# Patient Record
Sex: MALE | Race: WHITE | Employment: UNEMPLOYED | URBAN - METROPOLITAN AREA
[De-identification: names, ages, dates, MRNs, and addresses within clinical notes are randomized per-mention and may not be internally consistent; named-entity substitution may affect disease eponyms.]

---

## 2017-09-05 ENCOUNTER — APPOINTMENT (OUTPATIENT)
Dept: CT IMAGING | Age: 62
DRG: 044 | End: 2017-09-05
Attending: EMERGENCY MEDICINE
Payer: MEDICAID

## 2017-09-05 ENCOUNTER — APPOINTMENT (OUTPATIENT)
Dept: GENERAL RADIOLOGY | Age: 62
DRG: 044 | End: 2017-09-05
Attending: EMERGENCY MEDICINE
Payer: MEDICAID

## 2017-09-05 ENCOUNTER — HOSPITAL ENCOUNTER (INPATIENT)
Age: 62
LOS: 7 days | Discharge: HOME | DRG: 044 | End: 2017-09-12
Attending: EMERGENCY MEDICINE | Admitting: INTERNAL MEDICINE
Payer: MEDICAID

## 2017-09-05 DIAGNOSIS — R51.9 HEADACHE, UNSPECIFIED HEADACHE TYPE: ICD-10-CM

## 2017-09-05 DIAGNOSIS — S06.5XAA SUBDURAL HEMATOMA: Primary | ICD-10-CM

## 2017-09-05 DIAGNOSIS — I15.9 SECONDARY HYPERTENSION: ICD-10-CM

## 2017-09-05 PROBLEM — F31.9 BIPOLAR 1 DISORDER (HCC): Status: ACTIVE | Noted: 2017-09-05

## 2017-09-05 PROBLEM — I62.01 NONTRAUMATIC ACUTE SUBDURAL HEMORRHAGE (HCC): Status: ACTIVE | Noted: 2017-09-05

## 2017-09-05 LAB
ALBUMIN SERPL-MCNC: 3.6 G/DL (ref 3.2–4.6)
ALBUMIN/GLOB SERPL: 0.9 {RATIO} (ref 1.2–3.5)
ALP SERPL-CCNC: 38 U/L (ref 50–136)
ALT SERPL-CCNC: 16 U/L (ref 12–65)
ANION GAP SERPL CALC-SCNC: 14 MMOL/L (ref 7–16)
AST SERPL-CCNC: 10 U/L (ref 15–37)
ATRIAL RATE: 82 BPM
BASOPHILS # BLD: 0 K/UL (ref 0–0.2)
BASOPHILS NFR BLD: 0 % (ref 0–2)
BILIRUB SERPL-MCNC: 1.8 MG/DL (ref 0.2–1.1)
BUN SERPL-MCNC: 5 MG/DL (ref 8–23)
CALCIUM SERPL-MCNC: 9.3 MG/DL (ref 8.3–10.4)
CALCULATED P AXIS, ECG09: 61 DEGREES
CALCULATED R AXIS, ECG10: 54 DEGREES
CALCULATED T AXIS, ECG11: 53 DEGREES
CHLORIDE SERPL-SCNC: 101 MMOL/L (ref 98–107)
CO2 SERPL-SCNC: 20 MMOL/L (ref 21–32)
CREAT SERPL-MCNC: 0.66 MG/DL (ref 0.8–1.5)
DIAGNOSIS, 93000: NORMAL
DIFFERENTIAL METHOD BLD: ABNORMAL
EOSINOPHIL # BLD: 0 K/UL (ref 0–0.8)
EOSINOPHIL NFR BLD: 0 % (ref 0.5–7.8)
ERYTHROCYTE [DISTWIDTH] IN BLOOD BY AUTOMATED COUNT: 12.7 % (ref 11.9–14.6)
EST. AVERAGE GLUCOSE BLD GHB EST-MCNC: 148 MG/DL
GLOBULIN SER CALC-MCNC: 4.2 G/DL (ref 2.3–3.5)
GLUCOSE BLD STRIP.AUTO-MCNC: 137 MG/DL (ref 65–100)
GLUCOSE SERPL-MCNC: 138 MG/DL (ref 65–100)
HBA1C MFR BLD: 6.8 % (ref 4.8–6)
HCT VFR BLD AUTO: 41.4 % (ref 41.1–50.3)
HGB BLD-MCNC: 15.1 G/DL (ref 13.6–17.2)
IMM GRANULOCYTES # BLD: 0 K/UL (ref 0–0.5)
IMM GRANULOCYTES NFR BLD: 0.2 % (ref 0–5)
LYMPHOCYTES # BLD: 2 K/UL (ref 0.5–4.6)
LYMPHOCYTES NFR BLD: 17 % (ref 13–44)
MAGNESIUM SERPL-MCNC: 1.7 MG/DL (ref 1.8–2.4)
MCH RBC QN AUTO: 30.8 PG (ref 26.1–32.9)
MCHC RBC AUTO-ENTMCNC: 36.5 G/DL (ref 31.4–35)
MCV RBC AUTO: 84.3 FL (ref 79.6–97.8)
MONOCYTES # BLD: 1.8 K/UL (ref 0.1–1.3)
MONOCYTES NFR BLD: 15 % (ref 4–12)
NEUTS SEG # BLD: 8.2 K/UL (ref 1.7–8.2)
NEUTS SEG NFR BLD: 68 % (ref 43–78)
P-R INTERVAL, ECG05: 140 MS
PLATELET # BLD AUTO: 147 K/UL (ref 150–450)
PMV BLD AUTO: 10.3 FL (ref 10.8–14.1)
POTASSIUM SERPL-SCNC: 3.3 MMOL/L (ref 3.5–5.1)
PROT SERPL-MCNC: 7.8 G/DL (ref 6.3–8.2)
Q-T INTERVAL, ECG07: 368 MS
QRS DURATION, ECG06: 76 MS
QTC CALCULATION (BEZET), ECG08: 429 MS
RBC # BLD AUTO: 4.91 M/UL (ref 4.23–5.67)
SODIUM SERPL-SCNC: 135 MMOL/L (ref 136–145)
TROPONIN I SERPL-MCNC: <0.02 NG/ML (ref 0.02–0.05)
VENTRICULAR RATE, ECG03: 82 BPM
WBC # BLD AUTO: 12.1 K/UL (ref 4.3–11.1)

## 2017-09-05 PROCEDURE — 70450 CT HEAD/BRAIN W/O DYE: CPT

## 2017-09-05 PROCEDURE — 65610000001 HC ROOM ICU GENERAL

## 2017-09-05 PROCEDURE — 80053 COMPREHEN METABOLIC PANEL: CPT | Performed by: EMERGENCY MEDICINE

## 2017-09-05 PROCEDURE — 71020 XR CHEST PA LAT: CPT

## 2017-09-05 PROCEDURE — 83735 ASSAY OF MAGNESIUM: CPT | Performed by: INTERNAL MEDICINE

## 2017-09-05 PROCEDURE — 74011000250 HC RX REV CODE- 250: Performed by: EMERGENCY MEDICINE

## 2017-09-05 PROCEDURE — 84484 ASSAY OF TROPONIN QUANT: CPT | Performed by: EMERGENCY MEDICINE

## 2017-09-05 PROCEDURE — 82962 GLUCOSE BLOOD TEST: CPT

## 2017-09-05 PROCEDURE — 85025 COMPLETE CBC W/AUTO DIFF WBC: CPT | Performed by: EMERGENCY MEDICINE

## 2017-09-05 PROCEDURE — 74011250637 HC RX REV CODE- 250/637: Performed by: INTERNAL MEDICINE

## 2017-09-05 PROCEDURE — 93005 ELECTROCARDIOGRAM TRACING: CPT | Performed by: EMERGENCY MEDICINE

## 2017-09-05 PROCEDURE — 74011000250 HC RX REV CODE- 250: Performed by: INTERNAL MEDICINE

## 2017-09-05 PROCEDURE — 83036 HEMOGLOBIN GLYCOSYLATED A1C: CPT | Performed by: INTERNAL MEDICINE

## 2017-09-05 PROCEDURE — 74011250636 HC RX REV CODE- 250/636: Performed by: INTERNAL MEDICINE

## 2017-09-05 PROCEDURE — 99284 EMERGENCY DEPT VISIT MOD MDM: CPT | Performed by: EMERGENCY MEDICINE

## 2017-09-05 PROCEDURE — 74011000258 HC RX REV CODE- 258: Performed by: INTERNAL MEDICINE

## 2017-09-05 RX ORDER — IBUPROFEN 400 MG/1
400 TABLET ORAL
Status: DISCONTINUED | OUTPATIENT
Start: 2017-09-05 | End: 2017-09-08

## 2017-09-05 RX ORDER — PANTOPRAZOLE SODIUM 40 MG/1
40 TABLET, DELAYED RELEASE ORAL
Status: DISCONTINUED | OUTPATIENT
Start: 2017-09-06 | End: 2017-09-12 | Stop reason: HOSPADM

## 2017-09-05 RX ORDER — CARVEDILOL 25 MG/1
25 TABLET ORAL 2 TIMES DAILY WITH MEALS
Status: DISCONTINUED | OUTPATIENT
Start: 2017-09-06 | End: 2017-09-08

## 2017-09-05 RX ORDER — NALOXONE HYDROCHLORIDE 0.4 MG/ML
0.4 INJECTION, SOLUTION INTRAMUSCULAR; INTRAVENOUS; SUBCUTANEOUS AS NEEDED
Status: DISCONTINUED | OUTPATIENT
Start: 2017-09-05 | End: 2017-09-10

## 2017-09-05 RX ORDER — LABETALOL HYDROCHLORIDE 5 MG/ML
10 INJECTION, SOLUTION INTRAVENOUS
Status: DISCONTINUED | OUTPATIENT
Start: 2017-09-05 | End: 2017-09-05

## 2017-09-05 RX ORDER — NICARDIPINE HYDROCHLORIDE 0.1 MG/ML
5-15 INJECTION INTRAVENOUS
Status: DISCONTINUED | OUTPATIENT
Start: 2017-09-05 | End: 2017-09-05 | Stop reason: SDUPTHER

## 2017-09-05 RX ORDER — AMITRIPTYLINE HYDROCHLORIDE 50 MG/1
25 TABLET, FILM COATED ORAL
Status: CANCELLED | OUTPATIENT
Start: 2017-09-05

## 2017-09-05 RX ORDER — SODIUM CHLORIDE 0.9 % (FLUSH) 0.9 %
5-10 SYRINGE (ML) INJECTION AS NEEDED
Status: DISCONTINUED | OUTPATIENT
Start: 2017-09-05 | End: 2017-09-12 | Stop reason: HOSPADM

## 2017-09-05 RX ORDER — POTASSIUM CHLORIDE 20 MEQ/1
40 TABLET, EXTENDED RELEASE ORAL
Status: COMPLETED | OUTPATIENT
Start: 2017-09-05 | End: 2017-09-05

## 2017-09-05 RX ORDER — SODIUM CHLORIDE 0.9 % (FLUSH) 0.9 %
5-10 SYRINGE (ML) INJECTION EVERY 8 HOURS
Status: DISCONTINUED | OUTPATIENT
Start: 2017-09-05 | End: 2017-09-05 | Stop reason: SDUPTHER

## 2017-09-05 RX ORDER — MAGNESIUM SULFATE 1 G/100ML
1 INJECTION INTRAVENOUS ONCE
Status: COMPLETED | OUTPATIENT
Start: 2017-09-05 | End: 2017-09-05

## 2017-09-05 RX ORDER — TOPIRAMATE 50 MG/1
50 TABLET, FILM COATED ORAL
Status: DISCONTINUED | OUTPATIENT
Start: 2017-09-05 | End: 2017-09-08

## 2017-09-05 RX ORDER — INSULIN LISPRO 100 [IU]/ML
INJECTION, SOLUTION INTRAVENOUS; SUBCUTANEOUS
Status: DISCONTINUED | OUTPATIENT
Start: 2017-09-05 | End: 2017-09-12 | Stop reason: HOSPADM

## 2017-09-05 RX ORDER — ACETAMINOPHEN 325 MG/1
650 TABLET ORAL
Status: DISCONTINUED | OUTPATIENT
Start: 2017-09-05 | End: 2017-09-12 | Stop reason: HOSPADM

## 2017-09-05 RX ORDER — SPIRONOLACTONE 25 MG/1
25 TABLET ORAL DAILY
Status: DISCONTINUED | OUTPATIENT
Start: 2017-09-06 | End: 2017-09-12 | Stop reason: HOSPADM

## 2017-09-05 RX ORDER — DIVALPROEX SODIUM 500 MG/1
500 TABLET, EXTENDED RELEASE ORAL
Status: DISCONTINUED | OUTPATIENT
Start: 2017-09-05 | End: 2017-09-07

## 2017-09-05 RX ORDER — LISINOPRIL 5 MG/1
10 TABLET ORAL DAILY
Status: DISCONTINUED | OUTPATIENT
Start: 2017-09-06 | End: 2017-09-06

## 2017-09-05 RX ORDER — GABAPENTIN 300 MG/1
600 CAPSULE ORAL 3 TIMES DAILY
Status: CANCELLED | OUTPATIENT
Start: 2017-09-05

## 2017-09-05 RX ORDER — BISACODYL 5 MG
5 TABLET, DELAYED RELEASE (ENTERIC COATED) ORAL DAILY PRN
Status: DISCONTINUED | OUTPATIENT
Start: 2017-09-05 | End: 2017-09-12 | Stop reason: HOSPADM

## 2017-09-05 RX ORDER — SODIUM CHLORIDE 0.9 % (FLUSH) 0.9 %
5-10 SYRINGE (ML) INJECTION EVERY 8 HOURS
Status: DISCONTINUED | OUTPATIENT
Start: 2017-09-05 | End: 2017-09-12 | Stop reason: HOSPADM

## 2017-09-05 RX ORDER — HYDROCODONE BITARTRATE AND ACETAMINOPHEN 7.5; 325 MG/1; MG/1
1 TABLET ORAL
Status: DISCONTINUED | OUTPATIENT
Start: 2017-09-05 | End: 2017-09-07

## 2017-09-05 RX ORDER — ATORVASTATIN CALCIUM 40 MG/1
40 TABLET, FILM COATED ORAL DAILY
Status: CANCELLED | OUTPATIENT
Start: 2017-09-06

## 2017-09-05 RX ORDER — SODIUM CHLORIDE 0.9 % (FLUSH) 0.9 %
5-10 SYRINGE (ML) INJECTION AS NEEDED
Status: DISCONTINUED | OUTPATIENT
Start: 2017-09-05 | End: 2017-09-05 | Stop reason: SDUPTHER

## 2017-09-05 RX ADMIN — NICARDIPINE HYDROCHLORIDE 5 MG/HR: 0.1 INJECTION, SOLUTION INTRAVENOUS at 21:56

## 2017-09-05 RX ADMIN — SODIUM CHLORIDE 15 MG/HR: 900 INJECTION, SOLUTION INTRAVENOUS at 23:45

## 2017-09-05 RX ADMIN — MAGNESIUM SULFATE HEPTAHYDRATE 1 G: 1 INJECTION, SOLUTION INTRAVENOUS at 22:06

## 2017-09-05 RX ADMIN — HYDROCODONE BITARTRATE AND ACETAMINOPHEN 1 TABLET: 7.5; 325 TABLET ORAL at 23:10

## 2017-09-05 RX ADMIN — Medication 10 ML: at 21:55

## 2017-09-05 RX ADMIN — DIVALPROEX SODIUM 500 MG: 500 TABLET, EXTENDED RELEASE ORAL at 23:31

## 2017-09-05 RX ADMIN — TOPIRAMATE 50 MG: 50 TABLET ORAL at 23:31

## 2017-09-05 RX ADMIN — NICARDIPINE HYDROCHLORIDE 5 MG/HR: 0.1 INJECTION, SOLUTION INTRAVENOUS at 20:54

## 2017-09-05 RX ADMIN — POTASSIUM CHLORIDE 40 MEQ: 20 TABLET, EXTENDED RELEASE ORAL at 22:09

## 2017-09-05 NOTE — ED TRIAGE NOTES
Reports headache and shortness of breath x 1 months, went to [facility] [place] last month for possible stroke, pt denies stroke like symptoms currently
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
## 2017-09-06 ENCOUNTER — APPOINTMENT (OUTPATIENT)
Dept: MRI IMAGING | Age: 62
DRG: 044 | End: 2017-09-06
Attending: INTERNAL MEDICINE
Payer: MEDICAID

## 2017-09-06 ENCOUNTER — APPOINTMENT (OUTPATIENT)
Dept: CT IMAGING | Age: 62
DRG: 044 | End: 2017-09-06
Attending: INTERNAL MEDICINE
Payer: MEDICAID

## 2017-09-06 LAB
ANION GAP SERPL CALC-SCNC: 8 MMOL/L (ref 7–16)
BACTERIA SPEC CULT: NORMAL
BUN SERPL-MCNC: 5 MG/DL (ref 8–23)
CALCIUM SERPL-MCNC: 9.1 MG/DL (ref 8.3–10.4)
CHLORIDE SERPL-SCNC: 105 MMOL/L (ref 98–107)
CO2 SERPL-SCNC: 27 MMOL/L (ref 21–32)
CREAT SERPL-MCNC: 0.63 MG/DL (ref 0.8–1.5)
ERYTHROCYTE [DISTWIDTH] IN BLOOD BY AUTOMATED COUNT: 12.7 % (ref 11.9–14.6)
GLUCOSE BLD STRIP.AUTO-MCNC: 114 MG/DL (ref 65–100)
GLUCOSE BLD STRIP.AUTO-MCNC: 119 MG/DL (ref 65–100)
GLUCOSE BLD STRIP.AUTO-MCNC: 127 MG/DL (ref 65–100)
GLUCOSE BLD STRIP.AUTO-MCNC: 175 MG/DL (ref 65–100)
GLUCOSE SERPL-MCNC: 146 MG/DL (ref 65–100)
HCT VFR BLD AUTO: 43.7 % (ref 41.1–50.3)
HGB BLD-MCNC: 15.8 G/DL (ref 13.6–17.2)
INR PPP: 1.1 (ref 0.9–1.2)
MAGNESIUM SERPL-MCNC: 2.1 MG/DL (ref 1.8–2.4)
MCH RBC QN AUTO: 31 PG (ref 26.1–32.9)
MCHC RBC AUTO-ENTMCNC: 36.2 G/DL (ref 31.4–35)
MCV RBC AUTO: 85.7 FL (ref 79.6–97.8)
PLATELET # BLD AUTO: 139 K/UL (ref 150–450)
PMV BLD AUTO: 10.4 FL (ref 10.8–14.1)
POTASSIUM SERPL-SCNC: 3.3 MMOL/L (ref 3.5–5.1)
PROTHROMBIN TIME: 12.3 SEC (ref 9.6–12)
RBC # BLD AUTO: 5.1 M/UL (ref 4.23–5.67)
SERVICE CMNT-IMP: NORMAL
SODIUM SERPL-SCNC: 140 MMOL/L (ref 136–145)
WBC # BLD AUTO: 10.3 K/UL (ref 4.3–11.1)

## 2017-09-06 PROCEDURE — 36415 COLL VENOUS BLD VENIPUNCTURE: CPT | Performed by: INTERNAL MEDICINE

## 2017-09-06 PROCEDURE — 70450 CT HEAD/BRAIN W/O DYE: CPT

## 2017-09-06 PROCEDURE — 74011250637 HC RX REV CODE- 250/637: Performed by: INTERNAL MEDICINE

## 2017-09-06 PROCEDURE — 80048 BASIC METABOLIC PNL TOTAL CA: CPT | Performed by: INTERNAL MEDICINE

## 2017-09-06 PROCEDURE — 85027 COMPLETE CBC AUTOMATED: CPT | Performed by: INTERNAL MEDICINE

## 2017-09-06 PROCEDURE — 74011000258 HC RX REV CODE- 258: Performed by: INTERNAL MEDICINE

## 2017-09-06 PROCEDURE — 92610 EVALUATE SWALLOWING FUNCTION: CPT

## 2017-09-06 PROCEDURE — 97161 PT EVAL LOW COMPLEX 20 MIN: CPT

## 2017-09-06 PROCEDURE — 74011000250 HC RX REV CODE- 250: Performed by: INTERNAL MEDICINE

## 2017-09-06 PROCEDURE — 74011636637 HC RX REV CODE- 636/637: Performed by: INTERNAL MEDICINE

## 2017-09-06 PROCEDURE — 74011250636 HC RX REV CODE- 250/636

## 2017-09-06 PROCEDURE — 77030032490 HC SLV COMPR SCD KNE COVD -B

## 2017-09-06 PROCEDURE — 74011250636 HC RX REV CODE- 250/636: Performed by: INTERNAL MEDICINE

## 2017-09-06 PROCEDURE — 82962 GLUCOSE BLOOD TEST: CPT

## 2017-09-06 PROCEDURE — 83735 ASSAY OF MAGNESIUM: CPT | Performed by: INTERNAL MEDICINE

## 2017-09-06 PROCEDURE — 65610000001 HC ROOM ICU GENERAL

## 2017-09-06 PROCEDURE — 87641 MR-STAPH DNA AMP PROBE: CPT | Performed by: INTERNAL MEDICINE

## 2017-09-06 PROCEDURE — 85610 PROTHROMBIN TIME: CPT | Performed by: EMERGENCY MEDICINE

## 2017-09-06 RX ORDER — LORAZEPAM 2 MG/ML
1 INJECTION INTRAMUSCULAR ONCE
Status: COMPLETED | OUTPATIENT
Start: 2017-09-06 | End: 2017-09-06

## 2017-09-06 RX ORDER — ONDANSETRON 2 MG/ML
4 INJECTION INTRAMUSCULAR; INTRAVENOUS
Status: DISCONTINUED | OUTPATIENT
Start: 2017-09-06 | End: 2017-09-12 | Stop reason: HOSPADM

## 2017-09-06 RX ORDER — CHLORTHALIDONE 25 MG/1
50 TABLET ORAL DAILY
Status: DISCONTINUED | OUTPATIENT
Start: 2017-09-06 | End: 2017-09-08

## 2017-09-06 RX ORDER — POTASSIUM CHLORIDE 20 MEQ/1
40 TABLET, EXTENDED RELEASE ORAL 2 TIMES DAILY
Status: DISCONTINUED | OUTPATIENT
Start: 2017-09-06 | End: 2017-09-08

## 2017-09-06 RX ORDER — ONDANSETRON 2 MG/ML
INJECTION INTRAMUSCULAR; INTRAVENOUS
Status: COMPLETED
Start: 2017-09-06 | End: 2017-09-06

## 2017-09-06 RX ORDER — HYDRALAZINE HYDROCHLORIDE 20 MG/ML
10 INJECTION INTRAMUSCULAR; INTRAVENOUS
Status: DISCONTINUED | OUTPATIENT
Start: 2017-09-06 | End: 2017-09-12 | Stop reason: HOSPADM

## 2017-09-06 RX ORDER — POTASSIUM CHLORIDE 20MEQ/15ML
40 LIQUID (ML) ORAL 2 TIMES DAILY
Status: DISCONTINUED | OUTPATIENT
Start: 2017-09-06 | End: 2017-09-06

## 2017-09-06 RX ORDER — LORAZEPAM 2 MG/ML
INJECTION INTRAMUSCULAR
Status: COMPLETED
Start: 2017-09-06 | End: 2017-09-06

## 2017-09-06 RX ORDER — LISINOPRIL 20 MG/1
40 TABLET ORAL DAILY
Status: DISCONTINUED | OUTPATIENT
Start: 2017-09-07 | End: 2017-09-08

## 2017-09-06 RX ADMIN — INSULIN LISPRO 2 UNITS: 100 INJECTION, SOLUTION INTRAVENOUS; SUBCUTANEOUS at 08:57

## 2017-09-06 RX ADMIN — CHLORTHALIDONE 50 MG: 25 TABLET ORAL at 16:57

## 2017-09-06 RX ADMIN — Medication 10 ML: at 21:02

## 2017-09-06 RX ADMIN — ONDANSETRON: 2 INJECTION INTRAMUSCULAR; INTRAVENOUS at 20:00

## 2017-09-06 RX ADMIN — SODIUM CHLORIDE 15 MG/HR: 900 INJECTION, SOLUTION INTRAVENOUS at 05:16

## 2017-09-06 RX ADMIN — SODIUM CHLORIDE 15 MG/HR: 900 INJECTION, SOLUTION INTRAVENOUS at 11:42

## 2017-09-06 RX ADMIN — Medication 10 ML: at 05:13

## 2017-09-06 RX ADMIN — SODIUM CHLORIDE 15 MG/HR: 900 INJECTION, SOLUTION INTRAVENOUS at 07:30

## 2017-09-06 RX ADMIN — SODIUM CHLORIDE 15 MG/HR: 900 INJECTION, SOLUTION INTRAVENOUS at 17:09

## 2017-09-06 RX ADMIN — LORAZEPAM 1 MG: 2 INJECTION INTRAMUSCULAR; INTRAVENOUS at 19:22

## 2017-09-06 RX ADMIN — Medication 10 ML: at 13:21

## 2017-09-06 RX ADMIN — POTASSIUM CHLORIDE 40 MEQ: 20 TABLET, EXTENDED RELEASE ORAL at 17:00

## 2017-09-06 RX ADMIN — SPIRONOLACTONE 25 MG: 25 TABLET, FILM COATED ORAL at 08:56

## 2017-09-06 RX ADMIN — ONDANSETRON 4 MG: 2 INJECTION INTRAMUSCULAR; INTRAVENOUS at 19:22

## 2017-09-06 RX ADMIN — CARVEDILOL 25 MG: 25 TABLET, FILM COATED ORAL at 16:24

## 2017-09-06 RX ADMIN — HYDRALAZINE HYDROCHLORIDE 10 MG: 20 INJECTION INTRAMUSCULAR; INTRAVENOUS at 12:42

## 2017-09-06 RX ADMIN — SODIUM CHLORIDE 15 MG/HR: 900 INJECTION, SOLUTION INTRAVENOUS at 09:57

## 2017-09-06 RX ADMIN — LISINOPRIL 10 MG: 5 TABLET ORAL at 08:56

## 2017-09-06 RX ADMIN — SODIUM CHLORIDE 15 MG/HR: 900 INJECTION, SOLUTION INTRAVENOUS at 19:28

## 2017-09-06 RX ADMIN — DIVALPROEX SODIUM 500 MG: 500 TABLET, EXTENDED RELEASE ORAL at 21:01

## 2017-09-06 RX ADMIN — HYDROCODONE BITARTRATE AND ACETAMINOPHEN 1 TABLET: 7.5; 325 TABLET ORAL at 20:57

## 2017-09-06 RX ADMIN — SODIUM CHLORIDE 15 MG/HR: 900 INJECTION, SOLUTION INTRAVENOUS at 01:31

## 2017-09-06 RX ADMIN — LORAZEPAM 1 MG: 2 INJECTION, SOLUTION INTRAMUSCULAR; INTRAVENOUS at 20:00

## 2017-09-06 RX ADMIN — TOPIRAMATE 50 MG: 50 TABLET ORAL at 21:01

## 2017-09-06 RX ADMIN — PANTOPRAZOLE SODIUM 40 MG: 40 TABLET, DELAYED RELEASE ORAL at 08:57

## 2017-09-06 RX ADMIN — CARVEDILOL 25 MG: 25 TABLET, FILM COATED ORAL at 08:56

## 2017-09-06 RX ADMIN — SODIUM CHLORIDE 15 MG/HR: 900 INJECTION, SOLUTION INTRAVENOUS at 15:31

## 2017-09-06 RX ADMIN — SODIUM CHLORIDE 15 MG/HR: 900 INJECTION, SOLUTION INTRAVENOUS at 13:20

## 2017-09-06 RX ADMIN — POTASSIUM CHLORIDE 40 MEQ: 20 TABLET, EXTENDED RELEASE ORAL at 11:42

## 2017-09-06 NOTE — PROGRESS NOTES
Bedside and Verbal shift change report given to Jeri [address] (oncoming nurse) by Liz Avila (offgoing nurse). Report included the following information SBAR, Kardex, ED Summary, Procedure Summary, Intake/Output, MAR, Accordion, Recent Results, Med Rec Status, Cardiac Rhythm SR and Alarm Parameters . Patient alert and oriented. Denies numbness or tingling. Denies weakness. Neuro intact. Patient demanding and agitated at times.
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
## 2017-09-06 NOTE — PROGRESS NOTES
Neurological changes noted. All extremities weakened, but he is able to move all extremities. Bilateral facial weakness noted. Patient denies numbness or tingling. Able to tell me his name and date of birth with delayed responses. Pupils PERRLA at 3mm. Dr. Gini Davies paged and awaiting callback.

## 2017-09-06 NOTE — PROGRESS NOTES
Patient's systolic >697 at times. Reported to Dr. Erick Gardner. Patient maxed on cardene drip. No new orders at this time.

## 2017-09-06 NOTE — PROGRESS NOTES
Bedside report from Levine, Susan. \Hospital Has a New Name and Outlook.\"". Bed alarm on.  BP WNL. Will lrepeat BP to see if Cardene can be weaned.

## 2017-09-06 NOTE — CONSULTS
LEAPFROG PROTOCOL NOTE    Di Vaibhav  9/6/2017    The patient is currently in the critical care setting managed by Dr. Jose Maria Newby with Nontraumatic acute subdural hemorrhage. The patient's chart is reviewed and the patient is discussed with the staff. Patient is currently hemodynamically stable. Patient has no needs identified for Intensivist management in the critical care setting at this time. Please notify us if can be of assistance. No charge billed to the patient. Thank you.     Patricia Aguilar, NP

## 2017-09-06 NOTE — INTERDISCIPLINARY ROUNDS
Interdisciplinary team rounds were held 9/6/2017 with the following team members:Nursing, Nurse Practitioner, Nutrition, Palliative Care, Pastoral Care, Pharmacy, Physical Therapy, Physician, Respiratory Therapy and Clinical Coordinator and the patient. Plan of care discussed. See clinical pathway and/or care plan for interventions and desired outcomes.

## 2017-09-06 NOTE — PROGRESS NOTES
NS  CT SHOWS NO DEFINITE ACUTE HEMORRHAGE  SMALL LINEAR DENSITY ALONG THE FALX OF UNCERTAIN SIGNIFICANCE  NO MASS EFFECT  A/P NOT A SURGICAL SITUATION AT ALL  AGREE WITH DR Sejal Rivera MD
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
## 2017-09-06 NOTE — PROGRESS NOTES
Problem: Mobility Impaired (Adult and Pediatric)  Goal: *Acute Goals and Plan of Care (Insert Text)      PHYSICAL THERAPY: INITIAL ASSESSMENT, DISCHARGE, AM 9/6/2017  INPATIENT: Hospital Day: 2  Payor: [facility] MEDICAID / Plan: [facility] MEDICAID / Product Type: Medicaid /      NAME/AGE/GENDER: Luis Mathew is a 59 y.o. male    PRIMARY DIAGNOSIS: Nontraumatic acute subdural hemorrhage (HCC) Nontraumatic acute subdural hemorrhage (HCC) Nontraumatic acute subdural hemorrhage (HCC)        ICD-10: Treatment Diagnosis:       · Generalized Muscle Weakness (M62.81)   Precaution/Allergies:  Oyster extract       ASSESSMENT:      Mr. Barbie Jaffe is a 59 y.o. male in the hospital for the above who was supine in bed upon arrival.  He reports that he lives with his wife and 5 grandchildren in one story house. Pt also stated that he was independent with ADLS/ambulation PTA with no recent falls reported. Mr. Barbie Jaffe presents to PT with [facility] AROM and some weakness in B hip flexors (4/5). Pt reports intact sensation to light touch in B L3-S2 dermatomes and has WFL B LE coordination. Pt was able to come to sitting on edge of bed with modified independence and good sitting balance. He was also able to transfers and ambulate with supervision-modified independence. Pt at first used IV pole but was able to ambulate without while demonstrating good to fair + standing balance. His gait speed is slightly decreased but pt had no loss of balance throughout out of bed activity. Pt's BP recorded at 160/80 in sitting position after ambulation. Mr. Barbie Jaffe appears to be functioning close to baseline and will be discharged from PT. Please re-consult if status changes. 1.           RECOMMENDED REHABILITATION/EQUIPMENT: (at time of discharge pending progress): Due to the probability of continued deficits (see above) this patient will not likely need continued skilled physical therapy after discharge.   Equipment: · None at this time                   HISTORY:   History of Present Injury/Illness (Reason for Referral):  Per H&P:   HPI:   59M with PMH of complicated migraines with TIA symptoms presented to the ER with worsening headache since morning. Pt states he has been having headaches for about 3-4 months that have been off and on, Right posterior area, feels different than his migraines. Has been taking tylenol and sometimes takes percocet but headache didn't resolve completely. Today it was so worse that he came to the ER. CT head showed Increased linear density along the posterior falx subsequently asymmetrically extending along the right tentorial leaflet. A hairline acute subdural hematoma cannot be excluded. Images were reviewed by on call Neurosurgery and it looked like a small SHD. Pt denies any head trauma. His BP was 183/93 in ER. Hospitalist asked to admit. Pt states his headache has eased off for now. Denies any visual or speech related changes, any new numbness or weakness, dizziness or falls. Just feels weak overall. Wife at bedside concerned about depression as Pt has been feeling like he's gonna die and has self DC'd some of his home meds. He denies any SI/HI. No recent illness or fever reported. 10 point ROS done and is negative except as noted in HPI. Past Medical History/Comorbidities:   Mr. Leticia Reyes  has a past medical history of Anxiety (6/21/2016); Arrhythmia; Asthma (6/21/2016); Bipolar affective disorder, manic ([address] (5/16/2011); CAD (coronary artery disease) (6/21/2016); CHF (congestive heart failure)  (6/21/2016); Chronic back pain (6/21/2016); COPD (chronic obstructive pulmonary disease) ([address] (1/21/2016); Coronary atherosclerosis of native coronary artery (7/20/2014); CVA (cerebral vascular accident) ([address] (3/29/2011); Depression (6/21/2016); Diabetes mellitus type 2, controlled ([address] (11/5/2010);  Diabetic neuropathy ([facility] (6/21/2016); DM2 (diabetes mellitus, type 2) ([address] (7/20/2014); Dyslipidemia (11/5/2010); GERD (gastroesophageal reflux disease); Heart failure ([facility] HTN (hypertension) (7/20/2014); Hypertensive cardiovascular disease (11/5/2010); Insomnia (6/21/2016); Left leg weakness (5/12/2011); Left-sided weakness (8/28/2015); Migraine (7/20/2014); Neurological disorder; Thrombocytopenia ([facility] (6/21/2016); Unintentional weight loss (6/21/2016); and Weakness of left arm (5/12/2011). He also has no past medical history of COPD. Mr. Marci Felder  has a past surgical history that includes hernia repair; ptca; other surgical; and tonsil and adenoidectomy. Social History/Living Environment:   Home Environment: Private residence  # Steps to Enter: 1  One/Two Story Residence: One story  Living Alone: No  Support Systems: Spouse/Significant Other/Partner  Patient Expects to be Discharged to[de-identified] Unknown  Current DME Used/Available at Home: Cane, straight  Tub or Shower Type: Tub/Shower combination  Prior Level of Function/Work/Activity:  Lives with his wife and grandchildren in one story house with one step to enter. Independent with ADLs/ambulation PTA with no recent falls. Number of Personal Factors/Comorbidities that affect the Plan of Care:   Anxiety  Bipolar  CHF  COPD  Chronic back pain  DM II 3+: HIGH COMPLEXITY   EXAMINATION:   Most Recent Physical Functioning:   Gross Assessment:  AROM: Within functional limits  Strength: Generally decreased, functional (B hip flexors 4/5)  Coordination: Within functional limits  Sensation: Intact               Posture:     Balance:  Sitting: Intact  Standing: Impaired  Standing - Static: Good  Standing - Dynamic : Fair (+) Bed Mobility:  Supine to Sit: Modified independent  Sit to Supine: Modified independent  Scooting: Modified independent  Wheelchair Mobility:     Transfers:  Sit to Stand: Supervision  Stand to Sit: Modified independent  Gait:     Gait Abnormalities: Decreased step clearance  Distance (ft): 500 Feet (ft)  Ambulation - Level of Assistance: Modified independent       Body Structures Involved:  1. Nerves Body Functions Affected:  1. None Activities and Participation Affected:  1. None   Number of elements that affect the Plan of Care: 3: MODERATE COMPLEXITY   CLINICAL PRESENTATION:   Presentation: Stable and uncomplicated: LOW COMPLEXITY   CLINICAL DECISION MAKIN [facility] [address] AM-PAC 6 Clicks   Basic Mobility Inpatient Short Form  How much difficulty does the patient currently have. .. Unable A Lot A Little None   1. Turning over in bed (including adjusting bedclothes, sheets and blankets)? [ ] 1   [ ] 2   [ ] 3   [X] 4   2. Sitting down on and standing up from a chair with arms ( e.g., wheelchair, bedside commode, etc.)   [ ] 1   [ ] 2   [ ] 3   [X] 4   3. Moving from lying on back to sitting on the side of the bed? [ ] 1   [ ] 2   [ ] 3   [X] 4   How much help from another person does the patient currently need. .. Total A Lot A Little None   4. Moving to and from a bed to a chair (including a wheelchair)? [ ] 1   [ ] 2   [ ] 3   [X] 4   5. Need to walk in hospital room? [ ] 1   [ ] 2   [ ] 3   [X] 4   6. Climbing 3-5 steps with a railing? [ ] 1   [ ] 2   [ ] 3   [X] 4   © , Trustees of [address] [place] [address] [address] [address], under license to [employer]. All rights reserved    Score:  Initial: 24 Most Recent: X (Date: -- )     Interpretation of Tool:  Represents activities that are increasingly more difficult (i.e. Bed mobility, Transfers, Gait).        Score 24 23 22-20 19-15 14-10 9-7 6       Modifier CH CI CJ CK CL CM CN         · Mobility - Walking and Moving Around:               - CURRENT STATUS:    CH - 0% impaired, limited or restricted               - GOAL STATUS:           CH - 0% impaired, limited or restricted               - D/C STATUS:                       CH - 0% impaired, limited or restricted  Payor: [employer] [place] / [place] [employer] [facility] [place] / Product Type: Medicaid /        Use of outcome tool(s) and clinical judgement create a POC that gives a: Clear prediction of patient's progress: LOW COMPLEXITY                 TREATMENT:   (In addition to Assessment/Re-Assessment sessions the following treatments were rendered)   Pre-treatment Symptoms/Complaints:  None  Pain: Initial:   Pain Intensity 1: 0  Post Session:  0      Assessment/Reassessment only, no treatment provided today     Braces/Orthotics/Lines/Etc:   · IV  · O2 Device: Room air  Treatment/Session Assessment:    · Response to Treatment:  Pt tolerated well with no complaints. · Interdisciplinary Collaboration:  · Physical Therapist  · Registered Nurse  · Rehabilitation Attendant  · After treatment position/precautions:  · Supine in bed  · Bed/Chair-wheels locked  · Bed in low position  · Call light within reach  · RN notified  · Side rails x 3  · Compliance with Program/Exercises: Will assess as treatment progresses. · Recommendations/Intent for next treatment session: \"Next visit will focus on advancements to more challenging activities and reduction in assistance provided\".   Total Treatment Duration:  PT Patient Time In/Time Out  Time In: 6859  Time Out: 1669 Sadia Harris PT, DPT

## 2017-09-06 NOTE — PROGRESS NOTES
Summoned to room by bed alarm. Pt refuses to stay in bed, stating, \"I just need to get some air\". 02 sat 98% on room air. Pt alert and oriented x 4, but states, \"Just leave me alone\". Fauzia Taylor assisting pt up to commode and back to bed. Bed alarm in use.

## 2017-09-06 NOTE — PROGRESS NOTES
Patient's blood pressure sustaining systolics > 452. Dr. Kaushik Amaya notified. No new orders at this time. Cardene drip maxed and hydralazine given.

## 2017-09-06 NOTE — PROGRESS NOTES
During pt education about addition of PRN Hydralazine to attain SBP goal of 140's, pt stated that he \"has already had too many medicines tonight\". The pt is concerned that his BP will continue to decrease and that it will cause him to [facility] something bad happen\". Pt's right to refuse treatment observed and  notified. Currently, pt is not having any overt neuro changes except that his headache is \"almost gone\". BP monitoring will continue.

## 2017-09-06 NOTE — PROGRESS NOTES
* No dysphagia goals identified as swallow function is within normal limits. STG: Patient will participate in speech-language/cognitive assessment. Speech language pathology: bedside swallow note: Initial Assessment and Discharge    NAME/AGE/GENDER: Levan Peabody is a 59 y.o. male  DATE: 9/6/2017  PRIMARY DIAGNOSIS: Nontraumatic acute subdural hemorrhage ([facility]       ICD-10: Treatment Diagnosis: R13.12 Oropharyngeal Dysphagia. INTERDISCIPLINARY COLLABORATION: Registered Nurse  PRECAUTIONS/ALLERGIES: Oyster extract ASSESSMENT:Based on the objective data described below, Mr. Josue Son presents with swallow function that is within normal limits. He reports consuming regular diet prior to admission. He reports some \"drooling\" this AM, but relates it to medication provided by RN. No gross asymmetries noted on oral motor assessment. He also endorses changes in cognition over last 2 weeks, but did not elaborate on specific difficulties despite questioning from clinician. He was presented with thin via cup and straw, mixed, and solid consistencies. He demonstrated timely mastication with appropriate swallow initiation of mixed and solids. Single swallow palpated and adequate oral clearing. No drooling noted with liquids or solids. Consecutive sips of thin via straw consumed without overt signs or symptoms of airway compromise. Swallow function is within normal limits. Recommend continued with prescribed diet. No additional dysphagia treatment is warranted. ST to follow up with cognitive-linguistic assessment secondary to patient's reports of difficulties at home. Patient will benefit from skilled intervention to address the below impairments. ?????? ? ? This section established at most recent assessment??????????  PROBLEM LIST (Impairments causing functional limitations):  1.  Oropharyngeal dysphagia  REHABILITATION POTENTIAL FOR STATED GOALS: Excellent  PLAN OF CARE:   Patient will benefit from skilled intervention to address the following impairments. RECOMMENDATIONS AND PLANNED INTERVENTIONS (Benefits and precautions of therapy have been discussed with the patient.):  · continue prescribed diet  MEDICATIONS:  · With liquid  COMPENSATORY STRATEGIES/MODIFICATIONS INCLUDING:  · None  OTHER RECOMMENDATIONS (including follow up treatment recommendations):   · speech-language/cognitive assessment  RECOMMENDED DIET MODIFICATIONS DISCUSSED WITH:  · Nursing  · Patient  FREQUENCY/DURATION: Continue to follow patient 3 times a week for duration of hospital stay to address above goals. RECOMMENDED REHABILITATION/EQUIPMENT: (at time of discharge pending progress): Due to the probability of continued deficits (see above) this patient will not likely need continued skilled speech therapy after discharge. SUBJECTIVE:   \"I am going home today. I want to see my grandbabies. \"  History of Present Injury/Illness: Mr. Winnie Berumen  has a past medical history of Anxiety (6/21/2016); Arrhythmia; Asthma (6/21/2016); Bipolar affective disorder, manic ([address] (5/16/2011); CAD (coronary artery disease) (6/21/2016); CHF (congestive heart failure)  (6/21/2016); Chronic back pain (6/21/2016); COPD (chronic obstructive pulmonary disease) ([address] (1/21/2016); Coronary atherosclerosis of native coronary artery (7/20/2014); CVA (cerebral vascular accident) ([address] (3/29/2011); Depression (6/21/2016); Diabetes mellitus type 2, controlled ([address] (11/5/2010); Diabetic neuropathy ([address] (6/21/2016); DM2 (diabetes mellitus, type 2) ([address] (7/20/2014); Dyslipidemia (11/5/2010); GERD (gastroesophageal reflux disease); Heart failure ([address] HTN (hypertension) (7/20/2014); Hypertensive cardiovascular disease (11/5/2010); Insomnia (6/21/2016); Left leg weakness (5/12/2011); Left-sided weakness (8/28/2015); Migraine (7/20/2014); Neurological disorder; Thrombocytopenia ([address] (6/21/2016); Unintentional weight loss (6/21/2016); and Weakness of left arm (5/12/2011).  He also has no past medical history of COPD. Josh Nicole He also  has a past surgical history that includes hernia repair; ptca; other surgical; and tonsil and adenoidectomy. Present Symptoms: No dysphagia symptoms identified   Pain Intensity 1: 0  Pain Location 1: Head  Pain Orientation 1: Left  Pain Intervention(s) 1: Medication (see MAR)  Current Medications:   No current facility-administered medications on file prior to encounter. Current Outpatient Prescriptions on File Prior to Encounter   Medication Sig Dispense Refill    oxyCODONE-acetaminophen (PERCOCET)  mg per tablet Take 1 Tab by mouth every six (6) hours as needed for Pain. Max Daily Amount: 4 Tabs. 15 Tab 0    gabapentin (NEURONTIN) 600 mg tablet Take  by mouth three (3) times daily.  divalproex ER (DEPAKOTE ER) 500 mg ER tablet Take 1 Tab by mouth nightly. 30 Tab 0    topiramate (TOPAMAX) 25 mg tablet Take 2 Tabs by mouth nightly. 30 Tab 0    metFORMIN (GLUCOPHAGE) 1,000 mg tablet Take 1,000 mg by mouth two (2) times daily (with meals).  lisinopril (PRINIVIL, ZESTRIL) 10 mg tablet Take 10 mg by mouth daily.  spironolactone (ALDACTONE) 25 mg tablet Take 25 mg by mouth daily.  omeprazole (PRILOSEC) 20 mg capsule Take 20 mg by mouth daily.  carvedilol (COREG) 25 mg tablet Take 25 mg by mouth two (2) times daily (with meals).  nitroglycerin (NITROSTAT) 0.4 mg SL tablet 1 Tab by SubLINGual route as needed for Chest Pain. 1 Bottle 0     Current Dietary Status:  Regular/thin      Social History/Home Situation:    Home Environment: Private residence  # Steps to Enter: 1  One/Two Story Residence: One story  Living Alone: No  Support Systems: Mu-ism / hodan community, Family member(s)  Patient Expects to be Discharged to[de-identified] Private residence  Current DME Used/Available at Home: Glucometer  OBJECTIVE:   Respiratory Status:  Room air     CXR Results:No acute cardiopulmonary process evident by plain film imaging. MRI/CT Results:  Increased linear density along the posterior falx subsequently  asymmetrically extending along the right tentorial leaflet. A hairline acute  subdural hematoma cannot be excluded especially given its asymmetric appearance  along the tentorial leaflets. MRI pending  Oral Motor Structure/Speech:  Oral-Motor Structure/Motor Speech  Labial: No impairment  Dentition: Upper & lower dentures  Oral Hygiene: Adequate  Lingual: No impairment    Cognitive and Communication Status:  Neurologic State: Alert; Appropriate for age  Orientation Level: Oriented X4  Cognition: Follows commands  Perception: Appears intact  Perseveration: No perseveration noted  Safety/Judgement: Fall prevention    BEDSIDE SWALLOW EVALUATION  Oral Assessment:  Oral Assessment  Labial: No impairment  Dentition: Upper & lower dentures  Oral Hygiene: Adequate  Lingual: No impairment  P.O. Trials:  Patient Position: upright in bed    The patient was given tsp-small bite amounts of the following:   Consistency Presented: Solid;Mixed consistency; Thin liquid  How Presented: SLP-fed/presented;Spoon;Straw;Cup/sip; Successive swallows    ORAL PHASE:  Bolus Acceptance: No impairment  Bolus Formation/Control: No impairment  Propulsion: No impairment          PHARYNGEAL PHASE:  Initiation of Swallow: No impairment  Laryngeal Elevation: Functional  Aspiration Signs/Symptoms: None  Vocal Quality: No impairment           Pharyngeal Phase Characteristics: No impairment, issues, or problems     OTHER OBSERVATIONS:  Rate/bite size: WNL   Endurance: WNL   Comments:       Tool Used: Dysphagia Outcome and Severity Scale (DAI)    Score Comments   Normal Diet  [] 7 With no strategies or extra time needed   Functional Swallow  [] 6 May have mild oral or pharyngeal delay       Mild Dysphagia    [] 5 Which may require one diet consistency restricted (those who demonstrate penetration which is entirely cleared on MBS would be included)   Mild-Moderate Dysphagia  [] 4 With 1-2 diet consistencies restricted       Moderate Dysphagia  [] 3 With 2 or more diet consistencies restricted       Moderately Severe Dysphagia  [] 2 With partial PO strategies (trials with ST only)       Severe Dysphagia  [] 1 With inability to tolerate any PO safely          Score:  Initial: 7 Most Recent: X (Date: -- )   Interpretation of Tool: The Dysphagia Outcome and Severity Scale (DAI) is a simple, easy-to-use, 7-point scale developed to systematically rate the functional severity of dysphagia based on objective assessment and make recommendations for diet level, independence level, and type of nutrition. Score 7 6 5 4 3 2 1   Modifier CH CI CJ CK CL CM CN   ? Swallowing:     - CURRENT STATUS: CH - 0% impaired, limited or restricted    - GOAL STATUS:  CH - 0% impaired, limited or restricted    - D/C STATUS:  CH - 0% impaired, limited or restricted  [place] [employer] [place] / [place] [employer] [place] / Product Type: Medicaid /     TREATMENT:    (In addition to Assessment/Re-Assessment sessions the following treatments were rendered)  Assessment/Reassessment only, no treatment provided today  MODALITIES:                                                                    ORAL MOTOR  EXERCISES:                                                                                                                                                                      LARYNGEAL / PHARYNGEAL EXERCISES:                                                                                                                                     __________________________________________________________________________________________________  Safety:   After treatment position/precautions:  · Upright in Bed  Recommendations/Intent for next treatment session: \"Treatment next visit will focus on speech-language/cognitive assessment\".     Total Treatment Duration:  Time In: 9933  Time Out: [address] [place]. FELIX Mathew, CCC-SLP, CBIS

## 2017-09-06 NOTE — PROGRESS NOTES
Spoke with Dr. Delon Ham, no concern for any intracranial bleed. Will DC Cardene gtt and transfer out to remote tele. No need for aggressive SBP control to < 140. Will order CT in am and if -ve, then he can be discharged. Consider telepsych eval if he is agitated and develops acute psychosis. Pt is very mistrustful and delusional about meds given by RN, believes these meds will make him worse and kill him.
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
## 2017-09-06 NOTE — PROGRESS NOTES
Spiritual Care visit. Initial visit.  visited and prayed with patient at bedside. 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 Incidentally, he said the owner of the building had asked him to move out because he (the owner) was going to   [facility] the building. He also spoke of some challenging health issues he is facing, and about his anxiety about them.  conversed with him about God's call to faithfulness, and his role in obedience.

## 2017-09-06 NOTE — PROGRESS NOTES
PT Note:  Per RN pt is not appropriate at this time for PT due to cognitive status and orders should be cancelled at this time. Please re-consult when pt is appropriate.   Thanks,  Terell Enrique, PT, DPT
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
## 2017-09-06 NOTE — PROGRESS NOTES
Spoke to  as he rounded on unit about Cardene gtt 15mg/hr ( 150cc/hr) not being effective to get SBP down to 140's. Order for PRN Hydralzine received.

## 2017-09-06 NOTE — PROGRESS NOTES
Bedside report to Ramiro Martin RN. Pt has been standing up during report. His Cardene gtt is awaiting a new bag. Pt is currently argumentative as oncoming RN is attempting to do effective pt teaching about his need for further cooperation with his treatment plan which includes not getting out of bed and use of further antihypertensives for BP control. Neurosurgery is to be contacted on day shift.

## 2017-09-07 ENCOUNTER — APPOINTMENT (OUTPATIENT)
Dept: CT IMAGING | Age: 62
DRG: 044 | End: 2017-09-07
Attending: INTERNAL MEDICINE
Payer: MEDICAID

## 2017-09-07 LAB
ANION GAP SERPL CALC-SCNC: 11 MMOL/L (ref 7–16)
BUN SERPL-MCNC: 10 MG/DL (ref 8–23)
CALCIUM SERPL-MCNC: 8.9 MG/DL (ref 8.3–10.4)
CHLORIDE SERPL-SCNC: 108 MMOL/L (ref 98–107)
CO2 SERPL-SCNC: 24 MMOL/L (ref 21–32)
CREAT SERPL-MCNC: 0.71 MG/DL (ref 0.8–1.5)
GLUCOSE BLD STRIP.AUTO-MCNC: 132 MG/DL (ref 65–100)
GLUCOSE BLD STRIP.AUTO-MCNC: 141 MG/DL (ref 65–100)
GLUCOSE BLD STRIP.AUTO-MCNC: 150 MG/DL (ref 65–100)
GLUCOSE BLD STRIP.AUTO-MCNC: 174 MG/DL (ref 65–100)
GLUCOSE SERPL-MCNC: 126 MG/DL (ref 65–100)
POTASSIUM SERPL-SCNC: 3.7 MMOL/L (ref 3.5–5.1)
SODIUM SERPL-SCNC: 143 MMOL/L (ref 136–145)

## 2017-09-07 PROCEDURE — 74011000250 HC RX REV CODE- 250: Performed by: INTERNAL MEDICINE

## 2017-09-07 PROCEDURE — 74011250637 HC RX REV CODE- 250/637: Performed by: INTERNAL MEDICINE

## 2017-09-07 PROCEDURE — 96125 COGNITIVE TEST BY HC PRO: CPT

## 2017-09-07 PROCEDURE — 65660000000 HC RM CCU STEPDOWN

## 2017-09-07 PROCEDURE — 70450 CT HEAD/BRAIN W/O DYE: CPT

## 2017-09-07 PROCEDURE — 97530 THERAPEUTIC ACTIVITIES: CPT

## 2017-09-07 PROCEDURE — 97166 OT EVAL MOD COMPLEX 45 MIN: CPT

## 2017-09-07 PROCEDURE — 82962 GLUCOSE BLOOD TEST: CPT

## 2017-09-07 PROCEDURE — 97535 SELF CARE MNGMENT TRAINING: CPT

## 2017-09-07 PROCEDURE — 80048 BASIC METABOLIC PNL TOTAL CA: CPT | Performed by: INTERNAL MEDICINE

## 2017-09-07 PROCEDURE — 74011250636 HC RX REV CODE- 250/636: Performed by: INTERNAL MEDICINE

## 2017-09-07 PROCEDURE — 36415 COLL VENOUS BLD VENIPUNCTURE: CPT | Performed by: INTERNAL MEDICINE

## 2017-09-07 PROCEDURE — 74011636637 HC RX REV CODE- 636/637: Performed by: INTERNAL MEDICINE

## 2017-09-07 RX ORDER — DIVALPROEX SODIUM 125 MG/1
250 TABLET, DELAYED RELEASE ORAL EVERY 12 HOURS
Status: DISCONTINUED | OUTPATIENT
Start: 2017-09-07 | End: 2017-09-12 | Stop reason: HOSPADM

## 2017-09-07 RX ORDER — METOPROLOL TARTRATE 5 MG/5ML
5 INJECTION INTRAVENOUS ONCE
Status: COMPLETED | OUTPATIENT
Start: 2017-09-07 | End: 2017-09-07

## 2017-09-07 RX ADMIN — HYDRALAZINE HYDROCHLORIDE 10 MG: 20 INJECTION INTRAMUSCULAR; INTRAVENOUS at 22:33

## 2017-09-07 RX ADMIN — METOPROLOL TARTRATE 5 MG: 5 INJECTION INTRAVENOUS at 04:35

## 2017-09-07 RX ADMIN — CARVEDILOL 25 MG: 25 TABLET, FILM COATED ORAL at 10:07

## 2017-09-07 RX ADMIN — Medication 10 ML: at 15:13

## 2017-09-07 RX ADMIN — HYDRALAZINE HYDROCHLORIDE 10 MG: 20 INJECTION INTRAMUSCULAR; INTRAVENOUS at 02:13

## 2017-09-07 RX ADMIN — Medication 10 ML: at 05:04

## 2017-09-07 RX ADMIN — PANTOPRAZOLE SODIUM 40 MG: 40 TABLET, DELAYED RELEASE ORAL at 10:07

## 2017-09-07 RX ADMIN — INSULIN LISPRO 2 UNITS: 100 INJECTION, SOLUTION INTRAVENOUS; SUBCUTANEOUS at 12:03

## 2017-09-07 RX ADMIN — Medication 10 ML: at 21:25

## 2017-09-07 RX ADMIN — SPIRONOLACTONE 25 MG: 25 TABLET, FILM COATED ORAL at 10:07

## 2017-09-07 RX ADMIN — HYDRALAZINE HYDROCHLORIDE 10 MG: 20 INJECTION INTRAMUSCULAR; INTRAVENOUS at 06:14

## 2017-09-07 RX ADMIN — Medication 10 ML: at 06:15

## 2017-09-07 RX ADMIN — CHLORTHALIDONE 50 MG: 25 TABLET ORAL at 10:07

## 2017-09-07 RX ADMIN — POTASSIUM CHLORIDE 40 MEQ: 20 TABLET, EXTENDED RELEASE ORAL at 10:07

## 2017-09-07 RX ADMIN — WATER 10 MG: 1 INJECTION INTRAMUSCULAR; INTRAVENOUS; SUBCUTANEOUS at 01:59

## 2017-09-07 RX ADMIN — LISINOPRIL 40 MG: 20 TABLET ORAL at 10:07

## 2017-09-07 RX ADMIN — HYDROCODONE BITARTRATE AND ACETAMINOPHEN 1 TABLET: 7.5; 325 TABLET ORAL at 06:10

## 2017-09-07 RX ADMIN — ACETAMINOPHEN 650 MG: 325 TABLET ORAL at 15:13

## 2017-09-07 NOTE — PROGRESS NOTES
Lopressor given in the last hour for elevated BP and HR >100. BP down to 173/85 and HR down to 100. Pt continues oriented and easily awakened from a light sleep. He continues to offer staff $500 to anyone who will let him out of the soft restraints. He says that the bed alarm scared him earlier and this was why he was combative when he was walking in the room near the computer station without assistance.

## 2017-09-07 NOTE — PROGRESS NOTES
Pt on phone dialing multiple numbers to tell them, \"I'm dying. I just want to go to that window and they won't let me\". Anthony Solitario RN notifying hospitalist as I am at the bedside.

## 2017-09-07 NOTE — PROGRESS NOTES
STG: Patient will maintain attention to task at hand for 10 minutes without need for re-direction. STG: Patient will recall relevant verbal information with 80% accuracy with minimal assistance. STG: Patient will participate in moderate level word finding task with 80% accuracy  STG: Patient will complete verbal problem solving tasks with 80% accuracy with minimal assistance. LTG: Patient will increase neuro-linguistic abilities to increase safety and awareness of deficits. Speech language pathology: Speech-language and cognitive note: Initial Assessment    NAME/AGE/GENDER: Kiko Lee is a 59 y.o. male  DATE: 9/7/2017  PRIMARY DIAGNOSIS: Nontraumatic acute subdural hemorrhage ([facility]       ICD-10: Treatment Diagnosis: R41.841    INTERDISCIPLINARY COLLABORATION: Social WorkerASSESSMENT:Based on the objective data described below, Mr. Alejandro Moritz presents with moderate cognitive communicative deficits. Patient's family at bedside and report cognitive changes over last several months since \"bleeding on his brain\". They report that patient has voiced perseverative thoughts regarding death/dying. He also presents with paranoia. He did not eat hospital provided meal this PM due to concerns of being poisoned. He reports a recent decline in memory. Patient completed the [facility] Cognitive Assessment [facility]) with a raw score of 16/30. Decreased planning, attention, and problem solving during trials task and cube drawing. Semantic errors noted with confrontational naming. Five concrete category members identified in 1 minute with min-mod verbal prompts and repetition of concept. He recalled 5/5 unrelated words immediately, declining to 1/5 following a 5 minute delay. Patient presents with cognitive impairments in the areas of attention, memory, problem solving, thought organization, and word finding.  He is an excellent rehabilitation candidate and will benefit from continued skilled speech therapy services after discharge from this facility. ?????? ? ? This section established at most recent assessment??????????  PROBLEM LIST (Impairments causing functional limitations):  1. Memory  2. Attention  3. Problem solving  4. Word finding  5. Thought organization  REHABILITATION POTENTIAL FOR STATED GOALS: Fair  PLAN OF CARE:   Patient will benefit from skilled intervention to address the following impairments. INTERVENTIONS PLANNED: (Benefits and precautions of therapy have been discussed with the patient.)  1. Cognitive-linguisitc  FREQUENCY/DURATION: Continue to follow patient 3 times a week for duration of hospital stay to address above goals. RECOMMENDED REHABILITATION/EQUIPMENT: (at time of discharge pending progress): Due to the probability of continued deficits (see above) this patient will likely need continued skilled speech therapy after discharge. SUBJECTIVE:   Dalton Trevino are trying to kill me\"  History of Present Injury/Illness: Mr. Winnie Berumen  has a past medical history of Anxiety (6/21/2016); Arrhythmia; Asthma (6/21/2016); Bipolar affective disorder, manic ([facility] (5/16/2011); CAD (coronary artery disease) (6/21/2016); CHF (congestive heart failure)  (6/21/2016); Chronic back pain (6/21/2016); COPD (chronic obstructive pulmonary disease) ([address] (1/21/2016); Coronary atherosclerosis of native coronary artery (7/20/2014); CVA (cerebral vascular accident) ([facility] (3/29/2011); Depression (6/21/2016); Diabetes mellitus type 2, controlled ([facility] (11/5/2010); Diabetic neuropathy ([facility] (6/21/2016); DM2 (diabetes mellitus, type 2) ([facility] (7/20/2014); Dyslipidemia (11/5/2010); GERD (gastroesophageal reflux disease); Heart failure ([address] HTN (hypertension) (7/20/2014); Hypertensive cardiovascular disease (11/5/2010); Insomnia (6/21/2016); Left leg weakness (5/12/2011); Left-sided weakness (8/28/2015); Migraine (7/20/2014); Neurological disorder; Thrombocytopenia ([facility] (6/21/2016);  Unintentional weight loss (6/21/2016); and Weakness of left arm (5/12/2011). He also has no past medical history of COPD. Josh Kensington He also  has a past surgical history that includes hernia repair; ptca; other surgical; and tonsil and adenoidectomy. Present Symptoms: Cognitive-linguistic deficits   Pain Intensity 1: 0  Pain Location 1: Head  Pain Orientation 1: Posterior  Pain Intervention(s) 1: Medication (see MAR)  Current Medications:   No current facility-administered medications on file prior to encounter. Current Outpatient Prescriptions on File Prior to Encounter   Medication Sig Dispense Refill    oxyCODONE-acetaminophen (PERCOCET)  mg per tablet Take 1 Tab by mouth every six (6) hours as needed for Pain. Max Daily Amount: 4 Tabs. 15 Tab 0    gabapentin (NEURONTIN) 600 mg tablet Take  by mouth three (3) times daily.  divalproex ER (DEPAKOTE ER) 500 mg ER tablet Take 1 Tab by mouth nightly. 30 Tab 0    topiramate (TOPAMAX) 25 mg tablet Take 2 Tabs by mouth nightly. 30 Tab 0    metFORMIN (GLUCOPHAGE) 1,000 mg tablet Take 1,000 mg by mouth two (2) times daily (with meals).  lisinopril (PRINIVIL, ZESTRIL) 10 mg tablet Take 10 mg by mouth daily.  spironolactone (ALDACTONE) 25 mg tablet Take 25 mg by mouth daily.  omeprazole (PRILOSEC) 20 mg capsule Take 20 mg by mouth daily.  carvedilol (COREG) 25 mg tablet Take 25 mg by mouth two (2) times daily (with meals).  nitroglycerin (NITROSTAT) 0.4 mg SL tablet 1 Tab by SubLINGual route as needed for Chest Pain.  1 Bottle 0     Current Dietary Status:  Regular/thin      Social History/Home Situation:    Home Environment: Private residence  # Steps to Enter: 1  Wheelchair Ramp: No  One/Two Story Residence: One story  Living Alone: No  Support Systems: Spouse/Significant Other/Partner, Child(brian), Other (comments) (5 grandchildren live with pt, son/wife live behind pt/wife)  Patient Expects to be Discharged to[de-identified] Rehabilitation facility  Current DME Used/Available at Home: [address] [place] [address], straight  Tub or Shower Type: Tub/Shower combination  Work/Activity History:   OBJECTIVE:   Oral Motor Structure/Speech:  Oral-Motor Structure/Motor Speech  Labial: No impairment  Dentition: Upper & lower dentures  Oral Hygiene: Adequate  Lingual: No impairment    SPEECH-LANGUAGE COGNITIVE EVALUATION  Tests Given:MOCA    Mental Status:  Neurologic State: Eyes open to voice  Orientation Level: Oriented to person;Oriented to place;Oriented to situation;Disoriented to time  Cognition: Decreased attention/concentration;Decreased command following; Follows commands  Perception: Appears intact  Perseveration: No perseveration noted  Safety/Judgement: Decreased awareness of need for assistance;Decreased awareness of need for safety;Decreased awareness of environment;Decreased insight into deficits; Fall prevention    Motor Speech: Auditory Comprehension:   Auditory Comprehension  Auditory Impairment: No     Reading Comprehension:        Verbal Expression:   Verbal Expression  Initiation: No impairment  Naming: Impaired  Confrontation (%): 66 %  Divergent (%): 50 %  Conversation: No impairment  Cueing amount: Minimal    Neuro-Linguistics:  Verbal Reasoning Tasks: Impaired            Assessment/Reassessment only, no treatment provided today    Tool Used: Functional George Measure (FIMTM)   Score Comments   Eating       Comprehension       Expression       Social Interaction       Problem Solving  3     Memory          Score:  Initial:3 Most Recent: X (Date: -- )   Interpretation of Tool: Provides a uniform system of measurement for disability based on the International Classification of Impairment, Disabilities and Handicaps; measures the level of a patient's disability and indicates how much assistance is required for the individual to carry out activities of daily living. Score 7 6 5 4 3 2 1   Modifier CH CI CJ CK CL CM CN   ?  Attention:     - CURRENT STATUS: CL - 60%-79% impaired, limited or restricted    - GOAL STATUS:  CJ - 20%-39% impaired, limited or restricted    - D/C STATUS:  ---------------To be determined---------------  [place] [employer] [place] / [place] [employer] / Product Type: Medicaid /   __________________________________________________________________________________________________  Safety:   After treatment position/precautions:  · Upright in Bed  . Progression/Medical Necessity:   · Patient is expected to demonstrate progress in cognitive ability to increase independence with activities of daily living. Compliance with Program/Exercises: Will assess as treatment progresses. Reason for Continuation of Services/Other Comments:  · Patient continues to require skilled intervention due to cognitive-linguistic deficits. Recommendations/Intent for next treatment session: \"Treatment next visit will focus on cognitive-linguistic tasks\".     Total Treatment Duration:  Time In: 1416  Time Out: 1445    FELIX Gonzalez, CCC-SLP, CBIS

## 2017-09-07 NOTE — PROGRESS NOTES
Reported hypertension to Dr. Blu Gauthier. No new orders at this time. Patient drowsy and unable to take pills safely. Will continue to monitor.

## 2017-09-07 NOTE — PROGRESS NOTES
Depakote and Coreg given to patient. Patient spit pills out in wash cloth. States, \"you're trying to kill me. \"

## 2017-09-07 NOTE — PROGRESS NOTES
Problem: Self Care Deficits Care Plan (Adult)  Goal: *Acute Goals and Plan of Care (Insert Text)  1. Patient will complete upper body bathing and dressing with setup assist and adaptive equipment as needed. 2. Patient will complete lower body bathing and dressing with minimal assist and adaptive equipment as needed. 3. Patient will complete toileting with minimal assist.   4. Patient will tolerate 25 minutes of OT treatment with less than 2 rest breaks to increase activity tolerance for ADLs. 5. Patient will complete functional transfers with modified independence and adaptive equipment as needed. 6. Patient will self feed 80% of all meals with verbal cues only. 7. Patient will complete grooming tasks after setup with verbal cues only. Timeframe: 7 visits       OCCUPATIONAL THERAPY: Initial Assessment, Daily Note, Treatment Day: Day of Assessment and AM 9/7/2017  INPATIENT: Hospital Day: 3  Payor: [facility] MEDICAID / Plan: [facility] MEDICAID / Product Type: Medicaid /      NAME/AGE/GENDER: Angela Todd is a 59 y.o. male    PRIMARY DIAGNOSIS:  Nontraumatic acute subdural hemorrhage (HCC) Nontraumatic acute subdural hemorrhage (HCC) Nontraumatic acute subdural hemorrhage (HCC)        ICD-10: Treatment Diagnosis:        · Other lack of cordination (R27.8)   Precautions/Allergies:        falls, Oyster extract       ASSESSMENT:      Mr. Orestes Ruiz presents supine in ICU bed, awaken by voice, agreeable to OT evaluation. MORENITA Martinez cleared pt for OT treatment and removing B wrist and B leg restraints. Pt lethargic but answering questions. Not fully oriented, but that also fluctuated during the hour OT was with pt. Pt agreeable to eat breakfast, but did not open eyes fully and did not attempt to self feed until he was sitting up on the toilet and notably more alert. Head of bed elevated for better positioning, but pt reported his head began hurting more. Reported pain 8/10, RN notified.  Pt then reported he needed to use the bathroom for BM. RN Marci Leiva alerted and urinal was removed partially full. Pt was mod I with bed mobility, but slow. Reported no dizziness, but continued to keep eyes closed. Verbal cues to open them to look where he was going. Lines managed by RN, DIANNE x2 sit to stand and over to the toilet, CGA to sit. Pt asked for more coffee and then continued to finish his breakfast with pt actually holding cup for cranberry juice and mug for coffee. Completed all of breakfast with OT feeding him. B UE MMT completed while pt sitting on toilet. B UE are WFLs for basic self care tasks and no strength differences noted between R and L UEs. Pt reports he has always had trouble with fine motor tasks especially fasteners because his hands are so large, wife sometimes helps him with small buttons. B hands appear somewhat swollen this am, but pt able to make full fists. Decreased light touch to B hands, espcially fingertips. Reports B shoulders are sore \"because they jerked me around so much yesterday. \" Pt able to brush hair with R UE without trouble. Continued rubbing the back of his head while sitting up. Pt took medications while sitting on toilet as well with assist. No signs of swallowing problems noted. MD entered and asked for follow up CT scan. Pt sit to stand from lower surface needing min Assist x2, turned and washed hands at sink with verbal cues and setup. Verbal cues again for keeping eyes open to watch where he was walking. Ambulated back to bed with min Assist x2. Modified independence getting back to supine. Head of bed elevated and pt prepared for CT scan. Restraints not replaced. Pt is functioning well below his baseline and would benefit from skilled OT to maximize his independence with all ADLs, IADLs, and functional mobility. Depending on progress, will make recommendations on rehab needs.        This section established at most recent assessment   PROBLEM LIST (Impairments causing functional limitations):  1. Decreased Strength  2. Decreased ADL/Functional Activities  3. Decreased Transfer Abilities  4. Decreased Ambulation Ability/Technique  5. Decreased Balance  6. Increased Pain  7. Decreased Activity Tolerance  8. Decreased Pacing Skills  9. Decreased Cognition    INTERVENTIONS PLANNED: (Benefits and precautions of occupational therapy have been discussed with the patient.)  1. Activities of daily living training  2. Adaptive equipment training  3. Balance training  4. Clothing management  5. Cognitive training  6. Community reintergration  7. Donning&doffing training  8. Group therapy  9. Neuromuscular re-eduation  10. Therapeutic activity  11. Therapeutic exercise  12. Safety training      TREATMENT PLAN: Frequency/Duration: Follow patient 3x per week to address above goals. Rehabilitation Potential For Stated Goals: GOOD      RECOMMENDED REHABILITATION/EQUIPMENT: (at time of discharge pending progress): Due to the probability of continued deficits (see above) this patient will likely need continued skilled occupational therapy after discharge. Equipment:   · to be determined, possibly RW if balance does not improve. OCCUPATIONAL PROFILE AND HISTORY:   History of Present Injury/Illness (Reason for Referral):  See H&P   Past Medical History/Comorbidities:   Mr. Michael Lewis  has a past medical history of Anxiety (6/21/2016); Arrhythmia; Asthma (6/21/2016); Bipolar affective disorder, manic ([facility] (5/16/2011); CAD (coronary artery disease) (6/21/2016); CHF (congestive heart failure)  (6/21/2016); Chronic back pain (6/21/2016); COPD (chronic obstructive pulmonary disease) ([facility] [facility] (1/21/2016); Coronary atherosclerosis of native coronary artery (7/20/2014); CVA (cerebral vascular accident) ([facility] (3/29/2011); Depression (6/21/2016); Diabetes mellitus type 2, controlled ([address] (11/5/2010);  Diabetic neuropathy ([facility] [address] [facility] (6/21/2016); DM2 (diabetes mellitus, type 2) ([facility] (7/20/2014); Dyslipidemia (11/5/2010); GERD (gastroesophageal reflux disease); Heart failure ([facility] HTN (hypertension) (7/20/2014); Hypertensive cardiovascular disease (11/5/2010); Insomnia (6/21/2016); Left leg weakness (5/12/2011); Left-sided weakness (8/28/2015); Migraine (7/20/2014); Neurological disorder; Thrombocytopenia ([facility] (6/21/2016); Unintentional weight loss (6/21/2016); and Weakness of left arm (5/12/2011). He also has no past medical history of COPD. Mr. Patricia Subramanian  has a past surgical history that includes hernia repair; ptca; other surgical; and tonsil and adenoidectomy. Social History/Living Environment:   Home Environment: Private residence  # Steps to Enter: 1  Wheelchair Ramp: No  One/Two Story Residence: One story  Living Alone: No  Support Systems: Spouse/Significant Other/Partner, Child(brian), Other (comments) (5 grandchildren live with pt, son/wife live behind pt/wife)  Patient Expects to be Discharged to[de-identified] Rehabilitation facility  Current DME Used/Available at Home: [address] [place] [address] Street, straight  Tub or Shower Type: Tub/Shower combination  Prior Level of Function/Work/Activity:  Pt reports he was independent with all self care tasks and ambulation without device, was driving, retired from his own  business, and was helping take care of the 5 grandchildren that live with he and his wife. Reports his son and wife Garmateo Malini behind us in a house, but we have raised the grandkids. \"   Dominant Side:         RIGHT and Left hand since R hand has been broken and does not bend well he reports   Number of Personal Factors/Comorbidities that affect the Plan of Care: Extensive review of physical, cognitive, and psychosocial performance (3+):  HIGH COMPLEXITY   ASSESSMENT OF OCCUPATIONAL PERFORMANCE[de-identified]   Activities of Daily Living:         Assisted pt with breakfast, grooming tasks, bed mobility  Basic ADLs (From Assessment) Complex ADLs (From Assessment)   Basic ADL  Feeding: Maximum assistance, Minimum assistance (fluctuated)  Oral Facial Hygiene/Grooming: Maximum assistance  Bathing: Maximum assistance  Upper Body Dressing: Maximum assistance  Lower Body Dressing: Total assistance  Toileting: Maximum assistance, Additional time (used urinal after setup supine,asked for help wiping with BM) Instrumental ADL  Meal Preparation: Moderate assistance (wife prepares meals at home)  Homemaking: Moderate assistance (wife completes all housework)  Medication Management: Setup (took his own bloodsugars prior)  Financial Management: Setup (shared with wife)   Grooming/Bathing/Dressing Activities of Daily Living   Grooming  Grooming Assistance: Maximum assistance  Washing Face: Maximum assistance  Washing Hands: Minimum assistance (in standing at sink, verbal cues)  Shaving:  (declined)  Brushing/Combing Hair: Supervision/set-up (verbal cues for thoroughness)  Cues: Tactile cues provided;Verbal cues provided;Visual cues provided Cognitive Retraining  Orientation Retraining: Reorienting  Problem Solving: General alternative solution; Identifying the problem; Identifying the task  Executive Functions: Managing time;Regulating behavior  Organizing/Sequencing: Breaking task down;Prioritizing  Attention to Task: Single task;Distractibility  Maintains Attention For (Time): 1 minute  Following Commands: Follows one step commands/directions (not consistently)  Safety/Judgement: Decreased awareness of need for assistance;Decreased awareness of need for safety;Decreased awareness of environment;Decreased insight into deficits; Fall prevention  Cues: Tactile cues provided;Verbal cues provided;Visual cues provided     Feeding  Feeding Assistance: Minimum assistance;Maximum assistance (started at max, by end of breakfast was min)  Container Management: Minimum assistance  Cutting Food: Maximum assistance  Utensil Management: Maximum assistance  Food to Mouth: Maximum assistance  Drink to Mouth: Minimum assistance  Cues: Physical assistance; Tactile cues provided;Verbal cues provided;Visual cues provided     Toileting  Toileting Assistance: Moderate assistance  Bladder Hygiene: Minimum assistance  Bowel Hygiene: Moderate assistance  Clothing Management: Moderate assistance  Cues: Physical assistance for pants down;Physical assistance for pants up; Tactile cues provided;Verbal cues provided;Visual cues provided  Adaptive Equipment: Grab bars     Functional Transfers  Bathroom Mobility: Minimum assistance; Moderate assistance  Toilet Transfer : Minimum assistance;Assist x2 (from lower toilet surface)  Tub Transfer: Maximum assistance  Shower Transfer: Maximum assistance  Cues: Physical assistance; Tactile cues provided;Verbal cues provided;Visual cues provided  Adaptive Equipment: Grab bars     Bed/Mat Mobility  Rolling: Modified independent (but was in restraints upon contact, left for CT scan with RN)  Supine to Sit: Modified independent; Additional time (head of bed elevated)  Sit to Supine: Modified independent; Additional time  Sit to Stand: Contact guard assistance;Assist x2  Bed to Chair: Minimum assistance;Assist x2  Scooting: Modified independent; Additional time  Cues: Tactile cues provided;Verbal cues provided;Visual cues provided;Physical assistance  Adaptive Equipment: Bed rails (heaed of bed elevated)          Most Recent Physical Functioning:   Gross Assessment:  AROM: Generally decreased, functional (reports B shoulders sore)  Strength: Generally decreased, functional  Coordination: Generally decreased, functional (reports prior problems with fasteners due to large hands)  Sensation: Impaired (light touch diminished, takes blood sugars)               Posture:     Balance:  Sitting: Intact  Standing: Impaired  Standing - Static: Good  Standing - Dynamic : Fair (walked to toilet in ICU with HHA x2) Bed Mobility:  Rolling: Modified independent (but was in restraints upon contact, left for CT scan with RN)  Supine to Sit: Modified independent; Additional time (head of bed elevated)  Sit to Supine: Modified independent; Additional time  Scooting: Modified independent; Additional time  Wheelchair Mobility:     Transfers:  Sit to Stand: Contact guard assistance;Assist x2  Stand to Sit: Contact guard assistance;Assist x2  Bed to Chair: Minimum assistance;Assist x2              Patient Vitals for the past 6 hrs:       BP BP Patient Position SpO2 O2 Flow Rate (L/min) Pulse   17 0454 173/85 - 94 % - (!) 102   17 0554 (!) 195/94 - 94 % - (!) 108   17 0614 (!) 195/94 - - - (!) 101   17 0616 (!) 177/92 - 92 % - (!) 102   17 0654 165/79 - 94 % - (!) 102   17 0721 165/79 At rest 95 % 2 l/min (!) 105   17 0754 163/73 - 90 % - 95   17 1007 162/86 - - - (!) 104        Mental Status  Neurologic State: Drowsy, Eyes open spontaneously, Eyes open to voice  Orientation Level: Oriented to person, Oriented to place, Disoriented to situation, Disoriented to time (answered differently 3x during session)  Cognition: Decreased attention/concentration, Decreased command following  Perception: Appears intact  Perseveration: No perseveration noted  Safety/Judgement: Decreased awareness of need for assistance, Decreased awareness of need for safety, Decreased awareness of environment, Decreased insight into deficits, Fall prevention                               Physical Skills Involved:  1. Range of Motion  2. Balance  3. Strength  4. Activity Tolerance  5. Sensation  6. Fine Motor Control  7. Vision  8. Pain (acute)  9. Edema  10. Skin Integrity Cognitive Skills Affected (resulting in the inability to perform in a timely and safe manner):   1. all of the above Psychosocial Skills Affected:  1. all of the above   Number of elements that affect the Plan of Care: 5+:  HIGH COMPLEXITY   CLINICAL DECISION MAKIN [facility] [address] [address] AM-PAC 6 Clicks   Daily Activity Inpatient Short Form  How much help from another person does the patient currently need. .. Total A Lot A Little None   1. Putting on and taking off regular lower body clothing? [X] 1   [ ] 2   [ ] 3   [ ] 4   2. Bathing (including washing, rinsing, drying)? [ ] 1   [X] 2   [ ] 3   [ ] 4   3. Toileting, which includes using toilet, bedpan or urinal?   [ ] 1   [X] 2   [ ] 3   [ ] 4   4. Putting on and taking off regular upper body clothing?   [ ] 1   [X] 2   [ ] 3   [ ] 4   5. Taking care of personal grooming such as brushing teeth? [ ] 1   [X] 2   [ ] 3   [ ] 4   6. Eating meals? [ ] 1   [X] 2   [ ] 3   [ ] 4   © 2007, Trustees of [address] [place] [address] [address] [id number], under license to [employer]. All rights reserved    Score:  Initial: 11, completed 9/7/2017 Most Recent: X (Date: -- )     Interpretation of Tool:  Represents activities that are increasingly more difficult (i.e. Bed mobility, Transfers, Gait). Score 24 23 22-20 19-15 14-10 9-7 6       Modifier CH CI CJ CK CL CM CN         · Self Care:               - CURRENT STATUS:    CL - 60%-79% impaired, limited or restricted               - GOAL STATUS:           CI - 1%-19% impaired, limited or restricted               - D/C STATUS:                       ---------------To be determined---------------  Payor: [facility] [place] / Plan[place] [facility] [place] / Product Type: Medicaid /       Medical Necessity:     · Patient demonstrates good rehab potential due to higher previous functional level. Reason for Services/Other Comments:  · Patient continues to require skilled intervention due to s/p above and decreased ADLs, IADLs, and functional mobility. Use of outcome tool(s) and clinical judgement create a POC that gives a: MODERATE COMPLEXITY             TREATMENT:   (In addition to Assessment/Re-Assessment sessions the following treatments were rendered)      Pre-treatment Symptoms/Complaints: \"My head is killing me because they jerked me around yesterday. \"  Pt not fully oriented, fluctuated during session as well. Pain: Initial:   Pain Intensity 1: 8  Pain Location 1: Head  Pain Orientation 1: Posterior  Pain Intervention(s) 1: Ambulation/Increased Activity, Nurse notified, Repositioned, Rest (RN  gave medications during OT session)  Post Session:  same      Self Care: (32 mins): Procedure(s) (per grid) utilized to improve and/or restore self-care/home management as related to dressing, bathing, toileting, grooming and self feeding. Required maximal visual, verbal, manual, tactile, physical cues and   cueing to facilitate activities of daily living skills and compensatory activities. Therapeutic Activity: (    15 mins): Therapeutic activities including Bed transfers, Toilet transfers, Ambulation on level ground, Carry objects and safety training, bed mobility to improve mobility, strength, balance, coordination and activity tolerance for ADLs. Required moderate   to promote dynamic balance in standing. Evaluation: 15 mins  Braces/Orthotics/Lines/Etc:   · IV  · O2 Device: Nasal cannula  Treatment/Session Assessment:    · Response to Treatment:  Tolerated fairly, more alert once sitting up on toilet, not fully oriented. · Interdisciplinary Collaboration:  · Occupational Therapist  · Registered Nurse  · Physician  · After treatment position/precautions:  · Supine in bed  · Bed alarm/tab alert on  · Bed/Chair-wheels locked  · Bed in low position  · Call light within reach  · RN notified  · Nurse at bedside  · Side rails x 2  · restraints not in place after treatment, were at start  · Compliance with Program/Exercises: Will assess as treatment progresses. Compliant today. · Recommendations/Intent for next treatment session: \"Next visit will focus on advancements to more challenging activities and reduction in assistance provided\".   Total Treatment Duration:  62 mins  OT Patient Time In/Time Out  Time In: 0920  Time Out: [facility], OT  Benjamin Macias MS, OTR/L

## 2017-09-07 NOTE — PROGRESS NOTES
Bedside report to Maria Ines Leigh RN for care. Agreement that Tele-Psyche consult to be done on day shift.

## 2017-09-07 NOTE — PROGRESS NOTES
Honey No RN and Talita Harris RN in room to assist.  Pt is now on phone and is well-oriented as to his situated. He is telling his grandchild at home that he \"will probably come home in a box\". Pt telling family members that he is in \"room 3108 , ICU\".

## 2017-09-07 NOTE — PROGRESS NOTES
Pt is telling his family member on the phone  \"I got a bleed on my brain. I ain't gonna make it home:.
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
## 2017-09-07 NOTE — PROGRESS NOTES
Bedside and Verbal shift change report given to Jeri [address] (oncoming nurse) by Willie Arteaga (offgoing nurse). Report included the following information SBAR, Kardex, ED Summary, Procedure Summary, Intake/Output, MAR, Accordion, Recent Results, Med Rec Status, Cardiac Rhythm SR with PAC and Alarm Parameters .

## 2017-09-07 NOTE — PROGRESS NOTES
Close monitoring by staff continues. Pt is talking on phone to family members. He is oriented x 4. Maricarmen Arrieta RN assisting pt with unit telephone to make phone calls to other members of his family.
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
## 2017-09-07 NOTE — PROGRESS NOTES
Telepsych was consulted who recommended Depakote 250mg BID and Zyprexa 10mg IM Q8H PRN with repeat Depakote levels in 5 days.

## 2017-09-07 NOTE — PROGRESS NOTES
Pt seen awake and alert sitting up in bed in ICU. S/P admission Nontramuatic acute subdural hemorrhage. Wife and daughter are at bedside. Pt lives with wife, and 5 children, 16years old to 1year old, that he and wife have custody of. They have 2 grown children. He was independent prior to admission and drove self. Does not use DME to ambulate with and works. He was able to answer most questions himself. No DME's currently. Able to get Rx with drug plan and confirms [facility]. Goes to [facility] for PCP. Discussed d/c POC. PT/OT/Speech seeing pt. Pt does have [facility] MERY and would need LOC and precert prior to any STR at SNF. Awaiting eval. Pt and wife thinks he is doing well and could do [facility] or outpt. Discussed that we will follow to assist with d/c POC. They would like list of providers as PCP. [facility] PCP list given. Also, list of inpt rehab facilities given. Explained they would need to make sure accepts insurance if called to change. Verbalized understanding. CM to follow for d/c needs. PPD placed for any potential rehab needs. Care Management Interventions  PCP Verified by CM: Yes  Mode of Transport at Discharge: Other (see comment)  Physical Therapy Consult: Yes  Occupational Therapy Consult: Yes  Speech Therapy Consult: Yes  Current Support Network: Lives with Spouse, Own Home  Confirm Follow Up Transport: Family  Plan discussed with Pt/Family/Caregiver: Yes  Freedom of Choice Offered:  Yes

## 2017-09-07 NOTE — PROGRESS NOTES
Pt's wife has been notified of his behavior and he is in restraints and that he wants her to take him home.  notified of current BP and HR.
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
## 2017-09-08 LAB
ANION GAP SERPL CALC-SCNC: 12 MMOL/L (ref 7–16)
BUN SERPL-MCNC: 11 MG/DL (ref 8–23)
CALCIUM SERPL-MCNC: 9.7 MG/DL (ref 8.3–10.4)
CHLORIDE SERPL-SCNC: 103 MMOL/L (ref 98–107)
CO2 SERPL-SCNC: 23 MMOL/L (ref 21–32)
CREAT SERPL-MCNC: 0.69 MG/DL (ref 0.8–1.5)
GLUCOSE BLD STRIP.AUTO-MCNC: 131 MG/DL (ref 65–100)
GLUCOSE BLD STRIP.AUTO-MCNC: 131 MG/DL (ref 65–100)
GLUCOSE BLD STRIP.AUTO-MCNC: 151 MG/DL (ref 65–100)
GLUCOSE SERPL-MCNC: 158 MG/DL (ref 65–100)
MAGNESIUM SERPL-MCNC: 1.9 MG/DL (ref 1.8–2.4)
POTASSIUM SERPL-SCNC: 3.4 MMOL/L (ref 3.5–5.1)
SODIUM SERPL-SCNC: 138 MMOL/L (ref 136–145)

## 2017-09-08 PROCEDURE — 74011250637 HC RX REV CODE- 250/637: Performed by: INTERNAL MEDICINE

## 2017-09-08 PROCEDURE — 74011000250 HC RX REV CODE- 250

## 2017-09-08 PROCEDURE — 80048 BASIC METABOLIC PNL TOTAL CA: CPT | Performed by: INTERNAL MEDICINE

## 2017-09-08 PROCEDURE — 82962 GLUCOSE BLOOD TEST: CPT

## 2017-09-08 PROCEDURE — 83735 ASSAY OF MAGNESIUM: CPT | Performed by: INTERNAL MEDICINE

## 2017-09-08 PROCEDURE — 74011250636 HC RX REV CODE- 250/636: Performed by: INTERNAL MEDICINE

## 2017-09-08 PROCEDURE — 74011000250 HC RX REV CODE- 250: Performed by: INTERNAL MEDICINE

## 2017-09-08 PROCEDURE — 74011000258 HC RX REV CODE- 258: Performed by: INTERNAL MEDICINE

## 2017-09-08 PROCEDURE — 74011636637 HC RX REV CODE- 636/637: Performed by: INTERNAL MEDICINE

## 2017-09-08 PROCEDURE — 65660000000 HC RM CCU STEPDOWN

## 2017-09-08 PROCEDURE — 74011250636 HC RX REV CODE- 250/636

## 2017-09-08 PROCEDURE — 93005 ELECTROCARDIOGRAM TRACING: CPT | Performed by: INTERNAL MEDICINE

## 2017-09-08 PROCEDURE — 36415 COLL VENOUS BLD VENIPUNCTURE: CPT | Performed by: INTERNAL MEDICINE

## 2017-09-08 RX ORDER — LORAZEPAM 2 MG/ML
1 INJECTION INTRAMUSCULAR
Status: DISCONTINUED | OUTPATIENT
Start: 2017-09-08 | End: 2017-09-12 | Stop reason: HOSPADM

## 2017-09-08 RX ORDER — HALOPERIDOL 5 MG/ML
4 INJECTION INTRAMUSCULAR
Status: DISCONTINUED | OUTPATIENT
Start: 2017-09-08 | End: 2017-09-10

## 2017-09-08 RX ORDER — POTASSIUM CHLORIDE 14.9 MG/ML
20 INJECTION INTRAVENOUS ONCE
Status: COMPLETED | OUTPATIENT
Start: 2017-09-08 | End: 2017-09-08

## 2017-09-08 RX ORDER — TOPIRAMATE 50 MG/1
100 TABLET, FILM COATED ORAL DAILY
COMMUNITY
End: 2017-09-12

## 2017-09-08 RX ORDER — DICYCLOMINE HYDROCHLORIDE 10 MG/1
10 CAPSULE ORAL 2 TIMES DAILY
COMMUNITY
End: 2018-06-01 | Stop reason: ALTCHOICE

## 2017-09-08 RX ORDER — AMITRIPTYLINE HYDROCHLORIDE 25 MG/1
25 TABLET, FILM COATED ORAL
COMMUNITY
End: 2017-09-12

## 2017-09-08 RX ORDER — HALOPERIDOL 5 MG/ML
2 INJECTION INTRAMUSCULAR
Status: DISCONTINUED | OUTPATIENT
Start: 2017-09-08 | End: 2017-09-08

## 2017-09-08 RX ORDER — LORAZEPAM 0.5 MG/1
0.5 TABLET ORAL
Qty: 10 TAB | Refills: 0 | Status: SHIPPED | OUTPATIENT
Start: 2017-09-08 | End: 2017-09-08

## 2017-09-08 RX ORDER — DILTIAZEM HYDROCHLORIDE 5 MG/ML
10 INJECTION INTRAVENOUS ONCE
Status: COMPLETED | OUTPATIENT
Start: 2017-09-08 | End: 2017-09-08

## 2017-09-08 RX ORDER — TOPIRAMATE 100 MG/1
100 TABLET, FILM COATED ORAL
Status: DISCONTINUED | OUTPATIENT
Start: 2017-09-09 | End: 2017-09-12 | Stop reason: HOSPADM

## 2017-09-08 RX ORDER — DILTIAZEM HYDROCHLORIDE 5 MG/ML
INJECTION INTRAVENOUS
Status: COMPLETED
Start: 2017-09-08 | End: 2017-09-08

## 2017-09-08 RX ORDER — METOPROLOL TARTRATE 5 MG/5ML
5 INJECTION INTRAVENOUS EVERY 6 HOURS
Status: DISCONTINUED | OUTPATIENT
Start: 2017-09-08 | End: 2017-09-08

## 2017-09-08 RX ORDER — DILTIAZEM HYDROCHLORIDE 5 MG/ML
10 INJECTION INTRAVENOUS
Status: COMPLETED | OUTPATIENT
Start: 2017-09-08 | End: 2017-09-08

## 2017-09-08 RX ORDER — METOPROLOL TARTRATE 5 MG/5ML
INJECTION INTRAVENOUS
Status: COMPLETED
Start: 2017-09-08 | End: 2017-09-08

## 2017-09-08 RX ORDER — METOPROLOL TARTRATE 5 MG/5ML
2.5 INJECTION INTRAVENOUS EVERY 4 HOURS
Status: DISCONTINUED | OUTPATIENT
Start: 2017-09-08 | End: 2017-09-08

## 2017-09-08 RX ORDER — HALOPERIDOL 5 MG/ML
INJECTION INTRAMUSCULAR
Status: COMPLETED
Start: 2017-09-08 | End: 2017-09-08

## 2017-09-08 RX ORDER — CARVEDILOL 25 MG/1
25 TABLET ORAL 2 TIMES DAILY WITH MEALS
Status: DISCONTINUED | OUTPATIENT
Start: 2017-09-08 | End: 2017-09-12 | Stop reason: HOSPADM

## 2017-09-08 RX ORDER — AMITRIPTYLINE HYDROCHLORIDE 25 MG/1
25 TABLET, FILM COATED ORAL
Status: DISCONTINUED | OUTPATIENT
Start: 2017-09-08 | End: 2017-09-12 | Stop reason: HOSPADM

## 2017-09-08 RX ORDER — RANITIDINE 150 MG/1
150 CAPSULE ORAL 2 TIMES DAILY
COMMUNITY
End: 2017-09-12

## 2017-09-08 RX ORDER — METFORMIN HYDROCHLORIDE 500 MG/1
500 TABLET ORAL 2 TIMES DAILY WITH MEALS
COMMUNITY

## 2017-09-08 RX ADMIN — LORAZEPAM 1 MG: 2 INJECTION INTRAMUSCULAR at 13:50

## 2017-09-08 RX ADMIN — DILTIAZEM HYDROCHLORIDE 10 MG: 5 INJECTION INTRAVENOUS at 12:55

## 2017-09-08 RX ADMIN — HYDRALAZINE HYDROCHLORIDE 10 MG: 20 INJECTION INTRAMUSCULAR; INTRAVENOUS at 04:27

## 2017-09-08 RX ADMIN — HYDRALAZINE HYDROCHLORIDE 10 MG: 20 INJECTION INTRAMUSCULAR; INTRAVENOUS at 11:00

## 2017-09-08 RX ADMIN — HALOPERIDOL LACTATE 4 MG: 5 INJECTION, SOLUTION INTRAMUSCULAR at 10:25

## 2017-09-08 RX ADMIN — HALOPERIDOL 4 MG: 5 INJECTION INTRAMUSCULAR at 10:25

## 2017-09-08 RX ADMIN — METOPROLOL TARTRATE 5 MG: 5 INJECTION INTRAVENOUS at 09:30

## 2017-09-08 RX ADMIN — INSULIN LISPRO 2 UNITS: 100 INJECTION, SOLUTION INTRAVENOUS; SUBCUTANEOUS at 06:33

## 2017-09-08 RX ADMIN — Medication 10 ML: at 13:01

## 2017-09-08 RX ADMIN — POTASSIUM CHLORIDE 20 MEQ: 14.9 INJECTION, SOLUTION INTRAVENOUS at 17:35

## 2017-09-08 RX ADMIN — Medication 10 ML: at 22:03

## 2017-09-08 RX ADMIN — SODIUM CHLORIDE 10 MG/HR: 900 INJECTION, SOLUTION INTRAVENOUS at 22:00

## 2017-09-08 RX ADMIN — AMITRIPTYLINE HYDROCHLORIDE 25 MG: 25 TABLET, FILM COATED ORAL at 22:00

## 2017-09-08 RX ADMIN — SODIUM CHLORIDE 10 MG/HR: 900 INJECTION, SOLUTION INTRAVENOUS at 13:32

## 2017-09-08 RX ADMIN — METOPROLOL TARTRATE 5 MG: 5 INJECTION INTRAVENOUS at 03:08

## 2017-09-08 RX ADMIN — DIVALPROEX SODIUM 250 MG: 250 TABLET, DELAYED RELEASE ORAL at 22:00

## 2017-09-08 RX ADMIN — Medication 10 ML: at 06:33

## 2017-09-08 RX ADMIN — DILTIAZEM HYDROCHLORIDE 10 MG: 5 INJECTION INTRAVENOUS at 13:05

## 2017-09-08 NOTE — PROGRESS NOTES
Bedside and Verbal shift change report given to MORENITA Leary by Renetta Herbert RN. Report included the following information SBAR, Kardex, ED Summary, Procedure Summary, Intake/Output, MAR, Accordion, Recent Results, Med Rec Status, Cardiac Rhythm SR/ST and Alarm Parameters . Pt oriented to person, place, and date. Paranoid of situation and nursing staff. Refusing to take medications at this time. Pt states \"The medications will kill me\". Currently holding damp cloth over mouth and nose for unknown reason. Will continue to closely monitor. Awaiting open bed for transfer.

## 2017-09-08 NOTE — PROGRESS NOTES
Spiritual Care visit. Follow up visit.  visited with patient and a friend,  Prayed for patient's health.     Visit by Fahad Georges M.Ed., Th.B. ,Staff

## 2017-09-08 NOTE — PROGRESS NOTES
Pt refusing medication at this time including TB test. Educated on importance of TB test in relation to placement after hospitalization. Pt verbalizes understanding and realizes refusal of TB test may exclude him from any rehab facilities if deemed necessary. [employer] notified.  Documented in [facility]

## 2017-09-08 NOTE — PROGRESS NOTES
Called by Trevon Lemus RN, due to patient in suspected SVT with heart rate 170-190 on monitor. Patient is refusing his medications, including carvedilol. He is however willing to take IV meds. Lopressor 5 mg q 6 hours orderd. Upon arrival to bedside, his pulse is 110-140 and appears to be sinus tach. EKG pending.     Zari Ren MD

## 2017-09-08 NOTE — PROGRESS NOTES
Bedside and Verbal shift change report given to Faiza Gómez RN by  Victor Hugo Grimm RN.  Report included the following information SBAR, Kardex, ED Summary, Intake/Output, MAR, Accordion, Recent Results, Med Rec Status and Cardiac Rhythm ST.

## 2017-09-08 NOTE — PROGRESS NOTES
Problem: Nutrition Deficit  Goal: *Optimize nutritional status  Nutrition:  Malnutrition Screening Tool Referral for 14-23 pound weight loss without trying and eating poorly due to decreased appetite. Assessment:  Anthropometrics:  Ht - 6'1\", wgt - 98.6 kg (ICU bed 9/8/17), BMI - 28.7 c/w \"overweight\", edema - none reported. Macronutrient Needs:  Estimated calorie needs - 1930-4548 nick/day (20-25 nick/kg/day)   Estimated protein needs -  gm pro/day (1-1.2 gm pro/kgIBW/day) (GFR >60 ml/min)  Intake/Comparative Standards:              No oral intake is recorded. Pertinent Labs:              AM glucose 158, potassium 3.4. Pertinent Medications:              Aldactone. Diet:              CCHO, cardiac, low sodium. Food/Nutrition History:              59year old gentleman with a h/o diabetes, bipolar disorder and depression admitted with headache and possible subdural hemmorrhage. He reports that he \"thinks that he may have lost weight\", but can't give me any actual information - previous weight, how much or what he was eating PTA or any unusual GI symptoms. All weights recorded in the EMR are weights that were stated weights during ER visits. He is currently covering his nose and mouth with a washcloth and refusing his meals, claiming that the air is poisoning him. He declined the addition of a supplement, stating, \"I'm fine. \"     Diagnosis (Nutrition): Inadequate oral intake related to paranoid thought process as evidenced by intentional refusal of meals. Intervention:  Meals and Snacks: Continue current diet. Encouraged the patient to resume oral intake - our food or food brought to him from home. Medical Food Supplement Therapy: Commercial Beverage: Declined. Coordination of Nutrition Care by a Nutrition Professional: AM ICU rounds, collaboration with Rossana Cerna. Nutrition Discharge Plan: Too soon to determine. Joshua Escobedo.  Rosalio Gaucher  [phone]

## 2017-09-08 NOTE — PROGRESS NOTES
Hospitalist Progress Note     Admit Date:  2017  6:45 PM   Name:  Comfort Edwards   Age:  59 y.o.  :  1955   MRN:  [id number]   PCP:  Xenia Brandon MD  Treatment Team: Attending Provider: Surekha Mejia MD; Care Manager: David Ewrin RN; Consulting Provider: Gonzales Liu MD    Subjective:     \"Mr. Elena Prater is a 60 yo male who presented with headache on a background COPD, CHF, CAD, CVA, DM2 and anxiety. CT head was done and a hairline acute subdural hematoma cannot be excluded especially given On the second day of admission he became  agitated. Loren Berry was called and there was no concern for any intracranial bleed\"     17. Patient has become paranoic and is refusing his oral and IV  meds. He is covering his nose because he thinks the air is poisoned. He is hypertensive and tachycardic now because he is refusing his oral meds. After talking to him he is refusing his medications. May need parenteral anty-psychotics. Wife has been called before we proceed.          Objective:     Patient Vitals for the past 24 hrs:   Temp Pulse Resp BP SpO2   17 0627 - (!) 113 17 163/90 98 %   17 0559 - (!) 118 28 163/83 97 %   17 0459 - (!) 115 13 168/77 97 %   17 0414 97.5 °F (36.4 °C) (!) 120 16 (!) 185/99 98 %   17 0256 - (!) 183 17 - -   17 0254 - (!) 168 13 165/81 -   17 0249 - (!) 175 11 - -   17 0245 - (!) 182 26 - -   17 0238 - (!) 112 17 178/75 -   17 0101 - (!) 110 17 166/65 -   17 0035 - (!) 114 20 159/62 -   17 2330 99.9 °F (37.7 °C) (!) 107 16 143/68 98 %   17 2300 - (!) 109 22 167/66 -   17 2224 - (!) 103 19 174/78 -   17 2112 - (!) 104 18 - 98 %   17 1911 98.5 °F (36.9 °C) 99 12 156/83 96 %   17 1714 - 95 14 (!) 169/96 98 %   17 1614 - 100 - 161/77 -   17 1600 - 99 21 - 98 %   17 1516 98 °F (36.7 °C) 100 26 161/77 96 %   17 1500 - 88 25 - 97 %   17 1400 - 91 13 - 97 %   09/07/17 1300 - 91 21 - 95 %   09/07/17 1249 97.8 °F (36.6 °C) 91 19 120/85 97 %   09/07/17 1200 - 92 16 - 96 %   09/07/17 1051 - (!) 110 16 - 97 %   09/07/17 1026 - 98 17 - 96 %   09/07/17 1007 - (!) 104 - 162/86 -   09/07/17 0953 - 100 19 162/86 98 %     Oxygen Therapy  O2 Sat (%): 98 % (09/08/17 0627)  Pulse via Oximetry: 114 beats per minute (09/08/17 0627)  O2 Device: Room air (09/08/17 0414)  O2 Flow Rate (L/min): 2 l/min (09/07/17 0721)    Intake/Output Summary (Last 24 hours) at 09/08/17 0925  Last data filed at 09/08/17 0659   Gross per 24 hour   Intake                0 ml   Output             2050 ml   Net            -2050 ml           General appearance: alert   Eyes: anicteric sclerae, moist conjunctivae; no lid-lag  ENT: Atraumatic; oropharynx clear with moist mucous membranes and no mucosal ulcerations; normal hard and soft palate  Neck: Trachea midline   FROM, supple, no thyromegaly or lymphadenopathy  Lungs: CTA, with normal respiratory effort and no intercostal retractions  CV: S1,S2 present, no added murmurs  Abdomen: Soft, non-tender; no masses   Extremities: No peripheral edema or extremity lymphadenopathy  Skin: Normal temperature, turgor and texture; no subcutaneous nodules  Psych: paranoic     Data Review:  I have reviewed all labs, meds, telemetry events, and studies from the last 24 hours.     Recent Results (from the past 24 hour(s))   GLUCOSE, POC    Collection Time: 09/07/17 11:51 AM   Result Value Ref Range    Glucose (POC) 174 (H) 65 - 100 mg/dL   GLUCOSE, POC    Collection Time: 09/07/17  4:06 PM   Result Value Ref Range    Glucose (POC) 150 (H) 65 - 100 mg/dL   GLUCOSE, POC    Collection Time: 09/07/17  9:23 PM   Result Value Ref Range    Glucose (POC) 132 (H) 65 - 485 mg/dL   METABOLIC PANEL, BASIC    Collection Time: 09/08/17  4:07 AM   Result Value Ref Range    Sodium 138 136 - 145 mmol/L    Potassium 3.4 (L) 3.5 - 5.1 mmol/L    Chloride 103 98 - 107 mmol/L    CO2 23 21 - 32 mmol/L    Anion gap 12 7 - 16 mmol/L    Glucose 158 (H) 65 - 100 mg/dL    BUN 11 8 - 23 MG/DL    Creatinine 0.69 (L) 0.8 - 1.5 MG/DL    GFR est AA >60 >60 ml/min/1.73m2    GFR est non-AA >60 >60 ml/min/1.73m2    Calcium 9.7 8.3 - 10.4 MG/DL   GLUCOSE, POC    Collection Time: 09/08/17  6:21 AM   Result Value Ref Range    Glucose (POC) 151 (H) 65 - 100 mg/dL        All Micro Results     Procedure Component Value Units Date/Time    MRSA SCREEN - PCR (NASAL) [[id number]] Collected:  09/06/17 0730    Order Status:  Completed Specimen:  Nasal from Nares Updated:  09/06/17 0959     Special Requests: NO SPECIAL REQUESTS        Culture result:       MRSA target DNA is not detected (presumptive not colonized with MRSA)  CORRECTED ON 09/06 AT 0959: PREVIOUSLY REPORTED AS MRSA target DNA not detected, SA target DNA detected. A MRSA negative, SA positive test result does not preclude MRSA nasal colonization.             Current Meds:  Current Facility-Administered Medications   Medication Dose Route Frequency    metoprolol (LOPRESSOR) injection 5 mg  5 mg IntraVENous Q6H    haloperidol lactate (HALDOL) injection 2 mg  2 mg IntraVENous Q4H PRN    divalproex DR (DEPAKOTE) tablet 250 mg  250 mg Oral Q12H    hydrALAZINE (APRESOLINE) 20 mg/mL injection 10 mg  10 mg IntraVENous Q4H PRN    potassium chloride (K-DUR, KLOR-CON) SR tablet 40 mEq  40 mEq Oral BID    lisinopril (PRINIVIL, ZESTRIL) tablet 40 mg  40 mg Oral DAILY    ondansetron (ZOFRAN) injection 4 mg  4 mg IntraVENous Q6H PRN    sodium chloride (NS) flush 5-10 mL  5-10 mL IntraVENous Q8H    sodium chloride (NS) flush 5-10 mL  5-10 mL IntraVENous PRN    acetaminophen (TYLENOL) tablet 650 mg  650 mg Oral Q4H PRN    naloxone (NARCAN) injection 0.4 mg  0.4 mg IntraVENous PRN    bisacodyl (DULCOLAX) tablet 5 mg  5 mg Oral DAILY PRN    carvedilol (COREG) tablet 25 mg  25 mg Oral BID WITH MEALS    pantoprazole (PROTONIX) tablet 40 mg  40 mg Oral ACB    spironolactone (ALDACTONE) tablet 25 mg  25 mg Oral DAILY    topiramate (TOPAMAX) tablet 50 mg  50 mg Oral QHS    ibuprofen (MOTRIN) tablet 400 mg  400 mg Oral Q6H PRN    insulin lispro (HUMALOG) injection   SubCUTAneous AC&HS       Other Studies (last 24 hours):  Ct Head Wo Cont    Result Date: 9/7/2017  Head CT INDICATION:  Headaches, possible hemorrhage seen on recent CT Multiple axial images obtained through the brain without intravenous contrast. Radiation dose reduction techniques were used for this study: All CT scans performed at this facility use one or all of the following: Automated exposure control, adjustment of the mA and/or kVp according to patient's size, iterative reconstruction. FINDINGS: There are stable linear density in the posterior left posterior fossa, likely just asymmetric prominence of a normal left transverse sinus. This was also seen on a prior MRI from 2014. There is no CT evidence of acute hemorrhage or infarction. The ventricles are normal in size. There are no extra-axial fluid collections. No masses are seen. The sinuses are clear. There are no bony lesions. IMPRESSION: Asymmetric density in the left posterior fossa is likely a normal transverse vein. No evidence of acute hemorrhage.        Assessment and Plan:     Hospital Problems as of 9/8/2017  Date Reviewed: 5/12/2011          Codes Class Noted - Resolved POA    * (Principal)Nontraumatic acute subdural hemorrhage ([facility] ICD-10-CM: I62.01  ICD-9-CM: 432.1  9/5/2017 - Present Yes        Bipolar 1 disorder ([facility] ICD-10-CM: F31.9  ICD-9-CM: 296.7  9/5/2017 - Present Yes        GERD (gastroesophageal reflux disease) ICD-10-CM: K21.9  ICD-9-CM: 530.81  Unknown - Present Yes    Overview Signed 6/21/2016  4:42 PM by Sonal srivastavatculijuan david             CAD (coronary artery disease) ICD-10-CM: I25.10  ICD-9-CM: 414.00  6/21/2016 - Present Yes        Insomnia ICD-10-CM: G47.00  ICD-9-CM: 780.52  6/21/2016 - Present Yes Depression ICD-10-CM: F32.9  ICD-9-CM: 413  6/21/2016 - Present Yes        HTN (hypertension) ICD-10-CM: I10  ICD-9-CM: 401.9  7/20/2014 - Present Yes        Migraine ICD-10-CM: U18.650  ICD-9-CM: 346.90  7/20/2014 - Present Yes        DM2 (diabetes mellitus, type 2) (HCC) (Chronic) ICD-10-CM: E11.9  ICD-9-CM: 250.00  7/20/2014 - Present Yes              PLAN:  Metabolic encephalopathy/ suspect mainly psychiatric disorder/bipolar   -patient is paranoid   -refusing oral and IV meds   -May need parenteral anti-psychotics. Will call his wife to see if he can convince him. Otherwise, will proceed to give meds against his will.    - Neurosurgery no concern for intracranial bleed        DM2  Continue insulin sliding scale    #tachycardia /hypertension   -refusing to take his BB   -refusion IV meds        Dispo: may need to go a mental health facility   DVT ppx:  SCD    Signed:  Matt Mendoza MD

## 2017-09-08 NOTE — PROGRESS NOTES
-190 on monitor, Dr. Jen Lubin notified. Order for 5mg lopressor IV. Pt states he is slightly SOB, refusing oxygen at this time.  Awaiting EKG

## 2017-09-08 NOTE — PROGRESS NOTES
SPEECH PATHOLOGY NOTE:    Attempted to see patient this PM. Per RN, patient in Afib with heart rate at 190. Hold treatment at this time. ST to continue following.      FELIX West, CCC-SLP, CBIS

## 2017-09-08 NOTE — PROGRESS NOTES
Patient sitting in bed with washcloth over mouth. When asked about it, patient states, \"you are trying to poison me with gas. \" Attempted to reorient patient without success. Attempted again to give patient his medicines without success. Educated patient on importance of medications. Patient refuses at this time.

## 2017-09-08 NOTE — PROGRESS NOTES
Bedside and Verbal shift change report given to Gibran RN by Cortney Dacosta RN. Report included the following information SBAR, Kardex, ED Summary, Procedure Summary, Intake/Output, MAR, Accordion, Recent Results, Med Rec Status, Cardiac Rhythm AFIB/SR, PAC, PVC and Alarm Parameters . Pt paranoid throughout day, received PRN Ativan/Haldol during day shift. Calm/healing environment provided. Pt resting comfortably at this time, VSS, cardizem gtt infusing @ 10mg/hr. Will continue to monitor.

## 2017-09-08 NOTE — INTERDISCIPLINARY ROUNDS
Interdisciplinary team rounds were held 9/8/2017 with the following team members:Care Management, Nursing, Nutrition, Pastoral Care, Pharmacy, Physician, Respiratory Therapy, speech therapy and Clinical Coordinator. Plan of care discussed. See clinical pathway and/or care plan for interventions and desired outcomes.

## 2017-09-08 NOTE — PROGRESS NOTES
Patient encouraged to relax in bed. Patient states, \" I don't want to go to sleep because the enemy will get me and I don't want to be condemned to damnation. \"  Reassurance provided. Music turned on and warm blanket placed on patient. Patient relaxed and fell asleep. Wife updated over phone. Hydralazine given IV push while patient resting for .

## 2017-09-08 NOTE — PROGRESS NOTES
TRANSFER - OUT REPORT:    Verbal report given to MORENITA Arrieta on Angela Todd  being transferred to [address] [place] [address] for routine progression of care       Report consisted of patients Situation, Background, Assessment and   Recommendations(SBAR). Information from the following report(s) SBAR, Kardex, Procedure Summary, Intake/Output, MAR, Recent Results and Cardiac Rhythm SR/ST was reviewed with the receiving nurse. Lines:   Peripheral IV 09/06/17 Left Arm (Active)   Site Assessment Clean, dry, & intact 9/7/2017  7:11 PM   Phlebitis Assessment 0 9/7/2017  7:11 PM   Infiltration Assessment 0 9/7/2017  7:11 PM   Dressing Status Clean, dry, & intact 9/7/2017  7:11 PM   Dressing Type Transparent;Tape 9/7/2017  7:11 PM   Hub Color/Line Status Pink;Patent; Flushed 9/7/2017  7:11 PM        Opportunity for questions and clarification was provided.       Patient transported with:   Monitor (remote)

## 2017-09-08 NOTE — PROGRESS NOTES
Patient went into Afib with RVR this morning. Received a bolus of 20 mg IV of cardizem and was started on a cardizem drip. Anticoagulation NOT attempted due to possible intra-cranial bleeding reported in the head CT scans. He converted to NSR around 4 pm. Will taper down cardizem drip and will resume his regular dose of oral coreg. Family has provided his home meds. Patient still paranoid. IV potassium ordered due to mild hypokalemia.

## 2017-09-08 NOTE — PROGRESS NOTES
Patient assessed. Refusing to take medication at this time. He wants to take his medicine when he gets home. Patient states, [facility] hospital medicine is not the same as what I have at home. \"  Patient continues to hold cloth over his nose and mouth because he believes there is poison in the air. Reassurance and calm atmosphere provided.

## 2017-09-08 NOTE — PROGRESS NOTES
Problem: Falls - Risk of  Goal: *Absence of Falls  Document Malia Fall Risk and appropriate interventions in the flowsheet.    Outcome: Progressing Towards Goal  Fall Risk Interventions:  Mobility Interventions: Bed/chair exit alarm           Medication Interventions: Bed/chair exit alarm, Evaluate medications/consider consulting pharmacy     Elimination Interventions: Bed/chair exit alarm, Call light in reach

## 2017-09-08 NOTE — PROGRESS NOTES
Patient feeling drowsy and is very anxious about feeling drowsy. He is not happy about feeling drowsy. Reassurance and calm environment provided.

## 2017-09-09 LAB
ALBUMIN SERPL-MCNC: 2.8 G/DL (ref 3.2–4.6)
ALBUMIN/GLOB SERPL: 0.6 {RATIO} (ref 1.2–3.5)
ALP SERPL-CCNC: 36 U/L (ref 50–136)
ALT SERPL-CCNC: 14 U/L (ref 12–65)
ANION GAP SERPL CALC-SCNC: 13 MMOL/L (ref 7–16)
AST SERPL-CCNC: 18 U/L (ref 15–37)
ATRIAL RATE: 170 BPM
ATRIAL RATE: 89 BPM
BILIRUB SERPL-MCNC: 0.9 MG/DL (ref 0.2–1.1)
BUN SERPL-MCNC: 15 MG/DL (ref 8–23)
CALCIUM SERPL-MCNC: 9.4 MG/DL (ref 8.3–10.4)
CALCULATED P AXIS, ECG09: 116 DEGREES
CALCULATED R AXIS, ECG10: 36 DEGREES
CALCULATED R AXIS, ECG10: 64 DEGREES
CALCULATED T AXIS, ECG11: -78 DEGREES
CALCULATED T AXIS, ECG11: 78 DEGREES
CHLORIDE SERPL-SCNC: 105 MMOL/L (ref 98–107)
CO2 SERPL-SCNC: 22 MMOL/L (ref 21–32)
CREAT SERPL-MCNC: 0.65 MG/DL (ref 0.8–1.5)
DIAGNOSIS, 93000: NORMAL
DIAGNOSIS, 93000: NORMAL
ERYTHROCYTE [DISTWIDTH] IN BLOOD BY AUTOMATED COUNT: 12.8 % (ref 11.9–14.6)
GLOBULIN SER CALC-MCNC: 4.7 G/DL (ref 2.3–3.5)
GLUCOSE BLD STRIP.AUTO-MCNC: 134 MG/DL (ref 65–100)
GLUCOSE BLD STRIP.AUTO-MCNC: 136 MG/DL (ref 65–100)
GLUCOSE BLD STRIP.AUTO-MCNC: 150 MG/DL (ref 65–100)
GLUCOSE BLD STRIP.AUTO-MCNC: 163 MG/DL (ref 65–100)
GLUCOSE SERPL-MCNC: 140 MG/DL (ref 65–100)
HCT VFR BLD AUTO: 43.7 % (ref 41.1–50.3)
HGB BLD-MCNC: 15.7 G/DL (ref 13.6–17.2)
MAGNESIUM SERPL-MCNC: 2 MG/DL (ref 1.8–2.4)
MCH RBC QN AUTO: 30.8 PG (ref 26.1–32.9)
MCHC RBC AUTO-ENTMCNC: 35.9 G/DL (ref 31.4–35)
MCV RBC AUTO: 85.7 FL (ref 79.6–97.8)
P-R INTERVAL, ECG05: 138 MS
PLATELET # BLD AUTO: 177 K/UL (ref 150–450)
PMV BLD AUTO: 10.6 FL (ref 10.8–14.1)
POTASSIUM SERPL-SCNC: 3.8 MMOL/L (ref 3.5–5.1)
PROT SERPL-MCNC: 7.5 G/DL (ref 6.3–8.2)
Q-T INTERVAL, ECG07: 282 MS
Q-T INTERVAL, ECG07: 340 MS
QRS DURATION, ECG06: 74 MS
QRS DURATION, ECG06: 82 MS
QTC CALCULATION (BEZET), ECG08: 413 MS
QTC CALCULATION (BEZET), ECG08: 479 MS
RBC # BLD AUTO: 5.1 M/UL (ref 4.23–5.67)
SODIUM SERPL-SCNC: 140 MMOL/L (ref 136–145)
VENTRICULAR RATE, ECG03: 174 BPM
VENTRICULAR RATE, ECG03: 89 BPM
WBC # BLD AUTO: 8.8 K/UL (ref 4.3–11.1)

## 2017-09-09 PROCEDURE — 80053 COMPREHEN METABOLIC PANEL: CPT | Performed by: INTERNAL MEDICINE

## 2017-09-09 PROCEDURE — 74011250636 HC RX REV CODE- 250/636: Performed by: INTERNAL MEDICINE

## 2017-09-09 PROCEDURE — 85027 COMPLETE CBC AUTOMATED: CPT | Performed by: INTERNAL MEDICINE

## 2017-09-09 PROCEDURE — 36415 COLL VENOUS BLD VENIPUNCTURE: CPT | Performed by: INTERNAL MEDICINE

## 2017-09-09 PROCEDURE — 82962 GLUCOSE BLOOD TEST: CPT

## 2017-09-09 PROCEDURE — 74011250637 HC RX REV CODE- 250/637: Performed by: INTERNAL MEDICINE

## 2017-09-09 PROCEDURE — 65660000000 HC RM CCU STEPDOWN

## 2017-09-09 PROCEDURE — 74011636637 HC RX REV CODE- 636/637: Performed by: INTERNAL MEDICINE

## 2017-09-09 PROCEDURE — 83735 ASSAY OF MAGNESIUM: CPT | Performed by: INTERNAL MEDICINE

## 2017-09-09 RX ORDER — LISINOPRIL 5 MG/1
10 TABLET ORAL DAILY
Status: DISCONTINUED | OUTPATIENT
Start: 2017-09-09 | End: 2017-09-12 | Stop reason: HOSPADM

## 2017-09-09 RX ADMIN — Medication 10 ML: at 17:35

## 2017-09-09 RX ADMIN — CARVEDILOL 25 MG: 25 TABLET ORAL at 18:09

## 2017-09-09 RX ADMIN — HYDRALAZINE HYDROCHLORIDE 10 MG: 20 INJECTION INTRAMUSCULAR; INTRAVENOUS at 17:43

## 2017-09-09 RX ADMIN — INSULIN LISPRO 2 UNITS: 100 INJECTION, SOLUTION INTRAVENOUS; SUBCUTANEOUS at 11:49

## 2017-09-09 RX ADMIN — Medication 10 ML: at 06:26

## 2017-09-09 RX ADMIN — SPIRONOLACTONE 25 MG: 25 TABLET, FILM COATED ORAL at 08:09

## 2017-09-09 RX ADMIN — CARVEDILOL 25 MG: 25 TABLET ORAL at 08:09

## 2017-09-09 RX ADMIN — LISINOPRIL 10 MG: 5 TABLET ORAL at 11:46

## 2017-09-09 RX ADMIN — TOPIRAMATE 100 MG: 100 TABLET, FILM COATED ORAL at 08:09

## 2017-09-09 RX ADMIN — INSULIN LISPRO 2 UNITS: 100 INJECTION, SOLUTION INTRAVENOUS; SUBCUTANEOUS at 23:58

## 2017-09-09 RX ADMIN — PANTOPRAZOLE SODIUM 40 MG: 40 TABLET, DELAYED RELEASE ORAL at 06:32

## 2017-09-09 RX ADMIN — DIVALPROEX SODIUM 250 MG: 250 TABLET, DELAYED RELEASE ORAL at 08:09

## 2017-09-09 NOTE — PROGRESS NOTES
Admitted to the unit, no acute distress, alert, oriented X 3, lung diminished BS A/P, no confusion or paranoid behavior, responds to questions appropriately.

## 2017-09-09 NOTE — PROGRESS NOTES
Pt resting comfortably throughout night. No paranoid behavior noted, taking PO medications at this time. VSS on cardizem gtt.

## 2017-09-09 NOTE — PROGRESS NOTES
Hospitalist Progress Note     Admit Date:  2017  6:45 PM   Name:  Kiko Lee   Age:  59 y.o.  :  1955   MRN:  [id number]   PCP:  Kitty Wright MD  Treatment Team: Attending Provider: Mark Espinal MD; Care Manager: Peter Aldana RN; Consulting Provider: Cris Amado MD    Subjective:     \"Mr. Wileen Gottron is a 60 yo male who presented with headache on a background COPD, CHF, CAD, CVA, DM2 and anxiety. CT head was done and a hairline acute subdural hematoma cannot be excluded especially given On the second day of admission he became  agitated. [facility] was called and there was no concern for any intracranial bleed\"     17. Yesterday patient developed Afib with RVR. He was placed on a cardizem drip and converted to NSR in the evening. No anticoagulation was started due to suspicion of small intracranial bleed. He was paranoid yesterday and refused to take his regular meds. Better today. Cooperative. Taking his meds.  Stable to go to the floor         Objective:     Patient Vitals for the past 24 hrs:   Temp Pulse Resp BP SpO2   17 1017 - 90 - (!) 160/95 -   17 0729 98.2 °F (36.8 °C) 91 21 (!) 160/95 100 %   17 0551 - 89 28 114/62 97 %   17 0349 98.5 °F (36.9 °C) 91 22 118/68 93 %   17 0200 - 83 29 103/44 -   17 0017 99 °F (37.2 °C) 94 28 116/64 94 %   17 1919 99.3 °F (37.4 °C) 86 23 115/70 95 %   17 1833 - 91 - 121/56 -   17 1305 - (!) 178 - - -   17 1255 - (!) 198 - (!) 163/115 -   17 1251 - (!) 175 20 (!) 163/115 -   17 1045 - (!) 105 - (!) 197/102 -     Oxygen Therapy  O2 Sat (%): 100 % (17)  Pulse via Oximetry: 87 beats per minute (17 0551)  O2 Device: Room air (17)  O2 Flow Rate (L/min): 2 l/min (17)    Intake/Output Summary (Last 24 hours) at 17 1040  Last data filed at 17 0955   Gross per 24 hour   Intake           673.75 ml   Output              850 ml   Net -176.25 ml           General appearance: alert   Eyes: anicteric sclerae, moist conjunctivae; no lid-lag  ENT: Atraumatic; oropharynx clear with moist mucous membranes and no mucosal ulcerations; normal hard and soft palate  Neck: Trachea midline   FROM, supple, no thyromegaly or lymphadenopathy  Lungs: CTA, with normal respiratory effort and no intercostal retractions  CV: S1,S2 present, no added murmurs  Abdomen: Soft, non-tender; no masses   Extremities: No peripheral edema or extremity lymphadenopathy  Skin: Normal temperature, turgor and texture; no subcutaneous nodules  Psych: normal , not paranoic anymore     Data Review:  I have reviewed all labs, meds, telemetry events, and studies from the last 24 hours. Recent Results (from the past 24 hour(s))   EKG, 12 LEAD, INITIAL    Collection Time: 09/08/17 12:47 PM   Result Value Ref Range    Ventricular Rate 174 BPM    Atrial Rate 170 BPM    QRS Duration 82 ms    Q-T Interval 282 ms    QTC Calculation (Bezet) 479 ms    Calculated R Axis 36 degrees    Calculated T Axis -78 degrees    Diagnosis       Atrial fibrillation with rapid ventricular response  Marked ST abnormality, possible inferior subendocardial injury  Marked ST abnormality, possible anterior subendocardial injury  Abnormal ECG  When compared with ECG of 05-SEP-2017 16:47,  Atrial fibrillation has replaced Sinus rhythm  Vent.  rate has increased BY  92 BPM  ST now depressed in Inferior leads  ST now depressed in Anterolateral leads  T wave inversion now evident in Inferior leads  Confirmed by Bryce Woods [id number]) on 9/9/2017 7:09:01 AM     GLUCOSE, POC    Collection Time: 09/08/17 12:48 PM   Result Value Ref Range    Glucose (POC) 131 (H) 65 - 100 mg/dL   EKG, 12 LEAD, SUBSEQUENT    Collection Time: 09/08/17  4:22 PM   Result Value Ref Range    Ventricular Rate 89 BPM    Atrial Rate 89 BPM    P-R Interval 138 ms    QRS Duration 74 ms    Q-T Interval 340 ms    QTC Calculation (Bezet) 413 ms Calculated P Axis 116 degrees    Calculated R Axis 64 degrees    Calculated T Axis 78 degrees    Diagnosis       Normal sinus rhythm  Normal ECG  When compared with ECG of 08-SEP-2017 12:47,  Sinus rhythm has replaced Atrial fibrillation  Vent. rate has decreased BY  85 BPM  ST no longer depressed in Inferior leads  ST no longer depressed in Anterolateral leads  T wave inversion no longer evident in Inferior leads  Nonspecific T wave abnormality, worse in Lateral leads  Confirmed by Leni Trammell ([id number]) on 9/9/2017 7:13:01 AM     GLUCOSE, POC    Collection Time: 09/08/17  9:55 PM   Result Value Ref Range    Glucose (POC) 131 (H) 65 - 464 mg/dL   METABOLIC PANEL, COMPREHENSIVE    Collection Time: 09/09/17  5:22 AM   Result Value Ref Range    Sodium 140 136 - 145 mmol/L    Potassium 3.8 3.5 - 5.1 mmol/L    Chloride 105 98 - 107 mmol/L    CO2 22 21 - 32 mmol/L    Anion gap 13 7 - 16 mmol/L    Glucose 140 (H) 65 - 100 mg/dL    BUN 15 8 - 23 MG/DL    Creatinine 0.65 (L) 0.8 - 1.5 MG/DL    GFR est AA >60 >60 ml/min/1.73m2    GFR est non-AA >60 >60 ml/min/1.73m2    Calcium 9.4 8.3 - 10.4 MG/DL    Bilirubin, total 0.9 0.2 - 1.1 MG/DL    ALT (SGPT) 14 12 - 65 U/L    AST (SGOT) 18 15 - 37 U/L    Alk.  phosphatase 36 (L) 50 - 136 U/L    Protein, total 7.5 6.3 - 8.2 g/dL    Albumin 2.8 (L) 3.2 - 4.6 g/dL    Globulin 4.7 (H) 2.3 - 3.5 g/dL    A-G Ratio 0.6 (L) 1.2 - 3.5     MAGNESIUM    Collection Time: 09/09/17  5:22 AM   Result Value Ref Range    Magnesium 2.0 1.8 - 2.4 mg/dL   GLUCOSE, POC    Collection Time: 09/09/17  6:21 AM   Result Value Ref Range    Glucose (POC) 134 (H) 65 - 100 mg/dL   CBC W/O DIFF    Collection Time: 09/09/17  6:26 AM   Result Value Ref Range    WBC 8.8 4.3 - 11.1 K/uL    RBC 5.10 4.23 - 5.67 M/uL    HGB 15.7 13.6 - 17.2 g/dL    HCT 43.7 41.1 - 50.3 %    MCV 85.7 79.6 - 97.8 FL    MCH 30.8 26.1 - 32.9 PG    MCHC 35.9 (H) 31.4 - 35.0 g/dL    RDW 12.8 11.9 - 14.6 %    PLATELET 663 000 - 124 K/uL MPV 10.6 (L) 10.8 - 14.1 FL        All Micro Results     Procedure Component Value Units Date/Time    MRSA SCREEN - PCR (NASAL) [[id number]] Collected:  09/06/17 0730    Order Status:  Completed Specimen:  Nasal from Nares Updated:  09/06/17 0959     Special Requests: NO SPECIAL REQUESTS        Culture result:       MRSA target DNA is not detected (presumptive not colonized with MRSA)  CORRECTED ON 09/06 AT 0959: PREVIOUSLY REPORTED AS MRSA target DNA not detected, SA target DNA detected. A MRSA negative, SA positive test result does not preclude MRSA nasal colonization.             Current Meds:  Current Facility-Administered Medications   Medication Dose Route Frequency    lisinopril (PRINIVIL, ZESTRIL) tablet 10 mg  10 mg Oral DAILY    haloperidol lactate (HALDOL) injection 4 mg  4 mg IntraVENous Q6H PRN    dilTIAZem (CARDIZEM) 100 mg in 0.9% sodium chloride (MBP/ADV) 100 mL infusion  0-15 mg/hr IntraVENous TITRATE    LORazepam (ATIVAN) injection 1 mg  1 mg IntraVENous Q4H PRN    carvedilol (COREG) tablet 25 mg  25 mg Oral BID WITH MEALS    topiramate (TOPAMAX) tablet 100 mg  100 mg Oral DAILY WITH BREAKFAST    amitriptyline (ELAVIL) tablet 25 mg  25 mg Oral QHS    divalproex DR (DEPAKOTE) tablet 250 mg  250 mg Oral Q12H    hydrALAZINE (APRESOLINE) 20 mg/mL injection 10 mg  10 mg IntraVENous Q4H PRN    ondansetron (ZOFRAN) injection 4 mg  4 mg IntraVENous Q6H PRN    sodium chloride (NS) flush 5-10 mL  5-10 mL IntraVENous Q8H    sodium chloride (NS) flush 5-10 mL  5-10 mL IntraVENous PRN    acetaminophen (TYLENOL) tablet 650 mg  650 mg Oral Q4H PRN    naloxone (NARCAN) injection 0.4 mg  0.4 mg IntraVENous PRN    bisacodyl (DULCOLAX) tablet 5 mg  5 mg Oral DAILY PRN    pantoprazole (PROTONIX) tablet 40 mg  40 mg Oral ACB    spironolactone (ALDACTONE) tablet 25 mg  25 mg Oral DAILY    insulin lispro (HUMALOG) injection   SubCUTAneous AC&HS       Other Studies (last 24 hours):  No results found.    Assessment and Plan:     Hospital Problems as of 9/9/2017  Date Reviewed: 5/12/2011          Codes Class Noted - Resolved POA    * (Principal)Nontraumatic acute subdural hemorrhage ([facility] ICD-10-CM: I62.01  ICD-9-CM: 432.1  9/5/2017 - Present Yes        Bipolar 1 disorder ([facility] ICD-10-CM: F31.9  ICD-9-CM: 296.7  9/5/2017 - Present Yes        GERD (gastroesophageal reflux disease) ICD-10-CM: K21.9  ICD-9-CM: 530.81  Unknown - Present Yes    Overview Signed 6/21/2016  4:42 PM by China sneed             CAD (coronary artery disease) ICD-10-CM: I25.10  ICD-9-CM: 414.00  6/21/2016 - Present Yes        Insomnia ICD-10-CM: G47.00  ICD-9-CM: 780.52  6/21/2016 - Present Yes        Depression ICD-10-CM: F32.9  ICD-9-CM: 681  6/21/2016 - Present Yes        HTN (hypertension) ICD-10-CM: I10  ICD-9-CM: 401.9  7/20/2014 - Present Yes        Migraine ICD-10-CM: W12.725  ICD-9-CM: 346.90  7/20/2014 - Present Yes        DM2 (diabetes mellitus, type 2) (HCC) (Chronic) ICD-10-CM: E11.9  ICD-9-CM: 250.00  7/20/2014 - Present Yes              PLAN:  Metabolic encephalopathy/ suspect mainly psychiatric disorder/bipolar   -patient was paranoid yesterday. Received IV haldol. Started on depakote as per [employer]. Better today. Should check Depakote level on Monday   - Neurosurgery no concern for intracranial bleed. DM2  Continue insulin sliding scale  Using metformin at home   Hgb A1c is 6.8     Afib with RVR   -converted to NSR   -on Coreg   -on ASA   -no anticoagulation due to suspicion for small intracranial bleed     HTN  -on coreg   -lisinopril 10 mg po daily. Using 20 mg at home.  Titrate as necessary        DVT ppx:  SCD    Signed:  Haim Domingo MD

## 2017-09-09 NOTE — PROGRESS NOTES
Bedside report received from [address] [place] [address], [address] [place] [address].

## 2017-09-09 NOTE — PROGRESS NOTES
TRANSFER - OUT REPORT:    Verbal report given to Madison duarte RN on [address]  being transferred to [phone] for routine progression of care       Report consisted of patients Situation, Background, Assessment and   Recommendations(SBAR). Information from the following report(s) SBAR, Kardex, ED Summary, Intake/Output, MAR and Cardiac Rhythm NSR/ST was reviewed with the receiving nurse. Lines:   Peripheral IV 09/06/17 Left Arm (Active)   Site Assessment Clean, dry, & intact 9/9/2017  7:29 AM   Phlebitis Assessment 0 9/9/2017  7:29 AM   Infiltration Assessment 0 9/9/2017  7:29 AM   Dressing Status Clean, dry, & intact 9/9/2017  7:29 AM   Dressing Type Tape;Transparent 9/9/2017  7:29 AM   Hub Color/Line Status Pink; Infusing 9/9/2017  7:29 AM   Alcohol Cap Used No 9/9/2017  7:29 AM       Peripheral IV 09/08/17 Right Forearm (Active)   Site Assessment Clean, dry, & intact 9/9/2017  7:29 AM   Phlebitis Assessment 0 9/9/2017  7:29 AM   Infiltration Assessment 0 9/9/2017  7:29 AM   Dressing Status Clean, dry, & intact 9/9/2017  7:29 AM   Dressing Type Tape;Transparent 9/9/2017  7:29 AM   Hub Color/Line Status Pink;Capped 9/9/2017  7:29 AM   Alcohol Cap Used No 9/9/2017  7:29 AM        Opportunity for questions and clarification was provided.       Patient transported with:   Monitor  Tech

## 2017-09-09 NOTE — PROGRESS NOTES
TRANSFER - IN REPORT:    Verbal report received from Elizabeth Quiñones RN on [address]  being received from ICU for routine progression of care      Report consisted of patients Situation, Background, Assessment and   Recommendations(SBAR). Information from the following report(s) SBAR was reviewed with the receiving nurse. Opportunity for questions and clarification was provided. Assessment completed upon patients arrival to unit and care assumed.

## 2017-09-09 NOTE — PROGRESS NOTES
Bedside and Verbal shift change report given to Nahid Edge RN by [facility] RN. Report included the following information SBAR, Kardex, ED Summary, Intake/Output, MAR, Accordion, Recent Results, Med Rec Status, Cardiac Rhythm SR, PAC/PVC and Alarm Parameters .

## 2017-09-10 ENCOUNTER — APPOINTMENT (OUTPATIENT)
Dept: CT IMAGING | Age: 62
DRG: 044 | End: 2017-09-10
Attending: HOSPITALIST
Payer: MEDICAID

## 2017-09-10 LAB
ALBUMIN SERPL-MCNC: 3 G/DL (ref 3.2–4.6)
ALBUMIN/GLOB SERPL: 0.6 {RATIO} (ref 1.2–3.5)
ALP SERPL-CCNC: 42 U/L (ref 50–136)
ALT SERPL-CCNC: 15 U/L (ref 12–65)
ANION GAP SERPL CALC-SCNC: 12 MMOL/L (ref 7–16)
APPEARANCE UR: CLEAR
AST SERPL-CCNC: 12 U/L (ref 15–37)
BILIRUB DIRECT SERPL-MCNC: 0.3 MG/DL
BILIRUB SERPL-MCNC: 1.5 MG/DL (ref 0.2–1.1)
BILIRUB UR QL: ABNORMAL
BUN SERPL-MCNC: 16 MG/DL (ref 8–23)
CALCIUM SERPL-MCNC: 9.8 MG/DL (ref 8.3–10.4)
CHLORIDE SERPL-SCNC: 102 MMOL/L (ref 98–107)
CO2 SERPL-SCNC: 23 MMOL/L (ref 21–32)
COLOR UR: ABNORMAL
CREAT SERPL-MCNC: 0.64 MG/DL (ref 0.8–1.5)
CRP SERPL-MCNC: 20.1 MG/DL (ref 0–0.9)
ERYTHROCYTE [DISTWIDTH] IN BLOOD BY AUTOMATED COUNT: 12.7 % (ref 11.9–14.6)
ERYTHROCYTE [SEDIMENTATION RATE] IN BLOOD: 86 MM/HR (ref 0–20)
GLOBULIN SER CALC-MCNC: 5.2 G/DL (ref 2.3–3.5)
GLUCOSE BLD STRIP.AUTO-MCNC: 143 MG/DL (ref 65–100)
GLUCOSE BLD STRIP.AUTO-MCNC: 151 MG/DL (ref 65–100)
GLUCOSE BLD STRIP.AUTO-MCNC: 159 MG/DL (ref 65–100)
GLUCOSE BLD STRIP.AUTO-MCNC: 200 MG/DL (ref 65–100)
GLUCOSE SERPL-MCNC: 160 MG/DL (ref 65–100)
GLUCOSE UR STRIP.AUTO-MCNC: NEGATIVE MG/DL
HCT VFR BLD AUTO: 45.5 % (ref 41.1–50.3)
HGB BLD-MCNC: 16.3 G/DL (ref 13.6–17.2)
HGB UR QL STRIP: NEGATIVE
KETONES UR QL STRIP.AUTO: 15 MG/DL
LEUKOCYTE ESTERASE UR QL STRIP.AUTO: NEGATIVE
MAGNESIUM SERPL-MCNC: 2.1 MG/DL (ref 1.8–2.4)
MCH RBC QN AUTO: 31.1 PG (ref 26.1–32.9)
MCHC RBC AUTO-ENTMCNC: 35.8 G/DL (ref 31.4–35)
MCV RBC AUTO: 86.8 FL (ref 79.6–97.8)
NITRITE UR QL STRIP.AUTO: NEGATIVE
PH UR STRIP: 6 [PH] (ref 5–9)
PLATELET # BLD AUTO: 196 K/UL (ref 150–450)
PMV BLD AUTO: 10.7 FL (ref 10.8–14.1)
POTASSIUM SERPL-SCNC: 3.4 MMOL/L (ref 3.5–5.1)
PROCALCITONIN SERPL-MCNC: 0.1 NG/ML
PROT SERPL-MCNC: 8.2 G/DL (ref 6.3–8.2)
PROT UR STRIP-MCNC: ABNORMAL MG/DL
RBC # BLD AUTO: 5.24 M/UL (ref 4.23–5.67)
SODIUM SERPL-SCNC: 137 MMOL/L (ref 136–145)
SP GR UR REFRACTOMETRY: 1.04 (ref 1–1.02)
TSH SERPL DL<=0.005 MIU/L-ACNC: 0.53 UIU/ML (ref 0.36–3.74)
UROBILINOGEN UR QL STRIP.AUTO: 1 EU/DL (ref 0.2–1)
VIT B12 SERPL-MCNC: 736 PG/ML (ref 193–986)
WBC # BLD AUTO: 12.7 K/UL (ref 4.3–11.1)

## 2017-09-10 PROCEDURE — 82962 GLUCOSE BLOOD TEST: CPT

## 2017-09-10 PROCEDURE — 87088 URINE BACTERIA CULTURE: CPT | Performed by: HOSPITALIST

## 2017-09-10 PROCEDURE — 74011636320 HC RX REV CODE- 636/320: Performed by: INTERNAL MEDICINE

## 2017-09-10 PROCEDURE — 74011636637 HC RX REV CODE- 636/637: Performed by: INTERNAL MEDICINE

## 2017-09-10 PROCEDURE — 74011250637 HC RX REV CODE- 250/637: Performed by: INTERNAL MEDICINE

## 2017-09-10 PROCEDURE — 65660000000 HC RM CCU STEPDOWN

## 2017-09-10 PROCEDURE — 81003 URINALYSIS AUTO W/O SCOPE: CPT | Performed by: HOSPITALIST

## 2017-09-10 PROCEDURE — 82607 VITAMIN B-12: CPT | Performed by: INTERNAL MEDICINE

## 2017-09-10 PROCEDURE — 85027 COMPLETE CBC AUTOMATED: CPT | Performed by: INTERNAL MEDICINE

## 2017-09-10 PROCEDURE — 83735 ASSAY OF MAGNESIUM: CPT | Performed by: INTERNAL MEDICINE

## 2017-09-10 PROCEDURE — 84145 PROCALCITONIN (PCT): CPT | Performed by: INTERNAL MEDICINE

## 2017-09-10 PROCEDURE — 74011250636 HC RX REV CODE- 250/636: Performed by: HOSPITALIST

## 2017-09-10 PROCEDURE — 85652 RBC SED RATE AUTOMATED: CPT | Performed by: INTERNAL MEDICINE

## 2017-09-10 PROCEDURE — 80053 COMPREHEN METABOLIC PANEL: CPT | Performed by: INTERNAL MEDICINE

## 2017-09-10 PROCEDURE — 82248 BILIRUBIN DIRECT: CPT | Performed by: INTERNAL MEDICINE

## 2017-09-10 PROCEDURE — 84443 ASSAY THYROID STIM HORMONE: CPT | Performed by: INTERNAL MEDICINE

## 2017-09-10 PROCEDURE — 36415 COLL VENOUS BLD VENIPUNCTURE: CPT | Performed by: INTERNAL MEDICINE

## 2017-09-10 PROCEDURE — 87086 URINE CULTURE/COLONY COUNT: CPT | Performed by: HOSPITALIST

## 2017-09-10 PROCEDURE — 74011250637 HC RX REV CODE- 250/637: Performed by: HOSPITALIST

## 2017-09-10 PROCEDURE — 86140 C-REACTIVE PROTEIN: CPT | Performed by: INTERNAL MEDICINE

## 2017-09-10 RX ORDER — FUROSEMIDE 20 MG/1
20 TABLET ORAL DAILY
Status: DISCONTINUED | OUTPATIENT
Start: 2017-09-10 | End: 2017-09-12 | Stop reason: HOSPADM

## 2017-09-10 RX ORDER — ENOXAPARIN SODIUM 100 MG/ML
40 INJECTION SUBCUTANEOUS EVERY 24 HOURS
Status: DISCONTINUED | OUTPATIENT
Start: 2017-09-10 | End: 2017-09-12 | Stop reason: HOSPADM

## 2017-09-10 RX ORDER — SODIUM CHLORIDE 0.9 % (FLUSH) 0.9 %
10 SYRINGE (ML) INJECTION
Status: COMPLETED | OUTPATIENT
Start: 2017-09-10 | End: 2017-09-10

## 2017-09-10 RX ORDER — ASPIRIN 325 MG
325 TABLET ORAL DAILY
Status: DISCONTINUED | OUTPATIENT
Start: 2017-09-10 | End: 2017-09-12 | Stop reason: HOSPADM

## 2017-09-10 RX ORDER — HYDROCODONE BITARTRATE AND ACETAMINOPHEN 5; 325 MG/1; MG/1
1 TABLET ORAL
Status: DISCONTINUED | OUTPATIENT
Start: 2017-09-10 | End: 2017-09-12 | Stop reason: HOSPADM

## 2017-09-10 RX ORDER — HALOPERIDOL 5 MG/ML
2 INJECTION INTRAMUSCULAR
Status: DISCONTINUED | OUTPATIENT
Start: 2017-09-10 | End: 2017-09-12 | Stop reason: HOSPADM

## 2017-09-10 RX ADMIN — TOPIRAMATE 100 MG: 100 TABLET, FILM COATED ORAL at 09:19

## 2017-09-10 RX ADMIN — CARVEDILOL 25 MG: 25 TABLET ORAL at 17:06

## 2017-09-10 RX ADMIN — AMITRIPTYLINE HYDROCHLORIDE 25 MG: 25 TABLET, FILM COATED ORAL at 01:05

## 2017-09-10 RX ADMIN — INSULIN LISPRO 2 UNITS: 100 INJECTION, SOLUTION INTRAVENOUS; SUBCUTANEOUS at 09:17

## 2017-09-10 RX ADMIN — FUROSEMIDE 20 MG: 20 TABLET ORAL at 18:11

## 2017-09-10 RX ADMIN — DIVALPROEX SODIUM 250 MG: 250 TABLET, DELAYED RELEASE ORAL at 22:18

## 2017-09-10 RX ADMIN — Medication 10 ML: at 22:19

## 2017-09-10 RX ADMIN — ENOXAPARIN SODIUM 40 MG: 40 INJECTION SUBCUTANEOUS at 17:06

## 2017-09-10 RX ADMIN — PANTOPRAZOLE SODIUM 40 MG: 40 TABLET, DELAYED RELEASE ORAL at 09:18

## 2017-09-10 RX ADMIN — DIVALPROEX SODIUM 250 MG: 250 TABLET, DELAYED RELEASE ORAL at 01:06

## 2017-09-10 RX ADMIN — ASPIRIN 325 MG ORAL TABLET 325 MG: 325 PILL ORAL at 18:11

## 2017-09-10 RX ADMIN — CARVEDILOL 25 MG: 25 TABLET ORAL at 09:18

## 2017-09-10 RX ADMIN — Medication 10 ML: at 13:21

## 2017-09-10 RX ADMIN — Medication 10 ML: at 00:00

## 2017-09-10 RX ADMIN — LISINOPRIL 10 MG: 5 TABLET ORAL at 09:18

## 2017-09-10 RX ADMIN — DIVALPROEX SODIUM 250 MG: 250 TABLET, DELAYED RELEASE ORAL at 09:18

## 2017-09-10 RX ADMIN — Medication 10 ML: at 05:41

## 2017-09-10 RX ADMIN — Medication 10 ML: at 14:52

## 2017-09-10 RX ADMIN — INSULIN LISPRO 2 UNITS: 100 INJECTION, SOLUTION INTRAVENOUS; SUBCUTANEOUS at 22:48

## 2017-09-10 RX ADMIN — INSULIN LISPRO 4 UNITS: 100 INJECTION, SOLUTION INTRAVENOUS; SUBCUTANEOUS at 17:06

## 2017-09-10 RX ADMIN — SPIRONOLACTONE 25 MG: 25 TABLET, FILM COATED ORAL at 09:19

## 2017-09-10 RX ADMIN — IOPAMIDOL 80 ML: 755 INJECTION, SOLUTION INTRAVENOUS at 13:21

## 2017-09-10 RX ADMIN — AMITRIPTYLINE HYDROCHLORIDE 25 MG: 25 TABLET, FILM COATED ORAL at 22:18

## 2017-09-10 NOTE — PROGRESS NOTES
Bedside and Verbal shift change report given to Aiyana De La Vega  (oncoming nurse) by Jake Coleman  (offgoing nurse). Report included the following information SBAR, Kardex, Intake/Output, MAR and Recent Results.

## 2017-09-10 NOTE — ROUTINE PROCESS
Patient refused night-time medication until the football game is off. Patient states the medication make him sleepy. Nurse explained the importance of timely medication. Medication is placed on hold. Patient refused Protonix related to \"they have been waking me up all night. I not gonna to take any medication\" Nurse explained the medication must be administrated on a empty stomach. Patient refused. Patient sits on the edge of the bed to use the urinal.   Patient's urine is orange and cloudy.

## 2017-09-10 NOTE — PROGRESS NOTES
Problem: Falls - Risk of  Goal: *Absence of Falls  Document Malia Fall Risk and appropriate interventions in the flowsheet. Fall Risk Interventions:  Mobility Interventions: Bed/chair exit alarm, Patient to call before getting OOB     Mentation Interventions: Bed/chair exit alarm     Medication Interventions: Bed/chair exit alarm, Patient to call before getting OOB, Teach patient to arise slowly     Elimination Interventions: Bed/chair exit alarm, Call light in reach, Patient to call for help with toileting needs                 Problem: Nutrition Deficit  Goal: *Optimize nutritional status  Outcome: Not Progressing Towards Goal  Patient still has a poor appetite & is not eating well.

## 2017-09-10 NOTE — PROGRESS NOTES
Notified Dr. Danii Ortega that pt could not complete CT of head & neck because he became nauseous & moved around too much. Plan is to try the CT again tomorrow after 1330.

## 2017-09-10 NOTE — PROGRESS NOTES
Shift assessment complete; pt resting in bed. Drowsy but arouse able. Oriented x4. Resp even & unlabored on room air; lungs clear. HR regular; telemetry applied. All extremities weak with palpable pulses. Skin intact. Call light in reach; bed low & locked; no needs voiced; alarm on; will continue to monitor.
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
## 2017-09-11 ENCOUNTER — APPOINTMENT (OUTPATIENT)
Dept: CT IMAGING | Age: 62
DRG: 044 | End: 2017-09-11
Attending: HOSPITALIST
Payer: MEDICAID

## 2017-09-11 ENCOUNTER — APPOINTMENT (OUTPATIENT)
Dept: ULTRASOUND IMAGING | Age: 62
DRG: 044 | End: 2017-09-11
Attending: HOSPITALIST
Payer: MEDICAID

## 2017-09-11 LAB
ALBUMIN SERPL-MCNC: 2.8 G/DL (ref 3.2–4.6)
ALBUMIN/GLOB SERPL: 0.5 {RATIO} (ref 1.2–3.5)
ALP SERPL-CCNC: 38 U/L (ref 50–136)
ALT SERPL-CCNC: 17 U/L (ref 12–65)
ANION GAP SERPL CALC-SCNC: 11 MMOL/L (ref 7–16)
AST SERPL-CCNC: 12 U/L (ref 15–37)
BASOPHILS # BLD: 0 K/UL (ref 0–0.2)
BASOPHILS NFR BLD: 0 % (ref 0–2)
BILIRUB SERPL-MCNC: 0.8 MG/DL (ref 0.2–1.1)
BUN SERPL-MCNC: 27 MG/DL (ref 8–23)
CALCIUM SERPL-MCNC: 9.7 MG/DL (ref 8.3–10.4)
CHLORIDE SERPL-SCNC: 103 MMOL/L (ref 98–107)
CO2 SERPL-SCNC: 23 MMOL/L (ref 21–32)
CREAT SERPL-MCNC: 0.73 MG/DL (ref 0.8–1.5)
CRP SERPL-MCNC: 16 MG/DL (ref 0–0.9)
DIFFERENTIAL METHOD BLD: ABNORMAL
EOSINOPHIL # BLD: 0.1 K/UL (ref 0–0.8)
EOSINOPHIL NFR BLD: 1 % (ref 0.5–7.8)
ERYTHROCYTE [DISTWIDTH] IN BLOOD BY AUTOMATED COUNT: 12.7 % (ref 11.9–14.6)
GLOBULIN SER CALC-MCNC: 5.3 G/DL (ref 2.3–3.5)
GLUCOSE BLD STRIP.AUTO-MCNC: 146 MG/DL (ref 65–100)
GLUCOSE BLD STRIP.AUTO-MCNC: 151 MG/DL (ref 65–100)
GLUCOSE BLD STRIP.AUTO-MCNC: 186 MG/DL (ref 65–100)
GLUCOSE BLD STRIP.AUTO-MCNC: 196 MG/DL (ref 65–100)
GLUCOSE SERPL-MCNC: 142 MG/DL (ref 65–100)
HCT VFR BLD AUTO: 45.4 % (ref 41.1–50.3)
HGB BLD-MCNC: 16.1 G/DL (ref 13.6–17.2)
IMM GRANULOCYTES # BLD: 0 K/UL (ref 0–0.5)
IMM GRANULOCYTES NFR BLD: 0.5 % (ref 0–5)
LACTATE SERPL-SCNC: 1 MMOL/L (ref 0.4–2)
LYMPHOCYTES # BLD: 2.6 K/UL (ref 0.5–4.6)
LYMPHOCYTES NFR BLD: 31 % (ref 13–44)
MCH RBC QN AUTO: 30.7 PG (ref 26.1–32.9)
MCHC RBC AUTO-ENTMCNC: 35.5 G/DL (ref 31.4–35)
MCV RBC AUTO: 86.6 FL (ref 79.6–97.8)
MONOCYTES # BLD: 1 K/UL (ref 0.1–1.3)
MONOCYTES NFR BLD: 11 % (ref 4–12)
NEUTS SEG # BLD: 4.8 K/UL (ref 1.7–8.2)
NEUTS SEG NFR BLD: 57 % (ref 43–78)
PLATELET # BLD AUTO: 200 K/UL (ref 150–450)
PMV BLD AUTO: 10.4 FL (ref 10.8–14.1)
POTASSIUM SERPL-SCNC: 3.6 MMOL/L (ref 3.5–5.1)
PROT SERPL-MCNC: 8.1 G/DL (ref 6.3–8.2)
RBC # BLD AUTO: 5.24 M/UL (ref 4.23–5.67)
SODIUM SERPL-SCNC: 137 MMOL/L (ref 136–145)
T3 SERPL-MCNC: 0.49 NG/ML (ref 0.6–1.81)
T4 FREE SERPL-MCNC: 1.6 NG/DL (ref 0.78–1.46)
VALPROATE SERPL-MCNC: 28 UG/ML (ref 50–100)
WBC # BLD AUTO: 8.6 K/UL (ref 4.3–11.1)

## 2017-09-11 PROCEDURE — 74011250637 HC RX REV CODE- 250/637: Performed by: INTERNAL MEDICINE

## 2017-09-11 PROCEDURE — 74011000258 HC RX REV CODE- 258: Performed by: INTERNAL MEDICINE

## 2017-09-11 PROCEDURE — 97164 PT RE-EVAL EST PLAN CARE: CPT

## 2017-09-11 PROCEDURE — 86140 C-REACTIVE PROTEIN: CPT | Performed by: HOSPITALIST

## 2017-09-11 PROCEDURE — 74011636320 HC RX REV CODE- 636/320: Performed by: INTERNAL MEDICINE

## 2017-09-11 PROCEDURE — 65660000000 HC RM CCU STEPDOWN

## 2017-09-11 PROCEDURE — 80053 COMPREHEN METABOLIC PANEL: CPT | Performed by: HOSPITALIST

## 2017-09-11 PROCEDURE — 74011250636 HC RX REV CODE- 250/636: Performed by: HOSPITALIST

## 2017-09-11 PROCEDURE — 97530 THERAPEUTIC ACTIVITIES: CPT

## 2017-09-11 PROCEDURE — 76705 ECHO EXAM OF ABDOMEN: CPT

## 2017-09-11 PROCEDURE — 85025 COMPLETE CBC W/AUTO DIFF WBC: CPT | Performed by: HOSPITALIST

## 2017-09-11 PROCEDURE — 84480 ASSAY TRIIODOTHYRONINE (T3): CPT | Performed by: INTERNAL MEDICINE

## 2017-09-11 PROCEDURE — 70496 CT ANGIOGRAPHY HEAD: CPT

## 2017-09-11 PROCEDURE — 74011250637 HC RX REV CODE- 250/637: Performed by: HOSPITALIST

## 2017-09-11 PROCEDURE — 83605 ASSAY OF LACTIC ACID: CPT | Performed by: HOSPITALIST

## 2017-09-11 PROCEDURE — 80201 ASSAY OF TOPIRAMATE: CPT | Performed by: INTERNAL MEDICINE

## 2017-09-11 PROCEDURE — 36415 COLL VENOUS BLD VENIPUNCTURE: CPT | Performed by: HOSPITALIST

## 2017-09-11 PROCEDURE — 74011636637 HC RX REV CODE- 636/637: Performed by: INTERNAL MEDICINE

## 2017-09-11 PROCEDURE — 80164 ASSAY DIPROPYLACETIC ACD TOT: CPT | Performed by: HOSPITALIST

## 2017-09-11 PROCEDURE — 74011250636 HC RX REV CODE- 250/636: Performed by: INTERNAL MEDICINE

## 2017-09-11 PROCEDURE — 84439 ASSAY OF FREE THYROXINE: CPT | Performed by: HOSPITALIST

## 2017-09-11 PROCEDURE — 82962 GLUCOSE BLOOD TEST: CPT

## 2017-09-11 RX ORDER — SODIUM CHLORIDE 0.9 % (FLUSH) 0.9 %
10 SYRINGE (ML) INJECTION
Status: ACTIVE | OUTPATIENT
Start: 2017-09-11 | End: 2017-09-11

## 2017-09-11 RX ADMIN — Medication 10 ML: at 06:58

## 2017-09-11 RX ADMIN — INSULIN LISPRO 2 UNITS: 100 INJECTION, SOLUTION INTRAVENOUS; SUBCUTANEOUS at 17:10

## 2017-09-11 RX ADMIN — LISINOPRIL 10 MG: 5 TABLET ORAL at 09:53

## 2017-09-11 RX ADMIN — TOPIRAMATE 100 MG: 100 TABLET, FILM COATED ORAL at 09:53

## 2017-09-11 RX ADMIN — DIVALPROEX SODIUM 250 MG: 250 TABLET, DELAYED RELEASE ORAL at 23:23

## 2017-09-11 RX ADMIN — ASPIRIN 325 MG ORAL TABLET 325 MG: 325 PILL ORAL at 09:52

## 2017-09-11 RX ADMIN — CARVEDILOL 25 MG: 25 TABLET ORAL at 08:00

## 2017-09-11 RX ADMIN — FUROSEMIDE 20 MG: 20 TABLET ORAL at 09:53

## 2017-09-11 RX ADMIN — DIVALPROEX SODIUM 250 MG: 250 TABLET, DELAYED RELEASE ORAL at 09:53

## 2017-09-11 RX ADMIN — CARVEDILOL 25 MG: 25 TABLET ORAL at 17:10

## 2017-09-11 RX ADMIN — SPIRONOLACTONE 25 MG: 25 TABLET, FILM COATED ORAL at 09:53

## 2017-09-11 RX ADMIN — INSULIN LISPRO 2 UNITS: 100 INJECTION, SOLUTION INTRAVENOUS; SUBCUTANEOUS at 23:23

## 2017-09-11 RX ADMIN — IOPAMIDOL 80 ML: 755 INJECTION, SOLUTION INTRAVENOUS at 13:59

## 2017-09-11 RX ADMIN — ENOXAPARIN SODIUM 40 MG: 40 INJECTION SUBCUTANEOUS at 17:10

## 2017-09-11 RX ADMIN — Medication 5 ML: at 14:32

## 2017-09-11 RX ADMIN — PANTOPRAZOLE SODIUM 40 MG: 40 TABLET, DELAYED RELEASE ORAL at 06:57

## 2017-09-11 RX ADMIN — LORAZEPAM 1 MG: 2 INJECTION INTRAMUSCULAR at 13:04

## 2017-09-11 RX ADMIN — Medication 10 ML: at 23:25

## 2017-09-11 RX ADMIN — SODIUM CHLORIDE 100 ML: 900 INJECTION, SOLUTION INTRAVENOUS at 13:59

## 2017-09-11 RX ADMIN — INSULIN LISPRO 2 UNITS: 100 INJECTION, SOLUTION INTRAVENOUS; SUBCUTANEOUS at 12:28

## 2017-09-11 RX ADMIN — AMITRIPTYLINE HYDROCHLORIDE 25 MG: 25 TABLET, FILM COATED ORAL at 23:23

## 2017-09-11 NOTE — PROGRESS NOTES
OT NOTE:    Charts reviewed, treatment session attempted. Pt was being transported off the floor for testing upon attempt. Will re-attempt at later time/date as schedule allows.      Thanks,    Norbert Wick, OTR/L

## 2017-09-11 NOTE — PROGRESS NOTES
Problem: Mobility Impaired (Adult and Pediatric)  Goal: Discharge Goals:    (1.)Mr. Orestes Ruiz will tolerate 25+ minutes of therapeutic activity/exercise while maintaining stable vitals to improve functional strength and activity tolerance within 7 day(s). (2.)Mr. Orestes Ruiz will demonstrate good dynamic standing balance within 7 day(s). (3.)Mr. Orestes Ruiz will perform transfers with INDEPENDENCE within 7 day(s). (4.)Mr. Orestes Ruiz will ambulate with INDEPENDENCE for 250+ feet with the least restrictive device within 7 day(s). ________________________________________________________________________________________________             PHYSICAL THERAPY: Re-evaluation, Treatment Day: Day of Assessment and PM    9/11/2017  INPATIENT: Hospital Day: 7  Payor: [facility] MEDICAID / Plan: [facility] MEDICAID / Product Type: Medicaid /      NAME/AGE/GENDER: Angela Todd is a 59 y.o. male    PRIMARY DIAGNOSIS: Nontraumatic acute subdural hemorrhage (HCC) Nontraumatic acute subdural hemorrhage (HCC) Nontraumatic acute subdural hemorrhage (HCC)        ICD-10: Treatment Diagnosis:       · Generalized Muscle Weakness (M62.81)  · Difficulty in walking, Not elsewhere classified (R26.2)   Precaution/Allergies:  Oyster extract       ASSESSMENT:      Mr. Orestes Ruiz is a 59year old male admitted with SDH and a-fib with RVR. Patient seen this PM for physical therapy re-evaluation: presents supine in bed, oriented x4, and endorses 0/10 pain. Patient lives with his spouse in a single story residence with 1 step to enter. At baseline, patient is an independent community level ambulator and independent with ADLs. Today, generalized weakness appreciated in B UE/LE (4/5), B UE/LE ROM WFL, visual acuity and tracking WFL, and sensation is intact to light touch C4-T1 and L3-S1 B. Patient performed bed mobility with supervision, transfers with CGA, and ambulation x30ft with room/bathroom with CGA.  Demonstrated a slow, step-through gait pattern with mildly narrowed base of support and fair dynamic balance throughout. Provided handhold CGA assistance with ambulation and patient reaching out for objects in room; endorses, \"feeling better\" holding onto something. Post evaluation patient participated in therapeutic activity including: pre-gait activity, dynamic standing balance throughout ADLs, and transfers bed to chair. Mr. Winnie Berumen presents with generalized weakness, decreased functional activity tolerance, and decreased balance from independent baseline. Recommend continued skilled PT services to address stated deficits. Anticipate patient will progress well toward stated mobility goals. ·               This section established at most recent assessment   PROBLEM LIST (Impairments causing functional limitations):  1. Decreased Strength  2. Decreased ADL/Functional Activities  3. Decreased Transfer Abilities  4. Decreased Ambulation Ability/Technique  5. Decreased Balance  6. Increased Pain  7. Decreased Activity Tolerance  8. Decreased Knowledge of Precautions  9. Decreased [place] with Home Exercise Program    INTERVENTIONS PLANNED: (Benefits and precautions of physical therapy have been discussed with the patient.)  1. Balance Exercise  2. Bed Mobility  3. Family Education  4. Gait Training  5. Home Exercise Program (HEP)  6. Neuromuscular Re-education/Strengthening  7. Range of Motion (ROM)  8. Therapeutic Activites  9. Therapeutic Exercise/Strengthening  10. Transfer Training  11. Group Therapy      TREATMENT PLAN: Frequency/Duration: 3 times a week for duration of hospital stay  Rehabilitation Potential For Stated Goals: GOOD      RECOMMENDED REHABILITATION/EQUIPMENT: (at time of discharge pending progress): Due to the probability of continued deficits (see above) this patient will likely need continued skilled physical therapy after discharge.   Equipment:   · None at this time                   HISTORY:   History of Present Injury/Illness (Reason for Referral):  SDH; Weakness  Past Medical History/Comorbidities:   Mr. Kristy Ga  has a past medical history of Anxiety (6/21/2016); Arrhythmia; Asthma (6/21/2016); Bipolar affective disorder, manic ([facility] (5/16/2011); CAD (coronary artery disease) (6/21/2016); CHF (congestive heart failure)  (6/21/2016); Chronic back pain (6/21/2016); COPD (chronic obstructive pulmonary disease) ([facility] (1/21/2016); Coronary atherosclerosis of native coronary artery (7/20/2014); CVA (cerebral vascular accident) ([facility] (3/29/2011); Depression (6/21/2016); Diabetes mellitus type 2, controlled ([facility] (11/5/2010); Diabetic neuropathy ([facility] (6/21/2016); DM2 (diabetes mellitus, type 2) ([facility] (7/20/2014); Dyslipidemia (11/5/2010); GERD (gastroesophageal reflux disease); Heart failure ([facility] HTN (hypertension) (7/20/2014); Hypertensive cardiovascular disease (11/5/2010); Insomnia (6/21/2016); Left leg weakness (5/12/2011); Left-sided weakness (8/28/2015); Migraine (7/20/2014); Neurological disorder; Thrombocytopenia ([facility] (6/21/2016); Unintentional weight loss (6/21/2016); and Weakness of left arm (5/12/2011). He also has no past medical history of COPD. Mr. Kristy Ga  has a past surgical history that includes hernia repair; ptca; other surgical; and tonsil and adenoidectomy.   Social History/Living Environment:   Home Environment: Private residence  # Steps to Enter: 1  Wheelchair Ramp: No  One/Two Story Residence: One story  Living Alone: No  Support Systems: Family member(s), Spouse/Significant Other/Partner  Patient Expects to be Discharged to[de-identified] Unknown  Current DME Used/Available at Home: Glucometer, Cane, straight  Tub or Shower Type: Tub/Shower combination  Prior Level of Function/Work/Activity:  Independent      Number of Personal Factors/Comorbidities that affect the Plan of Care: 1-2: MODERATE COMPLEXITY   EXAMINATION:   Most Recent Physical Functioning:   Gross Assessment:  AROM: Within functional limits (B UE/LE)  Strength: Generally decreased, functional (generalized weakness B UE LE 4/5)  Coordination: Within functional limits (B UE/LE)  Sensation: Intact (L3-S1 B)               Posture:  Posture (WDL): Exceptions to WDL  Posture Assessment: Forward head, Rounded shoulders  Balance:  Sitting: Intact  Standing: Impaired  Standing - Static: Fair (+)  Standing - Dynamic : Fair Bed Mobility:  Supine to Sit: Supervision  Scooting: Supervision  Wheelchair Mobility:     Transfers:  Sit to Stand: Contact guard assistance  Stand to Sit: Contact guard assistance  Bed to Chair: Contact guard assistance  Gait:     Base of Support: Narrowed; Center of gravity altered  Speed/Vikki: Slow  Step Length: Left shortened;Right shortened  Gait Abnormalities: Decreased step clearance; Step to gait;Trunk sway increased       Body Structures Involved:  1. Heart  2. Muscles Body Functions Affected:  1. Neuromusculoskeletal  2. Movement Related Activities and Participation Affected:  1. Mobility  2. Self Care  3. Domestic Life   Number of elements that affect the Plan of Care: 4+: HIGH COMPLEXITY   CLINICAL PRESENTATION:   Presentation: Stable and uncomplicated: LOW COMPLEXITY   CLINICAL DECISION MAKING:   [facility] 6 Clicks   Basic Mobility Inpatient Short Form  How much difficulty does the patient currently have. .. Unable A Lot A Little None   1. Turning over in bed (including adjusting bedclothes, sheets and blankets)? [ ] 1   [ ] 2   [ ] 3   [X] 4   2. Sitting down on and standing up from a chair with arms ( e.g., wheelchair, bedside commode, etc.)   [ ] 1   [ ] 2   [ ] 3   [X] 4   3. Moving from lying on back to sitting on the side of the bed? [ ] 1   [ ] 2   [ ] 3   [X] 4   How much help from another person does the patient currently need. .. Total A Lot A Little None   4. Moving to and from a bed to a chair (including a wheelchair)? [ ] 1   [ ] 2   [X] 3   [ ] 4   5. Need to walk in hospital room?    [ ] 1 [ ] 2   [X] 3   [ ] 4   6. Climbing 3-5 steps with a railing? [ ] 1   [ ] 2   [X] 3   [ ] 4   © 2007, Trustees of [address] [place] [address] [address] [address], under license to [employer]. All rights reserved    Score:  Initial: 21 Most Recent: 21 (Date: 9/11/17 )     Interpretation of Tool:  Represents activities that are increasingly more difficult (i.e. Bed mobility, Transfers, Gait). Score 24 23 22-20 19-15 14-10 9-7 6       Modifier CH CI CJ CK CL CM CN         · Mobility - Walking and Moving Around:               - CURRENT STATUS:    CJ - 20%-39% impaired, limited or restricted               - GOAL STATUS:           CI - 1%-19% impaired, limited or restricted               - D/C STATUS:                       ---------------To be determined---------------  Payor: [facility] [place] / Plan[place] [employer] [place] / Product Type: Medicaid /       Medical Necessity:     · Patient demonstrates good rehab potential due to higher previous functional level. Reason for Services/Other Comments:  · Patient continues to require skilled intervention due to decreased functional activity tolerance and balance/gait status from baseline; Viktoriya Pritchett Use of outcome tool(s) and clinical judgement create a POC that gives a: Clear prediction of patient's progress: LOW COMPLEXITY                 TREATMENT:   (In addition to Assessment/Re-Assessment sessions the following treatments were rendered)   Pre-treatment Symptoms/Complaints:    Pain: Initial:   Pain Intensity 1: 0  Post Session:  0/10; unchanged      Initial Evaluation   Therapeutic Activity: (    8 Minutes): Therapeutic activities including bed mobility, transfer training, dynamic balance training throughout ADLs, safety awareness training, ambulation on level ground x30ft, and patient education to improve mobility, strength, balance and coordination. Required minimal   to promote dynamic balance in standing.       Braces/Orthotics/Lines/Etc:   · IV  · O2 Device: Room air  Treatment/Session Assessment:    · Response to Treatment:  See above  · Interdisciplinary Collaboration:  · Physical Therapist  · Occupational Therapist  · Registered Nurse  · After treatment position/precautions:  · Up in chair  · Bed alarm/tab alert on  · Bed/Chair-wheels locked  · Bed in low position  · Call light within reach  · RN notified  · Compliance with Program/Exercises: Will assess as treatment progresses. · Recommendations/Intent for next treatment session: \"Next visit will focus on advancements to more challenging activities and reduction in assistance provided\".      Total Treatment Duration:  PT Patient Time In/Time Out  Time In: 1438  Time Out: [address], DPT

## 2017-09-11 NOTE — PROGRESS NOTES
Physical Therapy Note:    Physical therapy evaluation orders received and chart reviewed. Attempted to see patient this AM x2 to initiate assessment. Discussed patient with primary RN; patient currently off of the floor for testing. Will follow and re-attempt at a later time/date as schedule permits/patient available.  Thank you,    Linda Torres, PT, DPT  9:25AM and 11:05AM   9/11/17

## 2017-09-11 NOTE — PROGRESS NOTES
Problem: Self Care Deficits Care Plan (Adult)  Goal: *Acute Goals and Plan of Care (Insert Text)  1. Patient will complete upper body bathing and dressing with setup assist and adaptive equipment as needed. 2. Patient will complete lower body bathing and dressing with minimal assist and adaptive equipment as needed. 3. Patient will complete toileting with minimal assist. MET 9/11/17  4. Patient will tolerate 25 minutes of OT treatment with less than 2 rest breaks to increase activity tolerance for ADLs. 5. Patient will complete functional transfers with modified independence and adaptive equipment as needed. 6. Patient will self feed 80% of all meals with verbal cues only. MET 9/11/17  7. Patient will complete grooming tasks after setup with verbal cues only. Timeframe: 7 visits       OCCUPATIONAL THERAPY: Daily Note, Treatment Day: 2nd and PM 9/11/2017  INPATIENT: Hospital Day: 7  Payor: [employer] [place] / [place] [employer] / Product Type: Medicaid /      NAME/AGE/GENDER: Faye Jacobson is a 59 y.o. male    PRIMARY DIAGNOSIS:  Nontraumatic acute subdural hemorrhage (HCC) Nontraumatic acute subdural hemorrhage (HCC) Nontraumatic acute subdural hemorrhage (HCC)        ICD-10: Treatment Diagnosis:        · Other lack of cordination (R27.8)   Precautions/Allergies:        falls, Oyster extract       ASSESSMENT:      Mr. Sully Reynoso presents to hospital for above. Pt is seated in recliner upon arrival. He is alert and oriented x4 and agreeable to OT evaluation with encouragement. Today, pt completes BUE exercises (shown in grid below) to improve activity tolerance for ADLs. In addition, he completed functional transfers around room with SBA/CGA for balance and steadying and no AD used. Pt appears to be functioning better than at time of initial evaluation, as he fed himself today with set up assistance.  Of note: Per PT, pt completed toileting/bowel hygiene with supervision during their session today. Pt continues to be limited by decreased balance and activity tolerance. However, progress is noted by improved ADL performance today and improved cognition (he was able to call his family using the room phone without cueing). His potential for rehab remains good secondary to age and family support. Pt will continue to benefit from skilled OT services to maximize safety and independence with ADLs. Continue with POC and stated goals. This section established at most recent assessment   PROBLEM LIST (Impairments causing functional limitations):  1. Decreased Strength  2. Decreased ADL/Functional Activities  3. Decreased Transfer Abilities  4. Decreased Ambulation Ability/Technique  5. Decreased Balance  6. Increased Pain  7. Decreased Activity Tolerance  8. Decreased Pacing Skills  9. Decreased Cognition    INTERVENTIONS PLANNED: (Benefits and precautions of occupational therapy have been discussed with the patient.)  1. Activities of daily living training  2. Adaptive equipment training  3. Balance training  4. Clothing management  5. Cognitive training  6. Community reintergration  7. Donning&doffing training  8. Group therapy  9. Neuromuscular re-eduation  10. Therapeutic activity  11. Therapeutic exercise  12. Safety training      TREATMENT PLAN: Frequency/Duration: Follow patient 3x per week to address above goals. Rehabilitation Potential For Stated Goals: GOOD      RECOMMENDED REHABILITATION/EQUIPMENT: (at time of discharge pending progress): Due to the probability of continued deficits (see above) this patient will likely need continued skilled occupational therapy after discharge. Equipment:   · to be determined, possibly RW if balance does not improve. OCCUPATIONAL PROFILE AND HISTORY:   History of Present Injury/Illness (Reason for Referral):  See H&P   Past Medical History/Comorbidities:   Mr. Winnie Berumen  has a past medical history of Anxiety (6/21/2016);  Arrhythmia; Asthma (6/21/2016); Bipolar affective disorder, manic ([facility] (5/16/2011); CAD (coronary artery disease) (6/21/2016); CHF (congestive heart failure)  (6/21/2016); Chronic back pain (6/21/2016); COPD (chronic obstructive pulmonary disease) ([facility] (1/21/2016); Coronary atherosclerosis of native coronary artery (7/20/2014); CVA (cerebral vascular accident) ([facility] (3/29/2011); Depression (6/21/2016); Diabetes mellitus type 2, controlled ([facility] (11/5/2010); Diabetic neuropathy ([facility] (6/21/2016); DM2 (diabetes mellitus, type 2) ([facility] (7/20/2014); Dyslipidemia (11/5/2010); GERD (gastroesophageal reflux disease); Heart failure ([facility] HTN (hypertension) (7/20/2014); Hypertensive cardiovascular disease (11/5/2010); Insomnia (6/21/2016); Left leg weakness (5/12/2011); Left-sided weakness (8/28/2015); Migraine (7/20/2014); Neurological disorder; Thrombocytopenia ([facility] (6/21/2016); Unintentional weight loss (6/21/2016); and Weakness of left arm (5/12/2011). He also has no past medical history of COPD. Mr. Josue Son  has a past surgical history that includes hernia repair; ptca; other surgical; and tonsil and adenoidectomy. 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 Reports his son and wife Marco Antonio Cortez behind us in a house, but we have raised the grandkids. \"   Dominant Side:         RIGHT and Left hand since R hand has been broken and does not bend well he reports   Number of Personal Factors/Comorbidities that affect the Plan of Care: Extensive review of physical, cognitive, and psychosocial performance (3+):  HIGH COMPLEXITY   ASSESSMENT OF OCCUPATIONAL PERFORMANCE[de-identified]   Activities of Daily Living:         Assisted pt with breakfast, grooming tasks, bed mobility  Basic ADLs (From Assessment) Complex ADLs (From Assessment)   Basic ADL  Feeding: Maximum assistance, Minimum assistance (fluctuated)  Oral Facial Hygiene/Grooming: Maximum assistance  Bathing: Maximum assistance  Upper Body Dressing: Maximum assistance  Lower Body Dressing: Total assistance  Toileting: Maximum assistance, Additional time (used urinal after setup supine,asked for help wiping with BM) Instrumental ADL  Meal Preparation: Moderate assistance (wife prepares meals at home)  Homemaking: Moderate assistance (wife completes all housework)  Medication Management: Setup (took his own bloodsugars prior)  Financial Management: Setup (shared with wife)   Grooming/Bathing/Dressing Activities of Daily Living                             Bed/Mat Mobility  Supine to Sit: Supervision  Sit to Stand: Contact guard assistance  Bed to Chair: Contact guard assistance  Scooting: Supervision          Most Recent Physical Functioning:   Gross Assessment:                  Posture:  Posture (WDL): Exceptions to WDL  Posture Assessment:  Forward head, Rounded shoulders  Balance:  Sitting: Intact  Standing: Impaired  Standing - Static: Fair (+)  Standing - Dynamic : Fair Bed Mobility:  Supine to Sit: Supervision  Scooting: Supervision  Wheelchair Mobility:     Transfers:  Sit to Stand: Contact guard assistance  Stand to Sit: Contact guard assistance  Bed to Chair: Contact guard assistance              Patient Vitals for the past 6 hrs:   BP BP Patient Position SpO2 Pulse   09/11/17 1232 138/86 At rest 96 % 84        Mental Status  Neurologic State: Alert  Orientation Level: Oriented X4  Cognition: Follows commands  Perception: Appears intact  Perseveration: No perseveration noted  Safety/Judgement: Decreased awareness of need for assistance, Decreased awareness of need for safety, Decreased awareness of environment, Decreased insight into deficits, Fall prevention                               Physical Skills Involved:  1. Range of Motion  2. Balance  3. Strength  4. Activity Tolerance  5. Sensation  6. Fine Motor Control  7. Vision  8. Pain (acute)  9. Edema  10. Skin Integrity Cognitive Skills Affected (resulting in the inability to perform in a timely and safe manner): 1. all of the above Psychosocial Skills Affected:  1. all of the above   Number of elements that affect the Plan of Care: 5+:  HIGH COMPLEXITY   CLINICAL DECISION MAKIN[address] [place] [address] AM-PAC 6 Clicks   Daily Activity Inpatient Short Form  How much help from another person does the patient currently need. .. Total A Lot A Little None   1. Putting on and taking off regular lower body clothing? [X] 1   [ ] 2   [ ] 3   [ ] 4   2. Bathing (including washing, rinsing, drying)? [ ] 1   [X] 2   [ ] 3   [ ] 4   3. Toileting, which includes using toilet, bedpan or urinal?   [ ] 1   [X] 2   [ ] 3   [ ] 4   4. Putting on and taking off regular upper body clothing?   [ ] 1   [X] 2   [ ] 3   [ ] 4   5. Taking care of personal grooming such as brushing teeth? [ ] 1   [X] 2   [ ] 3   [ ] 4   6. Eating meals? [ ] 1   [X] 2   [ ] 3   [ ] 4   © , Trustees of [address] [place] [address], under license to [employer]. All rights reserved    Score:  Initial: 11, completed 2017 Most Recent: X (Date: -- )     Interpretation of Tool:  Represents activities that are increasingly more difficult (i.e. Bed mobility, Transfers, Gait).        Score 24 23 22-20 19-15 14-10 9-7 6       Modifier CH CI CJ CK CL CM CN         · Self Care:               - CURRENT STATUS:    CL - 60%-79% impaired, limited or restricted               - GOAL STATUS: CI - 1%-19% impaired, limited or restricted               - D/C STATUS:                       ---------------To be determined---------------  [place] / Product Type: Medicaid /       Medical Necessity:     · Patient demonstrates good rehab potential due to higher previous functional level. Reason for Services/Other Comments:  · Patient continues to require skilled intervention due to s/p above and decreased ADLs, IADLs, and functional mobility. Use of outcome tool(s) and clinical judgement create a POC that gives a: MODERATE COMPLEXITY             TREATMENT:   (In addition to Assessment/Re-Assessment sessions the following treatments were rendered)      Pre-treatment Symptoms/Complaints:    Pain: Initial:   Pain Intensity 1: 0  Post Session:  0/10      Therapeutic Activity: (    15 minutes): Therapeutic activities including Bed transfers, Chair transfers and BUE exercises to improve mobility, strength, balance and activity tolerance. Required no physical cueing   to promote static balance in standing. Date:  9/11/17 Date:   Date:     Activity/Exercise Parameters Parameters Parameters   Chest press 20 reps     Bicep curls 20 reps     Shoulder flexion 20 reps                                     Braces/Orthotics/Lines/Etc:   · O2 Device: Room air  Treatment/Session Assessment:    · Response to Treatment:  Pt tolerated well without complications or complaints   · Interdisciplinary Collaboration:  · Physical Therapist and Occupational Therapist  · After treatment position/precautions:  · Supine in bed, Bed alarm/tab alert on, Bed/Chair-wheels locked, Bed in low position, Call light within reach and Side rails x 3  · Compliance with Program/Exercises: Compliant all of the time today. · Recommendations/Intent for next treatment session:   \"Next visit will focus on advancements to more challenging activities and reduction in assistance provided\".   Total Treatment Duration:  OT Patient Time In/Time Out  Time In: 1520  Time Out: [address]

## 2017-09-11 NOTE — PROGRESS NOTES
Call received from [facility] with Microbiology. States they reported MRSA neg but is actually SA pos.

## 2017-09-11 NOTE — PROGRESS NOTES
Problem: Falls - Risk of  Goal: *Absence of Falls  Document Malia Fall Risk and appropriate interventions in the flowsheet.    Outcome: Progressing Towards Goal  Fall Risk Interventions:  Mobility Interventions: Bed/chair exit alarm, Patient to call before getting OOB     Mentation Interventions: Bed/chair exit alarm     Medication Interventions: Bed/chair exit alarm     Elimination Interventions: Bed/chair exit alarm
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
## 2017-09-11 NOTE — PROGRESS NOTES
SPEECH PATHOLOGY NOTE:    Attempted to see pt for cognitive tx but she was out of the room. Will re-attempt at a later time/date.     Jessica Cunha MS, CCC-SLP

## 2017-09-12 VITALS
RESPIRATION RATE: 16 BRPM | OXYGEN SATURATION: 95 % | DIASTOLIC BLOOD PRESSURE: 87 MMHG | HEART RATE: 86 BPM | WEIGHT: 222.8 LBS | SYSTOLIC BLOOD PRESSURE: 112 MMHG | BODY MASS INDEX: 29.53 KG/M2 | HEIGHT: 73 IN | TEMPERATURE: 98.2 F

## 2017-09-12 PROBLEM — I62.01 NONTRAUMATIC ACUTE SUBDURAL HEMORRHAGE (HCC): Status: RESOLVED | Noted: 2017-09-05 | Resolved: 2017-09-12

## 2017-09-12 PROBLEM — I48.0 PAROXYSMAL ATRIAL FIBRILLATION WITH RVR (HCC): Status: RESOLVED | Noted: 2017-09-08 | Resolved: 2017-09-08

## 2017-09-12 PROBLEM — Z53.09 MRI CONTRAINDICATED DUE TO METAL IMPLANT: Chronic | Status: ACTIVE | Noted: 2017-09-12

## 2017-09-12 LAB
ALBUMIN SERPL-MCNC: 2.9 G/DL (ref 3.2–4.6)
ALBUMIN/GLOB SERPL: 0.6 {RATIO} (ref 1.2–3.5)
ALP SERPL-CCNC: 40 U/L (ref 50–136)
ALT SERPL-CCNC: 23 U/L (ref 12–65)
ANION GAP SERPL CALC-SCNC: 11 MMOL/L (ref 7–16)
AST SERPL-CCNC: 15 U/L (ref 15–37)
BASOPHILS # BLD: 0 K/UL (ref 0–0.2)
BASOPHILS NFR BLD: 0 % (ref 0–2)
BILIRUB SERPL-MCNC: 0.7 MG/DL (ref 0.2–1.1)
BUN SERPL-MCNC: 28 MG/DL (ref 8–23)
CALCIUM SERPL-MCNC: 9.8 MG/DL (ref 8.3–10.4)
CHLORIDE SERPL-SCNC: 104 MMOL/L (ref 98–107)
CO2 SERPL-SCNC: 24 MMOL/L (ref 21–32)
CREAT SERPL-MCNC: 0.84 MG/DL (ref 0.8–1.5)
DIFFERENTIAL METHOD BLD: ABNORMAL
EOSINOPHIL # BLD: 0.1 K/UL (ref 0–0.8)
EOSINOPHIL NFR BLD: 1 % (ref 0.5–7.8)
ERYTHROCYTE [DISTWIDTH] IN BLOOD BY AUTOMATED COUNT: 12.6 % (ref 11.9–14.6)
ERYTHROCYTE [SEDIMENTATION RATE] IN BLOOD: 85 MM/HR (ref 0–20)
GLOBULIN SER CALC-MCNC: 5.2 G/DL (ref 2.3–3.5)
GLUCOSE BLD STRIP.AUTO-MCNC: 140 MG/DL (ref 65–100)
GLUCOSE BLD STRIP.AUTO-MCNC: 206 MG/DL (ref 65–100)
GLUCOSE SERPL-MCNC: 160 MG/DL (ref 65–100)
HCT VFR BLD AUTO: 46 % (ref 41.1–50.3)
HGB BLD-MCNC: 15.7 G/DL (ref 13.6–17.2)
IMM GRANULOCYTES # BLD: 0 K/UL (ref 0–0.5)
IMM GRANULOCYTES NFR BLD: 0 % (ref 0–5)
LYMPHOCYTES # BLD: 2.6 K/UL (ref 0.5–4.6)
LYMPHOCYTES NFR BLD: 28 % (ref 13–44)
MCH RBC QN AUTO: 30.2 PG (ref 26.1–32.9)
MCHC RBC AUTO-ENTMCNC: 34.1 G/DL (ref 31.4–35)
MCV RBC AUTO: 88.5 FL (ref 79.6–97.8)
MONOCYTES # BLD: 0.8 K/UL (ref 0.1–1.3)
MONOCYTES NFR BLD: 9 % (ref 4–12)
NEUTS SEG # BLD: 5.8 K/UL (ref 1.7–8.2)
NEUTS SEG NFR BLD: 62 % (ref 43–78)
PLATELET # BLD AUTO: 254 K/UL (ref 150–450)
PLATELET COMMENTS,PCOM: ADEQUATE
PMV BLD AUTO: 10.7 FL (ref 10.8–14.1)
POTASSIUM SERPL-SCNC: 3.7 MMOL/L (ref 3.5–5.1)
PROT SERPL-MCNC: 8.1 G/DL (ref 6.3–8.2)
RBC # BLD AUTO: 5.2 M/UL (ref 4.23–5.67)
RBC MORPH BLD: ABNORMAL
SODIUM SERPL-SCNC: 139 MMOL/L (ref 136–145)
TOPIRAMATE SERPL-MCNC: 3.2 UG/ML (ref 2–25)
WBC # BLD AUTO: 9.3 K/UL (ref 4.3–11.1)
WBC MORPH BLD: ABNORMAL

## 2017-09-12 PROCEDURE — 85025 COMPLETE CBC W/AUTO DIFF WBC: CPT | Performed by: HOSPITALIST

## 2017-09-12 PROCEDURE — 96125 COGNITIVE TEST BY HC PRO: CPT

## 2017-09-12 PROCEDURE — 80074 ACUTE HEPATITIS PANEL: CPT | Performed by: HOSPITALIST

## 2017-09-12 PROCEDURE — 85652 RBC SED RATE AUTOMATED: CPT | Performed by: HOSPITALIST

## 2017-09-12 PROCEDURE — 74011250637 HC RX REV CODE- 250/637: Performed by: INTERNAL MEDICINE

## 2017-09-12 PROCEDURE — 36415 COLL VENOUS BLD VENIPUNCTURE: CPT | Performed by: HOSPITALIST

## 2017-09-12 PROCEDURE — 92507 TX SP LANG VOICE COMM INDIV: CPT

## 2017-09-12 PROCEDURE — 82962 GLUCOSE BLOOD TEST: CPT

## 2017-09-12 PROCEDURE — 74011250637 HC RX REV CODE- 250/637: Performed by: HOSPITALIST

## 2017-09-12 PROCEDURE — 80053 COMPREHEN METABOLIC PANEL: CPT | Performed by: HOSPITALIST

## 2017-09-12 RX ORDER — FUROSEMIDE 20 MG/1
20 TABLET ORAL DAILY
Qty: 30 TAB | Refills: 3 | Status: SHIPPED | OUTPATIENT
Start: 2017-09-12 | End: 2017-09-12

## 2017-09-12 RX ORDER — SPIRONOLACTONE 25 MG/1
25 TABLET ORAL DAILY
Qty: 30 TAB | Refills: 3 | Status: SHIPPED | OUTPATIENT
Start: 2017-09-12 | End: 2018-06-01 | Stop reason: ALTCHOICE

## 2017-09-12 RX ORDER — DIVALPROEX SODIUM 250 MG/1
250 TABLET, DELAYED RELEASE ORAL EVERY 12 HOURS
Qty: 60 TAB | Refills: 3 | Status: SHIPPED | OUTPATIENT
Start: 2017-09-12 | End: 2018-06-01 | Stop reason: ALTCHOICE

## 2017-09-12 RX ORDER — CARVEDILOL 25 MG/1
25 TABLET ORAL 2 TIMES DAILY WITH MEALS
Qty: 60 TAB | Refills: 3 | Status: SHIPPED | OUTPATIENT
Start: 2017-09-12 | End: 2018-06-01 | Stop reason: ALTCHOICE

## 2017-09-12 RX ORDER — AMITRIPTYLINE HYDROCHLORIDE 25 MG/1
25 TABLET, FILM COATED ORAL
Qty: 30 TAB | Refills: 3 | Status: SHIPPED | OUTPATIENT
Start: 2017-09-12 | End: 2018-06-01 | Stop reason: ALTCHOICE

## 2017-09-12 RX ORDER — TOPIRAMATE 100 MG/1
100 TABLET, FILM COATED ORAL
Qty: 30 TAB | Refills: 3 | Status: SHIPPED | OUTPATIENT
Start: 2017-09-12 | End: 2018-06-01 | Stop reason: ALTCHOICE

## 2017-09-12 RX ORDER — LISINOPRIL 10 MG/1
10 TABLET ORAL DAILY
Qty: 30 TAB | Refills: 3 | Status: SHIPPED | OUTPATIENT
Start: 2017-09-12 | End: 2018-06-01 | Stop reason: DRUGHIGH

## 2017-09-12 RX ORDER — ASPIRIN 325 MG
325 TABLET ORAL DAILY
Qty: 30 TAB | Refills: 3 | Status: SHIPPED | OUTPATIENT
Start: 2017-09-12

## 2017-09-12 RX ADMIN — SPIRONOLACTONE 25 MG: 25 TABLET, FILM COATED ORAL at 08:27

## 2017-09-12 RX ADMIN — LISINOPRIL 10 MG: 5 TABLET ORAL at 08:27

## 2017-09-12 RX ADMIN — PANTOPRAZOLE SODIUM 40 MG: 40 TABLET, DELAYED RELEASE ORAL at 06:11

## 2017-09-12 RX ADMIN — FUROSEMIDE 20 MG: 20 TABLET ORAL at 08:27

## 2017-09-12 RX ADMIN — ASPIRIN 325 MG ORAL TABLET 325 MG: 325 PILL ORAL at 08:27

## 2017-09-12 RX ADMIN — CARVEDILOL 25 MG: 25 TABLET ORAL at 08:28

## 2017-09-12 RX ADMIN — DIVALPROEX SODIUM 250 MG: 250 TABLET, DELAYED RELEASE ORAL at 08:27

## 2017-09-12 RX ADMIN — Medication 10 ML: at 06:11

## 2017-09-12 RX ADMIN — TOPIRAMATE 100 MG: 100 TABLET, FILM COATED ORAL at 08:27

## 2017-09-12 NOTE — DISCHARGE INSTRUCTIONS
Subdural Hematoma: Care Instructions  Your Care Instructions  A subdural hematoma is a buildup of blood between the layers of tissue that cover the brain. The blood collects under the layer closest to the skull. (This layer is called the dura.) The bleeding is most often caused by a head injury, but there can be other causes. In an older adult, even a minor injury can lead to a subdural hematoma. The buildup of blood inside the skull can put pressure on the brain. This may cause symptoms, such as a severe headache, confusion, or seizures. There are two kinds of hematomas: acute and chronic. · With an acute hematoma, symptoms start soon after the injury. · With a chronic hematoma, it may be days or weeks before symptoms appear. Doctors use imaging tests to find the buildup of blood. You may have a test such as a CT scan or MRI. The doctor may also do a test to check the pressure inside your skull. Bleeding inside the skull may get worse over time. So it is very important to pay attention to your symptoms. And be sure to see your doctor for follow-up testing. In some cases, treatment is needed to remove the blood. This helps relieve the pressure on the brain. Your doctor may make one or two small holes in your skull. For a large hematoma, the doctor may need to remove a piece of the skull. You may not need treatment if you have a small hematoma that is not causing symptoms. If you take aspirin or some other blood thinner, you may need to stop taking it. The doctor may give you treatment to undo the effects of the blood thinner. This can help prevent more bleeding in the skull. The doctor has checked you carefully, but problems can develop later. If you notice any problems or new symptoms, get medical treatment right away. Follow-up care is a key part of your treatment and safety. Be sure to make and go to all appointments, and call your doctor if you are having problems.  It's also a good idea to know your test results and keep a list of the medicines you take. How can you care for yourself at home? For an acute hematoma  · Follow your doctor's instructions. He or she will tell you if you need someone to watch you closely for the next 24 hours or longer. For a chronic hematoma  · Get plenty of sleep at night, and take it easy during the day. Rest is the best way to recover. · Avoid activities that are physically or mentally demanding. These include housework, exercise, paperwork, video games, text messaging, and using the computer. You may need to change your work or school schedule for a while. · Return to your normal activities slowly. Do not try to do too much at once. For either type of hematoma  · Do not drink alcohol until your doctor says it is okay. · Don't drive a car, ride a bike, or operate machinery until your doctor says it's okay. · If you take aspirin or some other blood thinner, be sure to talk to your doctor. He or she will tell you if and when to start taking this medicine again. Make sure that you understand exactly what your doctor wants you to do. · If you normally take medicine, your doctor will tell you if and when you can restart it. He or she will also give you instructions about taking any new medicines. When should you call for help? Call 911 anytime you think you may need emergency care. For example, call if:  · You have a seizure. · You passed out (lost consciousness). · You are confused or can't stay awake. Call your doctor now or seek immediate medical care if:  · You have new or worse vomiting. · You feel less alert. · You have new weakness or numbness in any part of your body. · You have a headache that is getting worse. Watch closely for changes in your health, and be sure to contact your doctor if:  · You do not get better as expected. · You have new symptoms, such as headaches, trouble concentrating, or changes in mood. Where can you learn more?   Go to http://eugene-eva.info/. Enter L496 in the search box to learn more about \"Subdural Hematoma: Care Instructions. \"  Current as of: December 2, 2016  Content Version: 11.3  © 7685-1465 [employer]. Care instructions adapted under license by [employer] (which disclaims liability or warranty for this information). If you have questions about a medical condition or this instruction, always ask your healthcare professional. [place] [address] any warranty or liability for your use of this information. High Blood Pressure: Care Instructions  Your Care Instructions  If your blood pressure is usually above 140/90, you have high blood pressure, or hypertension. That means the top number is 140 or higher or the bottom number is 90 or higher, or both. Despite what a lot of people think, high blood pressure usually doesn't cause headaches or make you feel dizzy or lightheaded. It usually has no symptoms. But it does increase your risk for heart attack, stroke, and kidney or eye damage. The higher your blood pressure, the more your risk increases. Your doctor will give you a goal for your blood pressure. Your goal will be based on your health and your age. An example of a goal is to keep your blood pressure below 140/90. Lifestyle changes, such as eating healthy and being active, are always important to help lower blood pressure. You might also take medicine to reach your blood pressure goal.  Follow-up care is a key part of your treatment and safety. Be sure to make and go to all appointments, and call your doctor if you are having problems. It's also a good idea to know your test results and keep a list of the medicines you take. How can you care for yourself at home? Medical treatment  · If you stop taking your medicine, your blood pressure will go back up. You may take one or more types of medicine to lower your blood pressure. Be safe with medicines. Take your medicine exactly as prescribed. Call your doctor if you think you are having a problem with your medicine. · Talk to your doctor before you start taking aspirin every day. Aspirin can help certain people lower their risk of a heart attack or stroke. But taking aspirin isn't right for everyone, because it can cause serious bleeding. · See your doctor regularly. You may need to see the doctor more often at first or until your blood pressure comes down. · If you are taking blood pressure medicine, talk to your doctor before you take decongestants or anti-inflammatory medicine, such as ibuprofen. Some of these medicines can raise blood pressure. · Learn how to check your blood pressure at home. Lifestyle changes  · Stay at a healthy weight. This is especially important if you put on weight around the waist. Losing even 10 pounds can help you lower your blood pressure. · If your doctor recommends it, get more exercise. Walking is a good choice. Bit by bit, increase the amount you walk every day. Try for at least 30 minutes on most days of the week. You also may want to swim, bike, or do other activities. · Avoid or limit alcohol. Talk to your doctor about whether you can drink any alcohol. · Try to limit how much sodium you eat to less than 2,300 milligrams (mg) a day. Your doctor may ask you to try to eat less than 1,500 mg a day. · Eat plenty of fruits (such as bananas and oranges), vegetables, legumes, whole grains, and low-fat dairy products. · Lower the amount of saturated fat in your diet. Saturated fat is found in animal products such as milk, cheese, and meat. Limiting these foods may help you lose weight and also lower your risk for heart disease. · Do not smoke. Smoking increases your risk for heart attack and stroke. If you need help quitting, talk to your doctor about stop-smoking programs and medicines. These can increase your chances of quitting for good.   When should you call for help?  Call 911 anytime you think you may need emergency care. This may mean having symptoms that suggest that your blood pressure is causing a serious heart or blood vessel problem. Your blood pressure may be over 180/110. For example, call 911 if:  · You have symptoms of a heart attack. These may include:  ¨ Chest pain or pressure, or a strange feeling in the chest.  ¨ Sweating. ¨ Shortness of breath. ¨ Nausea or vomiting. ¨ Pain, pressure, or a strange feeling in the back, neck, jaw, or upper belly or in one or both shoulders or arms. ¨ Lightheadedness or sudden weakness. ¨ A fast or irregular heartbeat. · You have symptoms of a stroke. These may include:  ¨ Sudden numbness, tingling, weakness, or loss of movement in your face, arm, or leg, especially on only one side of your body. ¨ Sudden vision changes. ¨ Sudden trouble speaking. ¨ Sudden confusion or trouble understanding simple statements. ¨ Sudden problems with walking or balance. ¨ A sudden, severe headache that is different from past headaches. · You have severe back or belly pain. Do not wait until your blood pressure comes down on its own. Get help right away. Call your doctor now or seek immediate care if:  · Your blood pressure is much higher than normal (such as 180/110 or higher), but you don't have symptoms. · You think high blood pressure is causing symptoms, such as:  ¨ Severe headache. ¨ Blurry vision. Watch closely for changes in your health, and be sure to contact your doctor if:  · Your blood pressure measures 140/90 or higher at least 2 times. That means the top number is 140 or higher or the bottom number is 90 or higher, or both. · You think you may be having side effects from your blood pressure medicine. · Your blood pressure is usually normal, but it goes above normal at least 2 times. Where can you learn more? Go to http://eugene-eva.info/.   Enter D496 in the search box to learn more about \"High Blood Pressure: Care Instructions. \"  Current as of: August 8, 2016  Content Version: 11.3  © 1197-7245 [employer]. Care instructions adapted under license by [employer] (which disclaims liability or warranty for this information). If you have questions about a medical condition or this instruction, always ask your healthcare professional. [address] any warranty or liability for your use of this information. DISCHARGE SUMMARY from Nurse    The following personal items are in your possession at time of discharge:    Dental Appliances: Lowers, Uppers  Visual Aid: At bedside, Glasses     Home Medications: None  Jewelry: None  Clothing: Pants, Shirt  Other Valuables: None             PATIENT INSTRUCTIONS:    After general anesthesia or intravenous sedation, for 24 hours or while taking prescription Narcotics:  · Limit your activities  · Do not drive and operate hazardous machinery  · Do not make important personal or business decisions  · Do  not drink alcoholic beverages  · If you have not urinated within 8 hours after discharge, please contact your surgeon on call. Report the following to your surgeon:  · Excessive pain, swelling, redness or odor of or around the surgical area  · Temperature over 100.5  · Nausea and vomiting lasting longer than 4 hours or if unable to take medications  · Any signs of decreased circulation or nerve impairment to extremity: change in color, persistent  numbness, tingling, coldness or increase pain  · Any questions        What to do at Home:  Recommended activity: Activity as tolerated, diabetic diet as tolerated. *  Please give a list of your current medications to your Primary Care Provider. *  Please update this list whenever your medications are discontinued, doses are      changed, or new medications (including over-the-counter products) are added.     *  Please carry medication information at all times in case of emergency situations. These are general instructions for a healthy lifestyle:    No smoking/ No tobacco products/ Avoid exposure to second hand smoke    Surgeon General's Warning:  Quitting smoking now greatly reduces serious risk to your health. Obesity, smoking, and sedentary lifestyle greatly increases your risk for illness    A healthy diet, regular physical exercise & weight monitoring are important for maintaining a healthy lifestyle    You may be retaining fluid if you have a history of heart failure or if you experience any of the following symptoms:  Weight gain of 3 pounds or more overnight or 5 pounds in a week, increased swelling in our hands or feet or shortness of breath while lying flat in bed. Please call your doctor as soon as you notice any of these symptoms; do not wait until your next office visit. Recognize signs and symptoms of STROKE:    F-face looks uneven    A-arms unable to move or move unevenly    S-speech slurred or non-existent    T-time-call 911 as soon as signs and symptoms begin-DO NOT go       Back to bed or wait to see if you get better-TIME IS BRAIN. Warning Signs of HEART ATTACK     Call 911 if you have these symptoms:   Chest discomfort. Most heart attacks involve discomfort in the center of the chest that lasts more than a few minutes, or that goes away and comes back. It can feel like uncomfortable pressure, squeezing, fullness, or pain.  Discomfort in other areas of the upper body. Symptoms can include pain or discomfort in one or both arms, the back, neck, jaw, or stomach.  Shortness of breath with or without chest discomfort.  Other signs may include breaking out in a cold sweat, nausea, or lightheadedness. Don't wait more than five minutes to call 911 - MINUTES MATTER! Fast action can save your life. Calling 911 is almost always the fastest way to get lifesaving treatment.  Emergency Medical Services staff can begin treatment when they arrive -- up to an hour sooner than if someone gets to the hospital by car. The discharge information has been reviewed with the patient and spouse. The patient and spouse verbalized understanding. Discharge medications reviewed with the patient and spouse and appropriate educational materials and side effects teaching were provided.
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
## 2017-09-12 NOTE — PROGRESS NOTES
Discharge instructions pt and wife. Prescriptions sent to preferred pharmacy. Pt and wife verbalized understanding. Medication side effects sheet reviewed with pt. Pt to be discharged home.

## 2017-09-12 NOTE — PROGRESS NOTES
STG: Patient will maintain attention to task at hand for 10 minutes without need for re-direction. STG: Patient will recall relevant verbal information with 80% accuracy with minimal assistance. STG: Patient will participate in moderate level word finding task with 80% accuracy  STG: Patient will complete verbal problem solving tasks with 80% accuracy with minimal assistance. LTG: Patient will increase neuro-linguistic abilities to increase safety and awareness of deficits. Speech language pathology: Speech-language and cognitive note: Daily Note 1    NAME/AGE/GENDER: Faye Jacobson is a 59 y.o. male  DATE: 9/12/2017  PRIMARY DIAGNOSIS: Nontraumatic acute subdural hemorrhage (HCC)       ICD-10: Treatment Diagnosis: R41.841    INTERDISCIPLINARY COLLABORATION: NoneASSESSMENT:Patient had just finished breakfast when SLP arrived and was agreeable to cognitive/linguistic therapy  Based on the objective data described below, Mr. Sully Reynoso presents with continued cognitive communicative deficits. Patient was 75% accurate naming 3 items given broad category. He was 70% accuracy with critical thinking tasks, requiring min-mod verbal cues. Patient having the most difficulty remembering given names from task. Patient able to make inferences given simple scenarios with 80% accuracy with min cues. He was able to name 3 items given a category with 60% accuracy. Word-deduction and if/then tasks were completed with 100% accuracy. ?????? ? ? This section established at most recent assessment??????????  PROBLEM LIST (Impairments causing functional limitations):  1. Memory  2. Attention  3. Problem solving  4. Word finding  5. Thought organization  REHABILITATION POTENTIAL FOR STATED GOALS: Fair  PLAN OF CARE:   Patient will benefit from skilled intervention to address the following impairments.   INTERVENTIONS PLANNED: (Benefits and precautions of therapy have been discussed with the patient.)  1. Cognitive-linguisitc  FREQUENCY/DURATION: Continue to follow patient 3 times a week for duration of hospital stay to address above goals. RECOMMENDED REHABILITATION/EQUIPMENT: (at time of discharge pending progress): Due to the probability of continued deficits (see above) this patient will likely need continued skilled speech therapy after discharge. SUBJECTIVE:   Patient pleasant and cooperative. No family present. History of Present Injury/Illness: Mr. Denise Grove  has a past medical history of Anxiety (6/21/2016); Arrhythmia; Asthma (6/21/2016); Bipolar affective disorder, manic ([address] (5/16/2011); CAD (coronary artery disease) (6/21/2016); CHF (congestive heart failure)  (6/21/2016); Chronic back pain (6/21/2016); COPD (chronic obstructive pulmonary disease) ([address] (1/21/2016); Coronary atherosclerosis of native coronary artery (7/20/2014); CVA (cerebral vascular accident) ([address] (3/29/2011); Depression (6/21/2016); Diabetes mellitus type 2, controlled ([address] (11/5/2010); Diabetic neuropathy ([address] (6/21/2016); DM2 (diabetes mellitus, type 2) ([address] (7/20/2014); Dyslipidemia (11/5/2010); GERD (gastroesophageal reflux disease); Heart failure ([address] HTN (hypertension) (7/20/2014); Hypertensive cardiovascular disease (11/5/2010); Insomnia (6/21/2016); Left leg weakness (5/12/2011); Left-sided weakness (8/28/2015); Migraine (7/20/2014); Neurological disorder; Thrombocytopenia ([address] (6/21/2016); Unintentional weight loss (6/21/2016); and Weakness of left arm (5/12/2011). He also has no past medical history of COPD. Stone Mustafa He also  has a past surgical history that includes hernia repair; ptca; other surgical; and tonsil and adenoidectomy. Present Symptoms: Cognitive-linguistic deficits   Pain Intensity 1: 0  Pain Location 1: Head  Pain Orientation 1: Posterior  Pain Intervention(s) 1: Medication (see MAR)  Current Medications:   No current facility-administered medications on file prior to encounter.       Current Outpatient Prescriptions on File Prior to Encounter   Medication Sig Dispense Refill    oxyCODONE-acetaminophen (PERCOCET)  mg per tablet Take 1 Tab by mouth every six (6) hours as needed for Pain. Max Daily Amount: 4 Tabs. 15 Tab 0    gabapentin (NEURONTIN) 600 mg tablet Take  by mouth three (3) times daily.  lisinopril (PRINIVIL, ZESTRIL) 10 mg tablet Take 20 mg by mouth daily.  carvedilol (COREG) 25 mg tablet Take 25 mg by mouth two (2) times daily (with meals). Current Dietary Status:  Regular/thin      Social History/Home Situation:    Home Environment: Private residence  # Steps to Enter: 1  Wheelchair Ramp: No  One/Two Story Residence: One story  Living Alone: No  Support Systems: Family member(s), Spouse/Significant Other/Partner  Patient Expects to be Discharged to[de-identified] Unknown  Current DME Used/Available at Home: Glucometer, Cane, straight  Tub or Shower Type: Tub/Shower combination  Work/Activity History:   OBJECTIVE:   Oral Motor Structure/Speech:  Oral-Motor Structure/Motor Speech  Labial: No impairment  Dentition: Upper & lower dentures  Oral Hygiene: Adequate  Lingual: No impairment    SPEECH-LANGUAGE COGNITIVE EVALUATION  Tests Given:MOCA    Mental Status:  Neurologic State: Alert  Orientation Level: Oriented X4  Cognition: Follows commands; Appropriate for age attention/concentration             Motor Speech: Auditory Comprehension:        Reading Comprehension:        Verbal Expression:   Verbal Expression  Primary Mode of Expression: Verbal  Naming: Impaired  Convergent (%): 60 %  Divergent (%): 75 %    Neuro-Linguistics:  Verbal Reasoning Tasks: Impaired            Cognitive Skills Activities: Activities/Procedures listed utilized to improve and/or restore cognitive function as related to attention to tasks, problem solving skills, memory, thought organization and compensatory strategies for recall.  Required moderate verbal and   cueing to improve improve recall of information. Tool Used: Functional [place] Measure (FIMTM)   Score Comments   Eating       Comprehension       Expression   Primary Mode of Expression: Verbal   Social Interaction       Problem Solving  3     Memory          Score:  Initial:3 Most Recent: X (Date: -- )   Interpretation of Tool: Provides a uniform system of measurement for disability based on the International Classification of Impairment, Disabilities and Handicaps; measures the level of a patient's disability and indicates how much assistance is required for the individual to carry out activities of daily living. Score 7 6 5 4 3 2 1   Modifier CH CI CJ CK CL CM CN   ? Attention:     - CURRENT STATUS: CL - 60%-79% impaired, limited or restricted    - GOAL STATUS:  CJ - 20%-39% impaired, limited or restricted    - D/C STATUS:  ---------------To be determined---------------  Payor: [employer] [place] [employer] / [place] [employer] [place] / Product Type: Medicaid /   __________________________________________________________________________________________________  Safety:   After treatment position/precautions:  · Upright in Bed  . Progression/Medical Necessity:   · Patient is expected to demonstrate progress in cognitive ability to increase independence with activities of daily living. Compliance with Program/Exercises: Will assess as treatment progresses. Reason for Continuation of Services/Other Comments:  · Patient continues to require skilled intervention due to cognitive-linguistic deficits. Recommendations/Intent for next treatment session: \"Treatment next visit will focus on cognitive-linguistic tasks\".     Total Treatment Duration:  Time In: 0900  Time Out: [address], [place], CCC-SLP

## 2017-09-12 NOTE — PROGRESS NOTES
Problem: Falls - Risk of  Goal: *Absence of Falls  Document Malia Fall Risk and appropriate interventions in the flowsheet.    Outcome: Progressing Towards Goal  Fall Risk Interventions:  Mobility Interventions: Bed/chair exit alarm, Patient to call before getting OOB     Mentation Interventions: Bed/chair exit alarm, Door open when patient unattended     Medication Interventions: Bed/chair exit alarm, Patient to call before getting OOB     Elimination Interventions: Call light in reach, Bed/chair exit alarm, Patient to call for help with toileting needs

## 2017-09-12 NOTE — PROGRESS NOTES
Problem: Falls - Risk of  Goal: *Absence of Falls  Document Malia Fall Risk and appropriate interventions in the flowsheet.    Outcome: Progressing Towards Goal  Fall Risk Interventions:  Mobility Interventions: Bed/chair exit alarm, Communicate number of staff needed for ambulation/transfer, Patient to call before getting OOB     Mentation Interventions: Bed/chair exit alarm, Adequate sleep, hydration, pain control     Medication Interventions: Bed/chair exit alarm, Evaluate medications/consider consulting pharmacy, Patient to call before getting OOB     Elimination Interventions: Call light in reach, Patient to call for help with toileting needs

## 2017-09-13 LAB
BACTERIA SPEC CULT: ABNORMAL
HAV IGM SERPL QL IA: NEGATIVE
HBV CORE IGM SERPL QL IA: NEGATIVE
HBV SURFACE AG SERPL QL IA: NEGATIVE
HCV AB S/CO SERPL IA: <0.1 S/CO RATIO (ref 0–0.9)
SERVICE CMNT-IMP: ABNORMAL

## 2017-09-14 PROBLEM — G93.41 ACUTE METABOLIC ENCEPHALOPATHY: Status: RESOLVED | Noted: 2017-09-14 | Resolved: 2017-09-14

## 2017-09-14 PROBLEM — G93.41 ACUTE METABOLIC ENCEPHALOPATHY: Status: ACTIVE | Noted: 2017-09-14

## 2019-03-29 NOTE — IP AVS SNAPSHOT
[facility] 
 
 
 [address] [facility] [address] [facility] 
[phone] Patient: Levan Peabody MRN: [id number] MVC:[id number]/[id number] You are allergic to the following Allergen Reactions Oyster Extract Nausea and Vomiting Immunizations Administered for This Admission Name Date  
 TB Skin Test (PPD) Intradermal  Deferred () Recent Documentation Height Weight BMI Smoking Status 1.854 m 101.1 kg 29.39 kg/m2 Former Smoker Unresulted Labs Order Current Status HEPATITIS PANEL, ACUTE In process SED RATE, AUTOMATED In process CBC WITH AUTOMATED DIFF Preliminary result CULTURE, URINE Preliminary result Emergency Contacts Name Discharge Info Relation Home Work Mobile Jessica Love  Spouse [3] [phone] Isabelle Mota  Child [2] [phone] About your hospitalization You were admitted on:  September 5, 2017 You last received care in the:  [facility] 7 MED SURG You were discharged on:  September 12, 2017 Unit phone number:  [phone] Why you were hospitalized Your primary diagnosis was:  Nontraumatic Acute Subdural Hemorrhage (Hcc) Your diagnoses also included:  Migraine, Htn (Hypertension), Gerd (Gastroesophageal Reflux Disease), Cad (Coronary Artery Disease), Controlled Type 2 Diabetes Mellitus With Diabetic Neuropathy, Without Long-Term Current Use Of Insulin (Hcc), Chronic Diastolic Congestive Heart Failure (Hcc), Bipolar Affective Disorder, Manic (Hcc), Paroxysmal Atrial Fibrillation With Rvr (Hcc), Mri Contraindicated Due To Metal Implant Providers Seen During Your Hospitalizations Provider Role Specialty Primary office phone Sheila Parsons MD Attending Provider Emergency Medicine [phone] Ángel Mcneal MD Attending Provider Internal Medicine [phone] Your Primary Care Physician (PCP) Primary Care Physician Office Phone Office Fax [address], [address] [facility] [phone] Follow-up Information Follow up With Details Comments Contact Info Trey Davila MD On 9/21/2017 at 8:20 a.m. [address] [place] [address] [phone] Psychiatry Go to follow up bipolar Current Discharge Medication List  
  
START taking these medications Dose & Instructions Dispensing Information Comments Morning Noon Evening Bedtime  
 aspirin 325 mg tablet Commonly known as:  ASPIRIN Your next dose is:  Tomorrow Morning Dose:  325 mg Take 1 Tab by mouth daily. Quantity:  30 Tab Refills:  3  
     
  
   
   
   
  
 divalproex  mg tablet Commonly known as:  DEPAKOTE Replaces:  divalproex  mg ER tablet Your next dose is: This evening Dose:  250 mg Take 1 Tab by mouth every twelve (12) hours. Quantity:  60 Tab Refills:  3 CONTINUE these medications which have CHANGED Dose & Instructions Dispensing Information Comments Morning Noon Evening Bedtime  
 lisinopril 10 mg tablet Commonly known as:  Helon Estrin What changed:  how much to take Your next dose is:  Tomorrow Morning Dose:  10 mg Take 1 Tab by mouth daily. Quantity:  30 Tab Refills:  3  
     
  
   
   
   
  
 metFORMIN 500 mg tablet Commonly known as:  GLUCOPHAGE What changed:  Another medication with the same name was removed. Continue taking this medication, and follow the directions you see here. Your next dose is: This evening with meal  
   
 Dose:  500 mg Take 500 mg by mouth two (2) times daily (with meals). Refills:  0  
     
  
   
   
  
   
  
 topiramate 100 mg tablet Commonly known as:  TOPAMAX What changed:   
- medication strength 
- how much to take - when to take this - Another medication with the same name was removed. Continue taking this medication, and follow the directions you see here. Your next dose is:  Tomorrow Morning Dose:  100 mg Take 1 Tab by mouth daily (with breakfast). Quantity:  30 Tab Refills:  3 CONTINUE these medications which have NOT CHANGED Dose & Instructions Dispensing Information Comments Morning Noon Evening Bedtime  
 amitriptyline 25 mg tablet Commonly known as:  ELAVIL Your next dose is: Take tonight Dose:  25 mg Take 1 Tab by mouth nightly. Quantity:  30 Tab Refills:  3 BENTYL 10 mg capsule Generic drug:  dicyclomine Your next dose is: This evening Dose:  10 mg Take 10 mg by mouth two (2) times a day. Refills:  0  
     
  
   
   
  
   
  
 carvedilol 25 mg tablet Commonly known as:  Raynette Hy Your next dose is: This evening with meal  
   
 Dose:  25 mg Take 1 Tab by mouth two (2) times daily (with meals). Quantity:  60 Tab Refills:  3 NEURONTIN 600 mg tablet Generic drug:  gabapentin Your next dose is: This evening and bedtime Take  by mouth three (3) times daily. Refills:  0  
     
  
   
   
  
   
  
  
 spironolactone 25 mg tablet Commonly known as:  ALDACTONE Your next dose is:  Tomorrow Morning Dose:  25 mg Take 1 Tab by mouth daily. Quantity:  30 Tab Refills:  3 STOP taking these medications   
 divalproex  mg ER tablet Commonly known as:  DEPAKOTE ER  
Replaced by:  divalproex  mg tablet  
   
  
 nitroglycerin 0.4 mg SL tablet Commonly known as:  NITROSTAT  
   
  
 omeprazole 20 mg capsule Commonly known as:  PRILOSEC  
   
  
 oxyCODONE-acetaminophen  mg per tablet Commonly known as:  PERCOCET  
   
  
 raNITIdine hcl 150 mg capsule Where to Get Your Medications These medications were sent to [address] [place] [address] [address] [facility] [address] Phone:  [phone]  
  amitriptyline 25 mg tablet  
 aspirin 325 mg tablet  
 carvedilol 25 mg tablet  
 divalproex  mg tablet  
 lisinopril 10 mg tablet  
 spironolactone 25 mg tablet  
 topiramate 100 mg tablet Discharge Instructions Subdural Hematoma: Care Instructions Your Care Instructions A subdural hematoma is a buildup of blood between the layers of tissue that cover the brain. The blood collects under the layer closest to the skull. (This layer is called the dura.) The bleeding is most often caused by a head injury, but there can be other causes. In an older adult, even a minor injury can lead to a subdural hematoma. The buildup of blood inside the skull can put pressure on the brain. This may cause symptoms, such as a severe headache, confusion, or seizures. There are two kinds of hematomas: acute and chronic. · With an acute hematoma, symptoms start soon after the injury. · With a chronic hematoma, it may be days or weeks before symptoms appear. Doctors use imaging tests to find the buildup of blood. You may have a test such as a CT scan or MRI. The doctor may also do a test to check the pressure inside your skull. Bleeding inside the skull may get worse over time. So it is very important to pay attention to your symptoms. And be sure to see your doctor for follow-up testing. In some cases, treatment is needed to remove the blood. This helps relieve the pressure on the brain. Your doctor may make one or two small holes in your skull. For a large hematoma, the doctor may need to remove a piece of the skull. You may not need treatment if you have a small hematoma that is not causing symptoms. If you take aspirin or some other blood thinner, you may need to stop taking it.  The doctor may give you treatment to undo the effects of the blood thinner. This can help prevent more bleeding in the skull. The doctor has checked you carefully, but problems can develop later. If you notice any problems or new symptoms, get medical treatment right away. Follow-up care is a key part of your treatment and safety. Be sure to make and go to all appointments, and call your doctor if you are having problems. It's also a good idea to know your test results and keep a list of the medicines you take. How can you care for yourself at home? For an acute hematoma · Follow your doctor's instructions. He or she will tell you if you need someone to watch you closely for the next 24 hours or longer. For a chronic hematoma · Get plenty of sleep at night, and take it easy during the day. Rest is the best way to recover. · Avoid activities that are physically or mentally demanding. These include housework, exercise, paperwork, video games, text messaging, and using the computer. You may need to change your work or school schedule for a while. · Return to your normal activities slowly. Do not try to do too much at once. For either type of hematoma · Do not drink alcohol until your doctor says it is okay. · Don't drive a car, ride a bike, or operate machinery until your doctor says it's okay. · If you take aspirin or some other blood thinner, be sure to talk to your doctor. He or she will tell you if and when to start taking this medicine again. Make sure that you understand exactly what your doctor wants you to do. · If you normally take medicine, your doctor will tell you if and when you can restart it. He or she will also give you instructions about taking any new medicines. When should you call for help? Call 911 anytime you think you may need emergency care. For example, call if: 
· You have a seizure. · You passed out (lost consciousness). · You are confused or can't stay awake. Call your doctor now or seek immediate medical care if: · You have new or worse vomiting. · You feel less alert. · You have new weakness or numbness in any part of your body. · You have a headache that is getting worse. Watch closely for changes in your health, and be sure to contact your doctor if: 
· You do not get better as expected. · You have new symptoms, such as headaches, trouble concentrating, or changes in mood. Where can you learn more? Go to http://eugene-eva.info/. Enter U770 in the search box to learn more about \"Subdural Hematoma: Care Instructions. \" Current as of: December 2, 2016 Content Version: 11.3 © 1770-8145 [employer]. Care instructions adapted under license by [employer] (which disclaims liability or warranty for this information). If you have questions about a medical condition or this instruction, always ask your healthcare professional. [place] [address] any warranty or liability for your use of this information. High Blood Pressure: Care Instructions Your Care Instructions If your blood pressure is usually above 140/90, you have high blood pressure, or hypertension. That means the top number is 140 or higher or the bottom number is 90 or higher, or both. Despite what a lot of people think, high blood pressure usually doesn't cause headaches or make you feel dizzy or lightheaded. It usually has no symptoms. But it does increase your risk for heart attack, stroke, and kidney or eye damage. The higher your blood pressure, the more your risk increases. Your doctor will give you a goal for your blood pressure. Your goal will be based on your health and your age. An example of a goal is to keep your blood pressure below 140/90. Lifestyle changes, such as eating healthy and being active, are always important to help lower blood pressure.  You might also take medicine to reach your blood pressure goal. 
 Follow-up care is a key part of your treatment and safety. Be sure to make and go to all appointments, and call your doctor if you are having problems. It's also a good idea to know your test results and keep a list of the medicines you take. How can you care for yourself at home? Medical treatment · If you stop taking your medicine, your blood pressure will go back up. You may take one or more types of medicine to lower your blood pressure. Be safe with medicines. Take your medicine exactly as prescribed. Call your doctor if you think you are having a problem with your medicine. · Talk to your doctor before you start taking aspirin every day. Aspirin can help certain people lower their risk of a heart attack or stroke. But taking aspirin isn't right for everyone, because it can cause serious bleeding. · See your doctor regularly. You may need to see the doctor more often at first or until your blood pressure comes down. · If you are taking blood pressure medicine, talk to your doctor before you take decongestants or anti-inflammatory medicine, such as ibuprofen. Some of these medicines can raise blood pressure. · Learn how to check your blood pressure at home. Lifestyle changes · Stay at a healthy weight. This is especially important if you put on weight around the waist. Losing even 10 pounds can help you lower your blood pressure. · If your doctor recommends it, get more exercise. Walking is a good choice. Bit by bit, increase the amount you walk every day. Try for at least 30 minutes on most days of the week. You also may want to swim, bike, or do other activities. · Avoid or limit alcohol. Talk to your doctor about whether you can drink any alcohol. · Try to limit how much sodium you eat to less than 2,300 milligrams (mg) a day. Your doctor may ask you to try to eat less than 1,500 mg a day.  
· Eat plenty of fruits (such as bananas and oranges), vegetables, legumes, whole grains, and low-fat dairy products. · Lower the amount of saturated fat in your diet. Saturated fat is found in animal products such as milk, cheese, and meat. Limiting these foods may help you lose weight and also lower your risk for heart disease. · Do not smoke. Smoking increases your risk for heart attack and stroke. If you need help quitting, talk to your doctor about stop-smoking programs and medicines. These can increase your chances of quitting for good. When should you call for help? Call 911 anytime you think you may need emergency care. This may mean having symptoms that suggest that your blood pressure is causing a serious heart or blood vessel problem. Your blood pressure may be over 180/110. For example, call 911 if: 
· You have symptoms of a heart attack. These may include: ¨ Chest pain or pressure, or a strange feeling in the chest. 
¨ Sweating. ¨ Shortness of breath. ¨ Nausea or vomiting. ¨ Pain, pressure, or a strange feeling in the back, neck, jaw, or upper belly or in one or both shoulders or arms. ¨ Lightheadedness or sudden weakness. ¨ A fast or irregular heartbeat. · You have symptoms of a stroke. These may include: 
¨ Sudden numbness, tingling, weakness, or loss of movement in your face, arm, or leg, especially on only one side of your body. ¨ Sudden vision changes. ¨ Sudden trouble speaking. ¨ Sudden confusion or trouble understanding simple statements. ¨ Sudden problems with walking or balance. ¨ A sudden, severe headache that is different from past headaches. · You have severe back or belly pain. Do not wait until your blood pressure comes down on its own. Get help right away. Call your doctor now or seek immediate care if: 
· Your blood pressure is much higher than normal (such as 180/110 or higher), but you don't have symptoms. · You think high blood pressure is causing symptoms, such as: ¨ Severe headache. ¨ Blurry vision. Watch closely for changes in your health, and be sure to contact your doctor if: 
· Your blood pressure measures 140/90 or higher at least 2 times. That means the top number is 140 or higher or the bottom number is 90 or higher, or both. · You think you may be having side effects from your blood pressure medicine. · Your blood pressure is usually normal, but it goes above normal at least 2 times. Where can you learn more? Go to http://eugene-eva.info/. Enter S094 in the search box to learn more about \"High Blood Pressure: Care Instructions. \" Current as of: August 8, 2016 Content Version: 11.3 © 0918-2077 [employer]age[employer]. Care instructions adapted under license by [employer] (which disclaims liability or warranty for this information). If you have questions about a medical condition or this instruction, always ask your healthcare professional. [place] [address] any warranty or liability for your use of this information. DISCHARGE SUMMARY from Nurse The following personal items are in your possession at time of discharge: 
 
Dental Appliances: Lowers, Uppers Visual Aid: At bedside, Glasses Home Medications: None Jewelry: None Clothing: Sharon Arreola Other Valuables: None PATIENT INSTRUCTIONS: 
 
 
F-face looks uneven A-arms unable to move or move unevenly S-speech slurred or non-existent T-time-call 911 as soon as signs and symptoms begin-DO NOT go Back to bed or wait to see if you get better-TIME IS BRAIN. Warning Signs of HEART ATTACK Call 911 if you have these symptoms: 
? Chest discomfort.  Most heart attacks involve discomfort in the center of the chest that lasts more than a few minutes, or that goes away and comes back. It can feel like uncomfortable pressure, squeezing, fullness, or pain. ? Discomfort in other areas of the upper body. Symptoms can include pain or discomfort in one or both arms, the back, neck, jaw, or stomach. ? Shortness of breath with or without chest discomfort. ? Other signs may include breaking out in a cold sweat, nausea, or lightheadedness. Don't wait more than five minutes to call [address]! Fast action can save your life. Calling 911 is almost always the fastest way to get lifesaving treatment. Emergency Medical Services staff can begin treatment when they arrive  up to an hour sooner than if someone gets to the hospital by car. The discharge information has been reviewed with the patient and spouse. The patient and spouse verbalized understanding. Discharge medications reviewed with the patient and spouse and appropriate educational materials and side effects teaching were provided. Discharge Orders None WebinarHero Announcement We are excited to announce that we are making your provider's discharge notes available to you in WebinarHero. You will see these notes when they are completed and signed by the physician that discharged you from your recent hospital stay. If you have any questions or concerns about any information you see in WebinarHero, please call the Health Information Department where you were seen or reach out to your Primary Care Provider for more information about your plan of care. Introducing [facility]! [facility] introduces WebinarHero patient portal. Now you can access parts of your medical record, email your doctor's office, and request medication refills online. 1. In your internet browser, go to [email or web address] 2. Click on the First Time User? Click Here link in the Sign In box. You will see the New Member Sign Up page. 3. Enter your WebinarHero Access Code exactly as it appears below.  You will not need to use this code after youve completed the sign-up process. If you do not sign up before the expiration date, you must request a new code. · PharmAbcine Access Code: [email or web address] Expires: 12/11/2017 11:20 AM 
 
4. Enter the last four digits of your Social Security Number (xxxx) and Date of Birth (mm/dd/yyyy) as indicated and click Submit. You will be taken to the next sign-up page. 5. Create a PharmAbcine ID. This will be your PharmAbcine login ID and cannot be changed, so think of one that is secure and easy to remember. 6. Create a PharmAbcine password. You can change your password at any time. 7. Enter your Password Reset Question and Answer. This can be used at a later time if you forget your password. 8. Enter your e-mail address. You will receive e-mail notification when new information is available in [address] 9. Click Sign Up. You can now view and download portions of your medical record. 10. Click the Download Summary menu link to download a portable copy of your medical information. If you have questions, please visit the Frequently Asked Questions section of the PharmAbcine website. Remember, PharmAbcine is NOT to be used for urgent needs. For medical emergencies, dial 911. Now available from your iPhone and Android! General Information Please provide this summary of care documentation to your next provider. Patient Signature:  ____________________________________________________________ Date:  ____________________________________________________________  
  
Vanessa Hung Provider Signature:  ____________________________________________________________ Date:  ____________________________________________________________ DISPLAY PLAN FREE TEXT

## 2022-03-19 PROBLEM — Z53.09 MRI CONTRAINDICATED DUE TO METAL IMPLANT: Status: ACTIVE | Noted: 2017-09-12

## 2022-12-29 ENCOUNTER — APPOINTMENT (OUTPATIENT)
Dept: GENERAL RADIOLOGY | Age: 67
End: 2022-12-29
Payer: MEDICARE

## 2022-12-29 LAB
BASOPHILS # BLD: 0 K/UL (ref 0–0.2)
BASOPHILS NFR BLD: 0 % (ref 0–2)
DIFFERENTIAL METHOD BLD: NORMAL
EOSINOPHIL # BLD: 0.2 K/UL (ref 0–0.8)
EOSINOPHIL NFR BLD: 2 % (ref 0.5–7.8)
ERYTHROCYTE [DISTWIDTH] IN BLOOD BY AUTOMATED COUNT: 12.7 % (ref 11.9–14.6)
ERYTHROCYTE [SEDIMENTATION RATE] IN BLOOD: 39 MM/HR
HCT VFR BLD AUTO: 48.1 % (ref 41.1–50.3)
HGB BLD-MCNC: 16.4 G/DL (ref 13.6–17.2)
IMM GRANULOCYTES # BLD AUTO: 0 K/UL (ref 0–0.5)
IMM GRANULOCYTES NFR BLD AUTO: 0 % (ref 0–5)
LACTATE SERPL-SCNC: 1.6 MMOL/L (ref 0.4–2)
LYMPHOCYTES # BLD: 2.4 K/UL (ref 0.5–4.6)
LYMPHOCYTES NFR BLD: 25 % (ref 13–44)
MCH RBC QN AUTO: 30 PG (ref 26.1–32.9)
MCHC RBC AUTO-ENTMCNC: 34.1 G/DL (ref 31.4–35)
MCV RBC AUTO: 88.1 FL (ref 82–102)
MONOCYTES # BLD: 0.9 K/UL (ref 0.1–1.3)
MONOCYTES NFR BLD: 9 % (ref 4–12)
NEUTS SEG # BLD: 6 K/UL (ref 1.7–8.2)
NEUTS SEG NFR BLD: 64 % (ref 43–78)
NRBC # BLD: 0 K/UL (ref 0–0.2)
PLATELET # BLD AUTO: 159 K/UL (ref 150–450)
PMV BLD AUTO: 10.3 FL (ref 9.4–12.3)
RBC # BLD AUTO: 5.46 M/UL (ref 4.23–5.6)
WBC # BLD AUTO: 9.5 K/UL (ref 4.3–11.1)

## 2022-12-29 PROCEDURE — 83605 ASSAY OF LACTIC ACID: CPT

## 2022-12-29 PROCEDURE — 85652 RBC SED RATE AUTOMATED: CPT

## 2022-12-29 PROCEDURE — 80053 COMPREHEN METABOLIC PANEL: CPT

## 2022-12-29 PROCEDURE — 85025 COMPLETE CBC W/AUTO DIFF WBC: CPT

## 2022-12-29 PROCEDURE — 83036 HEMOGLOBIN GLYCOSYLATED A1C: CPT

## 2022-12-29 PROCEDURE — 93005 ELECTROCARDIOGRAM TRACING: CPT | Performed by: EMERGENCY MEDICINE

## 2022-12-29 PROCEDURE — 87040 BLOOD CULTURE FOR BACTERIA: CPT

## 2022-12-29 PROCEDURE — 99285 EMERGENCY DEPT VISIT HI MDM: CPT

## 2022-12-29 PROCEDURE — 71046 X-RAY EXAM CHEST 2 VIEWS: CPT

## 2022-12-29 PROCEDURE — 84145 PROCALCITONIN (PCT): CPT

## 2022-12-29 PROCEDURE — 83735 ASSAY OF MAGNESIUM: CPT

## 2022-12-29 PROCEDURE — 86140 C-REACTIVE PROTEIN: CPT

## 2022-12-30 ENCOUNTER — APPOINTMENT (OUTPATIENT)
Dept: ULTRASOUND IMAGING | Age: 67
End: 2022-12-30
Payer: MEDICARE

## 2022-12-30 ENCOUNTER — APPOINTMENT (OUTPATIENT)
Dept: GENERAL RADIOLOGY | Age: 67
End: 2022-12-30
Payer: MEDICARE

## 2022-12-30 ENCOUNTER — HOSPITAL ENCOUNTER (EMERGENCY)
Dept: CT IMAGING | Age: 67
End: 2022-12-30
Payer: MEDICARE

## 2022-12-30 ENCOUNTER — HOSPITAL ENCOUNTER (INPATIENT)
Age: 67
LOS: 1 days | Discharge: HOME | End: 2022-12-31
Attending: EMERGENCY MEDICINE | Admitting: FAMILY MEDICINE
Payer: MEDICARE

## 2022-12-30 ENCOUNTER — APPOINTMENT (OUTPATIENT)
Dept: CT IMAGING | Age: 67
End: 2022-12-30
Payer: MEDICARE

## 2022-12-30 DIAGNOSIS — L97.929 ULCERS OF BOTH LOWER LEGS (HCC): ICD-10-CM

## 2022-12-30 DIAGNOSIS — R52 PAIN: ICD-10-CM

## 2022-12-30 DIAGNOSIS — L97.919 ULCERS OF BOTH LOWER LEGS (HCC): ICD-10-CM

## 2022-12-30 DIAGNOSIS — I77.6 VASCULITIS (HCC): Primary | ICD-10-CM

## 2022-12-30 PROBLEM — S06.5XAA SUBDURAL HEMATOMA: Status: ACTIVE | Noted: 2022-12-30

## 2022-12-30 LAB
ALBUMIN SERPL-MCNC: 4 G/DL (ref 3.2–4.6)
ALBUMIN/GLOB SERPL: 1 {RATIO} (ref 0.4–1.6)
ALP SERPL-CCNC: 62 U/L (ref 50–136)
ALT SERPL-CCNC: 28 U/L (ref 12–65)
ANION GAP SERPL CALC-SCNC: 4 MMOL/L (ref 2–11)
AST SERPL-CCNC: 24 U/L (ref 15–37)
BILIRUB SERPL-MCNC: 0.4 MG/DL (ref 0.2–1.1)
BUN SERPL-MCNC: 18 MG/DL (ref 8–23)
CALCIUM SERPL-MCNC: 9.7 MG/DL (ref 8.3–10.4)
CHLORIDE SERPL-SCNC: 106 MMOL/L (ref 101–110)
CO2 SERPL-SCNC: 26 MMOL/L (ref 21–32)
CREAT SERPL-MCNC: 1 MG/DL (ref 0.8–1.5)
CRP SERPL-MCNC: 1.7 MG/DL (ref 0–0.9)
EKG ATRIAL RATE: 85 BPM
EKG DIAGNOSIS: NORMAL
EKG P AXIS: 32 DEGREES
EKG P-R INTERVAL: 144 MS
EKG Q-T INTERVAL: 358 MS
EKG QRS DURATION: 80 MS
EKG QTC CALCULATION (BAZETT): 426 MS
EKG R AXIS: 33 DEGREES
EKG T AXIS: 21 DEGREES
EKG VENTRICULAR RATE: 85 BPM
EST. AVERAGE GLUCOSE BLD GHB EST-MCNC: 212 MG/DL
GLOBULIN SER CALC-MCNC: 4 G/DL (ref 2.8–4.5)
GLUCOSE BLD STRIP.AUTO-MCNC: 150 MG/DL (ref 65–100)
GLUCOSE SERPL-MCNC: 189 MG/DL (ref 65–100)
HBA1C MFR BLD: 9 % (ref 4.8–5.6)
LACTATE SERPL-SCNC: 1.2 MMOL/L (ref 0.4–2)
MAGNESIUM SERPL-MCNC: 2.4 MG/DL (ref 1.8–2.4)
POTASSIUM SERPL-SCNC: 4.1 MMOL/L (ref 3.5–5.1)
PROCALCITONIN SERPL-MCNC: <0.05 NG/ML (ref 0–0.49)
PROT SERPL-MCNC: 8 G/DL (ref 6.3–8.2)
SERVICE CMNT-IMP: ABNORMAL
SODIUM SERPL-SCNC: 136 MMOL/L (ref 133–143)

## 2022-12-30 PROCEDURE — 2580000003 HC RX 258: Performed by: FAMILY MEDICINE

## 2022-12-30 PROCEDURE — 86235 NUCLEAR ANTIGEN ANTIBODY: CPT

## 2022-12-30 PROCEDURE — 86160 COMPLEMENT ANTIGEN: CPT

## 2022-12-30 PROCEDURE — 93970 EXTREMITY STUDY: CPT

## 2022-12-30 PROCEDURE — 83605 ASSAY OF LACTIC ACID: CPT

## 2022-12-30 PROCEDURE — 70450 CT HEAD/BRAIN W/O DYE: CPT

## 2022-12-30 PROCEDURE — 6370000000 HC RX 637 (ALT 250 FOR IP)

## 2022-12-30 PROCEDURE — 1100000000 HC RM PRIVATE

## 2022-12-30 PROCEDURE — 6370000000 HC RX 637 (ALT 250 FOR IP): Performed by: STUDENT IN AN ORGANIZED HEALTH CARE EDUCATION/TRAINING PROGRAM

## 2022-12-30 PROCEDURE — 6370000000 HC RX 637 (ALT 250 FOR IP): Performed by: FAMILY MEDICINE

## 2022-12-30 PROCEDURE — 87040 BLOOD CULTURE FOR BACTERIA: CPT

## 2022-12-30 PROCEDURE — 6360000002 HC RX W HCPCS: Performed by: STUDENT IN AN ORGANIZED HEALTH CARE EDUCATION/TRAINING PROGRAM

## 2022-12-30 PROCEDURE — 97530 THERAPEUTIC ACTIVITIES: CPT

## 2022-12-30 PROCEDURE — 83516 IMMUNOASSAY NONANTIBODY: CPT

## 2022-12-30 PROCEDURE — 2500000003 HC RX 250 WO HCPCS: Performed by: FAMILY MEDICINE

## 2022-12-30 PROCEDURE — 6360000002 HC RX W HCPCS: Performed by: FAMILY MEDICINE

## 2022-12-30 PROCEDURE — 73590 X-RAY EXAM OF LOWER LEG: CPT

## 2022-12-30 PROCEDURE — 97162 PT EVAL MOD COMPLEX 30 MIN: CPT

## 2022-12-30 PROCEDURE — 97535 SELF CARE MNGMENT TRAINING: CPT

## 2022-12-30 PROCEDURE — 97165 OT EVAL LOW COMPLEX 30 MIN: CPT

## 2022-12-30 PROCEDURE — 82962 GLUCOSE BLOOD TEST: CPT

## 2022-12-30 PROCEDURE — 36415 COLL VENOUS BLD VENIPUNCTURE: CPT

## 2022-12-30 RX ORDER — SODIUM CHLORIDE 0.9 % (FLUSH) 0.9 %
5-40 SYRINGE (ML) INJECTION EVERY 12 HOURS SCHEDULED
Status: DISCONTINUED | OUTPATIENT
Start: 2022-12-30 | End: 2022-12-31 | Stop reason: HOSPADM

## 2022-12-30 RX ORDER — HYDRALAZINE HYDROCHLORIDE 25 MG/1
25 TABLET, FILM COATED ORAL EVERY 8 HOURS SCHEDULED
Status: DISCONTINUED | OUTPATIENT
Start: 2022-12-30 | End: 2022-12-31 | Stop reason: HOSPADM

## 2022-12-30 RX ORDER — ONDANSETRON 2 MG/ML
4 INJECTION INTRAMUSCULAR; INTRAVENOUS EVERY 6 HOURS PRN
Status: DISCONTINUED | OUTPATIENT
Start: 2022-12-30 | End: 2022-12-31 | Stop reason: HOSPADM

## 2022-12-30 RX ORDER — ACETAMINOPHEN 325 MG/1
650 TABLET ORAL EVERY 6 HOURS PRN
Status: DISCONTINUED | OUTPATIENT
Start: 2022-12-30 | End: 2022-12-31 | Stop reason: HOSPADM

## 2022-12-30 RX ORDER — SODIUM CHLORIDE 9 MG/ML
INJECTION, SOLUTION INTRAVENOUS PRN
Status: DISCONTINUED | OUTPATIENT
Start: 2022-12-30 | End: 2022-12-31 | Stop reason: HOSPADM

## 2022-12-30 RX ORDER — ENOXAPARIN SODIUM 100 MG/ML
30 INJECTION SUBCUTANEOUS 2 TIMES DAILY
Status: DISCONTINUED | OUTPATIENT
Start: 2022-12-30 | End: 2022-12-31 | Stop reason: HOSPADM

## 2022-12-30 RX ORDER — AMLODIPINE BESYLATE 10 MG/1
10 TABLET ORAL DAILY
Status: DISCONTINUED | OUTPATIENT
Start: 2022-12-30 | End: 2022-12-31 | Stop reason: HOSPADM

## 2022-12-30 RX ORDER — POLYETHYLENE GLYCOL 3350 17 G/17G
17 POWDER, FOR SOLUTION ORAL DAILY PRN
Status: DISCONTINUED | OUTPATIENT
Start: 2022-12-30 | End: 2022-12-31 | Stop reason: HOSPADM

## 2022-12-30 RX ORDER — LISINOPRIL 5 MG/1
10 TABLET ORAL DAILY
Status: DISCONTINUED | OUTPATIENT
Start: 2022-12-30 | End: 2022-12-31 | Stop reason: HOSPADM

## 2022-12-30 RX ORDER — ACETAMINOPHEN 650 MG/1
650 SUPPOSITORY RECTAL EVERY 6 HOURS PRN
Status: DISCONTINUED | OUTPATIENT
Start: 2022-12-30 | End: 2022-12-31 | Stop reason: HOSPADM

## 2022-12-30 RX ORDER — HYDRALAZINE HYDROCHLORIDE 20 MG/ML
5 INJECTION INTRAMUSCULAR; INTRAVENOUS EVERY 8 HOURS PRN
Status: DISCONTINUED | OUTPATIENT
Start: 2022-12-30 | End: 2022-12-30

## 2022-12-30 RX ORDER — DOXYCYCLINE HYCLATE 100 MG/1
100 CAPSULE ORAL EVERY 12 HOURS SCHEDULED
Status: DISCONTINUED | OUTPATIENT
Start: 2022-12-30 | End: 2022-12-31 | Stop reason: HOSPADM

## 2022-12-30 RX ORDER — CLINDAMYCIN PHOSPHATE 600 MG/50ML
600 INJECTION INTRAVENOUS EVERY 8 HOURS
Status: DISCONTINUED | OUTPATIENT
Start: 2022-12-30 | End: 2022-12-30

## 2022-12-30 RX ORDER — HYDRALAZINE HYDROCHLORIDE 20 MG/ML
10 INJECTION INTRAMUSCULAR; INTRAVENOUS EVERY 4 HOURS PRN
Status: DISCONTINUED | OUTPATIENT
Start: 2022-12-30 | End: 2022-12-30

## 2022-12-30 RX ORDER — HYDRALAZINE HYDROCHLORIDE 20 MG/ML
10 INJECTION INTRAMUSCULAR; INTRAVENOUS EVERY 4 HOURS PRN
Status: DISCONTINUED | OUTPATIENT
Start: 2022-12-30 | End: 2022-12-31 | Stop reason: HOSPADM

## 2022-12-30 RX ORDER — HYDROCODONE BITARTRATE AND ACETAMINOPHEN 5; 325 MG/1; MG/1
1 TABLET ORAL EVERY 6 HOURS PRN
Status: DISCONTINUED | OUTPATIENT
Start: 2022-12-30 | End: 2022-12-31 | Stop reason: HOSPADM

## 2022-12-30 RX ORDER — ONDANSETRON 4 MG/1
4 TABLET, ORALLY DISINTEGRATING ORAL EVERY 8 HOURS PRN
Status: DISCONTINUED | OUTPATIENT
Start: 2022-12-30 | End: 2022-12-31 | Stop reason: HOSPADM

## 2022-12-30 RX ORDER — CEPHALEXIN 250 MG/1
500 CAPSULE ORAL EVERY 6 HOURS SCHEDULED
Status: DISCONTINUED | OUTPATIENT
Start: 2022-12-30 | End: 2022-12-31 | Stop reason: HOSPADM

## 2022-12-30 RX ORDER — SODIUM CHLORIDE 0.9 % (FLUSH) 0.9 %
5-40 SYRINGE (ML) INJECTION PRN
Status: DISCONTINUED | OUTPATIENT
Start: 2022-12-30 | End: 2022-12-31 | Stop reason: HOSPADM

## 2022-12-30 RX ADMIN — ENOXAPARIN SODIUM 30 MG: 100 INJECTION SUBCUTANEOUS at 20:52

## 2022-12-30 RX ADMIN — SODIUM CHLORIDE, PRESERVATIVE FREE 5 ML: 5 INJECTION INTRAVENOUS at 07:46

## 2022-12-30 RX ADMIN — SODIUM CHLORIDE, PRESERVATIVE FREE 10 ML: 5 INJECTION INTRAVENOUS at 20:51

## 2022-12-30 RX ADMIN — LISINOPRIL 10 MG: 5 TABLET ORAL at 09:46

## 2022-12-30 RX ADMIN — HYDROCODONE BITARTRATE AND ACETAMINOPHEN 1 TABLET: 5; 325 TABLET ORAL at 12:04

## 2022-12-30 RX ADMIN — CEPHALEXIN 500 MG: 250 CAPSULE ORAL at 23:49

## 2022-12-30 RX ADMIN — HYDROCODONE BITARTRATE AND ACETAMINOPHEN 1 TABLET: 5; 325 TABLET ORAL at 19:36

## 2022-12-30 RX ADMIN — HYDRALAZINE HYDROCHLORIDE 10 MG: 20 INJECTION INTRAMUSCULAR; INTRAVENOUS at 07:45

## 2022-12-30 RX ADMIN — HYDRALAZINE HYDROCHLORIDE 25 MG: 25 TABLET, FILM COATED ORAL at 20:51

## 2022-12-30 RX ADMIN — ACETAMINOPHEN 650 MG: 325 TABLET, FILM COATED ORAL at 09:46

## 2022-12-30 RX ADMIN — DOXYCYCLINE HYCLATE 100 MG: 100 CAPSULE ORAL at 20:51

## 2022-12-30 RX ADMIN — CLINDAMYCIN PHOSPHATE 600 MG: 600 INJECTION, SOLUTION INTRAVENOUS at 07:37

## 2022-12-30 RX ADMIN — AMLODIPINE BESYLATE 10 MG: 10 TABLET ORAL at 12:05

## 2022-12-30 ASSESSMENT — PAIN DESCRIPTION - LOCATION
LOCATION: LEG
LOCATION: BACK
LOCATION: LEG
LOCATION: BACK;LEG

## 2022-12-30 ASSESSMENT — PAIN DESCRIPTION - PAIN TYPE: TYPE: CHRONIC PAIN

## 2022-12-30 ASSESSMENT — PAIN SCALES - GENERAL
PAINLEVEL_OUTOF10: 7
PAINLEVEL_OUTOF10: 0
PAINLEVEL_OUTOF10: 9
PAINLEVEL_OUTOF10: 8
PAINLEVEL_OUTOF10: 3
PAINLEVEL_OUTOF10: 5
PAINLEVEL_OUTOF10: 2

## 2022-12-30 ASSESSMENT — ENCOUNTER SYMPTOMS
WHEEZING: 0
ABDOMINAL PAIN: 0
NAUSEA: 0
SHORTNESS OF BREATH: 0
VOMITING: 0

## 2022-12-30 ASSESSMENT — LIFESTYLE VARIABLES
HOW MANY STANDARD DRINKS CONTAINING ALCOHOL DO YOU HAVE ON A TYPICAL DAY: PATIENT DOES NOT DRINK
HOW OFTEN DO YOU HAVE A DRINK CONTAINING ALCOHOL: NEVER

## 2022-12-30 ASSESSMENT — PAIN DESCRIPTION - ONSET: ONSET: GRADUAL

## 2022-12-30 ASSESSMENT — PAIN DESCRIPTION - ORIENTATION
ORIENTATION: LEFT;RIGHT
ORIENTATION: LEFT;RIGHT;LOWER
ORIENTATION: RIGHT;LEFT

## 2022-12-30 ASSESSMENT — PAIN DESCRIPTION - DESCRIPTORS: DESCRIPTORS: BURNING

## 2022-12-30 ASSESSMENT — PAIN - FUNCTIONAL ASSESSMENT: PAIN_FUNCTIONAL_ASSESSMENT: PREVENTS OR INTERFERES SOME ACTIVE ACTIVITIES AND ADLS

## 2022-12-30 ASSESSMENT — PAIN DESCRIPTION - FREQUENCY: FREQUENCY: INTERMITTENT

## 2022-12-30 NOTE — ASSESSMENT & PLAN NOTE
- Multiple shallow ulcers on BLE  - Ongoing for ~1 month  - Non-healing  - Skin \"sloughs off\" per report  - Concern for vasculitis?   - Have ordered KIANA, ANCA, and C4 panels  - May need punch biopsy  - While does not appear to be actively infected, have ordered clindamycin for coverage of potential skin infection given appearance and open wounds

## 2022-12-30 NOTE — WOUND CARE
Spoke with primary RN pt to be off floor for multiple tests, primary RN described wounds nothing draining significantly, awaiting results of multiple tests and noted vascular consult as well, recommend xeroform over open areas on BLE and secure with kerlix and ace from base of toes to just below the knee, change daily. Wound team will follow up next week for full assessment.

## 2022-12-30 NOTE — ED NOTES
TRANSFER - OUT REPORT:    Verbal report given to Ismael Pozo RN on [address]  being transferred to  [phone] for routine progression of patient care       Report consisted of patient's Situation, Background, Assessment and   Recommendations(SBAR). Information from the following report(s) Nurse Handoff Report was reviewed with the receiving nurse. Kennard Assessment: No data recorded  Lines:   Peripheral IV 12/29/22 Right Hand (Active)   Site Assessment Clean, dry & intact 12/29/22 2310   Line Status Blood return noted 12/29/22 2310   Phlebitis Assessment No symptoms 12/29/22 2310   Infiltration Assessment 0 12/29/22 2310   Alcohol Cap Used No 12/29/22 2310   Dressing Status New dressing applied 12/29/22 2310   Dressing Type Transparent 12/29/22 2310   Dressing Intervention New 12/29/22 2310        Opportunity for questions and clarification was provided.       Patient transported with:  Registered Nurse          Mini Diaz RN  12/30/22 2792

## 2022-12-30 NOTE — PROGRESS NOTES
ACUTE PHYSICAL THERAPY GOALS:   (Developed with and agreed upon by patient and/or caregiver.)   Mr. Chris Harris will perform supine to sit and sit to supine independently in 7 days   Mr. Chris Harris will perform sit to stand and bed to chair independently in 7 days. Mr. Chris Harris will perform gait with least restrictive device 200 ft independently in 7 days. PHYSICAL THERAPY Initial Assessment, Daily Note, and AM  (Link to Caseload Tracking: PT Visit Days : 1  Acknowledge Orders  Time In/Out  PT Charge Capture  Rehab Caseload Tracker    Kianna Angel is a 69 y.o. male   PRIMARY DIAGNOSIS: Ulcers of both lower legs ([address]  Ulcers of both lower legs ([address] [L97.919, L97.929]  Subdural hematoma [S06. 5XAA]       Reason for Referral: Generalized Muscle Weakness (M62.81)  Difficulty in walking, Not elsewhere classified (R26.2)  History of falling (Z91.81)  Inpatient: Payor: Jc Mcdonnell / Plan: Jennifer Dial / Product Type: *No Product type* /     ASSESSMENT:     REHAB RECOMMENDATIONS:   Recommendation to date pending progress:  Setting:  Home Health Therapy    Equipment:    To Be Determined     ASSESSMENT:  Mr. Chris Harris presents after a fall where he landed on a cinder block. He has a gash on his forehead and is complaining of a headache, he is also complaining of \"kidney pain. \"  He has chronic wounds on both legs that are red and his ankles are swollen so that his crocs dont fit right now. He also was noted to have 2 boil like wounds on his back. He got to the edge of the bed slowly with min assist.  Sit to sit with cg and gait with rolling walker with cg. At baseline he doesn't drive but is not entirely active but manages to take care of his 4 grand children all under the age of 16. His wife passed away and when brought up he starts to cry. Mr Vanessa Harris was able to ambulate about 80 ft at a slow pace with generalize soreness while he was moving but steady.   Mr. Chris Harris is functioning below baseline and is therefore appropriate for skilled PT to maximize his rehab potential.     [address] Mobility Inpatient   How much difficulty turning over in bed?: A Little  How much difficulty sitting down on / standing up from a chair with arms?: A Little  How much difficulty moving from lying on back to sitting on side of bed?: A Little  How much help from another person moving to and from a bed to a chair?: A Little  How much help from another person needed to walk in hospital room?: A Little  How much help from another person for climbing 3-5 steps with a railing?: A Little  AM-PAC Inpatient Mobility Raw Score : 18  AM-PAC Inpatient T-Scale Score : 43.63  Mobility Inpatient CMS 0-100% Score: 46.58  Mobility Inpatient CMS G-Code Modifier : CK    SUBJECTIVE:   Mr. Emilee Gonzalez states, \"I dont know what happened I just went down.  \"     Social/Functional Lives With:  (grandkids--4 all under 16 yrs old)  Type of Home: House  Home Layout: One level  Bathroom Shower/Tub: Tub/Shower unit  ADL Assistance: Independent  Ambulation Assistance: Independent    OBJECTIVE:     PAIN: Katie Nose / O2: PRECAUTION / Mayme Jump / Tavo Oniel:   Pre Treatment:          Post Treatment: pain in head and back (around kindeys.) Vitals        Oxygen      None    RESTRICTIONS/PRECAUTIONS:  Restrictions/Precautions: Fall Risk                 GROSS EVALUATION: Intact Impaired (Comments):   AROM [x]     PROM []    Strength []  Weak but functional sore   Balance [] Posture: Fair  Sitting - Static: Good  Sitting - Dynamic: Good  Standing - Static: Fair, +  Standing - Dynamic: Fair   Posture [] Forward Head  Rounded Shoulders   Sensation []  Neuropathy both feet   Coordination []      Tone []     Edema []  BLE swelling   Activity Tolerance []      []      COGNITION/  PERCEPTION: Intact Impaired (Comments):   Orientation [x]     Vision [x]     Hearing [x]     Cognition  [x]       MOBILITY: I Mod I S SBA CGA Min Mod Max Total  NT x2 Comments:   Bed Mobility    Rolling [] [] [] [x] [] [] [] [] [] [] []    Supine to Sit [] [] [] [] [x] [] [] [] [] [] []    Scooting [] [] [] [] [x] [] [] [] [] [] []    Sit to Supine [] [] [] [] [] [] [] [] [] [x] []    Transfers    Sit to Stand [] [] [] [] [x] [] [] [] [] [] []    Bed to Chair [] [] [] [] [x] [] [] [] [] [] []    Stand to Sit [] [] [] [] [x] [] [] [] [] [] []     [] [] [] [] [] [] [] [] [] [] []    I=Independent, Mod I=Modified Independent, S=Supervision, SBA=Standby Assistance, CGA=Contact Guard Assistance,   Min=Minimal Assistance, Mod=Moderate Assistance, Max=Maximal Assistance, Total=Total Assistance, NT=Not Tested    GAIT: I Mod I S SBA CGA Min Mod Max Total  NT x2 Comments:   Level of Assistance [] [] [] [] [x] [] [] [] [] [] []    Distance 80 feet    DME Rolling Walker    Gait Quality Decreased felicita , Decreased step clearance, and Decreased step length    Weightbearing Status      Stairs      I=Independent, Mod I=Modified Independent, S=Supervision, SBA=Standby Assistance, CGA=Contact Guard Assistance,   Min=Minimal Assistance, Mod=Moderate Assistance, Max=Maximal Assistance, Total=Total Assistance, NT=Not Tested    PLAN:   FREQUENCY AND DURATION: 3 times/week for duration of hospital stay or until stated goals are met, whichever comes first.    THERAPY PROGNOSIS: Good    PROBLEM LIST:   (Skilled intervention is medically necessary to address:)  Decreased Activity Tolerance  Decreased AROM/PROM  Decreased Balance  Decreased Gait Ability  Decreased Safety Awareness  Decreased Strength  Decreased Transfer Abilities INTERVENTIONS PLANNED:   (Benefits and precautions of physical therapy have been discussed with the patient.)  Therapeutic Activity  Therapeutic Exercise/HEP  Neuromuscular Re-education  Gait Training  Education       TREATMENT:   EVALUATION: MODERATE COMPLEXITY: (Untimed Charge)    TREATMENT:   Co-Treatment PT/OT necessary due to patient's decreased overall endurance/tolerance levels, as well as need for high level skilled assistance to complete functional transfers/mobility and functional tasks  Therapeutic Activity (12 Minutes): Therapeutic activity included Supine to Sit, Scooting, Transfer Training, Ambulation on level ground, Sitting balance , and Standing balance to improve functional Activity tolerance, Balance, Mobility, and Strength.     TREATMENT GRID:  N/A    AFTER TREATMENT PRECAUTIONS: Call light within reach, Chair, Needs within reach, and RN notified    INTERDISCIPLINARY COLLABORATION:  RN/ PCT, PT/ PTA, and OT/ MASSEY    EDUCATION: Education Given To: Patient  Education Provided: Role of Therapy  Education Method: Verbal  Barriers to Learning: None  Education Outcome: Verbalized understanding    TIME IN/OUT:  Time In: 1640  Time Out: 8503  Minutes: Johnny Don, PT

## 2022-12-30 NOTE — WOUND CARE
Attempted to see patient in ER, pt out of room getting Xray and Ultrasounds. Attempted to call primary RN no answer, will attempt later as time permits.

## 2022-12-30 NOTE — CONSULTS
Infectious Disease Consult    Today's Date: 12/30/2022   Admit Date: 12/30/2022    Impression:   Bilateral lower extremity wounds with erythema/small papules:  ? Contact dermatitis vs possible component of cellulitis. Erythema, small papules, and wounds noted to the BLEs. Reports utilizing multiple products to his BLEs that can irritate/cause excessive dryness to the skin, along with rubbing the areas frequently (using his foot to rub alternate leg). Patient has been afebrile and WBC is WNLs. CRP 1.7 and ESR 39. DM    Plan:   Discontinue Clindamycin   Start Cephalexin 500 mg QID and Doxycyline 100 mg BID. Plan to treat for 10 days. Okay to transition Cephalexin to Cefadroxil upon discharge for ease of administration. Cefadroxil is not on formulary while the patient remains inpatient. Continue to follow blood culture result. Agree with wound consult. Recommend patient follow with wound care on an outpatient basis too. Recommend that patient re-establish care with a PCP. ID will sign off but please reach out with any concerns/questions. Patient does not need ID follow-up outpatient. Anti-infectives:   Cephalexin 12/30-  Doxycycline 12/30-  Clindamycin 12/30-     Subjective:   Date of Consultation:  December 30, 2022  Referring Physician: Errol Marinelli    Patient is a 69 y.o. male who presented to the ED on 12/29 with complaints of BLE redness/wounds. Patient reports that his legs have been bothering him for approximately 3 months. Patient reports that his legs have been itchy and painful. Reports that he rubs his legs, using the alternate foot to rub the alternate leg. Patient reports he has been rubbing hydrogen peroxide, alcohol, and \"red oil\" on his legs. Also reports utilizing neosporin and other creams. States he took some old/left over penicillin he had about a month ago and it did not help. Patient denies fever, chills, and/or sweats.   Patient has not seen a medical provider in approximately 2 years. No longer takes any medications. States he use to take medication for DM but quit after he changed his diet/had weight loss as he \"no longer had diabetes. \"  Patient reports he checks his blood glucose levels in the morning with levels ~ 120. Patient takes his blood glucose levels later in the day after oral intake and noted blood glucose levels 200. Patient noted to have a Tmax 99.3 since admission and WBC is WNLs. Additional labs include CRP 1.7, ESR 39, and lactic 1.6 down to 1.2. Blood culture collected 12/29/22 with NGTD. Patient recently fell PTA with CT head 12/29 with no acute intracranial process but does note question of a small central SDH. Repeat head CT, BLE x-rays, and BLE vascular duplex are pending. Neurosurgery and Vascular have been consulted. Patient Active Problem List   Diagnosis    Bipolar affective disorder, manic ([address]    Controlled type 2 diabetes mellitus with diabetic neuropathy, without long-term current use of insulin (HCC)    Anxiety    Dyslipidemia    Hypertensive cardiovascular disease    Migraine    Left leg weakness    MRI contraindicated due to metal implant    Weakness of left arm    HTN (hypertension)    CVA (cerebral vascular accident) ([address]    Left-sided weakness    Chronic diastolic congestive heart failure ([address]    S/P PTCA (percutaneous transluminal coronary angioplasty)    Unintentional weight loss    GERD (gastroesophageal reflux disease)    Diabetic neuropathy (HCC)    COPD (chronic obstructive pulmonary disease) (HCC)    CAD (coronary artery disease)    Ulcers of both lower legs ([address]    Subdural hematoma     Past Medical History:   Diagnosis Date    CHF (congestive heart failure) ([address]       History reviewed. No pertinent family history. Social History     Tobacco Use    Smoking status: Never    Smokeless tobacco: Never   Substance Use Topics    Alcohol use: Not Currently     History reviewed.  No pertinent surgical history. Prior to Admission medications    Not on File       No Known Allergies     Review of Systems:  A comprehensive review of systems was negative except for that written in the History of Present Illness. Objective:     Visit Vitals  BP (!) 140/79   Pulse 85   Temp 98 °F (36.7 °C) (Oral)   Resp 23   SpO2 97%     Temp (24hrs), Av.7 °F (37.1 °C), Min:98 °F (36.7 °C), Max:99.3 °F (37.4 °C)       Lines:  Peripheral IV:       Physical Exam:    General:  Alert, cooperative, well noursished, well developed, appears stated age   Eyes:  Sclera anicteric. Pupils equally round and reactive to light. Mouth/Throat: Mucous membranes normal, oral pharynx clear, dentures    Neck: Supple   Lungs:   Clear to auscultation bilaterally, good effort   CV:  Regular rate and rhythm,no murmur, click, rub or gallop   Abdomen:   Soft, non-tender.  bowel sounds normal. non-distended   Extremities: No cyanosis or edema   Skin: BLE with erythema and wounds,papules, see images below, no pus or drainage noted     Lymph nodes: Cervical and supraclavicular normal   Musculoskeletal: No swelling or deformity   Lines/Devices:  Intact, no erythema, drainage or tenderness   Psych: Alert and oriented, normal mood affect given the setting                     Data Review:     CBC:  Recent Labs     22   WBC 9.5   HGB 16.4   HCT 48.1          BMP:  Recent Labs     22   BUN 18      K 4.1      CO2 26       LFTS:  Recent Labs     22  2304   ALT 28       Microbiology:   Reviewed   Imaging:   Reviewed     Signed By: SONY Trevino - INDIRA     2022

## 2022-12-30 NOTE — ED TRIAGE NOTES
Pt to ED with c/o bilateral lower leg edema, redness and wounds. Pt states x1 month and not getting better. Pt states he takes no medications for 2-3 years. Pt states today he tripped and fell hitting his head on cinder block. Pt denies loss of consciousness. Pt alert and in NAD at this time.

## 2022-12-30 NOTE — PROGRESS NOTES
ACUTE OCCUPATIONAL THERAPY GOALS:   (Developed with and agreed upon by patient and/or caregiver.)  1. Patient will complete lower body bathing and dressing with SUPERVISION and adaptive equipment as needed. 2. Patient will complete toileting with SUPERVISION. 3. Patient will complete grooming ADL standing at sink with SUPERVISION. 4. Patient will tolerate 25 minutes of OT treatment with 1-2 rest breaks to increase activity tolerance for ADLs. 5. Patient will complete functional transfers with SUPERVISION and adaptive equipment as needed. 6. Patient will tolerate 10 minutes BUE exercises to increase strength for safe, functional transfers. Timeframe: 7 visits     OCCUPATIONAL THERAPY Initial Assessment and Daily Note       OT Visit Days: 1  Acknowledge Orders  Time  OT Charge Capture  Rehab Caseload Tracker      Param Tavarez is a 69 y.o. male   PRIMARY DIAGNOSIS: Ulcers of both lower legs ([address]  Ulcers of both lower legs ([address] [L97.919, L97.929]  Subdural hematoma [S06. 5XAA]       Reason for Referral: Generalized Muscle Weakness (M62.81)  Difficulty in walking, Not elsewhere classified (R26.2)  Other abnormalities of gait and mobility (R26.89)  Inpatient: Payor: Lico Petty / Plan: R[place] / Product Type: *No Product type* /     ASSESSMENT:     REHAB RECOMMENDATIONS:   Recommendation to date pending progress:  Setting:  Home Health Therapy    Equipment:    Rolling Walker     ASSESSMENT:  Mr. Cricket Magallanes is a 68 y/o male presents with BLE cellulitis and fall at home hitting his head on a cinder block. Pt now has subdural hematoma and pain in back. At baseline pt lives with his grandkids, is independent all ADLs and does not use DME for ambulation. Pt reports he does not drive or move much throughout the day. Today pt presents with decreased activity tolerance, balance and mobility impacting ADLs. Pt overall CGA RW for functional transfers and mobility of household distances in ER.  Pt then took seated rest break due to pain in BLE from cellulitis before completing grooming ADLs standing at sink with CGA. Pt noted to be safer with RW. Pt is currently functioning below baseline and would benefit from skilled OT services to address OT goals and plan of care. Rec HHOT at d/c. [address] [facility] [address] [address] [facility] 6 Clicks Daily Activity Inpatient Short Form:    AM-PAC Daily Activity Inpatient   How much help for putting on and taking off regular lower body clothing?: A Lot  How much help for Bathing?: A Little  How much help for Toileting?: A Little  How much help for putting on and taking off regular upper body clothing?: A Little  How much help for taking care of personal grooming?: None  How much help for eating meals?: None  AM-[facility] Inpatient Daily Activity Raw Score: 19  AM-PAC Inpatient ADL T-Scale Score : 40.22  ADL Inpatient CMS 0-100% Score: 42.8  ADL Inpatient CMS G-Code Modifier : CK           SUBJECTIVE:     Mr. Simba Montilla states, \"I never wear socks, but it is hard to get my pants on at home\"     Social/Functional Lives With:  (grandkids--4 all under 16 yrs old)  Type of Home: House  Home Layout: One level  Bathroom Shower/Tub: Tub/Shower unit  ADL Assistance: Independent  Ambulation Assistance: Independent    OBJECTIVE:     Rebecca Bernardo / Luly Shukla / Promise Sims: None    RESTRICTIONS/PRECAUTIONS:  Restrictions/Precautions: Fall Risk    PAIN: VITALS / O2:   Pre Treatment:   Pain Assessment: 0-10  Pain Level: 5  Pain Location: Back;Leg  Pain Orientation: Left;Right; Lower  Non-Pharmaceutical Pain Intervention(s): Ambulation/Increased Activity; Emotional support      Post Treatment: no c/o pain, resting comfortably in bedside chair       Vitals          Oxygen            GROSS EVALUATION: INTACT IMPAIRED   (See Comments)   UE AROM [x] []   UE PROM [x] []   Strength [x]       Posture / Balance [] Posture: Fair  Sitting - Static: Good  Sitting - Dynamic: Good  Standing - Static: Fair, +  Standing - Dynamic: Fair Sensation [x]     Coordination [x]       Tone [x]       Edema [x]    Activity Tolerance [] Patient limited by fatigue, Patient limited by pain, Patient Tolerated treatment well     Hand Dominance R [] L []      COGNITION/  PERCEPTION: INTACT IMPAIRED   (See Comments)   Orientation [x]     Vision [x]     Hearing [x]     Cognition  [x]     Perception [x]       MOBILITY: I Mod I S SBA CGA Min Mod Max Total  NT x2 Comments:   Bed Mobility    Rolling [] [] [] [] [] [] [] [] [] [x] []    Supine to Sit [] [] [] [] [x] [] [] [] [] [] []    Scooting [] [] [] [] [x] [] [] [] [] [] []    Sit to Supine [] [] [] [] [] [] [] [] [] [x] []    Transfers    Sit to Stand [] [] [] [] [x] [] [] [] [] [] [] RW   Bed to Chair [] [] [] [] [x] [] [] [] [] [] [] RW   Stand to Sit [] [] [] [] [x] [] [] [] [] [] [] RW   Tub/Shower [] [] [] [] [] [] [] [] [] [x] []     Toilet [] [] [] [] [] [] [] [] [] [x] []      [] [] [] [] [] [] [] [] [] [x] []    I=Independent, Mod I=Modified Independent, S=Supervision/Setup, SBA=Standby Assistance, CGA=Contact Guard Assistance, Min=Minimal Assistance, Mod=Moderate Assistance, Max=Maximal Assistance, Total=Total Assistance, NT=Not Tested    ACTIVITIES OF DAILY LIVING: I Mod I S SBA CGA Min Mod Max Total NT Comments   BASIC ADLs:              Upper Body Bathing  [] [] [] [] [] [] [] [] [] [x]    Lower Body Bathing [] [] [] [] [] [] [] [] [] [x]    Toileting [] [] [] [] [] [] [] [] [] [x]    Upper Body Dressing [] [] [] [] [] [] [] [] [] [x]    Lower Body Dressing [] [] [] [] [] [] [] [x] [] [] Donning socks EOB--reports does not wear socks at home   Feeding [] [] [] [] [] [] [] [] [] [x]    Grooming [] [] [] [] [x] [] [] [] [] [] Brushing teeth and washing face standing at sink RW   Personal Device Care [] [] [] [] [] [] [] [] [] [x]    Functional Mobility [] [] [] [] [x] [] [] [] [] [] RW   I=Independent, Mod I=Modified Independent, S=Supervision/Setup, SBA=Standby Assistance, CGA=Contact Guard Assistance, Min=Minimal Assistance, Mod=Moderate Assistance, Max=Maximal Assistance, Total=Total Assistance, NT=Not Tested    PLAN:   FREQUENCY/DURATION   OT Plan of Care: 3 times/week for duration of hospital stay or until stated goals are met, whichever comes first.    PROBLEM LIST:   (Skilled intervention is medically necessary to address:)  Decreased ADL/Functional Activities  Decreased Activity Tolerance  Decreased Balance  Decreased Gait Ability  Decreased Safety Awareness  Decreased Strength  Decreased Transfer Abilities  Increased Pain   INTERVENTIONS PLANNED:  (Benefits and precautions of occupational therapy have been discussed with the patient.)  Self Care Training  Therapeutic Activity  Therapeutic Exercise/HEP  Neuromuscular Re-education  Manual Therapy  Education         TREATMENT:     EVALUATION: LOW COMPLEXITY: (Untimed Charge)    TREATMENT:   Self Care (10 minutes): Patient participated in lower body dressing and grooming ADLs in unsupported sitting and standing with minimal verbal, manual, and tactile cueing to increase independence, decrease assistance required, and increase activity tolerance. Patient also participated in functional mobility, functional transfer, and adaptive equipment training to increase independence, decrease assistance required, increase activity tolerance, and increase safety awareness.      TREATMENT GRID:  N/A    AFTER TREATMENT PRECAUTIONS: Call light within reach, Chair, Needs within reach, and RN notified    INTERDISCIPLINARY COLLABORATION:  RN/ PCT, PT/ PTA, and OT/ MASSEY    EDUCATION:  Education Given To: Patient  Education Provided: Role of Therapy;Plan of Care  Education Method: Verbal  Barriers to Learning: None  Education Outcome: Verbalized understanding    TOTAL TREATMENT DURATION AND TIME:  Time In: 6507  Time Out: 429 [facility]  Minutes: 7043 [facility], OT

## 2022-12-30 NOTE — CONSULTS
Department of Neurosurgery  Attending Consult Note        Reason for Consult:  Subdural Hematoma  Requesting Physician:  Howard Malik MD    CHIEF COMPLAINT:  Leg ulcers    History Obtained From:  patient    HISTORY OF PRESENT ILLNESS:                The patient is a 69 y.o. male who presents with leg ulcers that have developed over the last several months. He reports that he was raking leaves yesterday and fell, hitting his forehead. There was no loss of consciousness. He endorses difficulty walking because of the ulcers on his legs. No complaints of other neurologic deficits. Past Medical History:        Diagnosis Date    CHF (congestive heart failure) (HCC)    HTN  DM2  CAD    Past Surgical History:    History reviewed. No pertinent surgical history.   Current Medications:   Current Facility-Administered Medications: sodium chloride flush 0.9 % injection 5-40 mL, 5-40 mL, IntraVENous, 2 times per day  sodium chloride flush 0.9 % injection 5-40 mL, 5-40 mL, IntraVENous, PRN  0.9 % sodium chloride infusion, , IntraVENous, PRN  ondansetron (ZOFRAN-ODT) disintegrating tablet 4 mg, 4 mg, Oral, Q8H PRN **OR** ondansetron (ZOFRAN) injection 4 mg, 4 mg, IntraVENous, Q6H PRN  polyethylene glycol (GLYCOLAX) packet 17 g, 17 g, Oral, Daily PRN  acetaminophen (TYLENOL) tablet 650 mg, 650 mg, Oral, Q6H PRN **OR** acetaminophen (TYLENOL) suppository 650 mg, 650 mg, Rectal, Q6H PRN  hydrALAZINE (APRESOLINE) injection 10 mg, 10 mg, IntraVENous, Q4H PRN  lisinopril (PRINIVIL;ZESTRIL) tablet 10 mg, 10 mg, Oral, Daily  HYDROcodone-acetaminophen (NORCO) 5-325 MG per tablet 1 tablet, 1 tablet, Oral, Q6H PRN  amLODIPine (NORVASC) tablet 10 mg, 10 mg, Oral, Daily  hydrALAZINE (APRESOLINE) tablet 25 mg, 25 mg, Oral, 3 times per day  hydrALAZINE (APRESOLINE) injection 5 mg, 5 mg, IntraVENous, Q8H PRN  cephALEXin (KEFLEX) capsule 500 mg, 500 mg, Oral, 4 times per day  doxycycline hyclate (VIBRAMYCIN) capsule 100 mg, 100 mg, Oral, 2 times per day  enoxaparin Sodium (LOVENOX) injection 30 mg, 30 mg, SubCUTAneous, BID  Allergies:  Patient has no known allergies. Social History:   TOBACCO:   reports that he has never smoked. He has never used smokeless tobacco.  Family History:   History reviewed. No pertinent family history. REVIEW OF SYSTEMS:  See HPI above for relevant ROS    PHYSICAL EXAM:  VITALS:  /71   Pulse (!) 102   Temp 98 °F (36.7 °C) (Oral)   Resp 29   Ht 6' (1.829 m)   Wt 252 lb 13.9 oz (114.7 kg)   SpO2 95%   BMI 34.29 kg/m²   CONSTITUTIONAL:  awake, alert, cooperative, no apparent distress, and appears stated age  NEUROLOGIC:  Awake, alert, oriented to name, place and time. Cranial nerves II-XII are grossly intact. Motor is 5 out of 5 bilaterally. Cerebellar and gait not tested secondary to bilateral LE ulcers and confinment to ICU bed. Sensory is grossly intact with obvious diabetic neuropathy    DATA:  Radiology Review:    CT Head today at 00:05 and 13:55 personally reviewed and DO NOT demonstrate any acute intracranial pathology including subdural hematomas. IMPRESSION/RECOMMENDATIONS:      1) No neurosurgical issues. 2) Does not require ICU monitoring -- no neurological deficits or pathology. 3) Follow-up with PCP PRN.

## 2022-12-30 NOTE — PROGRESS NOTES
Patient was seen and examined at the bedside. He stated he had wounds on his leg since 3 months and was using antibiotic cream, Neosporin and Betadine. He was not resting in the doctors. His PCP stopped his BP and diabetic medications 2 years back. He is unable to walk. He was hypertensive. Ordered lisinopril and hydralazine p.o and IV hydralazine as needed. Ordered AHMET and x-rays of the bilateral foot. Consulted ID, vascular and wound care. Noted small sdh in the ct scan and follow-up with repeat CAT scan. Please refer to H&P in a.m.

## 2022-12-30 NOTE — H&P
Hospitalist History and Physical   Admit Date:  2022  1:27 AM   Name:  Claudio Villatoro   Age:  69 y.o. Sex:  male  :  1955   MRN:  [id number]     Presenting Complaint: Leg wounds  Reason(s) for Admission: Ulcers of both lower legs ([facility] [address] [L97.919, L97.929]  Subdural hematoma [S06. 5XAA]     History of Present Illness:   Claudio Villatoro is a 69 y.o. male with medical history of HTN, DM2, CAD (not taking any meds) who presented to ED with BLE wounds and swelling. Symptoms started about 1 month ago. Reports wounds do not seem to be healing. Denies specific injury to legs. Denies fevers/chills, nausea/vomiting. Upon ER evaluation, labs overall are pretty unremarkable. He does have elevated CRP and sed rate. ER has concern that these wounds are reflective of an undiagnosed vasculitis. Hospitalist asked to admit. Additionally, patient reports falling and hitting his head on a cinder block yesterday. He denies any LOC. CT was obtained in the ER and shows:  1. No CT evidence of acute intracranial process. 2. Question small central frontal subdural hematoma with no underlying fracture. Due to possible SDH, will admit to ICU for close monitoring per protocol. Review of Systems:  10 systems reviewed and negative except as noted in HPI. Assessment & Plan:   Subdural hematoma  Assessment & Plan  - Small possible SDH on CT  - Did fall on cinder block  - Has small skin lac on forehead as well  - Monitor in ICU per protocol  - Repeat head CT  - No anticoagulation  - Control BP  - Monitor for any neurologic change  - Have placed consult to Neurosurgery, I would not expect for this to be surgical at all    * Ulcers of both lower legs (HCC)  Assessment & Plan  - Multiple shallow ulcers on BLE  - Ongoing for ~1 month  - Non-healing  - Skin \"sloughs off\" per report  - Concern for vasculitis?   - Have ordered KIANA, ANCA, and C4 panels  - May need punch biopsy  - While does not appear to be actively infected, have ordered clindamycin for coverage of potential skin infection given appearance and open wounds    CAD (coronary artery disease)  Assessment & Plan  - Has not taken meds in 2 years  - No chest pain  - Would advise f/u with PCP/Cardiology as outpatient    HTN (hypertension)  Assessment & Plan  - Has not taken meds in 2 years  - PRN hydralazine in attempt to keep SBP<140 due to SDH    Controlled type 2 diabetes mellitus with diabetic neuropathy, without long-term current use of insulin ([facility] [address]  Assessment & Plan  - Has not taken meds in 2 years  - Monitor and treat as needed      Disposition: inpatient    Past medical history reviewed. Past Medical History:   Diagnosis Date    CHF (congestive heart failure) (HCC)      Past surgical history reviewed. History reviewed. No pertinent surgical history. No Known Allergies   Social History     Tobacco Use    Smoking status: Never    Smokeless tobacco: Never   Substance Use Topics    Alcohol use: Not Currently      History reviewed. No pertinent family history. Family history reviewed and noncontributory to patient's acute condition; no relevant family history unless otherwise noted above. Immunization History   Administered Date(s) Administered    Td vaccine (adult) 05/07/1991     PTA Medications:  No current outpatient medications    Objective:   Patient Vitals for the past 24 hrs:   Temp Pulse Resp BP SpO2   12/29/22 2050 99.3 °F (37.4 °C) (!) 112 19 (!) 185/106 95 %          There is no height or weight on file to calculate BMI. No intake or output data in the 24 hours ending 12/30/22 0654      Physical Exam:  General:    Well nourished. No overt distress  Head:  Normocephalic, small lac on mid forehead from fall  Eyes:  Sclerae appear normal.  Pupils equally round. HENT:  Nares appear normal, no drainage. Moist mucous membranes  Neck:  No restricted ROM. Trachea midline  CV:   RRR. S1/S2 auscultated  Lungs:   CTAB.   No wheezing, rhonchi, or rales. Appears even, unlabored  Abdomen: Bowel sounds present. Soft, nontender, nondistended. Extremities: Warm and dry. No cyanosis or clubbing. 1+ BLE edema. Skin:     Multiple shallow ulcers on BLE with surrounding erythema. Skin not excessively warm. No drainage noted. Neuro:  Cranial nerves II-XII grossly intact. Sensation intact  Psych:  Normal mood and affect.   Alert and oriented x3    Data Ordered and Personally Reviewed:    Last 24hr Labs:  Recent Results (from the past 24 hour(s))   EKG 12 Lead    Collection Time: 12/29/22 10:48 PM   Result Value Ref Range    Ventricular Rate 85 BPM    Atrial Rate 85 BPM    P-R Interval 144 ms    QRS Duration 80 ms    Q-T Interval 358 ms    QTc Calculation (Bazett) 426 ms    P Axis 32 degrees    R Axis 33 degrees    T Axis 21 degrees    Diagnosis Normal sinus rhythm    CBC with Auto Differential    Collection Time: 12/29/22 11:04 PM   Result Value Ref Range    WBC 9.5 4.3 - 11.1 K/uL    RBC 5.46 4.23 - 5.6 M/uL    Hemoglobin 16.4 13.6 - 17.2 g/dL    Hematocrit 48.1 41.1 - 50.3 %    MCV 88.1 82 - 102 FL    MCH 30.0 26.1 - 32.9 PG    MCHC 34.1 31.4 - 35.0 g/dL    RDW 12.7 11.9 - 14.6 %    Platelets 657 974 - 862 K/uL    MPV 10.3 9.4 - 12.3 FL    nRBC 0.00 0.0 - 0.2 K/uL    Differential Type AUTOMATED      Seg Neutrophils 64 43 - 78 %    Lymphocytes 25 13 - 44 %    Monocytes 9 4.0 - 12.0 %    Eosinophils % 2 0.5 - 7.8 %    Basophils 0 0.0 - 2.0 %    Immature Granulocytes 0 0.0 - 5.0 %    Segs Absolute 6.0 1.7 - 8.2 K/UL    Absolute Lymph # 2.4 0.5 - 4.6 K/UL    Absolute Mono # 0.9 0.1 - 1.3 K/UL    Absolute Eos # 0.2 0.0 - 0.8 K/UL    Basophils Absolute 0.0 0.0 - 0.2 K/UL    Absolute Immature Granulocyte 0.0 0.0 - 0.5 K/UL   CMP    Collection Time: 12/29/22 11:04 PM   Result Value Ref Range    Sodium 136 133 - 143 mmol/L    Potassium 4.1 3.5 - 5.1 mmol/L    Chloride 106 101 - 110 mmol/L    CO2 26 21 - 32 mmol/L    Anion Gap 4 2 - 11 mmol/L Glucose 189 (H) 65 - 100 mg/dL    BUN 18 8 - 23 MG/DL    Creatinine 1.00 0.8 - 1.5 MG/DL    Est, Glom Filt Rate >60 >60 ml/min/1.73m2    Calcium 9.7 8.3 - 10.4 MG/DL    Total Bilirubin 0.4 0.2 - 1.1 MG/DL    ALT 28 12 - 65 U/L    AST 24 15 - 37 U/L    Alk Phosphatase 62 50 - 136 U/L    Total Protein 8.0 6.3 - 8.2 g/dL    Albumin 4.0 3.2 - 4.6 g/dL    Globulin 4.0 2.8 - 4.5 g/dL    Albumin/Globulin Ratio 1.0 0.4 - 1.6     Magnesium    Collection Time: 12/29/22 11:04 PM   Result Value Ref Range    Magnesium 2.4 1.8 - 2.4 mg/dL   C-Reactive Protein    Collection Time: 12/29/22 11:04 PM   Result Value Ref Range    CRP 1.7 (H) 0.0 - 0.9 mg/dL   Sedimentation Rate    Collection Time: 12/29/22 11:04 PM   Result Value Ref Range    Sed Rate, Automated 39 (H) 15 mm/hr   Culture, Blood 1    Collection Time: 12/29/22 11:04 PM    Specimen: Blood   Result Value Ref Range    Special Requests RIGHT  HAND        Culture PENDING    Lactic Acid    Collection Time: 12/29/22 11:04 PM   Result Value Ref Range    Lactic Acid, Plasma 1.6 0.4 - 2.0 MMOL/L   Procalcitonin    Collection Time: 12/29/22 11:04 PM   Result Value Ref Range    Procalcitonin <0.05 0.00 - 0.49 ng/mL   Lactic Acid    Collection Time: 12/30/22  4:10 AM   Result Value Ref Range    Lactic Acid, Plasma 1.2 0.4 - 2.0 MMOL/L       Signed:  Saddie Cowden, MD

## 2022-12-30 NOTE — ED NOTES
BLE wounds cleansed and dressed per wound care orders. Patient tolerated well.       Hassan Boeck, RN  12/30/22 0598

## 2022-12-30 NOTE — ASSESSMENT & PLAN NOTE
- Small possible SDH on CT  - Did fall on cinder block  - Has small skin lac on forehead as well  - Monitor in ICU per protocol  - Repeat head CT  - No anticoagulation  - Control BP  - Monitor for any neurologic change  - Have placed consult to Neurosurgery, I would not expect for this to be surgical at all

## 2022-12-30 NOTE — ED PROVIDER NOTES
Emergency Department Provider Note                   PCP:                Dwain Jain MD               Age: 69 y.o. Sex: male       ICD-10-CM    1. Vasculitis ([facility]  I77.6           DISPOSITION Admitted 12/30/2022 03:14:23 AM       MDM  Number of Diagnoses or Management Options  Vasculitis ([facility]  Diagnosis management comments: Pt with skin changes concerning for vasculitis. Discussed his case with Dr. Maryann Antonio. She recommended I hold steroids for now. She would like to see the patient first.  There are no signs of infection in his lab work and I do not think his exam is consistent with cellulitis. I reviewed the head CT prior to admit but did not wait for official read.        Amount and/or Complexity of Data Reviewed  Clinical lab tests: ordered and reviewed  Tests in the radiology section of CPT®: ordered and reviewed  Decide to obtain previous medical records or to obtain history from someone other than the patient: yes (son)  Review and summarize past medical records: yes  Discuss the patient with other providers: yes  Independent visualization of images, tracings, or specimens: yes    Risk of Complications, Morbidity, and/or Mortality  Presenting problems: high  Diagnostic procedures: minimal  Management options: high    Patient Progress  Patient progress: improved    Orders Placed This Encounter   Procedures    Culture, Blood 1    Culture, Blood 1    XR CHEST (2 VW)    CT HEAD WO CONTRAST    CT HEAD WO CONTRAST    CBC with Auto Differential    CMP    Magnesium    C-Reactive Protein    Sedimentation Rate    Lactic Acid    Lactic Acid    Procalcitonin    Comprehensive Metabolic Panel w/ Reflex to MG    CBC with Auto Differential    ANCA Panel    KIANA Comprehensive Panel    C4 Complement    Diet NPO    Vital signs per unit routine    Admission/Observation order previously placed    Up with assistance    Place INT pneumatic compression device    Full code    Inpatient consult to Neurosurgery    Inpatient consult to Physical Therapy    Inpatient consult to Occupational Therapy    Initiate Oxygen Therapy Protocol    EKG 12 Lead    ADMIT TO INPATIENT        Medications   sodium chloride flush 0.9 % injection 5-40 mL (has no administration in time range)   sodium chloride flush 0.9 % injection 5-40 mL (has no administration in time range)   0.9 % sodium chloride infusion (has no administration in time range)   ondansetron (ZOFRAN-ODT) disintegrating tablet 4 mg (has no administration in time range)     Or   ondansetron (ZOFRAN) injection 4 mg (has no administration in time range)   polyethylene glycol (GLYCOLAX) packet 17 g (has no administration in time range)   acetaminophen (TYLENOL) tablet 650 mg (has no administration in time range)     Or   acetaminophen (TYLENOL) suppository 650 mg (has no administration in time range)   hydrALAZINE (APRESOLINE) injection 10 mg (has no administration in time range)   clindamycin (CLEOCIN) 600 mg in dextrose 5 % 50 mL IVPB (600 mg IntraVENous New Bag 12/30/22 0737)   lisinopril (PRINIVIL;ZESTRIL) tablet 10 mg (has no administration in time range)       New Prescriptions    No medications on file        Cheyenne Portillo is a 69 y.o. male who presents to the Emergency Department with chief complaint of    Chief Complaint   Patient presents with    Leg Pain    Fall      Present for lower extremity swelling, weeping, redness, drainage for about 1 month. He states it is painful and itchy. Not getting better with lasix. Pt got lasix from source that he does not want to tell me about. Takes no medications regularly. Stopped taking all meds 2-3 years ago. Has not seen a provider in 2-3 years. Conchita Severino and hit head while raking leaves, no LOC at 3 pm, pos HA, pos nausea, no numbness or tingling. No new SHOB. All other systems reviewed and are negative unless otherwise stated in the history of present illness section.     Review of Systems   Constitutional:  Negative for chills and fever.   Respiratory:  Negative for shortness of breath and wheezing. Cardiovascular:  Positive for leg swelling. Negative for chest pain. Gastrointestinal:  Negative for abdominal pain, nausea and vomiting. Skin:  Positive for rash and wound. Neurological:  Negative for syncope. Psychiatric/Behavioral:  Negative for agitation and behavioral problems. All other systems reviewed and are negative. Past Medical History:   Diagnosis Date    CHF (congestive heart failure) ([facility] [address]         History reviewed. No pertinent surgical history. History reviewed. No pertinent family history. Social History     Socioeconomic History    Marital status:      Spouse name: None    Number of children: None    Years of education: None    Highest education level: None   Tobacco Use    Smoking status: Never    Smokeless tobacco: Never   Vaping Use    Vaping Use: Never used   Substance and Sexual Activity    Alcohol use: Not Currently    Drug use: Never    Sexual activity: Not Currently        Allergies: Patient has no known allergies. Previous Medications    No medications on file        Vitals signs and nursing note reviewed. Patient Vitals for the past 4 hrs:   Temp Pulse Resp BP SpO2   12/30/22 0737 98 °F (36.7 °C) 75 19 (!) 152/90 97 %   12/30/22 0700 -- 87 22 (!) 149/78 97 %            Physical Exam  Vitals and nursing note reviewed. Constitutional:       General: He is not in acute distress. Appearance: Normal appearance. He is well-developed. He is not ill-appearing. HENT:      Head: Normocephalic. Comments: Abrasion to forehead     Mouth/Throat:      Mouth: Mucous membranes are moist.      Pharynx: Oropharynx is clear. Eyes:      General: No scleral icterus. Extraocular Movements: Extraocular movements intact. Cardiovascular:      Rate and Rhythm: Normal rate and regular rhythm. Pulmonary:      Effort: Pulmonary effort is normal.      Breath sounds: Normal breath sounds. Abdominal:      Palpations: Abdomen is soft. Tenderness: There is no abdominal tenderness. Musculoskeletal:         General: Normal range of motion. Cervical back: Normal range of motion and neck supple. Right lower leg: Edema (1+) present. Left lower leg: Edema present. Comments: Multiple ulcerations on shins, no bullae, very erythematous/blanching/bluish, palpable pulses. Skin:     General: Skin is warm and dry. Coloration: Skin is not jaundiced. Neurological:      General: No focal deficit present. Mental Status: He is alert. Cranial Nerves: No cranial nerve deficit. Psychiatric:         Mood and Affect: Mood normal.         Behavior: Behavior normal.        Procedures    ED EKG Interpretation  EKG was interpreted in the absence of a cardiologist.    Rate: 85  EKG Interpretation: EKG Interpretation: sinus rhythm  ST Segments: Nonspecific ST segments - NO STEMI    Results for orders placed or performed during the hospital encounter of 12/30/22   Culture, Blood 1    Specimen: Blood   Result Value Ref Range    Special Requests RIGHT  HAND        Culture NO GROWTH AFTER 7 HOURS     XR CHEST (2 VW)    Narrative    EXAM: XR CHEST (2 VW)    HISTORY: CHF. TECHNIQUE: Frontal and lateral chest.    COMPARISON: 9/5/2017     FINDINGS:   The cardiac silhouette, mediastinum, and pulmonary vasculature are within normal  limits. There is no consolidation, pleural effusion, or pneumothorax. There is a 1.1 cm nodule projecting at the right mid to lower chest. This could  represent a nipple shadow. No significant osseous abnormalities are observed. Impression    No evidence of an acute intrathoracic process. There is a 1.1 cm nodule projecting at the right mid to lower chest. This could  represent a nipple shadow. CT HEAD WO CONTRAST    Narrative    EXAM: CT HEAD WO CONTRAST    HISTORY:  Reason for exam:->head injury.       TECHNIQUE: Axial images of the brain were performed without the administration  of intravenous contrast. Images were obtained axial plane and coronal  reformatted images were submitted. Dose reduction technique used: Automated exposure control/Adjustment of the mA  and/or kV according to patient size/Use of iterative reconstruction technique. COMPARISON: 9/7/2017    FINDINGS:   There is no evidence of acute intracranial hemorrhage, midline shift, or mass  effect. No intra or extra-axial fluid collections are observed. There are mild chronic appearing microangiopathic changes within the  periventricular white matter. No evidence of acute confluent territorial  infarction. Ventricles and basal cisterns are patent and symmetric. There is mild  generalized volume loss. Visualized paranasal sinuses and mastoid air cells are without significant  fluid. Question small central frontal subdural hematoma with no underlying fracture. Impression    1. No CT evidence of acute intracranial process. 2. Question small central frontal subdural hematoma with no underlying fracture.      CBC with Auto Differential   Result Value Ref Range    WBC 9.5 4.3 - 11.1 K/uL    RBC 5.46 4.23 - 5.6 M/uL    Hemoglobin 16.4 13.6 - 17.2 g/dL    Hematocrit 48.1 41.1 - 50.3 %    MCV 88.1 82 - 102 FL    MCH 30.0 26.1 - 32.9 PG    MCHC 34.1 31.4 - 35.0 g/dL    RDW 12.7 11.9 - 14.6 %    Platelets 674 512 - 056 K/uL    MPV 10.3 9.4 - 12.3 FL    nRBC 0.00 0.0 - 0.2 K/uL    Differential Type AUTOMATED      Seg Neutrophils 64 43 - 78 %    Lymphocytes 25 13 - 44 %    Monocytes 9 4.0 - 12.0 %    Eosinophils % 2 0.5 - 7.8 %    Basophils 0 0.0 - 2.0 %    Immature Granulocytes 0 0.0 - 5.0 %    Segs Absolute 6.0 1.7 - 8.2 K/UL    Absolute Lymph # 2.4 0.5 - 4.6 K/UL    Absolute Mono # 0.9 0.1 - 1.3 K/UL    Absolute Eos # 0.2 0.0 - 0.8 K/UL    Basophils Absolute 0.0 0.0 - 0.2 K/UL    Absolute Immature Granulocyte 0.0 0.0 - 0.5 K/UL   CMP   Result Value Ref Range Sodium 136 133 - 143 mmol/L    Potassium 4.1 3.5 - 5.1 mmol/L    Chloride 106 101 - 110 mmol/L    CO2 26 21 - 32 mmol/L    Anion Gap 4 2 - 11 mmol/L    Glucose 189 (H) 65 - 100 mg/dL    BUN 18 8 - 23 MG/DL    Creatinine 1.00 0.8 - 1.5 MG/DL    Est, Glom Filt Rate >60 >60 ml/min/1.73m2    Calcium 9.7 8.3 - 10.4 MG/DL    Total Bilirubin 0.4 0.2 - 1.1 MG/DL    ALT 28 12 - 65 U/L    AST 24 15 - 37 U/L    Alk Phosphatase 62 50 - 136 U/L    Total Protein 8.0 6.3 - 8.2 g/dL    Albumin 4.0 3.2 - 4.6 g/dL    Globulin 4.0 2.8 - 4.5 g/dL    Albumin/Globulin Ratio 1.0 0.4 - 1.6     Magnesium   Result Value Ref Range    Magnesium 2.4 1.8 - 2.4 mg/dL   C-Reactive Protein   Result Value Ref Range    CRP 1.7 (H) 0.0 - 0.9 mg/dL   Sedimentation Rate   Result Value Ref Range    Sed Rate, Automated 39 (H) 15 mm/hr   Lactic Acid   Result Value Ref Range    Lactic Acid, Plasma 1.6 0.4 - 2.0 MMOL/L   Lactic Acid   Result Value Ref Range    Lactic Acid, Plasma 1.2 0.4 - 2.0 MMOL/L   Procalcitonin   Result Value Ref Range    Procalcitonin <0.05 0.00 - 0.49 ng/mL   EKG 12 Lead   Result Value Ref Range    Ventricular Rate 85 BPM    Atrial Rate 85 BPM    P-R Interval 144 ms    QRS Duration 80 ms    Q-T Interval 358 ms    QTc Calculation (Bazett) 426 ms    P Axis 32 degrees    R Axis 33 degrees    T Axis 21 degrees    Diagnosis Normal sinus rhythm         CT HEAD WO CONTRAST   Final Result   1. No CT evidence of acute intracranial process. 2. Question small central frontal subdural hematoma with no underlying fracture. XR CHEST (2 VW)   Final Result   No evidence of an acute intrathoracic process. There is a 1.1 cm nodule projecting at the right mid to lower chest. This could   represent a nipple shadow. CT HEAD WO CONTRAST    (Results Pending)                         Voice dictation software was used during the making of this note.   This software is not perfect and grammatical and other typographical errors may be present. This note has not been completely proofread for errors.      Shae Beltrán MD  12/30/22 9622

## 2022-12-31 VITALS
RESPIRATION RATE: 19 BRPM | DIASTOLIC BLOOD PRESSURE: 73 MMHG | WEIGHT: 252.87 LBS | HEIGHT: 72 IN | HEART RATE: 72 BPM | TEMPERATURE: 98.2 F | SYSTOLIC BLOOD PRESSURE: 148 MMHG | OXYGEN SATURATION: 97 % | BODY MASS INDEX: 34.25 KG/M2

## 2022-12-31 LAB
ALBUMIN SERPL-MCNC: 3.3 G/DL (ref 3.2–4.6)
ALBUMIN/GLOB SERPL: 0.9 {RATIO} (ref 0.4–1.6)
ALP SERPL-CCNC: 55 U/L (ref 50–136)
ALT SERPL-CCNC: 20 U/L (ref 12–65)
ANION GAP SERPL CALC-SCNC: 7 MMOL/L (ref 2–11)
AST SERPL-CCNC: 13 U/L (ref 15–37)
BASOPHILS # BLD: 0 K/UL (ref 0–0.2)
BASOPHILS NFR BLD: 0 % (ref 0–2)
BILIRUB SERPL-MCNC: 0.9 MG/DL (ref 0.2–1.1)
BUN SERPL-MCNC: 13 MG/DL (ref 8–23)
CALCIUM SERPL-MCNC: 9.1 MG/DL (ref 8.3–10.4)
CHLORIDE SERPL-SCNC: 108 MMOL/L (ref 101–110)
CO2 SERPL-SCNC: 24 MMOL/L (ref 21–32)
CREAT SERPL-MCNC: 0.8 MG/DL (ref 0.8–1.5)
DIFFERENTIAL METHOD BLD: NORMAL
EOSINOPHIL # BLD: 0.2 K/UL (ref 0–0.8)
EOSINOPHIL NFR BLD: 3 % (ref 0.5–7.8)
ERYTHROCYTE [DISTWIDTH] IN BLOOD BY AUTOMATED COUNT: 13.1 % (ref 11.9–14.6)
GLOBULIN SER CALC-MCNC: 3.6 G/DL (ref 2.8–4.5)
GLUCOSE SERPL-MCNC: 177 MG/DL (ref 65–100)
HCT VFR BLD AUTO: 44.3 % (ref 41.1–50.3)
HGB BLD-MCNC: 15 G/DL (ref 13.6–17.2)
IMM GRANULOCYTES # BLD AUTO: 0 K/UL (ref 0–0.5)
IMM GRANULOCYTES NFR BLD AUTO: 0 % (ref 0–5)
LYMPHOCYTES # BLD: 2.5 K/UL (ref 0.5–4.6)
LYMPHOCYTES NFR BLD: 34 % (ref 13–44)
MCH RBC QN AUTO: 29.8 PG (ref 26.1–32.9)
MCHC RBC AUTO-ENTMCNC: 33.9 G/DL (ref 31.4–35)
MCV RBC AUTO: 88.1 FL (ref 82–102)
MONOCYTES # BLD: 0.6 K/UL (ref 0.1–1.3)
MONOCYTES NFR BLD: 9 % (ref 4–12)
NEUTS SEG # BLD: 3.9 K/UL (ref 1.7–8.2)
NEUTS SEG NFR BLD: 54 % (ref 43–78)
NRBC # BLD: 0 K/UL (ref 0–0.2)
PLATELET # BLD AUTO: 188 K/UL (ref 150–450)
PMV BLD AUTO: 11.2 FL (ref 9.4–12.3)
POTASSIUM SERPL-SCNC: 4.2 MMOL/L (ref 3.5–5.1)
PROT SERPL-MCNC: 6.9 G/DL (ref 6.3–8.2)
RBC # BLD AUTO: 5.03 M/UL (ref 4.23–5.6)
SODIUM SERPL-SCNC: 139 MMOL/L (ref 133–143)
WBC # BLD AUTO: 7.4 K/UL (ref 4.3–11.1)

## 2022-12-31 PROCEDURE — 36415 COLL VENOUS BLD VENIPUNCTURE: CPT

## 2022-12-31 PROCEDURE — 80053 COMPREHEN METABOLIC PANEL: CPT

## 2022-12-31 PROCEDURE — 6370000000 HC RX 637 (ALT 250 FOR IP)

## 2022-12-31 PROCEDURE — 2580000003 HC RX 258: Performed by: FAMILY MEDICINE

## 2022-12-31 PROCEDURE — 85025 COMPLETE CBC W/AUTO DIFF WBC: CPT

## 2022-12-31 PROCEDURE — 6360000002 HC RX W HCPCS: Performed by: STUDENT IN AN ORGANIZED HEALTH CARE EDUCATION/TRAINING PROGRAM

## 2022-12-31 PROCEDURE — 6370000000 HC RX 637 (ALT 250 FOR IP): Performed by: STUDENT IN AN ORGANIZED HEALTH CARE EDUCATION/TRAINING PROGRAM

## 2022-12-31 RX ORDER — DOXYCYCLINE HYCLATE 100 MG/1
100 CAPSULE ORAL EVERY 12 HOURS SCHEDULED
Qty: 18 CAPSULE | Refills: 0 | Status: SHIPPED | OUTPATIENT
Start: 2022-12-31 | End: 2023-01-09

## 2022-12-31 RX ORDER — LISINOPRIL 10 MG/1
20 TABLET ORAL DAILY
Qty: 30 TABLET | Refills: 0 | Status: SHIPPED | OUTPATIENT
Start: 2023-01-01

## 2022-12-31 RX ORDER — HYDROCODONE BITARTRATE AND ACETAMINOPHEN 5; 325 MG/1; MG/1
1 TABLET ORAL EVERY 6 HOURS PRN
Qty: 12 TABLET | Refills: 0 | Status: SHIPPED | OUTPATIENT
Start: 2022-12-31 | End: 2023-01-03

## 2022-12-31 RX ORDER — HYDRALAZINE HYDROCHLORIDE 25 MG/1
25 TABLET, FILM COATED ORAL EVERY 8 HOURS SCHEDULED
Qty: 90 TABLET | Refills: 0 | Status: SHIPPED | OUTPATIENT
Start: 2022-12-31

## 2022-12-31 RX ORDER — CEPHALEXIN 500 MG/1
500 CAPSULE ORAL 4 TIMES DAILY
Qty: 36 CAPSULE | Refills: 0 | Status: SHIPPED | OUTPATIENT
Start: 2022-12-31 | End: 2023-01-09

## 2022-12-31 RX ORDER — AMLODIPINE BESYLATE 10 MG/1
10 TABLET ORAL DAILY
Qty: 30 TABLET | Refills: 0 | Status: SHIPPED | OUTPATIENT
Start: 2023-01-01

## 2022-12-31 RX ADMIN — HYDRALAZINE HYDROCHLORIDE 25 MG: 25 TABLET, FILM COATED ORAL at 05:37

## 2022-12-31 RX ADMIN — ENOXAPARIN SODIUM 30 MG: 100 INJECTION SUBCUTANEOUS at 09:20

## 2022-12-31 RX ADMIN — HYDROCODONE BITARTRATE AND ACETAMINOPHEN 1 TABLET: 5; 325 TABLET ORAL at 05:37

## 2022-12-31 RX ADMIN — SODIUM CHLORIDE, PRESERVATIVE FREE 5 ML: 5 INJECTION INTRAVENOUS at 10:21

## 2022-12-31 RX ADMIN — AMLODIPINE BESYLATE 10 MG: 10 TABLET ORAL at 10:20

## 2022-12-31 RX ADMIN — DOXYCYCLINE HYCLATE 100 MG: 100 CAPSULE ORAL at 09:20

## 2022-12-31 RX ADMIN — CEPHALEXIN 500 MG: 250 CAPSULE ORAL at 12:30

## 2022-12-31 RX ADMIN — HYDROCODONE BITARTRATE AND ACETAMINOPHEN 1 TABLET: 5; 325 TABLET ORAL at 10:28

## 2022-12-31 RX ADMIN — LISINOPRIL 10 MG: 5 TABLET ORAL at 10:20

## 2022-12-31 RX ADMIN — CEPHALEXIN 500 MG: 250 CAPSULE ORAL at 05:37

## 2022-12-31 ASSESSMENT — PAIN DESCRIPTION - LOCATION
LOCATION: LEG
LOCATION: LEG

## 2022-12-31 ASSESSMENT — PAIN - FUNCTIONAL ASSESSMENT
PAIN_FUNCTIONAL_ASSESSMENT: ACTIVITIES ARE NOT PREVENTED
PAIN_FUNCTIONAL_ASSESSMENT: PREVENTS OR INTERFERES SOME ACTIVE ACTIVITIES AND ADLS

## 2022-12-31 ASSESSMENT — PAIN DESCRIPTION - ONSET: ONSET: GRADUAL

## 2022-12-31 ASSESSMENT — PAIN DESCRIPTION - PAIN TYPE
TYPE: ACUTE PAIN
TYPE: CHRONIC PAIN

## 2022-12-31 ASSESSMENT — PAIN DESCRIPTION - FREQUENCY
FREQUENCY: INTERMITTENT
FREQUENCY: INTERMITTENT

## 2022-12-31 ASSESSMENT — PAIN SCALES - GENERAL
PAINLEVEL_OUTOF10: 7
PAINLEVEL_OUTOF10: 6
PAINLEVEL_OUTOF10: 0
PAINLEVEL_OUTOF10: 0

## 2022-12-31 ASSESSMENT — PAIN DESCRIPTION - ORIENTATION
ORIENTATION: RIGHT;LEFT
ORIENTATION: LEFT;RIGHT

## 2022-12-31 ASSESSMENT — PAIN DESCRIPTION - DESCRIPTORS
DESCRIPTORS: ACHING
DESCRIPTORS: ACHING

## 2022-12-31 NOTE — PLAN OF CARE
Problem: Discharge Planning  Goal: Discharge to home or other facility with appropriate resources  Outcome: Progressing  Flowsheets (Taken 12/30/2022 0549 by Lilibeth Mccoy RN)  Discharge to home or other facility with appropriate resources:   Identify barriers to discharge with patient and caregiver   Identify discharge learning needs (meds, wound care, etc)   Refer to discharge planning if patient needs post-hospital services based on physician order or complex needs related to functional status, cognitive ability or social support system     Problem: Pain  Goal: Verbalizes/displays adequate comfort level or baseline comfort level  Outcome: Progressing     Problem: Safety - Adult  Goal: Free from fall injury  Outcome: Progressing     Problem: ABCDS Injury Assessment  Goal: Absence of physical injury  Outcome: Progressing

## 2022-12-31 NOTE — CONSULTS
History and Physical/Surgical Consult   Dean Baez    Admit date: 2022    MRN: [id number]     : 1955     Age: 69 y.o.          2022 9:41 AM    Subjective/HPI:   This patient is a 69 y.o. seen and evaluated at the request of Dr. Alba Siu. Dean Baez is a 69 y.o. male who presents with a roughly 3 month history of bilateral LE swelling with associated redness and now shallow ulcerations over the shins that weep clear fluid. He denies fevers, chills, sweats, purulence, odor from wounds. He denies claudication, rest pain, tissue loss. He does not endorse leg swelling before this. Review of Systems  Pertinent items are noted in HPI. Past Medical History:   Diagnosis Date    CHF (congestive heart failure) ([facility] [address]       History reviewed. No pertinent surgical history. No Known Allergies   Social History     Tobacco Use    Smoking status: Never    Smokeless tobacco: Never   Substance Use Topics    Alcohol use: Not Currently      Social History     Social History Narrative    Not on file     History reviewed. No pertinent family history.    [unfilled]  Current Facility-Administered Medications   Medication Dose Route Frequency    sodium chloride flush 0.9 % injection 5-40 mL  5-40 mL IntraVENous 2 times per day    sodium chloride flush 0.9 % injection 5-40 mL  5-40 mL IntraVENous PRN    0.9 % sodium chloride infusion   IntraVENous PRN    ondansetron (ZOFRAN-ODT) disintegrating tablet 4 mg  4 mg Oral Q8H PRN    Or    ondansetron (ZOFRAN) injection 4 mg  4 mg IntraVENous Q6H PRN    polyethylene glycol (GLYCOLAX) packet 17 g  17 g Oral Daily PRN    acetaminophen (TYLENOL) tablet 650 mg  650 mg Oral Q6H PRN    Or    acetaminophen (TYLENOL) suppository 650 mg  650 mg Rectal Q6H PRN    lisinopril (PRINIVIL;ZESTRIL) tablet 10 mg  10 mg Oral Daily    HYDROcodone-acetaminophen (NORCO) 5-325 MG per tablet 1 tablet  1 tablet Oral Q6H PRN    amLODIPine (NORVASC) tablet 10 mg  10 mg Oral Daily hydrALAZINE (APRESOLINE) tablet 25 mg  25 mg Oral 3 times per day    cephALEXin (KEFLEX) capsule 500 mg  500 mg Oral 4 times per day    doxycycline hyclate (VIBRAMYCIN) capsule 100 mg  100 mg Oral 2 times per day    enoxaparin Sodium (LOVENOX) injection 30 mg  30 mg SubCUTAneous BID    hydrALAZINE (APRESOLINE) injection 10 mg  10 mg IntraVENous Q4H PRN     Objective:     Vitals:    12/30/22 2344 12/31/22 0318 12/31/22 0537 12/31/22 0753   BP: (!) 164/79 139/78 139/78 137/78   Pulse: 81 82  90   Resp: 18 17  19   Temp: 97.2 °F (36.2 °C) 97.7 °F (36.5 °C)  97.3 °F (36.3 °C)   TempSrc: Oral Oral  Oral   SpO2: 97% 98%  96%   Weight:       Height:         12/31 0701 - 12/31 1900  In: 009 [P.O.:595]  Out: -   12/29 1901 - 12/31 0700  In: 500 [P.O.:500]  Out: 250 [Urine:250]  Physical Exam:   Gen- the patient is well developed and in no acute distress  HEENT- PERRL, EOMI, no scleral icterus       nose without alar flaring or epistaxis                  oral muscosa moist without cyanosis  Neck- no JVD or retractions  Lungs- resp even/unlab   Heart- RRR   Abd- SNT  Ext- warm without cyanosis. There is positive lower leg edema. Palpable pulses, changes of chronic venous stasis dermatitis with shallow ulcerations without surrounding erythema or purulence. Skin- no jaundice or rashes  Neuro- alert and oriented x 3. No gross sensorimotor deficits are present. Data Review   Recent Labs     12/29/22 2304 12/31/22  0543   WBC 9.5 7.4   HGB 16.4 15.0   HCT 48.1 44.3    188     Recent Labs     12/29/22 2304 12/31/22  0543    139   K 4.1 4.2    108   CO2 26 24   BUN 18 13   MG 2.4  --        Assessment:   69 y.o. male with bilateral chronic venous stasis dermatitis now with active ulcerations. Has not used compression yet or been evaluated for reflux. Plan:     Wound care evaluation for modified profore to bilateral LE (prior HF patient). ID recommends 10 day course of Keflex.   Follow-up in office in 1 month for follow-up and for venous reflux US to assess if any superficial venous reflux that can be treated.  Vascular surgery will sign off, please call with any questions or change in exam.      50 minutes of time was spent on this encounter with greater than 50% of time in direct patient contact including patient education, counseling, review of medical history and imaging, and medical decision making.       Marlon Ruiz MD  12/31/22  9:44 AM      Marlon Ruiz MD

## 2022-12-31 NOTE — DISCHARGE SUMMARY
Hospitalist Discharge Summary   Admit Date:  2022  1:27 AM   DC Note date: 2022  Name:  Antolin Jolly   Age:  69 y.o. Sex:  male  :  1955   MRN:  [id number]   Room:  [facility]  PCP:  Kody Fernandez MD    Presenting Complaint: Leg Pain and Fall     Initial Admission Diagnosis: Vasculitis ([address] [I77.6]  Subdural hematoma [S06. 5XAA]  Ulcers of both lower legs ([address] [L97.919, L97.929]     Problem List for this Hospitalization (present on admission):    Principal Problem:    Ulcers of both lower legs ([address]  Active Problems:    Subdural hematoma    Controlled type 2 diabetes mellitus with diabetic neuropathy, without long-term current use of insulin (HCC)    HTN (hypertension)    CAD (coronary artery disease)  Resolved Problems:    * No resolved hospital problems. *      Hospital Course:  Antolin Jolly is a 69 y.o. male with medical history of HTN, DM2, CAD (not taking any meds) who presented to ED with BLE wounds and swelling. Symptoms started about 1 month ago. Reports wounds do not seem to be healing. Denies specific injury to legs. Denies fevers/chills, nausea/vomiting. Upon ER evaluation, labs overall are pretty unremarkable. He does have elevated CRP and sed rate. ER has concern that these wounds are reflective of an undiagnosed vasculitis. Hospitalist asked to admit. Additionally, patient reports falling and hitting his head on a cinder block yesterday. He denies any LOC. CT was obtained in the ER and shows:  1. No CT evidence of acute intracranial process. 2. Question small central frontal subdural hematoma with no underlying fracture. Due to possible SDH, will admit to ICU for close monitoring per protocol. X-ray of bilateral legs  was negative for fracture. Venous duplex of left lower extremity negative for DVT. He was treated empirically with clindamycin. Blood cultures ID was consulted.  Clindamycin is a POOR choice for cellulitis as local MRSA/MSSA and Group B strep isolates are up to 20% resistant making this a poor empiric choice. Antibiotic changed to oral ABX Keflex for strep coverage and doxy for staph coverage and treat x 10 days. Needs outpatient wound care Follow up. ID signed off. Vascular was consulted and outpatient follow-up in 1 month. Neurosurgery was consulted and no neurosurgical intervention. Repeat CAT scan showed no subdural hemorrhage. PT/OT recommended HHT. Patient is hemodynamically stable for discharge. Disposition: Home  Diet: ADULT DIET; Regular; Low Fat/Low Chol/High Fiber/DOLORES  Code Status: Full Code    Follow Ups:      Time spent in patient discharge and coordination 32 minutes. Follow up labs/diagnostics (ultimately defer to outpatient provider): Follow-up with PCP in 3 to 5 days  Follow-up with vascular in 1 month  Follow-up with wound care 1 week    Plan was discussed with patient and grandson. All questions answered. Patient was stable at time of discharge. Instructions given to call a physician or return if any concerns. There are no discharge medications for this patient. Procedures done this admission:  * No surgery found *    Consults this admission:  IP CONSULT TO NEUROSURGERY  IP CONSULT TO PHYSICAL THERAPY  IP CONSULT TO OCCUPATIONAL THERAPY  IP CONSULT TO INFECTIOUS DISEASES  IP CONSULT TO VASCULAR SURGERY  IP WOUND CARE NURSE CONSULT TO EVAL    Echocardiogram results:  No results found for this or any previous visit. Diagnostic Imaging/Tests:   XR CHEST (2 VW)    Result Date: 12/30/2022  No evidence of an acute intrathoracic process. There is a 1.1 cm nodule projecting at the right mid to lower chest. This could represent a nipple shadow. XR TIBIA FIBULA LEFT (2 VIEWS)    Result Date: 12/30/2022  FINDINGS / IMPRESSION:   No fracture, subluxation or dislocation. No periostitis. No erosions. Possible osteopenia. RIGHT TIB-FIB 2 view(s).  INDICATION: Pain and soreness with difficulty walking TECHNIQUE: AP and lateral views. COMPARISON: None. FINDINGS / IMPRESSION:   No fracture, subluxation or dislocation. No periostitis. No erosions. Possible osteopenia. Arterial calcifications. XR TIBIA FIBULA RIGHT (2 VIEWS)    Result Date: 12/30/2022  FINDINGS / IMPRESSION:   No fracture, subluxation or dislocation. No periostitis. No erosions. Possible osteopenia. RIGHT TIB-FIB 2 view(s). INDICATION: Pain and soreness with difficulty walking TECHNIQUE: AP and lateral views. COMPARISON: None. FINDINGS / IMPRESSION:   No fracture, subluxation or dislocation. No periostitis. No erosions. Possible osteopenia. Arterial calcifications. CT HEAD WO CONTRAST    Result Date: 12/30/2022  Negative for acute intracranial abnormality. No subdural hemorrhage. CT HEAD WO CONTRAST    Result Date: 12/30/2022  1. No CT evidence of acute intracranial process. 2. Question small central frontal subdural hematoma with no underlying fracture. Vascular duplex lower extremity venous bilateral    Result Date: 12/30/2022  Negative for sonographic evidence of deep venous thrombosis right lower extremity. LEFT LOWER EXTREMITY VENOUS DUPLEX ULTRASOUND. INDICATION: Left lower extremity pain and swelling for greater than a month. TECHNIQUE: Direct skin-contact high resolution grayscale images, color-flow Doppler and duplex waveform analysis. FINDINGS: There is compressibility, color-flow assignment and augmentation of the venous waveform with calf compression in the common femoral, superficial femoral and popliteal veins. Upper calf veins are unremarkable. IMPRESSION: Negative for sonographic evidence of deep venous thrombosis left lower extremity.         Labs: Results:       BMP, Mg, Phos Recent Labs     12/29/22  2304 12/31/22  0543    139   K 4.1 4.2    108   CO2 26 24   ANIONGAP 4 7   BUN 18 13   CREATININE 1.00 0.80   LABGLOM >60 >60   CALCIUM 9.7 9.1   GLUCOSE 189* 177*   MG 2.4  --       CBC Recent Labs 12/29/22 2304 12/31/22  0543   WBC 9.5 7.4   RBC 5.46 5.03   HGB 16.4 15.0   HCT 48.1 44.3   MCV 88.1 88.1   MCH 30.0 29.8   MCHC 34.1 33.9   RDW 12.7 13.1    188   MPV 10.3 11.2   NRBC 0.00 0.00   SEGS 64 54   LYMPHOPCT 25 34   EOSRELPCT 2 3   MONOPCT 9 9   BASOPCT 0 0   IMMGRAN 0 0   SEGSABS 6.0 3.9   LYMPHSABS 2.4 2.5   EOSABS 0.2 0.2   MONOSABS 0.9 0.6   BASOSABS 0.0 0.0   ABSIMMGRAN 0.0 0.0      LFT Recent Labs     12/29/22 2304 12/31/22  0543   BILITOT 0.4 0.9   ALKPHOS 62 55   AST 24 13*   ALT 28 20   PROT 8.0 6.9   LABALBU 4.0 3.3   GLOB 4.0 3.6      Cardiac  No results found for: NTPROBNP, TROPHS   Coags No results found for: PROTIME, INR, APTT   A1c Lab Results   Component Value Date/Time    LABA1C 9.0 12/29/2022 11:07 PM     12/29/2022 11:07 PM      Lipids No results found for: CHOL, LDLCALC, LABVLDL, HDL, CHOLHDLRATIO, TRIG   Thyroid  No results found for: Galo Second     Most Recent UA No results found for: COLORU, APPEARANCE, SPECGRAV, LABPH, PROTEINU, GLUCOSEU, KETUA, [address] [place] [address], BLOODU, UROBILINOGEN, NITRU, LEUKOCYTESUR, WBCUA, RBCUA, EPITHUA, BACTERIA, LABCAST, MUCUS     Recent Labs     12/30/22  1822 12/29/22 2304   CULTURE NO GROWTH AFTER 11 HOURS NO GROWTH 2 DAYS       All Labs from Last 24 Hrs:  Recent Results (from the past 24 hour(s))   POCT Glucose    Collection Time: 12/30/22  4:29 PM   Result Value Ref Range    POC Glucose 150 (H) 65 - 100 mg/dL    Performed by: Heather    Culture, Blood 1    Collection Time: 12/30/22  6:22 PM    Specimen: Blood   Result Value Ref Range    Special Requests RIGHT  Antecubital        Culture NO GROWTH AFTER 11 HOURS     Comprehensive Metabolic Panel w/ Reflex to MG    Collection Time: 12/31/22  5:43 AM   Result Value Ref Range    Sodium 139 133 - 143 mmol/L    Potassium 4.2 3.5 - 5.1 mmol/L    Chloride 108 101 - 110 mmol/L    CO2 24 21 - 32 mmol/L    Anion Gap 7 2 - 11 mmol/L    Glucose 177 (H) 65 - 100 mg/dL    BUN 13 8 - 23 MG/DL    Creatinine 0.80 0.8 - 1.5 MG/DL    Est, Glom Filt Rate >60 >60 ml/min/1.73m2    Calcium 9.1 8.3 - 10.4 MG/DL    Total Bilirubin 0.9 0.2 - 1.1 MG/DL    ALT 20 12 - 65 U/L    AST 13 (L) 15 - 37 U/L    Alk Phosphatase 55 50 - 136 U/L    Total Protein 6.9 6.3 - 8.2 g/dL    Albumin 3.3 3.2 - 4.6 g/dL    Globulin 3.6 2.8 - 4.5 g/dL    Albumin/Globulin Ratio 0.9 0.4 - 1.6     CBC with Auto Differential    Collection Time: 12/31/22  5:43 AM   Result Value Ref Range    WBC 7.4 4.3 - 11.1 K/uL    RBC 5.03 4.23 - 5.6 M/uL    Hemoglobin 15.0 13.6 - 17.2 g/dL    Hematocrit 44.3 41.1 - 50.3 %    MCV 88.1 82 - 102 FL    MCH 29.8 26.1 - 32.9 PG    MCHC 33.9 31.4 - 35.0 g/dL    RDW 13.1 11.9 - 14.6 %    Platelets 821 315 - 540 K/uL    MPV 11.2 9.4 - 12.3 FL    nRBC 0.00 0.0 - 0.2 K/uL    Differential Type AUTOMATED      Seg Neutrophils 54 43 - 78 %    Lymphocytes 34 13 - 44 %    Monocytes 9 4.0 - 12.0 %    Eosinophils % 3 0.5 - 7.8 %    Basophils 0 0.0 - 2.0 %    Immature Granulocytes 0 0.0 - 5.0 %    Segs Absolute 3.9 1.7 - 8.2 K/UL    Absolute Lymph # 2.5 0.5 - 4.6 K/UL    Absolute Mono # 0.6 0.1 - 1.3 K/UL    Absolute Eos # 0.2 0.0 - 0.8 K/UL    Basophils Absolute 0.0 0.0 - 0.2 K/UL    Absolute Immature Granulocyte 0.0 0.0 - 0.5 K/UL       No Known Allergies  Immunization History   Administered Date(s) Administered    Td vaccine (adult) 05/07/1991       Recent Vital Data:  Patient Vitals for the past 24 hrs:   Temp Pulse Resp BP SpO2   12/31/22 1210 98.2 °F (36.8 °C) 72 19 (!) 148/73 97 %   12/31/22 1028 -- -- 20 -- --   12/31/22 0753 97.3 °F (36.3 °C) 90 19 137/78 96 %   12/31/22 0537 -- -- -- 139/78 --   12/31/22 0318 97.7 °F (36.5 °C) 82 17 139/78 98 %   12/30/22 2344 97.2 °F (36.2 °C) 81 18 (!) 164/79 97 %   12/30/22 2051 -- -- -- 137/71 --   12/30/22 1941 97.9 °F (36.6 °C) 90 19 137/71 98 %   12/30/22 1855 97.6 °F (36.4 °C) 84 22 (!) 163/85 98 %   12/30/22 1800 -- 86 (!) 35 139/73 96 %   12/30/22 1745 -- 76 17 (!) 150/79 94 %   12/30/22 1730 -- 77 26 (!) 147/74 96 %   12/30/22 1715 -- 72 16 -- 97 %   12/30/22 1700 -- 74 16 125/65 94 %   12/30/22 1645 98.6 °F (37 °C) 85 30 139/73 94 %   12/30/22 1630 -- 80 20 (!) 161/75 97 %   12/30/22 1500 -- -- -- 132/71 95 %   12/30/22 1445 -- -- -- 125/72 98 %   12/30/22 1430 -- -- -- (!) 157/79 --   12/30/22 1240 -- (!) 102 29 -- 91 %   12/30/22 1230 -- 85 23 (!) 140/79 97 %       Oxygen Therapy  SpO2: 97 %  Pulse via Oximetry: 87 beats per minute  O2 Device: None (Room air)    Estimated body mass index is 34.29 kg/m² as calculated from the following:    Height as of this encounter: 6' (1.829 m).    Weight as of this encounter: 252 lb 13.9 oz (114.7 kg).    Intake/Output Summary (Last 24 hours) at 12/31/2022 1226  Last data filed at 12/31/2022 0927  Gross per 24 hour   Intake 1095 ml   Output 250 ml   Net 845 ml         Physical Exam:    General:    Well nourished.  No overt distress  Head:  Normocephalic, atraumatic  Eyes:  Sclerae appear normal.  Pupils equally round.    HENT:  Nares appear normal, no drainage.  Moist mucous membranes  Neck:  No restricted ROM.  Trachea midline  CV:   RRR.  No m/r/g.  No JVD  Lungs:   CTAB.  No wheezing, rhonchi, or rales.  Respirations even, unlabored  Abdomen:   Soft, nontender, nondistended.    Extremities: Warm and dry.  No cyanosis or clubbing.  Dressing is present bilaterally on lower legs.  skin:     No rashes.  Normal coloration  Neuro:  CN II-XII grossly intact.  Psych:  Normal mood and affect.    Signed:  Padmaja Guerrero MD    Part of this note may have been written by using a voice dictation software.  The note has been proof read but may still contain some grammatical/other typographical errors.

## 2023-01-01 LAB
BACTERIA SPEC CULT: NORMAL
BACTERIA SPEC CULT: NORMAL
C4 SERPL-MCNC: 27 MG/DL (ref 12–38)
CENTROMERE B AB SER-ACNC: NORMAL AI
CHROMATIN AB SERPL-ACNC: NORMAL AI
DSDNA AB SER-ACNC: NORMAL IU/ML
ENA JO1 AB SER-ACNC: NORMAL AI
ENA RNP AB SER-ACNC: NORMAL AI
ENA SCL70 AB SER-ACNC: NORMAL AI
ENA SM AB SER-ACNC: NORMAL AI
ENA SS-A AB SER-ACNC: NORMAL AI
ENA SS-B AB SER-ACNC: NORMAL AI
Lab: NORMAL
SERVICE CMNT-IMP: NORMAL
SERVICE CMNT-IMP: NORMAL

## 2023-01-03 LAB
BACTERIA SPEC CULT: NORMAL
CENTROMERE B AB SER-ACNC: <0.2 AI (ref 0–0.9)
CHROMATIN AB SERPL-ACNC: <0.2 AI (ref 0–0.9)
DSDNA AB SER-ACNC: 1 IU/ML (ref 0–9)
ENA JO1 AB SER-ACNC: <0.2 AI (ref 0–0.9)
ENA RNP AB SER-ACNC: <0.2 AI (ref 0–0.9)
ENA SCL70 AB SER-ACNC: <0.2 AI (ref 0–0.9)
ENA SM AB SER-ACNC: <0.2 AI (ref 0–0.9)
ENA SS-A AB SER-ACNC: <0.2 AI (ref 0–0.9)
ENA SS-B AB SER-ACNC: <0.2 AI (ref 0–0.9)
Lab: NORMAL
SERVICE CMNT-IMP: NORMAL

## 2023-01-04 LAB
BACTERIA SPEC CULT: NORMAL
C-ANCA TITR SER IF: NORMAL TITER
MYELOPEROXIDASE AB SER IA-ACNC: <0.2 UNITS (ref 0–0.9)
P-ANCA ATYPICAL TITR SER IF: NORMAL TITER
P-ANCA TITR SER IF: NORMAL TITER
PROTEINASE3 AB SER IA-ACNC: <0.2 UNITS (ref 0–0.9)
SERVICE CMNT-IMP: NORMAL

## 2023-01-10 ENCOUNTER — OFFICE VISIT (OUTPATIENT)
Dept: PRIMARY CARE CLINIC | Facility: CLINIC | Age: 68
End: 2023-01-10
Payer: MEDICARE

## 2023-01-10 VITALS
DIASTOLIC BLOOD PRESSURE: 72 MMHG | HEIGHT: 72 IN | BODY MASS INDEX: 33.94 KG/M2 | SYSTOLIC BLOOD PRESSURE: 138 MMHG | WEIGHT: 250.6 LBS | RESPIRATION RATE: 16 BRPM | HEART RATE: 87 BPM | OXYGEN SATURATION: 99 %

## 2023-01-10 DIAGNOSIS — E78.5 DYSLIPIDEMIA: ICD-10-CM

## 2023-01-10 DIAGNOSIS — M54.41 CHRONIC BILATERAL LOW BACK PAIN WITH BILATERAL SCIATICA: ICD-10-CM

## 2023-01-10 DIAGNOSIS — G89.29 CHRONIC BILATERAL LOW BACK PAIN WITH BILATERAL SCIATICA: ICD-10-CM

## 2023-01-10 DIAGNOSIS — Z09 HOSPITAL DISCHARGE FOLLOW-UP: ICD-10-CM

## 2023-01-10 DIAGNOSIS — I10 HYPERTENSION, UNSPECIFIED TYPE: ICD-10-CM

## 2023-01-10 DIAGNOSIS — E11.65 UNCONTROLLED TYPE 2 DIABETES MELLITUS WITH HYPERGLYCEMIA (HCC): ICD-10-CM

## 2023-01-10 DIAGNOSIS — S06.5XAA SUBDURAL HEMATOMA: ICD-10-CM

## 2023-01-10 DIAGNOSIS — L97.929 ULCERS OF BOTH LOWER LEGS (HCC): ICD-10-CM

## 2023-01-10 DIAGNOSIS — E11.40 PAINFUL DIABETIC NEUROPATHY (HCC): ICD-10-CM

## 2023-01-10 DIAGNOSIS — L97.919 ULCERS OF BOTH LOWER LEGS (HCC): ICD-10-CM

## 2023-01-10 DIAGNOSIS — I25.10 CORONARY ARTERY DISEASE, UNSPECIFIED VESSEL OR LESION TYPE, UNSPECIFIED WHETHER ANGINA PRESENT, UNSPECIFIED WHETHER NATIVE OR TRANSPLANTED HEART: ICD-10-CM

## 2023-01-10 DIAGNOSIS — I50.9 CHRONIC CONGESTIVE HEART FAILURE, UNSPECIFIED HEART FAILURE TYPE (HCC): ICD-10-CM

## 2023-01-10 DIAGNOSIS — M54.42 CHRONIC BILATERAL LOW BACK PAIN WITH BILATERAL SCIATICA: ICD-10-CM

## 2023-01-10 DIAGNOSIS — Z76.89 ENCOUNTER TO ESTABLISH CARE WITH NEW DOCTOR: Primary | ICD-10-CM

## 2023-01-10 PROBLEM — E78.00 HYPERCHOLESTEROLEMIA: Status: ACTIVE | Noted: 2019-07-23

## 2023-01-10 PROBLEM — F51.01 PRIMARY INSOMNIA: Status: ACTIVE | Noted: 2023-01-10

## 2023-01-10 PROBLEM — D12.6 ADENOMATOUS POLYP OF COLON: Status: ACTIVE | Noted: 2023-01-10

## 2023-01-10 PROBLEM — K22.89 PRESBYESOPHAGUS: Status: ACTIVE | Noted: 2019-01-08

## 2023-01-10 PROBLEM — G25.9: Status: ACTIVE | Noted: 2017-07-19

## 2023-01-10 PROBLEM — E53.8 FOLATE DEFICIENCY: Status: ACTIVE | Noted: 2018-07-23

## 2023-01-10 PROBLEM — G89.4 CHRONIC PAIN DISORDER: Status: ACTIVE | Noted: 2017-06-30

## 2023-01-10 PROBLEM — G31.84 MILD COGNITIVE IMPAIRMENT: Status: ACTIVE | Noted: 2018-07-23

## 2023-01-10 PROBLEM — F31.9 BIPOLAR DISORDER (HCC): Status: ACTIVE | Noted: 2017-09-21

## 2023-01-10 PROBLEM — M47.816 SPONDYLOSIS OF LUMBAR REGION WITHOUT MYELOPATHY OR RADICULOPATHY: Status: ACTIVE | Noted: 2017-06-30

## 2023-01-10 PROCEDURE — 99204 OFFICE O/P NEW MOD 45 MIN: CPT | Performed by: FAMILY MEDICINE

## 2023-01-10 PROCEDURE — 3075F SYST BP GE 130 - 139MM HG: CPT | Performed by: FAMILY MEDICINE

## 2023-01-10 PROCEDURE — 1123F ACP DISCUSS/DSCN MKR DOCD: CPT | Performed by: FAMILY MEDICINE

## 2023-01-10 PROCEDURE — 3078F DIAST BP <80 MM HG: CPT | Performed by: FAMILY MEDICINE

## 2023-01-10 RX ORDER — AMLODIPINE BESYLATE 10 MG/1
10 TABLET ORAL DAILY
Qty: 30 TABLET | Refills: 5 | Status: SHIPPED | OUTPATIENT
Start: 2023-01-10

## 2023-01-10 RX ORDER — GABAPENTIN 300 MG/1
300 CAPSULE ORAL 3 TIMES DAILY
Qty: 90 CAPSULE | Refills: 5 | Status: SHIPPED | OUTPATIENT
Start: 2023-01-10 | End: 2023-07-09

## 2023-01-10 RX ORDER — GABAPENTIN 300 MG/1
300 CAPSULE ORAL DAILY
COMMUNITY
Start: 2018-08-08 | End: 2023-01-10 | Stop reason: ALTCHOICE

## 2023-01-10 RX ORDER — TRAMADOL HYDROCHLORIDE 50 MG/1
50 TABLET ORAL EVERY 8 HOURS PRN
Qty: 30 TABLET | Refills: 0 | Status: SHIPPED | OUTPATIENT
Start: 2023-01-10 | End: 2023-01-15

## 2023-01-10 RX ORDER — CARVEDILOL 25 MG/1
25 TABLET ORAL 2 TIMES DAILY
Qty: 60 TABLET | Refills: 5 | Status: SHIPPED | OUTPATIENT
Start: 2023-01-10

## 2023-01-10 RX ORDER — LISINOPRIL 20 MG/1
20 TABLET ORAL DAILY
Qty: 30 TABLET | Refills: 5 | Status: SHIPPED | OUTPATIENT
Start: 2023-01-10

## 2023-01-10 RX ORDER — DOXYCYCLINE HYCLATE 100 MG
100 TABLET ORAL EVERY 12 HOURS
Refills: 3 | Status: CANCELLED | OUTPATIENT
Start: 2023-01-10

## 2023-01-10 RX ORDER — HYDRALAZINE HYDROCHLORIDE 25 MG/1
25 TABLET, FILM COATED ORAL EVERY 8 HOURS SCHEDULED
Qty: 90 TABLET | Refills: 0 | Status: CANCELLED | OUTPATIENT
Start: 2023-01-10

## 2023-01-10 RX ORDER — FUROSEMIDE 20 MG/1
20 TABLET ORAL DAILY
Qty: 30 TABLET | Refills: 5 | Status: SHIPPED | OUTPATIENT
Start: 2023-01-10

## 2023-01-10 RX ORDER — DOXYCYCLINE HYCLATE 100 MG
100 TABLET ORAL EVERY 12 HOURS
COMMUNITY
Start: 2022-12-31 | End: 2023-01-10 | Stop reason: ALTCHOICE

## 2023-01-10 RX ORDER — BLOOD-GLUCOSE METER
1 KIT MISCELLANEOUS DAILY
Qty: 1 KIT | Refills: 0 | Status: SHIPPED | OUTPATIENT
Start: 2023-01-10

## 2023-01-10 RX ORDER — LISINOPRIL 10 MG/1
20 TABLET ORAL DAILY
Qty: 30 TABLET | Refills: 0 | Status: CANCELLED | OUTPATIENT
Start: 2023-01-10

## 2023-01-10 RX ORDER — CEPHALEXIN 500 MG/1
500 CAPSULE ORAL 4 TIMES DAILY
COMMUNITY
End: 2023-01-10 | Stop reason: ALTCHOICE

## 2023-01-10 RX ORDER — AMLODIPINE BESYLATE 10 MG/1
10 TABLET ORAL DAILY
COMMUNITY
Start: 2018-08-09 | End: 2023-01-10

## 2023-01-10 RX ORDER — HYDROCODONE BITARTRATE AND ACETAMINOPHEN 5; 325 MG/1; MG/1
1 TABLET ORAL EVERY 6 HOURS PRN
COMMUNITY
End: 2023-01-10 | Stop reason: ALTCHOICE

## 2023-01-10 RX ORDER — CEPHALEXIN 500 MG/1
500 CAPSULE ORAL 4 TIMES DAILY
Refills: 3 | Status: CANCELLED | OUTPATIENT
Start: 2023-01-10

## 2023-01-10 RX ORDER — GLUCOSAMINE HCL/CHONDROITIN SU 500-400 MG
CAPSULE ORAL
Qty: 100 STRIP | Refills: 5 | Status: SHIPPED | OUTPATIENT
Start: 2023-01-10

## 2023-01-10 SDOH — ECONOMIC STABILITY: FOOD INSECURITY: WITHIN THE PAST 12 MONTHS, YOU WORRIED THAT YOUR FOOD WOULD RUN OUT BEFORE YOU GOT MONEY TO BUY MORE.: NEVER TRUE

## 2023-01-10 SDOH — ECONOMIC STABILITY: FOOD INSECURITY: WITHIN THE PAST 12 MONTHS, THE FOOD YOU BOUGHT JUST DIDN'T LAST AND YOU DIDN'T HAVE MONEY TO GET MORE.: NEVER TRUE

## 2023-01-10 ASSESSMENT — ANXIETY QUESTIONNAIRES
6. BECOMING EASILY ANNOYED OR IRRITABLE: 0
3. WORRYING TOO MUCH ABOUT DIFFERENT THINGS: 0
5. BEING SO RESTLESS THAT IT IS HARD TO SIT STILL: 0
IF YOU CHECKED OFF ANY PROBLEMS ON THIS QUESTIONNAIRE, HOW DIFFICULT HAVE THESE PROBLEMS MADE IT FOR YOU TO DO YOUR WORK, TAKE CARE OF THINGS AT HOME, OR GET ALONG WITH OTHER PEOPLE: NOT DIFFICULT AT ALL
7. FEELING AFRAID AS IF SOMETHING AWFUL MIGHT HAPPEN: 0
5. BEING SO RESTLESS THAT IT IS HARD TO SIT STILL: 0
7. FEELING AFRAID AS IF SOMETHING AWFUL MIGHT HAPPEN: 0
2. NOT BEING ABLE TO STOP OR CONTROL WORRYING: 0
3. WORRYING TOO MUCH ABOUT DIFFERENT THINGS: 0
4. TROUBLE RELAXING: 0
6. BECOMING EASILY ANNOYED OR IRRITABLE: 0
1. FEELING NERVOUS, ANXIOUS, OR ON EDGE: 0
4. TROUBLE RELAXING: 0
GAD7 TOTAL SCORE: 0
1. FEELING NERVOUS, ANXIOUS, OR ON EDGE: 0
2. NOT BEING ABLE TO STOP OR CONTROL WORRYING: 0
IF YOU CHECKED OFF ANY PROBLEMS ON THIS QUESTIONNAIRE, HOW DIFFICULT HAVE THESE PROBLEMS MADE IT FOR YOU TO DO YOUR WORK, TAKE CARE OF THINGS AT HOME, OR GET ALONG WITH OTHER PEOPLE: NOT DIFFICULT AT ALL
GAD7 TOTAL SCORE: 0

## 2023-01-10 ASSESSMENT — ENCOUNTER SYMPTOMS
NAUSEA: 0
VOMITING: 0
SHORTNESS OF BREATH: 0
COLOR CHANGE: 1
ABDOMINAL PAIN: 0
COUGH: 0
DIARRHEA: 0
CONSTIPATION: 1
BACK PAIN: 1
BLOOD IN STOOL: 0

## 2023-01-10 ASSESSMENT — PATIENT HEALTH QUESTIONNAIRE - PHQ9
3. TROUBLE FALLING OR STAYING ASLEEP: 0
9. THOUGHTS THAT YOU WOULD BE BETTER OFF DEAD, OR OF HURTING YOURSELF: 0
7. TROUBLE CONCENTRATING ON THINGS, SUCH AS READING THE NEWSPAPER OR WATCHING TELEVISION: 0
2. FEELING DOWN, DEPRESSED OR HOPELESS: 0
SUM OF ALL RESPONSES TO PHQ QUESTIONS 1-9: 0
1. LITTLE INTEREST OR PLEASURE IN DOING THINGS: 0
4. FEELING TIRED OR HAVING LITTLE ENERGY: 0
SUM OF ALL RESPONSES TO PHQ QUESTIONS 1-9: 0
5. POOR APPETITE OR OVEREATING: 0
SUM OF ALL RESPONSES TO PHQ9 QUESTIONS 1 & 2: 0
6. FEELING BAD ABOUT YOURSELF - OR THAT YOU ARE A FAILURE OR HAVE LET YOURSELF OR YOUR FAMILY DOWN: 0
SUM OF ALL RESPONSES TO PHQ QUESTIONS 1-9: 0
8. MOVING OR SPEAKING SO SLOWLY THAT OTHER PEOPLE COULD HAVE NOTICED. OR THE OPPOSITE, BEING SO FIGETY OR RESTLESS THAT YOU HAVE BEEN MOVING AROUND A LOT MORE THAN USUAL: 0
SUM OF ALL RESPONSES TO PHQ QUESTIONS 1-9: 0
10. IF YOU CHECKED OFF ANY PROBLEMS, HOW DIFFICULT HAVE THESE PROBLEMS MADE IT FOR YOU TO DO YOUR WORK, TAKE CARE OF THINGS AT HOME, OR GET ALONG WITH OTHER PEOPLE: 0

## 2023-01-10 ASSESSMENT — SOCIAL DETERMINANTS OF HEALTH (SDOH): HOW HARD IS IT FOR YOU TO PAY FOR THE VERY BASICS LIKE FOOD, HOUSING, MEDICAL CARE, AND HEATING?: NOT HARD AT ALL

## 2023-01-10 NOTE — PROGRESS NOTES
[address], DO  [address] [facility], [address], [address]  [phone]        ASSESSMENT AND PLAN    Problem List Items Addressed This Visit          Circulatory    CHF (congestive heart failure) ([facility] [address]      History of CAD and CHF, patient was off medicine for some time, has not been back to Cardiology. Will refer back to [facility]. Relevant Medications    amLODIPine (NORVASC) 10 MG tablet    lisinopril (PRINIVIL;ZESTRIL) 20 MG tablet    carvedilol (COREG) 25 MG tablet    furosemide (LASIX) 20 MG tablet    Other Relevant Orders    [address]    HTN (hypertension)     BP controlled today, patient just put back on medication after his hospitalization. Discharged on Hydralazine, Lisinopril and Amlodipine. Will continue Amlodipine and Lisinopril, refills sent, and will switch from Hydralazine to Carvedilol due to Hx of CHF. Will check labs when patient returns in 3-4 weeks. Coronary atherosclerosis      Discussed restarting daily aspirin. Patient states that he did not tolerate statins in the past due to muscle aches. Will check labs when he returns in 3-4 weeks. Relevant Medications    amLODIPine (NORVASC) 10 MG tablet    lisinopril (PRINIVIL;ZESTRIL) 20 MG tablet    carvedilol (COREG) 25 MG tablet    furosemide (LASIX) 20 MG tablet       Endocrine    Uncontrolled type 2 diabetes mellitus with hyperglycemia ([facility] [address]     Patient with elevated blood sugars in the hospital with A1C of 9. Will restart his Metformin and will check labs when he returns in 3-4 weeks. Rx for glucometer and test strips sent to the pharmacy. Relevant Medications    metFORMIN (GLUCOPHAGE) 500 MG tablet    Painful diabetic neuropathy ([facility] [address]     Patient with chronic neuropathy, previously reported to be secondary to diabetes, though also possibly due to chronic low back issues.   Will restart on Gabapentin, agree to a one-time fill of Tramadol while I refer him to Pain Management. Relevant Medications    traMADol (ULTRAM) 50 MG tablet    metFORMIN (GLUCOPHAGE) 500 MG tablet       Nervous and Auditory    Subdural hematoma     Resolved on repeat CT prior to discharge. Other    Ulcers of both lower legs ([address]     Patient with poorly healing chronic leg wounds. Improved erythema today, patient completed Cephalexin and Doxycycline. Some edema and mild tenderness but no purulence noted. Called Wound Care and was able to move patient's appointment to tomorrow at 10:00 AM.  Informed patient and daughter. Relevant Orders    [address] Vascular Surgery, [place]    Encounter to establish care with [facility] discharge follow-up    Chronic lumbar pain    Relevant Medications    traMADol (ULTRAM) 50 MG tablet    gabapentin (NEURONTIN) 300 MG capsule    Other Relevant Orders    [facility] - Edmar Mcclelland, , Pain Management, [place]    Dyslipidemia     Patient reports did not tolerate statin in the past.  Will recheck lipids when he returns in 3-4 weeks. The diagnoses and plan were discussed with the patient, who verbalizes understanding and agrees with plan. All questions answered. Chief Complaint    Chief Complaint   Patient presents with    Establish Care    Wound Check     On both legs     Medication Refill         HISTORY OF PRESENT ILLNESS    69 y.o. male presents today to establish care with a new primary care provider. Presents for a hospital follow up appointment - was admitted to [facility] from 12/30-12/31/2022 for leg wounds and swelling. Patient had not been on medications for CAD, HTN, CHF or DM and had developed wounds on both lower legs a month prior to admission. Patient had also fallen the day before admission and hit his head on a cinder block. Questionable frontal subdural hematoma noted on CT.   X-rays and Dopplers of lower extremities negative for fractures and DVTs. Treated with Keflex and Doxycycline. Vascular consulted and recommended outpatient follow up in one month. Neurosurgery consulted and did not recommend any intervention with a negative repeat CT. States that he was discharged home with some medicine but has not been able to find a new PCP until today. Notes that he did not get follow up appointments scheduled with Wound Care or with Vascular as discussed. Notes that he used to take Metformin for his blood sugars and Gabapentin for neuropathy in his legs. Has chronic low back pain, used to take pain medicine a long time ago. Stopped it awhile ago and has been taking OTC meds without relief. Notes that when he was in the hospital he was given Hydrocodone, which helped with the pain. States that he had previously taken Furosemide to help with leg swelling. Notes that his legs are swollen today but states that the wounds and redness are better than before. Daughter notes that she was able to schedule him a wound care appointment but it is not for another couple of weeks. Patient notes history of CHF and used to see [facility] Cardiology, has not seen them for awhile. Does not have a glucometer at home that works so does not know how his blood sugars have been running. States that he stopped the Metformin awhile ago because his sugars were controlled. PAST MEDICAL HISTORY    Past Medical History:   Diagnosis Date    Bipolar disorder ([address]     CAD (coronary artery disease)     CHF (congestive heart failure) (H[facility])     Chronic back pain     Hyperlipidemia     Hypertension     Neuropathy     Type 2 diabetes mellitus without complication ([address]        PAST SURGICAL HISTORY    Past Surgical History:   Procedure Laterality Date    HERNIA REPAIR         FAMILY HISTORY    History reviewed. No pertinent family history.     SOCIAL HISTORY    Social History     Socioeconomic History    Marital status:      Spouse name: None Number of children: None    Years of education: None    Highest education level: None   Tobacco Use    Smoking status: Never    Smokeless tobacco: Never   Vaping Use    Vaping Use: Never used   Substance and Sexual Activity    Alcohol use: Not Currently    Drug use: Never    Sexual activity: Not Currently     Social Determinants of Health     Financial Resource Strain: Low Risk     Difficulty of Paying Living Expenses: Not hard at all   Food Insecurity: No Food Insecurity    Worried About Running Out of Food in the Last Year: Never true    Ran Out of Food in the Last Year: Never true       MEDICATIONS    Current Outpatient Medications:     amLODIPine (NORVASC) 10 MG tablet, Take 1 tablet by mouth daily, Disp: 30 tablet, Rfl: 5    lisinopril (PRINIVIL;ZESTRIL) 20 MG tablet, Take 1 tablet by mouth daily, Disp: 30 tablet, Rfl: 5    carvedilol (COREG) 25 MG tablet, Take 1 tablet by mouth 2 times daily, Disp: 60 tablet, Rfl: 5    traMADol (ULTRAM) 50 MG tablet, Take 1 tablet by mouth every 8 hours as needed for Pain for up to 5 days. Intended supply: 5 days. Take lowest dose possible to manage pain Max Daily Amount: 150 mg, Disp: 30 tablet, Rfl: 0    furosemide (LASIX) 20 MG tablet, Take 1 tablet by mouth daily, Disp: 30 tablet, Rfl: 5    metFORMIN (GLUCOPHAGE) 500 MG tablet, Take 1 tablet by mouth 2 times daily (with meals), Disp: 60 tablet, Rfl: 5    gabapentin (NEURONTIN) 300 MG capsule, Take 1 capsule by mouth 3 times daily for 180 days. Intended supply: 30 days, Disp: 90 capsule, Rfl: 5    glucose monitoring (FREESTYLE FREEDOM) kit, 1 kit by Does not apply route daily, Disp: 1 kit, Rfl: 0    blood glucose monitor strips, Test one time a day & as needed for symptoms of irregular blood glucose. Dispense sufficient amount for indicated testing frequency plus additional to accommodate PRN testing needs. , Disp: 100 strip, Rfl: 5    ALLERGIES / INTOLERANCES    Allergies   Allergen Reactions    Other Shortness Of Breath Oyster    Rosuvastatin Shortness Of Breath    Oyster Extract Other (See Comments) and Nausea And Vomiting     N & V, diarrhea, abdominal cramping         REVIEW OF SYSTEMS    Review of Systems   Constitutional:  Negative for fever. HENT:  Negative for congestion. Respiratory:  Negative for cough and shortness of breath. Cardiovascular:  Negative for chest pain. Gastrointestinal:  Positive for constipation. Negative for abdominal pain, blood in stool, diarrhea, nausea and vomiting. Musculoskeletal:  Positive for back pain, gait problem and myalgias. Skin:  Positive for color change and wound. Neurological:  Positive for numbness. Psychiatric/Behavioral:  Negative for dysphoric mood. PHYSICAL EXAMINATION    Vitals:    01/10/23 1338   BP: 138/72   Pulse: 87   Resp: 16   SpO2: 99%       Physical Exam  Vitals and nursing note reviewed. Constitutional:       Appearance: Normal appearance. He is obese. HENT:      Head: Normocephalic and atraumatic. Right Ear: External ear normal.      Left Ear: External ear normal.      Nose: Nose normal.      Mouth/Throat:      Mouth: Mucous membranes are moist.   Eyes:      Extraocular Movements: Extraocular movements intact. Cardiovascular:      Rate and Rhythm: Normal rate and regular rhythm. Heart sounds: Normal heart sounds. No murmur heard. Pulmonary:      Effort: Pulmonary effort is normal. No respiratory distress. Breath sounds: Normal breath sounds. Abdominal:      General: Bowel sounds are normal.      Palpations: Abdomen is soft. Tenderness: There is no abdominal tenderness. There is no right CVA tenderness or left CVA tenderness. Musculoskeletal:         General: Normal range of motion. Cervical back: Normal range of motion. Skin:     General: Skin is warm. Findings: Abrasion, erythema and wound present.              Comments: Right lower leg with mild anterior erythema with superficial scabs that looks like a healing abrasion, no drainage, mild tenderness, 1+ pitting edema; Left lower leg with anterior wound without purulence, mild erythema, 1+ pitting edema, tenderness   Neurological:      General: No focal deficit present. Mental Status: He is alert. Psychiatric:         Mood and Affect: Mood normal.         Behavior: Behavior normal.         PERTINENT LABS AND IMAGING    Lab Results   Component Value Date    WBC 7.4 12/31/2022    HGB 15.0 12/31/2022    HCT 44.3 12/31/2022    MCV 88.1 12/31/2022     12/31/2022     Lab Results   Component Value Date     12/31/2022    K 4.2 12/31/2022     12/31/2022    CO2 24 12/31/2022    BUN 13 12/31/2022    CREATININE 0.80 12/31/2022    GLUCOSE 177 (H) 12/31/2022    CALCIUM 9.1 12/31/2022    PROT 6.9 12/31/2022    LABALBU 3.3 12/31/2022    BILITOT 0.9 12/31/2022    ALKPHOS 55 12/31/2022    AST 13 (L) 12/31/2022    ALT 20 12/31/2022    LABGLOM >60 12/31/2022    GLOB 3.6 12/31/2022     Hemoglobin A1C   Date Value Ref Range Status   12/29/2022 9.0 (H) 4.8 - 5.6 % Final     Sed Rate, Automated 15 mm/hr 39 High       CRP 0.0 - 0.9 mg/dL 1.7 High       Magnesium 1.8 - 2.4 mg/dL 2.4     CT Result (most recent):  CT HEAD WO CONTRAST 12/30/2022    Narrative  CT HEAD WITHOUT CONTRAST. INDICATION: Subdural hematoma, follow-up. COMPARISON: Exam 14 hours earlier. TECHNIQUE:   5 mm axial scans from the skull base to the vertex. Our CT  scanners use one or more of the following:  Automated exposure control,  adjustment of the mA and or kV according to patient size, iterative  reconstruction. FINDINGS:  No acute intraparenchymal hemorrhage or abnormal extra-axial fluid collection. The ventricles are normal size. No midline shift or mass effect. Posterior fossa: Unremarkable. Included portion of the:  Paranasal sinuses and mastoid air cells are clear  Globes and orbits grossly unremarkable. Impression  Negative for acute intracranial abnormality.  No subdural  hemorrhage. Xray Result (most recent):  XR TIBIA FIBULA RIGHT (2 VIEWS) 12/30/2022    Narrative  RIGHT TIB-FIB 2 view(s). INDICATION: Pain and soreness with difficulty walking    TECHNIQUE: AP and lateral views. COMPARISON: None. Impression  FINDINGS / IMPRESSION:   No fracture, subluxation or dislocation. No  periostitis. No erosions. Possible osteopenia. RIGHT TIB-FIB 2 view(s). INDICATION: Pain and soreness with difficulty walking    TECHNIQUE: AP and lateral views. COMPARISON: None. FINDINGS / IMPRESSION:   No fracture, subluxation or dislocation. No  periostitis. No erosions. Possible osteopenia. Arterial calcifications.             Alexander Dozier DO  5:27 PM  01/10/23

## 2023-01-10 NOTE — CARE COORDINATION
1/10/2023 @ 0830    TC from pt's dtr, Magui Richmond. She stated when the pt was dc on 12/31/22, he was supposed to have a follow up appt at the wound healing center. She stated she called their office and was told they had not received a referral and one was needed. Chart reviewed. The only outpatient provider seen while hospitalized was vascular surgery. Pt was supposed to have a follow up scheduled with them for 1 month after dc but there is not record this was scheduled. Dtr confirmed the pt does not have a PCP. CM provided her with the number to [facility] and instructed her to contact them to schedule the follow up appointment and see if they can send the referral over to the wound care center. She was also agreeable for CM to make referral to As[facility] to establish care with a PCP. Referral submitted as an urgent request for a follow up appointment.

## 2023-01-10 NOTE — ASSESSMENT & PLAN NOTE
Patient reports did not tolerate statin in the past.  Will recheck lipids when he returns in 3-4 weeks.

## 2023-01-10 NOTE — ASSESSMENT & PLAN NOTE
BP controlled today, patient just put back on medication after his hospitalization. Discharged on Hydralazine, Lisinopril and Amlodipine. Will continue Amlodipine and Lisinopril, refills sent, and will switch from Hydralazine to Carvedilol due to Hx of CHF. Will check labs when patient returns in 3-4 weeks.

## 2023-01-10 NOTE — ASSESSMENT & PLAN NOTE
Patient with chronic neuropathy, previously reported to be secondary to diabetes, though also possibly due to chronic low back issues. Will restart on Gabapentin, agree to a one-time fill of Tramadol while I refer him to Pain Management.

## 2023-01-10 NOTE — PATIENT INSTRUCTIONS
IT WAS GOOD TO MEET YOU TODAY. STOP TAKING THE HYDRALAZINE AND THE 10 MG DOSE OF LISINOPRIL. I AM PUTTING YOU ON LISINOPRIL 20 MG ONCE A DAY, CARVEDILOL 25 MG TWICE A DAY AND AMLODIPINE 10 MG ONCE A DAY FOR BLOOD PRESSURE. I AM ALSO PUTTING YOU ON FUROSEMIDE 20 MG ONCE A DAY FOR SWELLING. I AM RESTARTING YOUR METFORMIN  MG. TAKE IT JUST ONCE IN THE MORNING FOR THE NEXT WEEK, THEN YOU CAN TAKE IT TWICE A DAY. I AM RESTARTING YOUR GABAPENTIN 300 MG. TAKE IT ONCE A DAY FOR 3 DAYS, THEN TWICE A DAY FOR 3 DAYS BEFORE RESTARTING IT THREE TIMES A DAY FOR NERVE PAIN. I WILL GIVE YOU ONE PRESCRIPTION FOR TRAMADOL TO USE AS NEEDED FOR PAIN. I WILL NOT REFILL THIS MEDICINE FOR YOU. INSTEAD I AM REFERRING YOU TO PAIN MANAGEMENT AND TO THE VASCULAR DOCTOR. THEY SHOULD BE CALLING YOU WITH AN APPOINTMENT. KEEP THE WOUND CARE APPOINTMENT UNLESS THEY CALL YOU WITH A NEW APPOINTMENT DATE AND TIME.     I WILL SEE YOU IN 4 WEEKS BUT IF YOU HAVE ANY PROBLEMS CALL THE OFFICE - [phone]

## 2023-01-10 NOTE — ASSESSMENT & PLAN NOTE
Discussed restarting daily aspirin. Patient states that he did not tolerate statins in the past due to muscle aches. Will check labs when he returns in 3-4 weeks.

## 2023-01-10 NOTE — ASSESSMENT & PLAN NOTE
History of CAD and CHF, patient was off medicine for some time, has not been back to Cardiology. Will refer back to [address] Cardiology.

## 2023-01-10 NOTE — ASSESSMENT & PLAN NOTE
Patient with elevated blood sugars in the hospital with A1C of 9. Will restart his Metformin and will check labs when he returns in 3-4 weeks. Rx for glucometer and test strips sent to the pharmacy.

## 2023-01-11 ENCOUNTER — HOSPITAL ENCOUNTER (OUTPATIENT)
Dept: WOUND CARE | Age: 68
Discharge: HOME | End: 2023-01-11
Payer: MEDICARE

## 2023-01-11 VITALS
HEART RATE: 82 BPM | SYSTOLIC BLOOD PRESSURE: 125 MMHG | DIASTOLIC BLOOD PRESSURE: 80 MMHG | WEIGHT: 254 LBS | HEIGHT: 72 IN | BODY MASS INDEX: 34.4 KG/M2

## 2023-01-11 DIAGNOSIS — I87.393 CHRONIC VENOUS HYPERTENSION (IDIOPATHIC) WITH OTHER COMPLICATIONS OF BILATERAL LOWER EXTREMITY: ICD-10-CM

## 2023-01-11 DIAGNOSIS — L72.3 SEBACEOUS CYST: ICD-10-CM

## 2023-01-11 PROBLEM — L97.929 ULCERS OF BOTH LOWER LEGS (HCC): Chronic | Status: ACTIVE | Noted: 2022-12-30

## 2023-01-11 PROBLEM — L97.919 ULCERS OF BOTH LOWER LEGS (HCC): Chronic | Status: ACTIVE | Noted: 2022-12-30

## 2023-01-11 PROCEDURE — 99203 OFFICE O/P NEW LOW 30 MIN: CPT | Performed by: FAMILY MEDICINE

## 2023-01-11 PROCEDURE — 29581 APPL MULTLAYER CMPRN SYS LEG: CPT

## 2023-01-11 NOTE — PROGRESS NOTES
[address] [facility]   History and Physical Note   Referring Provider: Camille Baxter DO  Reason for Referral: Lower extremity wounds bilaterally and edema. Matt Velasco  MEDICAL RECORD NUMBER:  [id number]  AGE: 69 y.o. GENDER: male  : 1955  EPISODE DATE:  2023    Chief complaint and reason for visit:     Chief Complaint   Patient presents with    Wound Check     BLE 23         HISTORY of PRESENT ILLNESS HPI     Matt Velasco is a 69 y.o. male who presents today for an initial evaluation of a wound/ulcer. Patient is new to the wound center. Wound duration:  3 month(s). History of Wound Context: Patient comes in today with daughter. Has lower extremity chronic wounds and drainage. Was recently hospitalized with cellulitis lower extremities. Also has congestive heart failure. Lasix has been initiated. He also has 2 cystic areas on his back that he would like looked at to see if what how these can be removed. Pertinent associated symptoms: drainage  and redness    PAST MEDICAL HISTORY        Diagnosis Date    Bipolar disorder ([address]     CAD (coronary artery disease)     CHF (congestive heart failure) (HCC)     Chronic back pain     Hyperlipidemia     Hypertension     Neuropathy     Type 2 diabetes mellitus without complication ([facility]        PAST SURGICAL HISTORY  Past Surgical History:   Procedure Laterality Date    HERNIA REPAIR         FAMILY HISTORY  History reviewed. No pertinent family history.     SOCIAL HISTORY  Social History     Tobacco Use    Smoking status: Never    Smokeless tobacco: Never   Vaping Use    Vaping Use: Never used   Substance Use Topics    Alcohol use: Not Currently    Drug use: Never       ALLERGIES  Allergies   Allergen Reactions    Other Shortness Of Breath     Oyster    Rosuvastatin Shortness Of Breath    Oyster Extract Other (See Comments) and Nausea And Vomiting     N & V, diarrhea, abdominal cramping MEDICATIONS  Current Outpatient Medications on File Prior to Encounter   Medication Sig Dispense Refill    amLODIPine (NORVASC) 10 MG tablet Take 1 tablet by mouth daily 30 tablet 5    lisinopril (PRINIVIL;ZESTRIL) 20 MG tablet Take 1 tablet by mouth daily 30 tablet 5    carvedilol (COREG) 25 MG tablet Take 1 tablet by mouth 2 times daily 60 tablet 5    traMADol (ULTRAM) 50 MG tablet Take 1 tablet by mouth every 8 hours as needed for Pain for up to 5 days. Intended supply: 5 days. Take lowest dose possible to manage pain Max Daily Amount: 150 mg 30 tablet 0    furosemide (LASIX) 20 MG tablet Take 1 tablet by mouth daily 30 tablet 5    metFORMIN (GLUCOPHAGE) 500 MG tablet Take 1 tablet by mouth 2 times daily (with meals) 60 tablet 5    gabapentin (NEURONTIN) 300 MG capsule Take 1 capsule by mouth 3 times daily for 180 days. Intended supply: 30 days 90 capsule 5    glucose monitoring (FREESTYLE FREEDOM) kit 1 kit by Does not apply route daily 1 kit 0    blood glucose monitor strips Test one time a day & as needed for symptoms of irregular blood glucose. Dispense sufficient amount for indicated testing frequency plus additional to accommodate PRN testing needs. 100 strip 5     No current facility-administered medications on file prior to encounter. REVIEW OF SYSTEMS  A comprehensive review of systems was negative except for: Pertinent items are noted in HPI. Objective:      /80   Pulse 82   Ht 6' (1.829 m)   Wt 254 lb (115.2 kg)   BMI 34.45 kg/m²     Wt Readings from Last 3 Encounters:   01/11/23 254 lb (115.2 kg)   01/10/23 250 lb 9.6 oz (113.7 kg)   12/30/22 252 lb 13.9 oz (114.7 kg)       PHYSICAL EXAM  General Appearance/Constitutional: alert and oriented to person, place and time,  and in no acute distress. Nontoxic. Skin: warm and dry, no rash, positive wound per LDA documentation if applicable. Head: normocephalic and atraumatic.   Eyes: extraocular eye movements intact, conjunctivae normal, and sclera anicteric. ENT: hearing grossly normal bilaterally. Normal appearance. Pulmonary/Chest: no chest wall tenderness and clear anteriorly. No respiratory distress. Cardiovascular: normal rate and regular rhythm. GI: abdomen soft, non-tender and non-distended. Musculoskeletal: normal range of motion of joints. Nontender calves. No cyanosis. Nontender calves. Edema 2+  Neurologic: no gross cranial nerve deficit and speech normal. No focal deficits. Mental status normal.    Medical Decision Making:     Patient's lower extremity wounds are mainly from the extra edema. Lasix is being treated but he needs compression therapy due to venous hypertension. We discussed this in great detail. We will utilize 2 layer wraps and Xeroform gauze over wound areas. Get the edema under control and these wound should heal up nicely. Long-term compression will be needed to prevent this in the future. This was discussed in detail. Patient to get 60 g of protein intake with protein supplementation. See orders below. Patient also has 2 sebaceous cysts that are somewhat protruding mid back that are close to each other. These need to be excised so we will set him up with general surgery for possibly treatment of that. Assessment required other independent historian(s): Yes. Additional Historian: patient  and daughter . Comorbid conditions affecting wound healing: As noted in [facility] and [facility] which was reviewed. Problem List Items Addressed This Visit          Circulatory    Chronic venous hypertension (idiopathic) with other complications of bilateral lower extremity (Chronic)       Other    Sebaceous cyst (Chronic)       Wounds and Treatment Plan:    Surgical referral for possible sebaceous cyst excision on back. Long discussion about compression therapy on lower extremities needed from  To prevent these areas on his lower extremities from coming back and to get them to heal now.   All discussed in detail with patient and patient's daughter. 60 g of protein daily. Return Appointment:   2 weeks with Lacho Barrow DO        Instructions: Do not cut compression wraps off. If wraps become too loose or slide down, call wound center to make an appointment for dressing change. If wraps become too tight, try elevating your legs to assist fluid drainage. Elevate legs when sitting. Avoid prolonged standing or sitting with legs in dependent position. Eliminate salt intake as this promotes fluid retention and swelling. Take diuretic (fluid pill) as instructed by your doctor. Increase protein intake to promote wound healing. Deandre and Ensure are examples of protein supplements. Wound healing is greatly slowed when glucose levels are greater than 200. Monitor glucose levels to ensure tight glucose control. Other diagnoses or problems addressed:      Pertinent labs reviewed. Review of medical records and external note (s) from other providers was done for this visit. New lab or imaging orders placed:  No new labs today. Prescription drug management: N/A     Risk of complications and/or mortality of patient management: This patient has a low risk of morbidity and mortality from additional diagnostic testing or treatment. This is due to the above conditions affecting wound healing as well as patient and procedure risk factors. Education and discussion held with patient regarding these disease processes pertinent to wound(s). Other pertinent decisions include: minor surgery or procedures as below. The patient's diagnosis or treatment is  significantly limited by social determinants of health as noted by: wound care supply limitations  and nutritional resource limitations . Discussion of management or test interpretation with N/A.     Time spent with patient and patient care issues above the usual time needed for wound assessment and treatment was: [] 15-20 min  [] 21-30 min  [] 31-44 min  [] 45 min or more  This included time retrieving and reviewing records with patient and education provided to patient regarding disease process(es), offloading or pressure relief, nutrition needed for wound healing, smoking cessation when applicable, and infection risk. This time also included physician non-face-to-face service time visit on the date of service such as  Preparing to see the patient (eg, review of tests)  Obtaining and/or reviewing separately obtained history  Performing a medically necessary appropriate examination and/or evaluation  Counseling and educating the patient/family/caregiver  Ordering medications, tests, or procedures  Referring and communicating with other health care professionals as needed  Documenting clinical information in the electronic or other health record  Independently interpreting results (not reported separately) and communicating results to the patient/family/caregiver  Care coordination (not reported separately)    Wound 01/11/23 Pretibial Left cluster #1 (Active)   Wound Image   01/11/23 1040   Wound Etiology Venous 01/11/23 1040   Dressing Status Intact 01/11/23 1040   Wound Cleansed Cleansed with saline 01/11/23 1040   Dressing/Treatment Open to air 01/11/23 1040   Wound Length (cm) 31 cm 01/11/23 1040   Wound Width (cm) 4 cm 01/11/23 1040   Wound Depth (cm) 0.1 cm 01/11/23 1040   Wound Surface Area (cm^2) 124 cm^2 01/11/23 1040   Wound Volume (cm^3) 12.4 cm^3 01/11/23 1040   Wound Assessment Pink/red 01/11/23 1040   Drainage Amount Small 01/11/23 1040   Drainage Description Serosanguinous 01/11/23 1040   Odor None 01/11/23 1040   Mariana-wound Assessment Edematous; Blanchable erythema; Hemosiderin staining (brown yellow) 01/11/23 1040   Wound Thickness Description not for Pressure Injury Full thickness 01/11/23 1040   Number of days: 0       Wound 01/11/23 Pretibial Right cluster #2 (Active)   Wound Image   01/11/23 1040   Wound Etiology Venous 01/11/23 1040   Dressing Status Intact 01/11/23 1040   Wound Cleansed Cleansed with saline 01/11/23 1040   Dressing/Treatment Open to air 01/11/23 1040   Wound Length (cm) 31 cm 01/11/23 1040   Wound Width (cm) 0.5 cm 01/11/23 1040   Wound Depth (cm) 0.1 cm 01/11/23 1040   Wound Surface Area (cm^2) 15.5 cm^2 01/11/23 1040   Wound Volume (cm^3) 1.55 cm^3 01/11/23 1040   Wound Assessment Granulation tissue 01/11/23 1040   Drainage Amount Small 01/11/23 1040   Drainage Description Serosanguinous 01/11/23 1040   Odor None 01/11/23 1040   Mariana-wound Assessment Edematous; Blanchable erythema; Hemosiderin staining (brown yellow) 01/11/23 1040   Wound Thickness Description not for Pressure Injury Full thickness 01/11/23 1040   Number of days: 0       Wound 01/11/23 Back Medial;Upper #3 (Active)   Wound Image   01/11/23 1040   Wound Etiology Other 01/11/23 1040   Dressing Status Other (Comment) 01/11/23 1040   Wound Cleansed Cleansed with saline 01/11/23 1040   Dressing/Treatment Open to air 01/11/23 1040   Wound Assessment Other (Comment) 01/11/23 1040   Drainage Amount None 01/11/23 1040   Odor None 01/11/23 1040   Mariana-wound Assessment Induration 01/11/23 1040   Number of days: 0          Written patient dismissal instructions given to patient and signed by patient or POA. Patient voiced understanding that the importance of adherence to instructions is paramount to wound healing improvement or success.      Electronically signed by Zane Palacios DO on 1/11/2023 at 11:29 AM

## 2023-01-11 NOTE — DISCHARGE INSTRUCTIONS
Discharge Instructions for  [facility]  [address] [place] [address]  [address], [address]  Phone [phone]   Fax [phone]      NAME:  Zenia Felty  YOB: 1955  MEDICAL RECORD NUMBER:  [id number]  DATE:  @ED@    Return Appointment:   2 weeks with John Meza DO      Instructions: Do not cut compression wraps off. If wraps become too loose or slide down, call wound center to make an appointment for dressing change. If wraps become too tight, try elevating your legs to assist fluid drainage. Elevate legs when sitting. Avoid prolonged standing or sitting with legs in dependent position. Eliminate salt intake as this promotes fluid retention and swelling. Take diuretic (fluid pill) as instructed by your doctor. Increase protein intake to promote wound healing. Deandre and Ensure are examples of protein supplements. Wound healing is greatly slowed when glucose levels are greater than 200. Monitor glucose levels to ensure tight glucose control. Should you experience increased redness, swelling, pain, foul odor, size of wound(s), or have a temperature over 101 degrees please contact the [address] [place] [address] Road at [phone] or if after hours contact your primary care physician or go to the hospital emergency department. PLEASE NOTE: IF YOU ARE UNABLE TO OBTAIN WOUND SUPPLIES, CONTINUE TO USE THE SUPPLIES YOU HAVE AVAILABLE UNTIL YOU ARE ABLE TO REACH US. IT IS MOST IMPORTANT TO KEEP THE WOUND COVERED AT ALL TIMES.     Electronically signed Sravanthi Faulkner RN on 1/11/2023 at 11:09 AM

## 2023-01-11 NOTE — WOUND CARE
Multilayer Compression Wrap   (Not Unna) Below the Knee    NAME:  Zenia Felty  YOB: 1955  MEDICAL RECORD NUMBER:  [id number]  DATE:  1/11/2023    Multilayer compression wrap: Removed old Multilayer wrap if indicated and wash leg with mild soap/water. Applied moisturizing agent to dry skin as needed. Applied primary and secondary dressing as ordered. Applied multilayered dressing below the knee to right lower leg. Applied multilayered dressing below the knee to left lower leg. Instructed patient/caregiver not to remove dressing and to keep it clean and dry. Instructed patient/caregiver on complications to report to provider, such as pain, numbness in toes, heavy drainage, and slippage of dressing. Instructed patient on purpose of compression dressing and on activity and exercise recommendations.       Electronically signed by Sravanthi Faulkner RN on 1/11/2023 at 11:10 AM

## 2023-01-11 NOTE — FLOWSHEET NOTE
01/11/23 1040   Wound 01/11/23 Pretibial Left cluster #1   Date First Assessed/Time First Assessed: 01/11/23 1039   Present on Hospital Admission: Yes  Wound Approximate Age at First Assessment (Weeks): 27 weeks  Primary Wound Type: Venous Ulcer  Location: Pretibial  Wound Location Orientation: Left  Wound De. .. Wound Image    Wound Etiology Venous   Dressing Status Intact   Wound Cleansed Cleansed with saline   Dressing/Treatment Open to air   Wound Length (cm) 31 cm   Wound Width (cm) 4 cm   Wound Depth (cm) 0.1 cm   Wound Surface Area (cm^2) 124 cm^2   Wound Volume (cm^3) 12.4 cm^3   Wound Assessment Pink/red   Drainage Amount Small   Drainage Description Serosanguinous   Odor None   Mariana-wound Assessment Edematous; Blanchable erythema; Hemosiderin staining (brown yellow)   Wound Thickness Description not for Pressure Injury Full thickness   Wound 01/11/23 Pretibial Right cluster #2   Date First Assessed/Time First Assessed: 01/11/23 1039   Present on Hospital Admission: Yes  Wound Approximate Age at First Assessment (Weeks): 27 weeks  Primary Wound Type: Venous Ulcer  Location: Pretibial  Wound Location Orientation: Right  Wound D... Wound Image    Wound Etiology Venous   Dressing Status Intact   Wound Cleansed Cleansed with saline   Dressing/Treatment Open to air   Wound Length (cm) 31 cm   Wound Width (cm) 0.5 cm   Wound Depth (cm) 0.1 cm   Wound Surface Area (cm^2) 15.5 cm^2   Wound Volume (cm^3) 1.55 cm^3   Wound Assessment Granulation tissue   Drainage Amount Small   Drainage Description Serosanguinous   Odor None   Mariana-wound Assessment Edematous; Blanchable erythema; Hemosiderin staining (brown yellow)   Wound Thickness Description not for Pressure Injury Full thickness   Wound 01/11/23 Back Medial;Upper #3   Date First Assessed/Time First Assessed: 01/11/23 1040   Primary Wound Type: (c)   Location: Back  Wound Location Orientation: Medial;Upper  Wound Description (Comments): #3   Wound Image    Wound Etiology Other   Dressing Status Other (Comment)   Wound Cleansed Cleansed with saline   Dressing/Treatment Open to air   Wound Assessment Other (Comment)  (cyst)   Drainage Amount None   Odor None   Mariana-wound Assessment Induration

## 2023-01-11 NOTE — WOUND CARE
Discharge Instructions for  [address]  [address]  [address] [facility], [facility] [address]  Phone [phone]   Fax [phone]      NAME:  Henny Caban  YOB: 1955  MEDICAL RECORD NUMBER:  [id number]  DATE:  1/11/2023    Return Appointment:   2 weeks with Ken Cohn DO      Instructions: BLE:  Cleanse with soap and water  Apply vaseline to bilateral lower legs  Xeroform- apply to wound bed. And cover with ABD pads  2 layer compression with wound and lower extremity assessment. If there is any problem with the dressing (too tight, slides down, etc.) Patient to return to wound clinic to have re-wrapped by clinician. Change 2 times per week      Do not get wet in shower may purchase cast covers from [employer] or [employer]    Referral to General surgery for management of cysts on upper mid back    Do not cut compression wraps off. If wraps become too loose or slide down, call wound center to make an appointment for dressing change. If wraps become too tight, try elevating your legs to assist fluid drainage. Elevate legs when sitting. Avoid prolonged standing or sitting with legs in dependent position. Eliminate salt intake as this promotes fluid retention and swelling. Take diuretic (fluid pill) as instructed by your doctor. Increase protein intake to promote wound healing. Deandre and Ensure are examples of protein supplements. Wound healing is greatly slowed when glucose levels are greater than 200. Monitor glucose levels to ensure tight glucose control.        Admit to [facility] for dressing changes  Applied wraps today need [facility] for dressing change this Friday 1/13/23 and then next week on Monday 1/16/23 and Friday 1/20/23        Should you experience increased redness, swelling, pain, foul odor, size of wound(s), or have a temperature over 101 degrees please contact the [address] at [phone] or if after hours contact your primary care physician or go to the hospital emergency department. PLEASE NOTE: IF YOU ARE UNABLE TO OBTAIN WOUND SUPPLIES, CONTINUE TO USE THE SUPPLIES YOU HAVE AVAILABLE UNTIL YOU ARE ABLE TO REACH US. IT IS MOST IMPORTANT TO KEEP THE WOUND COVERED AT ALL TIMES.     Electronically signed Ananda Stoll RN on 1/11/2023 at 11:06 AM

## 2023-01-17 ENCOUNTER — HOME HEALTH ADMISSION (OUTPATIENT)
Dept: HOME HEALTH SERVICES | Facility: HOME HEALTH | Age: 68
End: 2023-01-17
Payer: MEDICARE

## 2023-01-20 ENCOUNTER — HOME CARE VISIT (OUTPATIENT)
Dept: SCHEDULING | Facility: HOME HEALTH | Age: 68
End: 2023-01-20
Payer: MEDICARE

## 2023-01-20 VITALS
TEMPERATURE: 98.4 F | OXYGEN SATURATION: 96 % | SYSTOLIC BLOOD PRESSURE: 136 MMHG | DIASTOLIC BLOOD PRESSURE: 80 MMHG | RESPIRATION RATE: 16 BRPM | HEART RATE: 65 BPM

## 2023-01-20 PROCEDURE — G0299 HHS/HOSPICE OF RN EA 15 MIN: HCPCS

## 2023-01-20 ASSESSMENT — ENCOUNTER SYMPTOMS
STOOL DESCRIPTION: SOFT FORMED
DYSPNEA ACTIVITY LEVEL: AFTER AMBULATING MORE THAN 20 FT
PAIN LOCATION - PAIN QUALITY: ACHING, SHARP

## 2023-01-23 ENCOUNTER — HOME CARE VISIT (OUTPATIENT)
Dept: SCHEDULING | Facility: HOME HEALTH | Age: 68
End: 2023-01-23
Payer: MEDICARE

## 2023-01-23 PROCEDURE — G0299 HHS/HOSPICE OF RN EA 15 MIN: HCPCS

## 2023-01-24 VITALS
OXYGEN SATURATION: 98 % | TEMPERATURE: 97.9 F | SYSTOLIC BLOOD PRESSURE: 138 MMHG | RESPIRATION RATE: 18 BRPM | DIASTOLIC BLOOD PRESSURE: 82 MMHG | HEART RATE: 78 BPM

## 2023-01-24 ASSESSMENT — ENCOUNTER SYMPTOMS
DYSPNEA ACTIVITY LEVEL: AFTER AMBULATING MORE THAN 20 FT
STOOL DESCRIPTION: FORMED
PAIN LOCATION - PAIN QUALITY: BURNING, TINGLING

## 2023-01-25 ENCOUNTER — HOSPITAL ENCOUNTER (OUTPATIENT)
Dept: WOUND CARE | Age: 68
Discharge: HOME | End: 2023-01-25
Payer: MEDICARE

## 2023-01-25 VITALS
TEMPERATURE: 98.4 F | DIASTOLIC BLOOD PRESSURE: 73 MMHG | BODY MASS INDEX: 35.76 KG/M2 | WEIGHT: 264 LBS | SYSTOLIC BLOOD PRESSURE: 133 MMHG | HEIGHT: 72 IN | HEART RATE: 68 BPM

## 2023-01-25 DIAGNOSIS — L97.929 ULCERS OF BOTH LOWER LEGS (HCC): ICD-10-CM

## 2023-01-25 DIAGNOSIS — I87.393 CHRONIC VENOUS HYPERTENSION (IDIOPATHIC) WITH OTHER COMPLICATIONS OF BILATERAL LOWER EXTREMITY: Primary | ICD-10-CM

## 2023-01-25 DIAGNOSIS — L72.3 SEBACEOUS CYST: ICD-10-CM

## 2023-01-25 DIAGNOSIS — L97.919 ULCERS OF BOTH LOWER LEGS (HCC): ICD-10-CM

## 2023-01-25 PROBLEM — S06.5XAA SUBDURAL HEMATOMA (HCC): Chronic | Status: ACTIVE | Noted: 2022-12-30

## 2023-01-25 PROCEDURE — 97597 DBRDMT OPN WND 1ST 20 CM/<: CPT

## 2023-01-25 RX ORDER — LIDOCAINE HYDROCHLORIDE 40 MG/ML
SOLUTION TOPICAL ONCE
OUTPATIENT
Start: 2023-01-25 | End: 2023-01-25

## 2023-01-25 RX ORDER — GENTAMICIN SULFATE 1 MG/G
OINTMENT TOPICAL ONCE
OUTPATIENT
Start: 2023-01-25 | End: 2023-01-25

## 2023-01-25 RX ORDER — BACITRACIN, NEOMYCIN, POLYMYXIN B 400; 3.5; 5 [USP'U]/G; MG/G; [USP'U]/G
OINTMENT TOPICAL ONCE
OUTPATIENT
Start: 2023-01-25 | End: 2023-01-25

## 2023-01-25 RX ORDER — LIDOCAINE HYDROCHLORIDE 20 MG/ML
JELLY TOPICAL ONCE
OUTPATIENT
Start: 2023-01-25 | End: 2023-01-25

## 2023-01-25 RX ORDER — LIDOCAINE 40 MG/G
CREAM TOPICAL ONCE
OUTPATIENT
Start: 2023-01-25 | End: 2023-01-25

## 2023-01-25 RX ORDER — GINSENG 100 MG
CAPSULE ORAL ONCE
OUTPATIENT
Start: 2023-01-25 | End: 2023-01-25

## 2023-01-25 RX ORDER — BACITRACIN ZINC AND POLYMYXIN B SULFATE 500; 1000 [USP'U]/G; [USP'U]/G
OINTMENT TOPICAL ONCE
OUTPATIENT
Start: 2023-01-25 | End: 2023-01-25

## 2023-01-25 RX ORDER — BETAMETHASONE DIPROPIONATE 0.05 %
OINTMENT (GRAM) TOPICAL ONCE
OUTPATIENT
Start: 2023-01-25 | End: 2023-01-25

## 2023-01-25 RX ORDER — LIDOCAINE HYDROCHLORIDE 20 MG/ML
JELLY TOPICAL ONCE
Status: COMPLETED | OUTPATIENT
Start: 2023-01-25 | End: 2023-01-25

## 2023-01-25 RX ORDER — LIDOCAINE 50 MG/G
OINTMENT TOPICAL ONCE
OUTPATIENT
Start: 2023-01-25 | End: 2023-01-25

## 2023-01-25 RX ORDER — CLOBETASOL PROPIONATE 0.5 MG/G
OINTMENT TOPICAL ONCE
OUTPATIENT
Start: 2023-01-25 | End: 2023-01-25

## 2023-01-25 RX ADMIN — LIDOCAINE HYDROCHLORIDE: 20 JELLY TOPICAL at 15:29

## 2023-01-25 NOTE — FLOWSHEET NOTE
Pre & post debridement     01/25/23 1530   Wound 01/11/23 Pretibial Left cluster #1   Date First Assessed/Time First Assessed: 01/11/23 1039   Present on Hospital Admission: Yes  Wound Approximate Age at First Assessment (Weeks): 27 weeks  Primary Wound Type: Venous Ulcer  Location: Pretibial  Wound Location Orientation: Left  Wound De. .. Wound Image     Wound Etiology Venous   Dressing Status Clean;Dry; Intact   Wound Cleansed Cleansed with saline; Soap and water;Vashe   Dressing/Treatment Xeroform   Wound Length (cm) 0.7 cm   Wound Width (cm) 0.7 cm   Wound Depth (cm) 0.1 cm   Wound Surface Area (cm^2) 0.49 cm^2   Change in Wound Size % (l*w) 99.6   Wound Volume (cm^3) 0.049 cm^3   Wound Healing % 100   Post-Procedure Length (cm) 0.7 cm   Post-Procedure Width (cm) 0.7 cm   Post-Procedure Depth (cm) 0.1 cm   Post-Procedure Surface Area (cm^2) 0.49 cm^2   Post-Procedure Volume (cm^3) 0.049 cm^3   Wound Assessment Granulation tissue   Drainage Amount Small   Drainage Description Serosanguinous   Odor None   Mariana-wound Assessment Intact   Margins Attached edges   Wound Thickness Description not for Pressure Injury Full thickness   Wound 01/11/23 Pretibial Right cluster #2   Date First Assessed/Time First Assessed: 01/11/23 1039   Present on Hospital Admission: Yes  Wound Approximate Age at First Assessment (Weeks): 27 weeks  Primary Wound Type: Venous Ulcer  Location: Pretibial  Wound Location Orientation: Right  Wound D... Wound Image    Wound Etiology Venous   Dressing Status Intact   Wound Cleansed Cleansed with saline; Soap and water;Vashe   Dressing/Treatment Xeroform   Wound Length (cm) 0 cm   Wound Width (cm) 0 cm   Wound Depth (cm) 0 cm   Wound Surface Area (cm^2) 0 cm^2   Change in Wound Size % (l*w) 100   Wound Volume (cm^3) 0 cm^3   Wound Healing % 100   Wound Assessment Epithelialization   Drainage Amount None   Odor None   Wound 01/11/23 Back Medial;Upper #3   Date First Assessed/Time First Assessed: 01/11/23 1040   Primary Wound Type: (c)   Location: Back  Wound Location Orientation: Medial;Upper  Wound Description (Comments): #3   Wound Image    Wound Etiology Other   Wound Cleansed Cleansed with saline   Wound Length (cm) 3 cm   Wound Width (cm) 1.5 cm   Wound Depth (cm) 0 cm   Wound Surface Area (cm^2) 4.5 cm^2   Wound Volume (cm^3) 0 cm^3   Wound Assessment   (firm, raised, with black center X2)   Drainage Amount None   Odor None   Mariana-wound Assessment Intact

## 2023-01-25 NOTE — WOUND CARE
Discharge Instructions for  [facility]  [address] [place] [address]  [address], [address]  Phone [phone]   Fax [phone]      NAME:  Jc Valdez  YOB: 1955  MEDICAL RECORD NUMBER:  [id number]  DATE:  1/25/2023    Return Appointment:   1 week with Leonardo Estrada DO    Instructions: BLE:  Cleanse with soap and water. Vashe Wound Solution (hypochlorous acid) soak placed on wound bed for a minimum of 15 seconds prior to dressing application. This solution removes pathogens, bioburden, and biofilms from wounds, but is not cytotoxic. Apply vaseline to bilateral lower legs  Xeroform- apply to wound bed. And cover with ABD pads  2 layer compression with wound and lower extremity assessment. If there is any problem with the dressing (too tight, slides down, etc.) Patient to return to wound clinic to have re-wrapped by clinician. Change 2 times per week     Do not get wet in shower may purchase cast covers from [employer] or [employer]     Referral to General surgery for management of cysts on upper mid back     Do not cut compression wraps off. If wraps become too loose or slide down, call wound center to make an appointment for dressing change. If wraps become too tight, try elevating your legs to assist fluid drainage. Elevate legs when sitting. Avoid prolonged standing or sitting with legs in dependent position. Eliminate salt intake as this promotes fluid retention and swelling. Take diuretic (fluid pill) as instructed by your doctor. Increase protein intake to promote wound healing. Deandre and Ensure are examples of protein supplements. Wound healing is greatly slowed when glucose levels are greater than 200. Monitor glucose levels to ensure tight glucose control. Applied wraps today need HH for dressing change this Friday and then next week on Monday.     Should you experience increased redness, swelling, pain, foul odor, size of wound(s), or have a temperature over 101 degrees please contact the [address] [place] [address] Road at [phone] or if after hours contact your primary care physician or go to the hospital emergency department. PLEASE NOTE: IF YOU ARE UNABLE TO OBTAIN WOUND SUPPLIES, CONTINUE TO USE THE SUPPLIES YOU HAVE AVAILABLE UNTIL YOU ARE ABLE TO REACH US. IT IS MOST IMPORTANT TO KEEP THE WOUND COVERED AT ALL TIMES.     Electronically signed Suman Jo RN on 1/25/2023 at 3:40 PM

## 2023-01-25 NOTE — DISCHARGE INSTRUCTIONS
Discharge Instructions for  Gaby Plaza  [address] [facility] [address]  [address], [address]  Phone [phone]   Fax [phone]        NAME:  Jennifer Colmenares  YOB: 1955  MEDICAL RECORD NUMBER:  [id number]  DATE:  1/25/2023     Return Appointment:   1 week with Gregory Becerra DO     Instructions: BLE:  Cleanse with soap and water. Vashe Wound Solution (hypochlorous acid) soak placed on wound bed for a minimum of 15 seconds prior to dressing application. This solution removes pathogens, bioburden, and biofilms from wounds, but is not cytotoxic. Apply vaseline to bilateral lower legs  Xeroform- apply to wound bed. And cover with ABD pads  2 layer compression with wound and lower extremity assessment. If there is any problem with the dressing (too tight, slides down, etc.) Patient to return to wound clinic to have re-wrapped by clinician. Change 2 times per week     Do not get wet in shower may purchase cast covers from [employer] or [employer]     Referral to General surgery for management of cysts on upper mid back     Do not cut compression wraps off. If wraps become too loose or slide down, call wound center to make an appointment for dressing change. If wraps become too tight, try elevating your legs to assist fluid drainage. Elevate legs when sitting. Avoid prolonged standing or sitting with legs in dependent position. Eliminate salt intake as this promotes fluid retention and swelling. Take diuretic (fluid pill) as instructed by your doctor. Increase protein intake to promote wound healing. Deandre and Ensure are examples of protein supplements. Wound healing is greatly slowed when glucose levels are greater than 200. Monitor glucose levels to ensure tight glucose control. Applied wraps today need HH for dressing change this Friday and then next week on Monday.     Should you experience increased redness, swelling, pain, foul odor, size of wound(s), or have a temperature over 101 degrees please contact the [address] [place] [address] Road at [phone] or if after hours contact your primary care physician or go to the hospital emergency department. PLEASE NOTE: IF YOU ARE UNABLE TO OBTAIN WOUND SUPPLIES, CONTINUE TO USE THE SUPPLIES YOU HAVE AVAILABLE UNTIL YOU ARE ABLE TO REACH US. IT IS MOST IMPORTANT TO KEEP THE WOUND COVERED AT ALL TIMES.      Electronically signed Dorsie Goldberg, RN on 1/25/2023 at 3:40 PM          Revision History                          Routing History

## 2023-01-25 NOTE — WOUND CARE
Multilayer Compression Wrap   (Not Unna) Below the Knee    NAME:  Jc Valdez  YOB: 1955  MEDICAL RECORD NUMBER:  [id number]  DATE:  1/25/2023    Multilayer compression wrap: Removed old Multilayer wrap if indicated and wash leg with mild soap/water. Applied moisturizing agent to dry skin as needed. Applied primary and secondary dressing as ordered. Applied multilayered dressing below the knee to right lower leg. Applied multilayered dressing below the knee to left lower leg. Instructed patient/caregiver not to remove dressing and to keep it clean and dry. Instructed patient/caregiver on complications to report to provider, such as pain, numbness in toes, heavy drainage, and slippage of dressing. Instructed patient on purpose of compression dressing and on activity and exercise recommendations.       Electronically signed by Milvia Bridges RN on 1/25/2023 at 3:39 PM

## 2023-01-27 ENCOUNTER — HOME CARE VISIT (OUTPATIENT)
Dept: SCHEDULING | Facility: HOME HEALTH | Age: 68
End: 2023-01-27
Payer: MEDICARE

## 2023-01-27 VITALS
TEMPERATURE: 97.9 F | HEART RATE: 72 BPM | RESPIRATION RATE: 19 BRPM | OXYGEN SATURATION: 99 % | DIASTOLIC BLOOD PRESSURE: 70 MMHG | SYSTOLIC BLOOD PRESSURE: 122 MMHG

## 2023-01-27 PROCEDURE — G0299 HHS/HOSPICE OF RN EA 15 MIN: HCPCS

## 2023-01-27 ASSESSMENT — ENCOUNTER SYMPTOMS
SPUTUM PRODUCTION: 1
SPUTUM AMOUNT: SCANT
STOOL DESCRIPTION: FORMED
SPUTUM CONSISTENCY: THIN
COUGH: 1
SPUTUM COLOR: GREEN
DYSPNEA ACTIVITY LEVEL: AFTER AMBULATING MORE THAN 20 FT
COUGH CHARACTERISTICS: PRODUCTIVE

## 2023-01-31 ENCOUNTER — HOME CARE VISIT (OUTPATIENT)
Dept: SCHEDULING | Facility: HOME HEALTH | Age: 68
End: 2023-01-31
Payer: MEDICARE

## 2023-01-31 VITALS
HEART RATE: 62 BPM | RESPIRATION RATE: 18 BRPM | SYSTOLIC BLOOD PRESSURE: 126 MMHG | TEMPERATURE: 97.9 F | OXYGEN SATURATION: 98 % | BODY MASS INDEX: 35.4 KG/M2 | WEIGHT: 261 LBS | DIASTOLIC BLOOD PRESSURE: 72 MMHG

## 2023-01-31 PROCEDURE — G0299 HHS/HOSPICE OF RN EA 15 MIN: HCPCS

## 2023-01-31 ASSESSMENT — ENCOUNTER SYMPTOMS: HEMOPTYSIS: 0

## 2023-02-01 ENCOUNTER — HOSPITAL ENCOUNTER (OUTPATIENT)
Dept: WOUND CARE | Age: 68
Discharge: HOME | End: 2023-02-01
Payer: MEDICARE

## 2023-02-01 VITALS
WEIGHT: 263 LBS | HEIGHT: 72 IN | TEMPERATURE: 99.3 F | RESPIRATION RATE: 18 BRPM | BODY MASS INDEX: 35.62 KG/M2 | HEART RATE: 72 BPM | DIASTOLIC BLOOD PRESSURE: 77 MMHG | SYSTOLIC BLOOD PRESSURE: 129 MMHG

## 2023-02-01 DIAGNOSIS — L97.929 ULCERS OF BOTH LOWER LEGS (HCC): ICD-10-CM

## 2023-02-01 DIAGNOSIS — L97.919 ULCERS OF BOTH LOWER LEGS (HCC): ICD-10-CM

## 2023-02-01 DIAGNOSIS — I87.393 CHRONIC VENOUS HYPERTENSION (IDIOPATHIC) WITH OTHER COMPLICATIONS OF BILATERAL LOWER EXTREMITY: Primary | ICD-10-CM

## 2023-02-01 PROCEDURE — 29581 APPL MULTLAYER CMPRN SYS LEG: CPT

## 2023-02-01 PROCEDURE — 99213 OFFICE O/P EST LOW 20 MIN: CPT | Performed by: FAMILY MEDICINE

## 2023-02-01 RX ORDER — CLOBETASOL PROPIONATE 0.5 MG/G
OINTMENT TOPICAL ONCE
OUTPATIENT
Start: 2023-02-01 | End: 2023-02-01

## 2023-02-01 RX ORDER — LIDOCAINE HYDROCHLORIDE 20 MG/ML
JELLY TOPICAL ONCE
OUTPATIENT
Start: 2023-02-01 | End: 2023-02-01

## 2023-02-01 RX ORDER — BETAMETHASONE DIPROPIONATE 0.05 %
OINTMENT (GRAM) TOPICAL ONCE
OUTPATIENT
Start: 2023-02-01 | End: 2023-02-01

## 2023-02-01 RX ORDER — BACITRACIN, NEOMYCIN, POLYMYXIN B 400; 3.5; 5 [USP'U]/G; MG/G; [USP'U]/G
OINTMENT TOPICAL ONCE
OUTPATIENT
Start: 2023-02-01 | End: 2023-02-01

## 2023-02-01 RX ORDER — LIDOCAINE HYDROCHLORIDE 40 MG/ML
SOLUTION TOPICAL ONCE
OUTPATIENT
Start: 2023-02-01 | End: 2023-02-01

## 2023-02-01 RX ORDER — LIDOCAINE 50 MG/G
OINTMENT TOPICAL ONCE
OUTPATIENT
Start: 2023-02-01 | End: 2023-02-01

## 2023-02-01 RX ORDER — GENTAMICIN SULFATE 1 MG/G
OINTMENT TOPICAL ONCE
OUTPATIENT
Start: 2023-02-01 | End: 2023-02-01

## 2023-02-01 RX ORDER — GINSENG 100 MG
CAPSULE ORAL ONCE
OUTPATIENT
Start: 2023-02-01 | End: 2023-02-01

## 2023-02-01 RX ORDER — LIDOCAINE 40 MG/G
CREAM TOPICAL ONCE
OUTPATIENT
Start: 2023-02-01 | End: 2023-02-01

## 2023-02-01 RX ORDER — BACITRACIN ZINC AND POLYMYXIN B SULFATE 500; 1000 [USP'U]/G; [USP'U]/G
OINTMENT TOPICAL ONCE
OUTPATIENT
Start: 2023-02-01 | End: 2023-02-01

## 2023-02-01 NOTE — WOUND CARE
Multilayer Compression Wrap   (Not Unna) Below the Knee    NAME:  Martha Middleton  YOB: 1955  MEDICAL RECORD NUMBER:  [id number]  DATE:  2/1/2023    Multilayer compression wrap: Removed old Multilayer wrap if indicated and wash leg with mild soap/water. Applied moisturizing agent to dry skin as needed. Applied primary and secondary dressing as ordered. Applied multilayered dressing below the knee to right lower leg. Applied multilayered dressing below the knee to left lower leg. Instructed patient/caregiver not to remove dressing and to keep it clean and dry. Instructed patient/caregiver on complications to report to provider, such as pain, numbness in toes, heavy drainage, and slippage of dressing. Instructed patient on purpose of compression dressing and on activity and exercise recommendations.       Electronically signed by Ricardo Barriga RN on 2/1/2023 at 2:20 PM

## 2023-02-01 NOTE — FLOWSHEET NOTE
02/01/23 1342   Wound 01/11/23 Back Medial;Upper #3   Date First Assessed/Time First Assessed: 01/11/23 1040   Primary Wound Type: (c)   Location: Back  Wound Location Orientation: Medial;Upper  Wound Description (Comments): #3   Wound Image    Wound Etiology Other   Dressing/Treatment Open to air   Wound Length (cm) 3 cm   Wound Width (cm) 2 cm   Wound Depth (cm) 0.1 cm   Wound Surface Area (cm^2) 6 cm^2   Change in Wound Size % (l*w) -33.33   Wound Volume (cm^3) 0.6 cm^3   Wound Assessment   (sebaceous cyst)   Drainage Amount None   Odor None   Mariana-wound Assessment Intact   Wound 01/11/23 Pretibial Left cluster #1   Date First Assessed/Time First Assessed: 01/11/23 1039   Present on Hospital Admission: Yes  Wound Approximate Age at First Assessment (Weeks): 27 weeks  Primary Wound Type: Venous Ulcer  Location: Pretibial  Wound Location Orientation: Left  Wound De. ..    Wound Image    Wound Etiology Venous   Dressing Status Intact   Wound Cleansed Cleansed with saline   Dressing/Treatment Xeroform   Wound Length (cm) 0.2 cm   Wound Width (cm) 0.2 cm   Wound Depth (cm) 0.1 cm   Wound Surface Area (cm^2) 0.04 cm^2   Change in Wound Size % (l*w) 99.97   Wound Volume (cm^3) 0.004 cm^3   Wound Healing % 100   Wound Assessment Pink/red   Drainage Amount Scant   Drainage Description Serous   Odor None   Mariana-wound Assessment Intact   Wound Thickness Description not for Pressure Injury Full thickness

## 2023-02-01 NOTE — WOUND CARE
Discharge Instructions for  Gaby Plaza  [address] [place] [address]  [address], [address]  Phone [phone]   Fax [phone]      NAME:  Zenia Felty  YOB: 1955  MEDICAL RECORD NUMBER:  [id number]  DATE:  2/1/2023    Return Appointment:   2 weeks with John Meza DO      Instructions: BLE:  Cleanse with soap and water. Vashe Wound Solution (hypochlorous acid) soak placed on wound bed for a minimum of 15 seconds prior to dressing application. This solution removes pathogens, bioburden, and biofilms from wounds, but is not cytotoxic. Apply vaseline to bilateral lower legs  Xeroform- apply to wound bed. And cover with ABD pads  2 layer compression with wound and lower extremity assessment. If there is any problem with the dressing (too tight, slides down, etc.) Patient to return to wound clinic to have re-wrapped by clinician. Change once weekly. Do not get wet in shower may purchase cast covers from [employer] or [employer]     Referral to General surgery for management of cysts on upper mid back     Do not cut compression wraps off. If wraps become too loose or slide down, call wound center to make an appointment for dressing change. If wraps become too tight, try elevating your legs to assist fluid drainage. Elevate legs when sitting. Avoid prolonged standing or sitting with legs in dependent position. Eliminate salt intake as this promotes fluid retention and swelling. Take diuretic (fluid pill) as instructed by your doctor. Increase protein intake to promote wound healing. Deandre and Ensure are examples of protein supplements. Wound healing is greatly slowed when glucose levels are greater than 200. Monitor glucose levels to ensure tight glucose control. Applied wraps today need HH for dressing change next Wednesday and will send orders after next visit on Wednesday,   2/15/23.         Should you experience increased redness, swelling, pain, foul odor, size of wound(s), or have a temperature over 101 degrees please contact the [address] [place] [address] Road at [phone] or if after hours contact your primary care physician or go to the hospital emergency department. PLEASE NOTE: IF YOU ARE UNABLE TO OBTAIN WOUND SUPPLIES, CONTINUE TO USE THE SUPPLIES YOU HAVE AVAILABLE UNTIL YOU ARE ABLE TO REACH US. IT IS MOST IMPORTANT TO KEEP THE WOUND COVERED AT ALL TIMES.     Electronically signed Arnaud Diamond RN on 2/1/2023 at 2:18 PM

## 2023-02-03 ENCOUNTER — HOME CARE VISIT (OUTPATIENT)
Dept: SCHEDULING | Facility: HOME HEALTH | Age: 68
End: 2023-02-03
Payer: MEDICARE

## 2023-02-03 NOTE — PROGRESS NOTES
[address] [facility]   Medical Staff Progress Note     [facility]  MEDICAL RECORD NUMBER:  [id number]  AGE: 69 y.o. GENDER: male  : 1955  EPISODE DATE:  2023    Chief complaint and reason for visit:     Chief Complaint   Patient presents with    Wound Check     Left lower leg wound      Patient presenting for follow up evaluation of wound(s) per chief complaint. Subjective and ROS: Symptoms, wound related issues, or other pertinent wound history since last visit: no new wound care issues. Tolerating current treatment. No systemic complaints above baseline. History of Wound Context: Per original history and physical on this patient. Changes in history since last evaluation: none    Medical Decision Making:     Problem List Items Addressed This Visit          Circulatory    Chronic venous hypertension (idiopathic) with other complications of bilateral lower extremity - Primary (Chronic)       Other    * (Principal) Ulcers of both lower legs (HCC) (Chronic)       Wounds and Treatment Plan:     Return Appointment:   2 weeks with Lacho Barrow DO        Instructions: BLE:  Cleanse with soap and water. Vashe Wound Solution (hypochlorous acid) soak placed on wound bed for a minimum of 15 seconds prior to dressing application. This solution removes pathogens, bioburden, and biofilms from wounds, but is not cytotoxic. Apply vaseline to bilateral lower legs  Xeroform- apply to wound bed. And cover with ABD pads  2 layer compression with wound and lower extremity assessment. If there is any problem with the dressing (too tight, slides down, etc.) Patient to return to wound clinic to have re-wrapped by clinician. Change once weekly. Do not get wet in shower may purchase cast covers from [employer] or [employer]     Referral to General surgery for management of cysts on upper mid back     Do not cut compression wraps off.  If wraps become too loose or slide down, call wound center to make an appointment for dressing change. If wraps become too tight, try elevating your legs to assist fluid drainage. Elevate legs when sitting. Avoid prolonged standing or sitting with legs in dependent position. Eliminate salt intake as this promotes fluid retention and swelling. Take diuretic (fluid pill) as instructed by your doctor. Increase protein intake to promote wound healing. Deandre and Ensure are examples of protein supplements. Wound healing is greatly slowed when glucose levels are greater than 200. Monitor glucose levels to ensure tight glucose control. Applied wraps today need HH for dressing change next Wednesday and will send orders after next visit on Wednesday,   2/15/23. Other associated diagnoses or problems addressed:  Chronic venous hypertension    Pertinent imaging reviewed including independent interpretation include:  None    Pertinent labs reviewed. Medical records and review of external note (s) from other providers done as well. New lab or imaging orders placed:  None     Prescription drug management: N/A     Discussion of management or test interpretation with N/A. Comorbid conditions affecting wound healing: As per PMH which was reviewed. Risk of complications and/or mortality of patient management: This patient has a minimal risk of morbidity and mortality from additional diagnostic testing or treatment. This is due to the above conditions affecting wound healing as well as patient and procedure risk factors. Education and discussion held with patient regarding these disease processes pertinent to wound(s). Other pertinent decisions include: minor surgery or procedures as below. The patient's diagnosis or treatment is  significantly limited by social determinants of health as noted by: nutritional resource limitations .     Wound 01/11/23 Pretibial Left cluster #1 (Active)   Wound Image   02/01/23 1342 Wound Etiology Venous 02/01/23 1342   Dressing Status Intact 02/01/23 1342   Wound Cleansed Cleansed with saline 02/01/23 1342   Dressing/Treatment Xeroform 02/01/23 1342   Wound Length (cm) 0.2 cm 02/01/23 1342   Wound Width (cm) 0.2 cm 02/01/23 1342   Wound Depth (cm) 0.1 cm 02/01/23 1342   Wound Surface Area (cm^2) 0.04 cm^2 02/01/23 1342   Change in Wound Size % (l*w) 99.97 02/01/23 1342   Wound Volume (cm^3) 0.004 cm^3 02/01/23 1342   Wound Healing % 100 02/01/23 1342   Post-Procedure Length (cm) 0.7 cm 01/25/23 1530   Post-Procedure Width (cm) 0.7 cm 01/25/23 1530   Post-Procedure Depth (cm) 0.1 cm 01/25/23 1530   Post-Procedure Surface Area (cm^2) 0.49 cm^2 01/25/23 1530   Post-Procedure Volume (cm^3) 0.049 cm^3 01/25/23 1530   Wound Assessment Pink/red 02/01/23 1342   Drainage Amount Scant 02/01/23 1342   Drainage Description Serous 02/01/23 1342   Odor None 02/01/23 1342   Mariana-wound Assessment Intact 02/01/23 1342   Margins Defined edges 01/31/23 0957   Wound Thickness Description not for Pressure Injury Full thickness 02/01/23 1342   Number of days: 22       Wound 01/11/23 Back Medial;Upper #3 (Active)   Wound Image   02/01/23 1342   Wound Etiology Other 02/01/23 1342   Dressing Status Other (Comment) 01/11/23 1040   Wound Cleansed Not Cleansed 01/31/23 0957   Dressing/Treatment Open to air 02/01/23 1342   Wound Length (cm) 3 cm 02/01/23 1342   Wound Width (cm) 2 cm 02/01/23 1342   Wound Depth (cm) 0.1 cm 02/01/23 1342   Wound Surface Area (cm^2) 6 cm^2 02/01/23 1342   Change in Wound Size % (l*w) -33.33 02/01/23 1342   Wound Volume (cm^3) 0.6 cm^3 02/01/23 1342   Wound Assessment Other (Comment) 01/31/23 0957   Drainage Amount None 02/01/23 1342   Odor None 02/01/23 1342   Mariana-wound Assessment Intact 02/01/23 1342   Number of days: 22          TIME: E/M Time spent with patient and/or patient care issues: [] 15-20 min  [x] 21-30 min  [] 31-44 min  [] 45 min or more.    This is above the usual time needed to address patient's chief complaint today: [] Yes  [] No  This time includes physician non-face-to-face service time visit on the date of service such as  Preparing to see the patient (eg, review of tests)  Obtaining and/or reviewing separately obtained history  Performing a medically necessary appropriate examination and/or evaluation  Counseling and educating the patient/family/caregiver  Ordering medications, tests, or procedures  Referring and communicating with other health care professionals as needed  Documenting clinical information in the electronic or other health record  Independently interpreting results (not reported separately) and communicating results to the patient/family/caregiver  Care coordination (not reported separately)    Objective:    /77   Pulse 72   Temp 99.3 °F (37.4 °C) (Oral)   Resp 18   Ht 6' (1.829 m)   Wt 263 lb (119.3 kg)   BMI 35.67 kg/m²   Wt Readings from Last 3 Encounters:   02/01/23 263 lb (119.3 kg)   01/31/23 261 lb (118.4 kg)   01/25/23 264 lb (119.7 kg)       PHYSICAL EXAM  General: Alert and in no acute distress. Normal appearing  Skin: Warm and dry, no rash, see wound measurements listed above. Areas are much better. Still some edema. Recent wound areas are extremely thin. Head: Normocephalic and atraumatic  Eyes: Extraocular eye movements intact, conjunctivae normal, and sclera anicteric  ENT: Hearing grossly normal bilaterally. Normal appearance  Respiratory: no chest wall tenderness. no respiratory distress  GI: Abdomen non-tender and benign  Musculoskeletal: Baseline range of motion in joints. Nontender calves. No cyanosis. Edema 1+. Neurologic: Speech normal. At baseline without new focal deficits.  Mental status normal or at baseline    PAST MEDICAL HISTORY        Diagnosis Date    Bipolar disorder ([facility] [address]     CAD (coronary artery disease)     CHF (congestive heart failure) (HCC)     Chronic back pain     Hyperlipidemia     Hypertension Neuropathy     Type 2 diabetes mellitus without complication ([facility] [address]        PAST SURGICAL HISTORY    Past Surgical History:   Procedure Laterality Date    HERNIA REPAIR         FAMILY HISTORY    History reviewed. No pertinent family history. SOCIAL HISTORY    Social History     Tobacco Use    Smoking status: Never    Smokeless tobacco: Never   Vaping Use    Vaping Use: Never used   Substance Use Topics    Alcohol use: Not Currently    Drug use: Never       ALLERGIES    Allergies   Allergen Reactions    Other Shortness Of Breath     Oyster    Rosuvastatin Shortness Of Breath    Oyster Extract Other (See Comments) and Nausea And Vomiting     N & V, diarrhea, abdominal cramping         MEDICATIONS    Current Outpatient Medications on File Prior to Encounter   Medication Sig Dispense Refill    traMADol (ULTRAM) 50 MG tablet Take 50 mg by mouth every 8 hours as needed for Pain. amLODIPine (NORVASC) 10 MG tablet Take 1 tablet by mouth daily 30 tablet 5    lisinopril (PRINIVIL;ZESTRIL) 20 MG tablet Take 1 tablet by mouth daily 30 tablet 5    carvedilol (COREG) 25 MG tablet Take 1 tablet by mouth 2 times daily 60 tablet 5    furosemide (LASIX) 20 MG tablet Take 1 tablet by mouth daily 30 tablet 5    metFORMIN (GLUCOPHAGE) 500 MG tablet Take 1 tablet by mouth 2 times daily (with meals) 60 tablet 5    gabapentin (NEURONTIN) 300 MG capsule Take 1 capsule by mouth 3 times daily for 180 days. Intended supply: 30 days 90 capsule 5    glucose monitoring (FREESTYLE FREEDOM) kit 1 kit by Does not apply route daily 1 kit 0    blood glucose monitor strips Test one time a day & as needed for symptoms of irregular blood glucose. Dispense sufficient amount for indicated testing frequency plus additional to accommodate PRN testing needs. 100 strip 5     No current facility-administered medications on file prior to encounter. Written patient dismissal instructions given to patient and signed by patient or POA. Electronically signed by Daya Gray DO on 2/2/2023 at 8:16 PM

## 2023-02-08 ENCOUNTER — TELEPHONE (OUTPATIENT)
Dept: WOUND CARE | Age: 68
End: 2023-02-08

## 2023-02-08 NOTE — TELEPHONE ENCOUNTER
Hao Vivas 15 RN called wound center to notify Dr. Zeina Jimenez that the patient was no longer home bound. Patient is driving daily per home health RN. Patient is proficient in dressing changes and will have another appointment at wound center on 2/17/23.

## 2023-02-09 PROBLEM — Z09 HOSPITAL DISCHARGE FOLLOW-UP: Status: RESOLVED | Noted: 2023-01-10 | Resolved: 2023-02-09

## 2023-02-13 ASSESSMENT — ENCOUNTER SYMPTOMS
NAUSEA: 0
DIARRHEA: 0
VOMITING: 0
ABDOMINAL PAIN: 0

## 2023-02-13 NOTE — PROGRESS NOTES
[address], DO  [address] [facility], [address], [address]  [phone]         ASSESSMENT AND PLAN    Problem List Items Addressed This Visit          Circulatory    HTN (hypertension)     Controlled today. Will check labs. Relevant Orders    Comprehensive Metabolic Panel    CBC with Auto Differential    Coronary atherosclerosis      Waiting to see Dr. Jonna Moraes. Denies chest pain. Relevant Medications    furosemide (LASIX) 20 MG tablet    Other Relevant Orders    Comprehensive Metabolic Panel    CBC with Auto Differential    Lipid Panel       Endocrine    Uncontrolled type 2 diabetes mellitus with hyperglycemia (HCC) (Chronic)     Reports better blood sugar, does not check it at home but had it checked yesterday and it was good. Will check labs today. Relevant Orders    Comprehensive Metabolic Panel    Hemoglobin A1C    Microalbumin / Creatinine Urine Ratio    Painful diabetic neuropathy ([facility]     Seeing Pain Management, being worked up with more imaging, considering epidural, continuing Gabapentin. Hematopoietic and Hemostatic    Thrombocytopenia ([facility] [address]     Will check labs today. Relevant Orders    CBC with Auto Differential       Other    Folate deficiency     Will check labs today. Relevant Orders    Folate    Pain and swelling of right lower extremity - Primary     Patient with 2 days of more swelling, warmth and redness of right leg with pain. Has neuropathy but notes that it is worse in the right than the left. Will order a STAT Doppler to evaluate for DVT. Patient denies chest pain, has some SOB but this is not new for him. Will wait for result. Relevant Orders    Vascular duplex lower extremity venous right    Dyslipidemia     Will check labs today.          Relevant Orders    Lipid Panel     Other Visit Diagnoses       Screening for thyroid disorder        Relevant Orders    TSH Screening for malignant neoplasm of prostate        Relevant Orders    PSA Screening             The diagnoses and plan were discussed with the patient, who verbalizes understanding and agrees with plan. All questions answered. Chief Complaint    Chief Complaint   Patient presents with    Follow-up     Order in for MRI from Pain Management. Discuss Medications     furosemide 20 mg- does not work     Leg Swelling     Right leg. HISTORY OF PRESENT ILLNESS    69 y.o. male with CAD, CHF, DM and HTN presents today for follow up. Last seen to [facility] care one month ago following hospitalization for cellulitis of chronic leg wounds and CHF exacerbation. Referred to Pain Management and moved up his appointment with Wound Care. Patient has seen Dr. Lowell Rees, has had multiple wound debridements and was discharged from [address] [place] [address] as he is able to drive himself. Using Lasix and compression to help with leg swelling. Was referred back to Cardiology, switched from Hydralazine to Carvedilol, continued on Amlodipine and Lisinopril and asked to return today for recheck and labs. States that he is doing well. States that he saw Pain Management who said that he would need an MRI and an epidural.  States that they refilled his Neurontin, which helps. Has some shortness of breath and cough, which he has had for awhile. Denies chest pain. Notes that his right leg is more swollen than his left leg. States that it feels hot and is painful. States that he has been keeping on the compression wrap but continues to have some swelling. Is waiting to see Dr. Xena Monte.     PAST MEDICAL HISTORY    Past Medical History:   Diagnosis Date    Bipolar disorder ([facility] [address]     CAD (coronary artery disease)     CHF (congestive heart failure) (HCC)     Chronic back pain     Hyperlipidemia     Hypertension     Neuropathy     Type 2 diabetes mellitus without complication ([facility] [address]        PAST SURGICAL HISTORY    Past Surgical History:   Procedure Laterality Date    HERNIA REPAIR         FAMILY HISTORY    History reviewed. No pertinent family history. SOCIAL HISTORY    Social History     Socioeconomic History    Marital status:      Spouse name: None    Number of children: None    Years of education: None    Highest education level: None   Tobacco Use    Smoking status: Never    Smokeless tobacco: Never   Vaping Use    Vaping Use: Never used   Substance and Sexual Activity    Alcohol use: Not Currently    Drug use: Never    Sexual activity: Not Currently     Social Determinants of Health     Financial Resource Strain: Low Risk     Difficulty of Paying Living Expenses: Not hard at all   Food Insecurity: No Food Insecurity    Worried About Running Out of Food in the Last Year: Never true    Ran Out of Food in the Last Year: Never true   Transportation Needs: Unknown    Lack of Transportation (Non-Medical): No   Housing Stability: Unknown    Unstable Housing in the Last Year: No       MEDICATIONS      Current Outpatient Medications:     furosemide (LASIX) 20 MG tablet, Take 1 tablet by mouth daily, Disp: 30 tablet, Rfl: 3    traMADol (ULTRAM) 50 MG tablet, Take 50 mg by mouth every 8 hours as needed for Pain., Disp: , Rfl:     amLODIPine (NORVASC) 10 MG tablet, Take 1 tablet by mouth daily, Disp: 30 tablet, Rfl: 5    lisinopril (PRINIVIL;ZESTRIL) 20 MG tablet, Take 1 tablet by mouth daily, Disp: 30 tablet, Rfl: 5    carvedilol (COREG) 25 MG tablet, Take 1 tablet by mouth 2 times daily, Disp: 60 tablet, Rfl: 5    metFORMIN (GLUCOPHAGE) 500 MG tablet, Take 1 tablet by mouth 2 times daily (with meals), Disp: 60 tablet, Rfl: 5    gabapentin (NEURONTIN) 300 MG capsule, Take 1 capsule by mouth 3 times daily for 180 days.  Intended supply: 30 days, Disp: 90 capsule, Rfl: 5    glucose monitoring (FREESTYLE FREEDOM) kit, 1 kit by Does not apply route daily, Disp: 1 kit, Rfl: 0    blood glucose monitor strips, Test one time a day & as needed for symptoms of irregular blood glucose. Dispense sufficient amount for indicated testing frequency plus additional to accommodate PRN testing needs. , Disp: 100 strip, Rfl: 5    ALLERGIES / INTOLERANCES    Allergies   Allergen Reactions    Other Shortness Of Breath     Oyster    Rosuvastatin Shortness Of Breath    Oyster Extract Other (See Comments) and Nausea And Vomiting     N & V, diarrhea, abdominal cramping         REVIEW OF SYSTEMS    Review of Systems   Constitutional:  Negative for fever. HENT:  Negative for congestion. Respiratory:  Positive for cough and shortness of breath. Cardiovascular:  Positive for leg swelling. Negative for chest pain. Gastrointestinal:  Negative for abdominal pain, diarrhea, nausea and vomiting. Musculoskeletal:  Positive for back pain. Skin:  Positive for color change. Neurological:  Positive for numbness. Psychiatric/Behavioral:  Negative for dysphoric mood. PHYSICAL EXAMINATION    Vitals:    02/14/23 0851   BP: 136/72   Pulse: 71   Resp: 16   SpO2: 98%         Physical Exam  Vitals and nursing note reviewed. Constitutional:       Appearance: Normal appearance. He is obese. HENT:      Head: Normocephalic and atraumatic. Right Ear: External ear normal.      Left Ear: External ear normal.      Nose: Nose normal.      Mouth/Throat:      Mouth: Mucous membranes are moist.   Eyes:      Extraocular Movements: Extraocular movements intact. Cardiovascular:      Rate and Rhythm: Normal rate and regular rhythm. Heart sounds: Normal heart sounds. No murmur heard. Comments: Right lower leg 17 1/2 inches in diameter, Left lower leg 16 1/2 inches in diameter; right thigh 20 inches in diameter, left thigh 17 1/2 inches in diameter  Pulmonary:      Effort: Pulmonary effort is normal. No respiratory distress. Breath sounds: Normal breath sounds. Abdominal:      General: Bowel sounds are normal.      Palpations: Abdomen is soft.       Tenderness: There is no abdominal tenderness. There is no right CVA tenderness or left CVA tenderness. Musculoskeletal:         General: Normal range of motion. Cervical back: Normal range of motion. Right lower leg: Edema present. Skin:     General: Skin is warm. Findings: Erythema (right lower leg and thigh with warmth and erythema, tender to palpation in calf and distal thigh) present. Neurological:      General: No focal deficit present. Mental Status: He is alert.    Psychiatric:         Mood and Affect: Mood normal.         Behavior: Behavior normal.           RESULTS  Hemoglobin A1C   Date Value Ref Range Status   12/29/2022 9.0 (H) 4.8 - 5.6 % Final     Lab Results   Component Value Date     12/31/2022    K 4.2 12/31/2022     12/31/2022    CO2 24 12/31/2022    BUN 13 12/31/2022    CREATININE 0.80 12/31/2022    GLUCOSE 177 (H) 12/31/2022    CALCIUM 9.1 12/31/2022    PROT 6.9 12/31/2022    LABALBU 3.3 12/31/2022    BILITOT 0.9 12/31/2022    ALKPHOS 55 12/31/2022    AST 13 (L) 12/31/2022    ALT 20 12/31/2022    LABGLOM >60 12/31/2022    GLOB 3.6 12/31/2022       Lab Results   Component Value Date    WBC 7.4 12/31/2022    HGB 15.0 12/31/2022    HCT 44.3 12/31/2022    MCV 88.1 12/31/2022     12/31/2022           Feliciano Layne cameron, DO  9:34 AM  02/14/23

## 2023-02-14 ENCOUNTER — OFFICE VISIT (OUTPATIENT)
Dept: PRIMARY CARE CLINIC | Facility: CLINIC | Age: 68
End: 2023-02-14
Payer: MEDICARE

## 2023-02-14 ENCOUNTER — HOSPITAL ENCOUNTER (OUTPATIENT)
Dept: ULTRASOUND IMAGING | Age: 68
Discharge: HOME | End: 2023-02-17
Payer: MEDICARE

## 2023-02-14 VITALS
DIASTOLIC BLOOD PRESSURE: 72 MMHG | OXYGEN SATURATION: 98 % | RESPIRATION RATE: 16 BRPM | WEIGHT: 264.6 LBS | BODY MASS INDEX: 35.84 KG/M2 | HEIGHT: 72 IN | SYSTOLIC BLOOD PRESSURE: 136 MMHG | HEART RATE: 71 BPM

## 2023-02-14 DIAGNOSIS — E11.40 PAINFUL DIABETIC NEUROPATHY (HCC): ICD-10-CM

## 2023-02-14 DIAGNOSIS — M79.604 PAIN AND SWELLING OF RIGHT LOWER EXTREMITY: Primary | ICD-10-CM

## 2023-02-14 DIAGNOSIS — M79.89 PAIN AND SWELLING OF RIGHT LOWER EXTREMITY: ICD-10-CM

## 2023-02-14 DIAGNOSIS — I10 PRIMARY HYPERTENSION: ICD-10-CM

## 2023-02-14 DIAGNOSIS — M79.89 PAIN AND SWELLING OF RIGHT LOWER EXTREMITY: Primary | ICD-10-CM

## 2023-02-14 DIAGNOSIS — D69.6 THROMBOCYTOPENIA (HCC): ICD-10-CM

## 2023-02-14 DIAGNOSIS — I25.10 ATHEROSCLEROSIS OF CORONARY ARTERY, UNSPECIFIED VESSEL OR LESION TYPE, UNSPECIFIED WHETHER ANGINA PRESENT, UNSPECIFIED WHETHER NATIVE OR TRANSPLANTED HEART: ICD-10-CM

## 2023-02-14 DIAGNOSIS — Z13.29 SCREENING FOR THYROID DISORDER: ICD-10-CM

## 2023-02-14 DIAGNOSIS — Z12.5 SCREENING FOR MALIGNANT NEOPLASM OF PROSTATE: ICD-10-CM

## 2023-02-14 DIAGNOSIS — E78.5 DYSLIPIDEMIA: ICD-10-CM

## 2023-02-14 DIAGNOSIS — E53.8 FOLATE DEFICIENCY: ICD-10-CM

## 2023-02-14 DIAGNOSIS — E11.65 UNCONTROLLED TYPE 2 DIABETES MELLITUS WITH HYPERGLYCEMIA (HCC): ICD-10-CM

## 2023-02-14 DIAGNOSIS — M79.604 PAIN AND SWELLING OF RIGHT LOWER EXTREMITY: ICD-10-CM

## 2023-02-14 PROCEDURE — G8417 CALC BMI ABV UP PARAM F/U: HCPCS | Performed by: FAMILY MEDICINE

## 2023-02-14 PROCEDURE — G8427 DOCREV CUR MEDS BY ELIG CLIN: HCPCS | Performed by: FAMILY MEDICINE

## 2023-02-14 PROCEDURE — 3078F DIAST BP <80 MM HG: CPT | Performed by: FAMILY MEDICINE

## 2023-02-14 PROCEDURE — 3075F SYST BP GE 130 - 139MM HG: CPT | Performed by: FAMILY MEDICINE

## 2023-02-14 PROCEDURE — 1036F TOBACCO NON-USER: CPT | Performed by: FAMILY MEDICINE

## 2023-02-14 PROCEDURE — G8484 FLU IMMUNIZE NO ADMIN: HCPCS | Performed by: FAMILY MEDICINE

## 2023-02-14 PROCEDURE — 3017F COLORECTAL CA SCREEN DOC REV: CPT | Performed by: FAMILY MEDICINE

## 2023-02-14 PROCEDURE — 3046F HEMOGLOBIN A1C LEVEL >9.0%: CPT | Performed by: FAMILY MEDICINE

## 2023-02-14 PROCEDURE — 99214 OFFICE O/P EST MOD 30 MIN: CPT | Performed by: FAMILY MEDICINE

## 2023-02-14 PROCEDURE — 2022F DILAT RTA XM EVC RTNOPTHY: CPT | Performed by: FAMILY MEDICINE

## 2023-02-14 PROCEDURE — 1123F ACP DISCUSS/DSCN MKR DOCD: CPT | Performed by: FAMILY MEDICINE

## 2023-02-14 PROCEDURE — 93971 EXTREMITY STUDY: CPT

## 2023-02-14 RX ORDER — FUROSEMIDE 20 MG/1
20 TABLET ORAL DAILY
Qty: 30 TABLET | Refills: 3 | Status: SHIPPED | OUTPATIENT
Start: 2023-02-14

## 2023-02-14 SDOH — ECONOMIC STABILITY: FOOD INSECURITY: WITHIN THE PAST 12 MONTHS, THE FOOD YOU BOUGHT JUST DIDN'T LAST AND YOU DIDN'T HAVE MONEY TO GET MORE.: NEVER TRUE

## 2023-02-14 SDOH — ECONOMIC STABILITY: FOOD INSECURITY: WITHIN THE PAST 12 MONTHS, YOU WORRIED THAT YOUR FOOD WOULD RUN OUT BEFORE YOU GOT MONEY TO BUY MORE.: NEVER TRUE

## 2023-02-14 SDOH — ECONOMIC STABILITY: INCOME INSECURITY: HOW HARD IS IT FOR YOU TO PAY FOR THE VERY BASICS LIKE FOOD, HOUSING, MEDICAL CARE, AND HEATING?: NOT HARD AT ALL

## 2023-02-14 SDOH — ECONOMIC STABILITY: HOUSING INSECURITY
IN THE LAST 12 MONTHS, WAS THERE A TIME WHEN YOU DID NOT HAVE A STEADY PLACE TO SLEEP OR SLEPT IN A SHELTER (INCLUDING NOW)?: NO

## 2023-02-14 ASSESSMENT — PATIENT HEALTH QUESTIONNAIRE - PHQ9
1. LITTLE INTEREST OR PLEASURE IN DOING THINGS: 0
5. POOR APPETITE OR OVEREATING: 0
3. TROUBLE FALLING OR STAYING ASLEEP: 0
9. THOUGHTS THAT YOU WOULD BE BETTER OFF DEAD, OR OF HURTING YOURSELF: 0
8. MOVING OR SPEAKING SO SLOWLY THAT OTHER PEOPLE COULD HAVE NOTICED. OR THE OPPOSITE, BEING SO FIGETY OR RESTLESS THAT YOU HAVE BEEN MOVING AROUND A LOT MORE THAN USUAL: 0
SUM OF ALL RESPONSES TO PHQ QUESTIONS 1-9: 0
SUM OF ALL RESPONSES TO PHQ9 QUESTIONS 1 & 2: 0
SUM OF ALL RESPONSES TO PHQ QUESTIONS 1-9: 0
4. FEELING TIRED OR HAVING LITTLE ENERGY: 0
SUM OF ALL RESPONSES TO PHQ QUESTIONS 1-9: 0
2. FEELING DOWN, DEPRESSED OR HOPELESS: 0
7. TROUBLE CONCENTRATING ON THINGS, SUCH AS READING THE NEWSPAPER OR WATCHING TELEVISION: 0
6. FEELING BAD ABOUT YOURSELF - OR THAT YOU ARE A FAILURE OR HAVE LET YOURSELF OR YOUR FAMILY DOWN: 0
SUM OF ALL RESPONSES TO PHQ QUESTIONS 1-9: 0
10. IF YOU CHECKED OFF ANY PROBLEMS, HOW DIFFICULT HAVE THESE PROBLEMS MADE IT FOR YOU TO DO YOUR WORK, TAKE CARE OF THINGS AT HOME, OR GET ALONG WITH OTHER PEOPLE: 0

## 2023-02-14 ASSESSMENT — ANXIETY QUESTIONNAIRES
GAD7 TOTAL SCORE: 0
2. NOT BEING ABLE TO STOP OR CONTROL WORRYING: 0
IF YOU CHECKED OFF ANY PROBLEMS ON THIS QUESTIONNAIRE, HOW DIFFICULT HAVE THESE PROBLEMS MADE IT FOR YOU TO DO YOUR WORK, TAKE CARE OF THINGS AT HOME, OR GET ALONG WITH OTHER PEOPLE: NOT DIFFICULT AT ALL
7. FEELING AFRAID AS IF SOMETHING AWFUL MIGHT HAPPEN: 0
1. FEELING NERVOUS, ANXIOUS, OR ON EDGE: 0
5. BEING SO RESTLESS THAT IT IS HARD TO SIT STILL: 0
6. BECOMING EASILY ANNOYED OR IRRITABLE: 0
3. WORRYING TOO MUCH ABOUT DIFFERENT THINGS: 0
4. TROUBLE RELAXING: 0

## 2023-02-14 ASSESSMENT — ENCOUNTER SYMPTOMS
COLOR CHANGE: 1
COUGH: 1
BACK PAIN: 1
SHORTNESS OF BREATH: 1

## 2023-02-14 NOTE — PATIENT INSTRUCTIONS
IT WAS GREAT TO SEE YOU TODAY!    I WILL CALL YOU WITH THE RESULTS OF YOUR LABS.    CONTINUE TO WORK ON YOUR BLOOD SUGARS AND FOLLOW THE WOUND CARE DOCTORS' INSTRUCTIONS TO MAKE SURE TO KEEP YOUR SWELLING UNDER CONTROL.    PLEASE TAKE ALL MEDICATION AS DISCUSSED.    I WILL CALL YOU WITH THE RESULT OF YOUR ULTRASOUND.    I WILL SEE YOU AGAIN IN 3 MONTHS BUT PLEASE CALL WITH CONCERNS [phone]

## 2023-02-14 NOTE — ASSESSMENT & PLAN NOTE
Reports better blood sugar, does not check it at home but had it checked yesterday and it was good. Will check labs today.

## 2023-02-14 NOTE — ASSESSMENT & PLAN NOTE
Seeing Pain Management, being worked up with more imaging, considering epidural, continuing Gabapentin.

## 2023-02-14 NOTE — ASSESSMENT & PLAN NOTE
Patient with 2 days of more swelling, warmth and redness of right leg with pain. Has neuropathy but notes that it is worse in the right than the left. Will order a STAT Doppler to evaluate for DVT. Patient denies chest pain, has some SOB but this is not new for him. Will wait for result.

## 2023-02-17 ENCOUNTER — HOSPITAL ENCOUNTER (OUTPATIENT)
Dept: WOUND CARE | Age: 68
Discharge: HOME | End: 2023-02-17
Payer: MEDICARE

## 2023-02-17 VITALS
HEART RATE: 95 BPM | WEIGHT: 260 LBS | DIASTOLIC BLOOD PRESSURE: 68 MMHG | TEMPERATURE: 98.3 F | SYSTOLIC BLOOD PRESSURE: 109 MMHG | RESPIRATION RATE: 18 BRPM | BODY MASS INDEX: 35.21 KG/M2 | HEIGHT: 72 IN

## 2023-02-17 PROCEDURE — 99212 OFFICE O/P EST SF 10 MIN: CPT

## 2023-02-17 NOTE — DISCHARGE INSTRUCTIONS
Discharge Instructions for  [facility]  [address] [facility] [address]  [address], [address]  Phone [phone]   Fax [phone]        NAME:  Marylene Held  YOB: 1955  MEDICAL RECORD NUMBER:  [id number]  DATE:  2/17/2023     Return Appointment:   Discharge with Rajan Sandra DO        Instructions: Bilateral lower legs:  Keep skin clean and dry. Apply vaseline daily. Wear compression socks daily while up out of bed. Should you experience increased redness, swelling, pain, foul odor, size of wound(s), or have a temperature over 101 degrees please contact the [address] [place] [address] Road at [phone] or if after hours contact your primary care physician or go to the hospital emergency department. PLEASE NOTE: IF YOU ARE UNABLE TO OBTAIN WOUND SUPPLIES, CONTINUE TO USE THE SUPPLIES YOU HAVE AVAILABLE UNTIL YOU ARE ABLE TO REACH US. IT IS MOST IMPORTANT TO KEEP THE WOUND COVERED AT ALL TIMES.      Electronically signed Chago Valladares RN on 2/17/2023 at 11:13 AM

## 2023-02-17 NOTE — FLOWSHEET NOTE
02/17/23 1032   Right Leg Edema Point of Measurement   Leg circumference 41.5 cm   Ankle circumference 23.5 cm   Foot circumference 25 cm   Compression Therapy 2 layer compression wrap   Left Leg Edema Point of Measurement   Leg circumference 39 cm   Ankle circumference 23.5 cm   Foot circumference 26 cm   Compression Therapy 2 layer compression wrap   Wound 01/11/23 Pretibial Left cluster #1   Date First Assessed/Time First Assessed: 01/11/23 1039   Present on Hospital Admission: Yes  Wound Approximate Age at First Assessment (Weeks): 27 weeks  Primary Wound Type: Venous Ulcer  Location: Pretibial  Wound Location Orientation: Left  Wound De...   Wound Image    Wound Etiology Venous   Dressing Status Intact   Wound Cleansed Cleansed with saline   Dressing/Treatment Xeroform   Wound Length (cm) 0.1 cm   Wound Width (cm) 0.1 cm   Wound Depth (cm) 0.1 cm   Wound Surface Area (cm^2) 0.01 cm^2   Change in Wound Size % (l*w) 99.99   Wound Volume (cm^3) 0.001 cm^3   Wound Healing % 100   Wound Assessment Epithelialization   Drainage Amount None   Odor None   Mariana-wound Assessment Intact   Pain Assessment   Pain Assessment None - Denies Pain

## 2023-02-17 NOTE — WOUND CARE
Discharge Instructions for  [facility]  [address] [place] [address]  [address], [address] [facility]  Phone [phone]   Fax [phone]      NAME:  Stephen Jiménez  YOB: 1955  MEDICAL RECORD NUMBER:  [id number]  DATE:  2/17/2023    Return Appointment:   Discharge with Bolivar Garsia DO      Instructions: Bilateral lower legs:  Keep skin clean and dry. Apply vaseline daily. Wear compression socks daily while up out of bed. Should you experience increased redness, swelling, pain, foul odor, size of wound(s), or have a temperature over 101 degrees please contact the [address] [place] [address] Road at [phone] or if after hours contact your primary care physician or go to the hospital emergency department. PLEASE NOTE: IF YOU ARE UNABLE TO OBTAIN WOUND SUPPLIES, CONTINUE TO USE THE SUPPLIES YOU HAVE AVAILABLE UNTIL YOU ARE ABLE TO REACH US. IT IS MOST IMPORTANT TO KEEP THE WOUND COVERED AT ALL TIMES.     Electronically signed Emma Cisse RN on 2/17/2023 at 11:13 AM

## 2023-02-24 ENCOUNTER — HOSPITAL ENCOUNTER (OUTPATIENT)
Dept: WOUND CARE | Age: 68
Discharge: HOME | End: 2023-02-24
Payer: MEDICARE

## 2023-02-24 VITALS
HEIGHT: 72 IN | DIASTOLIC BLOOD PRESSURE: 80 MMHG | BODY MASS INDEX: 35.21 KG/M2 | SYSTOLIC BLOOD PRESSURE: 161 MMHG | WEIGHT: 260 LBS | HEART RATE: 79 BPM | RESPIRATION RATE: 18 BRPM

## 2023-02-24 PROCEDURE — 99213 OFFICE O/P EST LOW 20 MIN: CPT

## 2023-02-24 NOTE — FLOWSHEET NOTE
02/24/23 0939   Right Leg Edema Point of Measurement   Leg circumference 44 cm   Ankle circumference 25.5 cm   Foot circumference 26 cm   Compression Therapy Compression ordered   Left Leg Edema Point of Measurement   Leg circumference 41 cm   Ankle circumference 25.5 cm   Foot circumference 25.5 cm   Compression Therapy Compression ordered   RLE Neurovascular Assessment   Capillary Refill Less than/Equal to 3 seconds   Color Pink   Temperature Warm   RLE Sensation  Decreased;Numbness   R Pedal Pulse +2   LLE Neurovascular Assessment   Capillary Refill Less than/Equal to 3 seconds   Color Pink   Temperature Warm   LLE Sensation  Numbness;Decreased   L Pedal Pulse +2   Wound 02/24/23 Leg Right;Medial #1   Date First Assessed/Time First Assessed: 02/24/23 0941   Present on Hospital Admission: Yes  Wound Approximate Age at First Assessment (Weeks): 1 weeks  Primary Wound Type: Venous Ulcer  Location: Leg  Wound Location Orientation: Right;Medial  Wound D... Wound Image    Wound Etiology Venous   Wound Cleansed Cleansed with saline   Dressing/Treatment Open to air   Wound Length (cm) 2.2 cm   Wound Width (cm) 3.2 cm   Wound Depth (cm) 0.1 cm   Wound Surface Area (cm^2) 7.04 cm^2   Wound Volume (cm^3) 0.704 cm^3   Wound Assessment Pink/red   Drainage Amount Scant   Drainage Description Serous   Odor None   Mariana-wound Assessment Edematous; Blisters; Warm   Margins Attached edges   Wound Thickness Description not for Pressure Injury Partial thickness   Wound 02/24/23 Leg Left; Anterior #2   Date First Assessed/Time First Assessed: 02/24/23 0942   Present on Hospital Admission: Yes  Wound Approximate Age at First Assessment (Weeks): 1 weeks  Primary Wound Type: Venous Ulcer  Location: Leg  Wound Location Orientation: Left; Anterior  Wound . ..    Wound Image    Wound Etiology Venous   Wound Cleansed Cleansed with saline   Dressing/Treatment Open to air   Wound Length (cm) 0.5 cm   Wound Width (cm) 0.5 cm   Wound Depth (cm) 0.1 cm   Wound Surface Area (cm^2) 0.25 cm^2   Wound Volume (cm^3) 0.025 cm^3   Wound Assessment Pink/red   Drainage Amount Scant   Drainage Description Serous   Odor None   Mariana-wound Assessment Edematous; Warm   Margins Attached edges   Wound Thickness Description not for Pressure Injury Partial thickness   Pain Assessment   Pain Assessment None - Denies Pain   Patient is not currently on Anticoagulant Therapy. Wounds mechanically debrided with Normal Saline.

## 2023-02-24 NOTE — DISCHARGE INSTRUCTIONS
Bilateral Lower Legs:  Cleanse wound and periwound with wound cleanser or normal saline. Xeroform- apply to wound bed. Cover with ABD, wrap with Rolled Gauze. Tubigrip to BLE. Elevate lower legs when at rest, at least 30 minutes every 2 hours. Continue protein 60 gm minimum intake daily. Take Diuretic (Lasix) as prescribed by Cardiologist.    Daina Welch for [address] (Velcro) Compression Wraps. Apply Velcro Compression Wraps first thing every morning. Remove at bedtime. Sleep in bed with legs elevated.

## 2023-02-24 NOTE — WOUND CARE
Discharge Instructions for  [facility]  [address] [place] [address]  [address], [address]  Phone [phone]   Fax [phone]      NAME:  Henny Caban  YOB: 1955  MEDICAL RECORD NUMBER:  [id number]  DATE:  2/24/2023    Return Appointment:   1 month with Ken Cohn DO      Instructions:   Bilateral Lower Legs:  Cleanse wound and periwound with wound cleanser or normal saline. Xeroform- apply to wound bed. Cover with ABD, wrap with Rolled Gauze. Tubigrip to BLE. Elevate lower legs when at rest, at least 30 minutes every 2 hours. Continue protein 60 gm minimum intake daily. Take Diuretic (Lasix) as prescribed by Cardiologist.    Thais Estevez for [address] (Velcro) Compression Wraps. Apply Velcro Compression Wraps first thing every morning. Remove at bedtime. Sleep in bed with legs elevated. Should you experience increased redness, swelling, pain, foul odor, size of wound(s), or have a temperature over 101 degrees please contact the [address] [place] [address] Road at [phone] or if after hours contact your primary care physician or go to the hospital emergency department. PLEASE NOTE: IF YOU ARE UNABLE TO OBTAIN WOUND SUPPLIES, CONTINUE TO USE THE SUPPLIES YOU HAVE AVAILABLE UNTIL YOU ARE ABLE TO REACH US. IT IS MOST IMPORTANT TO KEEP THE WOUND COVERED AT ALL TIMES. Electronically signed Sobeida Marie.  Akhil Torres RN on 2/24/2023 at 10:52 AM
22-Sep-2021 01:48

## 2023-03-03 ENCOUNTER — TELEPHONE (OUTPATIENT)
Dept: PRIMARY CARE CLINIC | Facility: CLINIC | Age: 68
End: 2023-03-03

## 2023-03-03 DIAGNOSIS — F31.10 BIPOLAR I DISORDER WITH MANIA (HCC): Primary | ICD-10-CM

## 2023-03-03 RX ORDER — RISPERIDONE 2 MG/1
2 TABLET ORAL 2 TIMES DAILY
Qty: 60 TABLET | Refills: 2 | Status: SHIPPED | OUTPATIENT
Start: 2023-03-03
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
## 2023-03-03 NOTE — TELEPHONE ENCOUNTER
Patient daughter called stating patient is manic again, and had a manic episode last night and has been \"going crazy, and overspending money\" she is wanting to speak with you about it, and stated that he needs medication or to be in a facility.

## 2023-03-07 ENCOUNTER — APPOINTMENT (OUTPATIENT)
Dept: GENERAL RADIOLOGY | Age: 68
End: 2023-03-07
Payer: MEDICARE

## 2023-03-07 ENCOUNTER — HOSPITAL ENCOUNTER (EMERGENCY)
Age: 68
Discharge: HOME | End: 2023-03-08
Attending: EMERGENCY MEDICINE
Payer: MEDICARE

## 2023-03-07 VITALS
HEIGHT: 72 IN | HEART RATE: 85 BPM | RESPIRATION RATE: 18 BRPM | BODY MASS INDEX: 34.81 KG/M2 | OXYGEN SATURATION: 98 % | DIASTOLIC BLOOD PRESSURE: 82 MMHG | WEIGHT: 257 LBS | TEMPERATURE: 98.7 F | SYSTOLIC BLOOD PRESSURE: 159 MMHG

## 2023-03-07 DIAGNOSIS — L03.116 CELLULITIS OF LEFT LOWER EXTREMITY: Primary | ICD-10-CM

## 2023-03-07 LAB
ALBUMIN SERPL-MCNC: 3.5 G/DL (ref 3.2–4.6)
ALBUMIN/GLOB SERPL: 0.9 (ref 0.4–1.6)
ALP SERPL-CCNC: 46 U/L (ref 50–136)
ALT SERPL-CCNC: 18 U/L (ref 12–65)
ANION GAP SERPL CALC-SCNC: 8 MMOL/L (ref 2–11)
AST SERPL-CCNC: 23 U/L (ref 15–37)
BILIRUB SERPL-MCNC: 0.6 MG/DL (ref 0.2–1.1)
BUN SERPL-MCNC: 22 MG/DL (ref 8–23)
CALCIUM SERPL-MCNC: 9 MG/DL (ref 8.3–10.4)
CHLORIDE SERPL-SCNC: 106 MMOL/L (ref 101–110)
CO2 SERPL-SCNC: 21 MMOL/L (ref 21–32)
CREAT SERPL-MCNC: 1.2 MG/DL (ref 0.8–1.5)
ERYTHROCYTE [DISTWIDTH] IN BLOOD BY AUTOMATED COUNT: 12.8 % (ref 11.9–14.6)
ERYTHROCYTE [SEDIMENTATION RATE] IN BLOOD: 14 MM/HR
GLOBULIN SER CALC-MCNC: 3.9 G/DL (ref 2.8–4.5)
GLUCOSE SERPL-MCNC: 256 MG/DL (ref 65–100)
HCT VFR BLD AUTO: 39.7 % (ref 41.1–50.3)
HGB BLD-MCNC: 12.9 G/DL (ref 13.6–17.2)
MCH RBC QN AUTO: 29.5 PG (ref 26.1–32.9)
MCHC RBC AUTO-ENTMCNC: 32.5 G/DL (ref 31.4–35)
MCV RBC AUTO: 90.6 FL (ref 82–102)
NRBC # BLD: 0 K/UL (ref 0–0.2)
PLATELET # BLD AUTO: 181 K/UL (ref 150–450)
PMV BLD AUTO: 10.7 FL (ref 9.4–12.3)
POTASSIUM SERPL-SCNC: 4.2 MMOL/L (ref 3.5–5.1)
PROT SERPL-MCNC: 7.4 G/DL (ref 6.3–8.2)
RBC # BLD AUTO: 4.38 M/UL (ref 4.23–5.6)
SODIUM SERPL-SCNC: 135 MMOL/L (ref 133–143)
WBC # BLD AUTO: 8.1 K/UL (ref 4.3–11.1)

## 2023-03-07 PROCEDURE — 80053 COMPREHEN METABOLIC PANEL: CPT

## 2023-03-07 PROCEDURE — 73630 X-RAY EXAM OF FOOT: CPT

## 2023-03-07 PROCEDURE — 85027 COMPLETE CBC AUTOMATED: CPT

## 2023-03-07 PROCEDURE — 99284 EMERGENCY DEPT VISIT MOD MDM: CPT | Performed by: EMERGENCY MEDICINE

## 2023-03-07 PROCEDURE — 85652 RBC SED RATE AUTOMATED: CPT

## 2023-03-07 PROCEDURE — 6370000000 HC RX 637 (ALT 250 FOR IP): Performed by: PHYSICIAN ASSISTANT

## 2023-03-07 RX ORDER — TRAMADOL HYDROCHLORIDE 50 MG/1
50 TABLET ORAL ONCE
Status: COMPLETED | OUTPATIENT
Start: 2023-03-07 | End: 2023-03-07

## 2023-03-07 RX ADMIN — TRAMADOL HYDROCHLORIDE 50 MG: 50 TABLET ORAL at 22:44

## 2023-03-07 ASSESSMENT — LIFESTYLE VARIABLES
HOW OFTEN DO YOU HAVE A DRINK CONTAINING ALCOHOL: NEVER
HOW MANY STANDARD DRINKS CONTAINING ALCOHOL DO YOU HAVE ON A TYPICAL DAY: PATIENT DOES NOT DRINK

## 2023-03-07 ASSESSMENT — PAIN DESCRIPTION - LOCATION: LOCATION: TOE (COMMENT WHICH ONE)

## 2023-03-07 ASSESSMENT — ENCOUNTER SYMPTOMS
SHORTNESS OF BREATH: 0
COLOR CHANGE: 1
ABDOMINAL PAIN: 0
RHINORRHEA: 0
NAUSEA: 0
SORE THROAT: 0
VOMITING: 0

## 2023-03-07 ASSESSMENT — PAIN DESCRIPTION - ORIENTATION: ORIENTATION: LEFT

## 2023-03-07 ASSESSMENT — PAIN SCALES - GENERAL: PAINLEVEL_OUTOF10: 9

## 2023-03-08 RX ORDER — TRAMADOL HYDROCHLORIDE 50 MG/1
50 TABLET ORAL EVERY 8 HOURS PRN
Qty: 9 TABLET | Refills: 0 | Status: SHIPPED | OUTPATIENT
Start: 2023-03-08 | End: 2023-03-11

## 2023-03-08 RX ORDER — CEPHALEXIN 500 MG/1
500 CAPSULE ORAL 2 TIMES DAILY
Qty: 14 CAPSULE | Refills: 0 | Status: ON HOLD | OUTPATIENT
Start: 2023-03-08 | End: 2023-03-15

## 2023-03-08 NOTE — ED PROVIDER NOTES
Emergency Department Provider Note                   PCP:                Nicole Leon DO               Age: 69 y.o. Sex: male     DISPOSITION Decision To Discharge 03/08/2023 12:00:59 AM       ICD-10-CM    1. Cellulitis of left lower extremity  L03.116 traMADol (ULTRAM) 50 MG tablet          MEDICAL DECISION MAKING  Complexity of Problems Addressed:  1 acute on chronic illness    Data Reviewed and Analyzed:  Category 1:   I reviewed records from an external source: provider visit notes from PCP. I reviewed records from an external source: provider visit notes from outside specialist.  I ordered each unique test.  I reviewed the results of each unique test.        Category 2:   I independently ordered and reviewed the X-rays. No acute process  I independently ordered and reviewed the labs. Category 3: Discussion of management or test interpretation. Patient is a 38-year-old male who presented facility with acute on chronic leg wounds. On arrival patient is well-appearing in no acute distress. Vital signs are stable. Patient is afebrile with normal heart rate. Exam shows some bilateral anterior lower extremity erythema. The left leg appears to have some slow, but apparently healing wounds present. Appears consistent with a chronic venous stasis sort of issue. Labs here today look good. X-ray of the left foot was obtained with no acute process noted. Patient received a single dose of tramadol here in department for discomfort. Patient does have a history of diabetes and as such I am going to cover the patient with a low-dose of some Keflex for the next week until he can follow-up with his family doctor for reevaluation. I discussed all these results with the patient here today in the department. He reports understanding.   Patient go with a prescription for a few tramadol to try and help with his discomfort however I informed him if he needed more long-term pain control that would need to come from a family doctor or other specialist.  Patient understanding. Patient is stable for discharge at this time. Risk of Complications and/or Morbidity of Patient Management:  Prescription drug management performed     Cheyenne Portillo is a 69 y.o. male who presents to the Emergency Department with chief complaint of    Chief Complaint   Patient presents with    Other    Toe Pain      Patient is a 75-year-old male who presents to facility today with concern about infection and pain in his left lower leg. In his left lower leg. He states he has been signed off by wound care about 2 weeks ago but he is concerned there is still infection because he has diabetes he wanted to be reevaluated. He states been doing wound dressing and keeping them covered at home. He has not had any fevers or chills. He has not had any drainage coming from them. He also states his second digit of his left foot is a little discolored and he was concerned something might be wrong with it as well. He denies any injury or trauma to the toe. Patient has a family doctor who he follows with. No other history reported at this time. The history is provided by the patient. Review of Systems   Constitutional:  Negative for chills and fever. HENT:  Negative for congestion, rhinorrhea and sore throat. Respiratory:  Negative for shortness of breath. Cardiovascular:  Negative for chest pain. Gastrointestinal:  Negative for abdominal pain, nausea and vomiting. Genitourinary:  Negative for dysuria, frequency and urgency. Musculoskeletal:  Negative for gait problem. Skin:  Positive for color change and wound. Neurological:  Negative for dizziness, syncope and headaches. Psychiatric/Behavioral:  Negative for agitation and behavioral problems. All other systems reviewed and are negative. Vitals signs and nursing note reviewed.    Patient Vitals for the past 4 hrs:   Temp Pulse Resp BP SpO2   03/07/23 2132 98.7 °F (37.1 °C) 85 18 (!) 159/82 98 %          Physical Exam  Vitals and nursing note reviewed. Constitutional:       General: He is not in acute distress. Appearance: Normal appearance. He is not ill-appearing or toxic-appearing. HENT:      Head: Normocephalic and atraumatic. Right Ear: External ear normal.      Left Ear: External ear normal.   Eyes:      Extraocular Movements: Extraocular movements intact. Conjunctiva/sclera: Conjunctivae normal.   Cardiovascular:      Rate and Rhythm: Normal rate and regular rhythm. Pulses: Normal pulses. Heart sounds: Normal heart sounds. Pulmonary:      Effort: Pulmonary effort is normal.      Breath sounds: Normal breath sounds. Abdominal:      General: Abdomen is flat. Musculoskeletal:         General: Normal range of motion. Cervical back: Normal range of motion. Right lower leg: No edema. Left lower leg: No edema. Skin:     General: Skin is warm and dry. Capillary Refill: Capillary refill takes less than 2 seconds. Comments: See lower extremity pictures   Neurological:      General: No focal deficit present. Mental Status: He is alert and oriented to person, place, and time. Procedures     Orders Placed This Encounter   Procedures    XR FOOT LEFT (MIN 3 VIEWS)    CBC    Comprehensive Metabolic Panel    Sedimentation Rate        Medications   traMADol (ULTRAM) tablet 50 mg (50 mg Oral Given 3/7/23 2244)       New Prescriptions    CEPHALEXIN (KEFLEX) 500 MG CAPSULE    Take 1 capsule by mouth 2 times daily for 7 days    TRAMADOL (ULTRAM) 50 MG TABLET    Take 1 tablet by mouth every 8 hours as needed for Pain for up to 3 days. Intended supply: 3 days.  Take lowest dose possible to manage pain Max Daily Amount: 150 mg        Past Medical History:   Diagnosis Date    Bipolar disorder ([facility] [address]     CAD (coronary artery disease)     CHF (congestive heart failure) (HCC)     Chronic back pain     Hyperlipidemia Hypertension     Neuropathy     Type 2 diabetes mellitus without complication [facility])         Past Surgical History:   Procedure Laterality Date    HERNIA REPAIR          History reviewed. No pertinent family history. Social History     Socioeconomic History    Marital status:      Spouse name: None    Number of children: None    Years of education: None    Highest education level: None   Tobacco Use    Smoking status: Former     Types: Cigarettes     Quit date: 1998     Years since quittin.1    Smokeless tobacco: Never   Vaping Use    Vaping Use: Never used   Substance and Sexual Activity    Alcohol use: Not Currently    Drug use: Never    Sexual activity: Not Currently     Social Determinants of Health     Financial Resource Strain: Low Risk     Difficulty of Paying Living Expenses: Not hard at all   Food Insecurity: No Food Insecurity    Worried About [address] in the Last Year: Never true    [address] [employer] in the Last Year: Never true   Transportation Needs: Unknown    Lack of Transportation (Non-Medical): No   Housing Stability: Unknown    Unstable Housing in the Last Year: No        Allergies: Other, Rosuvastatin, and Oyster extract    Previous Medications    AMLODIPINE (NORVASC) 10 MG TABLET    Take 1 tablet by mouth daily    BLOOD GLUCOSE MONITOR STRIPS    Test one time a day & as needed for symptoms of irregular blood glucose. Dispense sufficient amount for indicated testing frequency plus additional to accommodate PRN testing needs. CARVEDILOL (COREG) 25 MG TABLET    Take 1 tablet by mouth 2 times daily    FUROSEMIDE (LASIX) 20 MG TABLET    Take 1 tablet by mouth daily    GABAPENTIN (NEURONTIN) 300 MG CAPSULE    Take 1 capsule by mouth 3 times daily for 180 days.  Intended supply: 30 days    GLUCOSE MONITORING (FREESTYLE FREEDOM) KIT    1 kit by Does not apply route daily    LISINOPRIL (PRINIVIL;ZESTRIL) 20 MG TABLET    Take 1 tablet by mouth daily    METFORMIN (GLUCOPHAGE) 500 MG TABLET    Take 1 tablet by mouth 2 times daily (with meals)    RISPERIDONE (RISPERDAL) 2 MG TABLET    Take 1 tablet by mouth 2 times daily        Results for orders placed or performed during the hospital encounter of 03/07/23   XR FOOT LEFT (MIN 3 VIEWS)    Narrative    EXAMINATION: XR FOOT LEFT (MIN 3 VIEWS)      DATE OF EXAM: 3/7/2023 9:54 PM    HISTORY: 2nd digit pain, discoloration    COMPARISON: None. FINDINGS:     The bones are demineralized. No fracture or dislocation. No osteomyelitis or   other acute abnormality. Soft tissues are relatively normal without discrete   wound or other abnormality. Impression    1. No acute abnormalities of the left foot. No fracture or dislocation. No bony   destructive changes of osteomyelitis. No discrete wound or soft tissue gas. 2.  Demineralization.       Soto Hayden M.D.   3/7/2023 10:20:00 PM   CBC   Result Value Ref Range    WBC 8.1 4.3 - 11.1 K/uL    RBC 4.38 4.23 - 5.6 M/uL    Hemoglobin 12.9 (L) 13.6 - 17.2 g/dL    Hematocrit 39.7 (L) 41.1 - 50.3 %    MCV 90.6 82 - 102 FL    MCH 29.5 26.1 - 32.9 PG    MCHC 32.5 31.4 - 35.0 g/dL    RDW 12.8 11.9 - 14.6 %    Platelets 974 000 - 502 K/uL    MPV 10.7 9.4 - 12.3 FL    nRBC 0.00 0.0 - 0.2 K/uL   Comprehensive Metabolic Panel   Result Value Ref Range    Sodium 135 133 - 143 mmol/L    Potassium 4.2 3.5 - 5.1 mmol/L    Chloride 106 101 - 110 mmol/L    CO2 21 21 - 32 mmol/L    Anion Gap 8 2 - 11 mmol/L    Glucose 256 (H) 65 - 100 mg/dL    BUN 22 8 - 23 MG/DL    Creatinine 1.20 0.8 - 1.5 MG/DL    Est, Glom Filt Rate >60 >60 ml/min/1.73m2    Calcium 9.0 8.3 - 10.4 MG/DL    Total Bilirubin 0.6 0.2 - 1.1 MG/DL    ALT 18 12 - 65 U/L    AST 23 15 - 37 U/L    Alk Phosphatase 46 (L) 50 - 136 U/L    Total Protein 7.4 6.3 - 8.2 g/dL    Albumin 3.5 3.2 - 4.6 g/dL    Globulin 3.9 2.8 - 4.5 g/dL    Albumin/Globulin Ratio 0.9 0.4 - 1.6     Sedimentation Rate   Result Value Ref Range    Sed Rate, Automated 14 (L) 15 mm/hr        XR FOOT LEFT (MIN 3 VIEWS)   Final Result      1. No acute abnormalities of the left foot. No fracture or dislocation. No bony    destructive changes of osteomyelitis. No discrete wound or soft tissue gas. 2.  Demineralization. Neela Deleon M.D.    3/7/2023 10:20:00 PM                        Voice dictation software was used during the making of this note. This software is not perfect and grammatical and other typographical errors may be present. This note has not been completely proofread for errors.      [employer], PA-C  03/08/23 0025

## 2023-03-08 NOTE — DISCHARGE INSTRUCTIONS
Labs look good. Out of caution we will cover with some keflex for the next 7 days. Follow-up with your family doctor.  Return to ED as needed

## 2023-03-08 NOTE — ED TRIAGE NOTES
Pt states that he is out of his tramadol and hes \"having withdrawals. \" Pt left leg is red and swollen. Toe beside big toe is red.  Pt states he \"thinks I need antibiotics\"

## 2023-03-10 ENCOUNTER — HOSPITAL ENCOUNTER (EMERGENCY)
Age: 68
Discharge: HOME | DRG: 217 | End: 2023-03-10
Attending: EMERGENCY MEDICINE
Payer: MEDICARE

## 2023-03-10 ENCOUNTER — HOSPITAL ENCOUNTER (EMERGENCY)
Age: 68
Discharge: HOME | DRG: 217 | End: 2023-03-11
Attending: EMERGENCY MEDICINE
Payer: MEDICARE

## 2023-03-10 VITALS
DIASTOLIC BLOOD PRESSURE: 80 MMHG | WEIGHT: 257 LBS | BODY MASS INDEX: 34.81 KG/M2 | RESPIRATION RATE: 18 BRPM | OXYGEN SATURATION: 97 % | HEIGHT: 72 IN | TEMPERATURE: 98.1 F | SYSTOLIC BLOOD PRESSURE: 147 MMHG | HEART RATE: 99 BPM

## 2023-03-10 VITALS
RESPIRATION RATE: 18 BRPM | HEART RATE: 97 BPM | DIASTOLIC BLOOD PRESSURE: 84 MMHG | HEIGHT: 72 IN | WEIGHT: 257 LBS | SYSTOLIC BLOOD PRESSURE: 174 MMHG | TEMPERATURE: 98.2 F | OXYGEN SATURATION: 98 % | BODY MASS INDEX: 34.81 KG/M2

## 2023-03-10 DIAGNOSIS — F31.70 BIPOLAR DISORDER, CURRENTLY IN REMISSION (HCC): Primary | ICD-10-CM

## 2023-03-10 DIAGNOSIS — L03.116 CELLULITIS OF LEFT LOWER EXTREMITY: Primary | ICD-10-CM

## 2023-03-10 LAB
ALBUMIN SERPL-MCNC: 3.8 G/DL (ref 3.2–4.6)
ALBUMIN/GLOB SERPL: 1.1 (ref 0.4–1.6)
ALP SERPL-CCNC: 60 U/L (ref 50–136)
ALT SERPL-CCNC: 18 U/L (ref 12–65)
ANION GAP SERPL CALC-SCNC: 7 MMOL/L (ref 2–11)
AST SERPL-CCNC: 13 U/L (ref 15–37)
BASOPHILS # BLD: 0 K/UL (ref 0–0.2)
BASOPHILS NFR BLD: 0 % (ref 0–2)
BILIRUB SERPL-MCNC: 0.4 MG/DL (ref 0.2–1.1)
BUN SERPL-MCNC: 17 MG/DL (ref 8–23)
CALCIUM SERPL-MCNC: 8.6 MG/DL (ref 8.3–10.4)
CHLORIDE SERPL-SCNC: 107 MMOL/L (ref 101–110)
CO2 SERPL-SCNC: 22 MMOL/L (ref 21–32)
CREAT SERPL-MCNC: 1 MG/DL (ref 0.8–1.5)
DIFFERENTIAL METHOD BLD: NORMAL
EOSINOPHIL # BLD: 0.2 K/UL (ref 0–0.8)
EOSINOPHIL NFR BLD: 2 % (ref 0.5–7.8)
ERYTHROCYTE [DISTWIDTH] IN BLOOD BY AUTOMATED COUNT: 12.8 % (ref 11.9–14.6)
ETHANOL SERPL-MCNC: <3 MG/DL (ref 0–0.08)
GLOBULIN SER CALC-MCNC: 3.5 G/DL (ref 2.8–4.5)
GLUCOSE SERPL-MCNC: 265 MG/DL (ref 65–100)
HCT VFR BLD AUTO: 41.1 % (ref 41.1–50.3)
HGB BLD-MCNC: 13.9 G/DL (ref 13.6–17.2)
IMM GRANULOCYTES # BLD AUTO: 0 K/UL (ref 0–0.5)
IMM GRANULOCYTES NFR BLD AUTO: 0 % (ref 0–5)
LYMPHOCYTES # BLD: 2.7 K/UL (ref 0.5–4.6)
LYMPHOCYTES NFR BLD: 34 % (ref 13–44)
MAGNESIUM SERPL-MCNC: 2.2 MG/DL (ref 1.8–2.4)
MCH RBC QN AUTO: 29.8 PG (ref 26.1–32.9)
MCHC RBC AUTO-ENTMCNC: 33.8 G/DL (ref 31.4–35)
MCV RBC AUTO: 88 FL (ref 82–102)
MONOCYTES # BLD: 0.8 K/UL (ref 0.1–1.3)
MONOCYTES NFR BLD: 10 % (ref 4–12)
NEUTS SEG # BLD: 4.3 K/UL (ref 1.7–8.2)
NEUTS SEG NFR BLD: 54 % (ref 43–78)
NRBC # BLD: 0 K/UL (ref 0–0.2)
PLATELET # BLD AUTO: 182 K/UL (ref 150–450)
PMV BLD AUTO: 9.9 FL (ref 9.4–12.3)
POTASSIUM SERPL-SCNC: 4.3 MMOL/L (ref 3.5–5.1)
PROT SERPL-MCNC: 7.3 G/DL (ref 6.3–8.2)
RBC # BLD AUTO: 4.67 M/UL (ref 4.23–5.6)
SALICYLATES SERPL-MCNC: <1.7 MG/DL (ref 2.8–20)
SODIUM SERPL-SCNC: 136 MMOL/L (ref 133–143)
WBC # BLD AUTO: 8 K/UL (ref 4.3–11.1)

## 2023-03-10 PROCEDURE — 99283 EMERGENCY DEPT VISIT LOW MDM: CPT

## 2023-03-10 PROCEDURE — 93005 ELECTROCARDIOGRAM TRACING: CPT

## 2023-03-10 PROCEDURE — 80053 COMPREHEN METABOLIC PANEL: CPT

## 2023-03-10 PROCEDURE — 83735 ASSAY OF MAGNESIUM: CPT

## 2023-03-10 PROCEDURE — 85025 COMPLETE CBC W/AUTO DIFF WBC: CPT

## 2023-03-10 PROCEDURE — 80307 DRUG TEST PRSMV CHEM ANLYZR: CPT

## 2023-03-10 PROCEDURE — 82077 ASSAY SPEC XCP UR&BREATH IA: CPT

## 2023-03-10 PROCEDURE — 80143 DRUG ASSAY ACETAMINOPHEN: CPT

## 2023-03-10 PROCEDURE — 80179 DRUG ASSAY SALICYLATE: CPT

## 2023-03-10 RX ORDER — CEPHALEXIN 500 MG/1
500 CAPSULE ORAL 2 TIMES DAILY
Qty: 14 CAPSULE | Refills: 0 | Status: ON HOLD | OUTPATIENT
Start: 2023-03-10 | End: 2023-03-23 | Stop reason: HOSPADM

## 2023-03-10 ASSESSMENT — LIFESTYLE VARIABLES
HOW OFTEN DO YOU HAVE A DRINK CONTAINING ALCOHOL: NEVER
HOW MANY STANDARD DRINKS CONTAINING ALCOHOL DO YOU HAVE ON A TYPICAL DAY: PATIENT DOES NOT DRINK
HOW MANY STANDARD DRINKS CONTAINING ALCOHOL DO YOU HAVE ON A TYPICAL DAY: PATIENT DOES NOT DRINK
HOW OFTEN DO YOU HAVE A DRINK CONTAINING ALCOHOL: NEVER

## 2023-03-11 ENCOUNTER — APPOINTMENT (OUTPATIENT)
Dept: CT IMAGING | Age: 68
DRG: 217 | End: 2023-03-11
Payer: MEDICARE

## 2023-03-11 ENCOUNTER — HOSPITAL ENCOUNTER (INPATIENT)
Age: 68
LOS: 11 days | Discharge: NURSING, REHAB OR LONG-TERM CARE | DRG: 217 | End: 2023-03-23
Attending: EMERGENCY MEDICINE | Admitting: INTERNAL MEDICINE
Payer: MEDICARE

## 2023-03-11 ENCOUNTER — APPOINTMENT (OUTPATIENT)
Dept: GENERAL RADIOLOGY | Age: 68
DRG: 217 | End: 2023-03-11
Payer: MEDICARE

## 2023-03-11 DIAGNOSIS — I48.91 NEW ONSET ATRIAL FIBRILLATION (HCC): Primary | ICD-10-CM

## 2023-03-11 DIAGNOSIS — Z95.1 S/P CABG X 2: ICD-10-CM

## 2023-03-11 DIAGNOSIS — I20.0 UNSTABLE ANGINA (HCC): ICD-10-CM

## 2023-03-11 DIAGNOSIS — E87.1 HYPONATREMIA: ICD-10-CM

## 2023-03-11 DIAGNOSIS — I25.110 CORONARY ARTERY DISEASE INVOLVING NATIVE CORONARY ARTERY OF NATIVE HEART WITH UNSTABLE ANGINA PECTORIS (HCC): ICD-10-CM

## 2023-03-11 DIAGNOSIS — F30.10 MANIC BEHAVIOR (HCC): ICD-10-CM

## 2023-03-11 DIAGNOSIS — I48.0 PAROXYSMAL ATRIAL FIBRILLATION (HCC): ICD-10-CM

## 2023-03-11 DIAGNOSIS — Z95.2 S/P AVR (AORTIC VALVE REPLACEMENT): ICD-10-CM

## 2023-03-11 DIAGNOSIS — I20.0 ACCELERATING ANGINA (HCC): ICD-10-CM

## 2023-03-11 LAB
ALBUMIN SERPL-MCNC: 3.3 G/DL (ref 3.2–4.6)
ALBUMIN/GLOB SERPL: 1 (ref 0.4–1.6)
ALP SERPL-CCNC: 41 U/L (ref 50–136)
ALT SERPL-CCNC: 17 U/L (ref 12–65)
AMPHET UR QL SCN: NEGATIVE
ANION GAP SERPL CALC-SCNC: 3 MMOL/L (ref 2–11)
APAP SERPL-MCNC: <10 UG/ML (ref 10–30)
APTT PPP: 34.4 SEC (ref 24.5–34.2)
AST SERPL-CCNC: 12 U/L (ref 15–37)
BARBITURATES UR QL SCN: NEGATIVE
BENZODIAZ UR QL: NEGATIVE
BILIRUB SERPL-MCNC: 0.3 MG/DL (ref 0.2–1.1)
BUN SERPL-MCNC: 16 MG/DL (ref 8–23)
CALCIUM SERPL-MCNC: 8.6 MG/DL (ref 8.3–10.4)
CANNABINOIDS UR QL SCN: NEGATIVE
CHLORIDE SERPL-SCNC: 107 MMOL/L (ref 101–110)
CO2 SERPL-SCNC: 25 MMOL/L (ref 21–32)
COCAINE UR QL SCN: NEGATIVE
CREAT SERPL-MCNC: 0.9 MG/DL (ref 0.8–1.5)
D DIMER PPP FEU-MCNC: 0.91 UG/ML(FEU)
EKG ATRIAL RATE: 92 BPM
EKG DIAGNOSIS: NORMAL
EKG P AXIS: 43 DEGREES
EKG P-R INTERVAL: 162 MS
EKG Q-T INTERVAL: 350 MS
EKG QRS DURATION: 80 MS
EKG QTC CALCULATION (BAZETT): 432 MS
EKG R AXIS: 28 DEGREES
EKG T AXIS: 2 DEGREES
EKG VENTRICULAR RATE: 92 BPM
ERYTHROCYTE [DISTWIDTH] IN BLOOD BY AUTOMATED COUNT: 13 % (ref 11.9–14.6)
GLOBULIN SER CALC-MCNC: 3.3 G/DL (ref 2.8–4.5)
GLUCOSE SERPL-MCNC: 210 MG/DL (ref 65–100)
HCT VFR BLD AUTO: 38.3 % (ref 41.1–50.3)
HGB BLD-MCNC: 12.8 G/DL (ref 13.6–17.2)
INR PPP: 1.1
LIPASE SERPL-CCNC: 285 U/L (ref 73–393)
MAGNESIUM SERPL-MCNC: 2.4 MG/DL (ref 1.8–2.4)
MCH RBC QN AUTO: 30 PG (ref 26.1–32.9)
MCHC RBC AUTO-ENTMCNC: 33.4 G/DL (ref 31.4–35)
MCV RBC AUTO: 89.7 FL (ref 82–102)
METHADONE UR QL: NEGATIVE
NRBC # BLD: 0 K/UL (ref 0–0.2)
OPIATES UR QL: NEGATIVE
PCP UR QL: NEGATIVE
PLATELET # BLD AUTO: 187 K/UL (ref 150–450)
PMV BLD AUTO: 10.1 FL (ref 9.4–12.3)
POTASSIUM SERPL-SCNC: 3.8 MMOL/L (ref 3.5–5.1)
PROT SERPL-MCNC: 6.6 G/DL (ref 6.3–8.2)
PROTHROMBIN TIME: 14.7 SEC (ref 12.6–14.3)
RBC # BLD AUTO: 4.27 M/UL (ref 4.23–5.6)
SODIUM SERPL-SCNC: 135 MMOL/L (ref 133–143)
TROPONIN I SERPL HS-MCNC: 10.1 PG/ML (ref 0–14)
TROPONIN I SERPL HS-MCNC: 10.5 PG/ML (ref 0–14)
TSH, 3RD GENERATION: 0.84 UIU/ML (ref 0.36–3.74)
WBC # BLD AUTO: 9.2 K/UL (ref 4.3–11.1)

## 2023-03-11 PROCEDURE — 96375 TX/PRO/DX INJ NEW DRUG ADDON: CPT

## 2023-03-11 PROCEDURE — 80053 COMPREHEN METABOLIC PANEL: CPT

## 2023-03-11 PROCEDURE — 2500000003 HC RX 250 WO HCPCS: Performed by: EMERGENCY MEDICINE

## 2023-03-11 PROCEDURE — 83735 ASSAY OF MAGNESIUM: CPT

## 2023-03-11 PROCEDURE — 84443 ASSAY THYROID STIM HORMONE: CPT

## 2023-03-11 PROCEDURE — 85730 THROMBOPLASTIN TIME PARTIAL: CPT

## 2023-03-11 PROCEDURE — 85610 PROTHROMBIN TIME: CPT

## 2023-03-11 PROCEDURE — 96366 THER/PROPH/DIAG IV INF ADDON: CPT

## 2023-03-11 PROCEDURE — 96365 THER/PROPH/DIAG IV INF INIT: CPT

## 2023-03-11 PROCEDURE — 71045 X-RAY EXAM CHEST 1 VIEW: CPT

## 2023-03-11 PROCEDURE — 99285 EMERGENCY DEPT VISIT HI MDM: CPT

## 2023-03-11 PROCEDURE — 6360000002 HC RX W HCPCS: Performed by: EMERGENCY MEDICINE

## 2023-03-11 PROCEDURE — 85379 FIBRIN DEGRADATION QUANT: CPT

## 2023-03-11 PROCEDURE — 6360000004 HC RX CONTRAST MEDICATION: Performed by: EMERGENCY MEDICINE

## 2023-03-11 PROCEDURE — 71260 CT THORAX DX C+: CPT | Performed by: EMERGENCY MEDICINE

## 2023-03-11 PROCEDURE — 93005 ELECTROCARDIOGRAM TRACING: CPT | Performed by: EMERGENCY MEDICINE

## 2023-03-11 PROCEDURE — 6370000000 HC RX 637 (ALT 250 FOR IP)

## 2023-03-11 PROCEDURE — 84484 ASSAY OF TROPONIN QUANT: CPT

## 2023-03-11 PROCEDURE — 85027 COMPLETE CBC AUTOMATED: CPT

## 2023-03-11 PROCEDURE — 83690 ASSAY OF LIPASE: CPT

## 2023-03-11 PROCEDURE — 96376 TX/PRO/DX INJ SAME DRUG ADON: CPT

## 2023-03-11 RX ORDER — DILTIAZEM HYDROCHLORIDE 5 MG/ML
20 INJECTION INTRAVENOUS ONCE
Status: COMPLETED | OUTPATIENT
Start: 2023-03-11 | End: 2023-03-11

## 2023-03-11 RX ORDER — HEPARIN SODIUM 1000 [USP'U]/ML
4000 INJECTION, SOLUTION INTRAVENOUS; SUBCUTANEOUS PRN
Status: DISCONTINUED | OUTPATIENT
Start: 2023-03-11 | End: 2023-03-15

## 2023-03-11 RX ORDER — TRAMADOL HYDROCHLORIDE 50 MG/1
50 TABLET ORAL EVERY 8 HOURS PRN
Status: DISCONTINUED | OUTPATIENT
Start: 2023-03-11 | End: 2023-03-11 | Stop reason: HOSPADM

## 2023-03-11 RX ORDER — MORPHINE SULFATE 4 MG/ML
4 INJECTION INTRAVENOUS ONCE
Status: COMPLETED | OUTPATIENT
Start: 2023-03-11 | End: 2023-03-11

## 2023-03-11 RX ORDER — RISPERIDONE 1 MG/1
2 TABLET ORAL NIGHTLY
Status: DISCONTINUED | OUTPATIENT
Start: 2023-03-11 | End: 2023-03-11 | Stop reason: HOSPADM

## 2023-03-11 RX ORDER — HEPARIN SODIUM 1000 [USP'U]/ML
4000 INJECTION, SOLUTION INTRAVENOUS; SUBCUTANEOUS ONCE
Status: COMPLETED | OUTPATIENT
Start: 2023-03-11 | End: 2023-03-11

## 2023-03-11 RX ORDER — GABAPENTIN 300 MG/1
300 CAPSULE ORAL 3 TIMES DAILY
Status: DISCONTINUED | OUTPATIENT
Start: 2023-03-11 | End: 2023-03-11 | Stop reason: HOSPADM

## 2023-03-11 RX ORDER — CARVEDILOL 25 MG/1
25 TABLET ORAL 2 TIMES DAILY WITH MEALS
Status: DISCONTINUED | OUTPATIENT
Start: 2023-03-11 | End: 2023-03-11 | Stop reason: HOSPADM

## 2023-03-11 RX ORDER — HEPARIN SODIUM 1000 [USP'U]/ML
2000 INJECTION, SOLUTION INTRAVENOUS; SUBCUTANEOUS PRN
Status: DISCONTINUED | OUTPATIENT
Start: 2023-03-11 | End: 2023-03-15

## 2023-03-11 RX ORDER — HEPARIN SODIUM 10000 [USP'U]/100ML
5-30 INJECTION, SOLUTION INTRAVENOUS CONTINUOUS
Status: DISCONTINUED | OUTPATIENT
Start: 2023-03-11 | End: 2023-03-15

## 2023-03-11 RX ADMIN — TRAMADOL HYDROCHLORIDE 50 MG: 50 TABLET ORAL at 02:23

## 2023-03-11 RX ADMIN — HEPARIN SODIUM 8 UNITS/KG/HR: 10000 INJECTION, SOLUTION INTRAVENOUS at 21:18

## 2023-03-11 RX ADMIN — MORPHINE SULFATE 4 MG: 4 INJECTION, SOLUTION INTRAMUSCULAR; INTRAVENOUS at 20:02

## 2023-03-11 RX ADMIN — DILTIAZEM HYDROCHLORIDE 20 MG: 5 INJECTION INTRAVENOUS at 21:15

## 2023-03-11 RX ADMIN — IOHEXOL 100 ML: 350 INJECTION, SOLUTION INTRAVENOUS at 22:31

## 2023-03-11 RX ADMIN — HEPARIN SODIUM 4000 UNITS: 1000 INJECTION INTRAVENOUS; SUBCUTANEOUS at 21:17

## 2023-03-11 ASSESSMENT — PAIN DESCRIPTION - FREQUENCY: FREQUENCY: CONTINUOUS

## 2023-03-11 ASSESSMENT — ENCOUNTER SYMPTOMS
SHORTNESS OF BREATH: 0
DIARRHEA: 0
NAUSEA: 0
COUGH: 0
COLOR CHANGE: 0
VOMITING: 0

## 2023-03-11 ASSESSMENT — PAIN SCALES - GENERAL
PAINLEVEL_OUTOF10: 7
PAINLEVEL_OUTOF10: 5
PAINLEVEL_OUTOF10: 9

## 2023-03-11 ASSESSMENT — PAIN DESCRIPTION - LOCATION
LOCATION: LEG;BACK
LOCATION: BACK;LEG
LOCATION: CHEST

## 2023-03-11 ASSESSMENT — PAIN DESCRIPTION - ORIENTATION: ORIENTATION: RIGHT;LEFT

## 2023-03-11 ASSESSMENT — PAIN - FUNCTIONAL ASSESSMENT: PAIN_FUNCTIONAL_ASSESSMENT: 0-10

## 2023-03-11 NOTE — Clinical Note
Contrast Dose Calculator:   Patient's age: 69.   Patient's sex: Male. Patient weight (kg) = 120.5. Creatinine level (mg/dL) = 1.1. Creatinine clearance (mL/min): 108. Contrast concentration (mg/mL) = 370. MACD = 300 mL. Max Contrast dose per Creatinine Cl calculator = 243 mL.

## 2023-03-11 NOTE — ED NOTES
I have reviewed discharge instructions with the caregiver. The caregiver verbalized understanding. Patient left ED via Discharge Method: ambulatory to Home with family. Opportunity for questions and clarification provided. Patient given 1 scripts. To continue your aftercare when you leave the hospital, you may receive an automated call from our care team to check in on how you are doing. This is a free service and part of our promise to provide the best care and service to meet your aftercare needs.  If you have questions, or wish to unsubscribe from this service please call [phone]. Thank you for Choosing our [facility] Emergency Department.         Ana Schulz RN  03/10/23 [id number]

## 2023-03-11 NOTE — DISCHARGE INSTRUCTIONS
Please increase Keflex to 4 times daily. This prescription will be sent to the [facility] pharmacy. Please return for any fevers, chills, or any other concerns.

## 2023-03-11 NOTE — ED NOTES
Constant Observer No   Constant Observer Oriented N/A   High risk patients are in line of sight at all times No - Low risk   Excess equipment/medical supplies not necessary for the care of the patient removed Yes   All sharp or dangerous objects are removed from room: including but not limited to belts, pens & pencils, needles, medications, cosmetics, lighters, matches, nail files, watches, necklaces, glass objects, razors, razor blades, knives, aerosol sprays, drawstring pants, shoes, cords (telephone, call bells, etc.) cleaning wipes or other cleaning items, aluminum cans, not permanently attached wall décor Yes   Telephone/cell phone removed as well as TV remote (batteries can be swallowed) Yes   Patient belongings removed and labeled at nurses station No - Given to Jason is removed from room Yes   All plastic bags are removed from the room and replaced with paper trash bags Yes   Patient is in paper scrubs or appropriate gown and using hospital socks with rubber soles Yes   No metal, hard eating utensils or hard plates are on meal tray Yes   Remove all cleaning agents used by [employer] Yes   If Crucifix is hanging on a nail, remove Crucifix as well as the nail Yes       *If any question above is answered \"No,\" documentation is required.       Polly Dean RN  03/10/23 [id number]

## 2023-03-11 NOTE — ED NOTES
I have reviewed discharge instructions with the patient. The patient verbalized understanding. Patient left ED via Discharge Method: ambulatory to Home with (insert name of family/friend, self, transportfamily). Opportunity for questions and clarification provided. Patient given 0 scripts. To continue your aftercare when you leave the hospital, you may receive an automated call from our care team to check in on how you are doing. This is a free service and part of our promise to provide the best care and service to meet your aftercare needs.  If you have questions, or wish to unsubscribe from this service please call [phone]. Thank you for Choosing our [facility] Emergency Department.         Jose R Jj RN  03/11/23 [id number]
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
## 2023-03-11 NOTE — ED TRIAGE NOTES
Patient arrives ambulatory to triage c/o vannessa leg pain. Reports he has sores on them and has been taking antibiotics. States he put vaseline on them and then placed dressing and compression socks. Denies fever or drainage.

## 2023-03-11 NOTE — ED PROVIDER NOTES
Emergency Department Provider Note                   PCP:                Jere Pelaez DO               Age: 69 y.o. Sex: male     DISPOSITION Decision To Discharge 03/10/2023 08:32:58 PM       ICD-10-CM    1. Cellulitis of left lower extremity  [address]  Complexity of Problems Addressed:  1 or more acute illnesses that pose a threat to life or bodily function. Data Reviewed and Analyzed:  Category 1:   I reviewed records from an external source: ED records from outside this hospital.  I reviewed records from an external source: provider visit notes from PCP. I ordered each unique test.  I reviewed the results of each unique test.        Category 2:       Category 3: Discussion of management or test interpretation. 43-year-old male presents for wound check. Was diagnosed with cellulitis on 3/7 and placed on Keflex twice daily. Overall his legs appear to be healing well, he states that they are feeling better. Having some difficulty with his family, states they have taken some medication. But he plans to get this medication back. He had some concern regarding the police wanting him to get a psychiatric evaluation, however he is not suicidal or homicidal.  He states that that this is related to him helping the homeless. Patient was discharged with prescription for additional Keflex, this was changed to 4 times daily. Discussed return precautions with patient. Patient states understanding.        Risk of Complications and/or Morbidity of Patient Management:  Prescription drug management performed and Patient was discharged risks and benefits of hospitalization were considered     Ree Gee is a 69 y.o. male who presents to the Emergency Department with chief complaint of    Chief Complaint   Patient presents with    Wound Check      43-year-old male with past medical history of bipolar disorder, CAD, hyperlipidemia, hypertension, diabetes presents for wound check. He was diagnosed with cellulitis on 3/7 and prescribed Keflex twice daily. He states that he feels his legs are slightly better however he is concerned that they will not heal.  Denies drainage from legs. Has been using compression hose with the Keflex. Denies fevers, chills. He states that he is also had some difficulty with his family recently, stating that they have taken some of his medication from him. He states he feels he can get it back tonight. He does have some concern that the police may have been inquiring about him having a psychiatric evaluation, he relates this to helping the homeless recently. He has not made contact with the police and did not arrive in police custody. He denies suicidal or homicidal ideations. The history is provided by the patient. Review of Systems   Constitutional:  Negative for chills and fever. Cardiovascular:  Positive for leg swelling. Skin:  Positive for wound. All other systems reviewed and are negative. Vitals signs and nursing note reviewed. Patient Vitals for the past 4 hrs:   Temp Pulse Resp BP SpO2   03/10/23 2011 98.1 °F (36.7 °C) 99 18 (!) 147/80 97 %          Physical Exam  Vitals and nursing note reviewed. Constitutional:       Appearance: Normal appearance. HENT:      Head: Normocephalic and atraumatic. Nose: Nose normal.      Mouth/Throat:      Mouth: Mucous membranes are dry. Eyes:      Extraocular Movements: Extraocular movements intact. Pupils: Pupils are equal, round, and reactive to light. Cardiovascular:      Rate and Rhythm: Normal rate and regular rhythm. Pulses: Normal pulses. Pulmonary:      Effort: Pulmonary effort is normal.   Musculoskeletal:         General: Normal range of motion. Cervical back: Normal range of motion. Right lower leg: Edema present. Left lower leg: Edema present. Skin:     General: Skin is warm.       Capillary Refill: Capillary refill takes less than 2 seconds. Findings: Erythema present. Neurological:      General: No focal deficit present. Mental Status: He is alert and oriented to person, place, and time. Psychiatric:         Mood and Affect: Mood normal.         Behavior: Behavior normal.         Thought Content: Thought content normal.         Judgment: Judgment normal.        Procedures     No orders of the defined types were placed in this encounter. Medications - No data to display    New Prescriptions    CEPHALEXIN (KEFLEX) 500 MG CAPSULE    Take 1 capsule by mouth 2 times daily for 7 days        Past Medical History:   Diagnosis Date    Bipolar disorder ([facility] [address]     CAD (coronary artery disease)     CHF (congestive heart failure) ([facility])     Chronic back pain     Hyperlipidemia     Hypertension     Neuropathy     Type 2 diabetes mellitus without complication ([facility] [address]         Past Surgical History:   Procedure Laterality Date    HERNIA REPAIR          No family history on file. Social History     Socioeconomic History    Marital status:    Tobacco Use    Smoking status: Former     Types: Cigarettes     Quit date: 1998     Years since quittin.2    Smokeless tobacco: Never   Vaping Use    Vaping Use: Never used   Substance and Sexual Activity    Alcohol use: Not Currently    Drug use: Never    Sexual activity: Not Currently     Social Determinants of Health     Financial Resource Strain: Low Risk     Difficulty of Paying Living Expenses: Not hard at all   Food Insecurity: No Food Insecurity    Worried About [address] in the Last Year: Never true    [facility] in the Last Year: Never true   Transportation Needs: Unknown    Lack of Transportation (Non-Medical): No   Housing Stability: Unknown    Unstable Housing in the Last Year: No        Allergies:  Other, Rosuvastatin, and Oyster extract    Previous Medications    AMLODIPINE (NORVASC) 10 MG TABLET    Take 1 tablet by mouth daily    BLOOD GLUCOSE MONITOR STRIPS    Test one time a day & as needed for symptoms of irregular blood glucose. Dispense sufficient amount for indicated testing frequency plus additional to accommodate PRN testing needs. CARVEDILOL (COREG) 25 MG TABLET    Take 1 tablet by mouth 2 times daily    CEPHALEXIN (KEFLEX) 500 MG CAPSULE    Take 1 capsule by mouth 2 times daily for 7 days    FUROSEMIDE (LASIX) 20 MG TABLET    Take 1 tablet by mouth daily    GABAPENTIN (NEURONTIN) 300 MG CAPSULE    Take 1 capsule by mouth 3 times daily for 180 days. Intended supply: 30 days    GLUCOSE MONITORING (FREESTYLE FREEDOM) KIT    1 kit by Does not apply route daily    LISINOPRIL (PRINIVIL;ZESTRIL) 20 MG TABLET    Take 1 tablet by mouth daily    METFORMIN (GLUCOPHAGE) 500 MG TABLET    Take 1 tablet by mouth 2 times daily (with meals)    RISPERIDONE (RISPERDAL) 2 MG TABLET    Take 1 tablet by mouth 2 times daily    TRAMADOL (ULTRAM) 50 MG TABLET    Take 1 tablet by mouth every 8 hours as needed for Pain for up to 3 days. Intended supply: 3 days. Take lowest dose possible to manage pain Max Daily Amount: 150 mg        No results found for any visits on 03/10/23. No orders to display                     Voice dictation software was used during the making of this note. This software is not perfect and grammatical and other typographical errors may be present. This note has not been completely proofread for errors.       Ellie Godwin, SONY - INDIRA  03/10/23 2039

## 2023-03-11 NOTE — ED TRIAGE NOTES
Patient a 69 y.o Male reports to the ED with complaint of chest pain. Patient states he was sleeping when chest pain woke him up. Patient has a Hx 6 cardiac stents per patient. EMS gave 4x 0.4 Nitro tabs and 4mg Zofran IV.  Patient took 325mg of aspirin at home prior to EMS arrival.

## 2023-03-12 ENCOUNTER — APPOINTMENT (OUTPATIENT)
Dept: NON INVASIVE DIAGNOSTICS | Age: 68
DRG: 217 | End: 2023-03-12
Payer: MEDICARE

## 2023-03-12 PROBLEM — I48.91 A-FIB (HCC): Status: ACTIVE | Noted: 2023-03-12

## 2023-03-12 PROBLEM — I48.0 PAROXYSMAL ATRIAL FIBRILLATION (HCC): Status: ACTIVE | Noted: 2023-03-12

## 2023-03-12 LAB
ECHO AO ASC DIAM: 4 CM
ECHO AO ASCENDING AORTA INDEX: 1.69 CM/M2
ECHO AO ROOT DIAM: 3.4 CM
ECHO AO ROOT INDEX: 1.43 CM/M2
ECHO AV AREA PEAK VELOCITY: 1.8 CM2
ECHO AV AREA VTI: 1.8 CM2
ECHO AV AREA/BSA PEAK VELOCITY: 0.8 CM2/M2
ECHO AV AREA/BSA VTI: 0.8 CM2/M2
ECHO AV MEAN GRADIENT: 19 MMHG
ECHO AV MEAN VELOCITY: 2.1 M/S
ECHO AV PEAK GRADIENT: 32 MMHG
ECHO AV PEAK VELOCITY: 2.8 M/S
ECHO AV VELOCITY RATIO: 0.57
ECHO AV VTI: 63 CM
ECHO BSA: 2.43 M2
ECHO LA AREA 2C: 23.4 CM2
ECHO LA AREA 4C: 28.2 CM2
ECHO LA DIAMETER INDEX: 1.65 CM/M2
ECHO LA DIAMETER: 3.9 CM
ECHO LA MAJOR AXIS: 6.6 CM
ECHO LA MINOR AXIS: 5.8 CM
ECHO LA TO AORTIC ROOT RATIO: 1.15
ECHO LA VOL 2C: 77 ML (ref 18–58)
ECHO LA VOL 4C: 98 ML (ref 18–58)
ECHO LA VOL BP: 92 ML (ref 18–58)
ECHO LA VOL/BSA BIPLANE: 39 ML/M2 (ref 16–34)
ECHO LA VOLUME INDEX A2C: 32 ML/M2 (ref 16–34)
ECHO LA VOLUME INDEX A4C: 41 ML/M2 (ref 16–34)
ECHO LV E' LATERAL VELOCITY: 10 CM/S
ECHO LV E' SEPTAL VELOCITY: 10 CM/S
ECHO LV EDV A2C: 137 ML
ECHO LV EDV A4C: 132 ML
ECHO LV EDV INDEX A4C: 56 ML/M2
ECHO LV EDV NDEX A2C: 58 ML/M2
ECHO LV EJECTION FRACTION A2C: 58 %
ECHO LV EJECTION FRACTION A4C: 49 %
ECHO LV EJECTION FRACTION BIPLANE: 54 % (ref 55–100)
ECHO LV ESV A2C: 58 ML
ECHO LV ESV A4C: 67 ML
ECHO LV ESV INDEX A2C: 24 ML/M2
ECHO LV ESV INDEX A4C: 28 ML/M2
ECHO LV FRACTIONAL SHORTENING: 37 % (ref 28–44)
ECHO LV INTERNAL DIMENSION DIASTOLE INDEX: 1.94 CM/M2
ECHO LV INTERNAL DIMENSION DIASTOLIC: 4.6 CM (ref 4.2–5.9)
ECHO LV INTERNAL DIMENSION SYSTOLIC INDEX: 1.22 CM/M2
ECHO LV INTERNAL DIMENSION SYSTOLIC: 2.9 CM
ECHO LV IVSD: 0.8 CM (ref 0.6–1)
ECHO LV MASS 2D: 148.1 G (ref 88–224)
ECHO LV MASS INDEX 2D: 62.5 G/M2 (ref 49–115)
ECHO LV POSTERIOR WALL DIASTOLIC: 1.1 CM (ref 0.6–1)
ECHO LV RELATIVE WALL THICKNESS RATIO: 0.48
ECHO LVOT AREA: 3.1 CM2
ECHO LVOT AV VTI INDEX: 0.56
ECHO LVOT DIAM: 2 CM
ECHO LVOT MEAN GRADIENT: 5 MMHG
ECHO LVOT PEAK GRADIENT: 10 MMHG
ECHO LVOT PEAK VELOCITY: 1.6 M/S
ECHO LVOT STROKE VOLUME INDEX: 47 ML/M2
ECHO LVOT SV: 111.5 ML
ECHO LVOT VTI: 35.5 CM
ECHO MV A VELOCITY: 1.09 M/S
ECHO MV AREA VTI: 2.2 CM2
ECHO MV E DECELERATION TIME (DT): 234 MS
ECHO MV E VELOCITY: 1.47 M/S
ECHO MV E/A RATIO: 1.35
ECHO MV E/E' LATERAL: 14.7
ECHO MV E/E' RATIO (AVERAGED): 14.7
ECHO MV E/E' SEPTAL: 14.7
ECHO MV LVOT VTI INDEX: 1.4
ECHO MV MAX VELOCITY: 1.7 M/S
ECHO MV MEAN GRADIENT: 4 MMHG
ECHO MV MEAN VELOCITY: 0.9 M/S
ECHO MV PEAK GRADIENT: 11 MMHG
ECHO MV VTI: 49.7 CM
ECHO PV MAX VELOCITY: 0.9 M/S
ECHO PV PEAK GRADIENT: 3 MMHG
ECHO RV BASAL DIMENSION: 4.1 CM
ECHO RV TAPSE: 2.8 CM (ref 1.7–?)
ECHO TV REGURGITANT MAX VELOCITY: 2.69 M/S
ECHO TV REGURGITANT PEAK GRADIENT: 29 MMHG
EKG ATRIAL RATE: 138 BPM
EKG ATRIAL RATE: 69 BPM
EKG ATRIAL RATE: 93 BPM
EKG DIAGNOSIS: NORMAL
EKG P AXIS: 64 DEGREES
EKG P-R INTERVAL: 156 MS
EKG Q-T INTERVAL: 329 MS
EKG Q-T INTERVAL: 351 MS
EKG Q-T INTERVAL: 414 MS
EKG QRS DURATION: 88 MS
EKG QRS DURATION: 90 MS
EKG QRS DURATION: 91 MS
EKG QTC CALCULATION (BAZETT): 427 MS
EKG QTC CALCULATION (BAZETT): 443 MS
EKG QTC CALCULATION (BAZETT): 463 MS
EKG R AXIS: 25 DEGREES
EKG R AXIS: 32 DEGREES
EKG R AXIS: 55 DEGREES
EKG T AXIS: -60 DEGREES
EKG T AXIS: 57 DEGREES
EKG T AXIS: 97 DEGREES
EKG VENTRICULAR RATE: 119 BPM
EKG VENTRICULAR RATE: 69 BPM
EKG VENTRICULAR RATE: 89 BPM
LV EF: 58 %
LVEF MODALITY: ABNORMAL
UFH PPP CHRO-ACNC: 0.2 IU/ML (ref 0.3–0.7)
UFH PPP CHRO-ACNC: 0.27 IU/ML (ref 0.3–0.7)
UFH PPP CHRO-ACNC: <0.1 IU/ML (ref 0.3–0.7)

## 2023-03-12 PROCEDURE — 93306 TTE W/DOPPLER COMPLETE: CPT | Performed by: INTERNAL MEDICINE

## 2023-03-12 PROCEDURE — 6370000000 HC RX 637 (ALT 250 FOR IP): Performed by: INTERNAL MEDICINE

## 2023-03-12 PROCEDURE — 1100000003 HC PRIVATE W/ TELEMETRY

## 2023-03-12 PROCEDURE — 6360000002 HC RX W HCPCS: Performed by: INTERNAL MEDICINE

## 2023-03-12 PROCEDURE — C8929 TTE W OR WO FOL WCON,DOPPLER: HCPCS

## 2023-03-12 PROCEDURE — 85520 HEPARIN ASSAY: CPT

## 2023-03-12 PROCEDURE — 2580000003 HC RX 258: Performed by: INTERNAL MEDICINE

## 2023-03-12 PROCEDURE — 6360000002 HC RX W HCPCS: Performed by: EMERGENCY MEDICINE

## 2023-03-12 PROCEDURE — 93005 ELECTROCARDIOGRAM TRACING: CPT | Performed by: INTERNAL MEDICINE

## 2023-03-12 PROCEDURE — 99223 1ST HOSP IP/OBS HIGH 75: CPT | Performed by: INTERNAL MEDICINE

## 2023-03-12 PROCEDURE — 36415 COLL VENOUS BLD VENIPUNCTURE: CPT

## 2023-03-12 PROCEDURE — 6360000004 HC RX CONTRAST MEDICATION: Performed by: INTERNAL MEDICINE

## 2023-03-12 RX ORDER — SODIUM CHLORIDE 0.9 % (FLUSH) 0.9 %
5-40 SYRINGE (ML) INJECTION EVERY 12 HOURS SCHEDULED
Status: DISCONTINUED | OUTPATIENT
Start: 2023-03-12 | End: 2023-03-17

## 2023-03-12 RX ORDER — ONDANSETRON 4 MG/1
4 TABLET, ORALLY DISINTEGRATING ORAL EVERY 8 HOURS PRN
Status: DISCONTINUED | OUTPATIENT
Start: 2023-03-12 | End: 2023-03-17 | Stop reason: SDUPTHER

## 2023-03-12 RX ORDER — ACETAMINOPHEN 650 MG/1
650 SUPPOSITORY RECTAL EVERY 6 HOURS PRN
Status: DISCONTINUED | OUTPATIENT
Start: 2023-03-12 | End: 2023-03-15

## 2023-03-12 RX ORDER — ENOXAPARIN SODIUM 100 MG/ML
30 INJECTION SUBCUTANEOUS 2 TIMES DAILY
Status: DISCONTINUED | OUTPATIENT
Start: 2023-03-12 | End: 2023-03-12

## 2023-03-12 RX ORDER — ONDANSETRON 2 MG/ML
4 INJECTION INTRAMUSCULAR; INTRAVENOUS EVERY 6 HOURS PRN
Status: DISCONTINUED | OUTPATIENT
Start: 2023-03-12 | End: 2023-03-15 | Stop reason: SDUPTHER

## 2023-03-12 RX ORDER — FUROSEMIDE 10 MG/ML
40 INJECTION INTRAMUSCULAR; INTRAVENOUS 2 TIMES DAILY
Status: DISCONTINUED | OUTPATIENT
Start: 2023-03-12 | End: 2023-03-17

## 2023-03-12 RX ORDER — GABAPENTIN 300 MG/1
300 CAPSULE ORAL 3 TIMES DAILY
Status: DISCONTINUED | OUTPATIENT
Start: 2023-03-12 | End: 2023-03-23 | Stop reason: HOSPADM

## 2023-03-12 RX ORDER — ACETAMINOPHEN 325 MG/1
650 TABLET ORAL EVERY 6 HOURS PRN
Status: DISCONTINUED | OUTPATIENT
Start: 2023-03-12 | End: 2023-03-15

## 2023-03-12 RX ORDER — RISPERIDONE 1 MG/1
2 TABLET ORAL 2 TIMES DAILY
Status: DISCONTINUED | OUTPATIENT
Start: 2023-03-12 | End: 2023-03-23 | Stop reason: HOSPADM

## 2023-03-12 RX ORDER — MORPHINE SULFATE 2 MG/ML
1 INJECTION, SOLUTION INTRAMUSCULAR; INTRAVENOUS EVERY 4 HOURS PRN
Status: DISCONTINUED | OUTPATIENT
Start: 2023-03-12 | End: 2023-03-13

## 2023-03-12 RX ORDER — SODIUM CHLORIDE 0.9 % (FLUSH) 0.9 %
5-40 SYRINGE (ML) INJECTION PRN
Status: DISCONTINUED | OUTPATIENT
Start: 2023-03-12 | End: 2023-03-17

## 2023-03-12 RX ORDER — NITROGLYCERIN 0.4 MG/1
0.4 TABLET SUBLINGUAL EVERY 5 MIN PRN
Status: DISCONTINUED | OUTPATIENT
Start: 2023-03-12 | End: 2023-03-23 | Stop reason: HOSPADM

## 2023-03-12 RX ORDER — SODIUM CHLORIDE 9 MG/ML
INJECTION, SOLUTION INTRAVENOUS PRN
Status: DISCONTINUED | OUTPATIENT
Start: 2023-03-12 | End: 2023-03-17

## 2023-03-12 RX ORDER — TRAMADOL HYDROCHLORIDE 50 MG/1
50 TABLET ORAL ONCE
Status: COMPLETED | OUTPATIENT
Start: 2023-03-12 | End: 2023-03-12

## 2023-03-12 RX ORDER — ASPIRIN 81 MG/1
81 TABLET, CHEWABLE ORAL DAILY
Status: DISCONTINUED | OUTPATIENT
Start: 2023-03-12 | End: 2023-03-13

## 2023-03-12 RX ORDER — LISINOPRIL 20 MG/1
20 TABLET ORAL DAILY
Status: DISCONTINUED | OUTPATIENT
Start: 2023-03-12 | End: 2023-03-15

## 2023-03-12 RX ORDER — METOPROLOL SUCCINATE 25 MG/1
50 TABLET, EXTENDED RELEASE ORAL DAILY
Status: DISCONTINUED | OUTPATIENT
Start: 2023-03-12 | End: 2023-03-13

## 2023-03-12 RX ORDER — CARVEDILOL 25 MG/1
25 TABLET ORAL 2 TIMES DAILY WITH MEALS
Status: DISCONTINUED | OUTPATIENT
Start: 2023-03-12 | End: 2023-03-12

## 2023-03-12 RX ORDER — POLYETHYLENE GLYCOL 3350 17 G/17G
17 POWDER, FOR SOLUTION ORAL DAILY PRN
Status: DISCONTINUED | OUTPATIENT
Start: 2023-03-12 | End: 2023-03-17 | Stop reason: SDUPTHER

## 2023-03-12 RX ADMIN — SODIUM CHLORIDE, PRESERVATIVE FREE 10 ML: 5 INJECTION INTRAVENOUS at 08:40

## 2023-03-12 RX ADMIN — HEPARIN SODIUM 2000 UNITS: 1000 INJECTION INTRAVENOUS; SUBCUTANEOUS at 10:41

## 2023-03-12 RX ADMIN — MORPHINE SULFATE 1 MG: 2 INJECTION, SOLUTION INTRAMUSCULAR; INTRAVENOUS at 13:22

## 2023-03-12 RX ADMIN — FUROSEMIDE 40 MG: 10 INJECTION, SOLUTION INTRAMUSCULAR; INTRAVENOUS at 18:19

## 2023-03-12 RX ADMIN — MORPHINE SULFATE 1 MG: 2 INJECTION, SOLUTION INTRAMUSCULAR; INTRAVENOUS at 19:16

## 2023-03-12 RX ADMIN — HEPARIN SODIUM 4000 UNITS: 1000 INJECTION INTRAVENOUS; SUBCUTANEOUS at 05:49

## 2023-03-12 RX ADMIN — CARVEDILOL 25 MG: 25 TABLET, FILM COATED ORAL at 08:37

## 2023-03-12 RX ADMIN — GABAPENTIN 300 MG: 300 CAPSULE ORAL at 13:55

## 2023-03-12 RX ADMIN — HEPARIN SODIUM 14 UNITS/KG/HR: 10000 INJECTION, SOLUTION INTRAVENOUS at 18:19

## 2023-03-12 RX ADMIN — GABAPENTIN 300 MG: 300 CAPSULE ORAL at 21:03

## 2023-03-12 RX ADMIN — RISPERIDONE 2 MG: 1 TABLET ORAL at 21:03

## 2023-03-12 RX ADMIN — SODIUM CHLORIDE, PRESERVATIVE FREE 0.6 ML: 5 INJECTION INTRAVENOUS at 17:00

## 2023-03-12 RX ADMIN — HEPARIN SODIUM 2000 UNITS: 1000 INJECTION INTRAVENOUS; SUBCUTANEOUS at 19:17

## 2023-03-12 RX ADMIN — NITROGLYCERIN 0.4 MG: 0.4 TABLET, ORALLY DISINTEGRATING SUBLINGUAL at 12:58

## 2023-03-12 RX ADMIN — ASPIRIN 81 MG 81 MG: 81 TABLET ORAL at 10:48

## 2023-03-12 RX ADMIN — GABAPENTIN 300 MG: 300 CAPSULE ORAL at 08:37

## 2023-03-12 RX ADMIN — LISINOPRIL 20 MG: 20 TABLET ORAL at 08:37

## 2023-03-12 RX ADMIN — TRAMADOL HYDROCHLORIDE 50 MG: 50 TABLET ORAL at 03:57

## 2023-03-12 RX ADMIN — NITROGLYCERIN 0.4 MG: 0.4 TABLET, ORALLY DISINTEGRATING SUBLINGUAL at 12:53

## 2023-03-12 RX ADMIN — FUROSEMIDE 40 MG: 10 INJECTION, SOLUTION INTRAMUSCULAR; INTRAVENOUS at 08:40

## 2023-03-12 RX ADMIN — SODIUM CHLORIDE, PRESERVATIVE FREE 10 ML: 5 INJECTION INTRAVENOUS at 21:02

## 2023-03-12 RX ADMIN — DILTIAZEM HYDROCHLORIDE 30 MG: 30 TABLET, FILM COATED ORAL at 05:16

## 2023-03-12 ASSESSMENT — PAIN DESCRIPTION - PAIN TYPE
TYPE: CHRONIC PAIN
TYPE: ACUTE PAIN
TYPE: CHRONIC PAIN

## 2023-03-12 ASSESSMENT — PAIN DESCRIPTION - DESCRIPTORS
DESCRIPTORS: DISCOMFORT;SHARP
DESCRIPTORS: BURNING
DESCRIPTORS: BURNING

## 2023-03-12 ASSESSMENT — PAIN SCALES - GENERAL
PAINLEVEL_OUTOF10: 0
PAINLEVEL_OUTOF10: 7
PAINLEVEL_OUTOF10: 4
PAINLEVEL_OUTOF10: 7
PAINLEVEL_OUTOF10: 7
PAINLEVEL_OUTOF10: 0
PAINLEVEL_OUTOF10: 0
PAINLEVEL_OUTOF10: 7

## 2023-03-12 ASSESSMENT — PAIN DESCRIPTION - FREQUENCY
FREQUENCY: CONTINUOUS

## 2023-03-12 ASSESSMENT — PAIN DESCRIPTION - LOCATION
LOCATION: BACK
LOCATION: BACK;LEG
LOCATION: CHEST
LOCATION: CHEST
LOCATION: BACK;LEG

## 2023-03-12 ASSESSMENT — PAIN - FUNCTIONAL ASSESSMENT: PAIN_FUNCTIONAL_ASSESSMENT: ACTIVITIES ARE NOT PREVENTED

## 2023-03-12 ASSESSMENT — PAIN DESCRIPTION - ONSET
ONSET: ON-GOING
ONSET: ON-GOING

## 2023-03-12 ASSESSMENT — PAIN DESCRIPTION - ORIENTATION: ORIENTATION: RIGHT;LEFT

## 2023-03-12 NOTE — ED NOTES
TRANSFER - OUT REPORT:    Verbal report given to [facility] on [address]  being transferred to [address] [place] [address]  for routine progression of patient care       Report consisted of patient's Situation, Background, Assessment and   Recommendations(SBAR). Information from the following report(s) ED Encounter Summary, ED SBAR and [facility] was reviewed with the receiving nurse. [place] Assessment: Presents to emergency department  because of falls (Syncope, seizure, or loss of consciousness): No, Age > 79: No, Altered Mental Status, Intoxication with alcohol or substance confusion (Disorientation, impaired judgment, poor safety awaremess, or inability to follow instructions): No, Impaired Mobility: Ambulates or transfers with assistive devices or assistance; Unable to ambulate or transer.: No, Nursing Judgement: No  Lines:   Peripheral IV 03/11/23 Right Hand (Active)   Site Assessment Clean, dry & intact 03/11/23 1852   Line Status Blood return noted 03/11/23 1852   Phlebitis Assessment No symptoms 03/11/23 1852   Infiltration Assessment 0 03/11/23 1852        Opportunity for questions and clarification was provided.       Patient transported with:  Monitor and Registered Nurse         Medina Broderick RN  03/12/23 0961

## 2023-03-12 NOTE — H&P
lower extremity and erythema. No claudication, leg cramps, prior DVTs,   Musculoskeletal: no muscle or joint pain/stiffness, joint swelling, erythema of joints, or back pain  Psychiatric: Positive for bipolar disorder. Neurological: no sensory or motor loss, seizures, syncope, tremors, numbness, no dementia  Hematologic: no anemia, easy bruising or bleeding  Endocrine: Positive for diabetes.  No thyroid problems     Subjective:     Physical Exam:    Vitals:    03/12/23 0435 03/12/23 0516 03/12/23 0754 03/12/23 0837   BP: (!) 128/59  123/66 128/76   Pulse: 80 78 70 75   Resp: 22 22    Temp: 97.9 °F (36.6 °C)  97.7 °F (36.5 °C)    TempSrc:   Oral    SpO2: 98%  91%    Weight:       Height:         General: Well Developed, Well Nourished, No Acute Distress  HEENT: pupils equal and round, no abnormalities noted  Neck: supple, no JVD, no carotid bruits  Heart: S1S2 with RRR without murmurs or gallops  Lungs: Clear throughout auscultation bilaterally without adventitious sounds  Abd: soft, nontender, nondistended, with good bowel sounds  Ext: warm, ++ edema, erythema, calves supple/nontender, pulses 2+ bilaterally  Skin: warm and dry  Psychiatric: Normal mood and affect  Neurologic: Alert and oriented X 3    Cardiographics    Telemetry: normal sinus rhythm  ECG: atrial fibrillation, rate 119  Echocardiogram: ordered     Labs:     Recent Labs     03/11/23  1855 03/10/23  2159 03/07/23  2249   WBC 9.2 8.0 8.1   HGB 12.8* 13.9 12.9*   HCT 38.3* 41.1 39.7*   MCV 89.7 88.0 90.6    182 181     Lab Results   Component Value Date    WBC 9.2 03/11/2023    HGB 12.8 (L) 03/11/2023    HCT 38.3 (L) 03/11/2023     03/11/2023    CHOL 156 02/14/2023    TRIG 124 02/14/2023    HDL 42 02/14/2023    ALT 17 03/11/2023    AST 12 (L) 03/11/2023     03/11/2023    K 3.8 03/11/2023     03/11/2023    CREATININE 0.90 03/11/2023    BUN 16 03/11/2023    CO2 25 03/11/2023    PSA 0.8 02/14/2023    INR 1.1 03/11/2023    LABA1C
Signed:  Davis Blizzard, DO    Part of this note may have been written by using a voice dictation software. The note has been proof read but may still contain some grammatical/other typographical errors.

## 2023-03-12 NOTE — ED PROVIDER NOTES
Emergency Department Provider Note                   PCP:                Cherri Negrete DO               Age: 69 y.o. Sex: male     DISPOSITION       No diagnosis found. MEDICAL DECISION MAKING  With his history and symptoms I will do a cardiac evaluation. Patient has developed some chest pain here so I will give him a small dose of morphine. He appears to be in atrial fibrillation. He is uncertain if he is ever had that before and he does not appear to be on any anticoagulants. We will try to find an old EKG for comparison. Patient had an ultrasound of his right lower leg to evaluate for DVT about 3 weeks ago. It was negative. I do not think he needs repeat imaging of that leg at this time. I will send off for a D-dimer given what appears to be new onset A-fib. Complexity of Problems Addressed:  1 or more acute illnesses that pose a threat to life or bodily function. Data Reviewed and Analyzed:  Category 1:   I reviewed records from an external source: provider visit notes from PCP. I reviewed records from an external source: provider visit notes from outside specialist.  I ordered each unique test.  I reviewed the results of each unique test.    The patients assessment required an independent historian: Distal history was obtained from EMS discussing their findings. Category 2:   I independently ordered and reviewed the X-rays. Chest x-ray revealed some questionable platelike atelectasis in the left lung. Category 3: Discussion of management or test interpretation. Discussed with the patient the fact that his EKG appears to show a new atrial fibrillation and that this may likely end up needing hospitalization for further treatment and management.   ED Course as of 03/12/23 0000   Sat Mar 11, 2023   1905 EKG independent interpretation by ER doctor:  Atrial fibrillation  No acute ischemic ST segment changes  No QTC prolongation  Rate of: 112 [AC]   2028 Repeat EKG

## 2023-03-13 ENCOUNTER — TELEPHONE (OUTPATIENT)
Dept: PRIMARY CARE CLINIC | Facility: CLINIC | Age: 68
End: 2023-03-13

## 2023-03-13 PROBLEM — R07.2 PRECORDIAL PAIN: Status: ACTIVE | Noted: 2023-03-13

## 2023-03-13 LAB
ANION GAP SERPL CALC-SCNC: 7 MMOL/L (ref 2–11)
BACTERIA SPEC CULT: ABNORMAL
BUN SERPL-MCNC: 21 MG/DL (ref 8–23)
CALCIUM SERPL-MCNC: 8 MG/DL (ref 8.3–10.4)
CHLORIDE SERPL-SCNC: 106 MMOL/L (ref 101–110)
CHOLEST SERPL-MCNC: 132 MG/DL
CO2 SERPL-SCNC: 23 MMOL/L (ref 21–32)
CREAT SERPL-MCNC: 1.1 MG/DL (ref 0.8–1.5)
ECHO BSA: 2.43 M2
EST. AVERAGE GLUCOSE BLD GHB EST-MCNC: 163 MG/DL
GLUCOSE BLD STRIP.AUTO-MCNC: 200 MG/DL (ref 65–100)
GLUCOSE BLD STRIP.AUTO-MCNC: 217 MG/DL (ref 65–100)
GLUCOSE SERPL-MCNC: 257 MG/DL (ref 65–100)
HBA1C MFR BLD: 7.3 % (ref 4.8–5.6)
HDLC SERPL-MCNC: 37 MG/DL (ref 40–60)
HDLC SERPL: 3.6
LDLC SERPL CALC-MCNC: 79.8 MG/DL
MAGNESIUM SERPL-MCNC: 2.3 MG/DL (ref 1.8–2.4)
POTASSIUM SERPL-SCNC: 3.7 MMOL/L (ref 3.5–5.1)
SERVICE CMNT-IMP: ABNORMAL
SODIUM SERPL-SCNC: 136 MMOL/L (ref 133–143)
TRIGL SERPL-MCNC: 76 MG/DL (ref 35–150)
UFH PPP CHRO-ACNC: 0.32 IU/ML (ref 0.3–0.7)
UFH PPP CHRO-ACNC: 0.36 IU/ML (ref 0.3–0.7)
VLDLC SERPL CALC-MCNC: 15.2 MG/DL (ref 6–23)

## 2023-03-13 PROCEDURE — 6360000004 HC RX CONTRAST MEDICATION: Performed by: INTERNAL MEDICINE

## 2023-03-13 PROCEDURE — 82962 GLUCOSE BLOOD TEST: CPT

## 2023-03-13 PROCEDURE — 85520 HEPARIN ASSAY: CPT

## 2023-03-13 PROCEDURE — 83735 ASSAY OF MAGNESIUM: CPT

## 2023-03-13 PROCEDURE — 87641 MR-STAPH DNA AMP PROBE: CPT

## 2023-03-13 PROCEDURE — 2580000003 HC RX 258: Performed by: INTERNAL MEDICINE

## 2023-03-13 PROCEDURE — 81003 URINALYSIS AUTO W/O SCOPE: CPT

## 2023-03-13 PROCEDURE — 99024 POSTOP FOLLOW-UP VISIT: CPT | Performed by: INTERNAL MEDICINE

## 2023-03-13 PROCEDURE — 6370000000 HC RX 637 (ALT 250 FOR IP): Performed by: INTERNAL MEDICINE

## 2023-03-13 PROCEDURE — 6360000002 HC RX W HCPCS: Performed by: INTERNAL MEDICINE

## 2023-03-13 PROCEDURE — 4A023N7 MEASUREMENT OF CARDIAC SAMPLING AND PRESSURE, LEFT HEART, PERCUTANEOUS APPROACH: ICD-10-PCS | Performed by: INTERNAL MEDICINE

## 2023-03-13 PROCEDURE — 6370000000 HC RX 637 (ALT 250 FOR IP): Performed by: HOSPITALIST

## 2023-03-13 PROCEDURE — B2151ZZ FLUOROSCOPY OF LEFT HEART USING LOW OSMOLAR CONTRAST: ICD-10-PCS | Performed by: INTERNAL MEDICINE

## 2023-03-13 PROCEDURE — 36415 COLL VENOUS BLD VENIPUNCTURE: CPT

## 2023-03-13 PROCEDURE — 2500000003 HC RX 250 WO HCPCS: Performed by: INTERNAL MEDICINE

## 2023-03-13 PROCEDURE — B2111ZZ FLUOROSCOPY OF MULTIPLE CORONARY ARTERIES USING LOW OSMOLAR CONTRAST: ICD-10-PCS | Performed by: INTERNAL MEDICINE

## 2023-03-13 PROCEDURE — 99152 MOD SED SAME PHYS/QHP 5/>YRS: CPT | Performed by: INTERNAL MEDICINE

## 2023-03-13 PROCEDURE — 80061 LIPID PANEL: CPT

## 2023-03-13 PROCEDURE — 1100000003 HC PRIVATE W/ TELEMETRY

## 2023-03-13 PROCEDURE — 83036 HEMOGLOBIN GLYCOSYLATED A1C: CPT

## 2023-03-13 PROCEDURE — C1894 INTRO/SHEATH, NON-LASER: HCPCS | Performed by: INTERNAL MEDICINE

## 2023-03-13 PROCEDURE — 6360000002 HC RX W HCPCS: Performed by: EMERGENCY MEDICINE

## 2023-03-13 PROCEDURE — 2709999900 HC NON-CHARGEABLE SUPPLY: Performed by: INTERNAL MEDICINE

## 2023-03-13 PROCEDURE — 80048 BASIC METABOLIC PNL TOTAL CA: CPT

## 2023-03-13 PROCEDURE — 93458 L HRT ARTERY/VENTRICLE ANGIO: CPT | Performed by: INTERNAL MEDICINE

## 2023-03-13 PROCEDURE — C1769 GUIDE WIRE: HCPCS | Performed by: INTERNAL MEDICINE

## 2023-03-13 RX ORDER — LANOLIN ALCOHOL/MO/W.PET/CERES
6 CREAM (GRAM) TOPICAL NIGHTLY PRN
Status: DISCONTINUED | OUTPATIENT
Start: 2023-03-13 | End: 2023-03-23 | Stop reason: HOSPADM

## 2023-03-13 RX ORDER — SODIUM CHLORIDE 9 MG/ML
INJECTION, SOLUTION INTRAVENOUS CONTINUOUS
Status: DISCONTINUED | OUTPATIENT
Start: 2023-03-13 | End: 2023-03-15

## 2023-03-13 RX ORDER — METOPROLOL TARTRATE 5 MG/5ML
5 INJECTION INTRAVENOUS EVERY 5 MIN PRN
Status: DISCONTINUED | OUTPATIENT
Start: 2023-03-13 | End: 2023-03-15

## 2023-03-13 RX ORDER — MIDAZOLAM HYDROCHLORIDE 1 MG/ML
INJECTION INTRAMUSCULAR; INTRAVENOUS PRN
Status: DISCONTINUED | OUTPATIENT
Start: 2023-03-13 | End: 2023-03-13 | Stop reason: HOSPADM

## 2023-03-13 RX ORDER — HYDROCODONE BITARTRATE AND ACETAMINOPHEN 5; 325 MG/1; MG/1
1 TABLET ORAL EVERY 6 HOURS PRN
Status: DISCONTINUED | OUTPATIENT
Start: 2023-03-13 | End: 2023-03-15

## 2023-03-13 RX ORDER — INSULIN LISPRO 100 [IU]/ML
0-4 INJECTION, SOLUTION INTRAVENOUS; SUBCUTANEOUS NIGHTLY
Status: DISCONTINUED | OUTPATIENT
Start: 2023-03-13 | End: 2023-03-15 | Stop reason: SDUPTHER

## 2023-03-13 RX ORDER — INSULIN LISPRO 100 [IU]/ML
0-8 INJECTION, SOLUTION INTRAVENOUS; SUBCUTANEOUS
Status: DISCONTINUED | OUTPATIENT
Start: 2023-03-13 | End: 2023-03-15 | Stop reason: SDUPTHER

## 2023-03-13 RX ORDER — LIDOCAINE HYDROCHLORIDE 10 MG/ML
INJECTION, SOLUTION INFILTRATION; PERINEURAL PRN
Status: DISCONTINUED | OUTPATIENT
Start: 2023-03-13 | End: 2023-03-13 | Stop reason: HOSPADM

## 2023-03-13 RX ORDER — METOPROLOL SUCCINATE 50 MG/1
50 TABLET, EXTENDED RELEASE ORAL 2 TIMES DAILY
Status: DISCONTINUED | OUTPATIENT
Start: 2023-03-13 | End: 2023-03-15

## 2023-03-13 RX ORDER — HEPARIN SODIUM 200 [USP'U]/100ML
INJECTION, SOLUTION INTRAVENOUS CONTINUOUS PRN
Status: DISCONTINUED | OUTPATIENT
Start: 2023-03-13 | End: 2023-03-13 | Stop reason: HOSPADM

## 2023-03-13 RX ORDER — METOPROLOL TARTRATE 5 MG/5ML
5 INJECTION INTRAVENOUS EVERY 5 MIN PRN
Status: DISCONTINUED | OUTPATIENT
Start: 2023-03-13 | End: 2023-03-13

## 2023-03-13 RX ADMIN — METOPROLOL TARTRATE 5 MG: 5 INJECTION, SOLUTION INTRAVENOUS at 22:15

## 2023-03-13 RX ADMIN — LISINOPRIL 20 MG: 20 TABLET ORAL at 08:45

## 2023-03-13 RX ADMIN — MORPHINE SULFATE 1 MG: 2 INJECTION, SOLUTION INTRAMUSCULAR; INTRAVENOUS at 06:16

## 2023-03-13 RX ADMIN — AMIODARONE HYDROCHLORIDE 1 MG/MIN: 50 INJECTION, SOLUTION INTRAVENOUS at 23:19

## 2023-03-13 RX ADMIN — RISPERIDONE 2 MG: 1 TABLET ORAL at 20:13

## 2023-03-13 RX ADMIN — HEPARIN SODIUM 16 UNITS/KG/HR: 10000 INJECTION, SOLUTION INTRAVENOUS at 05:30

## 2023-03-13 RX ADMIN — ASPIRIN 81 MG 81 MG: 81 TABLET ORAL at 08:45

## 2023-03-13 RX ADMIN — SODIUM CHLORIDE, PRESERVATIVE FREE 10 ML: 5 INJECTION INTRAVENOUS at 20:18

## 2023-03-13 RX ADMIN — Medication 6 MG: at 23:16

## 2023-03-13 RX ADMIN — INSULIN LISPRO 2 UNITS: 100 INJECTION, SOLUTION INTRAVENOUS; SUBCUTANEOUS at 17:19

## 2023-03-13 RX ADMIN — HYDROCODONE BITARTRATE AND ACETAMINOPHEN 1 TABLET: 5; 325 TABLET ORAL at 20:13

## 2023-03-13 RX ADMIN — GABAPENTIN 300 MG: 300 CAPSULE ORAL at 17:09

## 2023-03-13 RX ADMIN — MORPHINE SULFATE 1 MG: 2 INJECTION, SOLUTION INTRAMUSCULAR; INTRAVENOUS at 10:26

## 2023-03-13 RX ADMIN — SODIUM CHLORIDE, PRESERVATIVE FREE 10 ML: 5 INJECTION INTRAVENOUS at 08:57

## 2023-03-13 RX ADMIN — Medication: at 10:49

## 2023-03-13 RX ADMIN — METOPROLOL TARTRATE 5 MG: 5 INJECTION, SOLUTION INTRAVENOUS at 12:45

## 2023-03-13 RX ADMIN — RISPERIDONE 2 MG: 1 TABLET ORAL at 09:09

## 2023-03-13 RX ADMIN — FUROSEMIDE 40 MG: 10 INJECTION, SOLUTION INTRAMUSCULAR; INTRAVENOUS at 08:45

## 2023-03-13 RX ADMIN — SODIUM CHLORIDE: 9 INJECTION, SOLUTION INTRAVENOUS at 09:29

## 2023-03-13 RX ADMIN — ACETAMINOPHEN 650 MG: 325 TABLET ORAL at 18:00

## 2023-03-13 RX ADMIN — HYDROCODONE BITARTRATE AND ACETAMINOPHEN 1 TABLET: 5; 325 TABLET ORAL at 12:36

## 2023-03-13 RX ADMIN — METOPROLOL SUCCINATE 50 MG: 25 TABLET, EXTENDED RELEASE ORAL at 20:13

## 2023-03-13 RX ADMIN — METOPROLOL SUCCINATE 50 MG: 25 TABLET, EXTENDED RELEASE ORAL at 08:45

## 2023-03-13 RX ADMIN — GABAPENTIN 300 MG: 300 CAPSULE ORAL at 08:45

## 2023-03-13 RX ADMIN — AMIODARONE HYDROCHLORIDE 1 MG/MIN: 50 INJECTION, SOLUTION INTRAVENOUS at 15:56

## 2023-03-13 ASSESSMENT — PAIN DESCRIPTION - LOCATION
LOCATION: LEG
LOCATION: BACK;LEG
LOCATION: LEG
LOCATION: BACK
LOCATION: LEG

## 2023-03-13 ASSESSMENT — PAIN DESCRIPTION - DESCRIPTORS
DESCRIPTORS: ACHING
DESCRIPTORS: ACHING
DESCRIPTORS: BURNING
DESCRIPTORS: BURNING
DESCRIPTORS: ACHING

## 2023-03-13 ASSESSMENT — PAIN DESCRIPTION - ORIENTATION
ORIENTATION: RIGHT;LEFT
ORIENTATION: LEFT;RIGHT

## 2023-03-13 ASSESSMENT — ENCOUNTER SYMPTOMS
BACK PAIN: 0
HEMATEMESIS: 0
WHEEZING: 0
SORE THROAT: 0
COLOR CHANGE: 0
BLURRED VISION: 0
HEMOPTYSIS: 0
VOMITING: 0
NAUSEA: 0
DIARRHEA: 0
COUGH: 0
SHORTNESS OF BREATH: 0
RIGHT EYE: 0
HEARTBURN: 0
LEFT EYE: 0
CONSTIPATION: 0
ORTHOPNEA: 0
ABDOMINAL PAIN: 0
SPUTUM PRODUCTION: 0

## 2023-03-13 ASSESSMENT — PAIN SCALES - GENERAL
PAINLEVEL_OUTOF10: 0
PAINLEVEL_OUTOF10: 6
PAINLEVEL_OUTOF10: 7
PAINLEVEL_OUTOF10: 0
PAINLEVEL_OUTOF10: 0
PAINLEVEL_OUTOF10: 7
PAINLEVEL_OUTOF10: 0
PAINLEVEL_OUTOF10: 3
PAINLEVEL_OUTOF10: 6

## 2023-03-13 ASSESSMENT — PAIN - FUNCTIONAL ASSESSMENT: PAIN_FUNCTIONAL_ASSESSMENT: PREVENTS OR INTERFERES SOME ACTIVE ACTIVITIES AND ADLS

## 2023-03-13 NOTE — WOUND CARE
Patient seen for left lower leg wounds. Noted 2 small pink and dry wounds along tibia. Both legs swollen and light red. Patient wants ACE wraps to be completed. He has lymphedema wraps at home. Wounds both less than 1cm and no drainage. Will start Aquaphor ointment and wrap with kerlix and Ace from distal foot to below knee. recommend change daily. Discussed with patient. All questions answered. Noted back has 2 raised abscesses that patient reports were lanced on admission. May benefit from covering with silicone foam dressing.

## 2023-03-13 NOTE — PROCEDURES
Brief Cardiac Procedure Note    Patient: Alondra Ahmadi Sr. MRN: [id number]  SSN: [id number]    YOB: 1955  Age: 69 y.o. Sex: male      Date of Procedure: 3/13/2023     Pre-procedure Diagnosis: Unstable Angina    Post-procedure Diagnosis: Coronary Artery Disease    Procedure: Left Heart Catheterization    Brief Description of Procedure: As above    Performed By: Osmany Jamison MD     Assistants: None    Anesthesia: Moderate Sedation    Estimated Blood Loss: Less than 10 mL      Specimens: None    Implants: None    Findings: 3 vessel CAD. Moderate AS.      Complications: None    Recommendations: CT surgery consult for CABG, AVR surgical maze and DARELL occlusion    Signed By: Osmany Jamison MD     March 13, 2023

## 2023-03-13 NOTE — TELEPHONE ENCOUNTER
Patient daughter called stating patient is admitted downtown, and that they will be releasing him, stating that \"nothing is wrong with him\". She is concerned as she stated that Nurse told her he is not taking his medication in the hospital, and feels he needs to be admitted to [facility] before his discharge, and would like you assistance with the matter.

## 2023-03-14 ENCOUNTER — PREP FOR PROCEDURE (OUTPATIENT)
Dept: CARDIOTHORACIC SURGERY | Age: 68
End: 2023-03-14

## 2023-03-14 ENCOUNTER — ANESTHESIA EVENT (OUTPATIENT)
Dept: SURGERY | Age: 68
End: 2023-03-14
Payer: MEDICARE

## 2023-03-14 ENCOUNTER — APPOINTMENT (OUTPATIENT)
Dept: ULTRASOUND IMAGING | Age: 68
DRG: 217 | End: 2023-03-14
Payer: MEDICARE

## 2023-03-14 PROBLEM — I20.0 ACCELERATING ANGINA (HCC): Status: ACTIVE | Noted: 2023-03-11

## 2023-03-14 PROBLEM — I35.0 AORTIC VALVE STENOSIS: Status: ACTIVE | Noted: 2023-03-11

## 2023-03-14 LAB
ANION GAP SERPL CALC-SCNC: 3 MMOL/L (ref 2–11)
APPEARANCE UR: CLEAR
BILIRUB UR QL: NEGATIVE
BUN SERPL-MCNC: 18 MG/DL (ref 8–23)
CALCIUM SERPL-MCNC: 9 MG/DL (ref 8.3–10.4)
CHLORIDE SERPL-SCNC: 107 MMOL/L (ref 101–110)
CO2 SERPL-SCNC: 28 MMOL/L (ref 21–32)
COLOR UR: NORMAL
CREAT SERPL-MCNC: 0.9 MG/DL (ref 0.8–1.5)
GLUCOSE BLD STRIP.AUTO-MCNC: 145 MG/DL (ref 65–100)
GLUCOSE BLD STRIP.AUTO-MCNC: 172 MG/DL (ref 65–100)
GLUCOSE BLD STRIP.AUTO-MCNC: 185 MG/DL (ref 65–100)
GLUCOSE BLD STRIP.AUTO-MCNC: 230 MG/DL (ref 65–100)
GLUCOSE SERPL-MCNC: 200 MG/DL (ref 65–100)
GLUCOSE UR STRIP.AUTO-MCNC: 250 MG/DL
HGB UR QL STRIP: NEGATIVE
HISTORY CHECK: NORMAL
KETONES UR QL STRIP.AUTO: NEGATIVE MG/DL
LEUKOCYTE ESTERASE UR QL STRIP.AUTO: NEGATIVE
NITRITE UR QL STRIP.AUTO: NEGATIVE
PH UR STRIP: 5.5 (ref 5–9)
POTASSIUM SERPL-SCNC: 4.4 MMOL/L (ref 3.5–5.1)
PROT UR STRIP-MCNC: NEGATIVE MG/DL
SERVICE CMNT-IMP: ABNORMAL
SODIUM SERPL-SCNC: 138 MMOL/L (ref 133–143)
SP GR UR REFRACTOMETRY: 1.02 (ref 1–1.02)
UFH PPP CHRO-ACNC: 0.24 IU/ML (ref 0.3–0.7)
UFH PPP CHRO-ACNC: 0.38 IU/ML (ref 0.3–0.7)
UFH PPP CHRO-ACNC: 0.47 IU/ML (ref 0.3–0.7)
UROBILINOGEN UR QL STRIP.AUTO: 0.2 EU/DL (ref 0.2–1)

## 2023-03-14 PROCEDURE — 6360000002 HC RX W HCPCS: Performed by: EMERGENCY MEDICINE

## 2023-03-14 PROCEDURE — 2580000003 HC RX 258: Performed by: INTERNAL MEDICINE

## 2023-03-14 PROCEDURE — 86900 BLOOD TYPING SEROLOGIC ABO: CPT

## 2023-03-14 PROCEDURE — 6370000000 HC RX 637 (ALT 250 FOR IP): Performed by: PHYSICIAN ASSISTANT

## 2023-03-14 PROCEDURE — 86921 COMPATIBILITY TEST INCUBATE: CPT

## 2023-03-14 PROCEDURE — 93970 EXTREMITY STUDY: CPT

## 2023-03-14 PROCEDURE — 6370000000 HC RX 637 (ALT 250 FOR IP): Performed by: INTERNAL MEDICINE

## 2023-03-14 PROCEDURE — 82962 GLUCOSE BLOOD TEST: CPT

## 2023-03-14 PROCEDURE — 86870 RBC ANTIBODY IDENTIFICATION: CPT

## 2023-03-14 PROCEDURE — 36415 COLL VENOUS BLD VENIPUNCTURE: CPT

## 2023-03-14 PROCEDURE — 86902 BLOOD TYPE ANTIGEN DONOR EA: CPT

## 2023-03-14 PROCEDURE — 6360000002 HC RX W HCPCS: Performed by: INTERNAL MEDICINE

## 2023-03-14 PROCEDURE — 6360000002 HC RX W HCPCS: Performed by: HOSPITALIST

## 2023-03-14 PROCEDURE — 1100000003 HC PRIVATE W/ TELEMETRY

## 2023-03-14 PROCEDURE — 85520 HEPARIN ASSAY: CPT

## 2023-03-14 PROCEDURE — 86920 COMPATIBILITY TEST SPIN: CPT

## 2023-03-14 PROCEDURE — 86922 COMPATIBILITY TEST ANTIGLOB: CPT

## 2023-03-14 PROCEDURE — 80048 BASIC METABOLIC PNL TOTAL CA: CPT

## 2023-03-14 PROCEDURE — 86905 BLOOD TYPING RBC ANTIGENS: CPT

## 2023-03-14 PROCEDURE — 99232 SBSQ HOSP IP/OBS MODERATE 35: CPT | Performed by: INTERNAL MEDICINE

## 2023-03-14 RX ORDER — FAMOTIDINE 20 MG/1
20 TABLET, FILM COATED ORAL 2 TIMES DAILY
Status: DISCONTINUED | OUTPATIENT
Start: 2023-03-14 | End: 2023-03-15

## 2023-03-14 RX ORDER — MORPHINE SULFATE 2 MG/ML
2 INJECTION, SOLUTION INTRAMUSCULAR; INTRAVENOUS EVERY 4 HOURS PRN
Status: DISCONTINUED | OUTPATIENT
Start: 2023-03-14 | End: 2023-03-15

## 2023-03-14 RX ORDER — ASPIRIN 81 MG/1
81 TABLET ORAL DAILY
Status: DISCONTINUED | OUTPATIENT
Start: 2023-03-14 | End: 2023-03-15

## 2023-03-14 RX ADMIN — AMIODARONE HYDROCHLORIDE 1 MG/MIN: 50 INJECTION, SOLUTION INTRAVENOUS at 06:48

## 2023-03-14 RX ADMIN — INSULIN LISPRO 2 UNITS: 100 INJECTION, SOLUTION INTRAVENOUS; SUBCUTANEOUS at 12:19

## 2023-03-14 RX ADMIN — MORPHINE SULFATE 2 MG: 2 INJECTION, SOLUTION INTRAMUSCULAR; INTRAVENOUS at 22:04

## 2023-03-14 RX ADMIN — AMIODARONE HYDROCHLORIDE 1 MG/MIN: 50 INJECTION, SOLUTION INTRAVENOUS at 23:49

## 2023-03-14 RX ADMIN — RISPERIDONE 2 MG: 1 TABLET ORAL at 09:13

## 2023-03-14 RX ADMIN — GABAPENTIN 300 MG: 300 CAPSULE ORAL at 20:32

## 2023-03-14 RX ADMIN — METOPROLOL SUCCINATE 50 MG: 25 TABLET, EXTENDED RELEASE ORAL at 20:32

## 2023-03-14 RX ADMIN — LISINOPRIL 20 MG: 20 TABLET ORAL at 09:12

## 2023-03-14 RX ADMIN — HEPARIN SODIUM 2000 UNITS: 1000 INJECTION INTRAVENOUS; SUBCUTANEOUS at 02:51

## 2023-03-14 RX ADMIN — HYDROCODONE BITARTRATE AND ACETAMINOPHEN 1 TABLET: 5; 325 TABLET ORAL at 02:57

## 2023-03-14 RX ADMIN — GABAPENTIN 300 MG: 300 CAPSULE ORAL at 09:12

## 2023-03-14 RX ADMIN — FAMOTIDINE 20 MG: 20 TABLET, FILM COATED ORAL at 20:32

## 2023-03-14 RX ADMIN — HYDROCODONE BITARTRATE AND ACETAMINOPHEN 1 TABLET: 5; 325 TABLET ORAL at 17:35

## 2023-03-14 RX ADMIN — RISPERIDONE 2 MG: 1 TABLET ORAL at 22:00

## 2023-03-14 RX ADMIN — ASPIRIN 81 MG: 81 TABLET, COATED ORAL at 16:29

## 2023-03-14 RX ADMIN — GABAPENTIN 300 MG: 300 CAPSULE ORAL at 14:02

## 2023-03-14 RX ADMIN — Medication: at 09:16

## 2023-03-14 RX ADMIN — METOPROLOL SUCCINATE 50 MG: 25 TABLET, EXTENDED RELEASE ORAL at 09:10

## 2023-03-14 ASSESSMENT — PAIN DESCRIPTION - ORIENTATION
ORIENTATION: LEFT;RIGHT;UPPER
ORIENTATION: RIGHT;LEFT

## 2023-03-14 ASSESSMENT — PAIN SCALES - GENERAL
PAINLEVEL_OUTOF10: 0
PAINLEVEL_OUTOF10: 7
PAINLEVEL_OUTOF10: 0
PAINLEVEL_OUTOF10: 0
PAINLEVEL_OUTOF10: 6
PAINLEVEL_OUTOF10: 2

## 2023-03-14 ASSESSMENT — PAIN DESCRIPTION - LOCATION
LOCATION: LEG
LOCATION: BACK;LEG

## 2023-03-14 ASSESSMENT — PAIN DESCRIPTION - DESCRIPTORS
DESCRIPTORS: ACHING
DESCRIPTORS: ACHING

## 2023-03-14 NOTE — ANESTHESIA PRE PROCEDURE
Department of Anesthesiology  Preprocedure Note       Name:  Love Flores Sr.   Age:  69 y.o.  :  1955                                          MRN:  [id number]         Date:  3/14/2023      Surgeon: Sara Edwards):  Paula Zapata MD    Procedure: Procedure(s):  CABG CORONARY ARTERY BYPASS/ AORTIC VALVE REPLACEMENT/MAZE/PATIENT IS IP IN ROOM #321    Medications prior to admission:   Prior to Admission medications    Medication Sig Start Date End Date Taking? Authorizing Provider   cephALEXin (KEFLEX) 500 MG capsule Take 1 capsule by mouth 2 times daily for 7 days 3/10/23 3/17/23  Dearl SONY Linton - CNP   cephALEXin (KEFLEX) 500 MG capsule Take 1 capsule by mouth 2 times daily for 7 days 3/8/23 3/15/23  Shalonda De Los Santos PA-C   risperiDONE (RISPERDAL) 2 MG tablet Take 1 tablet by mouth 2 times daily 3/3/23   Frannyaster Dey, DO   furosemide (LASIX) 20 MG tablet Take 1 tablet by mouth daily 23aster Dey, DO   amLODIPine (NORVASC) 10 MG tablet Take 1 tablet by mouth daily 1/10/23   Franny Tiburcio, DO   lisinopril (PRINIVIL;ZESTRIL) 20 MG tablet Take 1 tablet by mouth daily 1/10/23   Franny Tiburcio, DO   carvedilol (COREG) 25 MG tablet Take 1 tablet by mouth 2 times daily 1/10/23   Franny Tiburcio, DO   metFORMIN (GLUCOPHAGE) 500 MG tablet Take 1 tablet by mouth 2 times daily (with meals) 1/10/23   Frannyaster Dey, DO   gabapentin (NEURONTIN) 300 MG capsule Take 1 capsule by mouth 3 times daily for 180 days. Intended supply: 30 days 1/10/23 7/9/23  Franny Dey DO   glucose monitoring (FREESTYLE FREEDOM) kit 1 kit by Does not apply route daily 1/10/23   Franny Dey, DO   blood glucose monitor strips Test one time a day & as needed for symptoms of irregular blood glucose. Dispense sufficient amount for indicated testing frequency plus additional to accommodate PRN testing needs.  1/10/23   Franny Dey, DO       Current medications:    Current Facility-Administered Medications   Medication Dose Route Frequency Provider Last Rate Last Admin    aspirin EC tablet 81 mg  81 mg Oral Daily HERBIE Stover   81 mg at 03/14/23 1629    famotidine (PEPCID) tablet 20 mg  20 mg Oral BID HERBIE Stover        [START ON 3/15/2023] ceFAZolin (ANCEF) 3000 mg in sterile water 30 mL IV syringe  3,000 mg IntraVENous On Call to [address] [place] [address]        [START ON 3/15/2023] alcohol 62% (NOZIN) nasal  3 ampule  3 ampule Topical Once [employer] [facility]        [START ON 3/15/2023] alcohol 62% (NOZIN) nasal  1 ampule  1 ampule Topical Q12H HERBIE Adhikari        0.9 % sodium chloride infusion   IntraVENous Continuous Patricia Matson MD 75 mL/hr at 03/13/23 0929 New Bag at 03/13/23 0929    metoprolol succinate (TOPROL XL) extended release tablet 50 mg  50 mg Oral BID Patricia Matson MD   50 mg at 03/14/23 1583    mineral oil-hydrophilic petrolatum (AQUAPHOR) ointment   Topical Daily Pam Reyez MD   Given at 03/14/23 0916    metoprolol (LOPRESSOR) injection 5 mg  5 mg IntraVENous Q5 Min PRN Pam Reyez MD   5 mg at 03/13/23 2215    HYDROcodone-acetaminophen (NORCO) 5-325 MG per tablet 1 tablet  1 tablet Oral Q6H PRN Pam Reyez MD   1 tablet at 03/14/23 1735    amiodarone (CORDARONE) 450 mg in dextrose 5 % 250 mL infusion ([id number])  1 mg/min IntraVENous Continuous Patricia Matson MD 33.3 mL/hr at 03/14/23 0648 1 mg/min at 03/14/23 0648    insulin lispro (HUMALOG) injection vial 0-8 Units  0-8 Units SubCUTAneous TID WC Pam Reyez MD   2 Units at 03/14/23 1219    insulin lispro (HUMALOG) injection vial 0-4 Units  0-4 Units SubCUTAneous Nightly Pam Reyez MD        melatonin tablet 6 mg  6 mg Oral Nightly PRN Surinder Cuevas MD   6 mg at 03/13/23 7104    sodium chloride flush 0.9 % injection 5-40 mL  5-40 mL IntraVENous 2 times per day Flaco Kirkpatrick, DO   10 mL at 03/13/23 2018    sodium chloride flush 0.9 % injection 5-40 mL  5-40 mL IntraVENous PRN Hari Ciesco, DO        0.9 % sodium chloride infusion   IntraVENous PRN Hari Ciesco, DO        ondansetron (ZOFRAN-ODT) disintegrating tablet 4 mg  4 mg Oral Q8H PRN Hari Ciesco, DO        Or    ondansetron (ZOFRAN) injection 4 mg  4 mg IntraVENous Q6H PRN Hari Ciesco, DO        polyethylene glycol (GLYCOLAX) packet 17 g  17 g Oral Daily PRN Hari Ciesco, DO        acetaminophen (TYLENOL) tablet 650 mg  650 mg Oral Q6H PRN Hari Ciesco, DO   650 mg at 03/13/23 1800    Or    acetaminophen (TYLENOL) suppository 650 mg  650 mg Rectal Q6H PRN Flaco Kirkpatrick, DO        [Held by provider] furosemide (LASIX) injection 40 mg  40 mg IntraVENous BID Hari Ciesco, DO   40 mg at 03/13/23 0845    gabapentin (NEURONTIN) capsule 300 mg  300 mg Oral TID Hari Ciesco, DO   300 mg at 03/14/23 1402    [Held by provider] lisinopril (PRINIVIL;ZESTRIL) tablet 20 mg  20 mg Oral Daily Hari Ciesco, DO   20 mg at 03/14/23 0912    risperiDONE (RISPERDAL) tablet 2 mg  2 mg Oral BID Hari Ciesco, DO   2 mg at 03/14/23 0913    nitroGLYCERIN (NITROSTAT) SL tablet 0.4 mg  0.4 mg SubLINGual Q5 Min PRN Mandi Kevin MD   0.4 mg at 03/12/23 1258    heparin (porcine) injection 4,000 Units  4,000 Units IntraVENous PRN Karol Stewart MD   4,000 Units at 03/12/23 0549    heparin (porcine) injection 2,000 Units  2,000 Units IntraVENous PRN Karol Stewart MD   2,000 Units at 03/14/23 0251    heparin 25,000 units in dextrose 5% 250 mL (premix) infusion  5-30 Units/kg/hr IntraVENous Continuous HERBIE Elliott 21 mL/hr at 03/14/23 1000 18 Units/kg/hr at 03/14/23 1000       Allergies:     Allergies   Allergen Reactions    Other Shortness Of Breath     Oyster    Rosuvastatin Shortness Of Breath    Oyster Extract Other (See Comments) and Nausea And Vomiting     N & V, diarrhea, abdominal cramping         Problem List: Patient Active Problem List   Diagnosis Code    Bipolar I disorder with vandana ([facility] F31.10    Uncontrolled type 2 diabetes mellitus with hyperglycemia ([facility] E11.65    Anxiety F41.9    Dyslipidemia E78.5    Hypertensive cardiovascular disease I11.9    Migraine G43.909    Left leg weakness R29.898    MRI contraindicated due to metal implant Z53.09    Weakness of left arm R29.898    HTN (hypertension) I10    Transient ischemic attack G45.9    Left-sided weakness R53.1    S/P PTCA (percutaneous transluminal coronary angioplasty) Z98.61    Unintentional weight loss R63.4    GERD (gastroesophageal reflux disease) K21.9    Painful diabetic neuropathy ([facility]) E11.40    COPD (chronic obstructive pulmonary disease) ([facility]) J44.9    Coronary atherosclerosis I25.10    Ulcers of both lower legs ([facility] L97.919, L97.929    Subdural hematoma S06. 5XAA    Adenomatous polyp of colon D12.6    Bipolar disorder ([facility]) F31.9    CHF (congestive heart failure) ([facility]) I50.9    Chronic lumbar pain M54.50, G89.29    Folate deficiency E53.8    Disease of basal ganglia G25.9    Spondylosis of lumbar region without myelopathy or radiculopathy M47.816    Thrombocytopenia ([facility]) D69.6    Mild cognitive impairment G31.84    Presbyesophagus K22.89    Primary insomnia F51.01    Chronic venous hypertension (idiopathic) with other complications of bilateral lower extremity I87.393    Sebaceous cyst L72.3    Pain and swelling of right lower extremity M79.604, M79.89    Paroxysmal atrial fibrillation ([facility]) I48.0    Precordial pain R07.2    Accelerating angina ([facility]) I20.0    Aortic valve stenosis I35.0       Past Medical History:        Diagnosis Date    Bipolar disorder ([facility]     CAD (coronary artery disease)     CHF (congestive heart failure) ([facility])     Chronic back pain     Hyperlipidemia     Hypertension     Neuropathy     Type 2 diabetes mellitus without complication ([facility]        Past Surgical History: Procedure Laterality Date    CARDIAC PROCEDURE N/A 3/13/2023    LEFT HEART CATH / CORONARY ANGIOGRAPHY performed by Nathan Guthrie MD at [facility] CARDIAC CATH LAB    HERNIA REPAIR         Social History:    Social History     Tobacco Use    Smoking status: Former     Types: Cigarettes     Quit date: 1998     Years since quittin.2    Smokeless tobacco: Never   Substance Use Topics    Alcohol use: Not Currently                                Counseling given: Not Answered      Vital Signs (Current):   Vitals:    23 0440 23 0725 23 1135 23 1529   BP: 133/75 (!) 147/71 130/70 138/84   Pulse: 64 65 65 63   Resp: 18 19 19 19   Temp: 97.4 °F (36.3 °C) 97.3 °F (36.3 °C) 97.3 °F (36.3 °C) 97.7 °F (36.5 °C)   TempSrc:  Oral Oral Oral   SpO2: 97% 98% 97% 99%   Weight:       Height:                                                  BP Readings from Last 3 Encounters:   23 138/84   03/10/23 (!) 174/84   03/10/23 (!) 147/80       NPO Status:                                                                                 BMI:   Wt Readings from Last 3 Encounters:   23 265 lb 9.6 oz (120.5 kg)   03/10/23 257 lb (116.6 kg)   03/10/23 257 lb (116.6 kg)     Body mass index is 36.02 kg/m².     CBC:   Lab Results   Component Value Date/Time    WBC 9.2 2023 06:55 PM    RBC 4.27 2023 06:55 PM    HGB 12.8 2023 06:55 PM    HCT 38.3 2023 06:55 PM    MCV 89.7 2023 06:55 PM    RDW 13.0 2023 06:55 PM     2023 06:55 PM       CMP:   Lab Results   Component Value Date/Time     2023 02:05 AM    K 4.4 2023 02:05 AM     2023 02:05 AM    CO2 28 2023 02:05 AM    BUN 18 2023 02:05 AM    CREATININE 0.90 2023 02:05 AM    LABGLOM >60 2023 02:05 AM    GLUCOSE 200 2023 02:05 AM    PROT 6.6 2023 06:55 PM    CALCIUM 9.0 2023 02:05 AM    BILITOT 0.3 2023 06:55 PM    ALKPHOS 41 2023 06:55 PM    AST 12 03/11/2023 06:55 PM    ALT 17 03/11/2023 06:55 PM       POC Tests:   Recent Labs     03/14/23  1813   POCGLU 172*       Coags:   Lab Results   Component Value Date/Time    PROTIME 14.7 03/11/2023 06:55 PM    INR 1.1 03/11/2023 06:55 PM    APTT 34.4 03/11/2023 06:55 PM       HCG (If Applicable): No results found for: PREGTESTUR, PREGSERUM, HCG, HCGQUANT     ABGs: No results found for: PHART, PO2ART, XGI4QBG, PDW7VDF, BEART, B2SBUOTI     Type & Screen (If Applicable):  No results found for: LABABO, LABRH    Drug/Infectious Status (If Applicable):  No results found for: HIV, HEPCAB    COVID-19 Screening (If Applicable): No results found for: COVID19        Anesthesia Evaluation  Patient summary reviewed  Airway: Mallampati: II  TM distance: >3 FB   Neck ROM: full  Mouth opening: > = 3 FB   Dental:    (+) upper dentures      Pulmonary: breath sounds clear to auscultation  (+) COPD:      Smoker: ex.                           Cardiovascular:  Exercise tolerance: poor (<4 METS),   (+) hypertension:, angina (presyncopal 2-3 weeks ago \"i passed out on a gocart at [facility]"): at rest, dysrhythmias (PAF): atrial fibrillation, CHF:,         Rhythm: irregular  Rate: normal                 ROS comment:    Echo (3/12/23):     Left Ventricle: Normal left ventricular systolic function with a visually estimated EF of 55 - 60%. Left ventricle size is normal. Mildly increased wall thickness. Normal wall motion. Abnormal diastolic function.   Aortic Valve: Moderately calcified cusp. Moderate stenosis of the aortic valve. AV mean gradient is 19 mmHg. AV area by continuity VTI is 1.8 cm2.   Mitral Valve: Mild annular calcification of the mitral valve. Mild regurgitation. Mild stenosis noted.   Left Atrium: Left atrium is mildly dilated.   Aorta: Mildly dilated ascending aorta. Ao ascending diameter is 4.0 cm.     3VD on cath     Neuro/Psych:   (+) TIA, psychiatric history:            GI/Hepatic/Renal:   (+) GERD: well controlled, morbid obesity          Endo/Other:    (+) DiabetesType II DM, well controlled, , .                 Abdominal:             Vascular: Other Findings:           Anesthesia Plan      general     ASA 4       Induction: intravenous. arterial line, EVA, continuous noninvasive hemodynamic monitor, CVP and BIS  MIPS: Postoperative opioids intended, Prophylactic antiemetics administered and Postoperative ventilation. Anesthetic plan and risks discussed with patient. Use of blood products discussed with patient whom.                      Chris Knight MD   3/14/2023

## 2023-03-15 ENCOUNTER — APPOINTMENT (OUTPATIENT)
Dept: GENERAL RADIOLOGY | Age: 68
DRG: 217 | End: 2023-03-15
Payer: MEDICARE

## 2023-03-15 ENCOUNTER — ANESTHESIA (OUTPATIENT)
Dept: SURGERY | Age: 68
End: 2023-03-15
Payer: MEDICARE

## 2023-03-15 PROBLEM — Z99.11 ENCOUNTER FOR WEANING FROM VENTILATOR (HCC): Status: ACTIVE | Noted: 2023-03-15

## 2023-03-15 PROBLEM — I48.91 NEW ONSET ATRIAL FIBRILLATION (HCC): Status: ACTIVE | Noted: 2023-03-15

## 2023-03-15 PROBLEM — J98.11 ATELECTASIS: Status: ACTIVE | Noted: 2023-03-15

## 2023-03-15 LAB
ACT AVERAGE - AAVG: 129 SEC
ACT AVERAGE - AAVG: 508 SEC
ACT AVERAGE - AAVG: 509 SEC
ACT AVERAGE - AAVG: 533 SEC
ACT AVERAGE - AAVG: 583 SEC
ACT AVERAGE - AAVG: 604 SEC
ACT AVERAGE - AAVG: >999 SEC
ANION GAP SERPL CALC-SCNC: 1 MMOL/L (ref 2–11)
ANION GAP SERPL CALC-SCNC: 2 MMOL/L (ref 2–11)
APTT PPP: 42.6 SEC (ref 24.5–34.2)
ARTERIAL PATENCY WRIST A: ABNORMAL
BASE DEFICIT BLD-SCNC: 1.6 MMOL/L
BASE DEFICIT BLD-SCNC: 1.8 MMOL/L
BASE DEFICIT BLD-SCNC: 2.2 MMOL/L
BASE DEFICIT BLD-SCNC: 3.4 MMOL/L
BASE DEFICIT BLD-SCNC: 3.6 MMOL/L
BASE DEFICIT BLD-SCNC: 3.6 MMOL/L
BASE DEFICIT BLD-SCNC: 4.2 MMOL/L
BASE DEFICIT BLD-SCNC: 5.7 MMOL/L
BDY SITE: ABNORMAL
BUN SERPL-MCNC: 12 MG/DL (ref 8–23)
BUN SERPL-MCNC: 12 MG/DL (ref 8–23)
CA-I BLD-MCNC: 1.12 MMOL/L (ref 1.12–1.32)
CA-I BLD-MCNC: 1.14 MMOL/L (ref 1.12–1.32)
CA-I BLD-MCNC: 1.14 MMOL/L (ref 1.12–1.32)
CA-I BLD-MCNC: 1.15 MMOL/L (ref 1.12–1.32)
CA-I BLD-MCNC: 1.16 MMOL/L (ref 1.12–1.32)
CA-I BLD-MCNC: 1.19 MMOL/L (ref 1.12–1.32)
CA-I BLD-MCNC: 1.19 MMOL/L (ref 1.12–1.32)
CA-I BLD-MCNC: 1.26 MMOL/L (ref 1.12–1.32)
CA-I BLD-MCNC: 1.39 MMOL/L (ref 1.12–1.32)
CA-I BLD-MCNC: 1.53 MMOL/L (ref 1.12–1.32)
CALCIUM SERPL-MCNC: 8.7 MG/DL (ref 8.3–10.4)
CALCIUM SERPL-MCNC: 8.7 MG/DL (ref 8.3–10.4)
CHLORIDE SERPL-SCNC: 109 MMOL/L (ref 101–110)
CHLORIDE SERPL-SCNC: 116 MMOL/L (ref 101–110)
CO2 BLD-SCNC: 20 MMOL/L (ref 13–23)
CO2 BLD-SCNC: 21 MMOL/L (ref 13–23)
CO2 BLD-SCNC: 22 MMOL/L (ref 13–23)
CO2 BLD-SCNC: 23 MMOL/L (ref 13–23)
CO2 BLD-SCNC: 23 MMOL/L (ref 13–23)
CO2 BLD-SCNC: 24 MMOL/L (ref 13–23)
CO2 SERPL-SCNC: 23 MMOL/L (ref 21–32)
CO2 SERPL-SCNC: 26 MMOL/L (ref 21–32)
CREAT SERPL-MCNC: 0.9 MG/DL (ref 0.8–1.5)
CREAT SERPL-MCNC: 1 MG/DL (ref 0.8–1.5)
EKG ATRIAL RATE: 72 BPM
EKG DIAGNOSIS: NORMAL
EKG P AXIS: 60 DEGREES
EKG P-R INTERVAL: 170 MS
EKG Q-T INTERVAL: 424 MS
EKG QRS DURATION: 84 MS
EKG QTC CALCULATION (BAZETT): 464 MS
EKG R AXIS: 50 DEGREES
EKG T AXIS: 75 DEGREES
EKG VENTRICULAR RATE: 72 BPM
ERYTHROCYTE [DISTWIDTH] IN BLOOD BY AUTOMATED COUNT: 13.3 % (ref 11.9–14.6)
ERYTHROCYTE [DISTWIDTH] IN BLOOD BY AUTOMATED COUNT: 13.3 % (ref 11.9–14.6)
ERYTHROCYTE [DISTWIDTH] IN BLOOD BY AUTOMATED COUNT: 13.5 % (ref 11.9–14.6)
FIO2 ON VENT: 100 %
GAS FLOW.O2 O2 DELIVERY SYS: ABNORMAL
GLUCOSE BLD STRIP.AUTO-MCNC: 112 MG/DL (ref 65–100)
GLUCOSE BLD STRIP.AUTO-MCNC: 115 MG/DL (ref 65–100)
GLUCOSE BLD STRIP.AUTO-MCNC: 121 MG/DL (ref 65–100)
GLUCOSE BLD STRIP.AUTO-MCNC: 123 MG/DL (ref 65–100)
GLUCOSE BLD STRIP.AUTO-MCNC: 124 MG/DL (ref 65–100)
GLUCOSE BLD STRIP.AUTO-MCNC: 125 MG/DL (ref 65–100)
GLUCOSE BLD STRIP.AUTO-MCNC: 127 MG/DL (ref 65–100)
GLUCOSE BLD STRIP.AUTO-MCNC: 131 MG/DL (ref 65–100)
GLUCOSE BLD STRIP.AUTO-MCNC: 140 MG/DL (ref 65–100)
GLUCOSE BLD STRIP.AUTO-MCNC: 140 MG/DL (ref 65–100)
GLUCOSE BLD STRIP.AUTO-MCNC: 141 MG/DL (ref 65–100)
GLUCOSE BLD STRIP.AUTO-MCNC: 143 MG/DL (ref 65–100)
GLUCOSE BLD STRIP.AUTO-MCNC: 148 MG/DL (ref 65–100)
GLUCOSE BLD STRIP.AUTO-MCNC: 154 MG/DL (ref 65–100)
GLUCOSE BLD STRIP.AUTO-MCNC: 157 MG/DL (ref 65–100)
GLUCOSE BLD STRIP.AUTO-MCNC: 164 MG/DL (ref 65–100)
GLUCOSE BLD STRIP.AUTO-MCNC: 169 MG/DL (ref 65–100)
GLUCOSE BLD STRIP.AUTO-MCNC: 178 MG/DL (ref 65–100)
GLUCOSE BLD STRIP.AUTO-MCNC: 180 MG/DL (ref 65–100)
GLUCOSE SERPL-MCNC: 122 MG/DL (ref 65–100)
GLUCOSE SERPL-MCNC: 160 MG/DL (ref 65–100)
HCO3 BLD-SCNC: 20.5 MMOL/L (ref 22–26)
HCO3 BLD-SCNC: 21.6 MMOL/L (ref 22–26)
HCO3 BLD-SCNC: 21.8 MMOL/L (ref 22–26)
HCO3 BLD-SCNC: 22.1 MMOL/L (ref 22–26)
HCO3 BLD-SCNC: 22.1 MMOL/L (ref 22–26)
HCO3 BLD-SCNC: 22.7 MMOL/L (ref 22–26)
HCO3 BLD-SCNC: 22.9 MMOL/L (ref 22–26)
HCO3 BLD-SCNC: 23.4 MMOL/L (ref 22–26)
HCO3 BLD-SCNC: 23.8 MMOL/L (ref 22–26)
HCO3 BLD-SCNC: 24.7 MMOL/L (ref 22–26)
HCT VFR BLD AUTO: 34.3 % (ref 41.1–50.3)
HCT VFR BLD AUTO: 37 % (ref 41.1–50.3)
HCT VFR BLD AUTO: 39 % (ref 41.1–50.3)
HEPARIN REQUIRED-PATIENT: 0
HEPARIN REQUIRED-PATIENT: 2502
HEPARIN REQUIRED-PATIENT: 2502
HEPARIN REQUIRED-TOTAL: 0 UNITS
HEPARIN REQUIRED-TOTAL: 2820 UNITS
HEPARIN REQUIRED-TOTAL: 2820 UNITS
HEPARIN TEST CONCENTRATE: 2 MG/KG
HEPARIN TEST CONCENTRATE: 2 MG/KG
HEPARIN TEST CONCENTRATE: 2.5 MG/KG
HGB BLD-MCNC: 11 G/DL (ref 13.6–17.2)
HGB BLD-MCNC: 12.2 G/DL (ref 13.6–17.2)
HGB BLD-MCNC: 12.8 G/DL (ref 13.6–17.2)
INR PPP: 1.3
MAGNESIUM SERPL-MCNC: 2.6 MG/DL (ref 1.8–2.4)
MCH RBC QN AUTO: 29.5 PG (ref 26.1–32.9)
MCH RBC QN AUTO: 30 PG (ref 26.1–32.9)
MCH RBC QN AUTO: 30 PG (ref 26.1–32.9)
MCHC RBC AUTO-ENTMCNC: 32.1 G/DL (ref 31.4–35)
MCHC RBC AUTO-ENTMCNC: 32.8 G/DL (ref 31.4–35)
MCHC RBC AUTO-ENTMCNC: 33 G/DL (ref 31.4–35)
MCV RBC AUTO: 90.9 FL (ref 82–102)
MCV RBC AUTO: 91.5 FL (ref 82–102)
MCV RBC AUTO: 92 FL (ref 82–102)
NRBC # BLD: 0 K/UL (ref 0–0.2)
PCO2 BLD: 41.5 MMHG (ref 35–45)
PCO2 BLD: 41.5 MMHG (ref 35–45)
PCO2 BLD: 41.8 MMHG (ref 35–45)
PCO2 BLD: 42.4 MMHG (ref 35–45)
PCO2 BLD: 42.5 MMHG (ref 35–45)
PCO2 BLD: 43.2 MMHG (ref 35–45)
PCO2 BLD: 44.6 MMHG (ref 35–45)
PCO2 BLD: 45.1 MMHG (ref 35–45)
PCO2 BLD: 47.1 MMHG (ref 35–45)
PCO2 BLD: 48.6 MMHG (ref 35–45)
PEEP RESPIRATORY: 8
PH BLD: 7.29 (ref 7.35–7.45)
PH BLD: 7.29 (ref 7.35–7.45)
PH BLD: 7.3 (ref 7.35–7.45)
PH BLD: 7.31 (ref 7.35–7.45)
PH BLD: 7.31 (ref 7.35–7.45)
PH BLD: 7.32 (ref 7.35–7.45)
PH BLD: 7.32 (ref 7.35–7.45)
PH BLD: 7.33 (ref 7.35–7.45)
PH BLD: 7.35 (ref 7.35–7.45)
PH BLD: 7.36 (ref 7.35–7.45)
PLATELET # BLD AUTO: 109 K/UL (ref 150–450)
PLATELET # BLD AUTO: 122 K/UL (ref 150–450)
PLATELET # BLD AUTO: 177 K/UL (ref 150–450)
PMV BLD AUTO: 10.4 FL (ref 9.4–12.3)
PMV BLD AUTO: 10.4 FL (ref 9.4–12.3)
PMV BLD AUTO: 9.9 FL (ref 9.4–12.3)
PO2 BLD: 134 MMHG (ref 75–100)
PO2 BLD: 144 MMHG (ref 75–100)
PO2 BLD: 182 MMHG (ref 75–100)
PO2 BLD: 204 MMHG (ref 75–100)
PO2 BLD: 225 MMHG (ref 75–100)
PO2 BLD: 249 MMHG (ref 75–100)
PO2 BLD: 260 MMHG (ref 75–100)
PO2 BLD: 263 MMHG (ref 75–100)
PO2 BLD: 271 MMHG (ref 75–100)
PO2 BLD: 412 MMHG (ref 75–100)
POTASSIUM BLD-SCNC: 4.1 MMOL/L (ref 3.5–5.1)
POTASSIUM BLD-SCNC: 4.2 MMOL/L (ref 3.5–5.1)
POTASSIUM BLD-SCNC: 4.3 MMOL/L (ref 3.5–5.1)
POTASSIUM BLD-SCNC: 4.4 MMOL/L (ref 3.5–5.1)
POTASSIUM BLD-SCNC: 4.4 MMOL/L (ref 3.5–5.1)
POTASSIUM BLD-SCNC: 4.5 MMOL/L (ref 3.5–5.1)
POTASSIUM BLD-SCNC: 4.8 MMOL/L (ref 3.5–5.1)
POTASSIUM BLD-SCNC: 4.9 MMOL/L (ref 3.5–5.1)
POTASSIUM BLD-SCNC: 4.9 MMOL/L (ref 3.5–5.1)
POTASSIUM BLD-SCNC: 5 MMOL/L (ref 3.5–5.1)
POTASSIUM SERPL-SCNC: 4.2 MMOL/L (ref 3.5–5.1)
POTASSIUM SERPL-SCNC: 4.5 MMOL/L (ref 3.5–5.1)
POTASSIUM SERPL-SCNC: 4.7 MMOL/L (ref 3.5–5.1)
PROTAMINE DOSE-PUMP: 30 MG
PROTAMINE DOSE-PUMP: 30 MG
PROTAMINE DOSE-PUMP: 37 MG
PROTAMINE DOSE-TOTAL: 265 MG
PROTAMINE DOSE-TOTAL: 265 MG
PROTAMINE DOSE-TOTAL: 331 MG
PROTHROMBIN TIME: 17.1 SEC (ref 12.6–14.3)
RBC # BLD AUTO: 3.73 M/UL (ref 4.23–5.6)
RBC # BLD AUTO: 4.07 M/UL (ref 4.23–5.6)
RBC # BLD AUTO: 4.26 M/UL (ref 4.23–5.6)
RESPIRATORY RATE: 18
SAO2 % BLD: 100 %
SAO2 % BLD: 99 %
SAO2 % BLD: 99 %
SERVICE CMNT-IMP: ABNORMAL
SODIUM BLD-SCNC: 136 MMOL/L (ref 136–145)
SODIUM BLD-SCNC: 137 MMOL/L (ref 136–145)
SODIUM BLD-SCNC: 138 MMOL/L (ref 136–145)
SODIUM BLD-SCNC: 138 MMOL/L (ref 136–145)
SODIUM BLD-SCNC: 139 MMOL/L (ref 136–145)
SODIUM BLD-SCNC: 140 MMOL/L (ref 136–145)
SODIUM BLD-SCNC: 140 MMOL/L (ref 136–145)
SODIUM BLD-SCNC: 141 MMOL/L (ref 136–145)
SODIUM SERPL-SCNC: 137 MMOL/L (ref 133–143)
SODIUM SERPL-SCNC: 140 MMOL/L (ref 133–143)
SPECIMEN SITE: ABNORMAL
UFH PPP CHRO-ACNC: 0.22 IU/ML (ref 0.3–0.7)
VENTILATION MODE VENT: ABNORMAL
VT SETTING VENT: 550
WBC # BLD AUTO: 12.6 K/UL (ref 4.3–11.1)
WBC # BLD AUTO: 13.5 K/UL (ref 4.3–11.1)
WBC # BLD AUTO: 7.1 K/UL (ref 4.3–11.1)

## 2023-03-15 PROCEDURE — 37799 UNLISTED PX VASCULAR SURGERY: CPT

## 2023-03-15 PROCEDURE — 84132 ASSAY OF SERUM POTASSIUM: CPT

## 2023-03-15 PROCEDURE — 82330 ASSAY OF CALCIUM: CPT

## 2023-03-15 PROCEDURE — B24BZZ4 ULTRASONOGRAPHY OF HEART WITH AORTA, TRANSESOPHAGEAL: ICD-10-PCS | Performed by: ANESTHESIOLOGY

## 2023-03-15 PROCEDURE — 2580000003 HC RX 258: Performed by: ANESTHESIOLOGY

## 2023-03-15 PROCEDURE — 6360000002 HC RX W HCPCS: Performed by: NURSE ANESTHETIST, CERTIFIED REGISTERED

## 2023-03-15 PROCEDURE — 6370000000 HC RX 637 (ALT 250 FOR IP): Performed by: INTERNAL MEDICINE

## 2023-03-15 PROCEDURE — 6360000002 HC RX W HCPCS: Performed by: PHYSICIAN ASSISTANT

## 2023-03-15 PROCEDURE — 94002 VENT MGMT INPAT INIT DAY: CPT

## 2023-03-15 PROCEDURE — 83735 ASSAY OF MAGNESIUM: CPT

## 2023-03-15 PROCEDURE — 3700000000 HC ANESTHESIA ATTENDED CARE: Performed by: THORACIC SURGERY (CARDIOTHORACIC VASCULAR SURGERY)

## 2023-03-15 PROCEDURE — 06BQ4ZZ EXCISION OF LEFT SAPHENOUS VEIN, PERCUTANEOUS ENDOSCOPIC APPROACH: ICD-10-PCS | Performed by: THORACIC SURGERY (CARDIOTHORACIC VASCULAR SURGERY)

## 2023-03-15 PROCEDURE — 2780000006 HC MISC HEART VALVE: Performed by: THORACIC SURGERY (CARDIOTHORACIC VASCULAR SURGERY)

## 2023-03-15 PROCEDURE — 03HY32Z INSERTION OF MONITORING DEVICE INTO UPPER ARTERY, PERCUTANEOUS APPROACH: ICD-10-PCS | Performed by: NURSE ANESTHETIST, CERTIFIED REGISTERED

## 2023-03-15 PROCEDURE — 02RF08Z REPLACEMENT OF AORTIC VALVE WITH ZOOPLASTIC TISSUE, OPEN APPROACH: ICD-10-PCS | Performed by: THORACIC SURGERY (CARDIOTHORACIC VASCULAR SURGERY)

## 2023-03-15 PROCEDURE — 84295 ASSAY OF SERUM SODIUM: CPT

## 2023-03-15 PROCEDURE — P9047 ALBUMIN (HUMAN), 25%, 50ML: HCPCS

## 2023-03-15 PROCEDURE — 88305 TISSUE EXAM BY PATHOLOGIST: CPT

## 2023-03-15 PROCEDURE — 2500000003 HC RX 250 WO HCPCS

## 2023-03-15 PROCEDURE — 2580000003 HC RX 258: Performed by: INTERNAL MEDICINE

## 2023-03-15 PROCEDURE — 2500000003 HC RX 250 WO HCPCS: Performed by: PHYSICIAN ASSISTANT

## 2023-03-15 PROCEDURE — 5A1221Z PERFORMANCE OF CARDIAC OUTPUT, CONTINUOUS: ICD-10-PCS | Performed by: THORACIC SURGERY (CARDIOTHORACIC VASCULAR SURGERY)

## 2023-03-15 PROCEDURE — C1751 CATH, INF, PER/CENT/MIDLINE: HCPCS | Performed by: THORACIC SURGERY (CARDIOTHORACIC VASCULAR SURGERY)

## 2023-03-15 PROCEDURE — C1713 ANCHOR/SCREW BN/BN,TIS/BN: HCPCS | Performed by: THORACIC SURGERY (CARDIOTHORACIC VASCULAR SURGERY)

## 2023-03-15 PROCEDURE — C1729 CATH, DRAINAGE: HCPCS | Performed by: THORACIC SURGERY (CARDIOTHORACIC VASCULAR SURGERY)

## 2023-03-15 PROCEDURE — 6370000000 HC RX 637 (ALT 250 FOR IP): Performed by: PHYSICIAN ASSISTANT

## 2023-03-15 PROCEDURE — 3600000008 HC SURGERY OHS BASE: Performed by: THORACIC SURGERY (CARDIOTHORACIC VASCULAR SURGERY)

## 2023-03-15 PROCEDURE — 85027 COMPLETE CBC AUTOMATED: CPT

## 2023-03-15 PROCEDURE — 3600000018 HC SURGERY OHS ADDTL 15MIN: Performed by: THORACIC SURGERY (CARDIOTHORACIC VASCULAR SURGERY)

## 2023-03-15 PROCEDURE — 85610 PROTHROMBIN TIME: CPT

## 2023-03-15 PROCEDURE — 2720000010 HC SURG SUPPLY STERILE: Performed by: THORACIC SURGERY (CARDIOTHORACIC VASCULAR SURGERY)

## 2023-03-15 PROCEDURE — 2580000003 HC RX 258

## 2023-03-15 PROCEDURE — 2709999900 HC NON-CHARGEABLE SUPPLY: Performed by: THORACIC SURGERY (CARDIOTHORACIC VASCULAR SURGERY)

## 2023-03-15 PROCEDURE — 99233 SBSQ HOSP IP/OBS HIGH 50: CPT | Performed by: INTERNAL MEDICINE

## 2023-03-15 PROCEDURE — 71045 X-RAY EXAM CHEST 1 VIEW: CPT

## 2023-03-15 PROCEDURE — 02100Z9 BYPASS CORONARY ARTERY, ONE ARTERY FROM LEFT INTERNAL MAMMARY, OPEN APPROACH: ICD-10-PCS | Performed by: THORACIC SURGERY (CARDIOTHORACIC VASCULAR SURGERY)

## 2023-03-15 PROCEDURE — 80048 BASIC METABOLIC PNL TOTAL CA: CPT

## 2023-03-15 PROCEDURE — 36415 COLL VENOUS BLD VENIPUNCTURE: CPT

## 2023-03-15 PROCEDURE — C1769 GUIDE WIRE: HCPCS | Performed by: THORACIC SURGERY (CARDIOTHORACIC VASCULAR SURGERY)

## 2023-03-15 PROCEDURE — 85347 COAGULATION TIME ACTIVATED: CPT

## 2023-03-15 PROCEDURE — 021009W BYPASS CORONARY ARTERY, ONE ARTERY FROM AORTA WITH AUTOLOGOUS VENOUS TISSUE, OPEN APPROACH: ICD-10-PCS | Performed by: THORACIC SURGERY (CARDIOTHORACIC VASCULAR SURGERY)

## 2023-03-15 PROCEDURE — 6370000000 HC RX 637 (ALT 250 FOR IP): Performed by: NURSE ANESTHETIST, CERTIFIED REGISTERED

## 2023-03-15 PROCEDURE — 85530 HEPARIN-PROTAMINE TOLERANCE: CPT

## 2023-03-15 PROCEDURE — 82962 GLUCOSE BLOOD TEST: CPT

## 2023-03-15 PROCEDURE — 2580000003 HC RX 258: Performed by: THORACIC SURGERY (CARDIOTHORACIC VASCULAR SURGERY)

## 2023-03-15 PROCEDURE — 02L70CK OCCLUSION OF LEFT ATRIAL APPENDAGE WITH EXTRALUMINAL DEVICE, OPEN APPROACH: ICD-10-PCS | Performed by: THORACIC SURGERY (CARDIOTHORACIC VASCULAR SURGERY)

## 2023-03-15 PROCEDURE — 85730 THROMBOPLASTIN TIME PARTIAL: CPT

## 2023-03-15 PROCEDURE — 6360000002 HC RX W HCPCS

## 2023-03-15 PROCEDURE — 93005 ELECTROCARDIOGRAM TRACING: CPT | Performed by: PHYSICIAN ASSISTANT

## 2023-03-15 PROCEDURE — 2100000000 HC CCU R&B

## 2023-03-15 PROCEDURE — 02583ZZ DESTRUCTION OF CONDUCTION MECHANISM, PERCUTANEOUS APPROACH: ICD-10-PCS | Performed by: THORACIC SURGERY (CARDIOTHORACIC VASCULAR SURGERY)

## 2023-03-15 PROCEDURE — 85018 HEMOGLOBIN: CPT

## 2023-03-15 PROCEDURE — 2580000003 HC RX 258: Performed by: NURSE ANESTHETIST, CERTIFIED REGISTERED

## 2023-03-15 PROCEDURE — 82947 ASSAY GLUCOSE BLOOD QUANT: CPT

## 2023-03-15 PROCEDURE — 02HV33Z INSERTION OF INFUSION DEVICE INTO SUPERIOR VENA CAVA, PERCUTANEOUS APPROACH: ICD-10-PCS | Performed by: ANESTHESIOLOGY

## 2023-03-15 PROCEDURE — C1889 IMPLANT/INSERT DEVICE, NOC: HCPCS | Performed by: THORACIC SURGERY (CARDIOTHORACIC VASCULAR SURGERY)

## 2023-03-15 PROCEDURE — 85520 HEPARIN ASSAY: CPT

## 2023-03-15 PROCEDURE — 3700000001 HC ADD 15 MINUTES (ANESTHESIA): Performed by: THORACIC SURGERY (CARDIOTHORACIC VASCULAR SURGERY)

## 2023-03-15 PROCEDURE — 6360000002 HC RX W HCPCS: Performed by: THORACIC SURGERY (CARDIOTHORACIC VASCULAR SURGERY)

## 2023-03-15 PROCEDURE — 2580000003 HC RX 258: Performed by: PHYSICIAN ASSISTANT

## 2023-03-15 PROCEDURE — 2500000003 HC RX 250 WO HCPCS: Performed by: NURSE ANESTHETIST, CERTIFIED REGISTERED

## 2023-03-15 PROCEDURE — A4216 STERILE WATER/SALINE, 10 ML: HCPCS | Performed by: PHYSICIAN ASSISTANT

## 2023-03-15 PROCEDURE — 82803 BLOOD GASES ANY COMBINATION: CPT

## 2023-03-15 PROCEDURE — 6370000000 HC RX 637 (ALT 250 FOR IP): Performed by: FAMILY MEDICINE

## 2023-03-15 PROCEDURE — 99223 1ST HOSP IP/OBS HIGH 75: CPT | Performed by: INTERNAL MEDICINE

## 2023-03-15 DEVICE — VALVE AORT 25MM REPL RESILIENT BOV PERICARD CO CHROMIUM ALLY: Type: IMPLANTABLE DEVICE | Site: HEART | Status: FUNCTIONAL

## 2023-03-15 DEVICE — DEVICE OCCL W/ 35MM PRELD VCLIP FR LAA EXCLUSION SYS: Type: IMPLANTABLE DEVICE | Site: HEART | Status: FUNCTIONAL

## 2023-03-15 RX ORDER — ASPIRIN 81 MG/1
81 TABLET ORAL DAILY
Status: DISCONTINUED | OUTPATIENT
Start: 2023-03-16 | End: 2023-03-23 | Stop reason: HOSPADM

## 2023-03-15 RX ORDER — SODIUM CHLORIDE 9 MG/ML
INJECTION, SOLUTION INTRAVENOUS PRN
Status: DISCONTINUED | OUTPATIENT
Start: 2023-03-15 | End: 2023-03-17

## 2023-03-15 RX ORDER — ETOMIDATE 2 MG/ML
INJECTION INTRAVENOUS PRN
Status: DISCONTINUED | OUTPATIENT
Start: 2023-03-15 | End: 2023-03-15 | Stop reason: SDUPTHER

## 2023-03-15 RX ORDER — POTASSIUM CHLORIDE 29.8 MG/ML
20 INJECTION INTRAVENOUS PRN
Status: DISCONTINUED | OUTPATIENT
Start: 2023-03-15 | End: 2023-03-23 | Stop reason: HOSPADM

## 2023-03-15 RX ORDER — MAGNESIUM SULFATE 1 G/100ML
1000 INJECTION INTRAVENOUS PRN
Status: DISCONTINUED | OUTPATIENT
Start: 2023-03-15 | End: 2023-03-23 | Stop reason: HOSPADM

## 2023-03-15 RX ORDER — ATORVASTATIN CALCIUM 20 MG/1
40 TABLET, FILM COATED ORAL NIGHTLY
Status: DISCONTINUED | OUTPATIENT
Start: 2023-03-15 | End: 2023-03-16

## 2023-03-15 RX ORDER — CHLORHEXIDINE GLUCONATE 0.12 MG/ML
10 RINSE ORAL 2 TIMES DAILY
Status: DISCONTINUED | OUTPATIENT
Start: 2023-03-15 | End: 2023-03-16

## 2023-03-15 RX ORDER — NITROGLYCERIN 20 MG/100ML
0-100 INJECTION INTRAVENOUS CONTINUOUS PRN
Status: DISCONTINUED | OUTPATIENT
Start: 2023-03-15 | End: 2023-03-17

## 2023-03-15 RX ORDER — MORPHINE SULFATE 4 MG/ML
3 INJECTION, SOLUTION INTRAMUSCULAR; INTRAVENOUS
Status: DISCONTINUED | OUTPATIENT
Start: 2023-03-15 | End: 2023-03-17

## 2023-03-15 RX ORDER — NOREPINEPHRINE BIT/0.9 % NACL 16MG/250ML
.01-.1 INFUSION BOTTLE (ML) INTRAVENOUS CONTINUOUS PRN
Status: DISCONTINUED | OUTPATIENT
Start: 2023-03-15 | End: 2023-03-17

## 2023-03-15 RX ORDER — SODIUM CHLORIDE 0.9 % (FLUSH) 0.9 %
5-40 SYRINGE (ML) INJECTION PRN
Status: DISCONTINUED | OUTPATIENT
Start: 2023-03-15 | End: 2023-03-17

## 2023-03-15 RX ORDER — SODIUM CHLORIDE, SODIUM LACTATE, POTASSIUM CHLORIDE, CALCIUM CHLORIDE 600; 310; 30; 20 MG/100ML; MG/100ML; MG/100ML; MG/100ML
INJECTION, SOLUTION INTRAVENOUS
Status: COMPLETED | OUTPATIENT
Start: 2023-03-15 | End: 2023-03-15

## 2023-03-15 RX ORDER — FAMOTIDINE 20 MG/1
20 TABLET, FILM COATED ORAL 2 TIMES DAILY
Status: DISCONTINUED | OUTPATIENT
Start: 2023-03-15 | End: 2023-03-17 | Stop reason: SDUPTHER

## 2023-03-15 RX ORDER — DEXMEDETOMIDINE HYDROCHLORIDE 4 UG/ML
.1-1.5 INJECTION, SOLUTION INTRAVENOUS CONTINUOUS
Status: DISCONTINUED | OUTPATIENT
Start: 2023-03-15 | End: 2023-03-17

## 2023-03-15 RX ORDER — DEXMEDETOMIDINE HYDROCHLORIDE 4 UG/ML
INJECTION, SOLUTION INTRAVENOUS CONTINUOUS PRN
Status: DISCONTINUED | OUTPATIENT
Start: 2023-03-15 | End: 2023-03-15 | Stop reason: SDUPTHER

## 2023-03-15 RX ORDER — AMIODARONE HYDROCHLORIDE 200 MG/1
200 TABLET ORAL 2 TIMES DAILY
Status: DISCONTINUED | OUTPATIENT
Start: 2023-03-15 | End: 2023-03-17

## 2023-03-15 RX ORDER — MIDAZOLAM HYDROCHLORIDE 2 MG/2ML
1 INJECTION, SOLUTION INTRAMUSCULAR; INTRAVENOUS
Status: DISCONTINUED | OUTPATIENT
Start: 2023-03-15 | End: 2023-03-17

## 2023-03-15 RX ORDER — SODIUM CHLORIDE, SODIUM LACTATE, POTASSIUM CHLORIDE, CALCIUM CHLORIDE 600; 310; 30; 20 MG/100ML; MG/100ML; MG/100ML; MG/100ML
INJECTION, SOLUTION INTRAVENOUS CONTINUOUS PRN
Status: DISCONTINUED | OUTPATIENT
Start: 2023-03-15 | End: 2023-03-15 | Stop reason: SDUPTHER

## 2023-03-15 RX ORDER — BISACODYL 10 MG
10 SUPPOSITORY, RECTAL RECTAL ONCE
Status: COMPLETED | OUTPATIENT
Start: 2023-03-15 | End: 2023-03-15

## 2023-03-15 RX ORDER — SODIUM CHLORIDE 9 MG/ML
INJECTION, SOLUTION INTRAVENOUS CONTINUOUS PRN
Status: DISCONTINUED | OUTPATIENT
Start: 2023-03-15 | End: 2023-03-15 | Stop reason: SDUPTHER

## 2023-03-15 RX ORDER — ONDANSETRON 2 MG/ML
4 INJECTION INTRAMUSCULAR; INTRAVENOUS EVERY 4 HOURS PRN
Status: DISCONTINUED | OUTPATIENT
Start: 2023-03-15 | End: 2023-03-17 | Stop reason: SDUPTHER

## 2023-03-15 RX ORDER — NITROGLYCERIN 20 MG/100ML
INJECTION INTRAVENOUS PRN
Status: DISCONTINUED | OUTPATIENT
Start: 2023-03-15 | End: 2023-03-15 | Stop reason: SDUPTHER

## 2023-03-15 RX ORDER — SODIUM CHLORIDE 0.9 % (FLUSH) 0.9 %
5-40 SYRINGE (ML) INJECTION EVERY 12 HOURS SCHEDULED
Status: DISCONTINUED | OUTPATIENT
Start: 2023-03-15 | End: 2023-03-17

## 2023-03-15 RX ORDER — FENTANYL CITRATE 0.05 MG/ML
INJECTION, SOLUTION INTRAMUSCULAR; INTRAVENOUS PRN
Status: DISCONTINUED | OUTPATIENT
Start: 2023-03-15 | End: 2023-03-15 | Stop reason: SDUPTHER

## 2023-03-15 RX ORDER — INSULIN LISPRO 100 [IU]/ML
0-6 INJECTION, SOLUTION INTRAVENOUS; SUBCUTANEOUS NIGHTLY
Status: DISCONTINUED | OUTPATIENT
Start: 2023-03-16 | End: 2023-03-17 | Stop reason: SDUPTHER

## 2023-03-15 RX ORDER — EPHEDRINE SULFATE/0.9% NACL/PF 50 MG/5 ML
SYRINGE (ML) INTRAVENOUS PRN
Status: DISCONTINUED | OUTPATIENT
Start: 2023-03-15 | End: 2023-03-15 | Stop reason: SDUPTHER

## 2023-03-15 RX ORDER — ACETAMINOPHEN 325 MG/1
650 TABLET ORAL EVERY 4 HOURS PRN
Status: DISCONTINUED | OUTPATIENT
Start: 2023-03-15 | End: 2023-03-17 | Stop reason: SDUPTHER

## 2023-03-15 RX ORDER — OXYCODONE HYDROCHLORIDE AND ACETAMINOPHEN 5; 325 MG/1; MG/1
1 TABLET ORAL EVERY 4 HOURS PRN
Status: DISCONTINUED | OUTPATIENT
Start: 2023-03-15 | End: 2023-03-17 | Stop reason: SDUPTHER

## 2023-03-15 RX ORDER — VECURONIUM BROMIDE 1 MG/ML
INJECTION, POWDER, LYOPHILIZED, FOR SOLUTION INTRAVENOUS PRN
Status: DISCONTINUED | OUTPATIENT
Start: 2023-03-15 | End: 2023-03-15 | Stop reason: SDUPTHER

## 2023-03-15 RX ORDER — LIDOCAINE HYDROCHLORIDE 20 MG/ML
INJECTION, SOLUTION EPIDURAL; INFILTRATION; INTRACAUDAL; PERINEURAL PRN
Status: DISCONTINUED | OUTPATIENT
Start: 2023-03-15 | End: 2023-03-15 | Stop reason: SDUPTHER

## 2023-03-15 RX ORDER — ROCURONIUM BROMIDE 10 MG/ML
INJECTION, SOLUTION INTRAVENOUS PRN
Status: DISCONTINUED | OUTPATIENT
Start: 2023-03-15 | End: 2023-03-15 | Stop reason: SDUPTHER

## 2023-03-15 RX ORDER — HEPARIN SODIUM 1000 [USP'U]/ML
INJECTION, SOLUTION INTRAVENOUS; SUBCUTANEOUS PRN
Status: DISCONTINUED | OUTPATIENT
Start: 2023-03-15 | End: 2023-03-15 | Stop reason: SDUPTHER

## 2023-03-15 RX ORDER — MORPHINE SULFATE 4 MG/ML
4 INJECTION, SOLUTION INTRAMUSCULAR; INTRAVENOUS
Status: DISCONTINUED | OUTPATIENT
Start: 2023-03-15 | End: 2023-03-17

## 2023-03-15 RX ORDER — PAPAVERINE HYDROCHLORIDE 30 MG/ML
INJECTION INTRAMUSCULAR; INTRAVENOUS PRN
Status: DISCONTINUED | OUTPATIENT
Start: 2023-03-15 | End: 2023-03-15 | Stop reason: HOSPADM

## 2023-03-15 RX ORDER — INSULIN LISPRO 100 [IU]/ML
0-12 INJECTION, SOLUTION INTRAVENOUS; SUBCUTANEOUS
Status: DISCONTINUED | OUTPATIENT
Start: 2023-03-16 | End: 2023-03-17 | Stop reason: SDUPTHER

## 2023-03-15 RX ORDER — DEXTROSE, SODIUM CHLORIDE, AND POTASSIUM CHLORIDE 5; .45; .15 G/100ML; G/100ML; G/100ML
INJECTION INTRAVENOUS CONTINUOUS
Status: DISCONTINUED | OUTPATIENT
Start: 2023-03-15 | End: 2023-03-17

## 2023-03-15 RX ORDER — MIDAZOLAM HYDROCHLORIDE 1 MG/ML
INJECTION INTRAMUSCULAR; INTRAVENOUS PRN
Status: DISCONTINUED | OUTPATIENT
Start: 2023-03-15 | End: 2023-03-15 | Stop reason: SDUPTHER

## 2023-03-15 RX ORDER — PROTAMINE SULFATE 10 MG/ML
INJECTION, SOLUTION INTRAVENOUS PRN
Status: DISCONTINUED | OUTPATIENT
Start: 2023-03-15 | End: 2023-03-15 | Stop reason: SDUPTHER

## 2023-03-15 RX ORDER — AMINOCAPROIC ACID 250 MG/ML
INJECTION, SOLUTION INTRAVENOUS PRN
Status: DISCONTINUED | OUTPATIENT
Start: 2023-03-15 | End: 2023-03-15 | Stop reason: SDUPTHER

## 2023-03-15 RX ADMIN — Medication 3000 MG: at 14:57

## 2023-03-15 RX ADMIN — NITROGLYCERIN 20 MCG: 20 INJECTION INTRAVENOUS at 10:36

## 2023-03-15 RX ADMIN — FENTANYL CITRATE 100 MCG: 0.05 INJECTION, SOLUTION INTRAMUSCULAR; INTRAVENOUS at 13:00

## 2023-03-15 RX ADMIN — ROCURONIUM BROMIDE 30 MG: 50 INJECTION, SOLUTION INTRAVENOUS at 13:00

## 2023-03-15 RX ADMIN — LIDOCAINE HYDROCHLORIDE 100 MG: 20 INJECTION, SOLUTION EPIDURAL; INFILTRATION; INTRACAUDAL; PERINEURAL at 07:27

## 2023-03-15 RX ADMIN — INSULIN HUMAN 3 UNITS: 100 INJECTION, SOLUTION PARENTERAL at 12:44

## 2023-03-15 RX ADMIN — VECURONIUM BROMIDE 3 MG: 1 INJECTION, POWDER, LYOPHILIZED, FOR SOLUTION INTRAVENOUS at 07:58

## 2023-03-15 RX ADMIN — HYDROCODONE BITARTRATE AND ACETAMINOPHEN 1 TABLET: 5; 325 TABLET ORAL at 00:13

## 2023-03-15 RX ADMIN — PHENYLEPHRINE HYDROCHLORIDE 50 MCG: 10 INJECTION INTRAVENOUS at 10:51

## 2023-03-15 RX ADMIN — PROTAMINE SULFATE 320 MG: 10 INJECTION, SOLUTION INTRAVENOUS at 13:42

## 2023-03-15 RX ADMIN — GABAPENTIN 300 MG: 300 CAPSULE ORAL at 05:37

## 2023-03-15 RX ADMIN — FAMOTIDINE 20 MG: 20 TABLET, FILM COATED ORAL at 04:42

## 2023-03-15 RX ADMIN — SODIUM CHLORIDE, PRESERVATIVE FREE 10 ML: 5 INJECTION INTRAVENOUS at 20:18

## 2023-03-15 RX ADMIN — Medication 10 MG: at 09:05

## 2023-03-15 RX ADMIN — PHENYLEPHRINE HYDROCHLORIDE 200 MCG: 10 INJECTION INTRAVENOUS at 10:53

## 2023-03-15 RX ADMIN — BISACODYL 10 MG: 10 SUPPOSITORY RECTAL at 04:21

## 2023-03-15 RX ADMIN — FAMOTIDINE 20 MG: 10 INJECTION, SOLUTION INTRAVENOUS at 17:55

## 2023-03-15 RX ADMIN — ATORVASTATIN CALCIUM 40 MG: 20 TABLET, FILM COATED ORAL at 20:07

## 2023-03-15 RX ADMIN — Medication 0.02 MCG/KG/MIN: at 13:23

## 2023-03-15 RX ADMIN — FENTANYL CITRATE 150 MCG: 0.05 INJECTION, SOLUTION INTRAMUSCULAR; INTRAVENOUS at 07:31

## 2023-03-15 RX ADMIN — ACETAMINOPHEN 650 MG: 325 TABLET ORAL at 23:28

## 2023-03-15 RX ADMIN — NOREPINEPHRINE BITARTRATE 0.03 MCG/KG/MIN: 1 SOLUTION INTRAVENOUS at 13:37

## 2023-03-15 RX ADMIN — Medication 1 AMPULE: at 20:20

## 2023-03-15 RX ADMIN — TUBERCULIN PURIFIED PROTEIN DERIVATIVE 5 UNITS: 5 INJECTION, SOLUTION INTRADERMAL at 21:43

## 2023-03-15 RX ADMIN — CEFAZOLIN SODIUM 2000 MG: 100 INJECTION, POWDER, LYOPHILIZED, FOR SOLUTION INTRAVENOUS at 23:30

## 2023-03-15 RX ADMIN — VECURONIUM BROMIDE 2 MG: 1 INJECTION, POWDER, LYOPHILIZED, FOR SOLUTION INTRAVENOUS at 10:02

## 2023-03-15 RX ADMIN — MIDAZOLAM 2 MG: 1 INJECTION INTRAMUSCULAR; INTRAVENOUS at 07:11

## 2023-03-15 RX ADMIN — DEXTROSE MONOHYDRATE, SODIUM CHLORIDE, AND POTASSIUM CHLORIDE: 50; 4.5; 1.49 INJECTION, SOLUTION INTRAVENOUS at 16:22

## 2023-03-15 RX ADMIN — VECURONIUM BROMIDE 2 MG: 1 INJECTION, POWDER, LYOPHILIZED, FOR SOLUTION INTRAVENOUS at 11:46

## 2023-03-15 RX ADMIN — NITROGLYCERIN 10 MCG/MIN: 20 INJECTION INTRAVENOUS at 07:44

## 2023-03-15 RX ADMIN — Medication 3 AMPULE: at 04:44

## 2023-03-15 RX ADMIN — MIDAZOLAM 3 MG: 1 INJECTION INTRAMUSCULAR; INTRAVENOUS at 13:00

## 2023-03-15 RX ADMIN — DEXMEDETOMIDINE HYDROCHLORIDE 0.5 MCG/KG/HR: 4 INJECTION, SOLUTION INTRAVENOUS at 13:17

## 2023-03-15 RX ADMIN — SODIUM CHLORIDE: 9 INJECTION, SOLUTION INTRAVENOUS at 07:44

## 2023-03-15 RX ADMIN — SODIUM CHLORIDE, POTASSIUM CHLORIDE, SODIUM LACTATE AND CALCIUM CHLORIDE: 600; 310; 30; 20 INJECTION, SOLUTION INTRAVENOUS at 06:17

## 2023-03-15 RX ADMIN — FENTANYL CITRATE 100 MCG: 0.05 INJECTION, SOLUTION INTRAMUSCULAR; INTRAVENOUS at 07:27

## 2023-03-15 RX ADMIN — FENTANYL CITRATE 200 MCG: 0.05 INJECTION, SOLUTION INTRAMUSCULAR; INTRAVENOUS at 08:28

## 2023-03-15 RX ADMIN — SODIUM CHLORIDE, SODIUM LACTATE, POTASSIUM CHLORIDE, AND CALCIUM CHLORIDE: 600; 310; 30; 20 INJECTION, SOLUTION INTRAVENOUS at 07:11

## 2023-03-15 RX ADMIN — METOPROLOL SUCCINATE 50 MG: 25 TABLET, EXTENDED RELEASE ORAL at 04:42

## 2023-03-15 RX ADMIN — ROCURONIUM BROMIDE 50 MG: 50 INJECTION, SOLUTION INTRAVENOUS at 07:27

## 2023-03-15 RX ADMIN — AMINOCAPROIC ACID 10000 MG: 250 INJECTION, SOLUTION INTRAVENOUS at 07:44

## 2023-03-15 RX ADMIN — Medication 3000 MG: at 08:00

## 2023-03-15 RX ADMIN — ASPIRIN 81 MG: 81 TABLET, COATED ORAL at 04:42

## 2023-03-15 RX ADMIN — ETOMIDATE 16 MG: 2 INJECTION, SOLUTION INTRAVENOUS at 07:27

## 2023-03-15 RX ADMIN — Medication 10 MG: at 08:09

## 2023-03-15 RX ADMIN — AMINOCAPROIC ACID 1 G/HR: 250 INJECTION, SOLUTION INTRAVENOUS at 08:16

## 2023-03-15 RX ADMIN — Medication 3000 MG: at 11:30

## 2023-03-15 RX ADMIN — GABAPENTIN 300 MG: 300 CAPSULE ORAL at 20:07

## 2023-03-15 RX ADMIN — PHENYLEPHRINE HYDROCHLORIDE 50 MCG: 10 INJECTION INTRAVENOUS at 08:53

## 2023-03-15 RX ADMIN — HEPARIN SODIUM 30000 UNITS: 1000 INJECTION INTRAVENOUS; SUBCUTANEOUS at 10:22

## 2023-03-15 RX ADMIN — FENTANYL CITRATE 300 MCG: 0.05 INJECTION, SOLUTION INTRAMUSCULAR; INTRAVENOUS at 08:30

## 2023-03-15 RX ADMIN — PHENYLEPHRINE HYDROCHLORIDE 150 MCG: 10 INJECTION INTRAVENOUS at 10:52

## 2023-03-15 RX ADMIN — SODIUM CHLORIDE: 9 INJECTION, SOLUTION INTRAVENOUS at 15:15

## 2023-03-15 RX ADMIN — VECURONIUM BROMIDE 3 MG: 1 INJECTION, POWDER, LYOPHILIZED, FOR SOLUTION INTRAVENOUS at 08:25

## 2023-03-15 RX ADMIN — AMIODARONE HYDROCHLORIDE 200 MG: 200 TABLET ORAL at 20:06

## 2023-03-15 RX ADMIN — VECURONIUM BROMIDE 3 MG: 1 INJECTION, POWDER, LYOPHILIZED, FOR SOLUTION INTRAVENOUS at 10:36

## 2023-03-15 RX ADMIN — FENTANYL CITRATE 150 MCG: 0.05 INJECTION, SOLUTION INTRAMUSCULAR; INTRAVENOUS at 10:33

## 2023-03-15 RX ADMIN — VECURONIUM BROMIDE 2 MG: 1 INJECTION, POWDER, LYOPHILIZED, FOR SOLUTION INTRAVENOUS at 09:16

## 2023-03-15 ASSESSMENT — PAIN SCALES - GENERAL
PAINLEVEL_OUTOF10: 2
PAINLEVEL_OUTOF10: 6
PAINLEVEL_OUTOF10: 0
PAINLEVEL_OUTOF10: 3
PAINLEVEL_OUTOF10: 0
PAINLEVEL_OUTOF10: 0
PAINLEVEL_OUTOF10: 1
PAINLEVEL_OUTOF10: 3
PAINLEVEL_OUTOF10: 0
PAINLEVEL_OUTOF10: 1

## 2023-03-15 ASSESSMENT — PULMONARY FUNCTION TESTS
PIF_VALUE: 23
PIF_VALUE: 18
PIF_VALUE: 18

## 2023-03-15 ASSESSMENT — PAIN - FUNCTIONAL ASSESSMENT
PAIN_FUNCTIONAL_ASSESSMENT: 0-10
PAIN_FUNCTIONAL_ASSESSMENT: ACTIVITIES ARE NOT PREVENTED

## 2023-03-15 ASSESSMENT — PAIN DESCRIPTION - DESCRIPTORS
DESCRIPTORS: ACHING
DESCRIPTORS: ACHING

## 2023-03-15 ASSESSMENT — PAIN DESCRIPTION - ORIENTATION
ORIENTATION: MID
ORIENTATION: RIGHT;LEFT;LOWER

## 2023-03-15 ASSESSMENT — PAIN DESCRIPTION - LOCATION
LOCATION: CHEST
LOCATION: FOOT;BACK;SHOULDER

## 2023-03-15 NOTE — ANESTHESIA PROCEDURE NOTES
Arterial Line:    An arterial line was placed using ultrasound guidance, in the OR for the following indication(s): continuous blood pressure monitoring and blood sampling needed.    A 20 gauge (size) (length), Arrow (type) catheter was placed, Seldinger technique used, into the left radial artery, secured by Tegaderm and tape.  Anesthesia type: Local  Local infiltration: Injection    Events:  patient tolerated procedure well with no complications and EBL < 5mL.    Additional notes:  Left arm prepped with ChloraPrep, 0.8ml of 1% lidocaine infiltrated at skin, ultrasound guided Seldinger technique, good blood return, good waveform.      Potential access sites were examined with ultrasound and acceptable patent access site selected as noted above.  Needle path and artery access visualized in real time using ultrasound, an image of wire in vessel recorded for permanent record.    3/15/2023 7:19 AM3/15/2023 7:21 AM  Resident/CRNA: SONY Davis - CRNA  Performed: Resident/CRNA   Preanesthetic Checklist  Completed: patient identified, IV checked, site marked, risks and benefits discussed, surgical/procedural consents, equipment checked, pre-op evaluation, timeout performed, anesthesia consent given, oxygen available, monitors applied/VS acknowledged, fire risk safety assessment completed and verbalized and blood product R/B/A discussed and consented

## 2023-03-15 NOTE — ANESTHESIA POSTPROCEDURE EVALUATION
Department of Anesthesiology  Postprocedure Note    Patient: Mannie Gonzalez Sr. MRN: [id number]  YOB: 1955  Date of evaluation: 3/15/2023      Procedure Summary     Date: 03/15/23 Room / Location: [facility] MAIN OR  / [facility] MAIN OR    Anesthesia Start: 2459 Anesthesia Stop: 4836    Procedures:       CORONARY ARTERY BYPASS GRAFT (CABG X 2, LIMA; ENDOSCOPIC VEIN HARVEST,LEFT GREATER SAPHENOUS VEIN; LEFT ATRIAL APPENDAGE CLIPPING; AORTIC VALVE REPLACEMENT; MAZE (Chest)      TRANSESOPHAGEAL ECHOCARDIOGRAM (Esophagus) Diagnosis:       Atherosclerotic heart disease of native coronary artery with unstable angina pectoris (HCC)      Accelerating angina (HCC)      Paroxysmal atrial fibrillation (HCC)      Aortic valve stenosis      (Atherosclerotic heart disease of native coronary artery with unstable angina pectoris ([address] [I25.110])      (Accelerating angina (HCC) [I20.0])      (Paroxysmal atrial fibrillation (HCC) [I48.0])      (Aortic valve stenosis [I35.0])    Providers: Lesa Ramírez MD Responsible Provider: Zonia Luu MD    Anesthesia Type: general ASA Status: 4          Anesthesia Type: No value filed.     Sweta Phase I: Sweta Score: 9    Sweta Phase II:        Anesthesia Post Evaluation    Patient location during evaluation: ICU  Patient participation: complete - patient participated  Level of consciousness: awake and alert  Airway patency: patent  Nausea & Vomiting: no nausea and no vomiting  Complications: no  Cardiovascular status: hemodynamically stable  Respiratory status: acceptable, nonlabored ventilation and spontaneous ventilation  Hydration status: euvolemic  Comments: BP (!) 116/57   Pulse 80   Temp 98.2 °F (36.8 °C) (Bladder)   Resp 15   Ht 6' (1.829 m)   Wt 261 lb 8 oz (118.6 kg)   SpO2 98%   BMI 35.47 kg/m²     Multimodal analgesia pain management approach

## 2023-03-15 NOTE — PERIOP NOTE
TRANSFER - IN REPORT:    Verbal report received from SEJAL Hickey on [address] being received from  [id number] for ordered procedure      Report consisted of patients Situation, Background, Assessment and Recommendations(SBAR). Information from the following report(s) SBAR, Kardex, MAR, Recent Results, Cardiac Rhythm SR, Pre Procedure Checklist, Procedure Verification, and Quality Measures was reviewed with the receiving nurse. Opportunity for questions and clarification was provided. Assessment completed upon patients arrival to unit and care assumed.

## 2023-03-15 NOTE — PERIOP NOTE
Pt placed on 3 L O2 per NC per protocol. Allevyn placed on pt per protocol. Warming blanket applied. Hair clipped chin to toe per protocol by Merrick. Wiped with CHG wipes. #18g IV placed in R hand. Amio gtt. Beta blocker, & 81mg ASA this AM @ 0442. Pt watched pre-op teaching video. All necessary studies in pt's chart, reviewed. On heart monitor. Call light given.

## 2023-03-15 NOTE — ANESTHESIA PROCEDURE NOTES
Central Venous Line:    A central venous line was placed using surface landmarks, in the OR for the following indication(s): central venous access, CVP monitoring and vasoactive infusions. 3/15/2023 7:35 AM3/15/2023 7:45 AM    Sterility preparation included the following: hand hygiene performed prior to procedure, maximum sterile barriers used and sterile technique used to drape from head to toe. The patient was placed in Trendelenburg position. The right internal jugular vein was prepped. The site was prepped with Chloraprep. A 9 Fr (size), 12 (length), introducer SLIC was placed. During the procedure, the following specific steps were taken: target vein identified, needle advanced into vein and blood aspirated and guidewire advanced into vein. Intravenous verification was obtained by venous blood return and manometry. Post insertion care included: all ports aspirated, all ports flushed easily, guidewire removed intact, line sutured in place and dressing applied. During the procedure the patient experienced: patient tolerated procedure well with no complications. Outcomes: uncomplicated and patient tolerated procedure wellno  Anesthesia type: general    Additional notes:  Vein accessed on first stick with real time ultrasound guidance. Guidewire visualized in vein on ultrasound. Easy catheter insertion. Chest x-ray to be performed in ICU. Seven step protocol followed. Potential access sites were examined with ultrasound and the acceptable patent access site was selected.    Staffing  Performed: Anesthesiologist   Anesthesiologist: Anusha Lazcano MD  Preanesthetic Checklist  Completed: patient identified, IV checked, risks and benefits discussed, equipment checked, pre-op evaluation, timeout performed, anesthesia consent given, oxygen available and monitors applied/VS acknowledged

## 2023-03-15 NOTE — OR NURSING
TRANSFER - OUT REPORT:    Verbal report given to [facility] on Marc Engel Sr.  being transferred to [facility] for routine progression of patient care       Report consisted of patient's Situation, Background, Assessment and   Recommendations(SBAR). Information from the following report(s) Nurse Handoff Report was reviewed with the receiving nurse. Bradley Assessment: No data recorded  Lines:   Peripheral IV 03/13/23 Left Forearm (Active)   Site Assessment Clean, dry & intact 03/15/23 0613   Line Status Infusing 03/15/23 0613   Line Care Connections checked and tightened 03/15/23 0613   Phlebitis Assessment No symptoms 03/15/23 0613   Infiltration Assessment 0 03/15/23 0613   Alcohol Cap Used No 03/15/23 0613   Dressing Status Clean, dry & intact 03/15/23 0613   Dressing Type Transparent 03/15/23 0613       Peripheral IV 03/13/23 Distal;Right Antecubital (Active)   Site Assessment Bleeding 03/15/23 2164   Line Status Flushed; Infusing 03/15/23 0613   Line Care Connections checked and tightened 03/15/23 0613   Phlebitis Assessment No symptoms 03/15/23 0613   Infiltration Assessment 0 03/15/23 0613   Alcohol Cap Used No 03/15/23 0613   Dressing Status Intact; Old drainage noted 03/15/23 7488   Dressing Type Transparent 03/15/23 0613       Peripheral IV 03/15/23 Right;Posterior Hand (Active)   Site Assessment Clean, dry & intact 03/15/23 0720   Line Status Blood return noted; Flushed; Infusing 03/15/23 0643   Line Care Connections checked and tightened 03/15/23 0643   Phlebitis Assessment No symptoms 03/15/23 0643   Infiltration Assessment 0 03/15/23 0643   Alcohol Cap Used No 03/15/23 0643   Dressing Status New dressing applied;Clean, dry & intact 03/15/23 0643   Dressing Type Transparent 03/15/23 0643   Dressing Intervention New 03/15/23 0643       Arterial Line 03/15/23 Radial (Active)       Introducer 03/15/23 (Active)        Opportunity for questions and clarification was provided.       Patient transported

## 2023-03-15 NOTE — ANESTHESIA PROCEDURE NOTES
Airway  Date/Time: 3/15/2023 7:30 AM  Urgency: elective    Airway not difficult    General Information and Staff    Patient location during procedure: OR  Resident/CRNA: SONY Barton - CRNA  Performed: resident/CRNA     Indications and Patient Condition  Indications for airway management: anesthesia  Spontaneous Ventilation: absent  Sedation level: deep  Preoxygenated: yes  Patient position: sniffing  MILS not maintained throughout  Mask difficulty assessment: vent by bag mask + OA or adjuvant +/- NMBA    Final Airway Details  Final airway type: endotracheal airway      Successful airway: ETT  Cuffed: yes   Successful intubation technique: direct laryngoscopy  Endotracheal tube insertion site: oral  Blade: Gonzales  Blade size: #3  ETT size (mm): 8.0  Cormack-Lehane Classification: grade I - full view of glottis  Placement verified by: chest auscultation and capnometry   Inital cuff pressure (cm H2O): 25  Measured from: nares  ETT to nares (cm): 24  Number of attempts at approach: 1  Number of other approaches attempted: 0    no

## 2023-03-15 NOTE — ANESTHESIA PROCEDURE NOTES
Procedure Performed: EVA       Start Time:  3/15/2023 8:00 AM       End Time:   3/15/2023 1:30 PM    Preanesthesia Checklist:  Patient identified, IV assessed, risks and benefits discussed, monitors and equipment assessed, procedure being performed at surgeon's request and anesthesia consent obtained. General Procedure Information  Diagnostic Indications for Echo:  assessment of ascending aorta, assessment of surgical repair, hemodynamic monitoring and assessment of valve function  Physician Requesting Echo: Halie Meyers MD  Location performed:  OR  Intubated  Bite block placed  Heart visualized  Probe Insertion:  Easy  Probe Type:  Mulitplane and biplane  Modalities:  2D only, pulse wave Doppler and color flow mapping    Echocardiographic and Doppler Measurements    Ventricles    Right Ventricle:  Hypertrophy present. Thrombus not present. Global function moderately impaired. Ejection Fraction 45%. Left Ventricle:  Global Function mildly impaired. Ejection Fraction 45%. Valves    Aortic Valve: Annulus calcified. Stenosis severe. Area: 1.8 cm². Regurgitation mild. Leaflets calcified and thickened. Leaflet motions restricted. Mitral Valve: Annulus normal.  Regurgitation mild. Other Valve Findings:        Aortic valve sclerotic and stenotic; area 1.6 cm2  Trace AI, mild MR    Aorta    Descending Aorta:  Size normal.        Atria      Left Atrium:  Left atrial appendage normal.              Other Findings  Pericardium:  normal  Pleural Effusion:  none      Anesthesia Information  Performed Personally  Anesthesiologist:  Anusha Lazcano MD    Post Intervention Follow-up Study  Aortic Function: [id number] Function: unchanged1+NoneComments: After AVR no perivalvular leak, no AI, valve leaflets moving freely

## 2023-03-16 ENCOUNTER — APPOINTMENT (OUTPATIENT)
Dept: GENERAL RADIOLOGY | Age: 68
DRG: 217 | End: 2023-03-16
Payer: MEDICARE

## 2023-03-16 PROBLEM — Z95.1 S/P CABG X 2: Status: ACTIVE | Noted: 2023-03-15

## 2023-03-16 PROBLEM — Z95.2 S/P AVR (AORTIC VALVE REPLACEMENT): Status: ACTIVE | Noted: 2023-03-15

## 2023-03-16 LAB
ANION GAP SERPL CALC-SCNC: 4 MMOL/L (ref 2–11)
BUN SERPL-MCNC: 12 MG/DL (ref 8–23)
CALCIUM SERPL-MCNC: 8.3 MG/DL (ref 8.3–10.4)
CHLORIDE SERPL-SCNC: 115 MMOL/L (ref 101–110)
CO2 SERPL-SCNC: 22 MMOL/L (ref 21–32)
CREAT SERPL-MCNC: 0.8 MG/DL (ref 0.8–1.5)
EKG ATRIAL RATE: 81 BPM
EKG DIAGNOSIS: NORMAL
EKG P AXIS: 59 DEGREES
EKG P-R INTERVAL: 150 MS
EKG Q-T INTERVAL: 394 MS
EKG QRS DURATION: 86 MS
EKG QTC CALCULATION (BAZETT): 457 MS
EKG R AXIS: 42 DEGREES
EKG T AXIS: 61 DEGREES
EKG VENTRICULAR RATE: 81 BPM
ERYTHROCYTE [DISTWIDTH] IN BLOOD BY AUTOMATED COUNT: 13.6 % (ref 11.9–14.6)
GLUCOSE BLD STRIP.AUTO-MCNC: 106 MG/DL (ref 65–100)
GLUCOSE BLD STRIP.AUTO-MCNC: 124 MG/DL (ref 65–100)
GLUCOSE BLD STRIP.AUTO-MCNC: 127 MG/DL (ref 65–100)
GLUCOSE BLD STRIP.AUTO-MCNC: 129 MG/DL (ref 65–100)
GLUCOSE BLD STRIP.AUTO-MCNC: 142 MG/DL (ref 65–100)
GLUCOSE BLD STRIP.AUTO-MCNC: 176 MG/DL (ref 65–100)
GLUCOSE BLD STRIP.AUTO-MCNC: 194 MG/DL (ref 65–100)
GLUCOSE BLD STRIP.AUTO-MCNC: 202 MG/DL (ref 65–100)
GLUCOSE SERPL-MCNC: 141 MG/DL (ref 65–100)
HCT VFR BLD AUTO: 35 % (ref 41.1–50.3)
HCT VFR BLD AUTO: 35.6 % (ref 41.1–50.3)
HGB BLD-MCNC: 11.7 G/DL (ref 13.6–17.2)
HGB BLD-MCNC: 11.9 G/DL (ref 13.6–17.2)
MAGNESIUM SERPL-MCNC: 2.4 MG/DL (ref 1.8–2.4)
MAGNESIUM SERPL-MCNC: 2.5 MG/DL (ref 1.8–2.4)
MCH RBC QN AUTO: 30.4 PG (ref 26.1–32.9)
MCHC RBC AUTO-ENTMCNC: 34 G/DL (ref 31.4–35)
MCV RBC AUTO: 89.5 FL (ref 82–102)
MM INDURATION, POC: 0 MM (ref 0–5)
NRBC # BLD: 0 K/UL (ref 0–0.2)
PLATELET # BLD AUTO: 119 K/UL (ref 150–450)
PMV BLD AUTO: 10.3 FL (ref 9.4–12.3)
POTASSIUM SERPL-SCNC: 4.3 MMOL/L (ref 3.5–5.1)
POTASSIUM SERPL-SCNC: 4.6 MMOL/L (ref 3.5–5.1)
PPD, POC: NEGATIVE
RBC # BLD AUTO: 3.91 M/UL (ref 4.23–5.6)
SERVICE CMNT-IMP: ABNORMAL
SODIUM SERPL-SCNC: 141 MMOL/L (ref 133–143)
WBC # BLD AUTO: 12.9 K/UL (ref 4.3–11.1)

## 2023-03-16 PROCEDURE — 82962 GLUCOSE BLOOD TEST: CPT

## 2023-03-16 PROCEDURE — 6370000000 HC RX 637 (ALT 250 FOR IP): Performed by: PHYSICIAN ASSISTANT

## 2023-03-16 PROCEDURE — 97162 PT EVAL MOD COMPLEX 30 MIN: CPT

## 2023-03-16 PROCEDURE — 80048 BASIC METABOLIC PNL TOTAL CA: CPT

## 2023-03-16 PROCEDURE — 99232 SBSQ HOSP IP/OBS MODERATE 35: CPT | Performed by: INTERNAL MEDICINE

## 2023-03-16 PROCEDURE — 6360000002 HC RX W HCPCS: Performed by: PHYSICIAN ASSISTANT

## 2023-03-16 PROCEDURE — 93005 ELECTROCARDIOGRAM TRACING: CPT | Performed by: PHYSICIAN ASSISTANT

## 2023-03-16 PROCEDURE — 6370000000 HC RX 637 (ALT 250 FOR IP): Performed by: INTERNAL MEDICINE

## 2023-03-16 PROCEDURE — 2580000003 HC RX 258: Performed by: PHYSICIAN ASSISTANT

## 2023-03-16 PROCEDURE — 71045 X-RAY EXAM CHEST 1 VIEW: CPT

## 2023-03-16 PROCEDURE — 83735 ASSAY OF MAGNESIUM: CPT

## 2023-03-16 PROCEDURE — 6370000000 HC RX 637 (ALT 250 FOR IP): Performed by: THORACIC SURGERY (CARDIOTHORACIC VASCULAR SURGERY)

## 2023-03-16 PROCEDURE — 2580000003 HC RX 258: Performed by: INTERNAL MEDICINE

## 2023-03-16 PROCEDURE — 85027 COMPLETE CBC AUTOMATED: CPT

## 2023-03-16 PROCEDURE — 97530 THERAPEUTIC ACTIVITIES: CPT

## 2023-03-16 PROCEDURE — 2100000000 HC CCU R&B

## 2023-03-16 RX ORDER — TRAMADOL HYDROCHLORIDE 50 MG/1
50 TABLET ORAL EVERY 6 HOURS PRN
Status: DISCONTINUED | OUTPATIENT
Start: 2023-03-16 | End: 2023-03-23 | Stop reason: HOSPADM

## 2023-03-16 RX ORDER — PRAVASTATIN SODIUM 20 MG
40 TABLET ORAL NIGHTLY
Status: DISCONTINUED | OUTPATIENT
Start: 2023-03-16 | End: 2023-03-18

## 2023-03-16 RX ORDER — LISINOPRIL 5 MG/1
2.5 TABLET ORAL DAILY
Status: DISCONTINUED | OUTPATIENT
Start: 2023-03-16 | End: 2023-03-19

## 2023-03-16 RX ADMIN — CEFAZOLIN SODIUM 2000 MG: 100 INJECTION, POWDER, LYOPHILIZED, FOR SOLUTION INTRAVENOUS at 15:41

## 2023-03-16 RX ADMIN — OXYCODONE AND ACETAMINOPHEN 1 TABLET: 5; 325 TABLET ORAL at 14:27

## 2023-03-16 RX ADMIN — FAMOTIDINE 20 MG: 20 TABLET, FILM COATED ORAL at 21:08

## 2023-03-16 RX ADMIN — PRAVASTATIN SODIUM 40 MG: 20 TABLET ORAL at 21:08

## 2023-03-16 RX ADMIN — GABAPENTIN 300 MG: 300 CAPSULE ORAL at 08:30

## 2023-03-16 RX ADMIN — INSULIN LISPRO 1 UNITS: 100 INJECTION, SOLUTION INTRAVENOUS; SUBCUTANEOUS at 21:06

## 2023-03-16 RX ADMIN — Medication 1 AMPULE: at 08:30

## 2023-03-16 RX ADMIN — SODIUM CHLORIDE, PRESERVATIVE FREE 10 ML: 5 INJECTION INTRAVENOUS at 21:12

## 2023-03-16 RX ADMIN — CEFAZOLIN SODIUM 2000 MG: 100 INJECTION, POWDER, LYOPHILIZED, FOR SOLUTION INTRAVENOUS at 06:09

## 2023-03-16 RX ADMIN — FAMOTIDINE 20 MG: 20 TABLET, FILM COATED ORAL at 08:30

## 2023-03-16 RX ADMIN — SODIUM CHLORIDE, PRESERVATIVE FREE 10 ML: 5 INJECTION INTRAVENOUS at 08:27

## 2023-03-16 RX ADMIN — ASPIRIN 81 MG: 81 TABLET ORAL at 08:30

## 2023-03-16 RX ADMIN — METOPROLOL TARTRATE 25 MG: 25 TABLET, FILM COATED ORAL at 08:30

## 2023-03-16 RX ADMIN — GABAPENTIN 300 MG: 300 CAPSULE ORAL at 15:40

## 2023-03-16 RX ADMIN — GABAPENTIN 300 MG: 300 CAPSULE ORAL at 21:07

## 2023-03-16 RX ADMIN — AMIODARONE HYDROCHLORIDE 200 MG: 200 TABLET ORAL at 08:30

## 2023-03-16 RX ADMIN — INSULIN LISPRO 2 UNITS: 100 INJECTION, SOLUTION INTRAVENOUS; SUBCUTANEOUS at 12:33

## 2023-03-16 RX ADMIN — Medication 1 AMPULE: at 21:11

## 2023-03-16 RX ADMIN — LISINOPRIL 2.5 MG: 2.5 TABLET ORAL at 11:08

## 2023-03-16 RX ADMIN — INSULIN LISPRO 4 UNITS: 100 INJECTION, SOLUTION INTRAVENOUS; SUBCUTANEOUS at 17:05

## 2023-03-16 RX ADMIN — SODIUM CHLORIDE, PRESERVATIVE FREE 10 ML: 5 INJECTION INTRAVENOUS at 08:28

## 2023-03-16 RX ADMIN — OXYCODONE AND ACETAMINOPHEN 1 TABLET: 5; 325 TABLET ORAL at 19:03

## 2023-03-16 RX ADMIN — AMIODARONE HYDROCHLORIDE 200 MG: 200 TABLET ORAL at 21:08

## 2023-03-16 RX ADMIN — OXYCODONE AND ACETAMINOPHEN 1 TABLET: 5; 325 TABLET ORAL at 11:04

## 2023-03-16 RX ADMIN — CEFAZOLIN SODIUM 2000 MG: 100 INJECTION, POWDER, LYOPHILIZED, FOR SOLUTION INTRAVENOUS at 23:17

## 2023-03-16 RX ADMIN — ACETAMINOPHEN 650 MG: 325 TABLET ORAL at 06:06

## 2023-03-16 RX ADMIN — METOPROLOL TARTRATE 25 MG: 25 TABLET, FILM COATED ORAL at 21:08

## 2023-03-16 RX ADMIN — TRAMADOL HYDROCHLORIDE 50 MG: 50 TABLET ORAL at 12:38

## 2023-03-16 RX ADMIN — TRAMADOL HYDROCHLORIDE 50 MG: 50 TABLET ORAL at 07:37

## 2023-03-16 ASSESSMENT — PAIN DESCRIPTION - FREQUENCY
FREQUENCY: INTERMITTENT
FREQUENCY: INTERMITTENT
FREQUENCY: CONTINUOUS

## 2023-03-16 ASSESSMENT — PAIN DESCRIPTION - ONSET
ONSET: GRADUAL
ONSET: ON-GOING
ONSET: ON-GOING

## 2023-03-16 ASSESSMENT — PAIN - FUNCTIONAL ASSESSMENT
PAIN_FUNCTIONAL_ASSESSMENT: ACTIVITIES ARE NOT PREVENTED
PAIN_FUNCTIONAL_ASSESSMENT: ACTIVITIES ARE NOT PREVENTED
PAIN_FUNCTIONAL_ASSESSMENT: PREVENTS OR INTERFERES SOME ACTIVE ACTIVITIES AND ADLS

## 2023-03-16 ASSESSMENT — PAIN SCALES - GENERAL
PAINLEVEL_OUTOF10: 6
PAINLEVEL_OUTOF10: 10
PAINLEVEL_OUTOF10: 3
PAINLEVEL_OUTOF10: 0
PAINLEVEL_OUTOF10: 0
PAINLEVEL_OUTOF10: 3
PAINLEVEL_OUTOF10: 6
PAINLEVEL_OUTOF10: 6

## 2023-03-16 ASSESSMENT — PAIN DESCRIPTION - LOCATION
LOCATION: STERNUM;LEG
LOCATION: CHEST;INCISION
LOCATION: INCISION;CHEST
LOCATION: MEDIASTINUM;INCISION
LOCATION: CHEST
LOCATION: INCISION;CHEST

## 2023-03-16 ASSESSMENT — PAIN DESCRIPTION - DESCRIPTORS
DESCRIPTORS: ACHING;SORE
DESCRIPTORS: ACHING
DESCRIPTORS: ACHING;SORE
DESCRIPTORS: ACHING
DESCRIPTORS: ACHING

## 2023-03-16 ASSESSMENT — PAIN DESCRIPTION - ORIENTATION
ORIENTATION: MID;ANTERIOR
ORIENTATION: MID
ORIENTATION: MID;ANTERIOR
ORIENTATION: MID;ANTERIOR
ORIENTATION: ANTERIOR;MID

## 2023-03-16 ASSESSMENT — PAIN DESCRIPTION - PAIN TYPE
TYPE: ACUTE PAIN;SURGICAL PAIN
TYPE: SURGICAL PAIN
TYPE: SURGICAL PAIN

## 2023-03-16 NOTE — WOUND CARE
Patient seen for follow up, he is concerned his leg wounds will reopen post surgery. Currently intact. Aquaphor ointment to be continued. Compression URI in place to right leg, left s/p vein graft and dressing per surgery team. Keep legs elevated while in the chair. Discussed with nurses. Answered all patient questions. Small abscesses on his back have dried up and significant less redness, no drainage. Will follow loosely. Call when needed.

## 2023-03-16 NOTE — BRIEF OP NOTE
03/16/23 0715   Catheter Best Practices  Drainage tube clipped to bed;Catheter secured to thigh; Tamper seal intact; Bag below bladder;Bag not on floor;Drainage bag less than half full;Lack of dependent loop in tubing 03/16/23 0715   Status Draining;Patent 03/16/23 0715   Output (mL) 75 mL 03/16/23 1300       [REMOVED] Chest Tube Anterior Mediastinal 1 (Removed)   Chest Tube Airleak No 03/16/23 0715   Status Continuous Suction 03/16/23 0715   Suction -20 cm H2O 03/16/23 0715   Y Connector Used Yes 03/16/23 0715   Drainage Description Serosanguinous 03/16/23 0715   Dressing Status Clean, dry & intact 03/16/23 0715   Chest Tube Dressing Petroleum Jelly 03/16/23 0715   Site Assessment Clean, dry & intact 03/16/23 0715   Surrounding Skin Clean, dry & intact 03/16/23 0715   Output (ml) 0 ml 03/16/23 1113       [REMOVED] Chest Tube Right Mediastinal 2 (Removed)   Chest Tube Airleak No 03/16/23 0715   Status Continuous Suction 03/16/23 0715   Suction -20 cm H2O 03/16/23 0715   Y Connector Used Yes 03/16/23 0715   Drainage Description Serosanguinous 03/16/23 0715   Dressing Status Clean, dry & intact 03/16/23 0715   Chest Tube Dressing Petroleum Jelly 03/16/23 0715   Site Assessment Clean, dry & intact 03/16/23 0715   Surrounding Skin Clean, dry & intact 03/16/23 0715       [REMOVED] NG/OG/NJ/NE Tube Orogastric 18 fr (Removed)   Surrounding Skin Clean, dry & intact 03/15/23 1930   Securement device Tape 03/15/23 1930   Status Suction-low intermittent 03/15/23 1930   Placement Verified X-Ray (Initial); Gastric Contents 03/15/23 1930   NG/OG/NJ/NE External Measurement (cm) 58 cm 03/15/23 1930   Drainage Appearance Green 03/15/23 1930       Findings:      Electronically signed by Any Krishnamurthy MD on 3/16/2023 at 2:10 PM

## 2023-03-17 ENCOUNTER — APPOINTMENT (OUTPATIENT)
Dept: GENERAL RADIOLOGY | Age: 68
DRG: 217 | End: 2023-03-17
Payer: MEDICARE

## 2023-03-17 PROBLEM — I48.0 PAROXYSMAL ATRIAL FIBRILLATION (HCC): Chronic | Status: ACTIVE | Noted: 2023-03-12

## 2023-03-17 PROBLEM — E53.8 FOLATE DEFICIENCY: Chronic | Status: ACTIVE | Noted: 2018-07-23

## 2023-03-17 PROBLEM — F31.9 BIPOLAR DISORDER (HCC): Chronic | Status: ACTIVE | Noted: 2017-09-21

## 2023-03-17 LAB
ANION GAP SERPL CALC-SCNC: 5 MMOL/L (ref 2–11)
BASOPHILS # BLD: 0 K/UL (ref 0–0.2)
BASOPHILS NFR BLD: 0 % (ref 0–2)
BUN SERPL-MCNC: 17 MG/DL (ref 8–23)
CALCIUM SERPL-MCNC: 8 MG/DL (ref 8.3–10.4)
CHLORIDE SERPL-SCNC: 107 MMOL/L (ref 101–110)
CO2 SERPL-SCNC: 22 MMOL/L (ref 21–32)
CREAT SERPL-MCNC: 0.8 MG/DL (ref 0.8–1.5)
DIFFERENTIAL METHOD BLD: ABNORMAL
EKG ATRIAL RATE: 66 BPM
EKG DIAGNOSIS: NORMAL
EKG P AXIS: 38 DEGREES
EKG P-R INTERVAL: 174 MS
EKG Q-T INTERVAL: 368 MS
EKG QRS DURATION: 88 MS
EKG QTC CALCULATION (BAZETT): 385 MS
EKG R AXIS: 13 DEGREES
EKG T AXIS: 43 DEGREES
EKG VENTRICULAR RATE: 66 BPM
EOSINOPHIL # BLD: 0 K/UL (ref 0–0.8)
EOSINOPHIL NFR BLD: 0 % (ref 0.5–7.8)
ERYTHROCYTE [DISTWIDTH] IN BLOOD BY AUTOMATED COUNT: 13.8 % (ref 11.9–14.6)
GLUCOSE BLD STRIP.AUTO-MCNC: 171 MG/DL (ref 65–100)
GLUCOSE BLD STRIP.AUTO-MCNC: 192 MG/DL (ref 65–100)
GLUCOSE BLD STRIP.AUTO-MCNC: 217 MG/DL (ref 65–100)
GLUCOSE SERPL-MCNC: 183 MG/DL (ref 65–100)
HCT VFR BLD AUTO: 34.3 % (ref 41.1–50.3)
HGB BLD-MCNC: 11.2 G/DL (ref 13.6–17.2)
IMM GRANULOCYTES # BLD AUTO: 0.1 K/UL (ref 0–0.5)
IMM GRANULOCYTES NFR BLD AUTO: 1 % (ref 0–5)
LYMPHOCYTES # BLD: 2 K/UL (ref 0.5–4.6)
LYMPHOCYTES NFR BLD: 13 % (ref 13–44)
MAGNESIUM SERPL-MCNC: 2.3 MG/DL (ref 1.8–2.4)
MCH RBC QN AUTO: 29.7 PG (ref 26.1–32.9)
MCHC RBC AUTO-ENTMCNC: 32.7 G/DL (ref 31.4–35)
MCV RBC AUTO: 91 FL (ref 82–102)
MM INDURATION, POC: 0 MM (ref 0–5)
MONOCYTES # BLD: 1.8 K/UL (ref 0.1–1.3)
MONOCYTES NFR BLD: 12 % (ref 4–12)
NEUTS SEG # BLD: 11.5 K/UL (ref 1.7–8.2)
NEUTS SEG NFR BLD: 74 % (ref 43–78)
NRBC # BLD: 0 K/UL (ref 0–0.2)
PLATELET # BLD AUTO: 110 K/UL (ref 150–450)
PMV BLD AUTO: 10.6 FL (ref 9.4–12.3)
POTASSIUM SERPL-SCNC: 4.5 MMOL/L (ref 3.5–5.1)
PPD, POC: NEGATIVE
RBC # BLD AUTO: 3.77 M/UL (ref 4.23–5.6)
SERVICE CMNT-IMP: ABNORMAL
SODIUM SERPL-SCNC: 134 MMOL/L (ref 133–143)
WBC # BLD AUTO: 15.6 K/UL (ref 4.3–11.1)

## 2023-03-17 PROCEDURE — 2580000003 HC RX 258: Performed by: INTERNAL MEDICINE

## 2023-03-17 PROCEDURE — 2580000003 HC RX 258: Performed by: THORACIC SURGERY (CARDIOTHORACIC VASCULAR SURGERY)

## 2023-03-17 PROCEDURE — 99232 SBSQ HOSP IP/OBS MODERATE 35: CPT | Performed by: INTERNAL MEDICINE

## 2023-03-17 PROCEDURE — 6370000000 HC RX 637 (ALT 250 FOR IP): Performed by: NURSE PRACTITIONER

## 2023-03-17 PROCEDURE — 2140000001 HC CVICU INTERMEDIATE R&B

## 2023-03-17 PROCEDURE — 6370000000 HC RX 637 (ALT 250 FOR IP): Performed by: PHYSICIAN ASSISTANT

## 2023-03-17 PROCEDURE — C1751 CATH, INF, PER/CENT/MIDLINE: HCPCS

## 2023-03-17 PROCEDURE — 6370000000 HC RX 637 (ALT 250 FOR IP): Performed by: THORACIC SURGERY (CARDIOTHORACIC VASCULAR SURGERY)

## 2023-03-17 PROCEDURE — 85025 COMPLETE CBC W/AUTO DIFF WBC: CPT

## 2023-03-17 PROCEDURE — 2580000003 HC RX 258: Performed by: PHYSICIAN ASSISTANT

## 2023-03-17 PROCEDURE — 71045 X-RAY EXAM CHEST 1 VIEW: CPT

## 2023-03-17 PROCEDURE — 36573 INSJ PICC RS&I 5 YR+: CPT

## 2023-03-17 PROCEDURE — 97530 THERAPEUTIC ACTIVITIES: CPT

## 2023-03-17 PROCEDURE — 6360000002 HC RX W HCPCS: Performed by: PHYSICIAN ASSISTANT

## 2023-03-17 PROCEDURE — 82962 GLUCOSE BLOOD TEST: CPT

## 2023-03-17 PROCEDURE — 83735 ASSAY OF MAGNESIUM: CPT

## 2023-03-17 PROCEDURE — 2700000000 HC OXYGEN THERAPY PER DAY

## 2023-03-17 PROCEDURE — 80048 BASIC METABOLIC PNL TOTAL CA: CPT

## 2023-03-17 PROCEDURE — 6360000002 HC RX W HCPCS: Performed by: INTERNAL MEDICINE

## 2023-03-17 PROCEDURE — 6360000002 HC RX W HCPCS: Performed by: NURSE PRACTITIONER

## 2023-03-17 PROCEDURE — 6370000000 HC RX 637 (ALT 250 FOR IP): Performed by: INTERNAL MEDICINE

## 2023-03-17 PROCEDURE — 97166 OT EVAL MOD COMPLEX 45 MIN: CPT

## 2023-03-17 PROCEDURE — 93005 ELECTROCARDIOGRAM TRACING: CPT | Performed by: PHYSICIAN ASSISTANT

## 2023-03-17 PROCEDURE — 97112 NEUROMUSCULAR REEDUCATION: CPT

## 2023-03-17 RX ORDER — LIDOCAINE HYDROCHLORIDE 10 MG/ML
5 INJECTION, SOLUTION EPIDURAL; INFILTRATION; INTRACAUDAL; PERINEURAL ONCE
Status: DISCONTINUED | OUTPATIENT
Start: 2023-03-17 | End: 2023-03-17

## 2023-03-17 RX ORDER — FAMOTIDINE 20 MG/1
20 TABLET, FILM COATED ORAL 2 TIMES DAILY
Status: DISCONTINUED | OUTPATIENT
Start: 2023-03-17 | End: 2023-03-23 | Stop reason: HOSPADM

## 2023-03-17 RX ORDER — AMIODARONE HYDROCHLORIDE 200 MG/1
200 TABLET ORAL 2 TIMES DAILY
Status: DISCONTINUED | OUTPATIENT
Start: 2023-03-17 | End: 2023-03-18

## 2023-03-17 RX ORDER — SODIUM CHLORIDE 0.9 % (FLUSH) 0.9 %
5-40 SYRINGE (ML) INJECTION EVERY 12 HOURS SCHEDULED
Status: DISCONTINUED | OUTPATIENT
Start: 2023-03-17 | End: 2023-03-17

## 2023-03-17 RX ORDER — FUROSEMIDE 10 MG/ML
40 INJECTION INTRAMUSCULAR; INTRAVENOUS ONCE
Status: COMPLETED | OUTPATIENT
Start: 2023-03-17 | End: 2023-03-17

## 2023-03-17 RX ORDER — FUROSEMIDE 40 MG/1
40 TABLET ORAL DAILY
Status: DISCONTINUED | OUTPATIENT
Start: 2023-03-18 | End: 2023-03-19

## 2023-03-17 RX ORDER — INSULIN LISPRO 100 [IU]/ML
0-12 INJECTION, SOLUTION INTRAVENOUS; SUBCUTANEOUS
Status: DISCONTINUED | OUTPATIENT
Start: 2023-03-17 | End: 2023-03-23 | Stop reason: HOSPADM

## 2023-03-17 RX ORDER — SODIUM CHLORIDE 9 MG/ML
25 INJECTION, SOLUTION INTRAVENOUS PRN
Status: DISCONTINUED | OUTPATIENT
Start: 2023-03-17 | End: 2023-03-23 | Stop reason: HOSPADM

## 2023-03-17 RX ORDER — SODIUM CHLORIDE 0.9 % (FLUSH) 0.9 %
5-40 SYRINGE (ML) INJECTION EVERY 12 HOURS SCHEDULED
Status: DISCONTINUED | OUTPATIENT
Start: 2023-03-17 | End: 2023-03-23 | Stop reason: HOSPADM

## 2023-03-17 RX ORDER — SODIUM CHLORIDE 0.9 % (FLUSH) 0.9 %
5-40 SYRINGE (ML) INJECTION PRN
Status: DISCONTINUED | OUTPATIENT
Start: 2023-03-17 | End: 2023-03-23 | Stop reason: HOSPADM

## 2023-03-17 RX ORDER — ACETAMINOPHEN 325 MG/1
650 TABLET ORAL EVERY 4 HOURS PRN
Status: DISCONTINUED | OUTPATIENT
Start: 2023-03-17 | End: 2023-03-23 | Stop reason: HOSPADM

## 2023-03-17 RX ORDER — POLYETHYLENE GLYCOL 3350 17 G/17G
17 POWDER, FOR SOLUTION ORAL DAILY PRN
Status: DISCONTINUED | OUTPATIENT
Start: 2023-03-17 | End: 2023-03-23 | Stop reason: HOSPADM

## 2023-03-17 RX ORDER — SENNA AND DOCUSATE SODIUM 50; 8.6 MG/1; MG/1
1 TABLET, FILM COATED ORAL 2 TIMES DAILY
Status: DISCONTINUED | OUTPATIENT
Start: 2023-03-17 | End: 2023-03-18

## 2023-03-17 RX ORDER — ONDANSETRON 2 MG/ML
4 INJECTION INTRAMUSCULAR; INTRAVENOUS EVERY 6 HOURS PRN
Status: DISCONTINUED | OUTPATIENT
Start: 2023-03-17 | End: 2023-03-23 | Stop reason: HOSPADM

## 2023-03-17 RX ORDER — OXYCODONE HYDROCHLORIDE AND ACETAMINOPHEN 5; 325 MG/1; MG/1
1 TABLET ORAL EVERY 4 HOURS PRN
Status: DISCONTINUED | OUTPATIENT
Start: 2023-03-17 | End: 2023-03-18 | Stop reason: SDUPTHER

## 2023-03-17 RX ORDER — INSULIN LISPRO 100 [IU]/ML
0-6 INJECTION, SOLUTION INTRAVENOUS; SUBCUTANEOUS NIGHTLY
Status: DISCONTINUED | OUTPATIENT
Start: 2023-03-17 | End: 2023-03-23 | Stop reason: HOSPADM

## 2023-03-17 RX ORDER — SODIUM CHLORIDE 0.9 % (FLUSH) 0.9 %
5-40 SYRINGE (ML) INJECTION EVERY 12 HOURS SCHEDULED
Status: DISCONTINUED | OUTPATIENT
Start: 2023-03-17 | End: 2023-03-19

## 2023-03-17 RX ORDER — ONDANSETRON 4 MG/1
4 TABLET, ORALLY DISINTEGRATING ORAL EVERY 8 HOURS PRN
Status: DISCONTINUED | OUTPATIENT
Start: 2023-03-17 | End: 2023-03-23 | Stop reason: HOSPADM

## 2023-03-17 RX ORDER — DEXTROSE MONOHYDRATE 100 MG/ML
INJECTION, SOLUTION INTRAVENOUS CONTINUOUS PRN
Status: DISCONTINUED | OUTPATIENT
Start: 2023-03-17 | End: 2023-03-23 | Stop reason: HOSPADM

## 2023-03-17 RX ORDER — POTASSIUM CHLORIDE 750 MG/1
10 TABLET, EXTENDED RELEASE ORAL DAILY
Status: COMPLETED | OUTPATIENT
Start: 2023-03-18 | End: 2023-03-20

## 2023-03-17 RX ADMIN — FUROSEMIDE 40 MG: 10 INJECTION, SOLUTION INTRAMUSCULAR; INTRAVENOUS at 08:35

## 2023-03-17 RX ADMIN — Medication 1 AMPULE: at 08:34

## 2023-03-17 RX ADMIN — TRAMADOL HYDROCHLORIDE 50 MG: 50 TABLET ORAL at 21:27

## 2023-03-17 RX ADMIN — GABAPENTIN 300 MG: 300 CAPSULE ORAL at 08:38

## 2023-03-17 RX ADMIN — OXYCODONE AND ACETAMINOPHEN 1 TABLET: 5; 325 TABLET ORAL at 13:31

## 2023-03-17 RX ADMIN — ASPIRIN 81 MG: 81 TABLET ORAL at 08:35

## 2023-03-17 RX ADMIN — FAMOTIDINE 20 MG: 20 TABLET, FILM COATED ORAL at 08:35

## 2023-03-17 RX ADMIN — METOPROLOL TARTRATE 25 MG: 25 TABLET, FILM COATED ORAL at 08:38

## 2023-03-17 RX ADMIN — GABAPENTIN 300 MG: 300 CAPSULE ORAL at 21:20

## 2023-03-17 RX ADMIN — AMIODARONE HYDROCHLORIDE 200 MG: 200 TABLET ORAL at 11:42

## 2023-03-17 RX ADMIN — METOPROLOL TARTRATE 25 MG: 25 TABLET, FILM COATED ORAL at 21:20

## 2023-03-17 RX ADMIN — RISPERIDONE 2 MG: 1 TABLET ORAL at 21:20

## 2023-03-17 RX ADMIN — AMIODARONE HYDROCHLORIDE 150 MG: 50 INJECTION, SOLUTION INTRAVENOUS at 08:09

## 2023-03-17 RX ADMIN — Medication 1 AMPULE: at 21:28

## 2023-03-17 RX ADMIN — SENNOSIDES AND DOCUSATE SODIUM 1 TABLET: 8.6; 5 TABLET ORAL at 21:19

## 2023-03-17 RX ADMIN — INSULIN LISPRO 1 UNITS: 100 INJECTION, SOLUTION INTRAVENOUS; SUBCUTANEOUS at 21:35

## 2023-03-17 RX ADMIN — FAMOTIDINE 20 MG: 20 TABLET ORAL at 21:20

## 2023-03-17 RX ADMIN — LISINOPRIL 2.5 MG: 2.5 TABLET ORAL at 08:41

## 2023-03-17 RX ADMIN — AMIODARONE HYDROCHLORIDE 200 MG: 200 TABLET ORAL at 21:20

## 2023-03-17 RX ADMIN — Medication 6 MG: at 21:19

## 2023-03-17 RX ADMIN — SODIUM CHLORIDE, PRESERVATIVE FREE 10 ML: 5 INJECTION INTRAVENOUS at 21:28

## 2023-03-17 RX ADMIN — SODIUM CHLORIDE, PRESERVATIVE FREE 10 ML: 5 INJECTION INTRAVENOUS at 07:29

## 2023-03-17 RX ADMIN — AMIODARONE HYDROCHLORIDE 1 MG/MIN: 50 INJECTION, SOLUTION INTRAVENOUS at 08:09

## 2023-03-17 RX ADMIN — TRAMADOL HYDROCHLORIDE 50 MG: 50 TABLET ORAL at 16:08

## 2023-03-17 RX ADMIN — PRAVASTATIN SODIUM 40 MG: 20 TABLET ORAL at 21:20

## 2023-03-17 RX ADMIN — INSULIN LISPRO 4 UNITS: 100 INJECTION, SOLUTION INTRAVENOUS; SUBCUTANEOUS at 11:48

## 2023-03-17 RX ADMIN — ACETAMINOPHEN 650 MG: 325 TABLET ORAL at 08:53

## 2023-03-17 RX ADMIN — CEFAZOLIN SODIUM 2000 MG: 100 INJECTION, POWDER, LYOPHILIZED, FOR SOLUTION INTRAVENOUS at 06:29

## 2023-03-17 RX ADMIN — INSULIN LISPRO 2 UNITS: 100 INJECTION, SOLUTION INTRAVENOUS; SUBCUTANEOUS at 07:28

## 2023-03-17 RX ADMIN — GABAPENTIN 300 MG: 300 CAPSULE ORAL at 13:31

## 2023-03-17 RX ADMIN — SODIUM CHLORIDE, PRESERVATIVE FREE 10 ML: 5 INJECTION INTRAVENOUS at 08:39

## 2023-03-17 RX ADMIN — OXYCODONE AND ACETAMINOPHEN 1 TABLET: 5; 325 TABLET ORAL at 18:39

## 2023-03-17 RX ADMIN — SODIUM CHLORIDE, PRESERVATIVE FREE 10 ML: 5 INJECTION INTRAVENOUS at 08:35

## 2023-03-17 RX ADMIN — RISPERIDONE 2 MG: 1 TABLET ORAL at 08:38

## 2023-03-17 RX ADMIN — ACETAMINOPHEN 650 MG: 325 TABLET ORAL at 04:36

## 2023-03-17 ASSESSMENT — PAIN DESCRIPTION - ORIENTATION
ORIENTATION: MID
ORIENTATION: MID;LEFT
ORIENTATION: MID;LEFT
ORIENTATION: LEFT;MID

## 2023-03-17 ASSESSMENT — PAIN DESCRIPTION - LOCATION
LOCATION: CHEST;LEG
LOCATION: CHEST
LOCATION: CHEST
LOCATION: CHEST;LEG;INCISION
LOCATION: INCISION
LOCATION: INCISION
LOCATION: CHEST;INCISION;LEG
LOCATION: CHEST
LOCATION: CHEST;LEG

## 2023-03-17 ASSESSMENT — PAIN DESCRIPTION - DESCRIPTORS
DESCRIPTORS: ACHING;DISCOMFORT;SORE
DESCRIPTORS: ACHING
DESCRIPTORS: ACHING;DISCOMFORT;SORE
DESCRIPTORS: SHARP;STABBING
DESCRIPTORS: ACHING

## 2023-03-17 ASSESSMENT — PAIN - FUNCTIONAL ASSESSMENT
PAIN_FUNCTIONAL_ASSESSMENT: PREVENTS OR INTERFERES SOME ACTIVE ACTIVITIES AND ADLS
PAIN_FUNCTIONAL_ASSESSMENT: ACTIVITIES ARE NOT PREVENTED

## 2023-03-17 ASSESSMENT — PAIN SCALES - GENERAL
PAINLEVEL_OUTOF10: 6
PAINLEVEL_OUTOF10: 3
PAINLEVEL_OUTOF10: 0
PAINLEVEL_OUTOF10: 6
PAINLEVEL_OUTOF10: 6
PAINLEVEL_OUTOF10: 4
PAINLEVEL_OUTOF10: 7
PAINLEVEL_OUTOF10: 7
PAINLEVEL_OUTOF10: 2
PAINLEVEL_OUTOF10: 7
PAINLEVEL_OUTOF10: 7

## 2023-03-18 ENCOUNTER — APPOINTMENT (OUTPATIENT)
Dept: GENERAL RADIOLOGY | Age: 68
DRG: 217 | End: 2023-03-18
Payer: MEDICARE

## 2023-03-18 PROBLEM — I25.110 CORONARY ARTERY DISEASE INVOLVING NATIVE CORONARY ARTERY OF NATIVE HEART WITH UNSTABLE ANGINA PECTORIS (HCC): Status: ACTIVE | Noted: 2023-03-18

## 2023-03-18 LAB
ABO + RH BLD: NORMAL
ANION GAP SERPL CALC-SCNC: 4 MMOL/L (ref 2–11)
ANTIGENS PRESENT BLD: NORMAL
ANTIGENS PRESENT RBC DONR: NORMAL
BLD PROD TYP BPU: NORMAL
BLOOD BANK DISPENSE STATUS: NORMAL
BLOOD GROUP ANTIBODIES SERPL: NORMAL
BLOOD GROUP ANTIBODIES SERPL: NORMAL
BPU ID: NORMAL
BUN SERPL-MCNC: 20 MG/DL (ref 8–23)
CALCIUM SERPL-MCNC: 8.1 MG/DL (ref 8.3–10.4)
CHLORIDE SERPL-SCNC: 100 MMOL/L (ref 101–110)
CO2 SERPL-SCNC: 25 MMOL/L (ref 21–32)
CREAT SERPL-MCNC: 0.9 MG/DL (ref 0.8–1.5)
CROSSMATCH RESULT: NORMAL
EKG ATRIAL RATE: 125 BPM
EKG ATRIAL RATE: 65 BPM
EKG DIAGNOSIS: NORMAL
EKG DIAGNOSIS: NORMAL
EKG P AXIS: 37 DEGREES
EKG P-R INTERVAL: 162 MS
EKG P-R INTERVAL: 170 MS
EKG Q-T INTERVAL: 330 MS
EKG Q-T INTERVAL: 424 MS
EKG QRS DURATION: 84 MS
EKG QRS DURATION: 88 MS
EKG QTC CALCULATION (BAZETT): 440 MS
EKG QTC CALCULATION (BAZETT): 476 MS
EKG R AXIS: 11 DEGREES
EKG R AXIS: 6 DEGREES
EKG T AXIS: 166 DEGREES
EKG T AXIS: 54 DEGREES
EKG VENTRICULAR RATE: 125 BPM
EKG VENTRICULAR RATE: 65 BPM
ERYTHROCYTE [DISTWIDTH] IN BLOOD BY AUTOMATED COUNT: 13.6 % (ref 11.9–14.6)
GLUCOSE BLD STRIP.AUTO-MCNC: 164 MG/DL (ref 65–100)
GLUCOSE BLD STRIP.AUTO-MCNC: 188 MG/DL (ref 65–100)
GLUCOSE BLD STRIP.AUTO-MCNC: 202 MG/DL (ref 65–100)
GLUCOSE BLD STRIP.AUTO-MCNC: 226 MG/DL (ref 65–100)
GLUCOSE SERPL-MCNC: 173 MG/DL (ref 65–100)
HCT VFR BLD AUTO: 33.4 % (ref 41.1–50.3)
HGB BLD-MCNC: 10.9 G/DL (ref 13.6–17.2)
MAGNESIUM SERPL-MCNC: 2.3 MG/DL (ref 1.8–2.4)
MCH RBC QN AUTO: 29.7 PG (ref 26.1–32.9)
MCHC RBC AUTO-ENTMCNC: 32.6 G/DL (ref 31.4–35)
MCV RBC AUTO: 91 FL (ref 82–102)
MM INDURATION, POC: 0 MM (ref 0–5)
NRBC # BLD: 0 K/UL (ref 0–0.2)
PLATELET # BLD AUTO: 113 K/UL (ref 150–450)
PMV BLD AUTO: 10.8 FL (ref 9.4–12.3)
POTASSIUM SERPL-SCNC: 4.4 MMOL/L (ref 3.5–5.1)
PPD, POC: NEGATIVE
RBC # BLD AUTO: 3.67 M/UL (ref 4.23–5.6)
SERVICE CMNT-IMP: ABNORMAL
SODIUM SERPL-SCNC: 129 MMOL/L (ref 133–143)
SPECIMEN EXP DATE BLD: NORMAL
UNIT DIVISION: 0
WBC # BLD AUTO: 13 K/UL (ref 4.3–11.1)

## 2023-03-18 PROCEDURE — 71046 X-RAY EXAM CHEST 2 VIEWS: CPT

## 2023-03-18 PROCEDURE — 36415 COLL VENOUS BLD VENIPUNCTURE: CPT

## 2023-03-18 PROCEDURE — 99232 SBSQ HOSP IP/OBS MODERATE 35: CPT | Performed by: INTERNAL MEDICINE

## 2023-03-18 PROCEDURE — 6370000000 HC RX 637 (ALT 250 FOR IP): Performed by: PHYSICIAN ASSISTANT

## 2023-03-18 PROCEDURE — 6370000000 HC RX 637 (ALT 250 FOR IP): Performed by: NURSE PRACTITIONER

## 2023-03-18 PROCEDURE — 2580000003 HC RX 258: Performed by: THORACIC SURGERY (CARDIOTHORACIC VASCULAR SURGERY)

## 2023-03-18 PROCEDURE — 2580000003 HC RX 258: Performed by: NURSE PRACTITIONER

## 2023-03-18 PROCEDURE — 97530 THERAPEUTIC ACTIVITIES: CPT

## 2023-03-18 PROCEDURE — 93005 ELECTROCARDIOGRAM TRACING: CPT | Performed by: THORACIC SURGERY (CARDIOTHORACIC VASCULAR SURGERY)

## 2023-03-18 PROCEDURE — 85027 COMPLETE CBC AUTOMATED: CPT

## 2023-03-18 PROCEDURE — 82962 GLUCOSE BLOOD TEST: CPT

## 2023-03-18 PROCEDURE — 83735 ASSAY OF MAGNESIUM: CPT

## 2023-03-18 PROCEDURE — 97110 THERAPEUTIC EXERCISES: CPT

## 2023-03-18 PROCEDURE — 93005 ELECTROCARDIOGRAM TRACING: CPT | Performed by: NURSE PRACTITIONER

## 2023-03-18 PROCEDURE — 2140000001 HC CVICU INTERMEDIATE R&B

## 2023-03-18 PROCEDURE — 6370000000 HC RX 637 (ALT 250 FOR IP): Performed by: THORACIC SURGERY (CARDIOTHORACIC VASCULAR SURGERY)

## 2023-03-18 PROCEDURE — 80048 BASIC METABOLIC PNL TOTAL CA: CPT

## 2023-03-18 PROCEDURE — 6370000000 HC RX 637 (ALT 250 FOR IP): Performed by: INTERNAL MEDICINE

## 2023-03-18 RX ORDER — AMIODARONE HYDROCHLORIDE 200 MG/1
400 TABLET ORAL 2 TIMES DAILY
Status: DISCONTINUED | OUTPATIENT
Start: 2023-03-18 | End: 2023-03-23 | Stop reason: HOSPADM

## 2023-03-18 RX ORDER — AMIODARONE HYDROCHLORIDE 200 MG/1
400 TABLET ORAL 2 TIMES DAILY
Status: DISCONTINUED | OUTPATIENT
Start: 2023-03-18 | End: 2023-03-18

## 2023-03-18 RX ORDER — ATORVASTATIN CALCIUM 40 MG/1
40 TABLET, FILM COATED ORAL NIGHTLY
Status: DISCONTINUED | OUTPATIENT
Start: 2023-03-18 | End: 2023-03-20

## 2023-03-18 RX ORDER — OXYCODONE AND ACETAMINOPHEN 10; 325 MG/1; MG/1
1 TABLET ORAL EVERY 4 HOURS PRN
Status: DISCONTINUED | OUTPATIENT
Start: 2023-03-18 | End: 2023-03-23 | Stop reason: HOSPADM

## 2023-03-18 RX ORDER — SENNA AND DOCUSATE SODIUM 50; 8.6 MG/1; MG/1
2 TABLET, FILM COATED ORAL 2 TIMES DAILY
Status: DISCONTINUED | OUTPATIENT
Start: 2023-03-18 | End: 2023-03-23 | Stop reason: HOSPADM

## 2023-03-18 RX ORDER — AMIODARONE HYDROCHLORIDE 200 MG/1
200 TABLET ORAL ONCE
Status: COMPLETED | OUTPATIENT
Start: 2023-03-18 | End: 2023-03-18

## 2023-03-18 RX ADMIN — FAMOTIDINE 20 MG: 20 TABLET ORAL at 20:50

## 2023-03-18 RX ADMIN — GABAPENTIN 300 MG: 300 CAPSULE ORAL at 08:42

## 2023-03-18 RX ADMIN — OXYCODONE AND ACETAMINOPHEN 1 TABLET: 5; 325 TABLET ORAL at 04:29

## 2023-03-18 RX ADMIN — GABAPENTIN 300 MG: 300 CAPSULE ORAL at 20:50

## 2023-03-18 RX ADMIN — SODIUM CHLORIDE, PRESERVATIVE FREE 10 ML: 5 INJECTION INTRAVENOUS at 08:46

## 2023-03-18 RX ADMIN — ACETAMINOPHEN 650 MG: 325 TABLET ORAL at 16:07

## 2023-03-18 RX ADMIN — Medication: at 10:30

## 2023-03-18 RX ADMIN — Medication 1 AMPULE: at 20:51

## 2023-03-18 RX ADMIN — Medication 6 MG: at 20:51

## 2023-03-18 RX ADMIN — RISPERIDONE 2 MG: 1 TABLET ORAL at 20:51

## 2023-03-18 RX ADMIN — FUROSEMIDE 40 MG: 40 TABLET ORAL at 08:43

## 2023-03-18 RX ADMIN — AMIODARONE HYDROCHLORIDE 400 MG: 200 TABLET ORAL at 17:48

## 2023-03-18 RX ADMIN — FAMOTIDINE 20 MG: 20 TABLET ORAL at 08:42

## 2023-03-18 RX ADMIN — SENNOSIDES AND DOCUSATE SODIUM 1 TABLET: 8.6; 5 TABLET ORAL at 08:41

## 2023-03-18 RX ADMIN — GABAPENTIN 300 MG: 300 CAPSULE ORAL at 14:31

## 2023-03-18 RX ADMIN — METOPROLOL TARTRATE 25 MG: 25 TABLET, FILM COATED ORAL at 17:48

## 2023-03-18 RX ADMIN — OXYCODONE AND ACETAMINOPHEN 1 TABLET: 5; 325 TABLET ORAL at 14:31

## 2023-03-18 RX ADMIN — Medication 1 AMPULE: at 08:40

## 2023-03-18 RX ADMIN — POLYETHYLENE GLYCOL 3350 17 G: 17 POWDER, FOR SOLUTION ORAL at 08:37

## 2023-03-18 RX ADMIN — AMIODARONE HYDROCHLORIDE 200 MG: 200 TABLET ORAL at 12:00

## 2023-03-18 RX ADMIN — METOPROLOL TARTRATE 25 MG: 25 TABLET, FILM COATED ORAL at 08:43

## 2023-03-18 RX ADMIN — OXYCODONE AND ACETAMINOPHEN 1 TABLET: 5; 325 TABLET ORAL at 08:38

## 2023-03-18 RX ADMIN — ATORVASTATIN CALCIUM 40 MG: 40 TABLET, FILM COATED ORAL at 20:51

## 2023-03-18 RX ADMIN — SODIUM CHLORIDE, PRESERVATIVE FREE 10 ML: 5 INJECTION INTRAVENOUS at 20:52

## 2023-03-18 RX ADMIN — INSULIN LISPRO 2 UNITS: 100 INJECTION, SOLUTION INTRAVENOUS; SUBCUTANEOUS at 17:35

## 2023-03-18 RX ADMIN — TRAMADOL HYDROCHLORIDE 50 MG: 50 TABLET ORAL at 11:42

## 2023-03-18 RX ADMIN — INSULIN LISPRO 4 UNITS: 100 INJECTION, SOLUTION INTRAVENOUS; SUBCUTANEOUS at 11:39

## 2023-03-18 RX ADMIN — LISINOPRIL 2.5 MG: 2.5 TABLET ORAL at 08:41

## 2023-03-18 RX ADMIN — POTASSIUM CHLORIDE 10 MEQ: 750 TABLET, EXTENDED RELEASE ORAL at 08:41

## 2023-03-18 RX ADMIN — TRAMADOL HYDROCHLORIDE 50 MG: 50 TABLET ORAL at 17:35

## 2023-03-18 RX ADMIN — ASPIRIN 81 MG: 81 TABLET ORAL at 08:42

## 2023-03-18 RX ADMIN — AMIODARONE HYDROCHLORIDE 200 MG: 200 TABLET ORAL at 08:42

## 2023-03-18 RX ADMIN — INSULIN LISPRO 2 UNITS: 100 INJECTION, SOLUTION INTRAVENOUS; SUBCUTANEOUS at 20:48

## 2023-03-18 RX ADMIN — OXYCODONE AND ACETAMINOPHEN 1 TABLET: 10; 325 TABLET ORAL at 20:50

## 2023-03-18 RX ADMIN — INSULIN LISPRO 2 UNITS: 100 INJECTION, SOLUTION INTRAVENOUS; SUBCUTANEOUS at 08:38

## 2023-03-18 RX ADMIN — RISPERIDONE 2 MG: 1 TABLET ORAL at 08:43

## 2023-03-18 RX ADMIN — SENNOSIDES AND DOCUSATE SODIUM 2 TABLET: 8.6; 5 TABLET ORAL at 20:50

## 2023-03-18 RX ADMIN — TRAMADOL HYDROCHLORIDE 50 MG: 50 TABLET ORAL at 23:13

## 2023-03-18 ASSESSMENT — PAIN DESCRIPTION - DESCRIPTORS
DESCRIPTORS: ACHING;SORE
DESCRIPTORS: SORE
DESCRIPTORS: ACHING
DESCRIPTORS: ACHING
DESCRIPTORS: ACHING;SORE
DESCRIPTORS: ACHING;SORE
DESCRIPTORS: ACHING
DESCRIPTORS: SORE

## 2023-03-18 ASSESSMENT — PAIN DESCRIPTION - ORIENTATION
ORIENTATION: MID
ORIENTATION: ANTERIOR;MID
ORIENTATION: MID
ORIENTATION: ANTERIOR;MID;LEFT
ORIENTATION: MID
ORIENTATION: ANTERIOR;MID
ORIENTATION: ANTERIOR;MID
ORIENTATION: MID;ANTERIOR

## 2023-03-18 ASSESSMENT — PAIN SCALES - GENERAL
PAINLEVEL_OUTOF10: 4
PAINLEVEL_OUTOF10: 3
PAINLEVEL_OUTOF10: 9
PAINLEVEL_OUTOF10: 6
PAINLEVEL_OUTOF10: 8
PAINLEVEL_OUTOF10: 10
PAINLEVEL_OUTOF10: 0
PAINLEVEL_OUTOF10: 6
PAINLEVEL_OUTOF10: 0
PAINLEVEL_OUTOF10: 3
PAINLEVEL_OUTOF10: 6
PAINLEVEL_OUTOF10: 6
PAINLEVEL_OUTOF10: 3
PAINLEVEL_OUTOF10: 10

## 2023-03-18 ASSESSMENT — PAIN DESCRIPTION - LOCATION
LOCATION: INCISION
LOCATION: CHEST;INCISION
LOCATION: CHEST;INCISION
LOCATION: CHEST;LEG;INCISION
LOCATION: INCISION
LOCATION: INCISION
LOCATION: CHEST;INCISION
LOCATION: CHEST

## 2023-03-18 ASSESSMENT — PAIN - FUNCTIONAL ASSESSMENT
PAIN_FUNCTIONAL_ASSESSMENT: ACTIVITIES ARE NOT PREVENTED

## 2023-03-19 LAB
ANION GAP SERPL CALC-SCNC: 4 MMOL/L (ref 2–11)
BASOPHILS # BLD: 0 K/UL (ref 0–0.2)
BASOPHILS NFR BLD: 0 % (ref 0–2)
BUN SERPL-MCNC: 18 MG/DL (ref 8–23)
CALCIUM SERPL-MCNC: 8.1 MG/DL (ref 8.3–10.4)
CHLORIDE SERPL-SCNC: 98 MMOL/L (ref 101–110)
CO2 SERPL-SCNC: 26 MMOL/L (ref 21–32)
CREAT SERPL-MCNC: 0.9 MG/DL (ref 0.8–1.5)
DIFFERENTIAL METHOD BLD: ABNORMAL
EKG ATRIAL RATE: 277 BPM
EKG DIAGNOSIS: NORMAL
EKG P AXIS: 224 DEGREES
EKG Q-T INTERVAL: 400 MS
EKG QRS DURATION: 82 MS
EKG QTC CALCULATION (BAZETT): 419 MS
EKG R AXIS: 15 DEGREES
EKG T AXIS: 29 DEGREES
EKG VENTRICULAR RATE: 66 BPM
EOSINOPHIL # BLD: 0.3 K/UL (ref 0–0.8)
EOSINOPHIL NFR BLD: 2 % (ref 0.5–7.8)
ERYTHROCYTE [DISTWIDTH] IN BLOOD BY AUTOMATED COUNT: 13.3 % (ref 11.9–14.6)
GLUCOSE BLD STRIP.AUTO-MCNC: 163 MG/DL (ref 65–100)
GLUCOSE BLD STRIP.AUTO-MCNC: 186 MG/DL (ref 65–100)
GLUCOSE BLD STRIP.AUTO-MCNC: 210 MG/DL (ref 65–100)
GLUCOSE BLD STRIP.AUTO-MCNC: 218 MG/DL (ref 65–100)
GLUCOSE SERPL-MCNC: 170 MG/DL (ref 65–100)
HCT VFR BLD AUTO: 33.8 % (ref 41.1–50.3)
HGB BLD-MCNC: 11 G/DL (ref 13.6–17.2)
IMM GRANULOCYTES # BLD AUTO: 0.1 K/UL (ref 0–0.5)
IMM GRANULOCYTES NFR BLD AUTO: 1 % (ref 0–5)
LYMPHOCYTES # BLD: 2.2 K/UL (ref 0.5–4.6)
LYMPHOCYTES NFR BLD: 20 % (ref 13–44)
MAGNESIUM SERPL-MCNC: 2.4 MG/DL (ref 1.8–2.4)
MCH RBC QN AUTO: 29.5 PG (ref 26.1–32.9)
MCHC RBC AUTO-ENTMCNC: 32.5 G/DL (ref 31.4–35)
MCV RBC AUTO: 90.6 FL (ref 82–102)
MONOCYTES # BLD: 1.3 K/UL (ref 0.1–1.3)
MONOCYTES NFR BLD: 12 % (ref 4–12)
NEUTS SEG # BLD: 7.3 K/UL (ref 1.7–8.2)
NEUTS SEG NFR BLD: 65 % (ref 43–78)
NRBC # BLD: 0 K/UL (ref 0–0.2)
PLATELET # BLD AUTO: 151 K/UL (ref 150–450)
PMV BLD AUTO: 10.6 FL (ref 9.4–12.3)
POTASSIUM SERPL-SCNC: 4.6 MMOL/L (ref 3.5–5.1)
RBC # BLD AUTO: 3.73 M/UL (ref 4.23–5.6)
SERVICE CMNT-IMP: ABNORMAL
SODIUM SERPL-SCNC: 128 MMOL/L (ref 133–143)
WBC # BLD AUTO: 11.2 K/UL (ref 4.3–11.1)

## 2023-03-19 PROCEDURE — 6370000000 HC RX 637 (ALT 250 FOR IP): Performed by: THORACIC SURGERY (CARDIOTHORACIC VASCULAR SURGERY)

## 2023-03-19 PROCEDURE — 83735 ASSAY OF MAGNESIUM: CPT

## 2023-03-19 PROCEDURE — 2580000003 HC RX 258: Performed by: THORACIC SURGERY (CARDIOTHORACIC VASCULAR SURGERY)

## 2023-03-19 PROCEDURE — 82962 GLUCOSE BLOOD TEST: CPT

## 2023-03-19 PROCEDURE — 85025 COMPLETE CBC W/AUTO DIFF WBC: CPT

## 2023-03-19 PROCEDURE — 6370000000 HC RX 637 (ALT 250 FOR IP): Performed by: NURSE PRACTITIONER

## 2023-03-19 PROCEDURE — 2500000003 HC RX 250 WO HCPCS: Performed by: THORACIC SURGERY (CARDIOTHORACIC VASCULAR SURGERY)

## 2023-03-19 PROCEDURE — 80048 BASIC METABOLIC PNL TOTAL CA: CPT

## 2023-03-19 PROCEDURE — 2140000001 HC CVICU INTERMEDIATE R&B

## 2023-03-19 PROCEDURE — 6370000000 HC RX 637 (ALT 250 FOR IP): Performed by: INTERNAL MEDICINE

## 2023-03-19 PROCEDURE — 6360000002 HC RX W HCPCS: Performed by: INTERNAL MEDICINE

## 2023-03-19 PROCEDURE — 2580000003 HC RX 258: Performed by: NURSE PRACTITIONER

## 2023-03-19 PROCEDURE — 99232 SBSQ HOSP IP/OBS MODERATE 35: CPT | Performed by: INTERNAL MEDICINE

## 2023-03-19 PROCEDURE — 6370000000 HC RX 637 (ALT 250 FOR IP): Performed by: PHYSICIAN ASSISTANT

## 2023-03-19 PROCEDURE — 97530 THERAPEUTIC ACTIVITIES: CPT

## 2023-03-19 PROCEDURE — 36415 COLL VENOUS BLD VENIPUNCTURE: CPT

## 2023-03-19 PROCEDURE — 93005 ELECTROCARDIOGRAM TRACING: CPT | Performed by: NURSE PRACTITIONER

## 2023-03-19 RX ORDER — LISINOPRIL 5 MG/1
10 TABLET ORAL ONCE
Status: DISCONTINUED | OUTPATIENT
Start: 2023-03-19 | End: 2023-03-23 | Stop reason: HOSPADM

## 2023-03-19 RX ORDER — FUROSEMIDE 10 MG/ML
40 INJECTION INTRAMUSCULAR; INTRAVENOUS DAILY
Status: DISCONTINUED | OUTPATIENT
Start: 2023-03-19 | End: 2023-03-21

## 2023-03-19 RX ORDER — BISACODYL 5 MG/1
10 TABLET, DELAYED RELEASE ORAL DAILY PRN
Status: DISCONTINUED | OUTPATIENT
Start: 2023-03-19 | End: 2023-03-23 | Stop reason: HOSPADM

## 2023-03-19 RX ORDER — LISINOPRIL 5 MG/1
10 TABLET ORAL DAILY
Status: DISCONTINUED | OUTPATIENT
Start: 2023-03-20 | End: 2023-03-23 | Stop reason: HOSPADM

## 2023-03-19 RX ORDER — LACTULOSE 10 G/15ML
20 SOLUTION ORAL ONCE
Status: COMPLETED | OUTPATIENT
Start: 2023-03-19 | End: 2023-03-19

## 2023-03-19 RX ADMIN — SODIUM CHLORIDE 5 MG/HR: 900 INJECTION, SOLUTION INTRAVENOUS at 02:03

## 2023-03-19 RX ADMIN — FAMOTIDINE 20 MG: 20 TABLET ORAL at 08:23

## 2023-03-19 RX ADMIN — GABAPENTIN 300 MG: 300 CAPSULE ORAL at 08:24

## 2023-03-19 RX ADMIN — RISPERIDONE 2 MG: 1 TABLET ORAL at 21:16

## 2023-03-19 RX ADMIN — FUROSEMIDE 40 MG: 40 TABLET ORAL at 08:24

## 2023-03-19 RX ADMIN — TRAMADOL HYDROCHLORIDE 50 MG: 50 TABLET ORAL at 12:04

## 2023-03-19 RX ADMIN — METOPROLOL TARTRATE 25 MG: 25 TABLET, FILM COATED ORAL at 12:04

## 2023-03-19 RX ADMIN — METFORMIN HYDROCHLORIDE 500 MG: 500 TABLET ORAL at 15:46

## 2023-03-19 RX ADMIN — Medication: at 08:28

## 2023-03-19 RX ADMIN — METOPROLOL TARTRATE 25 MG: 25 TABLET, FILM COATED ORAL at 21:16

## 2023-03-19 RX ADMIN — SENNOSIDES AND DOCUSATE SODIUM 2 TABLET: 8.6; 5 TABLET ORAL at 21:15

## 2023-03-19 RX ADMIN — INSULIN LISPRO 4 UNITS: 100 INJECTION, SOLUTION INTRAVENOUS; SUBCUTANEOUS at 12:04

## 2023-03-19 RX ADMIN — Medication 6 MG: at 21:15

## 2023-03-19 RX ADMIN — INSULIN LISPRO 2 UNITS: 100 INJECTION, SOLUTION INTRAVENOUS; SUBCUTANEOUS at 07:46

## 2023-03-19 RX ADMIN — AMIODARONE HYDROCHLORIDE 400 MG: 200 TABLET ORAL at 21:16

## 2023-03-19 RX ADMIN — Medication 1 AMPULE: at 08:23

## 2023-03-19 RX ADMIN — ATORVASTATIN CALCIUM 40 MG: 40 TABLET, FILM COATED ORAL at 21:15

## 2023-03-19 RX ADMIN — FUROSEMIDE 40 MG: 10 INJECTION, SOLUTION INTRAMUSCULAR; INTRAVENOUS at 10:03

## 2023-03-19 RX ADMIN — GABAPENTIN 300 MG: 300 CAPSULE ORAL at 15:46

## 2023-03-19 RX ADMIN — GABAPENTIN 300 MG: 300 CAPSULE ORAL at 21:16

## 2023-03-19 RX ADMIN — AMIODARONE HYDROCHLORIDE 400 MG: 200 TABLET ORAL at 08:28

## 2023-03-19 RX ADMIN — FAMOTIDINE 20 MG: 20 TABLET ORAL at 21:16

## 2023-03-19 RX ADMIN — POTASSIUM CHLORIDE 10 MEQ: 750 TABLET, EXTENDED RELEASE ORAL at 08:24

## 2023-03-19 RX ADMIN — ASPIRIN 81 MG: 81 TABLET ORAL at 08:24

## 2023-03-19 RX ADMIN — OXYCODONE AND ACETAMINOPHEN 1 TABLET: 10; 325 TABLET ORAL at 15:46

## 2023-03-19 RX ADMIN — OXYCODONE AND ACETAMINOPHEN 1 TABLET: 10; 325 TABLET ORAL at 21:16

## 2023-03-19 RX ADMIN — RISPERIDONE 2 MG: 1 TABLET ORAL at 08:24

## 2023-03-19 RX ADMIN — OXYCODONE AND ACETAMINOPHEN 1 TABLET: 10; 325 TABLET ORAL at 02:03

## 2023-03-19 RX ADMIN — Medication 1 AMPULE: at 21:16

## 2023-03-19 RX ADMIN — SODIUM CHLORIDE, PRESERVATIVE FREE 10 ML: 5 INJECTION INTRAVENOUS at 21:17

## 2023-03-19 RX ADMIN — OXYCODONE AND ACETAMINOPHEN 1 TABLET: 10; 325 TABLET ORAL at 08:25

## 2023-03-19 RX ADMIN — SODIUM CHLORIDE, PRESERVATIVE FREE 10 ML: 5 INJECTION INTRAVENOUS at 08:27

## 2023-03-19 RX ADMIN — BISACODYL 10 MG: 5 TABLET, COATED ORAL at 08:24

## 2023-03-19 RX ADMIN — TRAMADOL HYDROCHLORIDE 50 MG: 50 TABLET ORAL at 05:25

## 2023-03-19 RX ADMIN — LACTULOSE 20 G: 20 SOLUTION ORAL at 16:01

## 2023-03-19 RX ADMIN — LISINOPRIL 2.5 MG: 2.5 TABLET ORAL at 08:24

## 2023-03-19 RX ADMIN — SODIUM CHLORIDE 5 MG/HR: 900 INJECTION, SOLUTION INTRAVENOUS at 08:34

## 2023-03-19 RX ADMIN — POLYETHYLENE GLYCOL 3350 17 G: 17 POWDER, FOR SOLUTION ORAL at 08:23

## 2023-03-19 RX ADMIN — SENNOSIDES AND DOCUSATE SODIUM 2 TABLET: 8.6; 5 TABLET ORAL at 08:23

## 2023-03-19 ASSESSMENT — PAIN SCALES - GENERAL
PAINLEVEL_OUTOF10: 0
PAINLEVEL_OUTOF10: 9
PAINLEVEL_OUTOF10: 0
PAINLEVEL_OUTOF10: 6
PAINLEVEL_OUTOF10: 7
PAINLEVEL_OUTOF10: 0
PAINLEVEL_OUTOF10: 8
PAINLEVEL_OUTOF10: 9
PAINLEVEL_OUTOF10: 6
PAINLEVEL_OUTOF10: 9
PAINLEVEL_OUTOF10: 0

## 2023-03-19 ASSESSMENT — PAIN DESCRIPTION - DESCRIPTORS
DESCRIPTORS: ACHING
DESCRIPTORS: ACHING;SORE
DESCRIPTORS: ACHING;SORE
DESCRIPTORS: ACHING
DESCRIPTORS: ACHING
DESCRIPTORS: ACHING;SORE

## 2023-03-19 ASSESSMENT — PAIN DESCRIPTION - ORIENTATION
ORIENTATION: ANTERIOR;MID
ORIENTATION: MID
ORIENTATION: MID

## 2023-03-19 ASSESSMENT — PAIN DESCRIPTION - LOCATION
LOCATION: INCISION
LOCATION: CHEST;INCISION
LOCATION: INCISION
LOCATION: CHEST;INCISION
LOCATION: INCISION

## 2023-03-19 ASSESSMENT — PAIN - FUNCTIONAL ASSESSMENT
PAIN_FUNCTIONAL_ASSESSMENT: ACTIVITIES ARE NOT PREVENTED
PAIN_FUNCTIONAL_ASSESSMENT: ACTIVITIES ARE NOT PREVENTED

## 2023-03-20 ENCOUNTER — APPOINTMENT (OUTPATIENT)
Dept: GENERAL RADIOLOGY | Age: 68
DRG: 217 | End: 2023-03-20
Payer: MEDICARE

## 2023-03-20 LAB
ANION GAP SERPL CALC-SCNC: 3 MMOL/L (ref 2–11)
BASOPHILS # BLD: 0 K/UL (ref 0–0.2)
BASOPHILS NFR BLD: 0 % (ref 0–2)
BUN SERPL-MCNC: 23 MG/DL (ref 8–23)
CALCIUM SERPL-MCNC: 8.5 MG/DL (ref 8.3–10.4)
CHLORIDE SERPL-SCNC: 93 MMOL/L (ref 101–110)
CO2 SERPL-SCNC: 28 MMOL/L (ref 21–32)
CREAT SERPL-MCNC: 0.9 MG/DL (ref 0.8–1.5)
DIFFERENTIAL METHOD BLD: ABNORMAL
EKG ATRIAL RATE: 58 BPM
EKG DIAGNOSIS: NORMAL
EKG P AXIS: 267 DEGREES
EKG P-R INTERVAL: 216 MS
EKG Q-T INTERVAL: 448 MS
EKG QRS DURATION: 88 MS
EKG QTC CALCULATION (BAZETT): 439 MS
EKG R AXIS: 10 DEGREES
EKG T AXIS: 101 DEGREES
EKG VENTRICULAR RATE: 58 BPM
EOSINOPHIL # BLD: 0.3 K/UL (ref 0–0.8)
EOSINOPHIL NFR BLD: 3 % (ref 0.5–7.8)
ERYTHROCYTE [DISTWIDTH] IN BLOOD BY AUTOMATED COUNT: 13.2 % (ref 11.9–14.6)
GLUCOSE BLD STRIP.AUTO-MCNC: 148 MG/DL (ref 65–100)
GLUCOSE BLD STRIP.AUTO-MCNC: 148 MG/DL (ref 65–100)
GLUCOSE BLD STRIP.AUTO-MCNC: 154 MG/DL (ref 65–100)
GLUCOSE BLD STRIP.AUTO-MCNC: 210 MG/DL (ref 65–100)
GLUCOSE SERPL-MCNC: 154 MG/DL (ref 65–100)
HCT VFR BLD AUTO: 31.8 % (ref 41.1–50.3)
HGB BLD-MCNC: 10.2 G/DL (ref 13.6–17.2)
IMM GRANULOCYTES # BLD AUTO: 0.1 K/UL (ref 0–0.5)
IMM GRANULOCYTES NFR BLD AUTO: 1 % (ref 0–5)
LYMPHOCYTES # BLD: 1.8 K/UL (ref 0.5–4.6)
LYMPHOCYTES NFR BLD: 20 % (ref 13–44)
MAGNESIUM SERPL-MCNC: 2.4 MG/DL (ref 1.8–2.4)
MCH RBC QN AUTO: 29.3 PG (ref 26.1–32.9)
MCHC RBC AUTO-ENTMCNC: 32.1 G/DL (ref 31.4–35)
MCV RBC AUTO: 91.4 FL (ref 82–102)
MONOCYTES # BLD: 1 K/UL (ref 0.1–1.3)
MONOCYTES NFR BLD: 10 % (ref 4–12)
NEUTS SEG # BLD: 6.1 K/UL (ref 1.7–8.2)
NEUTS SEG NFR BLD: 66 % (ref 43–78)
NRBC # BLD: 0 K/UL (ref 0–0.2)
PLATELET # BLD AUTO: 156 K/UL (ref 150–450)
PMV BLD AUTO: 10.2 FL (ref 9.4–12.3)
POTASSIUM SERPL-SCNC: 4.5 MMOL/L (ref 3.5–5.1)
RBC # BLD AUTO: 3.48 M/UL (ref 4.23–5.6)
SERVICE CMNT-IMP: ABNORMAL
SODIUM SERPL-SCNC: 124 MMOL/L (ref 133–143)
WBC # BLD AUTO: 9.3 K/UL (ref 4.3–11.1)

## 2023-03-20 PROCEDURE — 36415 COLL VENOUS BLD VENIPUNCTURE: CPT

## 2023-03-20 PROCEDURE — 6370000000 HC RX 637 (ALT 250 FOR IP): Performed by: INTERNAL MEDICINE

## 2023-03-20 PROCEDURE — 6370000000 HC RX 637 (ALT 250 FOR IP): Performed by: NURSE PRACTITIONER

## 2023-03-20 PROCEDURE — 6370000000 HC RX 637 (ALT 250 FOR IP): Performed by: THORACIC SURGERY (CARDIOTHORACIC VASCULAR SURGERY)

## 2023-03-20 PROCEDURE — 83735 ASSAY OF MAGNESIUM: CPT

## 2023-03-20 PROCEDURE — 97530 THERAPEUTIC ACTIVITIES: CPT

## 2023-03-20 PROCEDURE — 6360000002 HC RX W HCPCS: Performed by: INTERNAL MEDICINE

## 2023-03-20 PROCEDURE — 99232 SBSQ HOSP IP/OBS MODERATE 35: CPT | Performed by: INTERNAL MEDICINE

## 2023-03-20 PROCEDURE — 82962 GLUCOSE BLOOD TEST: CPT

## 2023-03-20 PROCEDURE — 93005 ELECTROCARDIOGRAM TRACING: CPT | Performed by: NURSE PRACTITIONER

## 2023-03-20 PROCEDURE — 85025 COMPLETE CBC W/AUTO DIFF WBC: CPT

## 2023-03-20 PROCEDURE — 2140000001 HC CVICU INTERMEDIATE R&B

## 2023-03-20 PROCEDURE — 71045 X-RAY EXAM CHEST 1 VIEW: CPT

## 2023-03-20 PROCEDURE — 6370000000 HC RX 637 (ALT 250 FOR IP): Performed by: PHYSICIAN ASSISTANT

## 2023-03-20 PROCEDURE — 6360000002 HC RX W HCPCS: Performed by: NURSE PRACTITIONER

## 2023-03-20 PROCEDURE — 2580000003 HC RX 258: Performed by: NURSE PRACTITIONER

## 2023-03-20 PROCEDURE — 97110 THERAPEUTIC EXERCISES: CPT

## 2023-03-20 PROCEDURE — 80048 BASIC METABOLIC PNL TOTAL CA: CPT

## 2023-03-20 RX ORDER — PRAVASTATIN SODIUM 20 MG
40 TABLET ORAL NIGHTLY
Status: DISCONTINUED | OUTPATIENT
Start: 2023-03-20 | End: 2023-03-23 | Stop reason: HOSPADM

## 2023-03-20 RX ADMIN — POTASSIUM CHLORIDE 10 MEQ: 750 TABLET, EXTENDED RELEASE ORAL at 08:39

## 2023-03-20 RX ADMIN — GABAPENTIN 300 MG: 300 CAPSULE ORAL at 08:38

## 2023-03-20 RX ADMIN — AMIODARONE HYDROCHLORIDE 400 MG: 200 TABLET ORAL at 21:13

## 2023-03-20 RX ADMIN — INSULIN LISPRO 2 UNITS: 100 INJECTION, SOLUTION INTRAVENOUS; SUBCUTANEOUS at 17:41

## 2023-03-20 RX ADMIN — METFORMIN HYDROCHLORIDE 500 MG: 500 TABLET ORAL at 08:38

## 2023-03-20 RX ADMIN — METOPROLOL TARTRATE 25 MG: 25 TABLET, FILM COATED ORAL at 08:38

## 2023-03-20 RX ADMIN — Medication 1 AMPULE: at 21:12

## 2023-03-20 RX ADMIN — LISINOPRIL 10 MG: 5 TABLET ORAL at 10:58

## 2023-03-20 RX ADMIN — Medication 1 AMPULE: at 08:40

## 2023-03-20 RX ADMIN — METOPROLOL TARTRATE 25 MG: 25 TABLET, FILM COATED ORAL at 21:13

## 2023-03-20 RX ADMIN — TRAMADOL HYDROCHLORIDE 50 MG: 50 TABLET ORAL at 11:03

## 2023-03-20 RX ADMIN — INSULIN LISPRO 1 UNITS: 100 INJECTION, SOLUTION INTRAVENOUS; SUBCUTANEOUS at 21:15

## 2023-03-20 RX ADMIN — ASPIRIN 81 MG: 81 TABLET ORAL at 08:38

## 2023-03-20 RX ADMIN — SENNOSIDES AND DOCUSATE SODIUM 2 TABLET: 8.6; 5 TABLET ORAL at 08:38

## 2023-03-20 RX ADMIN — TRAMADOL HYDROCHLORIDE 50 MG: 50 TABLET ORAL at 03:42

## 2023-03-20 RX ADMIN — GABAPENTIN 300 MG: 300 CAPSULE ORAL at 21:12

## 2023-03-20 RX ADMIN — BISACODYL 10 MG: 5 TABLET, COATED ORAL at 15:19

## 2023-03-20 RX ADMIN — RISPERIDONE 2 MG: 1 TABLET ORAL at 21:13

## 2023-03-20 RX ADMIN — AMIODARONE HYDROCHLORIDE 400 MG: 200 TABLET ORAL at 08:38

## 2023-03-20 RX ADMIN — METFORMIN HYDROCHLORIDE 500 MG: 500 TABLET ORAL at 17:41

## 2023-03-20 RX ADMIN — INSULIN LISPRO 4 UNITS: 100 INJECTION, SOLUTION INTRAVENOUS; SUBCUTANEOUS at 12:58

## 2023-03-20 RX ADMIN — INSULIN LISPRO 2 UNITS: 100 INJECTION, SOLUTION INTRAVENOUS; SUBCUTANEOUS at 08:39

## 2023-03-20 RX ADMIN — Medication: at 13:01

## 2023-03-20 RX ADMIN — SODIUM CHLORIDE, PRESERVATIVE FREE 10 ML: 5 INJECTION INTRAVENOUS at 21:13

## 2023-03-20 RX ADMIN — RISPERIDONE 2 MG: 1 TABLET ORAL at 10:58

## 2023-03-20 RX ADMIN — GABAPENTIN 300 MG: 300 CAPSULE ORAL at 15:12

## 2023-03-20 RX ADMIN — OXYCODONE AND ACETAMINOPHEN 1 TABLET: 10; 325 TABLET ORAL at 15:18

## 2023-03-20 RX ADMIN — OXYCODONE AND ACETAMINOPHEN 1 TABLET: 10; 325 TABLET ORAL at 21:13

## 2023-03-20 RX ADMIN — APIXABAN 5 MG: 5 TABLET, FILM COATED ORAL at 21:12

## 2023-03-20 RX ADMIN — FAMOTIDINE 20 MG: 20 TABLET ORAL at 08:38

## 2023-03-20 RX ADMIN — FAMOTIDINE 20 MG: 20 TABLET ORAL at 21:12

## 2023-03-20 RX ADMIN — ONDANSETRON 4 MG: 2 INJECTION INTRAMUSCULAR; INTRAVENOUS at 11:58

## 2023-03-20 RX ADMIN — PRAVASTATIN SODIUM 40 MG: 20 TABLET ORAL at 21:15

## 2023-03-20 RX ADMIN — SODIUM CHLORIDE, PRESERVATIVE FREE 10 ML: 5 INJECTION INTRAVENOUS at 11:06

## 2023-03-20 RX ADMIN — OXYCODONE AND ACETAMINOPHEN 1 TABLET: 10; 325 TABLET ORAL at 08:37

## 2023-03-20 RX ADMIN — SENNOSIDES AND DOCUSATE SODIUM 2 TABLET: 8.6; 5 TABLET ORAL at 21:11

## 2023-03-20 RX ADMIN — Medication 6 MG: at 21:15

## 2023-03-20 ASSESSMENT — PAIN DESCRIPTION - PAIN TYPE: TYPE: SURGICAL PAIN

## 2023-03-20 ASSESSMENT — PAIN DESCRIPTION - ORIENTATION
ORIENTATION: ANTERIOR
ORIENTATION: MID
ORIENTATION: LEFT
ORIENTATION: ANTERIOR
ORIENTATION: MID

## 2023-03-20 ASSESSMENT — PAIN SCALES - GENERAL
PAINLEVEL_OUTOF10: 1
PAINLEVEL_OUTOF10: 0
PAINLEVEL_OUTOF10: 7
PAINLEVEL_OUTOF10: 7
PAINLEVEL_OUTOF10: 3
PAINLEVEL_OUTOF10: 2
PAINLEVEL_OUTOF10: 7
PAINLEVEL_OUTOF10: 6
PAINLEVEL_OUTOF10: 3
PAINLEVEL_OUTOF10: 5
PAINLEVEL_OUTOF10: 0

## 2023-03-20 ASSESSMENT — PAIN DESCRIPTION - DESCRIPTORS
DESCRIPTORS: ACHING
DESCRIPTORS: ACHING
DESCRIPTORS: SORE
DESCRIPTORS: ACHING
DESCRIPTORS: ACHING

## 2023-03-20 ASSESSMENT — PAIN DESCRIPTION - LOCATION
LOCATION: CHEST
LOCATION: INCISION
LOCATION: CHEST

## 2023-03-20 ASSESSMENT — PAIN - FUNCTIONAL ASSESSMENT: PAIN_FUNCTIONAL_ASSESSMENT: ACTIVITIES ARE NOT PREVENTED

## 2023-03-20 NOTE — OP NOTE
[facility]  OPERATIVE REPORT    Name:  Tonie Rodriguez  MR#:  [id number]  :  1955  ACCOUNT #:  [de-identified]  DATE OF SERVICE:  03/15/2023    PREOPERATIVE DIAGNOSES:  Aortic valve stenosis, coronary artery occlusive disease, paroxysmal atrial fibrillation. POSTOPERATIVE DIAGNOSES:  Aortic valve stenosis, coronary artery occlusive disease, paroxysmal atrial fibrillation. PROCEDURES PERFORMED:  Aortic valve replacement with a 25 mm Diaz Inspiris bioprosthetic valve; two-vessel coronary artery bypass grafting using reverse saphenous vein graft to the intermediate coronary and left internal mammary artery to left anterior descending coronary; endoscopic vein harvest; maze procedure; clipping of the left atrial appendage with an AtriCure clip; (arterial line placed by Anesthesia); temporary right ventricular pacing wires. SURGEON:  Marina Serrano MD    ASSISTANT:       ANESTHESIA:  General.    COMPLICATIONS:   .    SPECIMENS REMOVED:   .    IMPLANTS:   .    ESTIMATED BLOOD LOSS:  Minimal.    CARDIOPULMONARY BYPASS TIME:  157 minutes. AORTIC CROSSCLAMP TIME:  131 minutes. PREOPERATIVE HISTORY:  This is a 57-year-old gentleman who is in this hospital with chest pain and cardiac catheterization showed severe coronary artery disease as well as moderate aortic valve stenosis. While on telemetry, he showed episodes of paroxysmal atrial fibrillation. Surgical revascularization and aortic valve replacement were recommended along with maze procedure. WHAT WAS FOUND AND WHAT WAS DONE:  The heart was exposed through a median sternotomy. The heart was normal in size, contracted well. The coronaries were grafted placing reverse vein to the intermediate coronary and the internal mammary artery was used to bypass the left anterior descending coronary. The aortic valve was trileaflet, but very calcified with calcium running on down to the mitral valve leaflets.   The valve was

## 2023-03-21 LAB
ANION GAP SERPL CALC-SCNC: 2 MMOL/L (ref 2–11)
BUN SERPL-MCNC: 22 MG/DL (ref 8–23)
CALCIUM SERPL-MCNC: 8.1 MG/DL (ref 8.3–10.4)
CHLORIDE SERPL-SCNC: 95 MMOL/L (ref 101–110)
CO2 SERPL-SCNC: 27 MMOL/L (ref 21–32)
CREAT SERPL-MCNC: 1 MG/DL (ref 0.8–1.5)
GLUCOSE BLD STRIP.AUTO-MCNC: 132 MG/DL (ref 65–100)
GLUCOSE BLD STRIP.AUTO-MCNC: 141 MG/DL (ref 65–100)
GLUCOSE BLD STRIP.AUTO-MCNC: 165 MG/DL (ref 65–100)
GLUCOSE BLD STRIP.AUTO-MCNC: 166 MG/DL (ref 65–100)
GLUCOSE SERPL-MCNC: 138 MG/DL (ref 65–100)
POTASSIUM SERPL-SCNC: 5.2 MMOL/L (ref 3.5–5.1)
POTASSIUM SERPL-SCNC: 5.2 MMOL/L (ref 3.5–5.1)
SERVICE CMNT-IMP: ABNORMAL
SODIUM SERPL-SCNC: 124 MMOL/L (ref 133–143)

## 2023-03-21 PROCEDURE — 2140000001 HC CVICU INTERMEDIATE R&B

## 2023-03-21 PROCEDURE — 97530 THERAPEUTIC ACTIVITIES: CPT

## 2023-03-21 PROCEDURE — 6370000000 HC RX 637 (ALT 250 FOR IP): Performed by: INTERNAL MEDICINE

## 2023-03-21 PROCEDURE — 6370000000 HC RX 637 (ALT 250 FOR IP): Performed by: PHYSICIAN ASSISTANT

## 2023-03-21 PROCEDURE — 80048 BASIC METABOLIC PNL TOTAL CA: CPT

## 2023-03-21 PROCEDURE — 6370000000 HC RX 637 (ALT 250 FOR IP): Performed by: NURSE PRACTITIONER

## 2023-03-21 PROCEDURE — 36415 COLL VENOUS BLD VENIPUNCTURE: CPT

## 2023-03-21 PROCEDURE — 6360000002 HC RX W HCPCS: Performed by: PHYSICIAN ASSISTANT

## 2023-03-21 PROCEDURE — 84132 ASSAY OF SERUM POTASSIUM: CPT

## 2023-03-21 PROCEDURE — 6370000000 HC RX 637 (ALT 250 FOR IP): Performed by: THORACIC SURGERY (CARDIOTHORACIC VASCULAR SURGERY)

## 2023-03-21 PROCEDURE — 82962 GLUCOSE BLOOD TEST: CPT

## 2023-03-21 PROCEDURE — 2580000003 HC RX 258: Performed by: NURSE PRACTITIONER

## 2023-03-21 PROCEDURE — 97535 SELF CARE MNGMENT TRAINING: CPT

## 2023-03-21 PROCEDURE — 97110 THERAPEUTIC EXERCISES: CPT

## 2023-03-21 RX ORDER — BISACODYL 10 MG
10 SUPPOSITORY, RECTAL RECTAL DAILY PRN
Status: DISCONTINUED | OUTPATIENT
Start: 2023-03-21 | End: 2023-03-23 | Stop reason: HOSPADM

## 2023-03-21 RX ORDER — FUROSEMIDE 10 MG/ML
40 INJECTION INTRAMUSCULAR; INTRAVENOUS DAILY
Status: DISCONTINUED | OUTPATIENT
Start: 2023-03-21 | End: 2023-03-23 | Stop reason: HOSPADM

## 2023-03-21 RX ADMIN — APIXABAN 5 MG: 5 TABLET, FILM COATED ORAL at 08:52

## 2023-03-21 RX ADMIN — FAMOTIDINE 20 MG: 20 TABLET ORAL at 20:54

## 2023-03-21 RX ADMIN — PRAVASTATIN SODIUM 40 MG: 20 TABLET ORAL at 20:53

## 2023-03-21 RX ADMIN — APIXABAN 5 MG: 5 TABLET, FILM COATED ORAL at 20:54

## 2023-03-21 RX ADMIN — METOPROLOL TARTRATE 25 MG: 25 TABLET, FILM COATED ORAL at 08:52

## 2023-03-21 RX ADMIN — Medication 1 AMPULE: at 08:51

## 2023-03-21 RX ADMIN — GABAPENTIN 300 MG: 300 CAPSULE ORAL at 20:53

## 2023-03-21 RX ADMIN — Medication 1 AMPULE: at 20:53

## 2023-03-21 RX ADMIN — METFORMIN HYDROCHLORIDE 500 MG: 500 TABLET ORAL at 16:30

## 2023-03-21 RX ADMIN — ASPIRIN 81 MG: 81 TABLET ORAL at 08:52

## 2023-03-21 RX ADMIN — OXYCODONE AND ACETAMINOPHEN 1 TABLET: 10; 325 TABLET ORAL at 15:22

## 2023-03-21 RX ADMIN — RISPERIDONE 2 MG: 1 TABLET ORAL at 08:53

## 2023-03-21 RX ADMIN — SODIUM CHLORIDE, PRESERVATIVE FREE 10 ML: 5 INJECTION INTRAVENOUS at 08:50

## 2023-03-21 RX ADMIN — OXYCODONE AND ACETAMINOPHEN 1 TABLET: 10; 325 TABLET ORAL at 05:08

## 2023-03-21 RX ADMIN — SODIUM CHLORIDE, PRESERVATIVE FREE 10 ML: 5 INJECTION INTRAVENOUS at 21:12

## 2023-03-21 RX ADMIN — OXYCODONE AND ACETAMINOPHEN 1 TABLET: 10; 325 TABLET ORAL at 20:53

## 2023-03-21 RX ADMIN — POLYETHYLENE GLYCOL 3350 17 G: 17 POWDER, FOR SOLUTION ORAL at 08:50

## 2023-03-21 RX ADMIN — INSULIN LISPRO 2 UNITS: 100 INJECTION, SOLUTION INTRAVENOUS; SUBCUTANEOUS at 16:30

## 2023-03-21 RX ADMIN — RISPERIDONE 2 MG: 1 TABLET ORAL at 20:54

## 2023-03-21 RX ADMIN — METOPROLOL TARTRATE 25 MG: 25 TABLET, FILM COATED ORAL at 20:52

## 2023-03-21 RX ADMIN — GABAPENTIN 300 MG: 300 CAPSULE ORAL at 08:52

## 2023-03-21 RX ADMIN — AMIODARONE HYDROCHLORIDE 400 MG: 200 TABLET ORAL at 20:54

## 2023-03-21 RX ADMIN — OXYCODONE AND ACETAMINOPHEN 1 TABLET: 10; 325 TABLET ORAL at 08:50

## 2023-03-21 RX ADMIN — GABAPENTIN 300 MG: 300 CAPSULE ORAL at 15:19

## 2023-03-21 RX ADMIN — FUROSEMIDE 40 MG: 10 INJECTION, SOLUTION INTRAMUSCULAR; INTRAVENOUS at 10:14

## 2023-03-21 RX ADMIN — FAMOTIDINE 20 MG: 20 TABLET ORAL at 08:52

## 2023-03-21 RX ADMIN — Medication: at 10:15

## 2023-03-21 RX ADMIN — SENNOSIDES AND DOCUSATE SODIUM 2 TABLET: 8.6; 5 TABLET ORAL at 08:53

## 2023-03-21 RX ADMIN — AMIODARONE HYDROCHLORIDE 400 MG: 200 TABLET ORAL at 08:52

## 2023-03-21 RX ADMIN — TRAMADOL HYDROCHLORIDE 50 MG: 50 TABLET ORAL at 12:29

## 2023-03-21 RX ADMIN — SENNOSIDES AND DOCUSATE SODIUM 2 TABLET: 8.6; 5 TABLET ORAL at 20:54

## 2023-03-21 RX ADMIN — INSULIN LISPRO 2 UNITS: 100 INJECTION, SOLUTION INTRAVENOUS; SUBCUTANEOUS at 08:51

## 2023-03-21 RX ADMIN — INSULIN LISPRO 2 UNITS: 100 INJECTION, SOLUTION INTRAVENOUS; SUBCUTANEOUS at 12:29

## 2023-03-21 RX ADMIN — METFORMIN HYDROCHLORIDE 500 MG: 500 TABLET ORAL at 08:52

## 2023-03-21 RX ADMIN — LISINOPRIL 10 MG: 5 TABLET ORAL at 08:52

## 2023-03-21 RX ADMIN — BISACODYL 10 MG: 10 SUPPOSITORY RECTAL at 17:27

## 2023-03-21 ASSESSMENT — PAIN DESCRIPTION - ORIENTATION
ORIENTATION: MID
ORIENTATION: ANTERIOR

## 2023-03-21 ASSESSMENT — PAIN DESCRIPTION - LOCATION
LOCATION: CHEST
LOCATION: GENERALIZED
LOCATION: CHEST
LOCATION: CHEST

## 2023-03-21 ASSESSMENT — PAIN DESCRIPTION - ONSET
ONSET: ON-GOING

## 2023-03-21 ASSESSMENT — PAIN DESCRIPTION - FREQUENCY
FREQUENCY: INTERMITTENT

## 2023-03-21 ASSESSMENT — PAIN SCALES - GENERAL
PAINLEVEL_OUTOF10: 3
PAINLEVEL_OUTOF10: 7
PAINLEVEL_OUTOF10: 2
PAINLEVEL_OUTOF10: 0
PAINLEVEL_OUTOF10: 3
PAINLEVEL_OUTOF10: 7
PAINLEVEL_OUTOF10: 10
PAINLEVEL_OUTOF10: 3
PAINLEVEL_OUTOF10: 6

## 2023-03-21 ASSESSMENT — PAIN DESCRIPTION - DESCRIPTORS
DESCRIPTORS: DISCOMFORT;SORE
DESCRIPTORS: ACHING

## 2023-03-21 ASSESSMENT — PAIN DESCRIPTION - PAIN TYPE
TYPE: SURGICAL PAIN

## 2023-03-22 LAB
ANION GAP SERPL CALC-SCNC: 3 MMOL/L (ref 2–11)
BUN SERPL-MCNC: 19 MG/DL (ref 8–23)
CALCIUM SERPL-MCNC: 8.4 MG/DL (ref 8.3–10.4)
CHLORIDE SERPL-SCNC: 94 MMOL/L (ref 101–110)
CO2 SERPL-SCNC: 27 MMOL/L (ref 21–32)
CREAT SERPL-MCNC: 1.1 MG/DL (ref 0.8–1.5)
GLUCOSE BLD STRIP.AUTO-MCNC: 119 MG/DL (ref 65–100)
GLUCOSE BLD STRIP.AUTO-MCNC: 123 MG/DL (ref 65–100)
GLUCOSE BLD STRIP.AUTO-MCNC: 138 MG/DL (ref 65–100)
GLUCOSE BLD STRIP.AUTO-MCNC: 195 MG/DL (ref 65–100)
GLUCOSE SERPL-MCNC: 133 MG/DL (ref 65–100)
MAGNESIUM SERPL-MCNC: 2.3 MG/DL (ref 1.8–2.4)
OSMOLALITY SERPL: 273 MOSM/KG H2O (ref 280–301)
POTASSIUM SERPL-SCNC: 4.9 MMOL/L (ref 3.5–5.1)
SERVICE CMNT-IMP: ABNORMAL
SODIUM SERPL-SCNC: 124 MMOL/L (ref 133–143)

## 2023-03-22 PROCEDURE — 97164 PT RE-EVAL EST PLAN CARE: CPT

## 2023-03-22 PROCEDURE — 97530 THERAPEUTIC ACTIVITIES: CPT

## 2023-03-22 PROCEDURE — 6370000000 HC RX 637 (ALT 250 FOR IP): Performed by: NURSE PRACTITIONER

## 2023-03-22 PROCEDURE — 36415 COLL VENOUS BLD VENIPUNCTURE: CPT

## 2023-03-22 PROCEDURE — 6370000000 HC RX 637 (ALT 250 FOR IP): Performed by: PHYSICIAN ASSISTANT

## 2023-03-22 PROCEDURE — 82962 GLUCOSE BLOOD TEST: CPT

## 2023-03-22 PROCEDURE — 83735 ASSAY OF MAGNESIUM: CPT

## 2023-03-22 PROCEDURE — 2140000001 HC CVICU INTERMEDIATE R&B

## 2023-03-22 PROCEDURE — 80048 BASIC METABOLIC PNL TOTAL CA: CPT

## 2023-03-22 PROCEDURE — 6370000000 HC RX 637 (ALT 250 FOR IP): Performed by: THORACIC SURGERY (CARDIOTHORACIC VASCULAR SURGERY)

## 2023-03-22 PROCEDURE — 6370000000 HC RX 637 (ALT 250 FOR IP): Performed by: INTERNAL MEDICINE

## 2023-03-22 PROCEDURE — 2580000003 HC RX 258: Performed by: NURSE PRACTITIONER

## 2023-03-22 PROCEDURE — 97535 SELF CARE MNGMENT TRAINING: CPT

## 2023-03-22 PROCEDURE — 83930 ASSAY OF BLOOD OSMOLALITY: CPT

## 2023-03-22 RX ADMIN — ONDANSETRON 4 MG: 4 TABLET, ORALLY DISINTEGRATING ORAL at 15:13

## 2023-03-22 RX ADMIN — ASPIRIN 81 MG: 81 TABLET ORAL at 09:04

## 2023-03-22 RX ADMIN — PRAVASTATIN SODIUM 40 MG: 20 TABLET ORAL at 20:50

## 2023-03-22 RX ADMIN — TRAMADOL HYDROCHLORIDE 50 MG: 50 TABLET ORAL at 12:36

## 2023-03-22 RX ADMIN — SENNOSIDES AND DOCUSATE SODIUM 2 TABLET: 8.6; 5 TABLET ORAL at 09:04

## 2023-03-22 RX ADMIN — METFORMIN HYDROCHLORIDE 500 MG: 500 TABLET ORAL at 09:04

## 2023-03-22 RX ADMIN — SENNOSIDES AND DOCUSATE SODIUM 2 TABLET: 8.6; 5 TABLET ORAL at 20:50

## 2023-03-22 RX ADMIN — GABAPENTIN 300 MG: 300 CAPSULE ORAL at 20:50

## 2023-03-22 RX ADMIN — METOPROLOL TARTRATE 25 MG: 25 TABLET, FILM COATED ORAL at 20:49

## 2023-03-22 RX ADMIN — Medication 1 AMPULE: at 20:50

## 2023-03-22 RX ADMIN — AMIODARONE HYDROCHLORIDE 400 MG: 200 TABLET ORAL at 09:03

## 2023-03-22 RX ADMIN — OXYCODONE AND ACETAMINOPHEN 1 TABLET: 10; 325 TABLET ORAL at 09:03

## 2023-03-22 RX ADMIN — FAMOTIDINE 20 MG: 20 TABLET ORAL at 20:49

## 2023-03-22 RX ADMIN — METFORMIN HYDROCHLORIDE 500 MG: 500 TABLET ORAL at 17:14

## 2023-03-22 RX ADMIN — OXYCODONE AND ACETAMINOPHEN 1 TABLET: 10; 325 TABLET ORAL at 05:22

## 2023-03-22 RX ADMIN — INSULIN LISPRO 2 UNITS: 100 INJECTION, SOLUTION INTRAVENOUS; SUBCUTANEOUS at 12:30

## 2023-03-22 RX ADMIN — OXYCODONE AND ACETAMINOPHEN 1 TABLET: 10; 325 TABLET ORAL at 20:50

## 2023-03-22 RX ADMIN — GABAPENTIN 300 MG: 300 CAPSULE ORAL at 09:03

## 2023-03-22 RX ADMIN — Medication 15 G: at 12:30

## 2023-03-22 RX ADMIN — Medication 6 MG: at 20:50

## 2023-03-22 RX ADMIN — APIXABAN 5 MG: 5 TABLET, FILM COATED ORAL at 20:49

## 2023-03-22 RX ADMIN — SODIUM CHLORIDE, PRESERVATIVE FREE 10 ML: 5 INJECTION INTRAVENOUS at 20:50

## 2023-03-22 RX ADMIN — BISACODYL 10 MG: 10 SUPPOSITORY RECTAL at 15:14

## 2023-03-22 RX ADMIN — LISINOPRIL 10 MG: 5 TABLET ORAL at 09:03

## 2023-03-22 RX ADMIN — FAMOTIDINE 20 MG: 20 TABLET ORAL at 09:04

## 2023-03-22 RX ADMIN — SODIUM CHLORIDE, PRESERVATIVE FREE 10 ML: 5 INJECTION INTRAVENOUS at 09:03

## 2023-03-22 RX ADMIN — Medication: at 09:05

## 2023-03-22 RX ADMIN — Medication 1 AMPULE: at 09:03

## 2023-03-22 RX ADMIN — METOPROLOL TARTRATE 25 MG: 25 TABLET, FILM COATED ORAL at 09:04

## 2023-03-22 RX ADMIN — APIXABAN 5 MG: 5 TABLET, FILM COATED ORAL at 09:04

## 2023-03-22 RX ADMIN — MAGNESIUM HYDROXIDE 30 ML: 2400 SUSPENSION ORAL at 20:50

## 2023-03-22 RX ADMIN — AMIODARONE HYDROCHLORIDE 400 MG: 200 TABLET ORAL at 20:50

## 2023-03-22 RX ADMIN — GABAPENTIN 300 MG: 300 CAPSULE ORAL at 12:33

## 2023-03-22 RX ADMIN — POLYETHYLENE GLYCOL 3350 17 G: 17 POWDER, FOR SOLUTION ORAL at 15:14

## 2023-03-22 ASSESSMENT — PAIN SCALES - GENERAL
PAINLEVEL_OUTOF10: 7
PAINLEVEL_OUTOF10: 5
PAINLEVEL_OUTOF10: 0
PAINLEVEL_OUTOF10: 7
PAINLEVEL_OUTOF10: 1
PAINLEVEL_OUTOF10: 9
PAINLEVEL_OUTOF10: 3
PAINLEVEL_OUTOF10: 7

## 2023-03-22 ASSESSMENT — PAIN DESCRIPTION - LOCATION
LOCATION: CHEST;INCISION
LOCATION: CHEST

## 2023-03-22 ASSESSMENT — PAIN DESCRIPTION - DESCRIPTORS
DESCRIPTORS: ACHING
DESCRIPTORS: ACHING
DESCRIPTORS: ACHING;SORE
DESCRIPTORS: SORE

## 2023-03-22 ASSESSMENT — PAIN DESCRIPTION - PAIN TYPE
TYPE: SURGICAL PAIN
TYPE: SURGICAL PAIN

## 2023-03-22 ASSESSMENT — PAIN DESCRIPTION - ORIENTATION
ORIENTATION: ANTERIOR
ORIENTATION: MID
ORIENTATION: ANTERIOR
ORIENTATION: ANTERIOR;MID

## 2023-03-22 ASSESSMENT — PAIN - FUNCTIONAL ASSESSMENT
PAIN_FUNCTIONAL_ASSESSMENT: ACTIVITIES ARE NOT PREVENTED

## 2023-03-22 ASSESSMENT — PAIN DESCRIPTION - ONSET: ONSET: ON-GOING

## 2023-03-22 ASSESSMENT — PAIN DESCRIPTION - FREQUENCY: FREQUENCY: INTERMITTENT

## 2023-03-23 ENCOUNTER — APPOINTMENT (OUTPATIENT)
Dept: GENERAL RADIOLOGY | Age: 68
DRG: 217 | End: 2023-03-23
Payer: MEDICARE

## 2023-03-23 VITALS
RESPIRATION RATE: 20 BRPM | HEART RATE: 71 BPM | BODY MASS INDEX: 37.49 KG/M2 | SYSTOLIC BLOOD PRESSURE: 112 MMHG | DIASTOLIC BLOOD PRESSURE: 65 MMHG | TEMPERATURE: 97.9 F | WEIGHT: 276.8 LBS | HEIGHT: 72 IN | OXYGEN SATURATION: 98 %

## 2023-03-23 LAB
ANION GAP SERPL CALC-SCNC: 3 MMOL/L (ref 2–11)
BUN SERPL-MCNC: 22 MG/DL (ref 8–23)
CALCIUM SERPL-MCNC: 8.8 MG/DL (ref 8.3–10.4)
CHLORIDE SERPL-SCNC: 96 MMOL/L (ref 101–110)
CO2 SERPL-SCNC: 29 MMOL/L (ref 21–32)
CREAT SERPL-MCNC: 0.8 MG/DL (ref 0.8–1.5)
GLUCOSE BLD STRIP.AUTO-MCNC: 130 MG/DL (ref 65–100)
GLUCOSE BLD STRIP.AUTO-MCNC: 139 MG/DL (ref 65–100)
GLUCOSE SERPL-MCNC: 130 MG/DL (ref 65–100)
POTASSIUM SERPL-SCNC: 5 MMOL/L (ref 3.5–5.1)
SERVICE CMNT-IMP: ABNORMAL
SERVICE CMNT-IMP: ABNORMAL
SODIUM SERPL-SCNC: 128 MMOL/L (ref 133–143)

## 2023-03-23 PROCEDURE — 6370000000 HC RX 637 (ALT 250 FOR IP): Performed by: INTERNAL MEDICINE

## 2023-03-23 PROCEDURE — 6370000000 HC RX 637 (ALT 250 FOR IP): Performed by: THORACIC SURGERY (CARDIOTHORACIC VASCULAR SURGERY)

## 2023-03-23 PROCEDURE — 6360000002 HC RX W HCPCS: Performed by: PHYSICIAN ASSISTANT

## 2023-03-23 PROCEDURE — 97110 THERAPEUTIC EXERCISES: CPT

## 2023-03-23 PROCEDURE — 82962 GLUCOSE BLOOD TEST: CPT

## 2023-03-23 PROCEDURE — 6370000000 HC RX 637 (ALT 250 FOR IP): Performed by: NURSE PRACTITIONER

## 2023-03-23 PROCEDURE — 2580000003 HC RX 258: Performed by: NURSE PRACTITIONER

## 2023-03-23 PROCEDURE — 97530 THERAPEUTIC ACTIVITIES: CPT

## 2023-03-23 PROCEDURE — 6370000000 HC RX 637 (ALT 250 FOR IP): Performed by: PHYSICIAN ASSISTANT

## 2023-03-23 PROCEDURE — 80048 BASIC METABOLIC PNL TOTAL CA: CPT

## 2023-03-23 PROCEDURE — 74018 RADEX ABDOMEN 1 VIEW: CPT

## 2023-03-23 PROCEDURE — 36415 COLL VENOUS BLD VENIPUNCTURE: CPT

## 2023-03-23 RX ORDER — OXYCODONE AND ACETAMINOPHEN 7.5; 325 MG/1; MG/1
1 TABLET ORAL EVERY 6 HOURS PRN
Qty: 12 TABLET | Refills: 0
Start: 2023-03-23 | End: 2023-03-28

## 2023-03-23 RX ORDER — FUROSEMIDE 40 MG/1
40 TABLET ORAL 2 TIMES DAILY
Qty: 14 TABLET | Refills: 0
Start: 2023-03-23 | End: 2023-03-30

## 2023-03-23 RX ORDER — POLYETHYLENE GLYCOL 3350 17 G/17G
17 POWDER, FOR SOLUTION ORAL DAILY PRN
Qty: 527 G | Refills: 1 | COMMUNITY
Start: 2023-03-23 | End: 2023-04-22

## 2023-03-23 RX ORDER — ACETAMINOPHEN 325 MG/1
650 TABLET ORAL EVERY 6 HOURS PRN
Qty: 120 TABLET | Refills: 3 | COMMUNITY
Start: 2023-03-23

## 2023-03-23 RX ORDER — NITROGLYCERIN 0.4 MG/1
0.4 TABLET SUBLINGUAL EVERY 5 MIN PRN
Qty: 25 TABLET | Refills: 3
Start: 2023-03-23

## 2023-03-23 RX ORDER — AMIODARONE HYDROCHLORIDE 200 MG/1
TABLET ORAL
Qty: 42 TABLET | Refills: 0
Start: 2023-03-23 | End: 2023-04-20

## 2023-03-23 RX ORDER — PRAVASTATIN SODIUM 40 MG
40 TABLET ORAL NIGHTLY
Qty: 90 TABLET | Refills: 3
Start: 2023-03-23

## 2023-03-23 RX ORDER — LISINOPRIL 10 MG/1
10 TABLET ORAL DAILY
Qty: 30 TABLET | Refills: 2
Start: 2023-03-23

## 2023-03-23 RX ORDER — ASPIRIN 81 MG/1
81 TABLET ORAL DAILY
Qty: 30 TABLET | Refills: 3 | COMMUNITY
Start: 2023-03-24

## 2023-03-23 RX ADMIN — FUROSEMIDE 40 MG: 10 INJECTION, SOLUTION INTRAMUSCULAR; INTRAVENOUS at 10:51

## 2023-03-23 RX ADMIN — SENNOSIDES AND DOCUSATE SODIUM 2 TABLET: 8.6; 5 TABLET ORAL at 10:58

## 2023-03-23 RX ADMIN — ASPIRIN 81 MG: 81 TABLET ORAL at 10:50

## 2023-03-23 RX ADMIN — METFORMIN HYDROCHLORIDE 500 MG: 500 TABLET ORAL at 13:22

## 2023-03-23 RX ADMIN — SODIUM CHLORIDE, PRESERVATIVE FREE 10 ML: 5 INJECTION INTRAVENOUS at 10:51

## 2023-03-23 RX ADMIN — Medication: at 10:58

## 2023-03-23 RX ADMIN — APIXABAN 5 MG: 5 TABLET, FILM COATED ORAL at 10:50

## 2023-03-23 RX ADMIN — ACETAMINOPHEN 650 MG: 325 TABLET ORAL at 15:00

## 2023-03-23 RX ADMIN — AMIODARONE HYDROCHLORIDE 400 MG: 200 TABLET ORAL at 10:50

## 2023-03-23 RX ADMIN — OXYCODONE AND ACETAMINOPHEN 1 TABLET: 10; 325 TABLET ORAL at 10:59

## 2023-03-23 RX ADMIN — FAMOTIDINE 20 MG: 20 TABLET ORAL at 10:50

## 2023-03-23 RX ADMIN — POLYETHYLENE GLYCOL 3350 17 G: 17 POWDER, FOR SOLUTION ORAL at 10:49

## 2023-03-23 RX ADMIN — OXYCODONE AND ACETAMINOPHEN 1 TABLET: 10; 325 TABLET ORAL at 04:01

## 2023-03-23 RX ADMIN — ONDANSETRON 4 MG: 4 TABLET, ORALLY DISINTEGRATING ORAL at 06:51

## 2023-03-23 RX ADMIN — METOPROLOL TARTRATE 25 MG: 25 TABLET, FILM COATED ORAL at 10:59

## 2023-03-23 RX ADMIN — Medication 1 AMPULE: at 10:49

## 2023-03-23 RX ADMIN — GABAPENTIN 300 MG: 300 CAPSULE ORAL at 13:21

## 2023-03-23 RX ADMIN — BISACODYL 10 MG: 10 SUPPOSITORY RECTAL at 10:52

## 2023-03-23 RX ADMIN — Medication 15 G: at 13:26

## 2023-03-23 RX ADMIN — LISINOPRIL 10 MG: 5 TABLET ORAL at 10:51

## 2023-03-23 ASSESSMENT — PAIN DESCRIPTION - DESCRIPTORS
DESCRIPTORS: SORE
DESCRIPTORS: SORE
DESCRIPTORS: ACHING

## 2023-03-23 ASSESSMENT — PAIN DESCRIPTION - LOCATION
LOCATION: CHEST
LOCATION: CHEST;INCISION
LOCATION: CHEST

## 2023-03-23 ASSESSMENT — PAIN SCALES - GENERAL
PAINLEVEL_OUTOF10: 9
PAINLEVEL_OUTOF10: 7
PAINLEVEL_OUTOF10: 5
PAINLEVEL_OUTOF10: 7

## 2023-03-23 ASSESSMENT — PAIN DESCRIPTION - ORIENTATION
ORIENTATION: MID
ORIENTATION: ANTERIOR
ORIENTATION: ANTERIOR;MID

## 2023-03-23 NOTE — DISCHARGE INSTRUCTIONS
All patients with prosthetic valves are considered among those at highest risk for endocarditis (infection of a heart valve). It is important to maintain good oral health care with regular use of a manual or powered toothbrush, dental floss or other plaque-removing devices. The risk of an infection on the heart valve is generally the highest with dental procedures which includes routine dental cleaning. You will need to take antibiotics before most dental procedures or oral surgery. You will also need antibiotics before procedures such as a having tonsils removed or procedures involving the lungs. If you require surgery for a skin infection, you will also need antibiotics to prevent infection of a heart valve. Please contact your cardiologist or primary care provider for further instructions.

## 2023-03-23 NOTE — PROGRESS NOTES
A follow up visit was made to the patient. Emotional support, spiritual presence and   prayer were provided for the patient.       Verona Lopez, [address] [facility], [facility]  
ACUTE OCCUPATIONAL THERAPY GOALS:   (Developed with and agreed upon by patient and/or caregiver.)  1. Patient will complete lower body bathing and dressing with minimal assistance and adaptive equipment as needed. 2. Patient will complete toileting with minimal assistance. 3. Patient will tolerate 25 minutes of OT treatment with 1-2 rest breaks to increase activity tolerance for ADLs. 4. Patient will complete functional transfers with CGA following sternal precautions and adaptive equipment as needed. 5. Patient will complete functional mobility for household distances with CGA and appropriate safety awareness. 6. Patient will tolerate standing sink side for grooming with CGA to improve standing tolerance for ADL. Timeframe: 7 visits       OCCUPATIONAL THERAPY Initial Assessment, Daily Note, and AM       OT Visit Days: 1  Acknowledge Orders  Time  OT Charge Capture  Rehab Caseload Tracker      Marc David Sr. is a 69 y.o. male   PRIMARY DIAGNOSIS: S/P CABG x 2  A-fib ([facility] [address] [I48.91]  New onset atrial fibrillation (HCC) [I48.91]  Manic behavior (HCC) [F30.10]  Procedure(s) (LRB):  CORONARY ARTERY BYPASS GRAFT (CABG X 2, LIMA; ENDOSCOPIC VEIN HARVEST,LEFT GREATER SAPHENOUS VEIN; LEFT ATRIAL APPENDAGE CLIPPING; AORTIC VALVE REPLACEMENT; MAZE (N/A)  TRANSESOPHAGEAL ECHOCARDIOGRAM (N/A)  2 Days Post-Op  Reason for Referral: Generalized Muscle Weakness (M62.81)  Other lack of cordination (R27.8)  History of falling (Z91.81)  Inpatient: Payor: Padmaja Knapp / Plan: Cinderella Case / Product Type: *No Product type* /     ASSESSMENT:     REHAB RECOMMENDATIONS:   Recommendation to date pending progress:  Setting:  Short-term Rehab    Equipment:    To Be Determined     ASSESSMENT:  Mr. Tati Martinez presents s/p CABG X 2 in the CVICU. Pt was resting in the recliner chair upon arrival. Pt is able to answer questions appropriately.  Pt lives with grandchildren at baseline and is typically modified
ACUTE OCCUPATIONAL THERAPY GOALS:   (Developed with and agreed upon by patient and/or caregiver.)  1. Patient will complete lower body bathing and dressing with minimal assistance and adaptive equipment as needed. 2. Patient will complete toileting withSBA   3. Patient will tolerate 25 minutes of OT treatment with 1-2 rest breaks to increase activity tolerance for ADLs. 4. Patient will complete functional transfers with  SBA following sternal precautions and adaptive equipment as needed. 5. Patient will complete functional mobility for household distances with SBA and appropriate safety awareness. 6. Patient will tolerate standing sink side for grooming independently to improve standing tolerance for ADL. Timeframe: 7 visits        OCCUPATIONAL THERAPY: Daily Note AM   OT Visit Days: 2   Time In/Out  OT Charge Capture  Rehab Caseload Tracker  OT Orders    Marc Sifuentes  is a 69 y.o. male   PRIMARY DIAGNOSIS: S/P CABG x 2  A-fib ([address] [I48.91]  New onset atrial fibrillation (HCC) [I48.91]  Manic behavior (HCC) [F30.10]  Procedure(s) (LRB):  CORONARY ARTERY BYPASS GRAFT (CABG X 2, LIMA; ENDOSCOPIC VEIN HARVEST,LEFT GREATER SAPHENOUS VEIN; LEFT ATRIAL APPENDAGE CLIPPING; AORTIC VALVE REPLACEMENT; MAZE (N/A)  TRANSESOPHAGEAL ECHOCARDIOGRAM (N/A)  6 Days Post-Op  Inpatient: Payor: [facility] MEDICARE / Plan: Marctravis Pop / Product Type: *No Product type* /     ASSESSMENT:     REHAB RECOMMENDATIONS: CURRENT LEVEL OF FUNCTION:  (Most Recently Demonstrated)   Recommendation to date pending progress:  Setting:  Short-term Rehab    Equipment:    None Bathing:  Not Tested  Dressing:  Not Tested  Feeding/Grooming:  Contact Guard Assist  Toileting:  Contact Guard Assist  Functional Mobility:  Contact Guard Assist     ASSESSMENT:  Mr. Dozier Brought demonstrated grooming at sink, toileting, and functional mobility for ADLs with CGA using RW. Min cues to maintain sternal precautions.  Pt endorsed fatigue
ACUTE PHYSICAL THERAPY GOALS:   (Developed with and agreed upon by patient and/or caregiver. )  LTG:  (1.)Mr. Dariusz Claros will move from supine to sit and sit to supine , scoot up and down and roll side to side in flat bed without siderails with  MINIMAL ASSIST within 7 day(s). (2.)Mr. Dariusz Claros will perform all functional transfers with  CONTACT GUARD ASSIST using the least restrictive/no device within 7 day(s). (3.)Mr. Dariusz Claros will ambulate with  CONTACT GUARD ASSIST for 150+ feet with normal vital sign response with the least restrictive device within 7 day(s). (4.)Mr. Dariusz Claros will ambulate up/down 1 steps with bilateral railing with  CONTACT GUARD ASSIST with no device within  7  day(s). PHYSICAL THERAPY: Daily Note AM   (Link to Caseload Tracking: PT Visit Days : 5  Time In/Out PT Charge Capture  Rehab Caseload Tracker  Orders    Marc Perkins is a 69 y.o. male   PRIMARY DIAGNOSIS: S/P CABG x 2  A-fib ([address] [I48.91]  New onset atrial fibrillation (HCC) [I48.91]  Manic behavior (HCC) [F30.10]  Procedure(s) (LRB):  CORONARY ARTERY BYPASS GRAFT (CABG X 2, LIMA; ENDOSCOPIC VEIN HARVEST,LEFT GREATER SAPHENOUS VEIN; LEFT ATRIAL APPENDAGE CLIPPING; AORTIC VALVE REPLACEMENT; MAZE (N/A)  TRANSESOPHAGEAL ECHOCARDIOGRAM (N/A)  5 Days Post-Op  Inpatient: Payor: [facility] MEDICARE / Plan: Jc Steiner / Product Type: *No Product type* /     ASSESSMENT:     REHAB RECOMMENDATIONS:   Recommendation to date pending progress:  Setting:  Short-term Rehab  Pending progress and length of stay    Equipment:    To Be Determined  RW??     ASSESSMENT:  Mr. Dariusz Claros was sitting in recliner, agreeable to therapy. He stood  with assistance using the momentum method  to the walker and ambulated x 90 feet total with 2 sitting rest breaks. He ambulates with slightly flexed posture and slow pace and leans on the walker for support.   He  is assisted to the bathroom and then back to the recliner, after resting he performed
ACUTE PHYSICAL THERAPY GOALS:   (Developed with and agreed upon by patient and/or caregiver. )  LTG:  (1.)Mr. Ivis Odonnell will move from supine to sit and sit to supine , scoot up and down and roll side to side in flat bed without siderails with  MINIMAL ASSIST within 7 day(s). (2.)Mr. Ivis Odonnell will perform all functional transfers with  CONTACT GUARD ASSIST using the least restrictive/no device within 7 day(s). (3.)Mr. Ivis Odonnell will ambulate with  CONTACT GUARD ASSIST for 150+ feet with normal vital sign response with the least restrictive device within 7 day(s). (4.)Mr. Ivis Odonnell will ambulate up/down 1 steps with bilateral railing with  CONTACT GUARD ASSIST with no device within  7  day(s). PHYSICAL THERAPY: Daily Note AM   (Link to Caseload Tracking: PT Visit Days : 6  Time In/Out PT Charge Capture  Rehab Caseload Tracker  Orders    Tommy Elva Osgood. is a 69 y.o. male   PRIMARY DIAGNOSIS: S/P CABG x 2  A-fib ([address] [I48.91]  New onset atrial fibrillation (HCC) [I48.91]  Manic behavior (HCC) [F30.10]  Procedure(s) (LRB):  CORONARY ARTERY BYPASS GRAFT (CABG X 2, LIMA; ENDOSCOPIC VEIN HARVEST,LEFT GREATER SAPHENOUS VEIN; LEFT ATRIAL APPENDAGE CLIPPING; AORTIC VALVE REPLACEMENT; MAZE (N/A)  TRANSESOPHAGEAL ECHOCARDIOGRAM (N/A)  6 Days Post-Op  Inpatient: Payor: [facility] MEDICARE / Plan: Levora Pretty / Product Type: *No Product type* /     ASSESSMENT:     REHAB RECOMMENDATIONS:   Recommendation to date pending progress:  Setting:  Short-term Rehab  Pending progress and length of stay    Equipment:    To Be Determined  RW??     ASSESSMENT:  Mr. Ivis Odonnell is currently working with OT , agreeable to therapy. Gait training with  rolling walker x 95 feet with slow felicita, he is returned to the recliner and stood again to go to the sink  then back to the recliner and performed exercises Felicita is  slow pace and leans on the walker for support, still  needs assist for safety.   Good session and progress
ACUTE PHYSICAL THERAPY GOALS:   (Developed with and agreed upon by patient and/or caregiver. )  LTG:  (1.)Mr. Makenna Berry will move from supine to sit and sit to supine , scoot up and down and roll side to side in flat bed without siderails with  MINIMAL ASSIST within 7 day(s). (2.)Mr. Makenna Berry will perform all functional transfers with  CONTACT GUARD ASSIST using the least restrictive/no device within 7 day(s). (3.)Mr. Makenna Berry will ambulate with  CONTACT GUARD ASSIST for 150+ feet with normal vital sign response with the least restrictive device within 7 day(s). (4.)Mr. Makenna Berry will ambulate up/down 1 steps with bilateral railing with  CONTACT GUARD ASSIST with no device within  7  day(s). PHYSICAL THERAPY: Daily Note AM   (Link to Caseload Tracking: PT Visit Days : 4  Time In/Out PT Charge Capture  Rehab Caseload Tracker  Orders    Marc Ceron is a 69 y.o. male   PRIMARY DIAGNOSIS: S/P CABG x 2  A-fib ([address] [I48.91]  New onset atrial fibrillation (HCC) [I48.91]  Manic behavior (HCC) [F30.10]  Procedure(s) (LRB):  CORONARY ARTERY BYPASS GRAFT (CABG X 2, LIMA; ENDOSCOPIC VEIN HARVEST,LEFT GREATER SAPHENOUS VEIN; LEFT ATRIAL APPENDAGE CLIPPING; AORTIC VALVE REPLACEMENT; MAZE (N/A)  TRANSESOPHAGEAL ECHOCARDIOGRAM (N/A)  4 Days Post-Op  Inpatient: Payor: [facility] MEDICARE / Plan: Andria Reynaga / Product Type: *No Product type* /     ASSESSMENT:     REHAB RECOMMENDATIONS:   Recommendation to date pending progress:  Setting:  Short-term Rehab  Pending progress and length of stay    Equipment:    To Be Determined  RW??     ASSESSMENT:  Mr. Makenna Berry was sitting in recliner, agreeable to therapy. He stood and was able to increase his ambulation distance using rolling walker and CGA. He took seated rest breaks as needed. He ambulates with slightly flexed posture and slow pace. He sat in recliner and performed below LE exercises and was left up with needs in reach. Good progress towards goals.   Will
ACUTE PHYSICAL THERAPY GOALS:   (Developed with and agreed upon by patient and/or caregiver. )  LTG:  (1.)Mr. Vannessa Law will move from supine to sit and sit to supine , scoot up and down and roll side to side in flat bed without siderails with  MINIMAL ASSIST within 7 day(s). (2.)Mr. Vannessa Law will perform all functional transfers with  CONTACT GUARD ASSIST using the least restrictive/no device within 7 day(s). (3.)Mr. Vannessa Law will ambulate with  CONTACT GUARD ASSIST for 150+ feet with normal vital sign response with the least restrictive device within 7 day(s). (4.)Mr. Vannessa Law will ambulate up/down 1 steps with bilateral railing with  CONTACT GUARD ASSIST with no device within  7  day(s). PHYSICAL THERAPY: Daily Note AM   (Link to Caseload Tracking: PT Visit Days : 3  Time In/Out PT Charge Capture  Rehab Caseload Tracker  Orders    Marc Hernandez is a 69 y.o. male   PRIMARY DIAGNOSIS: S/P CABG x 2  A-fib ([address] [I48.91]  New onset atrial fibrillation (HCC) [I48.91]  Manic behavior (HCC) [F30.10]  Procedure(s) (LRB):  CORONARY ARTERY BYPASS GRAFT (CABG X 2, LIMA; ENDOSCOPIC VEIN HARVEST,LEFT GREATER SAPHENOUS VEIN; LEFT ATRIAL APPENDAGE CLIPPING; AORTIC VALVE REPLACEMENT; MAZE (N/A)  TRANSESOPHAGEAL ECHOCARDIOGRAM (N/A)  3 Days Post-Op  Inpatient: Payor: [facility] MEDICARE / Plan: Yonny Winters / Product Type: *No Product type* /     ASSESSMENT:     REHAB RECOMMENDATIONS:   Recommendation to date pending progress:  Setting:  Short-term Rehab  Pending progress and length of stay    Equipment:    To Be Determined  RW??     ASSESSMENT:  Mr. Vannessa Law was sitting in recliner and is agreeable to therapy. He stood and ambulated on RA 25' then rested in chair. He ambulated another 30' using rolling walker and CGA/min assist.  His sats ranged 92-97% throughout session. He performed a few LE exercises while up in chair and was left up in chair with needs in reach.   He does fatigue quickly with
Admit Date: 3/11/2023      Subjective:   F/u on hyponatremia        Review of Systems  Cardio-vascular: no chest pain, no SOB  GI: no N/V/D  : no dysuria, no hematuria    Objective:     Patient Vitals for the past 8 hrs:   BP Temp Temp src Pulse Resp SpO2 Weight   03/23/23 0700 110/80 98.2 °F (36.8 °C) Oral 52 19 96 % --   03/23/23 0614 -- -- -- -- -- -- 276 lb 12.8 oz (125.6 kg)   03/23/23 0431 -- -- -- -- 18 -- --   03/23/23 0401 -- -- -- -- 18 -- --   03/23/23 0323 (!) 113/55 98.2 °F (36.8 °C) -- 71 18 91 % --     03/23 0701 - 03/23 1900  In: 325 [P.O.:325]  Out: -       Physical Exam:     Not in TIKI  Not jaundice   Lung: poor expansion, decreased BS vannessa. CV: RR, no M, norub  Abd: soft , not tender  Ext: +++ edema            Data Review   Recent Results (from the past 8 hour(s))   Basic Metabolic Panel    Collection Time: 03/23/23  5:35 AM   Result Value Ref Range    Sodium 128 (L) 133 - 143 mmol/L    Potassium 5.0 3.5 - 5.1 mmol/L    Chloride 96 (L) 101 - 110 mmol/L    CO2 29 21 - 32 mmol/L    Anion Gap 3 2 - 11 mmol/L    Glucose 130 (H) 65 - 100 mg/dL    BUN 22 8 - 23 MG/DL    Creatinine 0.80 0.8 - 1.5 MG/DL    Est, Glom Filt Rate >60 >60 ml/min/1.73m2    Calcium 8.8 8.3 - 10.4 MG/DL   POCT Glucose    Collection Time: 03/23/23  6:24 AM   Result Value Ref Range    POC Glucose 130 (H) 65 - 100 mg/dL    Performed by:  Angela            Assessment:     Principal Problem:    S/P CABG x 2  Active Problems:    Precordial pain    Bipolar disorder (HCC)    Chronic venous hypertension (idiopathic) with other complications of bilateral lower extremity    Paroxysmal atrial fibrillation ([address]    New onset atrial fibrillation (HCC)    Atelectasis    Encounter for weaning from ventilator ([facility] [address]    S/P AVR (aortic valve replacement)    Coronary artery disease involving native coronary artery of native heart with unstable angina pectoris ([facility] [address]    Uncontrolled type 2 diabetes mellitus with hyperglycemia ([address]
All questions answered regarding surgery. Hibiclens bath completed last night and this AM.  Bactroban to nares. NPO p [place]. Blood Consent signed and placed on chart. Procedure consent to be signed in Pre-op. Pt has viewed pre-op video. Blood verified with blood bank. Pre-op checklist completed. VSS with BP assessed in both arms. Height and weight updated on chart. AM SQBS 150's. Pt demonstrated proper use of Incentive Spirometry and encouraged to use hourly. Pre-op meds administered per MAR. Ancef sent to pre-op with chart. Allevyn placed on sacrum for prevention only per protocol    Pt to be transported on 3L NC to OR. Personal belongings including  suitcase given to family. Family to leave and come back later.
CH received call from PT . PT was pleased to have received card from [facility] who attempted to visit. PT requested visit . As [facility] had left for the day [address] [place] [address] checked on PT. PT shared about health. PT expressed importance of selena and prayer. PT is a . [address] [place] [address] and PT prayed together. [address] [place] [address] checked for unmet needs and offered support. Rev. Nicholas Isabel M.Div.   
[facility] CARDIOLOGY PROGRESS NOTE           3/22/2023 8:44 AM    Admit Date: 3/11/2023      Subjective:   Patient reports some shortness of breath overnight. Lower extremity swelling continues but is improved with elevation he reports. Continues to have pain at the sternotomy incision site no drainage. Denies abdominal pain, nausea, vomiting. No other complaints at this time. ROS:  Cardiovascular:  As noted above    Objective:      Vitals:    03/22/23 0340 03/22/23 0552 03/22/23 0633 03/22/23 0711   BP: (!) 143/71   127/69   Pulse: 76   72   Resp: 16 16  16   Temp: 98.7 °F (37.1 °C)   97.7 °F (36.5 °C)   TempSrc:    Oral   SpO2: 95%   99%   Weight:   279 lb (126.6 kg)    Height:           Physical Exam:  General-No Acute Distress, awake, alert, interactive  Neck- supple, no JVD  CV- regular rate and rhythm no MRG  Lung-nonlabored, mild rales at the bases bilaterally  Abd- soft, nontender, nondistended  Ext-1+ leg edema bilaterally. Skin- warm and dry    Data Review:   Recent Labs     03/20/23  0548 03/21/23  0559 03/22/23  0539   * 124* 124*   K 4.5 5.2*  5.2* 4.9   MG 2.4  --  2.3   BUN 23 22 19   WBC 9.3  --   --    HGB 10.2*  --   --    HCT 31.8*  --   --      --   --        Assessment/Plan:       S/P CABG x 2  -CABG/AVR/left atrial appendage clipping/maze from 3/15/2023.  -Preop echocardiogram with preserved EF.  -Continue aspirin, atorvastatin. Prior reported allergies to high intensity Crestor. Hypertension  -Some elevated BPs; usually on Norvasc 10 mg daily/lisinopril 20 mg daily in the outpatient setting. Increased lisinopril to 10 mg daily 3/19/2023; can titrate further as needed.  -Hold Norvasc as can exacerbate chronic lower extremity edema  -Titrate lisinopril further if blood pressure remains elevated. Chronic venous hypertension (idiopathic) with other complications of bilateral lower extremity  - Noted chronic stasis changes.   Was initially
CTS-pt is now undecided if he wants to have heart surgery. He states he would really like to avoid it. He agrees to proceed with vein mapping. Rich Lindsey.  MUNIR Casey
Call placed to the waiting room for family to come back to CVICU. No family present at this time. Will place another call for family shortly.
Case management following for d/c planning. LOS Day 10    Per report, patient wishes to complete family appeal for [facility]. Awaiting appeal decision. CM will continue to follow.
Comprehensive Nutrition Assessment    Type and Reason for Visit: Initial, RD Nutrition Re-Screen/LOS  Length of Stay    Nutrition Recommendations/Plan:   Meals and Snacks:  Diet: Add CCHO feature     Malnutrition Assessment:  Malnutrition Status: No malnutrition     Nutrition Assessment:  Nutrition History: Reports eating 3-4 small meals a day, like sandwiches and bagels. Started drinking Premier protein 8 months ago, will have 2-3 a day and sometimes use them as meal replacements. Pt expressed wanting to weigh 235#, no recent wt loss. OV wt hx:   2/14/23: 264# (primary care)   1/11/23: 254# (primary care)           Nutrition Background:       PMH CAD s/p multiple PCI with most recent in 2010, HTN, Hypercholesterolemia, DM2, COPD, former smoker and bipolar disorder. Presented  with chest pain. CXR showed mild right basilar atelectasis/infiltrate. EKG showed atrial fibrillation with RVR. Admitted with new onset atrial fibrillation. 3/13 left heart catheterization showed severe multivessel disease. 3/15 CABG x2, AVR. Post-op course complicated by afib    Nutrition Interval:  3/13: per wound care RN: Patient seen for left lower leg wounds. Noted 2 small pink and dry wounds along tibia. Both legs swollen and light red. Patient wants ACE wraps to be completed. He has lymphedema wraps at home. Wounds both less than 1cm and no drainage. 3/22 fluid intake restricted to 1500 mL per nephrology d/t hyponatremia    Patient seen reclined in bed by intern. Patient seemed drowsy throughout the visit. Reports eating 0% of lunch because he did not feel well. Ate 100% of breakfast this AM and 100% of dinner last night. Current Nutrition Therapies:  ADULT DIET;  Regular; Low Fat/Low Chol/High Fiber/2 gm Na; 1500 ml    Current Intake:   Average Meal Intake: % (for 15 recorded meals since admission)        Anthropometric Measures:  Height: 6' (182.9 cm)  Current Body Wt: 279 lb (126.6 kg) (3/22), Weight source:
Daughter and son are very concerned about patients psychiatric health. Patient has had multiple hospitalizations for bipolar episodes, most recently in our ED. She states he is very good at covering up his episodes and also at pocketing his risperidone and not taking it, as he says it makes him too drowsy. They would like him to be evaluated by psych and would also like to speak to someone about medication alternatives for the risperidone.  Will pass on to day RN for further evaluation by MD.
[facility] CARDIOLOGY PROGRESS NOTE           3/13/2023 8:16 AM    Admit Date: 3/11/2023      Subjective:   Patient remains in sinus rhythm. No recurrent chest pain. On IV heparin. ROS:  Cardiovascular:  As noted above    Objective:      Vitals:    03/12/23 2103 03/12/23 2332 03/13/23 0454 03/13/23 0455   BP: (!) 154/78 (!) 104/55 (!) 165/65 (!) 145/75   Pulse: 71 81 81 80   Resp:  17 19    Temp: 98.1 °F (36.7 °C) 99.1 °F (37.3 °C) 97.7 °F (36.5 °C)    TempSrc: Oral Axillary Oral    SpO2: 99% 96% 98%    Weight:   265 lb 9.6 oz (120.5 kg)    Height:           Physical Exam:  General-No Acute Distress  Neck- supple, no JVD  CV- regular rate and rhythm with grade I/VI BONNIE  Lung- clear bilaterally  Abd- soft, nontender, nondistended  Ext- no edema bilaterally. Skin- warm and dry      Data Review:   Recent Labs     03/11/23  1855 03/10/23  2159 03/07/23  2249   WBC 9.2 8.0 8.1   HGB 12.8* 13.9 12.9*   HCT 38.3* 41.1 39.7*   MCV 89.7 88.0 90.6    182 181       Recent Labs     03/13/23  0142 03/11/23  1855    135   K 3.7 3.8    107   CO2 23 25   BUN 21 16   CREATININE 1.10 0.90   GLUCOSE 257* 210*   CALCIUM 8.0* 8.6   PROT  --  6.6   LABALBU  --  3.3   BILITOT  --  0.3   ALKPHOS  --  41*   AST  --  12*   ALT  --  17   LABGLOM >60 >60   GLOB  --  3.3       Recent Labs     03/11/23 1855   DDIMER 0.91*       Echo (3/12/23): Left Ventricle: Normal left ventricular systolic function with a visually estimated EF of 55 - 60%. Left ventricle size is normal. Mildly increased wall thickness. Normal wall motion. Abnormal diastolic function. Aortic Valve: Moderately calcified cusp. Moderate stenosis of the aortic valve. AV mean gradient is 19 mmHg. AV area by continuity VTI is 1.8 cm2. Mitral Valve: Mild annular calcification of the mitral valve. Mild regurgitation. Mild stenosis noted. Left Atrium: Left atrium is mildly dilated. Aorta: Mildly dilated ascending aorta.  Ao
I have examined the patient, no changes in patient's medical status. Necessity for procedure is still present and the H&P is still current.
It is with great ramón that we pray for your family today:          \" The Lucien Telloin is my rock,   My God is my rock, in whom I find protection. He is my shield, the power that saves me,  And my place of safety. \"    Huma Hernandez    In my distress I called upon your name    And my cry came directly into your ear. You were moved with compassion    As the one you love was in distress. You came down upon a cloud    And rescued me. I am forever grateful. Amen.
It is with great ramón we pray for your family today        \"  So then, everything depends, not on what we humans want or do,    But only on God's mercy. \"    Marcie Sears me in your shelter, be my refuge. I seek your strength as I take this step of selena. Brock Arvizu, there are things about this situation    That are uncertain, but although I can not see    What's before me,    I know you are with me.     Amen
[facility] CARDIOLOGY PROGRESS NOTE           3/20/2023 10:17 AM    Admit Date: 3/11/2023      Subjective:     Currently in atrial flutter with controlled rates. Some persistent incisional chest discomfort. On room air. Some chronic lower extremity edema per patient history. ~13L net neg    ROS:  Cardiovascular:  As noted above    Objective:      Vitals:    03/20/23 1015 03/20/23 1030 03/20/23 1103 03/20/23 1121   BP: 137/69 134/70  132/70   Pulse: 58 55  56   Resp:   18 16   Temp:    97.7 °F (36.5 °C)   TempSrc:    Oral   SpO2:    97%   Weight:       Height:           Physical Exam:  General-No Acute Distress  Neck- supple, no JVD  CV- irregular rate and rhythm no MRG  Lung- clear bilaterally  Abd- soft, nontender, nondistended  Ext- 1+ edema bilaterally; stasis changes. Skin- warm and dry    Data Review:   Recent Labs     03/19/23  0606 03/20/23  0548   * 124*   K 4.6 4.5   MG 2.4 2.4   BUN 18 23   WBC 11.2* 9.3   HGB 11.0* 10.2*   HCT 33.8* 31.8*    156         Assessment/Plan:     Principal Problem:    S/P CABG x 2  -CABG/AVR/left atrial appendage clipping/maze from 3/15/2023.  -Preop echocardiogram with preserved EF.  -Continue aspirin; currently on pravastatin moderate intensity and switch over to high intensity with atorvastatin. Prior reported allergies to high intensity Crestor. Hypertension  -Some elevated BPs; usually on Norvasc 10 mg daily/lisinopril 20 mg daily in the outpatient setting. Increased lisinopril to 10 mg daily 3/19/2023; can titrate further as needed.  -Hold off Norvasc as can exacerbate chronic lower extremity edema    Active Problems:    Precordial pain  -Incisional; stable      Bipolar disorder (HCC)      Chronic venous hypertension (idiopathic) with other complications of bilateral lower extremity  -Noted chronic stasis changes.   Was started on IV Lasix per pulmonology and hold off with worsening hyponatremia      Paroxysmal atrial fibrillation
Occupational Therapy Note:    Attempted to see patient this AM for occupational therapy treatment  session. Patient bed rest s/p \"wires coming out\" per cardiology. Will follow and re-attempt as schedule permits/patient available.      Thank you,    Eddie Sorenson, OT    Rehab Caseload Tracker
Patient complaining of continued constipation. Day RN gave multiple meds to no avail. Explained to patient that direct relationship between narcotics and constipation and that we may need to space out his pain medicine to help him have a bm. Other non narcotic pain medication and remedies discussed. Patient expressed understanding and willingness to try.
Patient heart rate sustaining 100-120. Diltiazem started per protocol.
Patient refused Risperdal, states that medication make his \"feel funny\" and he does not want to take it any longer. Patient educated on the benefits and risks of medication and reminded of his medical diagnoses related to medication. Will continue plan of care.
Patient s/p CABG. Intubated/sedated. CT Surgery primary. I will sign off. Re-consult as needed. No charge for this encounter.     Thanks,    Ashok Castaneda MD
Patient's controlled medications were provided to daughter of patient and she reported that she would take them home. Discussed that patient would need meds back following discharge. Daughter verbalized understanding of this and took ownership of controlled medications (Ultram and Gabapentin) taking them home.
Physical Therapy Note:    Attempted to see patient this PM for physical therapy treatment  session. Patient wanting to lay down in bed and maybe walk later. Will follow and re-attempt as schedule permits/patient available. Thank you,    Returned later to provide therapy therapy however the patient was sitting edge of bed and wanting to use the urinal, returned when the patient was finished and he stood to the walker and transferred tot he recliner with min to mod assist , the patient thought  he could walk further but he was not capable and did sit prematurely. No charge to the patient.     Denise Choe, hospitals     Rehab Caseload Tracker
Physician Progress Note      Nita Mendiola  CSN #:                  [id number]  :                       1955  ADMIT DATE:       3/11/2023 6:38 PM  [address] [place] DATE:  [place]  PROVIDER #:        Kayode Auguste MD          QUERY TEXT:    Patient admitted with atrial fibrillation with RVR, noted to have new onset   paroxysmal atrial fibrillation in H&P 3/11. If possible, please document in   progress notes and discharge summary if you are evaluating and/or treating any   of the following:? The medical record reflects the following:?  Risk Factors: HTN, CAD with unstable angina, hyperlipidemia, CHF. ? Clinical Indicators: Per H&P 3/12 paroxysmal atrial fibrillation with RVR,? Treatment: IV heparin gtt, IV & oral diltiazem, IV diuresis, TTE, cardiology   consult, C, BB, lab workup. Options provided:  -- Secondary hypercoagulable state in a patient with atrial fibrillation  -- Other - I will add my own diagnosis  -- Disagree - Not applicable / Not valid  -- Disagree - Clinically unable to determine / Unknown  -- Refer to Clinical Documentation Reviewer    PROVIDER RESPONSE TEXT:    This patient has secondary hypercoagulable state in a patient with atrial   fibrillation. Query created by: Mahnaz Blair on 3/16/2023 8:01 AM      Electronically signed by:   Kayode Auguste MD 3/22/2023 9:00 PM
[facility] CARDIOLOGY PROGRESS NOTE    3/16/2023 6:33 AM    Admit Date: 3/11/2023        Subjective:   Stable overnight without angina, CHF, or palpitations. Vitals stable and controlled. No other complaints overnight. Tolerating meds well. Extubated and comfortable on nasal cannula. Postop day 2 s/p CABG/AVR/Surgical Maze/DARELL occlusion. Objective:      Vitals:    03/16/23 0215 03/16/23 0230 03/16/23 0245 03/16/23 0534   BP: (!) 144/68 137/63 139/74    Pulse: 68 69 68    Resp: 21 22 23    Temp:       TempSrc:       SpO2: 99% 98% 99%    Weight:    261 lb 7.5 oz (118.6 kg)   Height:           Physical Exam:  Neck- supple, no JVD  CV- regular rate and rhythm no MRG  Lung- clear bilaterally  Abd- soft, nontender, nondistended  Ext- no edema  Skin- warm and dry    Data Review:   Pertinent lab and noninvasive imaging over the past 24 hours reviewed and evaluated. Recent Labs     03/15/23  1506 03/15/23  1506 03/15/23  1832 03/15/23  2308 03/16/23  0309     --   --   --  141   K 4.5  --  4.7 4.6 4.3   MG  --    < > 2.6* 2.4 2.5*   BUN 12  --   --   --  12   WBC 13.5*  --  12.6*  --  12.9*   HGB 11.0*  --  12.2* 11.7* 11.9*   HCT 34.3*  --  37.0* 35.6* 35.0*   *  --  122*  --  119*   INR 1.3  --   --   --   --     < > = values in this interval not displayed. Assessment and Plan: Active Hospital Problems  Unstable angina-resolved, status post bypass. Continue meds and titrate as needed. Recheck labs in the morning replete electrolytes as needed. Mobilize. Incentive spirometer use encouraged. Paroxysmal atrial fibrillation (HCC)-sinus on telemetry. Continue amiodarone orally. Titrate meds as needed. Keep potassium greater than 4 magnesium greater than 2. Metoprolol restarted, continue present dose and monitor on telemetry. Atelectasis-incentive spirometer/mobilize as tolerated. Encounter for weaning from ventilator (HCC)-comfortable on nasal cannula.       Chronic venous
Pt and family members watched CABG videos. Urinalysis and MRSA swab obtained and sent to lab.
Pt home gabapentin & tramadol given to CVICU with narcotic count sheet, received by Gloria Dhillon RN.
Pulmonary CV progress Note: 3/17/2023        Corinne Eden Sr. Admission Date: 3/11/2023     The patient's chart is reviewed and the patient is discussed with the staff. Background: 69 y.o. y/o male  has a past medical history of Bipolar disorder ([facility] CAD (coronary artery disease), CHF (congestive heart failure) ([facility] Chronic back pain, Hyperlipidemia, Hypertension, Neuropathy, Subdural hematoma, and Type 2 diabetes mellitus without complication ([facility] S/P CABG x 2; 2 Days Post-Op. Subjective:     Back in atrial fib  Up in bed  On Amiodarone drip now  On NC 3 LPM    Review of Systems: Comprehensive ROS negative except in HPI  Objective:   Blood pressure 139/70, pulse 80, temperature 98.1 °F (36.7 °C), temperature source Oral, resp. rate 26, height 6' (1.829 m), weight 270 lb 1 oz (122.5 kg), SpO2 97 %. Intake/Output Summary (Last 24 hours) at 3/17/2023 1001  Last data filed at 3/17/2023 0900  Gross per 24 hour   Intake 760 ml   Output 1275 ml   Net -515 ml       Physical Exam:          Constitutional:  Awake, calm  EENMT:  Sclera clear, pupils equal  Respiratory: CTA  Cardiovascular:  RRR with no M,G,R;  Gastrointestinal:  soft;   bowel sounds present  Musculoskeletal:  warm with no cyanosis, no lower extremity edema. SKIN:  no jaundice or ecchymosis   Neurologic:  moving all extremities, fluent speech  Psychiatric:  calm    CXR:      3/17/23        LINES:  santoyo. DRIPS:   Dose (mcg/kg/hr) Dexmedetomidine: 0 mcg/kg/hr  Dose (mcg/min) Epinephrine: 0 mcg/min  Dose (mcg/min) Norepinephrine: 0 mcg/min  Dose (mcg/kg/min) Phenylephrine : *0 mcg/min  Dose (units/hr) Heparin: 0 Units/hr  Dose (units/hr) Insulin : 3.84 Units/hr  Dose (mg/min) Amiodarone: *1 mg/min + 150 mg (There are multiple administrations at this time. Please see the [facility] for detailed information.)       No results for input(s): PH, PCO2, PO2, HCO3 in the last 72 hours.
Radial compression band removed at 1755 after slowly reducing air from 12 cc to zero as per hospital protocol. No bleeding or hematoma noted. 2 x 2 gauze with tegaderm placed over puncture site. The affected extremity is warm and dry to the touch. Frequent vital signs printed and placed on bedside chart. Patient instructed to call if any bleeding noted on gauze. Patient verbalized understanding the nursing instructions.
Report given to Vivian POST. Transport service escorted patient to [facility] pt stable at discharge.
Report received from [address] [place] [address]. Procedural findings communicated. Intra procedural  medication administration reviewed. Progression of care discussed.      Patient received into [address] [facility] 9 post sheath removal.     Access site without bleeding or swelling yes    Dressing dry and intact yes    Patient instructed to limit movement to right upper extremity    Routine post procedural vital signs and site assessment initiated yes
Respiratory Mechanics completed and are as follows:   RR 16      VE 10   NIF -22   RSBI 26   RASS -1    Patient extubated to  Airvo with 40L flow and 30% FiO2. Patient is able to communicate and is negative for stridor. Breath sounds are clear and diminished. No complications with extubation.      vipul celeste, RCP
[facility] CARDIOLOGY PROGRESS NOTE           3/23/2023 10:15 AM    Admit Date: 3/11/2023      Subjective:   Patient reports some mild shortness of breath and leg swelling this morning, but improved compared to yesterday. Incisional chest pain continues at sternotomy site. Minimal cough. No abdominal pain, nausea, vomiting, hemoptysis, dizziness or lightheadedness. ROS:  Cardiovascular:  As noted above    Objective:      Vitals:    03/23/23 0401 03/23/23 0431 03/23/23 0614 03/23/23 0700   BP:    110/80   Pulse:    52   Resp: 18 18 19   Temp:    98.2 °F (36.8 °C)   TempSrc:    Oral   SpO2:    96%   Weight:   276 lb 12.8 oz (125.6 kg)    Height:           Physical Exam:  General-No Acute Distress, awake, alert sitting comfortably  Neck- supple, no JVD  CV- regular rate and rhythm no MRG  Lung-Rales in the lower fields bilaterally, nonlabored, no wheezes  Abd- soft, nontender, nondistended  Ext-mild leg edema bilaterally. Skin- warm and dry    Data Review:   Recent Labs     03/22/23  0539 03/23/23  0535   * 128*   K 4.9 5.0   MG 2.3  --    BUN 19 22       Assessment/Plan:        S/P CABG x 2  -CABG/AVR/left atrial appendage clipping/maze from 3/15/2023.  -Preop echocardiogram with preserved EF.  -Continue aspirin, atorvastatin. Prior reported allergies to high intensity Crestor. Hypertension  -Some elevated BPs; usually on Norvasc 10 mg daily/lisinopril 20 mg daily in the outpatient setting. Increased lisinopril to 10 mg daily 3/19/2023; can titrate further as needed.  -Hold Norvasc as can exacerbate chronic lower extremity edema  -Titrate lisinopril further if blood pressure remains elevated. Chronic venous hypertension (idiopathic) with other complications of bilateral lower extremity  - Noted chronic stasis changes. Was initially started on IV Lasix  -Nephrology consult pending given significant hyponatremia  -Elevate legs as possible, improving with Lasix.
Spiritual Care Visit, initial visit. Visited with patient at bedside. Affirmed his selena and prayed for patient's healing and health. Visit by Garry Anthony, Staff .  [place]amica., Sofia.B., B.A.
Spiritual Care visit, Follow up visit. Patient was sleeping and did not respond. Continued Spiritual And Emotional care needed.       Visit attempted by Ryan Zapata M.Ed., Th.B. ,Staff 
Spiritual Care visit. Initial Visit, Pre Surgery Consult. Patient was in surgery early.  visited and conversed with his daughter. Listened, and affirmed her desire to help him. Prayed for patient's recovery and health.     Visit by Ivis Tsai M.Ed., Th.B. ,Staff 
Spoke to Dr. Lilian Cowden 3/14/23 at ~19:45 to report carotid doppler exam absent from radiology/US reports. Dr. Lilian Cowden stated he is aware and had spoken to Radiology/US around noon to discuss. PCU RN, Bahman Wu, also called Radiology/US at ~20:00 - US tech not in office, will call PCU back. Bahman Wu RN called Radiology/US again 3/15/23 ~00:00 person answering phone states, Savannahanthony no longer on shift.
TRANSFER - IN REPORT:    Verbal report received from [facility] (name) on [employer] [employer]  being received from CVOR(unit) for routine post-op      Report consisted of patients Situation, Background, Assessment and   Recommendations(SBAR). Information from the following report(s) SBAR, Kardex, OR Summary, Intake/Output, MAR, and Recent Results was reviewed with the receiving nurse. Per anesthesia on admission patient intubation Normal    Collaborative team agrees of surgeon, anesthesia, RT, and RN agrees to proceed with CV weaning protocol        Opportunity for questions and clarification was provided. Assessment completed upon patients arrival to unit and care assumed.
TRANSFER - IN REPORT:    Verbal report received from [place] on [address] being received from ED for routine progression of care. Report consisted of patients Situation, Background, Assessment and Recommendations(SBAR). Information from the following report(s) SBAR and ED Summary was reviewed. Opportunity for questions and clarification was provided. Assessment completed upon patients arrival to unit and care assumed. Patient received to room 321. Patient connected to monitor and assessment completed. Plan of care reviewed. Patient oriented to room and call light. Patient aware to use call light to communicate any chest pain or needs. Admission skin assessment completed with second RN and reveals the following: Sacrum and heels intact and free from redness. Generalized redness over body. +2, +3 pitting lower leg edema. Swollen circular area on upper back. Secondary RN, Rafi Kwong.
TRANSFER - OUT REPORT:    [facility] with Dr. Brown Favorite  No intervention  CV consult    R radial approach  R band with 12mL    Versed 3mg IV  Fentanyl 75mcg IV  Heparin 5000units total    Verbal report given to Abiel Amanda on [address]  being transferred to [facility] for routine progression of patient care       Report consisted of patient's Situation, Background, Assessment and   Recommendations(SBAR). Information from the following report(s) Nurse Handoff Report and MAR was reviewed with the receiving nurse.
TRANSFER - OUT REPORT:    Verbal report given to [facility], RN on [address]  being transferred to Preop for ordered procedure       Report consisted of patient's Situation, Background, Assessment and   Recommendations(SBAR). Information from the following report(s) Nurse Handoff Report was reviewed with the receiving nurse. [place] Assessment: No data recorded  Lines:   Peripheral IV 03/13/23 Left Forearm (Active)   Site Assessment Clean, dry & intact 03/15/23 0613   Line Status Infusing 03/15/23 0613   Line Care Connections checked and tightened 03/15/23 0613   Phlebitis Assessment No symptoms 03/15/23 0613   Infiltration Assessment 0 03/15/23 0613   Alcohol Cap Used No 03/15/23 0613   Dressing Status Clean, dry & intact 03/15/23 0613   Dressing Type Transparent 03/15/23 0613       Peripheral IV 03/13/23 Distal;Right Antecubital (Active)   Site Assessment Bleeding 03/15/23 6868   Line Status Flushed; Infusing 03/15/23 0613   Line Care Connections checked and tightened 03/15/23 0613   Phlebitis Assessment No symptoms 03/15/23 0613   Infiltration Assessment 0 03/15/23 0613   Alcohol Cap Used No 03/15/23 0613   Dressing Status Intact; Old drainage noted 03/15/23 1841   Dressing Type Transparent 03/15/23 1464        Opportunity for questions and clarification was provided.       Patient transported with:  Monitor, O2 @ 3lpm, and Registered Nurse, medications
This AM patient stating he will not have procedure done, siting bible verses stating God does not want him to have it done.
Today's Date: 3/17/2023  Date of Admission: 3/11/2023    Chart Reviewed. Subjective:     Seen up in the chair. Asking to go back to bed - feels tired. Moderate amount of pain. Working with IS - volumes around 500 mls. Medications Reviewed. Objective:     Vitals:    03/17/23 0545 03/17/23 0700 03/17/23 0715 03/17/23 0730   BP:  (!) 144/70     Pulse: 83 93 67 76   Resp: 30 24 21 22   Temp:  98.1 °F (36.7 °C)     TempSrc:  Oral     SpO2: 95% 97% 98% 97%   Weight:       Height:           Intake and Output  Current Shift: No intake/output data recorded. Last 3 Shifts: 03/15 1901 - 03/17 0700  In: 1159.5 [P.O.:760; I.V.:399.5]  Out: 2352 [Urine:2040]    Physical Exam:  General: Well Developed, Well Nourished, No Acute Distress, Alert & Oriented x 3, Appropriate mood  Neck: supple, no JVD  Heart: Irregular rhythm - a flutter per telemetry  Lungs: Clear throughout auscultation bilaterally without adventitious sounds. Overall decreased. Oxygen at 3 liters. Abd: soft, nontender, nondistended, with good bowel sounds  Ext: + edema - generalized  Sternal incision: clean, dry, and intact  Skin: warm and dry    LABS  Data Review:   Recent Labs     03/15/23  1506 03/15/23  1832 03/16/23  0309 03/17/23  0317     --  141 134   K 4.5   < > 4.3 4.5   MG  --    < > 2.5* 2.3   BUN 12  --  12 17   WBC 13.5*   < > 12.9* 15.6*   HGB 11.0*   < > 11.9* 11.2*   HCT 34.3*   < > 35.0* 34.3*   *   < > 119* 110*   INR 1.3  --   --   --     < > = values in this interval not displayed. Assessment/Plan:     Active Hospital Problems        New onset atrial fibrillation ([address] -   a flutter per telemetry. Seen by cardiology. Starting IV amiodarone. Cardiology considering anticoagulation - hold for now with recent surgery      Atelectasis - up in chair. Working with IS but volumes only around 500 mls. Getting prn pain medication, has pillow for splinting. Oxygen at 3 liters.  CXR reviewed      Encounter for
Today's Date: 3/18/2023  Date of Admission: 3/11/2023    Chart Reviewed. Subjective:     Still sore  In and out of atrial fibrillation  In sinus rhythm this morning  Currently on amiodarone 200 mg twice daily    Medications Reviewed. Objective:     Vitals:    03/18/23 0704 03/18/23 0838 03/18/23 1115 03/18/23 1142   BP: 130/72      Pulse: 75      Resp: 18 20 18   Temp: 98.4 °F (36.9 °C)      TempSrc: Oral      SpO2: 97%  97%    Weight:       Height:           Intake and Output  Current Shift: 03/18 0701 - 03/18 1900  In: 360 [P.O.:360]  Out: 2 [Urine:2]   Last 3 Shifts: 03/16 1901 - 03/18 0700  In: 680 [P.O.:680]  Out: 1900 [Urine:1900]    Physical Exam  Gen- the patient is well developed and in no acute distress. HEENT- PERRL, EOMI, no scleral icterus       oral mus[place]a moist without cyanosis. Neck- the neck is supple; there is no JVD. Lungs-diminished but clear  Heart- RRR. There is no murmur. Abd- soft and non-tender, unremarkable bowel sounds. Ext- warm without cyanosis. There is 1 edema. Skin- no jaundice or rashes. Neuro- alert and oriented x 3. No gross sensorimotor deficits are present. LAB  Recent Labs     03/16/23  0309 03/17/23  0317 03/18/23  0611   WBC 12.9* 15.6* 13.0*   HGB 11.9* 11.2* 10.9*   HCT 35.0* 34.3* 33.4*   * 110* 113*     Recent Labs     03/15/23  1506 03/15/23  1832 03/16/23  0309 03/17/23  0317 03/18/23  0611     --  141 134 129*   K 4.5   < > 4.3 4.5 4.4   *  --  115* 107 100*   CO2 23  --  22 22 25   BUN 12  --  12 17 20   MG  --    < > 2.5* 2.3 2.3   INR 1.3  --   --   --   --     < > = values in this interval not displayed. No results for input(s): PH, PCO2, PO2, HCO3 in the last 72 hours.     Assessment:     Principal Problem:    S/P CABG x 2  Active Problems:    Precordial pain    Bipolar disorder (HCC)    Chronic venous hypertension (idiopathic) with other complications of bilateral lower extremity    Paroxysmal atrial fibrillation
Today's Date: 3/23/2023  Date of Admission: 3/11/2023    Chart Reviewed. Subjective:     Patient feels much better today. Dyspnea is much improved. He was able to sleep better. Medications Reviewed. Objective:     Vitals:    03/23/23 0401 03/23/23 0431 03/23/23 0614 03/23/23 0700   BP:    110/80   Pulse:    52   Resp: 18 18  19   Temp:    98.2 °F (36.8 °C)   TempSrc:    Oral   SpO2:    96%   Weight:   276 lb 12.8 oz (125.6 kg)    Height:           Intake and Output  Current Shift: No intake/output data recorded. Last 3 Shifts: 03/21 1901 - 03/23 0700  In: 440 [P.O.:440]  Out: 3200 [Urine:3200]    Physical Exam:  General: Well Developed, Well Nourished, No Acute Distress, Alert & Oriented x 3, Appropriate mood  Neck: supple, no JVD  Heart: S1S2 with RRR without murmurs or gallops  Lungs: Clear throughout auscultation bilaterally without adventitious sounds  Abd: soft, nontender, nondistended, with good bowel sounds  Ext: 1+ edema bilaterally  Sternal incision: clean, dry, and intact  Skin: warm and dry    LABS  Data Review:   Recent Labs     03/22/23  0539 03/23/23  0535   * 128*   K 4.9 5.0   MG 2.3  --    BUN 19 22       Estimated Creatinine Clearance: 119 mL/min (based on SCr of 0.8 mg/dL).       Assessment/Plan:     *S/P CABG x 2  On ASA, BB, ACE-I, statin (on pravastatin, intolerance to rosuvastatin and atorvastatin), stable on room air, pacing wires removed, was able to have BM, dyspnea much improved after additional IV lasix, sodium improved     Active Hospital Problems    Precordial pain       New onset atrial fibrillation (HCC)  Back in sinus rhythm currently, still some intermittent a-fib/flutter with occasional aberrancy, continue oral amiodarone, Eliquis, pacing wires removed       Atelectasis  IS        Encounter for weaning from ventilator ([address]       S/P AVR (aortic valve replacement)  As above        Paroxysmal atrial fibrillation ([address]  As above, continue amiodarone, Eliquis
Unsuccessful PICC Placement Note    PRE-PROCEDURE VERIFICATION    Correct Procedure: yes  Time out completed with assistant BRUNO Nguyen, RN, [facility]-BC and all persons present in agreement with time out. Risks and benefits reviewed with pt  and informed consent obtained prior to assessment and procedure. Correct Site: yes  Temperature: Temp: 98.1 °F (36.7 °C), Temperature Source:    Recent Labs     03/15/23  1506 03/15/23  1832 03/17/23  0317   BUN 12   < > 17   *   < > 110*   INR 1.3  --   --    WBC 13.5*   < > 15.6*    < > = values in this interval not displayed. Allergies: Other, Rosuvastatin, and Oyster extract  Education materials for [facility] Care given to patient or family. PROCEDURE DETAIL  A double lumen PICC line was attempted for Irritant/vesicant medication. The following documentation is in addition to the PICC properties in the lines/airways flowsheet:    Lot #: [id number]  Xylocaine used: Yes  Mid-Arm Circumference: 34 (cm)    Vein Selection for PICC: right cephalic  Central Line Insertion Bundle followed: Yes  Complication Related to Insertion: inability to advance catheter    Antibiotic ointment applied to site then covered with 2x2 and tegaderm.            Chapito Prater RN, [facility]
([facility]  As above, no anticoagulation for now      Chronic venous hypertension (idiopathic) with other complications of bilateral lower extremity  Recently completed Keflex for cellulitis       Bipolar disorder ([facility]  Continue home meds      Accelerating angina [facility])  S/p CABG       Aortic valve stenosis  S/p AVR       COPD (chronic obstructive pulmonary disease) (HCC)  No wheezing on exam      Coronary atherosclerosis  S/p CABG       Uncontrolled type 2 diabetes mellitus with hyperglycemia ([facility] [address]  Continue SSI for now      Roxie Casey PA-C
Atelectasis  IS        Encounter for weaning from ventilator ([facility] [address]       S/P AVR (aortic valve replacement)  As above        Paroxysmal atrial fibrillation (HCC)  As above, continue amiodarone, Eliquis        Chronic venous hypertension (idiopathic) with other complications of bilateral lower extremity  Recently completed Keflex for cellulitis        Bipolar disorder ([facility] [address]  Continue home meds       Accelerating angina [facility])  S/p CABG        Aortic valve stenosis  S/p AVR        COPD (chronic obstructive pulmonary disease) (HCC)  No wheezing on exam       Coronary atherosclerosis  S/p CABG        Uncontrolled type 2 diabetes mellitus with hyperglycemia ([facility] [address]  Continue SSI for now     Hyponatremia  Nephrology consulted     Dispo  Will need rehab       Karl Pantoja.  MUNIR Casey
Eliquis        Chronic venous hypertension (idiopathic) with other complications of bilateral lower extremity  Recently completed Keflex for cellulitis        Bipolar disorder ([facility] [address]  Continue home meds       Accelerating angina [facility])  S/p CABG        Aortic valve stenosis  S/p AVR        COPD (chronic obstructive pulmonary disease) (HCC)  No wheezing on exam       Coronary atherosclerosis  S/p CABG        Uncontrolled type 2 diabetes mellitus with hyperglycemia ([facility] [address]  Continue SSI for now    Hyponatremia  Worse after holding lasix and appears volume overloaded, will resume lasix and monitor      Dispo  Will need rehab       Nelia Singh.  MUNIR Casey
Flushed;Normal saline locked 03/17/23 0701   Line Care Cap changed; Connections checked and tightened 03/17/23 0701   Phlebitis Assessment No symptoms 03/17/23 0701   Infiltration Assessment 0 03/17/23 0701   Alcohol Cap Used Yes 03/17/23 0701   Dressing Status Clean, dry & intact 03/17/23 0701   Dressing Type Transparent 03/17/23 0701   Dressing Intervention Dressing changed 03/16/23 1633       Peripheral IV 03/15/23 Right;Posterior Hand (Active)   Site Assessment Clean, dry & intact 03/17/23 0701   Line Status Flushed;Normal saline locked; Capped 03/17/23 0701   Line Care Cap changed; Connections checked and tightened 03/17/23 0701   Phlebitis Assessment No symptoms 03/17/23 0701   Infiltration Assessment 0 03/17/23 0701   Alcohol Cap Used Yes 03/17/23 0701   Dressing Status Clean, dry & intact 03/17/23 0701   Dressing Type Transparent 03/17/23 0701   Dressing Intervention New 03/15/23 0643       Introducer 03/15/23 (Active)   Specific Qualities Capped 03/17/23 0701   Dressing Status Clean, dry & intact 03/17/23 0701   Dressing Intervention New;Dressing changed 03/15/23 1445   Dressing Change Due 03/22/23 03/15/23 1445        Opportunity for questions and clarification was provided.       Patient transported with:  Monitor, O2 @ 3lpm, and Registered Nurse
Good progress towards goals. Will continue with POC. STR at d/c     SUBJECTIVE:   Mr. Raquel Maria states, \"I'm just so swollen\"     Social/Functional Lives With: Other (comment) (grandchildren ages 6, 15, 13)  Home Layout: One level  Home Access: Stairs to enter with rails  Entrance Stairs - Number of Steps: 1 step if he goes by back entry  Bathroom Shower/Tub: Tub/Shower unit  [employer]: Handicap height  Bathroom Accessibility: Accessible  Home Equipment: Adela Meneses Help From: Family  ADL Assistance: Independent  Homemaking Responsibilities: Yes  Ambulation Assistance: Independent  Transfer Assistance: Independent  Additional Comments: Mr. Raquel Maria lives with his grandchildren who he is raising. He ambulates short distances with a cane and uses a scooter in the community.   OBJECTIVE:     PAIN: [place] / O2: PRECAUTION / Libia Gaxiola / Evette Roys:   Pre Treatment: not rated         Post Treatment: not rated Vitals        Oxygen    Continuous Pulse Oximetry, Telemetry , and O2 and pacer    RESTRICTIONS/PRECAUTIONS:        MOBILITY: I Mod I S SBA CGA Min Mod Max Total  NT x2 Comments:   Bed Mobility    Rolling [] [] [] [] [] [] [] [] [] [] []    Supine to Sit [] [] [] [] [] [] [x] [] [] [] []    Scooting [] [] [] [] [] [] [] [] [] [] []    Sit to Supine [] [] [] [] [] [] [x] [] [] [] []    Transfers    Sit to Stand [] [] [] [] [] [x] [] [] [] [] []    Bed to Chair [] [] [] [] [] [x] [] [] [] [] []    Stand to Sit [] [] [] [] [] [x] [] [] [] [] []     [] [] [] [] [] [] [] [] [] [] []    I=Independent, Mod I=Modified Independent, S=Supervision, SBA=Standby Assistance, CGA=Contact Guard Assistance,   Min=Minimal Assistance, Mod=Moderate Assistance, Max=Maximal Assistance, Total=Total Assistance, NT=Not Tested    BALANCE: Good Fair+ Fair Fair- Poor NT Comments   Sitting Static [] [] [x] [] [] []    Sitting Dynamic [] [] [x] [] [] []              Standing Static [] [] [x] [x] [] []    Standing Dynamic [] [] [x] [x] [] []
Oral Daily PRN    famotidine (PEPCID) tablet 20 mg  20 mg Oral BID    Or    famotidine (PEPCID) 20 mg in sodium chloride (PF) 0.9 % 10 mL injection  20 mg IntraVENous BID    insulin lispro (HUMALOG) injection vial 0-12 Units  0-12 Units SubCUTAneous TID WC    insulin lispro (HUMALOG) injection vial 0-6 Units  0-6 Units SubCUTAneous Nightly    glucose chewable tablet 16 g  4 tablet Oral PRN    dextrose bolus 10% 125 mL  125 mL IntraVENous PRN    Or    dextrose bolus 10% 250 mL  250 mL IntraVENous PRN    dextrose 10 % infusion   IntraVENous Continuous PRN    dilTIAZem 100 mg in sodium chloride 0.9 % 100 mL infusion ([facility])  2.5-15 mg/hr IntraVENous Continuous    amiodarone (CORDARONE) tablet 200 mg  200 mg Oral BID    sodium chloride flush 0.9 % injection 5-40 mL  5-40 mL IntraVENous 2 times per day    sodium chloride flush 0.9 % injection 5-40 mL  5-40 mL IntraVENous PRN    0.9 % sodium chloride infusion  25 mL IntraVENous PRN    traMADol (ULTRAM) tablet 50 mg  50 mg Oral Q6H PRN    lisinopril (PRINIVIL;ZESTRIL) tablet 2.5 mg  2.5 mg Oral Daily    pravastatin (PRAVACHOL) tablet 40 mg  40 mg Oral Nightly    aspirin EC tablet 81 mg  81 mg Oral Daily    metoprolol tartrate (LOPRESSOR) tablet 25 mg  25 mg Oral BID    potassium chloride 20 mEq/50 mL IVPB (Central Line)  20 mEq IntraVENous PRN    magnesium sulfate 1000 mg in dextrose 5% 100 mL IVPB  1,000 mg IntraVENous PRN    insulin regular (HUMULIN R;NOVOLIN R) 100 Units in sodium chloride 0.9 % 100 mL infusion  1-10 Units/hr IntraVENous TITRATE    alcohol 62% (NOZIN) nasal  1 ampule  1 ampule Topical Q12H    mineral oil-hydrophilic petrolatum (AQUAPHOR) ointment   Topical Daily    melatonin tablet 6 mg  6 mg Oral Nightly PRN    gabapentin (NEURONTIN) capsule 300 mg  300 mg Oral TID    risperiDONE (RISPERDAL) tablet 2 mg  2 mg Oral BID    nitroGLYCERIN (NITROSTAT) SL tablet 0.4 mg  0.4 mg SubLINGual Q5 Min PRN     Review of Systems: Comprehensive ROS
amiodarone. Continue telemetry. Bipolar disorder ([address]  Stable      Accelerating angina ([address]  S/P CABG. Monitor in CVICU. Wean vent. Restart B-blocker as BP allows. Continue ASA. Aortic valve stenosis  S/P AVR. COPD (chronic obstructive pulmonary disease) ([facility])  Wean vent post op. Coronary atherosclerosis  S/P CABG      Uncontrolled type 2 diabetes mellitus with hyperglycemia ([facility])  Insulin infusion and SSI.              Latanya Patel MD  3/15/2023 3:06 PM
artery of native heart with unstable angina pectoris ([facility] [address]    Uncontrolled type 2 diabetes mellitus with hyperglycemia (HCC)    COPD (chronic obstructive pulmonary disease) (HCC)    Coronary atherosclerosis    Accelerating angina ([facility] [address]    Aortic valve stenosis  Resolved Problems:    * No resolved hospital problems.  *      Plan:     Creatinine is excellent  Restart Glucophage  Monitor blood sugars  Laxative  Increase activity    Eric Nevarez MD
body weight: 94 kg (207 lb 3.7 oz)  Mode FIO2 Rate Tidal Volume Pressure   (S) CPAP/PS    30 %  18 bmp         8     Peak airway pressure:     Minute ventilation:    No results for input(s): PH, PCO2, PO2, HCO3 in the last 72 hours. Recent Labs     03/15/23  0514 03/15/23  1506 03/15/23  1832 03/15/23  2308 03/16/23  0309   WBC 7.1 13.5* 12.6*  --  12.9*   HGB 12.8* 11.0* 12.2* 11.7* 11.9*   HCT 39.0* 34.3* 37.0* 35.6* 35.0*    109* 122*  --  119*   INR  --  1.3  --   --   --      Recent Labs     03/15/23  0514 03/15/23  1506 03/15/23  1832 03/15/23  2308 03/16/23  0309    140  --   --  141   K 4.2 4.5 4.7 4.6 4.3    116*  --   --  115*   CO2 26 23  --   --  22   BUN 12 12  --   --  12   CREATININE 0.90 1.00  --   --  0.80   MG  --   --  2.6* 2.4 2.5*     No results for input(s): PROCAL, LACTATE in the last 72 hours. 03/11/23    TRANSTHORACIC ECHOCARDIOGRAM (TTE) COMPLETE (CONTRAST/BUBBLE/3D PRN) 03/12/2023  5:32 PM, 03/12/2023 12:00 AM (Final)    Interpretation Summary    Left Ventricle: Normal left ventricular systolic function with a visually estimated EF of 55 - 60%. Left ventricle size is normal. Mildly increased wall thickness. Normal wall motion. Abnormal diastolic function. Aortic Valve: Moderately calcified cusp. Moderate stenosis of the aortic valve. AV mean gradient is 19 mmHg. AV area by continuity VTI is 1.8 cm2. Mitral Valve: Mild annular calcification of the mitral valve. Mild regurgitation. Mild stenosis noted. Left Atrium: Left atrium is mildly dilated. Aorta: Mildly dilated ascending aorta. Ao ascending diameter is 4.0 cm.     Signed by: Rudolph Funez DO on 3/12/2023  5:32 PM, Signed by: Unknown Provider Result on 3/12/2023 12:00 AM    Assessment and Plan :  (Medical Decision Making)   Impression: 69 y.o. y/o male s/p CV surgery for Paroxysmal atrial fibrillation ([facility] [address] 1 Day Post-Op    Active Hospital Problems    Precordial pain     *Paroxysmal atrial
cm2.    Mitral Valve: Mild annular calcification of the mitral valve. Mild regurgitation. Mild stenosis noted. Left Atrium: Left atrium is mildly dilated. Aorta: Mildly dilated ascending aorta. Ao ascending diameter is 4.0 cm. Assessment/Plan:     Active Hospital Problems    Precordial pain  3 vessel CAD. Needs CABG. Plans for AM.        *Paroxysmal atrial fibrillation (HCC)  Continue IV amiodarone until cardiac cath. Surgical MAZE and DARELL occlusion. Continue to monitor on telemetry      Chronic venous hypertension (idiopathic) with other complications of bilateral lower extremity  Noted      Bipolar disorder ([address]  Stable. COPD (chronic obstructive pulmonary disease) ([address]  Defer management to primary team.        Coronary atherosclerosis  See above. Uncontrolled type 2 diabetes mellitus with hyperglycemia ([address]  Defer management to primary team.        Aortic stenosis  AVR with surgery.              Get Stover MD
held off with worsening hyponatremia after discussion with pulmonology but appears restarted per CT surgery this a.m.; discussed with Dr. Lilly Zhu. Progressive worsening hyponatremia on diuretics with no gross volume overload. Edema likely secondary to chronic stasis; cannot rule out possible ADH component in the setting of intermittent atrial tachyarrhythmias.  -Avoid diuresing with worsening hyponatremia unless sodium studies assessed in the setting of no significant volume overload. Consider nephrology consult      Paroxysmal atrial fibrillation ([address]  -Back in sinus rhythm; previously in atrial flutter with controlled ventricular rates  -Previously with AF, noted paroxysmal; consider anticoagulation if okay from a surgical standpoint. Underlying left atrial appendage ligation.  -Currently on p.o. amiodarone 400 mg twice daily; hemodynamically stable.         Atelectasis      Encounter for weaning from ventilator ([address]      S/P AVR (aortic valve replacement)  -Stable; plan outpatient follow-up echocardiogram in about a couple months      Uncontrolled type 2 diabetes mellitus with hyperglycemia ([address]      COPD (chronic obstructive pulmonary disease) ([address]      Harrison Hatfield MD  3/21/2023 11:07 AM
hypertension (idiopathic) with other complications of bilateral lower extremity  Recently completed Keflex for cellulitis        Bipolar disorder ([facility] [address]  Continue home meds       Accelerating angina [facility])  S/p CABG        Aortic valve stenosis  S/p AVR        COPD (chronic obstructive pulmonary disease) (HCC)  No wheezing on exam       Coronary atherosclerosis  S/p CABG        Uncontrolled type 2 diabetes mellitus with hyperglycemia ([facility] [address]  Continue SSI for now    Dispo  Will need rehab        Taisha Vyas.  MUNIR Casey
hyponatremia      Paroxysmal atrial fibrillation (HCC)  -Currently in atrial flutter with controlled ventricular rates  -Previously with AF, noted paroxysmal; consider anticoagulation if okay from a surgical standpoint. Underlying left atrial appendage ligation.  -Currently on p.o. amiodarone 400 mg twice daily; hemodynamically stable. Defer consideration for cardioversion at this time and can reassess as an outpatient if persistent.       Atelectasis      Encounter for weaning from ventilator ([address]      S/P AVR (aortic valve replacement)  -Stable; plan outpatient follow-up echocardiogram in about a couple months      Uncontrolled type 2 diabetes mellitus with hyperglycemia ([address]      COPD (chronic obstructive pulmonary disease) ([address]      Angella Kim MD  3/19/2023 12:57 PM
packet 17 g  17 g Oral Daily PRN    magnesium hydroxide (MILK OF MAGNESIA) 400 MG/5ML suspension 30 mL  30 mL Oral Daily PRN    famotidine (PEPCID) tablet 20 mg  20 mg Oral BID    Or    famotidine (PEPCID) 20 mg in sodium chloride (PF) 0.9 % 10 mL injection  20 mg IntraVENous BID    insulin lispro (HUMALOG) injection vial 0-12 Units  0-12 Units SubCUTAneous TID WC    insulin lispro (HUMALOG) injection vial 0-6 Units  0-6 Units SubCUTAneous Nightly    glucose chewable tablet 16 g  4 tablet Oral PRN    dextrose bolus 10% 125 mL  125 mL IntraVENous PRN    Or    dextrose bolus 10% 250 mL  250 mL IntraVENous PRN    dextrose 10 % infusion   IntraVENous Continuous PRN    dilTIAZem 100 mg in sodium chloride 0.9 % 100 mL infusion (ADD-[facility])  2.5-15 mg/hr IntraVENous Continuous    sodium chloride flush 0.9 % injection 5-40 mL  5-40 mL IntraVENous PRN    0.9 % sodium chloride infusion  25 mL IntraVENous PRN    traMADol (ULTRAM) tablet 50 mg  50 mg Oral Q6H PRN    aspirin EC tablet 81 mg  81 mg Oral Daily    metoprolol tartrate (LOPRESSOR) tablet 25 mg  25 mg Oral BID    potassium chloride 20 mEq/50 mL IVPB (Central Line)  20 mEq IntraVENous PRN    magnesium sulfate 1000 mg in dextrose 5% 100 mL IVPB  1,000 mg IntraVENous PRN    insulin regular (HUMULIN R;NOVOLIN R) 100 Units in sodium chloride 0.9 % 100 mL infusion  1-10 Units/hr IntraVENous TITRATE    alcohol 62% (NOZIN) nasal  1 ampule  1 ampule Topical Q12H    mineral oil-hydrophilic petrolatum (AQUAPHOR) ointment   Topical Daily    melatonin tablet 6 mg  6 mg Oral Nightly PRN    gabapentin (NEURONTIN) capsule 300 mg  300 mg Oral TID    risperiDONE (RISPERDAL) tablet 2 mg  2 mg Oral BID    nitroGLYCERIN (NITROSTAT) SL tablet 0.4 mg  0.4 mg SubLINGual Q5 Min PRN     Review of Systems: Comprehensive ROS negative except in HPI  Objective:   Blood pressure (!) 145/71, pulse 71, temperature 97.7 °F (36.5 °C), temperature source Oral, resp.  rate 18, height 6' (1.829
recliner with needs in reach. Will continue PT efforts. STR at d/c    PM note:  Patient agreeable . States that he feels tired. Gait training with rolling walker continued. Mobility appears slower this pm.  Continue PT efforts. SUBJECTIVE:   Mr. Vannessa Law states, \"I feel tired\"     Social/Functional Lives With: Other (comment) (grandchildren ages 6, 15, 13)  Home Layout: One level  Home Access: Stairs to enter with rails  Entrance Stairs - Number of Steps: 1 step if he goes by back entry  Bathroom Shower/Tub: Tub/Shower unit  [employer]: Handicap height  Bathroom Accessibility: Accessible  Home Equipment: triptap Help From: Family  ADL Assistance: Independent  Homemaking Responsibilities: Yes  Ambulation Assistance: Independent  Transfer Assistance: Independent  Additional Comments: Mr. Vannessa Law lives with his grandchildren who he is raising. He ambulates short distances with a cane and uses a scooter in the community.   OBJECTIVE:     PAIN: [place] / O2: PRECAUTION / Anne [place] / [place]:   Pre Treatment: not rated         Post Treatment: not rated Vitals        Oxygen    Telemetry     RESTRICTIONS/PRECAUTIONS:        MOBILITY: I Mod I S SBA CGA Min Mod Max Total  NT x2 Comments:   Bed Mobility    Rolling [] [] [] [] [] [] [] [] [] [x] []    Supine to Sit [] [] [] [] [] [] [] [] [] [x] []    Scooting [] [] [] [] [] [] [] [] [] [x] []    Sit to Supine [] [] [] [] [] [] [] [] [] [x] []    Transfers    Sit to Stand [] [] [] [] [] [x] [] [] [] [] []    Bed to Chair [] [] [] [] [] [] [] [] [] [x] []    Stand to Sit [] [] [] [] [] [x] [] [] [] [] []     [] [] [] [] [] [] [] [] [] [] []    I=Independent, Mod I=Modified Independent, S=Supervision, SBA=Standby Assistance, CGA=Contact Guard Assistance,   Min=Minimal Assistance, Mod=Moderate Assistance, Max=Maximal Assistance, Total=Total Assistance, NT=Not Tested    BALANCE: Good Fair+ Fair Fair- Poor NT Comments   Sitting Static [] [x] [] [] [] []    Sitting
valve replacement)  -Stable; plan outpatient follow-up echocardiogram in about a couple months      Uncontrolled type 2 diabetes mellitus with hyperglycemia ([facility] [address]      COPD [facility]chronic obstructive [facility] disease) [facility]      Kymberly Johnston MD  3/18/2023 12:08 PM
valve stenosis-status post AVR. Echo in 6 weeks in the office      COPD (chronic obstructive pulmonary disease) (HCC)-stable, continue meds, titrate inhalers and nebulizer as needed. Uncontrolled type 2 diabetes mellitus with hyperglycemia (HCC)-Per ICU protocol. Dyslipidemia-continue current atorvastatin dose with outpatient surveillance.        Lamont Ace MD
Assistance, NT=Not Tested    ACTIVITIES OF DAILY LIVING: I Mod I S SBA CGA Min Mod Max Total NT Comments   BASIC ADLs:              Upper Body   Bathing [] [] [] [] [] [] [] [] [] []    Lower Body Bathing [] [] [] [] [] [] [] [] [] []    Toileting [] [] [] [] [x] [] [] [] [] []    Upper Body Dressing [] [] [] [] [] [] [] [] [] []    Lower Body Dressing [] [] [] [] [] [x] [] [] [] []    Feeding [] [] [] [] [] [] [] [] [] []    Grooming [] [] [] [] [x] [] [] [] [] [] Standing at sink    Personal Device Care [] [] [] [] [] [] [] [] [] []    Functional Mobility [] [] [] [] [] [] [] [] [] []    I=Independent, Mod I=Modified Independent, S=Supervision/Setup, SBA=Standby Assistance, CGA=Contact Guard Assistance, Min=Minimal Assistance, Mod=Moderate Assistance, Max=Maximal Assistance, Total=Total Assistance, NT=Not Tested    PLAN:     FREQUENCY/DURATION   OT Plan of Care: 3 times/week for duration of hospital stay or until stated goals are met, whichever comes first.    TREATMENT:     TREATMENT:   [address] [facility] (27 minutes): Patient participated in toileting, lower body dressing, and grooming ADLs in unsupported sitting and standing with minimal visual and verbal cueing to increase independence, decrease assistance required, increase activity tolerance, and maintain precautions. Patient also participated in functional mobility, energy conservation, and adaptive equipment training to increase independence, decrease assistance required, increase activity tolerance, and maintain precautions.      TREATMENT GRID:  N/A    AFTER TREATMENT PRECAUTIONS: Alarm Activated, Call light within reach, Chair, and Needs within reach    INTERDISCIPLINARY COLLABORATION:  RN/ PCT    EDUCATION:       TOTAL TREATMENT DURATION AND TIME:  Time In: 0945  Time Out: [address]  Minutes: [address], [place]
Decreased felicita  and Decreased step length    Weightbearing Status      Stairs      I=Independent, Mod I=Modified Independent, S=Supervision, SBA=Standby Assistance, CGA=Contact Guard Assistance,   Min=Minimal Assistance, Mod=Moderate Assistance, Max=Maximal Assistance, Total=Total Assistance, NT=Not Tested    PLAN:   FREQUENCY AND DURATION: BID for duration of hospital stay or until stated goals are met, whichever comes first.    TREATMENT:   TREATMENT:   Therapeutic Activity (23 Minutes): Therapeutic activity included Rolling, Sit to Supine, Scooting, Transfer Training, Ambulation on level ground, Sitting balance , and Standing balance to improve functional Activity tolerance, Balance, Coordination, Mobility, and Strength.     TREATMENT GRID:     Date:   Date:   Date:     ACTIVITY/EXERCISE AM PM AM PM AM PM   LAQ         Shoulder shrugs         Gluteal sets         marching         Ankle pumps         abduction                  B = bilateral; AA = active assistive; A = active; P = passive    AFTER TREATMENT PRECAUTIONS: Alarm Activated, Bed, Bed/Chair Locked, Call light within reach, Needs within reach, and RN notified    INTERDISCIPLINARY COLLABORATION:  RN/ PCT and PT/ PTA    EDUCATION:  review POC and importance of mobility in the recovery process    TIME IN/OUT:  Time In: 1500  Time Out: [address]  Minutes: 23    Génesis Moore-Seble, PTA
Poor  Standing - Dynamic: Poor   Posture [] Forward Head  Rounded Shoulders   Sensation []  Impaired due to peripheral neuropathy   Coordination [x]      Tone [x]     Edema []    Activity Tolerance [] Patient limited by fatigue, Patient limited by pain    []      COGNITION/  PERCEPTION: Intact Impaired (Comments):   Orientation [x]     Vision []     Hearing [x]     Cognition  [x]       MOBILITY: I Mod I S SBA CGA Min Mod Max Total  NT x2 Comments:   Bed Mobility    Rolling [] [] [] [] [] [] [] [] [] [x] []    Supine to Sit [] [] [] [] [] [] [] [] [] [x] []    Scooting [] [] [] [] [] [] [] [x] [] [] [x]    Sit to Supine [] [] [] [] [] [] [] [] [] [x] []    Transfers    Sit to Stand [] [] [] [] [] [] [x] [] [] [] [x]    Bed to Chair [] [] [] [] [] [] [] [] [] [] []    Stand to Sit [] [] [] [] [] [] [x] [] [] [] [x]     [] [] [] [] [] [] [] [] [] [] []    I=Independent, Mod I=Modified Independent, S=Supervision, SBA=Standby Assistance, CGA=Contact Guard Assistance,   Min=Minimal Assistance, Mod=Moderate Assistance, Max=Maximal Assistance, Total=Total Assistance, NT=Not Tested    GAIT: I Mod I S SBA CGA Min Mod Max Total  NT x2 Comments:   Level of Assistance [] [] [] [] [] [x] [] [] [] [] [x]    Distance 40 feet    DME Cardiac Walker    Gait Quality Decreased felicita , Decreased step clearance, and Decreased step length    Weightbearing Status      Stairs      I=Independent, Mod I=Modified Independent, S=Supervision, SBA=Standby Assistance, CGA=Contact Guard Assistance,   Min=Minimal Assistance, Mod=Moderate Assistance, Max=Maximal Assistance, Total=Total Assistance, NT=Not Tested    PLAN:   FREQUENCY AND DURATION: BID for duration of hospital stay or until stated goals are met, whichever comes first.    THERAPY PROGNOSIS: Good    PROBLEM LIST:   (Skilled intervention is medically necessary to address:)  Decreased ADL/Functional Activities  Decreased Activity Tolerance  Decreased AROM/PROM  Decreased Balance  Decreased
Total  NT x2 Comments:   Level of Assistance [] [] [] [] [x] [x] [] [] [] [] []    Distance 25', 30' feet    DME Cardiac Walker    Gait Quality Decreased felicita , Decreased step clearance, Decreased step length, and Trunk flexion    Weightbearing Status      Stairs      I=Independent, Mod I=Modified Independent, S=Supervision, SBA=Standby Assistance, CGA=Contact Guard Assistance,   Min=Minimal Assistance, Mod=Moderate Assistance, Max=Maximal Assistance, Total=Total Assistance, NT=Not Tested    PLAN:   FREQUENCY AND DURATION: BID for duration of hospital stay or until stated goals are met, whichever comes first.    TREATMENT:   TREATMENT:   Therapeutic Activity (10 Minutes): Therapeutic activity included Sit to Supine, Scooting, Transfer Training, Ambulation on level ground, Sitting balance , and Standing balance to improve functional Activity tolerance, Balance, Coordination, Mobility, and Strength.     TREATMENT GRID:     DATE: 3/16/23 am  3/16 pm  3/18       Ambulation             Hip Flexion      x 10 B in standing       Long Arc Quads X10 AB  2x10 AB  x20 AB       Hip ab/ad X10 AAB    x 10 B in standing       Heel Raises X20 AB  x20 AB x20 AB        Toe Raises X20 AB  x20 AB  x20 AB                                                  Key:  A=active, AA=active assisted, P=passive, B=bilaterally, R=right, L=left              DF=dorsiflexion, PF=plantarflexion    AFTER TREATMENT PRECAUTIONS: Bed, Bed/Chair Locked, Call light within reach, Needs within reach, RN at bedside, and Side rails x3    INTERDISCIPLINARY COLLABORATION:  RN/ PCT and PT/ PTA    EDUCATION:  Review POC and importance of mobility in the recovery process    TIME IN/OUT:  Time In: 1410  Time Out: 1420  Minutes: 10    CHAS JAIN PTA
Weightbearing Status      Stairs      I=Independent, Mod I=Modified Independent, S=Supervision, SBA=Standby Assistance, CGA=Contact Guard Assistance,   Min=Minimal Assistance, Mod=Moderate Assistance, Max=Maximal Assistance, Total=Total Assistance, NT=Not Tested    PLAN:   FREQUENCY AND DURATION: BID for duration of hospital stay or until stated goals are met, whichever comes first.    TREATMENT:   TREATMENT:   Therapeutic Activity (15 Minutes): Therapeutic activity included Transfer Training, Ambulation on level ground, and Standing balance to improve functional Activity tolerance, Balance, Mobility, and Strength. Therapeutic Exercise (10 Minutes): Therapeutic exercises noted below to improve functional activity tolerance, AROM, strength, and mobility.      TREATMENT GRID:     Date:  3/23 Date:   Date:     ACTIVITY/EXERCISE AM PM AM PM AM PM   LAQ 10x B        Shoulder shrugs         Gluteal sets         marching 10x B        Ankle pumps 10x B        abduction 10x B                 B = bilateral; AA = active assistive; A = active; P = passive    AFTER TREATMENT PRECAUTIONS: Bed/Chair Locked, Call light within reach, Chair, Needs within reach, and RN notified    INTERDISCIPLINARY COLLABORATION:  RN/ PCT and PT/ PTA    EDUCATION:  review POC and importance of mobility in the recovery process    TIME IN/OUT:  Time In: 0840  Time Out: 5571  Minutes: 25    Magdy Koenig PTA
[] [x]    Distance 40 feet    DME Cardiac Walker    Gait Quality Decreased felicita , Decreased step clearance, Decreased step length, and Trunk flexion    Weightbearing Status      Stairs      I=Independent, Mod I=Modified Independent, S=Supervision, SBA=Standby Assistance, CGA=Contact Guard Assistance,   Min=Minimal Assistance, Mod=Moderate Assistance, Max=Maximal Assistance, Total=Total Assistance, NT=Not Tested    PLAN:   FREQUENCY AND DURATION: BID for duration of hospital stay or until stated goals are met, whichever comes first.    TREATMENT:   TREATMENT:   Therapeutic Activity (15 Minutes): Therapeutic activity included Scooting, Transfer Training, Ambulation on level ground, and LE ex's to improve functional Activity tolerance, Balance, Coordination, Mobility, Strength, and ROM.     TREATMENT GRID:     DATE: 3/16/23 am  3/16 pm         Ambulation             Hip Flexion             Long Arc Quads X10 AB  2x10 AB         Hip ab/ad X10 AAB           Heel Raises X20 AB  x20 AB         Toe Raises X20 AB  x20 AB                                                    Key:  A=active, AA=active assisted, P=passive, B=bilaterally, R=right, L=left              DF=dorsiflexion, PF=plantarflexion    AFTER TREATMENT PRECAUTIONS: Bed/Chair Locked, Call light within reach, Chair, Needs within reach, and RN notified    INTERDISCIPLINARY COLLABORATION:  RN/ PCT and PT/ PTA    EDUCATION: Education Given To: Patient  Education Provided: Role of Therapy;Plan of Care  Education Outcome: Verbalized understanding    TIME IN/OUT:  Time In: 1440  Time Out: [address]  Minutes: [address] Maria Dolores Heard PT
03/11/23  8:26 PM   Result Value Ref Range    Ventricular Rate 119 BPM    Atrial Rate 138 BPM    QRS Duration 88 ms    Q-T Interval 329 ms    QTc Calculation (Bazett) 463 ms    R Axis 25 degrees    T Axis 57 degrees    Diagnosis Atrial fibrillation    Troponin    Collection Time: 03/11/23  9:16 PM   Result Value Ref Range    Troponin, High Sensitivity 10.1 0 - 14 pg/mL   Anti-Xa, Unfractionated Heparin    Collection Time: 03/12/23  3:48 AM   Result Value Ref Range    Anti-XA Unfrac Heparin <0.10 (L) 0.3 - 0.7 IU/mL   Anti-Xa, Unfractionated Heparin    Collection Time: 03/12/23  9:32 AM   Result Value Ref Range    Anti-XA Unfrac Heparin 0.27 (L) 0.3 - 0.7 IU/mL   EKG 12 Lead    Collection Time: 03/12/23 10:30 AM   Result Value Ref Range    Ventricular Rate 69 BPM    Atrial Rate 69 BPM    P-R Interval 156 ms    QRS Duration 90 ms    Q-T Interval 414 ms    QTc Calculation (Bazett) 443 ms    P Axis 64 degrees    R Axis 55 degrees    T Axis 97 degrees    Diagnosis Normal sinus rhythm    Transthoracic echocardiogram (TTE) complete with contrast, bubble, strain, and 3D PRN    Collection Time: 03/12/23  5:23 PM   Result Value Ref Range    LV EDV A2C 137 mL    LV EDV A4C 132 mL    LV ESV A2C 58 mL    LV ESV A4C 67 mL    IVSd 0.8 0.6 - 1.0 cm    LVIDd 4.6 4.2 - 5.9 cm    LVIDs 2.9 cm    LVOT Diameter 2.0 cm    LVOT Mean Gradient 5 mmHg    LVOT VTI 35.5 cm    LVOT Peak Velocity 1.6 m/s    LVOT Peak Gradient 10 mmHg    LVPWd 1.1 (A) 0.6 - 1.0 cm    LV E' Lateral Velocity 10 cm/s    LV E' Septal Velocity 10 cm/s    LV Ejection Fraction A2C 58 %    LV Ejection Fraction A4C 49 %    EF BP 54 (A) 55 - 100 %    LVOT Area 3.1 cm2    LVOT .5 ml    LA Minor Axis 5.8 cm    LA Major Axis 6.6 cm    LA Area 2C 23.4 cm2    LA Area 4C 28.2 cm2    LA Volume 2C 77 (A) 18 - 58 mL    LA Volume 4C 98 (A) 18 - 58 mL    LA Volume BP 92 (A) 18 - 58 mL    LA Diameter 3.9 cm    AV Mean Velocity 2.1 m/s    AV Mean Gradient 19 mmHg    AV VTI 63.0
2.8 - 20.0 MG/DL   EKG 12 Lead    Collection Time: 03/10/23 10:04 PM   Result Value Ref Range    Ventricular Rate 92 BPM    Atrial Rate 92 BPM    P-R Interval 162 ms    QRS Duration 80 ms    Q-T Interval 350 ms    QTc Calculation (Bazett) 432 ms    P Axis 43 degrees    R Axis 28 degrees    T Axis 2 degrees    Diagnosis Sinus rhythm with marked sinus arrhythmia    Urine Drug Screen    Collection Time: 03/10/23 11:38 PM   Result Value Ref Range    PCP, Urine Negative NEG      Benzodiazepines, Urine Negative NEG      Cocaine, Urine Negative NEG      Amphetamine, Urine Negative NEG      Methadone, Urine Negative NEG      THC, TH-Cannabinol, Urine Negative NEG      Opiates, Urine Negative NEG      Barbiturates, Urine Negative NEG     EKG 12 Lead    Collection Time: 03/11/23  6:52 PM   Result Value Ref Range    Ventricular Rate 89 BPM    Atrial Rate 93 BPM    QRS Duration 91 ms    Q-T Interval 351 ms    QTc Calculation (Bazett) 427 ms    R Axis 32 degrees    T Axis -60 degrees    Diagnosis Atrial fibrillation    CBC    Collection Time: 03/11/23  6:55 PM   Result Value Ref Range    WBC 9.2 4.3 - 11.1 K/uL    RBC 4.27 4.23 - 5.6 M/uL    Hemoglobin 12.8 (L) 13.6 - 17.2 g/dL    Hematocrit 38.3 (L) 41.1 - 50.3 %    MCV 89.7 82 - 102 FL    MCH 30.0 26.1 - 32.9 PG    MCHC 33.4 31.4 - 35.0 g/dL    RDW 13.0 11.9 - 14.6 %    Platelets 087 694 - 299 K/uL    MPV 10.1 9.4 - 12.3 FL    nRBC 0.00 0.0 - 0.2 K/uL   Comprehensive Metabolic Panel    Collection Time: 03/11/23  6:55 PM   Result Value Ref Range    Sodium 135 133 - 143 mmol/L    Potassium 3.8 3.5 - 5.1 mmol/L    Chloride 107 101 - 110 mmol/L    CO2 25 21 - 32 mmol/L    Anion Gap 3 2 - 11 mmol/L    Glucose 210 (H) 65 - 100 mg/dL    BUN 16 8 - 23 MG/DL    Creatinine 0.90 0.8 - 1.5 MG/DL    Est, Glom Filt Rate >60 >60 ml/min/1.73m2    Calcium 8.6 8.3 - 10.4 MG/DL    Total Bilirubin 0.3 0.2 - 1.1 MG/DL    ALT 17 12 - 65 U/L    AST 12 (L) 15 - 37 U/L    Alk Phosphatase 41 (L) 50 -
AV mean gradient is 19 mmHg. AV area by continuity VTI is 1.8 cm2. Mitral Valve: Mild annular calcification of the mitral valve. Mild regurgitation. Mild stenosis noted. Left Atrium: Left atrium is mildly dilated. Aorta: Mildly dilated ascending aorta. Ao ascending diameter is 4.0 cm. Signed by: Ke Garcia DO on 3/12/2023  5:32 PM, Signed by: Unknown Provider Result on 3/12/2023 12:00 AM    Assessment and Plan:  (Medical Decision Making)   Principal Problem:    S/P CABG x 2 and AVR  Plan:   Ongoing scrotal and  LE edema. PO lasix not getting net negative. Will change to IV for now. Monitor I/O, Na, and creatinine. Active Problems:       Paroxysmal atrial fibrillation (HCC)  Plan: now on po amiodarone, iv dilt. Atelectasis  Plan: cont IS        COPD (chronic obstructive pulmonary disease) ([facility] [address]  Plan: no pft's on file. No inhaled meds on PTA med list.     On RA. Will plan to see at least one more day to ensure doing well then may be able to sign off. More than 50% of the time documented was spent in face-to-face contact with the patient and in the care of the patient on the floor/unit where the patient is located.     Nathalie Merlos MD
PTA    EDUCATION:  Review POC and importance of mobility in the recovery process    TIME IN/OUT:  Time In: 0940  Time Out: 1003  Minutes: 23    Génesis Moore-Seble, PTA
within reach, Chair, Needs within reach, RN notified, and Visitors at bedside    INTERDISCIPLINARY COLLABORATION:  RN/ PCT and PT/ PTA    EDUCATION:  Review POC and importance of mobility in the recovery process    TIME IN/OUT:  Time In: 5068  Time Out: [address]  Minutes: 21    Génesis Moore-Seble, PTA
[id number]  Minutes: [id number]
Glucose 185 (H) 65 - 100 mg/dL    Performed by: Will    Anti-Xa, Unfractionated Heparin    Collection Time: 03/14/23  8:55 AM   Result Value Ref Range    Anti-XA Unfrac Heparin 0.47 0.3 - 0.7 IU/mL       I have personally reviewed imaging studies:  Other Studies:  CT CHEST PULMONARY EMBOLISM W CONTRAST   Final Result   No acute cardiopulmonary abnormality. No pulmonary embolus. There is    some minimal atelectasis of the lung bases but no focal consolidation.       Stephen Ventura M.D.    3/11/2023 11:15:00 PM      XR CHEST PORTABLE   Final Result   Mild right basilar atelectasis/infiltrate.                     Vascular duplex vein mapping lower bilateral    (Results Pending)       Current Meds:  Current Facility-Administered Medications   Medication Dose Route Frequency    0.9 % sodium chloride infusion   IntraVENous Continuous    metoprolol succinate (TOPROL XL) extended release tablet 50 mg  50 mg Oral BID    mineral oil-hydrophilic petrolatum (AQUAPHOR) ointment   Topical Daily    metoprolol (LOPRESSOR) injection 5 mg  5 mg IntraVENous Q5 Min PRN    HYDROcodone-acetaminophen (NORCO) 5-325 MG per tablet 1 tablet  1 tablet Oral Q6H PRN    amiodarone (CORDARONE) 450 mg in dextrose 5 % 250 mL infusion ([id number])  1 mg/min IntraVENous Continuous    insulin lispro (HUMALOG) injection vial 0-8 Units  0-8 Units SubCUTAneous TID WC    insulin lispro (HUMALOG) injection vial 0-4 Units  0-4 Units SubCUTAneous Nightly    melatonin tablet 6 mg  6 mg Oral Nightly PRN    sodium chloride flush 0.9 % injection 5-40 mL  5-40 mL IntraVENous 2 times per day    sodium chloride flush 0.9 % injection 5-40 mL  5-40 mL IntraVENous PRN    0.9 % sodium chloride infusion   IntraVENous PRN    ondansetron (ZOFRAN-ODT) disintegrating tablet 4 mg  4 mg Oral Q8H PRN    Or    ondansetron (ZOFRAN) injection 4 mg  4 mg IntraVENous Q6H PRN    polyethylene glycol (GLYCOLAX) packet 17 g  17 g Oral Daily PRN    acetaminophen

## 2023-03-23 NOTE — CARE COORDINATION
CM continues to follow for discharge planning  and/or CM  needs. Received pt's new insurance card and relayed information to [facility]. Prior authorization initiated for admission to [facility]. Insurance offered [employer] which attending clinician completed. Insurance denied admission to [facility] - offered family appeal.     This CM updated pt and daughter regarding process. Pt is agreeable to proceed with family appeal.  [facility] to await denial letter from pt insurance and then update on appeal number. No additional CM needs at this time. Will continue to monitor and update as needed.
CM continues to follow for discharge planning and/or CM needs. Discharge plan pending clinical progress. Will continue to monitor and update as needed.
Patient states he lives in a one story home with one step to get up if goes in the back entryway. He states if needs help getting around the house he holds onto the walls but does have a cane and walker at home (reluctant to use). He states he sees his PMD routinely most recently for legs wounds. Also had assistance with home care for wound care from [facility]. Pt reports his 13year old grandson drives him to  prescriptions. He states he recently went to the store and got a scooter to help him shop but fell asleep on the scooter. Despite this he declines needing assistance for shopping. 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 Patient states his grandchildren are not at home when homeless come over. Patient reports he is the legal guardian for grandchildren Genoveva Huerta (F) age 6, Sathya Garcia (F) age 15, Bala March (13) M. He adds Vaibhav Hymanland age 16 he recently \"kicked out\" because he was disrespectful. Patient states these children's parents are \"on the street\" and then he later states they are in the backyard. He reports the 16year old is now with his parents. During his hospitalization he has designated a son to care for these kids. Patient recently in ED on a mandatory police hold for psych eval. This evaluation did not indicate any needs. Patients chart indicates he is bi-polar and prescribed Risperdal.  He agreed to take this medication in the hospital this morning but states he didn't take it before he was hospitalized and he doesn't need it and wont take it again. Patient has a daughter who is expressing concerns about patient well being and ability to care for self and grandchildren. Report made to [facility] to evaluate safety and care of minor children. Patient to cath lab. CM will continue to monitor and assess needs.
This CM met with pt at bedside  this day to discuss discharge planning. Reviewed with pt that therapy has recommended that he would benefit from post acute rehab at discharge. Pt is understanding and  agreeable. Reviewed agencies and he is agreeable to referral to [facility]. Referral made this day and pt is accepted but there is an issue with his insurance. Pt confirms he still has [facility] Medicare but has a no insurance plan - he is having  a family member bring his new insurance card to the  hospital this day. Once new insurance card received, prior authorization can be initiated. No additional CM  needs at this time. Will continue to monitor and update as needed.
Representative agree with the Discharge Plan? Yes   Freedom of Choice list was provided with basic dialogue that supports the patient's individualized plan of care/goals, treatment preferences, and shares the quality data associated with the providers?   Yes

## 2023-03-23 NOTE — DISCHARGE SUMMARY
infection which include: increasing area of redness, fever or purulent drainage at the incision site. The patient is instructed to call the office or return to the ER for immediate evaluation for any shortness of breath or chest pain not relieved by NTG. Discharge Exam: /80   Pulse 71   Temp 97.9 °F (36.6 °C) (Oral)   Resp 20   Ht 6' (1.829 m)   Wt 276 lb 12.8 oz (125.6 kg)   SpO2 98%   BMI 37.54 kg/m²       Physical Exam:  General: Well Developed, Obese, No Acute Distress, Alert & Oriented  Neck: supple, no JVD  Heart: S1S2 with RRR without murmurs or gallops  Lungs: Clear throughout auscultation bilaterally without adventitious sounds  Abd: soft, nontender, nondistended, with good bowel sounds  Ext: warm, 1+ edema  Sternal incision: clean, dry, and intact  Skin: warm and dry      Recent Results (from the past 24 hour(s))   POCT Glucose    Collection Time: 03/22/23  3:47 PM   Result Value Ref Range    POC Glucose 119 (H) 65 - 100 mg/dL    Performed by: Landry    POCT Glucose    Collection Time: 03/22/23  8:29 PM   Result Value Ref Range    POC Glucose 138 (H) 65 - 100 mg/dL    Performed by: Jami    Basic Metabolic Panel    Collection Time: 03/23/23  5:35 AM   Result Value Ref Range    Sodium 128 (L) 133 - 143 mmol/L    Potassium 5.0 3.5 - 5.1 mmol/L    Chloride 96 (L) 101 - 110 mmol/L    CO2 29 21 - 32 mmol/L    Anion Gap 3 2 - 11 mmol/L    Glucose 130 (H) 65 - 100 mg/dL    BUN 22 8 - 23 MG/DL    Creatinine 0.80 0.8 - 1.5 MG/DL    Est, Glom Filt Rate >60 >60 ml/min/1.73m2    Calcium 8.8 8.3 - 10.4 MG/DL   POCT Glucose    Collection Time: 03/23/23  6:24 AM   Result Value Ref Range    POC Glucose 130 (H) 65 - 100 mg/dL    Performed by:  Angela          Patient Instructions:   Current Discharge Medication List        START taking these medications    Details   nitroGLYCERIN (NITROSTAT) 0.4 MG SL tablet Place 1 tablet under the tongue every 5 minutes as needed for Chest

## 2023-03-23 NOTE — PLAN OF CARE
Problem: Discharge Planning  Goal: Discharge to home or other facility with appropriate resources  Outcome: Progressing     Problem: Pain  Goal: Verbalizes/displays adequate comfort level or baseline comfort level  Outcome: Progressing     Problem: Safety - Adult  Goal: Free from fall injury  Outcome: Progressing     Problem: ABCDS Injury Assessment  Goal: Absence of physical injury  Outcome: Progressing
Problem: Discharge Planning  Goal: Discharge to home or other facility with appropriate resources  Outcome: Progressing  Flowsheets (Taken 3/16/2023 2000)  Discharge to home or other facility with appropriate resources: Identify barriers to discharge with patient and caregiver     Problem: Pain  Goal: Verbalizes/displays adequate comfort level or baseline comfort level  Outcome: Progressing  Flowsheets (Taken 3/16/2023 2000)  Verbalizes/displays adequate comfort level or baseline comfort level:   Encourage patient to monitor pain and request assistance   Assess pain using appropriate pain scale     Problem: Safety - Adult  Goal: Free from fall injury  Outcome: Progressing  Flowsheets (Taken 3/16/2023 2000)  Free From Fall Injury:   Instruct family/caregiver on patient safety   Based on caregiver fall risk screen, instruct family/caregiver to ask for assistance with transferring infant if caregiver noted to have fall risk factors     Problem: ABCDS Injury Assessment  Goal: Absence of physical injury  Outcome: Progressing  Flowsheets (Taken 3/16/2023 2000)  Absence of Physical Injury: Implement safety measures based on patient assessment     Problem: Chronic Conditions and Co-morbidities  Goal: Patient's chronic conditions and co-morbidity symptoms are monitored and maintained or improved  Outcome: Progressing  Flowsheets (Taken 3/16/2023 2000)  Care Plan - Patient's Chronic Conditions and Co-Morbidity Symptoms are Monitored and Maintained or Improved:   Monitor and assess patient's chronic conditions and comorbid symptoms for stability, deterioration, or improvement   Collaborate with multidisciplinary team to address chronic and comorbid conditions and prevent exacerbation or deterioration     Problem: Neurosensory - Adult  Goal: Achieves stable or improved neurological status  Outcome: Progressing  Flowsheets (Taken 3/16/2023 2000)  Achieves stable or improved neurological status: Assess for and report changes in
Problem: Discharge Planning  Goal: Discharge to home or other facility with appropriate resources  Outcome: Progressing  Flowsheets (Taken 3/19/2023 4355)  Discharge to home or other facility with appropriate resources:   Identify barriers to discharge with patient and caregiver   Arrange for needed discharge resources and transportation as appropriate   Identify discharge learning needs (meds, wound care, etc)   Refer to discharge planning if patient needs post-hospital services based on physician order or complex needs related to functional status, cognitive ability or social support system     Problem: Pain  Goal: Verbalizes/displays adequate comfort level or baseline comfort level  Outcome: Progressing     Problem: Safety - Adult  Goal: Free from fall injury  Outcome: Progressing
monitor pain and request assistance     Problem: Safety - Adult  Goal: Free from fall injury  Outcome: Progressing  Flowsheets  Taken 3/15/2023 1930 by Marsha Delcid RN  Free From Fall Injury:   Instruct family/caregiver on patient safety   Based on caregiver fall risk screen, instruct family/caregiver to ask for assistance with transferring infant if caregiver noted to have fall risk factors  Taken 3/15/2023 1445 by Jordy Finch RN  Free From Fall Injury: Instruct family/caregiver on patient safety     Problem: ABCDS Injury Assessment  Goal: Absence of physical injury  Outcome: Progressing  Flowsheets (Taken 3/15/2023 1930)  Absence of Physical Injury: Implement safety measures based on patient assessment     Problem: Chronic Conditions and Co-morbidities  Goal: Patient's chronic conditions and co-morbidity symptoms are monitored and maintained or improved  Outcome: Progressing  Flowsheets  Taken 3/15/2023 1930 by Marsha Delcid RN  Care Plan - Patient's Chronic Conditions and Co-Morbidity Symptoms are Monitored and Maintained or Improved:   Monitor and assess patient's chronic conditions and comorbid symptoms for stability, deterioration, or improvement   Collaborate with multidisciplinary team to address chronic and comorbid conditions and prevent exacerbation or deterioration  Taken 3/15/2023 1445 by Milly Del Toro 34 - Patient's Chronic Conditions and Co-Morbidity Symptoms are Monitored and Maintained or Improved:   Monitor and assess patient's chronic conditions and comorbid symptoms for stability, deterioration, or improvement   Collaborate with multidisciplinary team to address chronic and comorbid conditions and prevent exacerbation or deterioration   Update acute care plan with appropriate goals if chronic or comorbid symptoms are exacerbated and prevent overall improvement and discharge     Problem: Neurosensory - Adult  Goal: Achieves stable or improved neurological status  Outcome:
needed   Ensure adequate protection for wounds/incisions during mobilization     Problem: Gastrointestinal - Adult  Goal: Minimal or absence of nausea and vomiting  Outcome: Progressing  Flowsheets (Taken 3/20/2023 2036)  Minimal or absence of nausea and vomiting:   Administer ordered antiemetic medications as needed   Provide nonpharmacologic comfort measures as appropriate   Advance diet as tolerated, if ordered     Problem: Genitourinary - Adult  Goal: Absence of urinary retention  Outcome: Progressing  Flowsheets (Taken 3/20/2023 2036)  Absence of urinary retention:   Assess patients ability to void and empty bladder   Monitor intake/output and perform bladder scan as needed  Goal: Urinary catheter remains patent  Outcome: Progressing     Problem: Infection - Adult  Goal: Absence of infection at discharge  Outcome: Progressing  Flowsheets (Taken 3/20/2023 2036)  Absence of infection at discharge:   Assess and monitor for signs and symptoms of infection   Monitor lab/diagnostic results   Monitor all insertion sites i.e., indwelling lines, tubes and drains  Goal: Absence of infection during hospitalization  Outcome: Progressing  Flowsheets (Taken 3/20/2023 2036)  Absence of infection during hospitalization:   Assess and monitor for signs and symptoms of infection   Monitor lab/diagnostic results   Monitor all insertion sites i.e., indwelling lines, tubes and drains  Goal: Absence of fever/infection during anticipated neutropenic period  Outcome: Progressing  Flowsheets (Taken 3/20/2023 2036)  Absence of fever/infection during anticipated neutropenic period:   Monitor white blood cell count   Administer growth factors as ordered     Problem: Metabolic/Fluid and Electrolytes - Adult  Goal: Electrolytes maintained within normal limits  Outcome: Progressing  Flowsheets (Taken 3/20/2023 2036)  Electrolytes maintained within normal limits:   Monitor labs and assess patient for signs and symptoms of electrolyte
RN  Absence of infection during hospitalization: Assess and monitor for signs and symptoms of infection  Goal: Absence of fever/infection during anticipated neutropenic period  Outcome: Progressing  Flowsheets  Taken 3/22/2023 1937 by Triny Sun RN  Absence of fever/infection during anticipated neutropenic period:   Monitor white blood cell count   Administer growth factors as ordered  Taken 3/22/2023 0900 by Rehan Villanueva RN  Absence of fever/infection during anticipated neutropenic period: Monitor white blood cell count     Problem: Metabolic/Fluid and Electrolytes - Adult  Goal: Electrolytes maintained within normal limits  Outcome: Progressing  Flowsheets  Taken 3/22/2023 1937 by Triny Sun RN  Electrolytes maintained within normal limits:   Monitor labs and assess patient for signs and symptoms of electrolyte imbalances   Administer electrolyte replacement as ordered   Monitor response to electrolyte replacements, including repeat lab results as appropriate  Taken 3/22/2023 0900 by Rehan Villanueva RN  Electrolytes maintained within normal limits: Monitor labs and assess patient for signs and symptoms of electrolyte imbalances  Goal: Hemodynamic stability and optimal renal function maintained  Outcome: Progressing  Flowsheets  Taken 3/22/2023 1937 by Triny Sun RN  Hemodynamic stability and optimal renal function maintained:   Monitor labs and assess for signs and symptoms of volume excess or deficit   Monitor intake, output and patient weight   Monitor urine specific gravity, serum osmolarity and serum sodium as indicated or ordered  Taken 3/22/2023 0900 by Rehan Villanueva RN  Hemodynamic stability and optimal renal function maintained: Monitor labs and assess for signs and symptoms of volume excess or deficit  Goal: Glucose maintained within prescribed range  Outcome: Progressing  Flowsheets  Taken 3/22/2023 1937 by Triny Sun RN  Glucose maintained within prescribed range:   Monitor

## 2023-03-25 ENCOUNTER — HOSPITAL ENCOUNTER (EMERGENCY)
Age: 68
Discharge: HOME | End: 2023-03-25
Attending: EMERGENCY MEDICINE
Payer: MEDICARE

## 2023-03-25 ENCOUNTER — APPOINTMENT (OUTPATIENT)
Dept: GENERAL RADIOLOGY | Age: 68
End: 2023-03-25
Payer: MEDICARE

## 2023-03-25 VITALS
DIASTOLIC BLOOD PRESSURE: 71 MMHG | OXYGEN SATURATION: 95 % | TEMPERATURE: 98.7 F | HEART RATE: 91 BPM | SYSTOLIC BLOOD PRESSURE: 128 MMHG | RESPIRATION RATE: 16 BRPM

## 2023-03-25 DIAGNOSIS — R07.89 CHEST WALL PAIN: Primary | ICD-10-CM

## 2023-03-25 LAB
ALBUMIN SERPL-MCNC: 2.9 G/DL (ref 3.2–4.6)
ALBUMIN/GLOB SERPL: 0.8 (ref 0.4–1.6)
ALP SERPL-CCNC: 44 U/L (ref 50–136)
ALT SERPL-CCNC: 27 U/L (ref 12–65)
ANION GAP SERPL CALC-SCNC: 4 MMOL/L (ref 2–11)
AST SERPL-CCNC: 15 U/L (ref 15–37)
BASOPHILS # BLD: 0.1 K/UL (ref 0–0.2)
BASOPHILS NFR BLD: 1 % (ref 0–2)
BILIRUB SERPL-MCNC: 0.3 MG/DL (ref 0.2–1.1)
BUN SERPL-MCNC: 20 MG/DL (ref 8–23)
CALCIUM SERPL-MCNC: 8.9 MG/DL (ref 8.3–10.4)
CHLORIDE SERPL-SCNC: 100 MMOL/L (ref 101–110)
CO2 SERPL-SCNC: 29 MMOL/L (ref 21–32)
CREAT SERPL-MCNC: 1 MG/DL (ref 0.8–1.5)
DIFFERENTIAL METHOD BLD: ABNORMAL
EKG ATRIAL RATE: 211 BPM
EKG ATRIAL RATE: 77 BPM
EKG DIAGNOSIS: NORMAL
EKG DIAGNOSIS: NORMAL
EKG Q-T INTERVAL: 382 MS
EKG Q-T INTERVAL: 420 MS
EKG QRS DURATION: 87 MS
EKG QRS DURATION: 97 MS
EKG QTC CALCULATION (BAZETT): 452 MS
EKG QTC CALCULATION (BAZETT): 460 MS
EKG R AXIS: 36 DEGREES
EKG R AXIS: 59 DEGREES
EKG T AXIS: 88 DEGREES
EKG VENTRICULAR RATE: 109 BPM
EKG VENTRICULAR RATE: 72 BPM
EOSINOPHIL # BLD: 0.6 K/UL (ref 0–0.8)
EOSINOPHIL NFR BLD: 6 % (ref 0.5–7.8)
ERYTHROCYTE [DISTWIDTH] IN BLOOD BY AUTOMATED COUNT: 13.6 % (ref 11.9–14.6)
GLOBULIN SER CALC-MCNC: 3.7 G/DL (ref 2.8–4.5)
GLUCOSE SERPL-MCNC: 127 MG/DL (ref 65–100)
HCT VFR BLD AUTO: 31.5 % (ref 41.1–50.3)
HGB BLD-MCNC: 10.4 G/DL (ref 13.6–17.2)
IMM GRANULOCYTES # BLD AUTO: 0.1 K/UL (ref 0–0.5)
IMM GRANULOCYTES NFR BLD AUTO: 1 % (ref 0–5)
LACTATE SERPL-SCNC: 1.3 MMOL/L (ref 0.4–2)
LIPASE SERPL-CCNC: 297 U/L (ref 73–393)
LYMPHOCYTES # BLD: 2.8 K/UL (ref 0.5–4.6)
LYMPHOCYTES NFR BLD: 25 % (ref 13–44)
MCH RBC QN AUTO: 29.6 PG (ref 26.1–32.9)
MCHC RBC AUTO-ENTMCNC: 33 G/DL (ref 31.4–35)
MCV RBC AUTO: 89.7 FL (ref 82–102)
MONOCYTES # BLD: 1 K/UL (ref 0.1–1.3)
MONOCYTES NFR BLD: 9 % (ref 4–12)
NEUTS SEG # BLD: 6.5 K/UL (ref 1.7–8.2)
NEUTS SEG NFR BLD: 58 % (ref 43–78)
NRBC # BLD: 0 K/UL (ref 0–0.2)
NT PRO BNP: 2559 PG/ML (ref 5–125)
PLATELET # BLD AUTO: 260 K/UL (ref 150–450)
PMV BLD AUTO: 9.2 FL (ref 9.4–12.3)
POTASSIUM SERPL-SCNC: 5 MMOL/L (ref 3.5–5.1)
PROT SERPL-MCNC: 6.6 G/DL (ref 6.3–8.2)
RBC # BLD AUTO: 3.51 M/UL (ref 4.23–5.6)
SODIUM SERPL-SCNC: 133 MMOL/L (ref 133–143)
TROPONIN I SERPL HS-MCNC: 79.6 PG/ML (ref 0–57)
TROPONIN I SERPL HS-MCNC: 88.3 PG/ML (ref 0–57)
TROPONIN I SERPL HS-MCNC: 90.5 PG/ML (ref 0–57)
WBC # BLD AUTO: 11 K/UL (ref 4.3–11.1)

## 2023-03-25 PROCEDURE — 99285 EMERGENCY DEPT VISIT HI MDM: CPT | Performed by: STUDENT IN AN ORGANIZED HEALTH CARE EDUCATION/TRAINING PROGRAM

## 2023-03-25 PROCEDURE — 6360000002 HC RX W HCPCS: Performed by: STUDENT IN AN ORGANIZED HEALTH CARE EDUCATION/TRAINING PROGRAM

## 2023-03-25 PROCEDURE — 96375 TX/PRO/DX INJ NEW DRUG ADDON: CPT | Performed by: STUDENT IN AN ORGANIZED HEALTH CARE EDUCATION/TRAINING PROGRAM

## 2023-03-25 PROCEDURE — 84484 ASSAY OF TROPONIN QUANT: CPT

## 2023-03-25 PROCEDURE — 71045 X-RAY EXAM CHEST 1 VIEW: CPT

## 2023-03-25 PROCEDURE — 83690 ASSAY OF LIPASE: CPT

## 2023-03-25 PROCEDURE — 93005 ELECTROCARDIOGRAM TRACING: CPT | Performed by: EMERGENCY MEDICINE

## 2023-03-25 PROCEDURE — 96376 TX/PRO/DX INJ SAME DRUG ADON: CPT | Performed by: STUDENT IN AN ORGANIZED HEALTH CARE EDUCATION/TRAINING PROGRAM

## 2023-03-25 PROCEDURE — 85025 COMPLETE CBC W/AUTO DIFF WBC: CPT

## 2023-03-25 PROCEDURE — 83880 ASSAY OF NATRIURETIC PEPTIDE: CPT

## 2023-03-25 PROCEDURE — 80053 COMPREHEN METABOLIC PANEL: CPT

## 2023-03-25 PROCEDURE — 83605 ASSAY OF LACTIC ACID: CPT

## 2023-03-25 PROCEDURE — 6360000002 HC RX W HCPCS: Performed by: EMERGENCY MEDICINE

## 2023-03-25 PROCEDURE — 96374 THER/PROPH/DIAG INJ IV PUSH: CPT | Performed by: STUDENT IN AN ORGANIZED HEALTH CARE EDUCATION/TRAINING PROGRAM

## 2023-03-25 PROCEDURE — 93005 ELECTROCARDIOGRAM TRACING: CPT | Performed by: STUDENT IN AN ORGANIZED HEALTH CARE EDUCATION/TRAINING PROGRAM

## 2023-03-25 RX ORDER — FUROSEMIDE 10 MG/ML
60 INJECTION INTRAMUSCULAR; INTRAVENOUS ONCE
Status: COMPLETED | OUTPATIENT
Start: 2023-03-25 | End: 2023-03-25

## 2023-03-25 RX ORDER — ONDANSETRON 2 MG/ML
4 INJECTION INTRAMUSCULAR; INTRAVENOUS
Status: COMPLETED | OUTPATIENT
Start: 2023-03-25 | End: 2023-03-25

## 2023-03-25 RX ORDER — MORPHINE SULFATE 4 MG/ML
4 INJECTION, SOLUTION INTRAMUSCULAR; INTRAVENOUS
Status: COMPLETED | OUTPATIENT
Start: 2023-03-25 | End: 2023-03-25

## 2023-03-25 RX ORDER — OXYCODONE HYDROCHLORIDE 5 MG/1
5 TABLET ORAL EVERY 6 HOURS PRN
Qty: 12 TABLET | Refills: 0 | Status: SHIPPED | OUTPATIENT
Start: 2023-03-25 | End: 2023-03-28

## 2023-03-25 RX ADMIN — ONDANSETRON 4 MG: 2 INJECTION INTRAMUSCULAR; INTRAVENOUS at 00:45

## 2023-03-25 RX ADMIN — FUROSEMIDE 60 MG: 10 INJECTION, SOLUTION INTRAMUSCULAR; INTRAVENOUS at 02:10

## 2023-03-25 RX ADMIN — MORPHINE SULFATE 4 MG: 4 INJECTION, SOLUTION INTRAMUSCULAR; INTRAVENOUS at 00:45

## 2023-03-25 RX ADMIN — MORPHINE SULFATE 4 MG: 4 INJECTION, SOLUTION INTRAMUSCULAR; INTRAVENOUS at 04:13

## 2023-03-25 ASSESSMENT — PAIN SCALES - GENERAL
PAINLEVEL_OUTOF10: 7
PAINLEVEL_OUTOF10: 6
PAINLEVEL_OUTOF10: 5
PAINLEVEL_OUTOF10: 9

## 2023-03-25 ASSESSMENT — ENCOUNTER SYMPTOMS
RHINORRHEA: 0
COUGH: 0
SORE THROAT: 0
VOMITING: 0
CONSTIPATION: 0
COLOR CHANGE: 0
SHORTNESS OF BREATH: 0
NAUSEA: 0
DIARRHEA: 0
BACK PAIN: 0
ABDOMINAL PAIN: 0

## 2023-03-25 ASSESSMENT — PAIN DESCRIPTION - LOCATION
LOCATION: CHEST

## 2023-03-25 ASSESSMENT — PAIN DESCRIPTION - ORIENTATION
ORIENTATION: LEFT
ORIENTATION: MID

## 2023-03-25 ASSESSMENT — PAIN DESCRIPTION - DESCRIPTORS
DESCRIPTORS: SHARP
DESCRIPTORS: SHARP

## 2023-03-25 NOTE — ED PROVIDER NOTES
Genitourinary:  Negative for dysuria and hematuria. Musculoskeletal:  Negative for back pain and neck pain. Skin:  Negative for color change and rash. Neurological:  Positive for numbness (left arm). Negative for dizziness, weakness and headaches. All other systems reviewed and are negative. Vitals signs and nursing note reviewed:  Patient Vitals for the past 4 hrs:   Temp Pulse Resp BP SpO2   03/25/23 0130 -- 85 16 -- 94 %   03/25/23 0121 -- -- -- 115/76 --   03/25/23 0049 98.7 °F (37.1 °C) -- -- -- --   03/25/23 0045 -- 79 14 119/64 97 %   03/25/23 0042 -- 72 -- 123/68 99 %          Physical Exam  Vitals and nursing note reviewed. Constitutional:       General: He is not in acute distress. Appearance: Normal appearance. HENT:      Head: Normocephalic and atraumatic. Cardiovascular:      Rate and Rhythm: Normal rate and regular rhythm. Pulmonary:      Effort: Pulmonary effort is normal. No respiratory distress. Breath sounds: No wheezing. Comments: Breath sounds decreased on left. Abdominal:      General: Bowel sounds are normal.      Palpations: Abdomen is soft. Tenderness: There is no abdominal tenderness. Musculoskeletal:         General: No tenderness. Normal range of motion. Cervical back: Normal range of motion. No tenderness. Right lower leg: Edema present. Left lower leg: Edema present. Skin:     General: Skin is warm and dry. Neurological:      Mental Status: He is alert.         Procedures          Orders Placed This Encounter   Procedures    XR CHEST PORTABLE    Troponin    CBC with Auto Differential    Comprehensive Metabolic Panel    Lipase    Lactic Acid    Brain Natriuretic Peptide    Troponin    EKG 12 Lead        Medications   furosemide (LASIX) injection 60 mg (has no administration in time range)   morphine sulfate (PF) injection 4 mg (4 mg IntraVENous Given 3/25/23 0045)   ondansetron (ZOFRAN) injection 4 mg (4 mg IntraVENous Given 3/25/23 0045)       New Prescriptions    No medications on file        Past Medical History:   Diagnosis Date    Bipolar disorder ([facility] [address]     CAD (coronary artery disease)     CHF (congestive heart failure) (HCC)     Chronic back pain     Hyperlipidemia     Hypertension     Neuropathy     Subdural hematoma 2022    Type 2 diabetes mellitus without complication [facility])         Past Surgical History:   Procedure Laterality Date    CABG WITH AORTIC VALVE REPLACEMENT N/A 3/15/2023    CORONARY ARTERY BYPASS GRAFT (CABG X 2, LIMA; ENDOSCOPIC VEIN HARVEST,LEFT GREATER SAPHENOUS VEIN; LEFT ATRIAL APPENDAGE CLIPPING; AORTIC VALVE REPLACEMENT; MAZE performed by Roxanna Sheridan MD at [facility] N/A 3/13/2023    LEFT HEART CATH / CORONARY ANGIOGRAPHY performed by Ok Mo MD at [facility] CATH LAB    HERNIA REPAIR      TRANSESOPHAGEAL ECHOCARDIOGRAM N/A 3/15/2023    TRANSESOPHAGEAL ECHOCARDIOGRAM performed by Roxanna Sheridan MD at [facility] MAIN OR        No family history on file.      Social History     Socioeconomic History    Marital status:    Tobacco Use    Smoking status: Former     Types: Cigarettes     Quit date: 1998     Years since quittin.2    Smokeless tobacco: Never   Vaping Use    Vaping Use: Never used   Substance and Sexual Activity    Alcohol use: Not Currently    Drug use: Never    Sexual activity: Not Currently     Social Determinants of Health     Financial Resource Strain: Low Risk     Difficulty of Paying Living Expenses: Not hard at all   Food Insecurity: No Food Insecurity    Worried About Running Out of Food in the Last Year: Never true    Ran Out of Food in the Last Year: Never true   Transportation Needs: Unknown    Lack of Transportation (Non-Medical): No   Housing Stability: Unknown    Unstable Housing in the Last Year: No        Allergies: Atorvastatin, Other, Rosuvastatin, and Oyster extract    Previous Medications    ACETAMINOPHEN (TYLENOL) 325 MG TABLET

## 2023-03-25 NOTE — ED NOTES
I have reviewed discharge instructions with the patient. The patient verbalized understanding. Patient left ED via Discharge Method: stretcher to [facility] facility with [employer]. Opportunity for questions and clarification provided. Patient given 1 scripts. To continue your aftercare when you leave the hospital, you may receive an automated call from our care team to check in on how you are doing. This is a free service and part of our promise to provide the best care and service to meet your aftercare needs.  If you have questions, or wish to unsubscribe from this service please call [phone]. Thank you for Choosing our [facility] Emergency Department.       Linda Bledsoe RN  03/25/23 3973

## 2023-03-25 NOTE — ED TRIAGE NOTES
[facility] from [facility] for having left sided weakness x 2 days and having chest pain       Pt had a cabg done 1 week ago per pt

## 2023-03-25 NOTE — ED NOTES
TRANSFER - OUT REPORT:    Verbal report given to John Howard on [address]  being transferred to [facility] for routine progression of patient care       Report consisted of patient's Situation, Background, Assessment and   Recommendations(SBAR). Information from the following report(s) Nurse Handoff Report, ED Encounter Summary, ED SBAR, Adult Overview, Recent Results, Neuro Assessment and Event Log was reviewed with the receiving nurse. [place] Assessment: Presents to emergency department  because of falls (Syncope, seizure, or loss of consciousness): No, Age > 79: No, Altered Mental Status, Intoxication with alcohol or substance confusion (Disorientation, impaired judgment, poor safety awaremess, or inability to follow instructions): No, Impaired Mobility: Ambulates or transfers with assistive devices or assistance; Unable to ambulate or transer.: Yes    Opportunity for questions and clarification was provided.       Patient transported with:  Levon Wheatley [facility]  03/25/23 5159

## 2023-03-25 NOTE — DISCHARGE INSTRUCTIONS
He may take Tylenol in addition to the prescribed pain medication as needed for chest wall pain. Please follow-up with his cardiologist and his cardiothoracic surgeons.   Return to the ER if any new or worsening symptoms

## 2023-03-28 NOTE — PROGRESS NOTES
monitoring (FREESTYLE FREEDOM) kit 1 kit by Does not apply route daily 1 kit 0    blood glucose monitor strips Test one time a day & as needed for symptoms of irregular blood glucose. Dispense sufficient amount for indicated testing frequency plus additional to accommodate PRN testing needs. 100 strip 5        Medications patient taking as of now reconciled against medications ordered at time of hospital discharge: Yes    A comprehensive review of systems was negative except for what was noted in the HPI. Objective:    /74   Pulse 66   Resp 16   Ht 6' (1.829 m)   Wt 274 lb 4.8 oz (124.4 kg)   SpO2 98%   BMI 37.20 kg/m²     General Appearance: alert and oriented to person, place and time, well developed and well- nourished, in no acute distress  Skin: three ulcers noted on left inner thigh, two with erythema, one with scant purulence, erythema lower left leg; healing sternal wound without palpable fluid, drainage or erythema, mildly tender  Head: normocephalic and atraumatic  Eyes: pupils equal, round, and reactive to light, extraocular eye movements intact, conjunctivae normal  ENT: external ear and ear canal normal bilaterally, nose without deformity, moist mucous membranes  Neck: supple and non-tender without mass, no cervical lymphadenopathy  Pulmonary/Chest: clear to auscultation bilaterally- no wheezes, rales or rhonchi, normal air movement, no respiratory distress  Cardiovascular: normal rate, regular rhythm, 1+ pitting edema bilateral lower extremities  Abdomen: soft, non-tender, non-distended, normal bowel sounds, no masses or organomegaly  Extremities: no cyanosis or clubbing  Neurologic: antalgic gait      An electronic signature was used to authenticate this note.   --Reji Michel, DO

## 2023-03-29 ENCOUNTER — OFFICE VISIT (OUTPATIENT)
Dept: PRIMARY CARE CLINIC | Facility: CLINIC | Age: 68
End: 2023-03-29
Payer: MEDICARE

## 2023-03-29 VITALS
RESPIRATION RATE: 16 BRPM | OXYGEN SATURATION: 98 % | SYSTOLIC BLOOD PRESSURE: 124 MMHG | DIASTOLIC BLOOD PRESSURE: 74 MMHG | WEIGHT: 274.3 LBS | BODY MASS INDEX: 37.15 KG/M2 | HEIGHT: 72 IN | HEART RATE: 66 BPM

## 2023-03-29 DIAGNOSIS — E87.1 HYPONATREMIA: ICD-10-CM

## 2023-03-29 DIAGNOSIS — E11.40 PAINFUL DIABETIC NEUROPATHY (HCC): ICD-10-CM

## 2023-03-29 DIAGNOSIS — L97.929 ULCERS OF BOTH LOWER LEGS (HCC): ICD-10-CM

## 2023-03-29 DIAGNOSIS — E11.65 UNCONTROLLED TYPE 2 DIABETES MELLITUS WITH HYPERGLYCEMIA (HCC): ICD-10-CM

## 2023-03-29 DIAGNOSIS — Z95.1 STATUS POST CORONARY ARTERY BYPASS GRAFT: ICD-10-CM

## 2023-03-29 DIAGNOSIS — I25.110 CORONARY ARTERY DISEASE INVOLVING NATIVE CORONARY ARTERY OF NATIVE HEART WITH UNSTABLE ANGINA PECTORIS (HCC): ICD-10-CM

## 2023-03-29 DIAGNOSIS — L97.919 ULCERS OF BOTH LOWER LEGS (HCC): ICD-10-CM

## 2023-03-29 DIAGNOSIS — I10 PRIMARY HYPERTENSION: Chronic | ICD-10-CM

## 2023-03-29 DIAGNOSIS — I48.91 NEW ONSET ATRIAL FIBRILLATION (HCC): ICD-10-CM

## 2023-03-29 DIAGNOSIS — I50.9 CHRONIC CONGESTIVE HEART FAILURE, UNSPECIFIED HEART FAILURE TYPE (HCC): ICD-10-CM

## 2023-03-29 DIAGNOSIS — Z09 HOSPITAL DISCHARGE FOLLOW-UP: Primary | ICD-10-CM

## 2023-03-29 DIAGNOSIS — F31.10 BIPOLAR I DISORDER WITH MANIA (HCC): Chronic | ICD-10-CM

## 2023-03-29 PROCEDURE — 3078F DIAST BP <80 MM HG: CPT | Performed by: FAMILY MEDICINE

## 2023-03-29 PROCEDURE — 1123F ACP DISCUSS/DSCN MKR DOCD: CPT | Performed by: FAMILY MEDICINE

## 2023-03-29 PROCEDURE — G8484 FLU IMMUNIZE NO ADMIN: HCPCS | Performed by: FAMILY MEDICINE

## 2023-03-29 PROCEDURE — 1111F DSCHRG MED/CURRENT MED MERGE: CPT | Performed by: FAMILY MEDICINE

## 2023-03-29 PROCEDURE — 2022F DILAT RTA XM EVC RTNOPTHY: CPT | Performed by: FAMILY MEDICINE

## 2023-03-29 PROCEDURE — 3051F HG A1C>EQUAL 7.0%<8.0%: CPT | Performed by: FAMILY MEDICINE

## 2023-03-29 PROCEDURE — 3017F COLORECTAL CA SCREEN DOC REV: CPT | Performed by: FAMILY MEDICINE

## 2023-03-29 PROCEDURE — 3074F SYST BP LT 130 MM HG: CPT | Performed by: FAMILY MEDICINE

## 2023-03-29 PROCEDURE — G8417 CALC BMI ABV UP PARAM F/U: HCPCS | Performed by: FAMILY MEDICINE

## 2023-03-29 PROCEDURE — 1036F TOBACCO NON-USER: CPT | Performed by: FAMILY MEDICINE

## 2023-03-29 PROCEDURE — 99214 OFFICE O/P EST MOD 30 MIN: CPT | Performed by: FAMILY MEDICINE

## 2023-03-29 PROCEDURE — G8427 DOCREV CUR MEDS BY ELIG CLIN: HCPCS | Performed by: FAMILY MEDICINE

## 2023-03-29 RX ORDER — GABAPENTIN 300 MG/1
300 CAPSULE ORAL 3 TIMES DAILY
Qty: 90 CAPSULE | Refills: 5 | Status: SHIPPED | OUTPATIENT
Start: 2023-03-29 | End: 2023-04-28

## 2023-03-29 RX ORDER — TRAMADOL HYDROCHLORIDE 50 MG/1
50 TABLET ORAL EVERY 12 HOURS PRN
Qty: 40 TABLET | Refills: 0 | Status: SHIPPED | OUTPATIENT
Start: 2023-03-29 | End: 2023-04-03

## 2023-03-29 RX ORDER — PANTOPRAZOLE SODIUM 40 MG/1
40 TABLET, DELAYED RELEASE ORAL DAILY
COMMUNITY
Start: 2023-03-26

## 2023-03-29 RX ORDER — ASPIRIN 81 MG/1
TABLET, CHEWABLE ORAL
COMMUNITY
Start: 2023-03-26

## 2023-03-29 SDOH — ECONOMIC STABILITY: FOOD INSECURITY: WITHIN THE PAST 12 MONTHS, YOU WORRIED THAT YOUR FOOD WOULD RUN OUT BEFORE YOU GOT MONEY TO BUY MORE.: NEVER TRUE

## 2023-03-29 SDOH — ECONOMIC STABILITY: INCOME INSECURITY: HOW HARD IS IT FOR YOU TO PAY FOR THE VERY BASICS LIKE FOOD, HOUSING, MEDICAL CARE, AND HEATING?: NOT HARD AT ALL

## 2023-03-29 SDOH — ECONOMIC STABILITY: FOOD INSECURITY: WITHIN THE PAST 12 MONTHS, THE FOOD YOU BOUGHT JUST DIDN'T LAST AND YOU DIDN'T HAVE MONEY TO GET MORE.: NEVER TRUE

## 2023-03-29 ASSESSMENT — ANXIETY QUESTIONNAIRES
1. FEELING NERVOUS, ANXIOUS, OR ON EDGE: 0
IF YOU CHECKED OFF ANY PROBLEMS ON THIS QUESTIONNAIRE, HOW DIFFICULT HAVE THESE PROBLEMS MADE IT FOR YOU TO DO YOUR WORK, TAKE CARE OF THINGS AT HOME, OR GET ALONG WITH OTHER PEOPLE: NOT DIFFICULT AT ALL
5. BEING SO RESTLESS THAT IT IS HARD TO SIT STILL: 0
3. WORRYING TOO MUCH ABOUT DIFFERENT THINGS: 0
7. FEELING AFRAID AS IF SOMETHING AWFUL MIGHT HAPPEN: 0
GAD7 TOTAL SCORE: 0
2. NOT BEING ABLE TO STOP OR CONTROL WORRYING: 0
4. TROUBLE RELAXING: 0
6. BECOMING EASILY ANNOYED OR IRRITABLE: 0

## 2023-03-29 ASSESSMENT — PATIENT HEALTH QUESTIONNAIRE - PHQ9
7. TROUBLE CONCENTRATING ON THINGS, SUCH AS READING THE NEWSPAPER OR WATCHING TELEVISION: 0
SUM OF ALL RESPONSES TO PHQ QUESTIONS 1-9: 0
SUM OF ALL RESPONSES TO PHQ9 QUESTIONS 1 & 2: 0
9. THOUGHTS THAT YOU WOULD BE BETTER OFF DEAD, OR OF HURTING YOURSELF: 0
8. MOVING OR SPEAKING SO SLOWLY THAT OTHER PEOPLE COULD HAVE NOTICED. OR THE OPPOSITE, BEING SO FIGETY OR RESTLESS THAT YOU HAVE BEEN MOVING AROUND A LOT MORE THAN USUAL: 0
SUM OF ALL RESPONSES TO PHQ QUESTIONS 1-9: 0
5. POOR APPETITE OR OVEREATING: 0
10. IF YOU CHECKED OFF ANY PROBLEMS, HOW DIFFICULT HAVE THESE PROBLEMS MADE IT FOR YOU TO DO YOUR WORK, TAKE CARE OF THINGS AT HOME, OR GET ALONG WITH OTHER PEOPLE: 0
SUM OF ALL RESPONSES TO PHQ QUESTIONS 1-9: 0
3. TROUBLE FALLING OR STAYING ASLEEP: 0
6. FEELING BAD ABOUT YOURSELF - OR THAT YOU ARE A FAILURE OR HAVE LET YOURSELF OR YOUR FAMILY DOWN: 0
SUM OF ALL RESPONSES TO PHQ QUESTIONS 1-9: 0
2. FEELING DOWN, DEPRESSED OR HOPELESS: 0
1. LITTLE INTEREST OR PLEASURE IN DOING THINGS: 0
4. FEELING TIRED OR HAVING LITTLE ENERGY: 0

## 2023-03-29 NOTE — ASSESSMENT & PLAN NOTE
Status post CABG, patient healing but still sore. Has follow-up this week with Dr. Pratima Samuel. We will continue to follow.

## 2023-03-29 NOTE — ASSESSMENT & PLAN NOTE
Sternal wound healing well, still some soreness. When he finishes oxycodone we will cut back to tramadol to use as needed for severe pain. Discussed use of Tylenol for mild pain.

## 2023-03-29 NOTE — ASSESSMENT & PLAN NOTE
Diagnosed earlier this month, admitted and converted with IV Cardizem but continued to go in and out of A-fib and a flutter, so treating with Eliquis, amiodarone and metoprolol. Has follow-up this week with cardiology. Still has some chest soreness but denies deeper chest pain or palpitations today. We will continue to monitor.

## 2023-03-29 NOTE — ASSESSMENT & PLAN NOTE
Patient with persistent ulcers on left thigh, slight erythema noted with scant purulence. No streaking. Instructed to resume the cephalexin he was previously prescribed and continue to keep them clean per wound clinic. We will recheck at next visit in 1 month.

## 2023-03-29 NOTE — ASSESSMENT & PLAN NOTE
Hemoglobin A1C   Date Value Ref Range Status   03/13/2023 7.3 (H) 4.8 - 5.6 % Final     Metformin stopped in the hospital, though patient had normal creatinine function. We will restart this today, refill sent to the pharmacy.

## 2023-03-29 NOTE — PATIENT INSTRUCTIONS
IT WAS GREAT TO SEE YOU TODAY! I WILL CALL YOU WITH THE RESULTS OF YOUR LABS. PLEASE TAKE ALL MEDICATION AS DISCUSSED.    ~I AM RESTARTING YOU ON METFORMIN AND GABAPENTIN LIKE BEFORE.    ~I AM PRESCRIBING TRAMADOL TO HELP WITH PAIN WHEN YOU FINISH THE OXYCODONE. I CANNOT REFILL THIS SO PLEASE ONLY USE IT WHEN YOU NEED IT.  OTHERWISE USE TYLENOL    ~FINISH THE CEPHALEXIN FOR YOUR LEGS. KEEP USING THE FOAM THEY GAVE YOU AND ELEVATE YOUR LEGS WHEN YOU'RE NOT WALKING TO HELP WITH SWELLING.     I WILL SEE YOU AGAIN IN 4 WEEKS BUT PLEASE CALL WITH CONCERNS [phone]

## 2023-03-29 NOTE — ASSESSMENT & PLAN NOTE
Hyponatremic in the hospital, initially thought to be due to furosemide but then attributed it to fluid overload. We will recheck BMP today.

## 2023-03-29 NOTE — ASSESSMENT & PLAN NOTE
Had fluid overload in the hospital, increase to furosemide 40 mg twice a day. We will recheck BMP today. Notes improving swelling in both legs today.

## 2023-03-29 NOTE — ASSESSMENT & PLAN NOTE
Patient states that his mood is better and does not feel that he needs any more Risperidone. Discussed importance of monitoring to prevent this from happening again. Will continue to monitor.

## 2023-03-30 LAB
ANION GAP SERPL CALC-SCNC: 6 MMOL/L (ref 2–11)
BUN SERPL-MCNC: 18 MG/DL (ref 8–23)
CALCIUM SERPL-MCNC: 8.6 MG/DL (ref 8.3–10.4)
CHLORIDE SERPL-SCNC: 104 MMOL/L (ref 101–110)
CO2 SERPL-SCNC: 29 MMOL/L (ref 21–32)
CREAT SERPL-MCNC: 1.2 MG/DL (ref 0.8–1.5)
GLUCOSE SERPL-MCNC: 135 MG/DL (ref 65–100)
POTASSIUM SERPL-SCNC: 4.4 MMOL/L (ref 3.5–5.1)
SODIUM SERPL-SCNC: 139 MMOL/L (ref 133–143)

## 2023-03-31 ENCOUNTER — OFFICE VISIT (OUTPATIENT)
Dept: CARDIOLOGY CLINIC | Age: 68
End: 2023-03-31

## 2023-03-31 VITALS
WEIGHT: 273 LBS | BODY MASS INDEX: 36.98 KG/M2 | SYSTOLIC BLOOD PRESSURE: 120 MMHG | HEART RATE: 72 BPM | HEIGHT: 72 IN | DIASTOLIC BLOOD PRESSURE: 66 MMHG

## 2023-03-31 DIAGNOSIS — I10 ESSENTIAL HYPERTENSION: ICD-10-CM

## 2023-03-31 DIAGNOSIS — Z95.2 H/O AORTIC VALVE REPLACEMENT: ICD-10-CM

## 2023-03-31 DIAGNOSIS — E78.2 MIXED HYPERLIPIDEMIA: ICD-10-CM

## 2023-03-31 DIAGNOSIS — I25.10 CORONARY ARTERY DISEASE INVOLVING NATIVE CORONARY ARTERY OF NATIVE HEART WITHOUT ANGINA PECTORIS: Primary | ICD-10-CM

## 2023-03-31 DIAGNOSIS — Z95.1 HX OF CABG: ICD-10-CM

## 2023-03-31 DIAGNOSIS — I35.0 AORTIC VALVE STENOSIS, ETIOLOGY OF CARDIAC VALVE DISEASE UNSPECIFIED: ICD-10-CM

## 2023-03-31 DIAGNOSIS — I48.0 PAROXYSMAL ATRIAL FIBRILLATION (HCC): ICD-10-CM

## 2023-03-31 RX ORDER — CEPHALEXIN 500 MG/1
500 CAPSULE ORAL 2 TIMES DAILY
COMMUNITY

## 2023-03-31 ASSESSMENT — ENCOUNTER SYMPTOMS
ABDOMINAL PAIN: 0
NAUSEA: 0
COUGH: 0
SHORTNESS OF BREATH: 0
GASTROINTESTINAL NEGATIVE: 1
HEMATOCHEZIA: 0
VOMITING: 0

## 2023-03-31 NOTE — PROGRESS NOTES
CABG, however unable to tolerate high potency statin. 7. Paroxysmal atrial fibrillation (HCC)  -Continue metoprolol, Eliquis 5 mg twice daily for stroke prophylaxis. Amiodarone started by CT surgery, will just use a short course postoperatively. Problem List Items Addressed This Visit          Circulatory    Paroxysmal atrial fibrillation (HCC)    Aortic valve stenosis     Other Visit Diagnoses       Coronary artery disease involving native coronary artery of native heart without angina pectoris    -  Primary    Hx of CABG        H/O aortic valve replacement        Essential hypertension        Mixed hyperlipidemia                Instructed patient go to ER or call 911/EMS should symptoms recur or worsen. Patient has been instructed and agrees to call our office with any issues or other concerns related to their cardiac condition(s) and/or complaint(s). No follow-ups on file.     Sidney Hernández DO  3/31/2023 4:14 PM

## 2023-04-18 ENCOUNTER — OFFICE VISIT (OUTPATIENT)
Dept: CARDIOTHORACIC SURGERY | Age: 68
End: 2023-04-18

## 2023-04-18 VITALS
HEIGHT: 72 IN | DIASTOLIC BLOOD PRESSURE: 97 MMHG | HEART RATE: 106 BPM | BODY MASS INDEX: 34.27 KG/M2 | WEIGHT: 253 LBS | SYSTOLIC BLOOD PRESSURE: 180 MMHG

## 2023-04-18 DIAGNOSIS — Z95.2 S/P AVR (AORTIC VALVE REPLACEMENT): Primary | ICD-10-CM

## 2023-04-18 DIAGNOSIS — Z95.1 S/P CABG X 2: ICD-10-CM

## 2023-04-18 PROCEDURE — 99024 POSTOP FOLLOW-UP VISIT: CPT | Performed by: PHYSICIAN ASSISTANT

## 2023-04-18 RX ORDER — TRAMADOL HYDROCHLORIDE 50 MG/1
50 TABLET ORAL EVERY 6 HOURS PRN
Qty: 25 TABLET | Refills: 0 | Status: SHIPPED | OUTPATIENT
Start: 2023-04-18 | End: 2023-05-02

## 2023-04-18 NOTE — PROGRESS NOTES
[address] [facility]  [facility]. MD Eda Hahn. Nikhil Rosenthal MD          [address] [facility] [address]       [id number]  4/18/2023 1953      Jackie Wong Sr. is seen today for follow-up status post aortic valve replacement with a 25mm Diaz Inspiris valve, CABG X 2 with LIMA to the LAD and reverse SVG to the Intermediate coronary, as well as MAZE and clipping of the left atrial appendage on 3/15/23. He had recurrent a-fib/flutter with RVR and was started on IV amiodarone. He improved and was transferred to CV stepdown on POD 2. His rhythm improved and he was transitioned back to oral amiodarone. He continued to have intermittent a-fib/flutter and was started on Eliquis for anticoagulation. He was started on IV lasix for more aggressive diuresis given volume overload and scrotal edema. He was weaned off of O2. He developed worsening hyponatremia and lasix was held by cardiology. He developed worsening dyspnea and IV lasix was resumed. Nephrology was consulted for hyponatremia. Hyponatremia was felt to be dilutional in setting of volume overload. IV lasix was continued for diuresis. Hyponatremia improved with diuresis and he felt much better. Acute rehab was recommended for continued therapy. Insurance initially denied rehab but patient appealed and the denial was overturned. He was discharged to [facility] on 3/23/23 on continued oral lasix. Repeat outpatient BMP showed sodium of 139.     he is active and ambulatory. There is no fever or shortness of breath. Appetite is good. Pain has improved. He has ongoing pain in his back as well as left leg at vein harvest sites. He states Tylenol is not helping his pain. Pt is sleeping well. EXAM:  Vitals:  Blood pressure (!) 180/97, pulse (!) 106, height 6' (1.829 m), weight 253 lb (114.8 kg). Lungs:  Clear to auscultation bilaterally. Heart: Regular rate and rhythm without murmurs. Incision: Sternum stable.

## 2023-04-26 ENCOUNTER — TELEPHONE (OUTPATIENT)
Dept: PRIMARY CARE CLINIC | Facility: CLINIC | Age: 68
End: 2023-04-26

## 2023-04-26 RX ORDER — FUROSEMIDE 40 MG/1
40 TABLET ORAL 2 TIMES DAILY
Qty: 14 TABLET | Refills: 0 | Status: CANCELLED | OUTPATIENT
Start: 2023-04-26 | End: 2023-05-03

## 2023-04-26 RX ORDER — LISINOPRIL 10 MG/1
10 TABLET ORAL DAILY
Qty: 30 TABLET | Refills: 6 | Status: SHIPPED | OUTPATIENT
Start: 2023-04-26

## 2023-04-26 RX ORDER — PRAVASTATIN SODIUM 40 MG
40 TABLET ORAL NIGHTLY
Qty: 90 TABLET | Refills: 3 | Status: SHIPPED | OUTPATIENT
Start: 2023-04-26

## 2023-04-26 RX ORDER — GABAPENTIN 300 MG/1
300 CAPSULE ORAL 3 TIMES DAILY
Qty: 90 CAPSULE | Refills: 5 | Status: CANCELLED | OUTPATIENT
Start: 2023-04-26 | End: 2023-05-26

## 2023-04-26 NOTE — TELEPHONE ENCOUNTER
Patient left voice mail stating he needed refills, and did not specify which medications. Called patient and asked what refills he needed. Patient states that I should call the pharmacy and find out. Told patient it was not my responsibility to see what refills he needed. He stated he wanted to talk to \"that black girl\" I asked him to have respect and then he said if he cant talk to the black girl he wanted to talk to [place]. I told him she goes by Dr. Bib Patel he said no I can call her that because she said I could. I told him I was sorry but we need to have some respect and call her by her title. He then called me a \"maraim carranzach\" I ended the call.

## 2023-04-26 NOTE — TELEPHONE ENCOUNTER
Patient called requesting to cancel his future visit with the office. He stated that the \"nurse\" was a smart ass, and that he simply wanted to refill his medication and all she had to do was look in his chart\". Patient stated he did not want to return to the practice. Cancelled patient visit.

## 2023-04-27 ENCOUNTER — TELEPHONE (OUTPATIENT)
Dept: PRIMARY CARE CLINIC | Facility: CLINIC | Age: 68
End: 2023-04-27

## 2023-04-27 RX ORDER — PANTOPRAZOLE SODIUM 40 MG/1
40 TABLET, DELAYED RELEASE ORAL DAILY
Qty: 30 TABLET | Refills: 6 | Status: SHIPPED | OUTPATIENT
Start: 2023-04-27

## 2023-04-28 PROBLEM — Z09 HOSPITAL DISCHARGE FOLLOW-UP: Status: RESOLVED | Noted: 2023-03-29 | Resolved: 2023-04-28

## 2023-05-01 NOTE — TELEPHONE ENCOUNTER
Spoke with patient's daughter, Lesly Ballard. She mentioned that her father was upset after speaking with us initially last week. Notes that he was calling me by my first name because he cannot say my last name. When I mentioned that he called Christo Choi a derogatory name she said that he will not act that way if she comes and asks if we can see him again to discuss other medications. Also mentioned that he is having chest discomfort after his heart surgery. Told Lesly Ballard that he will need to see Cardiology if he is having chest discomfort but I will see him as currently scheduled this week if she comes with him. I did inform her that if he says anything derogatory he will not be able to return. She verbalized understanding.

## 2023-05-18 ENCOUNTER — APPOINTMENT (OUTPATIENT)
Dept: CT IMAGING | Age: 68
End: 2023-05-18
Payer: MEDICARE

## 2023-05-18 ENCOUNTER — APPOINTMENT (OUTPATIENT)
Dept: GENERAL RADIOLOGY | Age: 68
End: 2023-05-18
Payer: MEDICARE

## 2023-05-18 ENCOUNTER — HOSPITAL ENCOUNTER (INPATIENT)
Age: 68
LOS: 2 days | Discharge: HOME | End: 2023-05-20
Attending: EMERGENCY MEDICINE | Admitting: FAMILY MEDICINE
Payer: MEDICARE

## 2023-05-18 DIAGNOSIS — K56.609 SBO (SMALL BOWEL OBSTRUCTION) (HCC): Primary | ICD-10-CM

## 2023-05-18 LAB
ALBUMIN SERPL-MCNC: 3.2 G/DL (ref 3.2–4.6)
ALBUMIN/GLOB SERPL: 0.8 (ref 0.4–1.6)
ALP SERPL-CCNC: 61 U/L (ref 50–136)
ALT SERPL-CCNC: 26 U/L (ref 12–65)
ANION GAP SERPL CALC-SCNC: 7 MMOL/L (ref 2–11)
AST SERPL-CCNC: 22 U/L (ref 15–37)
BASOPHILS # BLD: 0 K/UL (ref 0–0.2)
BASOPHILS NFR BLD: 0 % (ref 0–2)
BILIRUB SERPL-MCNC: 0.3 MG/DL (ref 0.2–1.1)
BUN SERPL-MCNC: 15 MG/DL (ref 8–23)
CALCIUM SERPL-MCNC: 8.5 MG/DL (ref 8.3–10.4)
CHLORIDE SERPL-SCNC: 107 MMOL/L (ref 101–110)
CO2 SERPL-SCNC: 22 MMOL/L (ref 21–32)
CREAT SERPL-MCNC: 0.8 MG/DL (ref 0.8–1.5)
DIFFERENTIAL METHOD BLD: ABNORMAL
EOSINOPHIL # BLD: 0.1 K/UL (ref 0–0.8)
EOSINOPHIL NFR BLD: 1 % (ref 0.5–7.8)
ERYTHROCYTE [DISTWIDTH] IN BLOOD BY AUTOMATED COUNT: 14.6 % (ref 11.9–14.6)
GLOBULIN SER CALC-MCNC: 4 G/DL (ref 2.8–4.5)
GLUCOSE SERPL-MCNC: 194 MG/DL (ref 65–100)
HCT VFR BLD AUTO: 42.8 % (ref 41.1–50.3)
HGB BLD-MCNC: 13.6 G/DL (ref 13.6–17.2)
IMM GRANULOCYTES # BLD AUTO: 0.1 K/UL (ref 0–0.5)
IMM GRANULOCYTES NFR BLD AUTO: 0 % (ref 0–5)
LACTATE SERPL-SCNC: 1.4 MMOL/L (ref 0.4–2)
LIPASE SERPL-CCNC: 252 U/L (ref 73–393)
LYMPHOCYTES # BLD: 1.1 K/UL (ref 0.5–4.6)
LYMPHOCYTES NFR BLD: 9 % (ref 13–44)
MCH RBC QN AUTO: 27.8 PG (ref 26.1–32.9)
MCHC RBC AUTO-ENTMCNC: 31.8 G/DL (ref 31.4–35)
MCV RBC AUTO: 87.5 FL (ref 82–102)
MONOCYTES # BLD: 0.7 K/UL (ref 0.1–1.3)
MONOCYTES NFR BLD: 6 % (ref 4–12)
NEUTS SEG # BLD: 10.4 K/UL (ref 1.7–8.2)
NEUTS SEG NFR BLD: 84 % (ref 43–78)
NRBC # BLD: 0 K/UL (ref 0–0.2)
PLATELET # BLD AUTO: 194 K/UL (ref 150–450)
PMV BLD AUTO: 10.1 FL (ref 9.4–12.3)
POTASSIUM SERPL-SCNC: 3.7 MMOL/L (ref 3.5–5.1)
PROT SERPL-MCNC: 7.2 G/DL (ref 6.3–8.2)
RBC # BLD AUTO: 4.89 M/UL (ref 4.23–5.6)
SODIUM SERPL-SCNC: 136 MMOL/L (ref 133–143)
WBC # BLD AUTO: 12.5 K/UL (ref 4.3–11.1)

## 2023-05-18 PROCEDURE — 99285 EMERGENCY DEPT VISIT HI MDM: CPT

## 2023-05-18 PROCEDURE — 74177 CT ABD & PELVIS W/CONTRAST: CPT

## 2023-05-18 PROCEDURE — 96375 TX/PRO/DX INJ NEW DRUG ADDON: CPT

## 2023-05-18 PROCEDURE — 1100000000 HC RM PRIVATE

## 2023-05-18 PROCEDURE — 96374 THER/PROPH/DIAG INJ IV PUSH: CPT

## 2023-05-18 PROCEDURE — 81001 URINALYSIS AUTO W/SCOPE: CPT

## 2023-05-18 PROCEDURE — 83605 ASSAY OF LACTIC ACID: CPT

## 2023-05-18 PROCEDURE — 71045 X-RAY EXAM CHEST 1 VIEW: CPT

## 2023-05-18 PROCEDURE — 6360000004 HC RX CONTRAST MEDICATION: Performed by: EMERGENCY MEDICINE

## 2023-05-18 PROCEDURE — 80053 COMPREHEN METABOLIC PANEL: CPT

## 2023-05-18 PROCEDURE — 6360000002 HC RX W HCPCS: Performed by: EMERGENCY MEDICINE

## 2023-05-18 PROCEDURE — 85025 COMPLETE CBC W/AUTO DIFF WBC: CPT

## 2023-05-18 PROCEDURE — 83690 ASSAY OF LIPASE: CPT

## 2023-05-18 PROCEDURE — 2580000003 HC RX 258: Performed by: EMERGENCY MEDICINE

## 2023-05-18 RX ORDER — 0.9 % SODIUM CHLORIDE 0.9 %
1000 INTRAVENOUS SOLUTION INTRAVENOUS
Status: COMPLETED | OUTPATIENT
Start: 2023-05-18 | End: 2023-05-19

## 2023-05-18 RX ORDER — ONDANSETRON 2 MG/ML
4 INJECTION INTRAMUSCULAR; INTRAVENOUS ONCE
Status: COMPLETED | OUTPATIENT
Start: 2023-05-19 | End: 2023-05-19

## 2023-05-18 RX ORDER — HYDROMORPHONE HYDROCHLORIDE 1 MG/ML
1 INJECTION, SOLUTION INTRAMUSCULAR; INTRAVENOUS; SUBCUTANEOUS ONCE
Status: COMPLETED | OUTPATIENT
Start: 2023-05-19 | End: 2023-05-19

## 2023-05-18 RX ORDER — ONDANSETRON 2 MG/ML
4 INJECTION INTRAMUSCULAR; INTRAVENOUS
Status: COMPLETED | OUTPATIENT
Start: 2023-05-18 | End: 2023-05-18

## 2023-05-18 RX ORDER — MORPHINE SULFATE 4 MG/ML
4 INJECTION, SOLUTION INTRAMUSCULAR; INTRAVENOUS
Status: COMPLETED | OUTPATIENT
Start: 2023-05-18 | End: 2023-05-18

## 2023-05-18 RX ADMIN — MORPHINE SULFATE 4 MG: 4 INJECTION INTRAVENOUS at 22:05

## 2023-05-18 RX ADMIN — SODIUM CHLORIDE 1000 ML: 9 INJECTION, SOLUTION INTRAVENOUS at 22:06

## 2023-05-18 RX ADMIN — IOPAMIDOL 100 ML: 755 INJECTION, SOLUTION INTRAVENOUS at 22:29

## 2023-05-18 RX ADMIN — ONDANSETRON 4 MG: 2 INJECTION INTRAMUSCULAR; INTRAVENOUS at 22:05

## 2023-05-18 ASSESSMENT — ENCOUNTER SYMPTOMS
SORE THROAT: 0
SHORTNESS OF BREATH: 0
COLOR CHANGE: 0
ABDOMINAL PAIN: 1
CONSTIPATION: 0
NAUSEA: 1
RHINORRHEA: 0
VOMITING: 1
COUGH: 0
BACK PAIN: 0
DIARRHEA: 0

## 2023-05-18 ASSESSMENT — PAIN - FUNCTIONAL ASSESSMENT: PAIN_FUNCTIONAL_ASSESSMENT: 0-10

## 2023-05-18 ASSESSMENT — PAIN SCALES - GENERAL: PAINLEVEL_OUTOF10: 9

## 2023-05-18 ASSESSMENT — PAIN DESCRIPTION - LOCATION: LOCATION: ABDOMEN

## 2023-05-19 LAB
ANION GAP SERPL CALC-SCNC: 1 MMOL/L (ref 2–11)
APPEARANCE UR: CLEAR
BACTERIA URNS QL MICRO: NEGATIVE /HPF
BASOPHILS # BLD: 0 K/UL (ref 0–0.2)
BASOPHILS NFR BLD: 0 % (ref 0–2)
BILIRUB UR QL: NEGATIVE
BUN SERPL-MCNC: 12 MG/DL (ref 8–23)
CALCIUM SERPL-MCNC: 8.7 MG/DL (ref 8.3–10.4)
CASTS URNS QL MICRO: ABNORMAL /LPF
CHLORIDE SERPL-SCNC: 108 MMOL/L (ref 101–110)
CO2 SERPL-SCNC: 25 MMOL/L (ref 21–32)
COLOR UR: ABNORMAL
CREAT SERPL-MCNC: 0.7 MG/DL (ref 0.8–1.5)
DIFFERENTIAL METHOD BLD: ABNORMAL
EOSINOPHIL # BLD: 0.2 K/UL (ref 0–0.8)
EOSINOPHIL NFR BLD: 1 % (ref 0.5–7.8)
EPI CELLS #/AREA URNS HPF: ABNORMAL /HPF
ERYTHROCYTE [DISTWIDTH] IN BLOOD BY AUTOMATED COUNT: 14.6 % (ref 11.9–14.6)
GLUCOSE BLD STRIP.AUTO-MCNC: 102 MG/DL (ref 65–100)
GLUCOSE BLD STRIP.AUTO-MCNC: 112 MG/DL (ref 65–100)
GLUCOSE BLD STRIP.AUTO-MCNC: 150 MG/DL (ref 65–100)
GLUCOSE BLD STRIP.AUTO-MCNC: 157 MG/DL (ref 65–100)
GLUCOSE SERPL-MCNC: 177 MG/DL (ref 65–100)
GLUCOSE UR STRIP.AUTO-MCNC: >1000 MG/DL
HCT VFR BLD AUTO: 46.2 % (ref 41.1–50.3)
HGB BLD-MCNC: 14.7 G/DL (ref 13.6–17.2)
HGB UR QL STRIP: NEGATIVE
IMM GRANULOCYTES # BLD AUTO: 0.1 K/UL (ref 0–0.5)
IMM GRANULOCYTES NFR BLD AUTO: 0 % (ref 0–5)
KETONES UR QL STRIP.AUTO: ABNORMAL MG/DL
LEUKOCYTE ESTERASE UR QL STRIP.AUTO: NEGATIVE
LYMPHOCYTES # BLD: 1.3 K/UL (ref 0.5–4.6)
LYMPHOCYTES NFR BLD: 10 % (ref 13–44)
MCH RBC QN AUTO: 27.8 PG (ref 26.1–32.9)
MCHC RBC AUTO-ENTMCNC: 31.8 G/DL (ref 31.4–35)
MCV RBC AUTO: 87.5 FL (ref 82–102)
MONOCYTES # BLD: 0.9 K/UL (ref 0.1–1.3)
MONOCYTES NFR BLD: 7 % (ref 4–12)
MUCOUS THREADS URNS QL MICRO: 0 /LPF
NEUTS SEG # BLD: 11 K/UL (ref 1.7–8.2)
NEUTS SEG NFR BLD: 82 % (ref 43–78)
NITRITE UR QL STRIP.AUTO: NEGATIVE
NRBC # BLD: 0 K/UL (ref 0–0.2)
PH UR STRIP: 7 (ref 5–9)
PLATELET # BLD AUTO: 212 K/UL (ref 150–450)
PMV BLD AUTO: 10.1 FL (ref 9.4–12.3)
POTASSIUM SERPL-SCNC: 4.1 MMOL/L (ref 3.5–5.1)
PROT UR STRIP-MCNC: 100 MG/DL
RBC # BLD AUTO: 5.28 M/UL (ref 4.23–5.6)
RBC #/AREA URNS HPF: ABNORMAL /HPF
SERVICE CMNT-IMP: ABNORMAL
SODIUM SERPL-SCNC: 134 MMOL/L (ref 133–143)
SP GR UR REFRACTOMETRY: >1.035 (ref 1–1.02)
URINE CULTURE IF INDICATED: ABNORMAL
UROBILINOGEN UR QL STRIP.AUTO: 0.2 EU/DL (ref 0.2–1)
WBC # BLD AUTO: 13.4 K/UL (ref 4.3–11.1)
WBC URNS QL MICRO: ABNORMAL /HPF

## 2023-05-19 PROCEDURE — 6360000002 HC RX W HCPCS: Performed by: FAMILY MEDICINE

## 2023-05-19 PROCEDURE — 85025 COMPLETE CBC W/AUTO DIFF WBC: CPT

## 2023-05-19 PROCEDURE — 1100000000 HC RM PRIVATE

## 2023-05-19 PROCEDURE — 2580000003 HC RX 258: Performed by: FAMILY MEDICINE

## 2023-05-19 PROCEDURE — 6370000000 HC RX 637 (ALT 250 FOR IP): Performed by: PHYSICIAN ASSISTANT

## 2023-05-19 PROCEDURE — 80048 BASIC METABOLIC PNL TOTAL CA: CPT

## 2023-05-19 PROCEDURE — 6370000000 HC RX 637 (ALT 250 FOR IP): Performed by: FAMILY MEDICINE

## 2023-05-19 PROCEDURE — 99222 1ST HOSP IP/OBS MODERATE 55: CPT | Performed by: SURGERY

## 2023-05-19 PROCEDURE — 2500000003 HC RX 250 WO HCPCS: Performed by: FAMILY MEDICINE

## 2023-05-19 PROCEDURE — 82962 GLUCOSE BLOOD TEST: CPT

## 2023-05-19 PROCEDURE — 36415 COLL VENOUS BLD VENIPUNCTURE: CPT

## 2023-05-19 RX ORDER — INSULIN LISPRO 100 [IU]/ML
0-8 INJECTION, SOLUTION INTRAVENOUS; SUBCUTANEOUS
Status: DISCONTINUED | OUTPATIENT
Start: 2023-05-19 | End: 2023-05-20 | Stop reason: HOSPADM

## 2023-05-19 RX ORDER — SODIUM CHLORIDE 0.9 % (FLUSH) 0.9 %
5-40 SYRINGE (ML) INJECTION EVERY 12 HOURS SCHEDULED
Status: DISCONTINUED | OUTPATIENT
Start: 2023-05-19 | End: 2023-05-20 | Stop reason: HOSPADM

## 2023-05-19 RX ORDER — PANTOPRAZOLE SODIUM 40 MG/1
40 TABLET, DELAYED RELEASE ORAL DAILY
Status: DISCONTINUED | OUTPATIENT
Start: 2023-05-19 | End: 2023-05-20 | Stop reason: HOSPADM

## 2023-05-19 RX ORDER — PRAVASTATIN SODIUM 20 MG
40 TABLET ORAL NIGHTLY
Status: DISCONTINUED | OUTPATIENT
Start: 2023-05-19 | End: 2023-05-20 | Stop reason: HOSPADM

## 2023-05-19 RX ORDER — INSULIN LISPRO 100 [IU]/ML
0-4 INJECTION, SOLUTION INTRAVENOUS; SUBCUTANEOUS NIGHTLY
Status: DISCONTINUED | OUTPATIENT
Start: 2023-05-19 | End: 2023-05-20 | Stop reason: HOSPADM

## 2023-05-19 RX ORDER — HYDRALAZINE HYDROCHLORIDE 20 MG/ML
10 INJECTION INTRAMUSCULAR; INTRAVENOUS EVERY 4 HOURS PRN
Status: DISCONTINUED | OUTPATIENT
Start: 2023-05-19 | End: 2023-05-20 | Stop reason: HOSPADM

## 2023-05-19 RX ORDER — ONDANSETRON 2 MG/ML
4 INJECTION INTRAMUSCULAR; INTRAVENOUS EVERY 6 HOURS PRN
Status: DISCONTINUED | OUTPATIENT
Start: 2023-05-19 | End: 2023-05-20 | Stop reason: HOSPADM

## 2023-05-19 RX ORDER — LISINOPRIL 5 MG/1
10 TABLET ORAL DAILY
Status: DISCONTINUED | OUTPATIENT
Start: 2023-05-19 | End: 2023-05-20 | Stop reason: HOSPADM

## 2023-05-19 RX ORDER — FUROSEMIDE 40 MG/1
40 TABLET ORAL DAILY
Status: DISCONTINUED | OUTPATIENT
Start: 2023-05-19 | End: 2023-05-20 | Stop reason: HOSPADM

## 2023-05-19 RX ORDER — ENOXAPARIN SODIUM 100 MG/ML
30 INJECTION SUBCUTANEOUS 2 TIMES DAILY
Status: DISCONTINUED | OUTPATIENT
Start: 2023-05-19 | End: 2023-05-20 | Stop reason: HOSPADM

## 2023-05-19 RX ORDER — ACETAMINOPHEN 650 MG/1
650 SUPPOSITORY RECTAL EVERY 6 HOURS PRN
Status: DISCONTINUED | OUTPATIENT
Start: 2023-05-19 | End: 2023-05-20 | Stop reason: HOSPADM

## 2023-05-19 RX ORDER — SODIUM CHLORIDE 9 MG/ML
INJECTION, SOLUTION INTRAVENOUS PRN
Status: DISCONTINUED | OUTPATIENT
Start: 2023-05-19 | End: 2023-05-20 | Stop reason: HOSPADM

## 2023-05-19 RX ORDER — ACETAMINOPHEN 325 MG/1
650 TABLET ORAL EVERY 6 HOURS PRN
Status: DISCONTINUED | OUTPATIENT
Start: 2023-05-19 | End: 2023-05-20 | Stop reason: HOSPADM

## 2023-05-19 RX ORDER — MORPHINE SULFATE 2 MG/ML
2 INJECTION, SOLUTION INTRAMUSCULAR; INTRAVENOUS EVERY 4 HOURS PRN
Status: DISCONTINUED | OUTPATIENT
Start: 2023-05-19 | End: 2023-05-20 | Stop reason: HOSPADM

## 2023-05-19 RX ORDER — GABAPENTIN 300 MG/1
300 CAPSULE ORAL 3 TIMES DAILY
Status: DISCONTINUED | OUTPATIENT
Start: 2023-05-19 | End: 2023-05-20 | Stop reason: HOSPADM

## 2023-05-19 RX ORDER — ASPIRIN 81 MG/1
81 TABLET, CHEWABLE ORAL DAILY
Status: DISCONTINUED | OUTPATIENT
Start: 2023-05-19 | End: 2023-05-20 | Stop reason: HOSPADM

## 2023-05-19 RX ORDER — SODIUM CHLORIDE 0.9 % (FLUSH) 0.9 %
5-40 SYRINGE (ML) INJECTION PRN
Status: DISCONTINUED | OUTPATIENT
Start: 2023-05-19 | End: 2023-05-20 | Stop reason: HOSPADM

## 2023-05-19 RX ORDER — POLYETHYLENE GLYCOL 3350 17 G/17G
17 POWDER, FOR SOLUTION ORAL DAILY PRN
Status: DISCONTINUED | OUTPATIENT
Start: 2023-05-19 | End: 2023-05-20 | Stop reason: HOSPADM

## 2023-05-19 RX ORDER — ONDANSETRON 4 MG/1
4 TABLET, ORALLY DISINTEGRATING ORAL EVERY 8 HOURS PRN
Status: DISCONTINUED | OUTPATIENT
Start: 2023-05-19 | End: 2023-05-20 | Stop reason: HOSPADM

## 2023-05-19 RX ADMIN — METOPROLOL TARTRATE 25 MG: 25 TABLET, FILM COATED ORAL at 20:41

## 2023-05-19 RX ADMIN — SODIUM CHLORIDE, PRESERVATIVE FREE 10 ML: 5 INJECTION INTRAVENOUS at 23:12

## 2023-05-19 RX ADMIN — ACETAMINOPHEN 650 MG: 325 TABLET ORAL at 23:20

## 2023-05-19 RX ADMIN — LISINOPRIL 10 MG: 5 TABLET ORAL at 15:04

## 2023-05-19 RX ADMIN — MORPHINE SULFATE 2 MG: 2 INJECTION, SOLUTION INTRAMUSCULAR; INTRAVENOUS at 09:24

## 2023-05-19 RX ADMIN — GABAPENTIN 300 MG: 300 CAPSULE ORAL at 21:42

## 2023-05-19 RX ADMIN — ENOXAPARIN SODIUM 30 MG: 100 INJECTION SUBCUTANEOUS at 21:42

## 2023-05-19 RX ADMIN — ENOXAPARIN SODIUM 30 MG: 100 INJECTION SUBCUTANEOUS at 09:20

## 2023-05-19 RX ADMIN — MORPHINE SULFATE 2 MG: 2 INJECTION, SOLUTION INTRAMUSCULAR; INTRAVENOUS at 20:41

## 2023-05-19 RX ADMIN — SODIUM CHLORIDE, PRESERVATIVE FREE 10 ML: 5 INJECTION INTRAVENOUS at 09:20

## 2023-05-19 RX ADMIN — MORPHINE SULFATE 2 MG: 2 INJECTION, SOLUTION INTRAMUSCULAR; INTRAVENOUS at 17:01

## 2023-05-19 RX ADMIN — FUROSEMIDE 40 MG: 40 TABLET ORAL at 15:05

## 2023-05-19 RX ADMIN — ONDANSETRON 4 MG: 2 INJECTION INTRAMUSCULAR; INTRAVENOUS at 00:04

## 2023-05-19 RX ADMIN — ONDANSETRON 4 MG: 4 TABLET, ORALLY DISINTEGRATING ORAL at 02:30

## 2023-05-19 RX ADMIN — PRAVASTATIN SODIUM 40 MG: 20 TABLET ORAL at 20:41

## 2023-05-19 RX ADMIN — HYDROMORPHONE HYDROCHLORIDE 1 MG: 1 INJECTION, SOLUTION INTRAMUSCULAR; INTRAVENOUS; SUBCUTANEOUS at 00:04

## 2023-05-19 RX ADMIN — HYDRALAZINE HYDROCHLORIDE 10 MG: 20 INJECTION INTRAMUSCULAR; INTRAVENOUS at 04:55

## 2023-05-19 RX ADMIN — MORPHINE SULFATE 2 MG: 2 INJECTION, SOLUTION INTRAMUSCULAR; INTRAVENOUS at 01:40

## 2023-05-19 ASSESSMENT — PAIN DESCRIPTION - LOCATION
LOCATION: ABDOMEN;FOOT;LEG
LOCATION: ABDOMEN

## 2023-05-19 ASSESSMENT — PAIN SCALES - GENERAL
PAINLEVEL_OUTOF10: 10
PAINLEVEL_OUTOF10: 9
PAINLEVEL_OUTOF10: 1
PAINLEVEL_OUTOF10: 7
PAINLEVEL_OUTOF10: 10

## 2023-05-19 ASSESSMENT — PAIN DESCRIPTION - DESCRIPTORS
DESCRIPTORS: DISCOMFORT
DESCRIPTORS: BURNING;CRAMPING;DISCOMFORT
DESCRIPTORS: DISCOMFORT
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
## 2023-05-19 NOTE — ASSESSMENT & PLAN NOTE
- Holding Eliquis and metoprolol due to NPO with SBO  - Lovenox for thrombosis prophylaxis   - IV lopressor

## 2023-05-19 NOTE — ED NOTES
Pt received room assignment in facility. Report called, given to accepting RN, Ignacia Thorpe. Pt transport requested. TRANSFER - OUT REPORT:    Verbal report given to Ignacia Thorpe RN on Joelle Zamudio Sr.  being transferred to [phone] for routine progression of patient care       Report consisted of patient's Situation, Background, Assessment and   Recommendations(SBAR). Information from the following report(s) ED SBAR was reviewed with the receiving nurse. Mcnary Assessment: No data recorded  Lines:   Peripheral IV 05/18/23 Left Antecubital (Active)   Site Assessment Clean, dry & intact 05/18/23 2132   Line Status Blood return noted; Flushed;Normal saline locked 05/18/23 2132   Phlebitis Assessment No symptoms 05/18/23 2132   Infiltration Assessment 0 05/18/23 2132   Dressing Status New dressing applied;Clean, dry & intact 05/18/23 2132   Dressing Type Transparent 05/18/23 2132   Dressing Intervention New 05/18/23 2132        Opportunity for questions and clarification was provided.       Patient transported with:  Dave Weeks RN  05/19/23 0060
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
## 2023-05-19 NOTE — ED TRIAGE NOTES
Pt arrived via [place] [id number] from home. Pt c/o abdominal pain, N/V. Pt denies diarrhea, states had normal BM yesterday but only \"1 little turd today\". Pt symptoms onset yesterday, Pt states concern he may have eaten \"bad chicken\". States Hx diverticulitis. EMS BP - /110, 98% O2 on RA, HR 88, .  Temp 97.6

## 2023-05-19 NOTE — ED NOTES
Pt oxygen saturation fell to 88% while sleeping. Pt placed on 2L oxygen via NC. Oxygen saturation alix to 96%.      Jasmina Brown RN  05/18/23 3979

## 2023-05-19 NOTE — H&P
Hospitalist History and Physical   Admit Date:  2023  9:21 PM   Name:  Osman Sawyer Sr.   Age:  69 y.o. Sex:  male  :  1955   MRN:  [id number]     Presenting Complaint: abdominal pain  Reason(s) for Admission: [facility] (small bowel obstruction) ([facility] [K56.609]     History of Present Illness:   Marc Barnard Sr. is a 69 y.o. male with medical history of pAF, recent AVR, HTN, COPD who presented to ED with abdominal pain. Associated nausea/vomiting. Symptoms started ~2 days ago. Reports only have 1 very small BM recently. Pain is worst in his LLQ. Upon ER evaluation, WBC is mildly elevated at 12.5. Lactic acid is normal at 1.4. CT A/P obtained shows:  IMPRESSION:  1. Mild/moderate grade central small bowel obstruction, transition point in the   central small bowel in an area of feculent small bowel contents and matted   bowel loops, perhaps related to adhesions. Hospitalist asked to admit. Review of Systems:  10 systems reviewed and negative except as noted in HPI.   Assessment & Plan:   * SBO (small bowel obstruction) (HCC)  Assessment & Plan  - SBO on CT  - NGT to LIS  - PRN antiemetics and pain meds  - Consult to General Surgery    S/P AVR (aortic valve replacement)  Assessment & Plan  - Underwent AVR in March, s/p rehab  - Stable    Paroxysmal atrial fibrillation (HCC)  Assessment & Plan  - Holding Eliquis and metoprolol due to NPO with SBO  - Lovenox for thrombosis prophylaxis   - IV lopressor    CHF (congestive heart failure) ([facility])  Assessment & Plan  - Stable  - Holding PO meds due to SBO, restart as able    Bipolar disorder ([facility]  Assessment & Plan  - Per chart review  - Does not appear to be on meds  - Behavior normal at this time    COPD (chronic obstructive pulmonary disease) ([facility])  Assessment & Plan  - Not in exacerbation    HTN (hypertension)  Assessment & Plan  - Holding PO home meds  - PRN IV hydralazine        Disposition: inpatient      Past medical history

## 2023-05-19 NOTE — WOUND CARE
Left lower leg with hemosiderin staining and scars from previous venous ulcers, new venous ulcers x 3 and blisters, weeping. Patient is supposed to wear compression wraps, states \"they get to painful\" and admits he does not wear as often as he should. Recommend xeroform ABD and andreas daily and outpatient follow up with wound clinic, he has been to wound clinic in the past. Will likely need ongoing wound care.

## 2023-05-20 VITALS
DIASTOLIC BLOOD PRESSURE: 84 MMHG | RESPIRATION RATE: 18 BRPM | HEIGHT: 72 IN | TEMPERATURE: 99 F | OXYGEN SATURATION: 98 % | BODY MASS INDEX: 33.86 KG/M2 | WEIGHT: 250 LBS | SYSTOLIC BLOOD PRESSURE: 139 MMHG | HEART RATE: 76 BPM

## 2023-05-20 LAB
ANION GAP SERPL CALC-SCNC: 1 MMOL/L (ref 2–11)
BASOPHILS # BLD: 0 K/UL (ref 0–0.2)
BASOPHILS NFR BLD: 0 % (ref 0–2)
BUN SERPL-MCNC: 9 MG/DL (ref 8–23)
CALCIUM SERPL-MCNC: 8.4 MG/DL (ref 8.3–10.4)
CHLORIDE SERPL-SCNC: 108 MMOL/L (ref 101–110)
CO2 SERPL-SCNC: 28 MMOL/L (ref 21–32)
CREAT SERPL-MCNC: 0.8 MG/DL (ref 0.8–1.5)
DIFFERENTIAL METHOD BLD: ABNORMAL
EOSINOPHIL # BLD: 0.4 K/UL (ref 0–0.8)
EOSINOPHIL NFR BLD: 6 % (ref 0.5–7.8)
ERYTHROCYTE [DISTWIDTH] IN BLOOD BY AUTOMATED COUNT: 14.6 % (ref 11.9–14.6)
GLUCOSE BLD STRIP.AUTO-MCNC: 112 MG/DL (ref 65–100)
GLUCOSE BLD STRIP.AUTO-MCNC: 136 MG/DL (ref 65–100)
GLUCOSE SERPL-MCNC: 115 MG/DL (ref 65–100)
HCT VFR BLD AUTO: 39.8 % (ref 41.1–50.3)
HGB BLD-MCNC: 12.3 G/DL (ref 13.6–17.2)
IMM GRANULOCYTES # BLD AUTO: 0 K/UL (ref 0–0.5)
IMM GRANULOCYTES NFR BLD AUTO: 0 % (ref 0–5)
LYMPHOCYTES # BLD: 2 K/UL (ref 0.5–4.6)
LYMPHOCYTES NFR BLD: 30 % (ref 13–44)
MCH RBC QN AUTO: 27.7 PG (ref 26.1–32.9)
MCHC RBC AUTO-ENTMCNC: 30.9 G/DL (ref 31.4–35)
MCV RBC AUTO: 89.6 FL (ref 82–102)
MONOCYTES # BLD: 0.6 K/UL (ref 0.1–1.3)
MONOCYTES NFR BLD: 9 % (ref 4–12)
NEUTS SEG # BLD: 3.7 K/UL (ref 1.7–8.2)
NEUTS SEG NFR BLD: 55 % (ref 43–78)
NRBC # BLD: 0 K/UL (ref 0–0.2)
PLATELET # BLD AUTO: 186 K/UL (ref 150–450)
PMV BLD AUTO: 10.3 FL (ref 9.4–12.3)
POTASSIUM SERPL-SCNC: 4.3 MMOL/L (ref 3.5–5.1)
RBC # BLD AUTO: 4.44 M/UL (ref 4.23–5.6)
SERVICE CMNT-IMP: ABNORMAL
SERVICE CMNT-IMP: ABNORMAL
SODIUM SERPL-SCNC: 137 MMOL/L (ref 133–143)
WBC # BLD AUTO: 6.8 K/UL (ref 4.3–11.1)

## 2023-05-20 PROCEDURE — 6360000002 HC RX W HCPCS: Performed by: FAMILY MEDICINE

## 2023-05-20 PROCEDURE — 85025 COMPLETE CBC W/AUTO DIFF WBC: CPT

## 2023-05-20 PROCEDURE — 82962 GLUCOSE BLOOD TEST: CPT

## 2023-05-20 PROCEDURE — 6370000000 HC RX 637 (ALT 250 FOR IP): Performed by: PHYSICIAN ASSISTANT

## 2023-05-20 PROCEDURE — 2580000003 HC RX 258: Performed by: FAMILY MEDICINE

## 2023-05-20 PROCEDURE — 36415 COLL VENOUS BLD VENIPUNCTURE: CPT

## 2023-05-20 PROCEDURE — 6370000000 HC RX 637 (ALT 250 FOR IP): Performed by: FAMILY MEDICINE

## 2023-05-20 PROCEDURE — 80048 BASIC METABOLIC PNL TOTAL CA: CPT

## 2023-05-20 RX ORDER — DEXTROSE MONOHYDRATE 100 MG/ML
INJECTION, SOLUTION INTRAVENOUS CONTINUOUS PRN
Status: DISCONTINUED | OUTPATIENT
Start: 2023-05-20 | End: 2023-05-20 | Stop reason: HOSPADM

## 2023-05-20 RX ADMIN — LISINOPRIL 10 MG: 5 TABLET ORAL at 08:05

## 2023-05-20 RX ADMIN — METOPROLOL TARTRATE 25 MG: 25 TABLET, FILM COATED ORAL at 08:05

## 2023-05-20 RX ADMIN — ENOXAPARIN SODIUM 30 MG: 100 INJECTION SUBCUTANEOUS at 08:58

## 2023-05-20 RX ADMIN — POLYETHYLENE GLYCOL 3350 17 G: 17 POWDER, FOR SOLUTION ORAL at 08:58

## 2023-05-20 RX ADMIN — PANTOPRAZOLE SODIUM 40 MG: 40 TABLET, DELAYED RELEASE ORAL at 08:05

## 2023-05-20 RX ADMIN — MORPHINE SULFATE 2 MG: 2 INJECTION, SOLUTION INTRAMUSCULAR; INTRAVENOUS at 00:39

## 2023-05-20 RX ADMIN — ASPIRIN 81 MG 81 MG: 81 TABLET ORAL at 08:05

## 2023-05-20 RX ADMIN — GABAPENTIN 300 MG: 300 CAPSULE ORAL at 08:05

## 2023-05-20 RX ADMIN — MORPHINE SULFATE 2 MG: 2 INJECTION, SOLUTION INTRAMUSCULAR; INTRAVENOUS at 08:02

## 2023-05-20 RX ADMIN — FUROSEMIDE 40 MG: 40 TABLET ORAL at 08:05

## 2023-05-20 RX ADMIN — SODIUM CHLORIDE, PRESERVATIVE FREE 10 ML: 5 INJECTION INTRAVENOUS at 08:07

## 2023-05-20 ASSESSMENT — PAIN DESCRIPTION - LOCATION
LOCATION: ABDOMEN
LOCATION: ABDOMEN

## 2023-05-20 ASSESSMENT — PAIN DESCRIPTION - FREQUENCY
FREQUENCY: CONTINUOUS
FREQUENCY: CONTINUOUS

## 2023-05-20 ASSESSMENT — PAIN DESCRIPTION - PAIN TYPE
TYPE: ACUTE PAIN
TYPE: ACUTE PAIN

## 2023-05-20 ASSESSMENT — PAIN DESCRIPTION - ONSET
ONSET: ON-GOING
ONSET: ON-GOING

## 2023-05-20 ASSESSMENT — PAIN SCALES - GENERAL
PAINLEVEL_OUTOF10: 7
PAINLEVEL_OUTOF10: 9
PAINLEVEL_OUTOF10: 9

## 2023-05-20 ASSESSMENT — PAIN DESCRIPTION - ORIENTATION: ORIENTATION: LEFT

## 2023-05-20 ASSESSMENT — PAIN DESCRIPTION - DESCRIPTORS
DESCRIPTORS: ACHING;SHARP
DESCRIPTORS: ACHING
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
## 2023-05-20 NOTE — DISCHARGE SUMMARY
Patient left AMA this am. A&O x4 during my exam earlier. Discussion prior to him leaving regarding plans.

## 2023-05-20 NOTE — CARE COORDINATION
Pt is for discharge home today with family and no needs/supportive care orders recieved for CM at this time. 05/20/23 1815   Social/Functional History   Lives With Alone   Type of [address] One level   Home Access Stairs to enter without rails   Entrance Stairs - Number of Steps 2   Bathroom Shower/Tub Tub/Shower unit   Bathroom Toilet Standard   Bathroom Equipment None   Bathroom Accessibility Accessible   Home Equipment Cane   ADL Assistance Independent   Homemaking Assistance Independent   Ambulation Assistance Independent   Transfer Assistance Independent   Active  Yes   Mode of Transportation Car   Occupation Student: [employer] 51 Discharge   Transition of [address] ([address] Discharge [facility] Discharge None   [employer] Information Provided? No   Mode of Transport at Discharge Other (see comment)  (family)   Confirm Follow Up Transport Family   Condition of Participation: Discharge Planning   The Plan for Transition of Care is related to the following treatment goals: Pt will return home at discharge. The Patient and/Or Patient Representative agree with the Discharge Plan?  Yes
Potential Assistance Needed N/A   Potential Assistance Purchasing Medications No   Type of Home Care Services None   Patient expects to be discharged to: House   One/Two Story Residence One story   History of falls? 0

## 2023-05-23 ENCOUNTER — OFFICE VISIT (OUTPATIENT)
Dept: PRIMARY CARE CLINIC | Facility: CLINIC | Age: 68
End: 2023-05-23
Payer: MEDICARE

## 2023-05-23 VITALS
HEART RATE: 76 BPM | RESPIRATION RATE: 16 BRPM | HEIGHT: 72 IN | SYSTOLIC BLOOD PRESSURE: 136 MMHG | OXYGEN SATURATION: 98 % | WEIGHT: 243.6 LBS | BODY MASS INDEX: 33 KG/M2 | DIASTOLIC BLOOD PRESSURE: 84 MMHG

## 2023-05-23 DIAGNOSIS — Z09 HOSPITAL DISCHARGE FOLLOW-UP: Primary | ICD-10-CM

## 2023-05-23 DIAGNOSIS — L03.119 CELLULITIS OF ANTERIOR LOWER LEG: ICD-10-CM

## 2023-05-23 DIAGNOSIS — L97.929 ULCERS OF BOTH LOWER LEGS (HCC): ICD-10-CM

## 2023-05-23 DIAGNOSIS — L97.919 ULCERS OF BOTH LOWER LEGS (HCC): ICD-10-CM

## 2023-05-23 DIAGNOSIS — K56.609 SBO (SMALL BOWEL OBSTRUCTION) (HCC): ICD-10-CM

## 2023-05-23 PROCEDURE — G8417 CALC BMI ABV UP PARAM F/U: HCPCS | Performed by: FAMILY MEDICINE

## 2023-05-23 PROCEDURE — 1111F DSCHRG MED/CURRENT MED MERGE: CPT | Performed by: FAMILY MEDICINE

## 2023-05-23 PROCEDURE — 1123F ACP DISCUSS/DSCN MKR DOCD: CPT | Performed by: FAMILY MEDICINE

## 2023-05-23 PROCEDURE — G8427 DOCREV CUR MEDS BY ELIG CLIN: HCPCS | Performed by: FAMILY MEDICINE

## 2023-05-23 PROCEDURE — 99214 OFFICE O/P EST MOD 30 MIN: CPT | Performed by: FAMILY MEDICINE

## 2023-05-23 PROCEDURE — 1036F TOBACCO NON-USER: CPT | Performed by: FAMILY MEDICINE

## 2023-05-23 PROCEDURE — 3017F COLORECTAL CA SCREEN DOC REV: CPT | Performed by: FAMILY MEDICINE

## 2023-05-23 PROCEDURE — 3079F DIAST BP 80-89 MM HG: CPT | Performed by: FAMILY MEDICINE

## 2023-05-23 PROCEDURE — 3075F SYST BP GE 130 - 139MM HG: CPT | Performed by: FAMILY MEDICINE

## 2023-05-23 RX ORDER — CEPHALEXIN 500 MG/1
500 CAPSULE ORAL 2 TIMES DAILY
Qty: 20 CAPSULE | Refills: 0 | Status: SHIPPED | OUTPATIENT
Start: 2023-05-23 | End: 2023-06-02

## 2023-05-23 SDOH — ECONOMIC STABILITY: FOOD INSECURITY: WITHIN THE PAST 12 MONTHS, THE FOOD YOU BOUGHT JUST DIDN'T LAST AND YOU DIDN'T HAVE MONEY TO GET MORE.: NEVER TRUE

## 2023-05-23 SDOH — ECONOMIC STABILITY: FOOD INSECURITY: WITHIN THE PAST 12 MONTHS, YOU WORRIED THAT YOUR FOOD WOULD RUN OUT BEFORE YOU GOT MONEY TO BUY MORE.: NEVER TRUE

## 2023-05-23 SDOH — ECONOMIC STABILITY: INCOME INSECURITY: HOW HARD IS IT FOR YOU TO PAY FOR THE VERY BASICS LIKE FOOD, HOUSING, MEDICAL CARE, AND HEATING?: NOT HARD AT ALL

## 2023-05-23 ASSESSMENT — PATIENT HEALTH QUESTIONNAIRE - PHQ9
2. FEELING DOWN, DEPRESSED OR HOPELESS: 0
8. MOVING OR SPEAKING SO SLOWLY THAT OTHER PEOPLE COULD HAVE NOTICED. OR THE OPPOSITE, BEING SO FIGETY OR RESTLESS THAT YOU HAVE BEEN MOVING AROUND A LOT MORE THAN USUAL: 0
4. FEELING TIRED OR HAVING LITTLE ENERGY: 0
3. TROUBLE FALLING OR STAYING ASLEEP: 0
SUM OF ALL RESPONSES TO PHQ QUESTIONS 1-9: 0
5. POOR APPETITE OR OVEREATING: 0
3. TROUBLE FALLING OR STAYING ASLEEP: 0
10. IF YOU CHECKED OFF ANY PROBLEMS, HOW DIFFICULT HAVE THESE PROBLEMS MADE IT FOR YOU TO DO YOUR WORK, TAKE CARE OF THINGS AT HOME, OR GET ALONG WITH OTHER PEOPLE: 0
7. TROUBLE CONCENTRATING ON THINGS, SUCH AS READING THE NEWSPAPER OR WATCHING TELEVISION: 0
5. POOR APPETITE OR OVEREATING: 0
7. TROUBLE CONCENTRATING ON THINGS, SUCH AS READING THE NEWSPAPER OR WATCHING TELEVISION: 0
9. THOUGHTS THAT YOU WOULD BE BETTER OFF DEAD, OR OF HURTING YOURSELF: 0
6. FEELING BAD ABOUT YOURSELF - OR THAT YOU ARE A FAILURE OR HAVE LET YOURSELF OR YOUR FAMILY DOWN: 0
4. FEELING TIRED OR HAVING LITTLE ENERGY: 0
9. THOUGHTS THAT YOU WOULD BE BETTER OFF DEAD, OR OF HURTING YOURSELF: 0
10. IF YOU CHECKED OFF ANY PROBLEMS, HOW DIFFICULT HAVE THESE PROBLEMS MADE IT FOR YOU TO DO YOUR WORK, TAKE CARE OF THINGS AT HOME, OR GET ALONG WITH OTHER PEOPLE: 0
SUM OF ALL RESPONSES TO PHQ QUESTIONS 1-9: 0
SUM OF ALL RESPONSES TO PHQ9 QUESTIONS 1 & 2: 0
SUM OF ALL RESPONSES TO PHQ9 QUESTIONS 1 & 2: 0
SUM OF ALL RESPONSES TO PHQ QUESTIONS 1-9: 0
8. MOVING OR SPEAKING SO SLOWLY THAT OTHER PEOPLE COULD HAVE NOTICED. OR THE OPPOSITE, BEING SO FIGETY OR RESTLESS THAT YOU HAVE BEEN MOVING AROUND A LOT MORE THAN USUAL: 0
6. FEELING BAD ABOUT YOURSELF - OR THAT YOU ARE A FAILURE OR HAVE LET YOURSELF OR YOUR FAMILY DOWN: 0
2. FEELING DOWN, DEPRESSED OR HOPELESS: 0
1. LITTLE INTEREST OR PLEASURE IN DOING THINGS: 0
1. LITTLE INTEREST OR PLEASURE IN DOING THINGS: 0

## 2023-05-23 ASSESSMENT — ANXIETY QUESTIONNAIRES
4. TROUBLE RELAXING: 0
IF YOU CHECKED OFF ANY PROBLEMS ON THIS QUESTIONNAIRE, HOW DIFFICULT HAVE THESE PROBLEMS MADE IT FOR YOU TO DO YOUR WORK, TAKE CARE OF THINGS AT HOME, OR GET ALONG WITH OTHER PEOPLE: NOT DIFFICULT AT ALL
2. NOT BEING ABLE TO STOP OR CONTROL WORRYING: 0
3. WORRYING TOO MUCH ABOUT DIFFERENT THINGS: 0
1. FEELING NERVOUS, ANXIOUS, OR ON EDGE: 0
6. BECOMING EASILY ANNOYED OR IRRITABLE: 0
5. BEING SO RESTLESS THAT IT IS HARD TO SIT STILL: 0
GAD7 TOTAL SCORE: 0
7. FEELING AFRAID AS IF SOMETHING AWFUL MIGHT HAPPEN: 0

## 2023-05-23 NOTE — PATIENT INSTRUCTIONS
IT WAS GREAT TO SEE YOU TODAY! PLEASE TAKE ALL MEDICATION AS DISCUSSED. ~TAKE THE KEFLEX TWICE A DAY FOR THE INFECTION ON THE FRONT OF YOUR LEFT LEG. CONTINUE USING YOUR WOUND CARE CLEANING SOLUTIONS. PLEASE WATCH YOUR BLOOD SUGARS AND DO NOT DRINK A LOT OF YOUR FRUIT WINE TO PREVENT YOUR DIABETES FROM GETTING OUT OF CONTROL. IF YOU DEVELOP SEVERE ABDOMINAL PAIN AGAIN PLEASE GO BACK TO THE EMERGENCY ROOM.     I WILL SEE YOU AGAIN IN 1 MONTHS BUT PLEASE CALL WITH CONCERNS [phone]

## 2023-05-23 NOTE — PROGRESS NOTES
[address], DO  [facility], [place] [address], [address]  [phone]        Post-Discharge Transitional Care  Follow Up      Marylu Winters Sr. YOB: 1955    Date of Office Visit:  5/23/2023  Date of Hospital Admission: 5/18/23  Date of Hospital Discharge: 5/20/23  Risk of hospital readmission (high >=14%. Medium >=10%) :Readmission Risk Score: 26.2      Care management risk score Rising risk (score 2-5) and Complex Care (Scores >=6): No Risk Score On File     Non face to face  following discharge, date last encounter closed (first attempt may have been earlier): *No documented post hospital discharge outreach found in the last 14 days    Call initiated 2 business days of discharge: *No response recorded in the last 14 days    ASSESSMENT/PLAN:   Hospital discharge follow-up  Assessment & Plan:  Patient seen today after leaving the hospital [address] for small bowel obstruction. States that he was able to pass gas before he left the hospital and went home and took milk of magnesia with improvement in his abdominal pain. Notes that he is passing gas and having bowel movements but still has some pain. Advised it may take several days to resume a normal bowel movement pattern. Encouraged to drink lots of water and if he has worsening pain to go back to the emergency room. Orders:  -     [place] DISCHARGE MEDS RECONCILED W/ CURRENT OUTPATIENT MED LIST  SBO (small bowel obstruction) ([facility] [address]  Assessment & Plan:  Patient with small bowel obstruction, admitted to the hospital last week for this and spoke to surgery but could not understand the surgeon so left [address] Notes that his pain is improved, went home and took milk of magnesia and has been able to pass gas and have bowel movements. Notes that he is not having normal bowel movements yet but it is better.   Encouraged to drink plenty of water and advised

## 2023-05-23 NOTE — ASSESSMENT & PLAN NOTE
Patient with small bowel obstruction, admitted to the hospital last week for this and spoke to surgery but could not understand the surgeon so left [address] Notes that his pain is improved, went home and took milk of magnesia and has been able to pass gas and have bowel movements. Notes that he is not having normal bowel movements yet but it is better. Encouraged to drink plenty of water and advised to go back to the ER if symptoms worsen.

## 2023-05-23 NOTE — ASSESSMENT & PLAN NOTE
Patient seen today after leaving the hospital [address] for small bowel obstruction. States that he was able to pass gas before he left the hospital and went home and took milk of magnesia with improvement in his abdominal pain. Notes that he is passing gas and having bowel movements but still has some pain. Advised it may take several days to resume a normal bowel movement pattern. Encouraged to drink lots of water and if he has worsening pain to go back to the emergency room.

## 2023-05-23 NOTE — ASSESSMENT & PLAN NOTE
Anterior left lower leg with ulcer with dried purulence and surrounding erythema, no obvious fluctuance on exam but warm to touch. We will treat with Keflex, advised continuing his wound care washes at home and to let me know if it gets worse. We will recheck in 1 month.

## 2023-06-05 ENCOUNTER — HOSPITAL ENCOUNTER (EMERGENCY)
Age: 68
Discharge: HOME | End: 2023-06-06
Attending: EMERGENCY MEDICINE
Payer: MEDICARE

## 2023-06-05 ENCOUNTER — APPOINTMENT (OUTPATIENT)
Dept: CT IMAGING | Age: 68
End: 2023-06-05
Payer: MEDICARE

## 2023-06-05 ENCOUNTER — APPOINTMENT (OUTPATIENT)
Dept: GENERAL RADIOLOGY | Age: 68
End: 2023-06-05
Payer: MEDICARE

## 2023-06-05 DIAGNOSIS — R55 NEAR SYNCOPE: ICD-10-CM

## 2023-06-05 DIAGNOSIS — I87.2 VENOUS STASIS DERMATITIS OF BOTH LOWER EXTREMITIES: ICD-10-CM

## 2023-06-05 DIAGNOSIS — R07.9 CHEST PAIN, UNSPECIFIED TYPE: Primary | ICD-10-CM

## 2023-06-05 LAB
ALBUMIN SERPL-MCNC: 3.4 G/DL (ref 3.2–4.6)
ALBUMIN/GLOB SERPL: 0.9 (ref 0.4–1.6)
ALP SERPL-CCNC: 64 U/L (ref 50–136)
ALT SERPL-CCNC: 23 U/L (ref 12–65)
ANION GAP SERPL CALC-SCNC: 6 MMOL/L (ref 2–11)
AST SERPL-CCNC: 17 U/L (ref 15–37)
BILIRUB SERPL-MCNC: 0.3 MG/DL (ref 0.2–1.1)
BUN SERPL-MCNC: 22 MG/DL (ref 8–23)
CALCIUM SERPL-MCNC: 8.7 MG/DL (ref 8.3–10.4)
CHLORIDE SERPL-SCNC: 105 MMOL/L (ref 101–110)
CO2 SERPL-SCNC: 25 MMOL/L (ref 21–32)
CREAT SERPL-MCNC: 0.8 MG/DL (ref 0.8–1.5)
D DIMER PPP FEU-MCNC: 0.63 UG/ML(FEU)
ERYTHROCYTE [DISTWIDTH] IN BLOOD BY AUTOMATED COUNT: 14.6 % (ref 11.9–14.6)
GLOBULIN SER CALC-MCNC: 3.9 G/DL (ref 2.8–4.5)
GLUCOSE SERPL-MCNC: 127 MG/DL (ref 65–100)
HCT VFR BLD AUTO: 39 % (ref 41.1–50.3)
HGB BLD-MCNC: 12.4 G/DL (ref 13.6–17.2)
MAGNESIUM SERPL-MCNC: 2.2 MG/DL (ref 1.8–2.4)
MCH RBC QN AUTO: 27.4 PG (ref 26.1–32.9)
MCHC RBC AUTO-ENTMCNC: 31.8 G/DL (ref 31.4–35)
MCV RBC AUTO: 86.1 FL (ref 82–102)
NRBC # BLD: 0 K/UL (ref 0–0.2)
PLATELET # BLD AUTO: 182 K/UL (ref 150–450)
PMV BLD AUTO: 10.7 FL (ref 9.4–12.3)
POTASSIUM SERPL-SCNC: 3.7 MMOL/L (ref 3.5–5.1)
PROT SERPL-MCNC: 7.3 G/DL (ref 6.3–8.2)
RBC # BLD AUTO: 4.53 M/UL (ref 4.23–5.6)
SODIUM SERPL-SCNC: 136 MMOL/L (ref 133–143)
TROPONIN I SERPL HS-MCNC: 34 PG/ML (ref 0–57)
WBC # BLD AUTO: 8.1 K/UL (ref 4.3–11.1)

## 2023-06-05 PROCEDURE — 70450 CT HEAD/BRAIN W/O DYE: CPT

## 2023-06-05 PROCEDURE — 83735 ASSAY OF MAGNESIUM: CPT

## 2023-06-05 PROCEDURE — 80053 COMPREHEN METABOLIC PANEL: CPT

## 2023-06-05 PROCEDURE — 84484 ASSAY OF TROPONIN QUANT: CPT

## 2023-06-05 PROCEDURE — 85027 COMPLETE CBC AUTOMATED: CPT

## 2023-06-05 PROCEDURE — 93005 ELECTROCARDIOGRAM TRACING: CPT | Performed by: EMERGENCY MEDICINE

## 2023-06-05 PROCEDURE — 94760 N-INVAS EAR/PLS OXIMETRY 1: CPT

## 2023-06-05 PROCEDURE — 6360000002 HC RX W HCPCS: Performed by: EMERGENCY MEDICINE

## 2023-06-05 PROCEDURE — 99284 EMERGENCY DEPT VISIT MOD MDM: CPT

## 2023-06-05 PROCEDURE — 85379 FIBRIN DEGRADATION QUANT: CPT

## 2023-06-05 PROCEDURE — 96374 THER/PROPH/DIAG INJ IV PUSH: CPT

## 2023-06-05 RX ORDER — FUROSEMIDE 10 MG/ML
20 INJECTION INTRAMUSCULAR; INTRAVENOUS ONCE
Status: COMPLETED | OUTPATIENT
Start: 2023-06-05 | End: 2023-06-05

## 2023-06-05 RX ADMIN — FUROSEMIDE 20 MG: 10 INJECTION, SOLUTION INTRAMUSCULAR; INTRAVENOUS at 22:18

## 2023-06-05 ASSESSMENT — PAIN SCALES - GENERAL: PAINLEVEL_OUTOF10: 7

## 2023-06-05 ASSESSMENT — PAIN DESCRIPTION - LOCATION: LOCATION: CHEST;HEAD

## 2023-06-05 ASSESSMENT — PAIN - FUNCTIONAL ASSESSMENT: PAIN_FUNCTIONAL_ASSESSMENT: 0-10

## 2023-06-06 VITALS
HEART RATE: 90 BPM | TEMPERATURE: 97.8 F | OXYGEN SATURATION: 100 % | RESPIRATION RATE: 19 BRPM | DIASTOLIC BLOOD PRESSURE: 77 MMHG | BODY MASS INDEX: 32.78 KG/M2 | SYSTOLIC BLOOD PRESSURE: 130 MMHG | HEIGHT: 72 IN | WEIGHT: 242 LBS

## 2023-06-06 LAB
EKG ATRIAL RATE: 75 BPM
EKG DIAGNOSIS: NORMAL
EKG P AXIS: 46 DEGREES
EKG P-R INTERVAL: 150 MS
EKG Q-T INTERVAL: 392 MS
EKG QRS DURATION: 76 MS
EKG QTC CALCULATION (BAZETT): 437 MS
EKG R AXIS: 64 DEGREES
EKG T AXIS: 73 DEGREES
EKG VENTRICULAR RATE: 75 BPM

## 2023-06-06 PROCEDURE — 93010 ELECTROCARDIOGRAM REPORT: CPT | Performed by: INTERNAL MEDICINE

## 2023-06-06 RX ORDER — CLINDAMYCIN HYDROCHLORIDE 300 MG/1
300 CAPSULE ORAL 3 TIMES DAILY
Qty: 21 CAPSULE | Refills: 0 | Status: SHIPPED | OUTPATIENT
Start: 2023-06-06 | End: 2023-06-13

## 2023-06-06 NOTE — ED NOTES
I have reviewed discharge instructions with the patient. The patient verbalized understanding. Patient left ED via Discharge Method: wheelchair to Home with family. Opportunity for questions and clarification provided. Patient given 0 scripts. To continue your aftercare when you leave the hospital, you may receive an automated call from our care team to check in on how you are doing. This is a free service and part of our promise to provide the best care and service to meet your aftercare needs.  If you have questions, or wish to unsubscribe from this service please call [phone]. Thank you for Choosing our [employer] Emergency Department.         Roger Aguero RN  06/06/23 4569

## 2023-06-06 NOTE — ED PROVIDER NOTES
Emergency Department Provider Note       PCP: Scot Gutierrez DO   Age: 69 y.o. Sex: male     DISPOSITION Decision To Discharge 06/06/2023 12:19:19 AM       ICD-10-CM    1. Chest pain, unspecified type  R07.9       2. Venous stasis dermatitis of both lower extremities  I87.2       3. Near syncope  R55           Medical Decision Making     Complexity of Problems Addressed:  1 or more acute illnesses that pose a threat to life or bodily function. Data Reviewed and Analyzed:  Category 1:   I independently ordered and reviewed each unique test.  I reviewed external records: ED visit note from an outside group. Reviewed labs from self including high-sensitivity troponin and normal chest x-ray. Previous echo nl. Category 2:   I independently ordered and interpreted the ED EKG in the absence of a Cardiologist.    Rate: 75  EKG Interpretation: EKG Interpretation: sinus rhythm, no evidence of arrhythmia  ST Segments: Nonspecific ST segments - NO STEMI  I interpreted the CT Scan no acute. Category 3: Discussion of management or test interpretation. Patient's troponin is not significantly elevated and he has had significant time for evaluation. His chest x-ray itself was normal.  His head CT is negative. He did not have a syncopal episode and although he reports being in feeling confused he was able to drive himself to the hospital.  His daughter picked him up and brought him here. At this point I do not have a reason to admit him as he has no focal weakness or other neurologic complaints. I will start him on antibiotics for his lower extremity redness and swelling. He will need to follow-up with his primary care doctor. Risk of Complications and/or Morbidity of Patient Management:  Prescription drug management performed. Is this patient to be included in the SEP-1 core measure due to severe sepsis or septic shock?  No Exclusion criteria - the patient is NOT to be included for

## 2023-06-06 NOTE — ED TRIAGE NOTES
Pt presents c/o left sided CP and severe HA with blurred vision that began around 16:00, was seen at [place] earlier but states wait was too long, +SOB, pain is worse with deep inspiration, bilateral leg swelling, reports compliancy with lasix.

## 2023-06-22 PROBLEM — Z09 HOSPITAL DISCHARGE FOLLOW-UP: Status: RESOLVED | Noted: 2023-03-29 | Resolved: 2023-06-22

## 2023-08-14 NOTE — PROGRESS NOTES
DO Natalee Mcdonough, [address], [address] [facility]  [phone]         Medicare Annual Wellness Visit    Marc Griffith. is here for Follow Up After Procedure and Medicare AWV    Assessment & Plan   Medicare annual wellness visit, subsequent  Cellulitis of anterior lower leg  Assessment & Plan:  Improving erythema, patient finished Clindamycin and has ordered Penicillin, which he has been taking for the last month. Less swelling and drainage today, discussed not using the \"cancer cream\" he is using, will add Mupirocin and will refer back to Wound Care. Orders:  -     [facility], Wound Care  Primary hypertension  Assessment & Plan:   BP stable today. Patient will check labs when he returns. Will refer to Meza Lauraside for evaluation. Orders:  -     [facility]  Uncontrolled type 2 diabetes mellitus with hyperglycemia ([address])  Assessment & Plan:     Hemoglobin A1C   Date Value Ref Range Status   03/13/2023 7.3 (H) 4.8 - 5.6 % Final     Still elevated, patient has gained weight but is still taking his medicine. Discussed need to check labs, patient will do next visit in 3 months. Referral placed to see the eye doctor. Orders:  -     [facility]  Coronary artery disease involving native coronary artery of native heart with unstable angina pectoris [facility])  Assessment & Plan:   Discussed following up with Cardiology. Recommendations for Preventive Services Due: see orders and patient instructions/AVS.  Recommended screening schedule for the next 5-10 years is provided to the patient in written form: see Patient Instructions/AVS.     Return in about 3 months (around 11/15/2023). Subjective   The following acute and/or chronic problems were also addressed today:  69 y.o. male with CAD presents today for follow up and AWV.   Last seen by me three months ago after patient left the

## 2023-08-15 ENCOUNTER — OFFICE VISIT (OUTPATIENT)
Dept: PRIMARY CARE CLINIC | Facility: CLINIC | Age: 68
End: 2023-08-15
Payer: MEDICARE

## 2023-08-15 VITALS
HEIGHT: 72 IN | DIASTOLIC BLOOD PRESSURE: 78 MMHG | WEIGHT: 247.3 LBS | OXYGEN SATURATION: 98 % | SYSTOLIC BLOOD PRESSURE: 134 MMHG | HEART RATE: 82 BPM | RESPIRATION RATE: 16 BRPM | BODY MASS INDEX: 33.49 KG/M2

## 2023-08-15 DIAGNOSIS — I25.110 CORONARY ARTERY DISEASE INVOLVING NATIVE CORONARY ARTERY OF NATIVE HEART WITH UNSTABLE ANGINA PECTORIS (HCC): ICD-10-CM

## 2023-08-15 DIAGNOSIS — I10 PRIMARY HYPERTENSION: ICD-10-CM

## 2023-08-15 DIAGNOSIS — Z00.00 MEDICARE ANNUAL WELLNESS VISIT, SUBSEQUENT: Primary | ICD-10-CM

## 2023-08-15 DIAGNOSIS — L03.119 CELLULITIS OF ANTERIOR LOWER LEG: ICD-10-CM

## 2023-08-15 DIAGNOSIS — E11.65 UNCONTROLLED TYPE 2 DIABETES MELLITUS WITH HYPERGLYCEMIA (HCC): ICD-10-CM

## 2023-08-15 PROCEDURE — 3051F HG A1C>EQUAL 7.0%<8.0%: CPT | Performed by: FAMILY MEDICINE

## 2023-08-15 PROCEDURE — G8417 CALC BMI ABV UP PARAM F/U: HCPCS | Performed by: FAMILY MEDICINE

## 2023-08-15 PROCEDURE — 1123F ACP DISCUSS/DSCN MKR DOCD: CPT | Performed by: FAMILY MEDICINE

## 2023-08-15 PROCEDURE — 99214 OFFICE O/P EST MOD 30 MIN: CPT | Performed by: FAMILY MEDICINE

## 2023-08-15 PROCEDURE — 3017F COLORECTAL CA SCREEN DOC REV: CPT | Performed by: FAMILY MEDICINE

## 2023-08-15 PROCEDURE — 3078F DIAST BP <80 MM HG: CPT | Performed by: FAMILY MEDICINE

## 2023-08-15 PROCEDURE — G0439 PPPS, SUBSEQ VISIT: HCPCS | Performed by: FAMILY MEDICINE

## 2023-08-15 PROCEDURE — 2022F DILAT RTA XM EVC RTNOPTHY: CPT | Performed by: FAMILY MEDICINE

## 2023-08-15 PROCEDURE — G8427 DOCREV CUR MEDS BY ELIG CLIN: HCPCS | Performed by: FAMILY MEDICINE

## 2023-08-15 PROCEDURE — 3075F SYST BP GE 130 - 139MM HG: CPT | Performed by: FAMILY MEDICINE

## 2023-08-15 PROCEDURE — 1036F TOBACCO NON-USER: CPT | Performed by: FAMILY MEDICINE

## 2023-08-15 RX ORDER — GABAPENTIN 300 MG/1
300 CAPSULE ORAL 3 TIMES DAILY
Qty: 90 CAPSULE | Refills: 5 | Status: SHIPPED | OUTPATIENT
Start: 2023-08-15 | End: 2024-02-11

## 2023-08-15 ASSESSMENT — PATIENT HEALTH QUESTIONNAIRE - PHQ9
10. IF YOU CHECKED OFF ANY PROBLEMS, HOW DIFFICULT HAVE THESE PROBLEMS MADE IT FOR YOU TO DO YOUR WORK, TAKE CARE OF THINGS AT HOME, OR GET ALONG WITH OTHER PEOPLE: 0
8. MOVING OR SPEAKING SO SLOWLY THAT OTHER PEOPLE COULD HAVE NOTICED. OR THE OPPOSITE, BEING SO FIGETY OR RESTLESS THAT YOU HAVE BEEN MOVING AROUND A LOT MORE THAN USUAL: 0
9. THOUGHTS THAT YOU WOULD BE BETTER OFF DEAD, OR OF HURTING YOURSELF: 0
SUM OF ALL RESPONSES TO PHQ QUESTIONS 1-9: 0
4. FEELING TIRED OR HAVING LITTLE ENERGY: 0
SUM OF ALL RESPONSES TO PHQ QUESTIONS 1-9: 0
6. FEELING BAD ABOUT YOURSELF - OR THAT YOU ARE A FAILURE OR HAVE LET YOURSELF OR YOUR FAMILY DOWN: 0
SUM OF ALL RESPONSES TO PHQ QUESTIONS 1-9: 0
7. TROUBLE CONCENTRATING ON THINGS, SUCH AS READING THE NEWSPAPER OR WATCHING TELEVISION: 0
SUM OF ALL RESPONSES TO PHQ QUESTIONS 1-9: 0
5. POOR APPETITE OR OVEREATING: 0
2. FEELING DOWN, DEPRESSED OR HOPELESS: 0
SUM OF ALL RESPONSES TO PHQ9 QUESTIONS 1 & 2: 0
3. TROUBLE FALLING OR STAYING ASLEEP: 0
1. LITTLE INTEREST OR PLEASURE IN DOING THINGS: 0

## 2023-08-15 ASSESSMENT — LIFESTYLE VARIABLES
HOW OFTEN DO YOU HAVE A DRINK CONTAINING ALCOHOL: MONTHLY OR LESS
HOW MANY STANDARD DRINKS CONTAINING ALCOHOL DO YOU HAVE ON A TYPICAL DAY: PATIENT DOES NOT DRINK

## 2023-08-15 NOTE — ASSESSMENT & PLAN NOTE
BP stable today. Patient will check labs when he returns. Will refer to Meza Lauraside for evaluation.

## 2023-08-15 NOTE — ASSESSMENT & PLAN NOTE
Hemoglobin A1C   Date Value Ref Range Status   03/13/2023 7.3 (H) 4.8 - 5.6 % Final     Still elevated, patient has gained weight but is still taking his medicine. Discussed need to check labs, patient will do next visit in 3 months. Referral placed to see the eye doctor.

## 2023-08-15 NOTE — ASSESSMENT & PLAN NOTE
Improving erythema, patient finished Clindamycin and has ordered Penicillin, which he has been taking for the last month. Less swelling and drainage today, discussed not using the \"cancer cream\" he is using, will add Mupirocin and will refer back to Wound Care.

## 2023-08-15 NOTE — PATIENT INSTRUCTIONS

## 2023-08-30 NOTE — TELEPHONE ENCOUNTER
[facility] CLINICAL [facility]: ADHERENCE REVIEW  Identified care gap per [employer]: fills at [facility] : ACE/ARB, Diabetes, and Statin adherence    ASSESSMENT    ACE/ARB ADHERENCE    Insurance Records claims through 23 (Prior Year [address] = not reported; YTD [address] [place] [address] = 100%; Potential Fail Date: 23): Lisinopril 10 mg tab last filled on 23 for 30 day supply. Next refill due: 23    Prescribed si tablet/capsule daily    Per Reconcile Dispense History:   LISINOPRIL 10 MG TABLET 2023 30 30 each [facility]ant, [facility] . .. LISINOPRIL 10 MG TABLET 2023 30 30 [facility] . .. LISINOPRIL 10 MG TABLET 2023 30 30 each [facility] . .. LISINOPRIL 10 MG TABLET 2023 30 30 each Conception Adjutant, [facility] . .. LISINOPRIL  10 MG TABS 2023 30 30 tablet Claudio Buckner MD [id number]. .. LISINOPRIL 20 MG TABLET 01/10/2023 90 90 each  [facility]. ..   3RR per hyperlink    BP Readings from Last 3 Encounters:   08/15/23 134/78   23 130/77   23 136/84     Estimated Creatinine Clearance: 113 mL/min (based on SCr of 0.8 mg/dL). Lab Results   Component Value Date    CREATININE 0.80 2023     Lab Results   Component Value Date    K 3.7 2023     DIABETES ADHERENCE    Insurance Records claims through 23 (Prior Year [address] = not reported; YTD [address] [place] [address] = 100%; Potential Fail Date: 23): Metformin 500 mg tab last filled on 23 for 30 day supply (60 tabs). Next refill due: 23    Prescribed si tablet/capsule twice daily    Per Reconcile Dispense History:  METFORMIN  MG TABLET 2023 30 60 each Obdulia Keene DO [facility] . .. METFORMIN  MG TABLET 2023 30 60 each DO Derrick Solomon [facility] . ..    METFORMIN  MG

## 2023-09-05 RX ORDER — LISINOPRIL 10 MG/1
10 TABLET ORAL DAILY
Qty: 100 TABLET | Refills: 1 | Status: SHIPPED | OUTPATIENT
Start: 2023-09-05

## 2023-09-05 RX ORDER — PRAVASTATIN SODIUM 40 MG
40 TABLET ORAL NIGHTLY
Qty: 100 TABLET | Refills: 3 | Status: SHIPPED | OUTPATIENT
Start: 2023-09-05

## 2023-09-05 NOTE — TELEPHONE ENCOUNTER
Tori Fry, DO, patient eligible for up to 100-day supply of pravastatin, metformin, and lisinopril if OK with you? Same cost to patient as 1 month through his insurance.     Last Visit: 8/15/23  Next Visit: 11/16/23    Thank you,  Jorge Richardson, PharmD, [facility], toll free: [phone], option 2

## 2023-09-06 NOTE — TELEPHONE ENCOUNTER
Rx signed by provider - thank you! Letter sent to patient.     For Pharmacy Admin Tracking Only    Program: Marvel in place:  No  Recommendation Provided To: Provider: 3 via Note to Provider  Intervention Detail: Adherence Monitorin and New Rx: 3, reason: Improve Adherence  Intervention Accepted By: Provider: 3  Gap Closed?: No   Time Spent (min): 15

## 2023-09-14 PROBLEM — Z00.00 MEDICARE ANNUAL WELLNESS VISIT, SUBSEQUENT: Status: RESOLVED | Noted: 2023-08-15 | Resolved: 2023-09-14

## 2023-10-01 ENCOUNTER — APPOINTMENT (OUTPATIENT)
Dept: GENERAL RADIOLOGY | Age: 68
End: 2023-10-01
Payer: MEDICARE

## 2023-10-01 ENCOUNTER — HOSPITAL ENCOUNTER (EMERGENCY)
Age: 68
Discharge: HOME | End: 2023-10-01
Attending: EMERGENCY MEDICINE
Payer: MEDICARE

## 2023-10-01 VITALS
BODY MASS INDEX: 33.46 KG/M2 | WEIGHT: 247 LBS | HEART RATE: 90 BPM | HEIGHT: 72 IN | OXYGEN SATURATION: 97 % | TEMPERATURE: 98.4 F | RESPIRATION RATE: 21 BRPM | SYSTOLIC BLOOD PRESSURE: 167 MMHG | DIASTOLIC BLOOD PRESSURE: 96 MMHG

## 2023-10-01 DIAGNOSIS — J18.9 PNEUMONIA OF RIGHT LOWER LOBE DUE TO INFECTIOUS ORGANISM: Primary | ICD-10-CM

## 2023-10-01 LAB
ANION GAP SERPL CALC-SCNC: 5 MMOL/L (ref 2–11)
BILIRUB UR QL: NEGATIVE
BUN SERPL-MCNC: 13 MG/DL (ref 8–23)
CALCIUM SERPL-MCNC: 8.3 MG/DL (ref 8.3–10.4)
CHLORIDE SERPL-SCNC: 105 MMOL/L (ref 101–110)
CO2 SERPL-SCNC: 28 MMOL/L (ref 21–32)
CREAT SERPL-MCNC: 0.9 MG/DL (ref 0.8–1.5)
ERYTHROCYTE [DISTWIDTH] IN BLOOD BY AUTOMATED COUNT: 16.2 % (ref 11.9–14.6)
FLUAV RNA SPEC QL NAA+PROBE: NOT DETECTED
FLUBV RNA SPEC QL NAA+PROBE: NOT DETECTED
GLUCOSE SERPL-MCNC: 136 MG/DL (ref 65–100)
GLUCOSE UR QL STRIP.AUTO: NEGATIVE MG/DL
HCT VFR BLD AUTO: 39.3 % (ref 41.1–50.3)
HGB BLD-MCNC: 12.2 G/DL (ref 13.6–17.2)
KETONES UR-MCNC: NEGATIVE MG/DL
LEUKOCYTE ESTERASE UR QL STRIP: NEGATIVE
MCH RBC QN AUTO: 25.9 PG (ref 26.1–32.9)
MCHC RBC AUTO-ENTMCNC: 31 G/DL (ref 31.4–35)
MCV RBC AUTO: 83.4 FL (ref 82–102)
NITRITE UR QL: NEGATIVE
NRBC # BLD: 0 K/UL (ref 0–0.2)
PH UR: 7 (ref 5–9)
PLATELET # BLD AUTO: 195 K/UL (ref 150–450)
PMV BLD AUTO: 10.3 FL (ref 9.4–12.3)
POTASSIUM SERPL-SCNC: 4.1 MMOL/L (ref 3.5–5.1)
PROT UR QL: NEGATIVE MG/DL
RBC # BLD AUTO: 4.71 M/UL (ref 4.23–5.6)
RBC # UR STRIP: NEGATIVE
SARS-COV-2 RDRP RESP QL NAA+PROBE: NOT DETECTED
SERVICE CMNT-IMP: NORMAL
SODIUM SERPL-SCNC: 138 MMOL/L (ref 133–143)
SOURCE: NORMAL
SP GR UR: 1.01 (ref 1–1.02)
UROBILINOGEN UR QL: 0.2 EU/DL (ref 0.2–1)
WBC # BLD AUTO: 8.3 K/UL (ref 4.3–11.1)

## 2023-10-01 PROCEDURE — 94760 N-INVAS EAR/PLS OXIMETRY 1: CPT

## 2023-10-01 PROCEDURE — 6370000000 HC RX 637 (ALT 250 FOR IP): Performed by: PHYSICIAN ASSISTANT

## 2023-10-01 PROCEDURE — 94640 AIRWAY INHALATION TREATMENT: CPT

## 2023-10-01 PROCEDURE — 87635 SARS-COV-2 COVID-19 AMP PRB: CPT

## 2023-10-01 PROCEDURE — 96375 TX/PRO/DX INJ NEW DRUG ADDON: CPT

## 2023-10-01 PROCEDURE — 2580000003 HC RX 258: Performed by: PHYSICIAN ASSISTANT

## 2023-10-01 PROCEDURE — 80048 BASIC METABOLIC PNL TOTAL CA: CPT

## 2023-10-01 PROCEDURE — 6360000002 HC RX W HCPCS: Performed by: PHYSICIAN ASSISTANT

## 2023-10-01 PROCEDURE — 87502 INFLUENZA DNA AMP PROBE: CPT

## 2023-10-01 PROCEDURE — 71045 X-RAY EXAM CHEST 1 VIEW: CPT

## 2023-10-01 PROCEDURE — 81003 URINALYSIS AUTO W/O SCOPE: CPT

## 2023-10-01 PROCEDURE — 85027 COMPLETE CBC AUTOMATED: CPT

## 2023-10-01 PROCEDURE — 96365 THER/PROPH/DIAG IV INF INIT: CPT

## 2023-10-01 PROCEDURE — 99284 EMERGENCY DEPT VISIT MOD MDM: CPT

## 2023-10-01 RX ORDER — AZITHROMYCIN 250 MG/1
250 TABLET, FILM COATED ORAL SEE ADMIN INSTRUCTIONS
Qty: 6 TABLET | Refills: 0 | Status: SHIPPED | OUTPATIENT
Start: 2023-10-01 | End: 2023-10-06

## 2023-10-01 RX ORDER — IPRATROPIUM BROMIDE AND ALBUTEROL SULFATE 2.5; .5 MG/3ML; MG/3ML
1 SOLUTION RESPIRATORY (INHALATION)
Status: COMPLETED | OUTPATIENT
Start: 2023-10-01 | End: 2023-10-01

## 2023-10-01 RX ORDER — CEFDINIR 300 MG/1
300 CAPSULE ORAL 2 TIMES DAILY
Qty: 20 CAPSULE | Refills: 0 | Status: SHIPPED | OUTPATIENT
Start: 2023-10-01 | End: 2023-10-11

## 2023-10-01 RX ORDER — GUAIFENESIN 600 MG/1
600 TABLET, EXTENDED RELEASE ORAL 2 TIMES DAILY
Qty: 20 TABLET | Refills: 0 | Status: SHIPPED | OUTPATIENT
Start: 2023-10-01 | End: 2023-10-11

## 2023-10-01 RX ORDER — ALBUTEROL SULFATE 90 UG/1
2 AEROSOL, METERED RESPIRATORY (INHALATION) EVERY 6 HOURS PRN
Qty: 18 G | Refills: 0 | Status: SHIPPED | OUTPATIENT
Start: 2023-10-01

## 2023-10-01 RX ORDER — MORPHINE SULFATE 4 MG/ML
4 INJECTION INTRAVENOUS ONCE
Status: COMPLETED | OUTPATIENT
Start: 2023-10-01 | End: 2023-10-01

## 2023-10-01 RX ORDER — BENZONATATE 100 MG/1
100 CAPSULE ORAL
Status: COMPLETED | OUTPATIENT
Start: 2023-10-01 | End: 2023-10-01

## 2023-10-01 RX ORDER — 0.9 % SODIUM CHLORIDE 0.9 %
1000 INTRAVENOUS SOLUTION INTRAVENOUS
Status: COMPLETED | OUTPATIENT
Start: 2023-10-01 | End: 2023-10-01

## 2023-10-01 RX ADMIN — IPRATROPIUM BROMIDE AND ALBUTEROL SULFATE 1 DOSE: .5; 3 SOLUTION RESPIRATORY (INHALATION) at 02:06

## 2023-10-01 RX ADMIN — BENZONATATE 100 MG: 100 CAPSULE ORAL at 03:47

## 2023-10-01 RX ADMIN — MORPHINE SULFATE 4 MG: 4 INJECTION, SOLUTION INTRAMUSCULAR; INTRAVENOUS at 03:24

## 2023-10-01 RX ADMIN — CEFTRIAXONE 1000 MG: 1 INJECTION, POWDER, FOR SOLUTION INTRAMUSCULAR; INTRAVENOUS at 03:47

## 2023-10-01 RX ADMIN — SODIUM CHLORIDE 1000 ML: 9 INJECTION, SOLUTION INTRAVENOUS at 02:07

## 2023-10-01 ASSESSMENT — ENCOUNTER SYMPTOMS
COUGH: 1
PHOTOPHOBIA: 0
SHORTNESS OF BREATH: 0
RHINORRHEA: 0
SORE THROAT: 0
ABDOMINAL PAIN: 0
NAUSEA: 0
VOMITING: 0

## 2023-10-01 ASSESSMENT — PAIN SCALES - GENERAL: PAINLEVEL_OUTOF10: 6

## 2023-10-01 ASSESSMENT — LIFESTYLE VARIABLES
HOW OFTEN DO YOU HAVE A DRINK CONTAINING ALCOHOL: 2-3 TIMES A WEEK
HOW MANY STANDARD DRINKS CONTAINING ALCOHOL DO YOU HAVE ON A TYPICAL DAY: 1 OR 2

## 2023-10-01 ASSESSMENT — PAIN DESCRIPTION - LOCATION: LOCATION: BACK

## 2023-10-01 NOTE — ED TRIAGE NOTES
Pt c/o arm/leg pain, unbalanced, coughing up yellow phlegm for 3-4 days.   Pt states he \"hasn't been able to walk right\" for three days, states he can't walk because his legs hurt

## 2023-10-01 NOTE — ED PROVIDER NOTES
[facility]  Emergency Department    DISPOSITION Decision To Discharge 10/01/2023 04:35:55 AM       ICD-10-CM    1. Pneumonia of right lower lobe due to infectious organism  J18.9         ED Course     ED Course as of 10/01/23 0441   Sun Oct 01, 2023   0156 Patient is a 28-year-old male who presents to facility today with complaint of cough for the last 3 to 4 days and maybe a week, states he is unsure. States he feels off balance that started at the same time as the coughing. He states he hurts everywhere especially his leg so it hurts to walk. Patient states is a productive cough with phlegm. No known sick contacts. We will check basic labs, chest x-ray, COVID and flu, give a breathing treatment and reevaluate. [TT]   0300 XR CHEST PORTABLE  IMPRESSION:     1. There is some mild haziness of the lateral right lung base that could be   pneumonia or atelectasis. Cyrus Hernandez M.D.   10/1/2023 2:17:00 AM [TT]   2154 COVID-19, Rapid:    SOURCE, [id number] NASAL   SARS-CoV-2, Rapid Not detected [TT]   0328 Influenza A/B, Molecular:    Influenza A, FANG Not detected   Influenza B, FAGN Not detected [TT]   0433 Went and checked on the patient. Patient resting comfortably. Reports improvement following DuoNeb therapy. Plan will be to send the patient home on IV antibiotics for community-acquired pneumonia. We will send him with an albuterol inhaler as well and some cough medicine. Encouraged follow-up with family doctor. Discussed return precautions back to the ED which the patient reports understanding. Patient stable for discharge. [TT]      ED Course User Index  [TT] Nida John PA-C     Complexity of Problems Addressed:  1 acute, stable illness    Data Reviewed and Analyzed:  Category 1:   I reviewed external records: provider visit note from PCP.   I reviewed external records: provider visit note from outside specialist.  I ordered each unique test.  I interpreted the

## 2023-10-01 NOTE — ED NOTES
[address] member was notified over phone to  patient.  Sylvia Sutherland agreed to come  PT       Sherry Diaz RN  10/01/23 4234

## 2023-10-01 NOTE — DISCHARGE INSTRUCTIONS
Work-up here today is overall reassuring. There does appear to be some concern for some pneumonia and your lung. We are going to start you with antibiotics for the next 10 days. Take both antibiotics as prescribed. Some cough medicine for you. Use the albuterol inhaler as prescribed. Follow-up with your family doctor. Return to the ED as needed for new or worsening symptoms.

## 2023-10-02 ENCOUNTER — CARE COORDINATION (OUTPATIENT)
Dept: CARE COORDINATION | Facility: CLINIC | Age: 68
End: 2023-10-02

## 2023-10-02 NOTE — CARE COORDINATION
Ambulatory Care Coordination Note  10/2/2023    Patient Current Location:  [facility]    ACM contacted the patient by telephone. Verified name and  with patient as identifiers. Provided introduction to self, and explanation of the ACM role. ACM: Dakota Ruano RN    Challenges to be reviewed by the provider   Additional needs identified to be addressed with provider: Yes  Notified of er admission and need for follow up               Method of communication with provider: staff message. See assessment below. Ccm outreached to patient. Patient agreeable to ccm services. Ccm reviewed with patient discharge summary relating to PNA diagnosis. Patient verbalized understanding. Reviewed with patient all discharge medications. Patient is taking as directed. Patient states no SOB at this time. Patient states he still has some congestion but is feeling better. Patient is eating and drinking well. Patient reports multiple falls last week. Ccm reviewed with patient to use cane at all times when ambulating. Also reviewed to move slower and take his time when ambulating. Patient verbalized understanding. Ccm also left message with pcp regarding er admission and need for follow up. Patient also uses inhalers as needed. Patient states no questions or concerns. Ccm discussed that I would outreach next week. Patient agreeable. Offered patient enrollment in the Remote Patient Monitoring (RPM) program for in-home monitoring: Patient declined. Ambulatory Care Coordination Assessment    Care Coordination Protocol  Referral from Primary Care Provider: No  Week 1 - Initial Assessment     Do you have all of your prescriptions and are they filled?: Yes  Barriers to medication adherence: None  Are you able to afford your medications?: Yes  How often do you have trouble taking your medications the way you have been told to take them?: I always take them as prescribed.      Do you have Home O2 Therapy?: No      Ability to

## 2023-10-09 ENCOUNTER — APPOINTMENT (OUTPATIENT)
Dept: MRI IMAGING | Age: 68
DRG: 065 | End: 2023-10-09
Payer: MEDICARE

## 2023-10-09 ENCOUNTER — HOSPITAL ENCOUNTER (INPATIENT)
Age: 68
LOS: 2 days | Discharge: NURSING, REHAB OR LONG-TERM CARE | DRG: 065 | End: 2023-10-13
Attending: EMERGENCY MEDICINE | Admitting: HOSPITALIST
Payer: MEDICARE

## 2023-10-09 ENCOUNTER — APPOINTMENT (OUTPATIENT)
Dept: ULTRASOUND IMAGING | Age: 68
DRG: 065 | End: 2023-10-09
Payer: MEDICARE

## 2023-10-09 ENCOUNTER — APPOINTMENT (OUTPATIENT)
Dept: GENERAL RADIOLOGY | Age: 68
DRG: 065 | End: 2023-10-09
Payer: MEDICARE

## 2023-10-09 ENCOUNTER — CARE COORDINATION (OUTPATIENT)
Dept: CARE COORDINATION | Facility: CLINIC | Age: 68
End: 2023-10-09

## 2023-10-09 ENCOUNTER — APPOINTMENT (OUTPATIENT)
Dept: CT IMAGING | Age: 68
DRG: 065 | End: 2023-10-09
Payer: MEDICARE

## 2023-10-09 DIAGNOSIS — R29.898 LEFT LEG WEAKNESS: ICD-10-CM

## 2023-10-09 DIAGNOSIS — R29.898 LEFT ARM WEAKNESS: ICD-10-CM

## 2023-10-09 DIAGNOSIS — I63.9 ACUTE CVA (CEREBROVASCULAR ACCIDENT) (HCC): Primary | ICD-10-CM

## 2023-10-09 DIAGNOSIS — I63.9 CEREBROVASCULAR ACCIDENT (CVA), UNSPECIFIED MECHANISM (HCC): ICD-10-CM

## 2023-10-09 LAB
ALBUMIN SERPL-MCNC: 3.6 G/DL (ref 3.2–4.6)
ALBUMIN/GLOB SERPL: 0.8 (ref 0.4–1.6)
ALP SERPL-CCNC: 94 U/L (ref 50–136)
ALT SERPL-CCNC: 20 U/L (ref 12–65)
ANION GAP SERPL CALC-SCNC: 3 MMOL/L (ref 2–11)
AST SERPL-CCNC: 15 U/L (ref 15–37)
BASOPHILS # BLD: 0 K/UL (ref 0–0.2)
BASOPHILS NFR BLD: 0 % (ref 0–2)
BILIRUB SERPL-MCNC: 0.5 MG/DL (ref 0.2–1.1)
BUN SERPL-MCNC: 16 MG/DL (ref 8–23)
CALCIUM SERPL-MCNC: 8.9 MG/DL (ref 8.3–10.4)
CHLORIDE SERPL-SCNC: 102 MMOL/L (ref 101–110)
CHOLEST SERPL-MCNC: 140 MG/DL
CO2 SERPL-SCNC: 29 MMOL/L (ref 21–32)
CREAT SERPL-MCNC: 1 MG/DL (ref 0.8–1.5)
DIFFERENTIAL METHOD BLD: ABNORMAL
EKG ATRIAL RATE: 142 BPM
EKG DIAGNOSIS: NORMAL
EKG P AXIS: 51 DEGREES
EKG P-R INTERVAL: 154 MS
EKG Q-T INTERVAL: 408 MS
EKG QRS DURATION: 94 MS
EKG QTC CALCULATION (BAZETT): 447 MS
EKG R AXIS: 54 DEGREES
EKG T AXIS: 59 DEGREES
EKG VENTRICULAR RATE: 72 BPM
EOSINOPHIL # BLD: 0.4 K/UL (ref 0–0.8)
EOSINOPHIL NFR BLD: 4 % (ref 0.5–7.8)
ERYTHROCYTE [DISTWIDTH] IN BLOOD BY AUTOMATED COUNT: 15.6 % (ref 11.9–14.6)
EST. AVERAGE GLUCOSE BLD GHB EST-MCNC: 163 MG/DL
GLOBULIN SER CALC-MCNC: 4.4 G/DL (ref 2.8–4.5)
GLUCOSE BLD STRIP.AUTO-MCNC: 133 MG/DL (ref 65–100)
GLUCOSE SERPL-MCNC: 139 MG/DL (ref 65–100)
HBA1C MFR BLD: 7.3 % (ref 4.8–5.6)
HCT VFR BLD AUTO: 42.9 % (ref 41.1–50.3)
HDLC SERPL-MCNC: 42 MG/DL (ref 40–60)
HDLC SERPL: 3.3
HGB BLD-MCNC: 13.2 G/DL (ref 13.6–17.2)
IMM GRANULOCYTES # BLD AUTO: 0 K/UL (ref 0–0.5)
IMM GRANULOCYTES NFR BLD AUTO: 0 % (ref 0–5)
INR BLD: 1.4 (ref 0.9–1.2)
INR PPP: 1.3
LDLC SERPL CALC-MCNC: 84 MG/DL
LYMPHOCYTES # BLD: 2.6 K/UL (ref 0.5–4.6)
LYMPHOCYTES NFR BLD: 27 % (ref 13–44)
MCH RBC QN AUTO: 25.7 PG (ref 26.1–32.9)
MCHC RBC AUTO-ENTMCNC: 30.8 G/DL (ref 31.4–35)
MCV RBC AUTO: 83.6 FL (ref 82–102)
MONOCYTES # BLD: 0.7 K/UL (ref 0.1–1.3)
MONOCYTES NFR BLD: 7 % (ref 4–12)
NEUTS SEG # BLD: 5.8 K/UL (ref 1.7–8.2)
NEUTS SEG NFR BLD: 61 % (ref 43–78)
NRBC # BLD: 0 K/UL (ref 0–0.2)
PLATELET # BLD AUTO: 214 K/UL (ref 150–450)
PMV BLD AUTO: 9.5 FL (ref 9.4–12.3)
POTASSIUM SERPL-SCNC: 4.6 MMOL/L (ref 3.5–5.1)
PROT SERPL-MCNC: 8 G/DL (ref 6.3–8.2)
PROTHROMBIN TIME: 16.6 SEC (ref 12.6–14.3)
PT BLD: 16.3 SECS (ref 9.6–11.6)
RBC # BLD AUTO: 5.13 M/UL (ref 4.23–5.6)
SERVICE CMNT-IMP: ABNORMAL
SODIUM SERPL-SCNC: 134 MMOL/L (ref 133–143)
TRIGL SERPL-MCNC: 70 MG/DL (ref 35–150)
TROPONIN I SERPL HS-MCNC: 11.3 PG/ML (ref 0–14)
VLDLC SERPL CALC-MCNC: 14 MG/DL (ref 6–23)
WBC # BLD AUTO: 9.5 K/UL (ref 4.3–11.1)

## 2023-10-09 PROCEDURE — 80053 COMPREHEN METABOLIC PANEL: CPT

## 2023-10-09 PROCEDURE — 85610 PROTHROMBIN TIME: CPT

## 2023-10-09 PROCEDURE — 94760 N-INVAS EAR/PLS OXIMETRY 1: CPT

## 2023-10-09 PROCEDURE — 83036 HEMOGLOBIN GLYCOSYLATED A1C: CPT

## 2023-10-09 PROCEDURE — 85025 COMPLETE CBC W/AUTO DIFF WBC: CPT

## 2023-10-09 PROCEDURE — 70450 CT HEAD/BRAIN W/O DYE: CPT

## 2023-10-09 PROCEDURE — 93005 ELECTROCARDIOGRAM TRACING: CPT | Performed by: EMERGENCY MEDICINE

## 2023-10-09 PROCEDURE — 93010 ELECTROCARDIOGRAM REPORT: CPT | Performed by: INTERNAL MEDICINE

## 2023-10-09 PROCEDURE — 2580000003 HC RX 258: Performed by: INTERNAL MEDICINE

## 2023-10-09 PROCEDURE — 94640 AIRWAY INHALATION TREATMENT: CPT

## 2023-10-09 PROCEDURE — G0378 HOSPITAL OBSERVATION PER HR: HCPCS

## 2023-10-09 PROCEDURE — 2500000003 HC RX 250 WO HCPCS: Performed by: INTERNAL MEDICINE

## 2023-10-09 PROCEDURE — 93880 EXTRACRANIAL BILAT STUDY: CPT

## 2023-10-09 PROCEDURE — 6370000000 HC RX 637 (ALT 250 FOR IP): Performed by: INTERNAL MEDICINE

## 2023-10-09 PROCEDURE — 99291 CRITICAL CARE FIRST HOUR: CPT | Performed by: PSYCHIATRY & NEUROLOGY

## 2023-10-09 PROCEDURE — 80061 LIPID PANEL: CPT

## 2023-10-09 PROCEDURE — 99285 EMERGENCY DEPT VISIT HI MDM: CPT

## 2023-10-09 PROCEDURE — 96375 TX/PRO/DX INJ NEW DRUG ADDON: CPT

## 2023-10-09 PROCEDURE — 96374 THER/PROPH/DIAG INJ IV PUSH: CPT

## 2023-10-09 PROCEDURE — 82962 GLUCOSE BLOOD TEST: CPT

## 2023-10-09 PROCEDURE — 84484 ASSAY OF TROPONIN QUANT: CPT

## 2023-10-09 PROCEDURE — 71045 X-RAY EXAM CHEST 1 VIEW: CPT

## 2023-10-09 RX ORDER — PANTOPRAZOLE SODIUM 40 MG/1
40 TABLET, DELAYED RELEASE ORAL DAILY
Status: DISCONTINUED | OUTPATIENT
Start: 2023-10-10 | End: 2023-10-13 | Stop reason: HOSPADM

## 2023-10-09 RX ORDER — LABETALOL HYDROCHLORIDE 5 MG/ML
10 INJECTION, SOLUTION INTRAVENOUS EVERY 10 MIN PRN
Status: DISCONTINUED | OUTPATIENT
Start: 2023-10-09 | End: 2023-10-13 | Stop reason: HOSPADM

## 2023-10-09 RX ORDER — HYDROMORPHONE HYDROCHLORIDE 1 MG/ML
0.5 INJECTION, SOLUTION INTRAMUSCULAR; INTRAVENOUS; SUBCUTANEOUS EVERY 4 HOURS PRN
Status: DISCONTINUED | OUTPATIENT
Start: 2023-10-09 | End: 2023-10-13 | Stop reason: HOSPADM

## 2023-10-09 RX ORDER — LISINOPRIL 5 MG/1
10 TABLET ORAL DAILY
Status: DISCONTINUED | OUTPATIENT
Start: 2023-10-10 | End: 2023-10-13 | Stop reason: HOSPADM

## 2023-10-09 RX ORDER — ONDANSETRON 4 MG/1
4 TABLET, ORALLY DISINTEGRATING ORAL EVERY 8 HOURS PRN
Status: DISCONTINUED | OUTPATIENT
Start: 2023-10-09 | End: 2023-10-13 | Stop reason: HOSPADM

## 2023-10-09 RX ORDER — SODIUM CHLORIDE 0.9 % (FLUSH) 0.9 %
5-40 SYRINGE (ML) INJECTION PRN
Status: DISCONTINUED | OUTPATIENT
Start: 2023-10-09 | End: 2023-10-13 | Stop reason: HOSPADM

## 2023-10-09 RX ORDER — ALBUTEROL SULFATE 90 UG/1
2 AEROSOL, METERED RESPIRATORY (INHALATION) EVERY 6 HOURS PRN
Status: DISCONTINUED | OUTPATIENT
Start: 2023-10-09 | End: 2023-10-13 | Stop reason: HOSPADM

## 2023-10-09 RX ORDER — ACETAMINOPHEN 325 MG/1
650 TABLET ORAL EVERY 6 HOURS PRN
Status: DISCONTINUED | OUTPATIENT
Start: 2023-10-09 | End: 2023-10-13 | Stop reason: HOSPADM

## 2023-10-09 RX ORDER — GABAPENTIN 300 MG/1
300 CAPSULE ORAL 3 TIMES DAILY
Status: DISCONTINUED | OUTPATIENT
Start: 2023-10-09 | End: 2023-10-13 | Stop reason: HOSPADM

## 2023-10-09 RX ORDER — POLYETHYLENE GLYCOL 3350 17 G/17G
17 POWDER, FOR SOLUTION ORAL DAILY PRN
Status: DISCONTINUED | OUTPATIENT
Start: 2023-10-09 | End: 2023-10-13 | Stop reason: HOSPADM

## 2023-10-09 RX ORDER — OXYCODONE HYDROCHLORIDE 5 MG/1
5 TABLET ORAL EVERY 6 HOURS PRN
Status: DISCONTINUED | OUTPATIENT
Start: 2023-10-09 | End: 2023-10-13 | Stop reason: HOSPADM

## 2023-10-09 RX ORDER — ONDANSETRON 2 MG/ML
4 INJECTION INTRAMUSCULAR; INTRAVENOUS EVERY 6 HOURS PRN
Status: DISCONTINUED | OUTPATIENT
Start: 2023-10-09 | End: 2023-10-13 | Stop reason: HOSPADM

## 2023-10-09 RX ORDER — AMIODARONE HYDROCHLORIDE 200 MG/1
200 TABLET ORAL DAILY
Status: DISCONTINUED | OUTPATIENT
Start: 2023-10-10 | End: 2023-10-13 | Stop reason: HOSPADM

## 2023-10-09 RX ORDER — SODIUM CHLORIDE 0.9 % (FLUSH) 0.9 %
5-40 SYRINGE (ML) INJECTION EVERY 12 HOURS SCHEDULED
Status: DISCONTINUED | OUTPATIENT
Start: 2023-10-09 | End: 2023-10-13 | Stop reason: HOSPADM

## 2023-10-09 RX ORDER — ASPIRIN 81 MG/1
81 TABLET, CHEWABLE ORAL DAILY
Status: DISCONTINUED | OUTPATIENT
Start: 2023-10-10 | End: 2023-10-10

## 2023-10-09 RX ORDER — PRAVASTATIN SODIUM 20 MG
40 TABLET ORAL NIGHTLY
Status: DISCONTINUED | OUTPATIENT
Start: 2023-10-09 | End: 2023-10-13 | Stop reason: HOSPADM

## 2023-10-09 RX ORDER — SODIUM CHLORIDE 9 MG/ML
INJECTION, SOLUTION INTRAVENOUS PRN
Status: DISCONTINUED | OUTPATIENT
Start: 2023-10-09 | End: 2023-10-13 | Stop reason: HOSPADM

## 2023-10-09 RX ADMIN — OXYCODONE HYDROCHLORIDE 5 MG: 5 TABLET ORAL at 20:03

## 2023-10-09 RX ADMIN — HYDROMORPHONE HYDROCHLORIDE 0.5 MG: 1 INJECTION, SOLUTION INTRAMUSCULAR; INTRAVENOUS; SUBCUTANEOUS at 22:40

## 2023-10-09 RX ADMIN — ALBUTEROL SULFATE 2 PUFF: 90 AEROSOL, METERED RESPIRATORY (INHALATION) at 23:01

## 2023-10-09 RX ADMIN — SODIUM CHLORIDE, PRESERVATIVE FREE 10 ML: 5 INJECTION INTRAVENOUS at 20:03

## 2023-10-09 RX ADMIN — APIXABAN 5 MG: 5 TABLET, FILM COATED ORAL at 20:30

## 2023-10-09 RX ADMIN — GABAPENTIN 300 MG: 300 CAPSULE ORAL at 20:30

## 2023-10-09 RX ADMIN — PRAVASTATIN SODIUM 40 MG: 20 TABLET ORAL at 20:02

## 2023-10-09 RX ADMIN — HYDROMORPHONE HYDROCHLORIDE 0.5 MG: 1 INJECTION, SOLUTION INTRAMUSCULAR; INTRAVENOUS; SUBCUTANEOUS at 15:08

## 2023-10-09 RX ADMIN — SODIUM CHLORIDE, PRESERVATIVE FREE 10 ML: 5 INJECTION INTRAVENOUS at 20:02

## 2023-10-09 ASSESSMENT — PAIN DESCRIPTION - DESCRIPTORS
DESCRIPTORS: ACHING

## 2023-10-09 ASSESSMENT — ENCOUNTER SYMPTOMS
NAUSEA: 0
SHORTNESS OF BREATH: 0
COLOR CHANGE: 0
DIARRHEA: 0
VOMITING: 0
COUGH: 0
ABDOMINAL PAIN: 0
RHINORRHEA: 0

## 2023-10-09 ASSESSMENT — PAIN DESCRIPTION - LOCATION
LOCATION: BACK;LEG
LOCATION: BACK;LEG
LOCATION: BACK
LOCATION: BACK
LOCATION: GENERALIZED

## 2023-10-09 ASSESSMENT — PAIN - FUNCTIONAL ASSESSMENT
PAIN_FUNCTIONAL_ASSESSMENT: 0-10
PAIN_FUNCTIONAL_ASSESSMENT: ACTIVITIES ARE NOT PREVENTED
PAIN_FUNCTIONAL_ASSESSMENT: ACTIVITIES ARE NOT PREVENTED

## 2023-10-09 ASSESSMENT — PAIN SCALES - GENERAL
PAINLEVEL_OUTOF10: 8
PAINLEVEL_OUTOF10: 9
PAINLEVEL_OUTOF10: 6
PAINLEVEL_OUTOF10: 0
PAINLEVEL_OUTOF10: 8
PAINLEVEL_OUTOF10: 7
PAINLEVEL_OUTOF10: 0

## 2023-10-09 ASSESSMENT — PAIN DESCRIPTION - ORIENTATION
ORIENTATION: LEFT;LOWER
ORIENTATION: LEFT
ORIENTATION: LEFT;LOWER
ORIENTATION: LEFT

## 2023-10-09 ASSESSMENT — PAIN DESCRIPTION - FREQUENCY: FREQUENCY: CONTINUOUS

## 2023-10-09 ASSESSMENT — PAIN DESCRIPTION - PAIN TYPE: TYPE: CHRONIC PAIN

## 2023-10-09 NOTE — CONSULTS
Consult    Patient: Renard Rios Sr. MRN: [id number]     YOB: 1955  Age: 69 y.o. Sex: male      Subjective: Renard Rios Sr. is a 69 y.o. male who is being seen for code S. He has acute onset left-sided sensory changes. The patient was last known normal when he went to bed last night. He does not know his medication but Eliquis is listed and he says he takes all medication prescribed to him. The patient presented to [facility] ED. A code S was called at 10:54 AM. Neurology arrived to the bedside at 10:58 AM. Initial NIHSS was 2. A CT of the head was obtained and does not show acute changes. Past Medical History:   Diagnosis Date    Bipolar disorder ([address])     CAD (coronary artery disease)     CHF (congestive heart failure) (HCC)     Chronic back pain     Hyperlipidemia     Hypertension     Neuropathy     Subdural hematoma ([address]) 2022    Type 2 diabetes mellitus without complication [facility])      Past Surgical History:   Procedure Laterality Date    CABG WITH AORTIC VALVE REPLACEMENT N/A 3/15/2023    CORONARY ARTERY BYPASS GRAFT (CABG X 2, LIMA; ENDOSCOPIC VEIN HARVEST,LEFT GREATER SAPHENOUS VEIN; LEFT ATRIAL APPENDAGE CLIPPING; AORTIC VALVE REPLACEMENT; MAZE performed by Kye Gibbs MD at [address] [place] [address] N/A 3/13/2023    LEFT HEART CATH / CORONARY ANGIOGRAPHY performed by Tacos Barcenas MD at [facility] CATH LAB    HERNIA REPAIR      TRANSESOPHAGEAL ECHOCARDIOGRAM N/A 3/15/2023    TRANSESOPHAGEAL ECHOCARDIOGRAM performed by Kye Gibbs MD at [facility] MAIN OR      No family history on file. Social History     Tobacco Use    Smoking status: Former     Packs/day: .5     Types: Cigarettes     Quit date: 1998     Years since quittin.7    Smokeless tobacco: Never   Substance Use Topics    Alcohol use: Not Currently      No current facility-administered medications for this encounter.      Current Outpatient Medications   Medication Sig Dispense Refill
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
## 2023-10-09 NOTE — ED PROVIDER NOTES
Emergency Department Provider Note       PCP: Daija Shukla DO   Age: 69 y.o. Sex: male     DISPOSITION Admitted 10/09/2023 01:35:15 PM       ICD-10-CM    1. Acute CVA (cerebrovascular accident) [facility])  I63.9 Vascular duplex carotid bilateral     Vascular duplex carotid bilateral      2. Cerebrovascular accident (CVA), unspecified mechanism ([address])  I63.9       3. Left leg weakness  R29.898 Transthoracic echocardiogram (TTE) complete with contrast, bubble, strain, and 3D PRN     Transthoracic echocardiogram (TTE) complete with contrast, bubble, strain, and 3D PRN      4. Left arm weakness  R29.898           Medical Decision Making     Complexity of Problems Addressed:  1 or more acute illnesses that pose a threat to life or bodily function. Data Reviewed and Analyzed:   I independently ordered and reviewed each unique test.  I reviewed external records: previous lab results from outside ED. I reviewed external records: previous imaging study including radiologist interpretation. TTE reviewed from 3/11/23. Left Ventricle: Normal left ventricular systolic function with a visually estimated EF of 55 - 60%. Left ventricle size is normal. Mildly increased wall thickness. Normal wall motion. Abnormal diastolic function. Aortic Valve: Moderately calcified cusp. Moderate stenosis of the aortic valve. AV mean gradient is 19 mmHg. AV area by continuity VTI is 1.8 cm2. Mitral Valve: Mild annular calcification of the mitral valve. Mild regurgitation. Mild stenosis noted. Left Atrium: Left atrium is mildly dilated. Aorta: Mildly dilated ascending aorta. Ao ascending diameter is 4.0 cm. I independently ordered and interpreted the ED EKG in the absence of a Cardiologist.    Rate: 72  EKG Interpretation: Sinus rhythm. PACs noted. ST Segments: Normal ST segments - NO STEMI      I interpreted the X-rays chest x-ray with mild bibasilar atelectasis likely due to shallow inspiration.   Agree with

## 2023-10-09 NOTE — ACP (ADVANCE CARE PLANNING)
Advance Care Planning   Healthcare Decision Maker:    Primary Decision Maker: Anju Holland Child - [phone]    Secondary Decision Maker: Abena Alvarez. - Child - [phone]    Click here to complete Healthcare Decision Makers including selection of the Healthcare Decision Maker Relationship (ie \"Primary\").

## 2023-10-09 NOTE — CARE COORDINATION
Patient has hx of utilizing [facility] resources related to wound care and has had follow up at the wound clinic. Wounds persist per patient. He does not recall the name of the agency providing home care. He also has a hx of being discharged to [facility] but left there quickly stating he did not like all there rules.        10/09/23 8512   Condition of Participation: Discharge Planning   The Plan for Transition of Care is related to the following treatment goals: based on clinical course

## 2023-10-09 NOTE — ED NOTES
TRANSFER - OUT REPORT:    Verbal report given to SEJAL Garay on Marc Sheriff Blind Sr.  being transferred to [phone], RN for routine progression of patient care       Report consisted of patient's Situation, Background, Assessment and   Recommendations(SBAR). Information from the following report(s) ED SBAR was reviewed with the receiving nurse. [place] [facility] Assessment:    Presents to emergency department  because of falls (Syncope, seizure, or loss of consciousness): Yes  Age > 79: No  Altered Mental Status, Intoxication with alcohol or substance confusion (Disorientation, impaired judgment, poor safety awaremess, or inability to follow instructions): No  Impaired Mobility: Ambulates or transfers with assistive devices or assistance; Unable to ambulate or transer.: Yes  Nursing Judgement: Yes          Lines:   Peripheral IV 10/09/23 Right Antecubital (Active)       Peripheral IV 10/09/23 Left Antecubital (Active)        Opportunity for questions and clarification was provided.       Patient transported with:  Registered Nurse           Alla Figueredo RN  10/09/23 9194

## 2023-10-09 NOTE — ED TRIAGE NOTES
Patient arrives to ED via EMS from home. Patient reports last night when he went to bed he had some back pain. Patient reports when he woke up around 0400 am he had left sided weakness and when he went to the bathroom he slipped and fell. Patient denies hitting head. Denies LOC. Patient is unsure what medications he takes. Eliquis is listed in his chart.

## 2023-10-10 ENCOUNTER — APPOINTMENT (OUTPATIENT)
Dept: NON INVASIVE DIAGNOSTICS | Age: 68
DRG: 065 | End: 2023-10-10
Attending: INTERNAL MEDICINE
Payer: MEDICARE

## 2023-10-10 ENCOUNTER — APPOINTMENT (OUTPATIENT)
Dept: GENERAL RADIOLOGY | Age: 68
DRG: 065 | End: 2023-10-10
Payer: MEDICARE

## 2023-10-10 LAB
ANION GAP SERPL CALC-SCNC: 4 MMOL/L (ref 2–11)
BUN SERPL-MCNC: 13 MG/DL (ref 8–23)
CALCIUM SERPL-MCNC: 9 MG/DL (ref 8.3–10.4)
CHLORIDE SERPL-SCNC: 106 MMOL/L (ref 101–110)
CO2 SERPL-SCNC: 26 MMOL/L (ref 21–32)
CREAT SERPL-MCNC: 1 MG/DL (ref 0.8–1.5)
ECHO AO ROOT DIAM: 3.8 CM
ECHO AO ROOT INDEX: 1.62 CM/M2
ECHO AV AREA PEAK VELOCITY: 1.4 CM2
ECHO AV AREA VTI: 1.8 CM2
ECHO AV AREA/BSA PEAK VELOCITY: 0.6 CM2/M2
ECHO AV AREA/BSA VTI: 0.8 CM2/M2
ECHO AV MEAN GRADIENT: 8 MMHG
ECHO AV MEAN VELOCITY: 1.3 M/S
ECHO AV PEAK GRADIENT: 15 MMHG
ECHO AV PEAK VELOCITY: 1.9 M/S
ECHO AV VELOCITY RATIO: 0.53
ECHO AV VTI: 35.9 CM
ECHO BSA: 2.41 M2
ECHO LA AREA 2C: 17.8 CM2
ECHO LA AREA 4C: 20 CM2
ECHO LA DIAMETER INDEX: 1.96 CM/M2
ECHO LA DIAMETER: 4.6 CM
ECHO LA MAJOR AXIS: 6 CM
ECHO LA MINOR AXIS: 5.3 CM
ECHO LA TO AORTIC ROOT RATIO: 1.21
ECHO LA VOL 2C: 49 ML (ref 18–58)
ECHO LA VOL 4C: 54 ML (ref 18–58)
ECHO LA VOL BP: 54 ML (ref 18–58)
ECHO LA VOL/BSA BIPLANE: 23 ML/M2 (ref 16–34)
ECHO LA VOLUME INDEX A2C: 21 ML/M2 (ref 16–34)
ECHO LA VOLUME INDEX A4C: 23 ML/M2 (ref 16–34)
ECHO LV E' LATERAL VELOCITY: 8 CM/S
ECHO LV E' SEPTAL VELOCITY: 9 CM/S
ECHO LV FRACTIONAL SHORTENING: 28 % (ref 28–44)
ECHO LV INTERNAL DIMENSION DIASTOLE INDEX: 1.7 CM/M2
ECHO LV INTERNAL DIMENSION DIASTOLIC: 4 CM (ref 4.2–5.9)
ECHO LV INTERNAL DIMENSION SYSTOLIC INDEX: 1.23 CM/M2
ECHO LV INTERNAL DIMENSION SYSTOLIC: 2.9 CM
ECHO LV IVSD: 1.4 CM (ref 0.6–1)
ECHO LV MASS 2D: 209 G (ref 88–224)
ECHO LV MASS INDEX 2D: 88.9 G/M2 (ref 49–115)
ECHO LV POSTERIOR WALL DIASTOLIC: 1.4 CM (ref 0.6–1)
ECHO LV RELATIVE WALL THICKNESS RATIO: 0.7
ECHO LVOT AREA: 2.8 CM2
ECHO LVOT AV VTI INDEX: 0.62
ECHO LVOT DIAM: 1.9 CM
ECHO LVOT MEAN GRADIENT: 3 MMHG
ECHO LVOT PEAK GRADIENT: 4 MMHG
ECHO LVOT PEAK VELOCITY: 1 M/S
ECHO LVOT STROKE VOLUME INDEX: 26.7 ML/M2
ECHO LVOT SV: 62.6 ML
ECHO LVOT VTI: 22.1 CM
ECHO MV A VELOCITY: 0.97 M/S
ECHO MV AREA VTI: 1.6 CM2
ECHO MV E DECELERATION TIME (DT): 292 MS
ECHO MV E VELOCITY: 1.3 M/S
ECHO MV E/A RATIO: 1.34
ECHO MV E/E' LATERAL: 16.25
ECHO MV E/E' RATIO (AVERAGED): 15.35
ECHO MV E/E' SEPTAL: 14.44
ECHO MV LVOT VTI INDEX: 1.73
ECHO MV MAX VELOCITY: 1.4 M/S
ECHO MV MEAN GRADIENT: 4 MMHG
ECHO MV MEAN VELOCITY: 1 M/S
ECHO MV PEAK GRADIENT: 8 MMHG
ECHO MV VTI: 38.3 CM
ECHO PV MAX VELOCITY: 0.8 M/S
ECHO PV PEAK GRADIENT: 3 MMHG
ECHO RV FREE WALL PEAK S': 10 CM/S
ECHO RV INTERNAL DIMENSION: 3.1 CM
ECHO RV TAPSE: 0.7 CM (ref 1.7–?)
ECHO TV REGURGITANT MAX VELOCITY: 2.55 M/S
ECHO TV REGURGITANT PEAK GRADIENT: 26 MMHG
ERYTHROCYTE [DISTWIDTH] IN BLOOD BY AUTOMATED COUNT: 15.7 % (ref 11.9–14.6)
FERRITIN SERPL-MCNC: 18 NG/ML (ref 8–388)
GLUCOSE BLD STRIP.AUTO-MCNC: 115 MG/DL (ref 65–100)
GLUCOSE BLD STRIP.AUTO-MCNC: 129 MG/DL (ref 65–100)
GLUCOSE BLD STRIP.AUTO-MCNC: 160 MG/DL (ref 65–100)
GLUCOSE SERPL-MCNC: 157 MG/DL (ref 65–100)
HCT VFR BLD AUTO: 42 % (ref 41.1–50.3)
HGB BLD-MCNC: 12.9 G/DL (ref 13.6–17.2)
IRON SATN MFR SERPL: 14 %
IRON SERPL-MCNC: 42 UG/DL (ref 35–150)
MCH RBC QN AUTO: 25.6 PG (ref 26.1–32.9)
MCHC RBC AUTO-ENTMCNC: 30.7 G/DL (ref 31.4–35)
MCV RBC AUTO: 83.5 FL (ref 82–102)
NRBC # BLD: 0 K/UL (ref 0–0.2)
PHOSPHATE SERPL-MCNC: 3.3 MG/DL (ref 2.3–3.7)
PLATELET # BLD AUTO: 206 K/UL (ref 150–450)
PMV BLD AUTO: 9.6 FL (ref 9.4–12.3)
POTASSIUM SERPL-SCNC: 4.6 MMOL/L (ref 3.5–5.1)
RBC # BLD AUTO: 5.03 M/UL (ref 4.23–5.6)
SERVICE CMNT-IMP: ABNORMAL
SODIUM SERPL-SCNC: 136 MMOL/L (ref 133–143)
TIBC SERPL-MCNC: 293 UG/DL (ref 250–450)
TSH W FREE THYROID IF ABNORMAL: 0.79 UIU/ML (ref 0.36–3.74)
WBC # BLD AUTO: 7.7 K/UL (ref 4.3–11.1)

## 2023-10-10 PROCEDURE — 2500000003 HC RX 250 WO HCPCS: Performed by: INTERNAL MEDICINE

## 2023-10-10 PROCEDURE — 84100 ASSAY OF PHOSPHORUS: CPT

## 2023-10-10 PROCEDURE — 97535 SELF CARE MNGMENT TRAINING: CPT

## 2023-10-10 PROCEDURE — G0378 HOSPITAL OBSERVATION PER HR: HCPCS

## 2023-10-10 PROCEDURE — 84443 ASSAY THYROID STIM HORMONE: CPT

## 2023-10-10 PROCEDURE — C8929 TTE W OR WO FOL WCON,DOPPLER: HCPCS

## 2023-10-10 PROCEDURE — 2580000003 HC RX 258: Performed by: INTERNAL MEDICINE

## 2023-10-10 PROCEDURE — 36415 COLL VENOUS BLD VENIPUNCTURE: CPT

## 2023-10-10 PROCEDURE — 92610 EVALUATE SWALLOWING FUNCTION: CPT

## 2023-10-10 PROCEDURE — 82728 ASSAY OF FERRITIN: CPT

## 2023-10-10 PROCEDURE — 97162 PT EVAL MOD COMPLEX 30 MIN: CPT

## 2023-10-10 PROCEDURE — 96376 TX/PRO/DX INJ SAME DRUG ADON: CPT

## 2023-10-10 PROCEDURE — 99232 SBSQ HOSP IP/OBS MODERATE 35: CPT | Performed by: PSYCHIATRY & NEUROLOGY

## 2023-10-10 PROCEDURE — 83550 IRON BINDING TEST: CPT

## 2023-10-10 PROCEDURE — 93306 TTE W/DOPPLER COMPLETE: CPT | Performed by: INTERNAL MEDICINE

## 2023-10-10 PROCEDURE — 2580000003 HC RX 258: Performed by: STUDENT IN AN ORGANIZED HEALTH CARE EDUCATION/TRAINING PROGRAM

## 2023-10-10 PROCEDURE — 6360000004 HC RX CONTRAST MEDICATION: Performed by: STUDENT IN AN ORGANIZED HEALTH CARE EDUCATION/TRAINING PROGRAM

## 2023-10-10 PROCEDURE — 6370000000 HC RX 637 (ALT 250 FOR IP): Performed by: INTERNAL MEDICINE

## 2023-10-10 PROCEDURE — 85027 COMPLETE CBC AUTOMATED: CPT

## 2023-10-10 PROCEDURE — 97166 OT EVAL MOD COMPLEX 45 MIN: CPT

## 2023-10-10 PROCEDURE — 80048 BASIC METABOLIC PNL TOTAL CA: CPT

## 2023-10-10 PROCEDURE — 82962 GLUCOSE BLOOD TEST: CPT

## 2023-10-10 PROCEDURE — 83540 ASSAY OF IRON: CPT

## 2023-10-10 PROCEDURE — 6370000000 HC RX 637 (ALT 250 FOR IP): Performed by: STUDENT IN AN ORGANIZED HEALTH CARE EDUCATION/TRAINING PROGRAM

## 2023-10-10 PROCEDURE — 73523 X-RAY EXAM HIPS BI 5/> VIEWS: CPT

## 2023-10-10 PROCEDURE — 97530 THERAPEUTIC ACTIVITIES: CPT

## 2023-10-10 PROCEDURE — 6370000000 HC RX 637 (ALT 250 FOR IP): Performed by: PSYCHIATRY & NEUROLOGY

## 2023-10-10 RX ORDER — EZETIMIBE 10 MG/1
10 TABLET ORAL NIGHTLY
Status: DISCONTINUED | OUTPATIENT
Start: 2023-10-10 | End: 2023-10-13 | Stop reason: HOSPADM

## 2023-10-10 RX ORDER — DEXTROSE MONOHYDRATE 100 MG/ML
INJECTION, SOLUTION INTRAVENOUS CONTINUOUS PRN
Status: DISCONTINUED | OUTPATIENT
Start: 2023-10-10 | End: 2023-10-13 | Stop reason: HOSPADM

## 2023-10-10 RX ORDER — INSULIN LISPRO 100 [IU]/ML
0-4 INJECTION, SOLUTION INTRAVENOUS; SUBCUTANEOUS NIGHTLY
Status: DISCONTINUED | OUTPATIENT
Start: 2023-10-10 | End: 2023-10-13 | Stop reason: HOSPADM

## 2023-10-10 RX ORDER — INSULIN LISPRO 100 [IU]/ML
0-8 INJECTION, SOLUTION INTRAVENOUS; SUBCUTANEOUS
Status: DISCONTINUED | OUTPATIENT
Start: 2023-10-10 | End: 2023-10-13 | Stop reason: HOSPADM

## 2023-10-10 RX ADMIN — AMIODARONE HYDROCHLORIDE 200 MG: 200 TABLET ORAL at 07:39

## 2023-10-10 RX ADMIN — OXYCODONE HYDROCHLORIDE 5 MG: 5 TABLET ORAL at 04:29

## 2023-10-10 RX ADMIN — DICLOFENAC SODIUM 2 G: 10 GEL TOPICAL at 22:21

## 2023-10-10 RX ADMIN — POLYETHYLENE GLYCOL 3350 17 G: 17 POWDER, FOR SOLUTION ORAL at 07:39

## 2023-10-10 RX ADMIN — SODIUM CHLORIDE, PRESERVATIVE FREE 0.45 ML: 5 INJECTION INTRAVENOUS at 15:24

## 2023-10-10 RX ADMIN — GABAPENTIN 300 MG: 300 CAPSULE ORAL at 21:00

## 2023-10-10 RX ADMIN — ASPIRIN 81 MG: 81 TABLET, CHEWABLE ORAL at 07:39

## 2023-10-10 RX ADMIN — GABAPENTIN 300 MG: 300 CAPSULE ORAL at 07:39

## 2023-10-10 RX ADMIN — APIXABAN 5 MG: 5 TABLET, FILM COATED ORAL at 07:40

## 2023-10-10 RX ADMIN — PRAVASTATIN SODIUM 40 MG: 20 TABLET ORAL at 21:40

## 2023-10-10 RX ADMIN — GABAPENTIN 300 MG: 300 CAPSULE ORAL at 15:07

## 2023-10-10 RX ADMIN — SODIUM CHLORIDE, PRESERVATIVE FREE 5 ML: 5 INJECTION INTRAVENOUS at 21:40

## 2023-10-10 RX ADMIN — HYDROMORPHONE HYDROCHLORIDE 0.5 MG: 1 INJECTION, SOLUTION INTRAMUSCULAR; INTRAVENOUS; SUBCUTANEOUS at 21:37

## 2023-10-10 RX ADMIN — PANTOPRAZOLE SODIUM 40 MG: 40 TABLET, DELAYED RELEASE ORAL at 07:39

## 2023-10-10 RX ADMIN — EZETIMIBE 10 MG: 10 TABLET ORAL at 21:39

## 2023-10-10 RX ADMIN — APIXABAN 5 MG: 5 TABLET, FILM COATED ORAL at 21:00

## 2023-10-10 RX ADMIN — HYDROMORPHONE HYDROCHLORIDE 0.5 MG: 1 INJECTION, SOLUTION INTRAMUSCULAR; INTRAVENOUS; SUBCUTANEOUS at 11:55

## 2023-10-10 RX ADMIN — OXYCODONE HYDROCHLORIDE 5 MG: 5 TABLET ORAL at 19:21

## 2023-10-10 RX ADMIN — SODIUM CHLORIDE, PRESERVATIVE FREE 10 ML: 5 INJECTION INTRAVENOUS at 07:40

## 2023-10-10 ASSESSMENT — PAIN DESCRIPTION - DESCRIPTORS
DESCRIPTORS: ACHING
DESCRIPTORS: SORE
DESCRIPTORS: ACHING
DESCRIPTORS: ACHING

## 2023-10-10 ASSESSMENT — PAIN - FUNCTIONAL ASSESSMENT
PAIN_FUNCTIONAL_ASSESSMENT: ACTIVITIES ARE NOT PREVENTED
PAIN_FUNCTIONAL_ASSESSMENT: PREVENTS OR INTERFERES WITH ALL ACTIVE AND SOME PASSIVE ACTIVITIES
PAIN_FUNCTIONAL_ASSESSMENT: PREVENTS OR INTERFERES WITH ALL ACTIVE AND SOME PASSIVE ACTIVITIES

## 2023-10-10 ASSESSMENT — PAIN DESCRIPTION - ORIENTATION
ORIENTATION: LEFT
ORIENTATION: LEFT;LOWER
ORIENTATION: LEFT
ORIENTATION: LEFT;LOWER

## 2023-10-10 ASSESSMENT — PAIN SCALES - GENERAL
PAINLEVEL_OUTOF10: 0
PAINLEVEL_OUTOF10: 8
PAINLEVEL_OUTOF10: 5
PAINLEVEL_OUTOF10: 5
PAINLEVEL_OUTOF10: 8
PAINLEVEL_OUTOF10: 6
PAINLEVEL_OUTOF10: 5
PAINLEVEL_OUTOF10: 2
PAINLEVEL_OUTOF10: 0
PAINLEVEL_OUTOF10: 0

## 2023-10-10 ASSESSMENT — PAIN DESCRIPTION - LOCATION
LOCATION: BACK;LEG
LOCATION: BACK;HIP
LOCATION: BACK
LOCATION: BACK;LEG
LOCATION: BACK;LEG
LOCATION: BACK

## 2023-10-10 ASSESSMENT — PAIN DESCRIPTION - FREQUENCY: FREQUENCY: CONTINUOUS

## 2023-10-10 ASSESSMENT — PAIN DESCRIPTION - PAIN TYPE: TYPE: CHRONIC PAIN

## 2023-10-10 NOTE — PROGRESS NOTES
Demonstrated understanding    PRECAUTIONS/ALLERGIES: Atorvastatin, Other, Rosuvastatin, and Oyster extract   Safety Devices in place: Yes  Type of devices: Call light within reach; Left in chair;Nurse notified      Therapy Time  SLP Individual Minutes  Time In: 9787  Time Out: 7320  Minutes: [address], [facility], [facility], [address] [facility]  10/10/2023 9:43 AM

## 2023-10-10 NOTE — WOUND CARE
Patient has hemosiderin staining, and scabbed venous ulcers on lower legs. He states he once went to wound clinic and they got his legs healed but the blisters came back and he has wounds again, he is not interested in going to wound clinic again. He states they gave him some \"boots\" he couldn't figure out how to wear and does not wear them at all, his description of the boots sounds like circaid or farrow wraps. He states he would rather continue the simple bandage he uses at home of toliet paper and ace bandage and continue to use the ointment he uses at home, he called his daughter who state the ointment is diclofenac sodium 1% (voltaren). This NSAID ointment is vaseline based and may accidentally help the wounds, likely helps a great deal with the pain. Discussed with Stewart , PA, order for Voltaren, vaseline gauze, abd and anderas daily. Patient requests and ace baandge to each leg as well, if doing well medically will add ace bandage tomorrow. Discussed with patient that the bandages are sterile and may help him more than toilet paper and he agreed. Will monitor.

## 2023-10-10 NOTE — CARE COORDINATION
CM continuing to follow for ongoing discharge planning. Therapy recommending [facility] at discharge. Referral sent to 9th floor IRF and admissions liaison notified.

## 2023-10-10 NOTE — PROGRESS NOTES
SPEECH PATHOLOGY NOTE:    Speech therapy consult received and appreciated. Attempted to see patient for bedside swallow evaluation. Patient is currently working with PT. Will re-attempt at later time/date as patient becomes available and schedule permits.           Feliciano Ahuja, [facility], CCC-SLP

## 2023-10-10 NOTE — THERAPY EVALUATION
ACUTE PHYSICAL THERAPY GOALS:   (Developed with and agreed upon by patient and/or caregiver.)  (1.) Sarital Bunting Sr.  will move from supine to sit and sit to supine , scoot up and down, and roll side to side with STAND BY ASSIST within 7 treatment day(s). (2.) Marc Sheriff Blind Sr. will transfer from bed to chair and chair to bed with CONTACT GUARD ASSIST using the least restrictive device within 7 treatment day(s). (3.) Inell Bunting Sr. will ambulate with CONTACT GUARD ASSIST for 100 feet with the least restrictive device within 7 treatment day(s). (4.) Marc Antonteen Blind Sr. will perform standing static and dynamic balance activities x 20 minutes with CONTACT GUARD ASSIST to improve safety within 7 treatment day(s). (5.) Marc Antonteen Blind Sr. will perform therapeutic exercises x 20 min for HEP with INDEPENDENCE to improve strength, endurance, and functional mobility within 7 treatment day(s).      PHYSICAL THERAPY Initial Assessment, Daily Note, and AM  (Link to Caseload Tracking: PT Visit Days : 1  Acknowledge Orders  Time In/Out  PT Charge Capture  Rehab Caseload Tracker    Marc Underwood is a 69 y.o. male   PRIMARY DIAGNOSIS: Left sided numbness  Left-sided weakness [R53.1]  Left leg weakness [R29.898]  Acute CVA (cerebrovascular accident) ([address]) [I63.9]  Cerebrovascular accident (CVA), unspecified mechanism ([address]) [I63.9]       Reason for Referral: Generalized Muscle Weakness (M62.81)  Other lack of cordination (R27.8)  Difficulty in walking, Not elsewhere classified (R26.2)  Other abnormalities of gait and mobility (R26.89)  Observation: Payor: [facility] MEDICARE / Plan: Misael Peoples COMPLETE / Product Type: *No Product type* /     ASSESSMENT:     REHAB RECOMMENDATIONS:   Recommendation to date pending progress:  Setting:  Inpatient Rehab Facility    Equipment:    To Be Determined     ASSESSMENT:  Mr. Little Ferrera is a 69year old M who presents to hospital with reports of

## 2023-10-10 NOTE — PROGRESS NOTES
[address] [] [] [] [] [] [] [x] [] [] [] [] RW   I=Independent, Mod I=Modified Independent, S=Supervision/Setup, SBA=Standby Assistance, CGA=Contact Guard Assistance, Min=Minimal Assistance, Mod=Moderate Assistance, Max=Maximal Assistance, Total=Total Assistance, NT=Not Tested    ACTIVITIES OF DAILY LIVING: I Mod I S SBA CGA Min Mod Max Total NT Comments   BASIC ADLs:              Upper Body Bathing  [] [] [] [] [] [] [] [] [] [x]     Lower Body Bathing [] [] [] [] [] [] [] [] [] [x]     Toileting [] [] [] [] [] [] [] [] [] [x]    Upper Body Dressing [] [] [] [] [] [] [x] [] [] [] Donning/doffing gown EOB, poor sitting balance due to pain   Lower Body Dressing [] [] [] [] [] [] [] [] [] [x]    Feeding [] [] [] [] [] [] [] [] [] [x]    Grooming [] [] [] [] [] [x] [] [] [] [] Combing hair EOB, unable to gain balance   Personal Device Care [] [] [] [] [] [] [] [] [] [x]    Functional Mobility [] [] [] [] [] [] [x] [] [] [] RW   I=Independent, Mod I=Modified Independent, S=Supervision/Setup, SBA=Standby Assistance, CGA=Contact Guard Assistance, Min=Minimal Assistance, Mod=Moderate Assistance, Max=Maximal Assistance, Total=Total Assistance, NT=Not Tested    PLAN:   FREQUENCY/DURATION   OT Plan of Care: 3 times/week for duration of hospital stay or until stated goals are met, whichever comes first.    PROBLEM LIST:   (Skilled intervention is medically necessary to address:)  Decreased ADL/Functional Activities  Decreased Activity Tolerance  Decreased AROM/PROM  Decreased Balance  Decreased Coordination  Decreased Gait Ability  Decreased Safety Awareness  Decreased Strength  Decreased Transfer Abilities  Increased Pain   INTERVENTIONS PLANNED:  (Benefits and precautions of occupational therapy have been discussed with the patient.)  Self Care Training  Therapeutic Activity  Therapeutic Exercise/HEP  Neuromuscular Re-education  Manual Therapy  Education         TREATMENT:     EVALUATION: MODERATE COMPLEXITY: (Untimed Charge)    TREATMENT:   Self Care (15 minutes): Patient participated in upper body dressing and grooming ADLs in unsupported sitting with moderate verbal, manual, and tactile cueing to increase independence, decrease assistance required, and increase activity tolerance. Patient also participated in functional mobility, functional transfer, and adaptive equipment training to increase independence, decrease assistance required, increase activity tolerance, and increase safety awareness.      TREATMENT GRID:  N/A    AFTER TREATMENT PRECAUTIONS: Alarm Activated, Bed, Call light within reach, Needs within reach, and RN notified    INTERDISCIPLINARY COLLABORATION:  RN/ PCT and OT/ MASSEY    EDUCATION:  Education Given To: Patient  Education Provided: Role of Therapy;Plan of Care  Education Method: Verbal  Barriers to Learning: None  Education Outcome: Verbalized understanding    TOTAL TREATMENT DURATION AND TIME:  Time In: 3044  Time Out: [address]  Minutes: [address] Herlinda Torres, [facility]

## 2023-10-10 NOTE — PROGRESS NOTES
Hospitalist Progress Note   Admit Date:  10/9/2023 10:43 AM   Name:  Kimberlyn Torres Sr.   Age:  69 y.o. Sex:  male  :  1955   MRN:  [id number]   Room:  732/01    Presenting/Chief Complaint: Extremity Weakness     Reason(s) for Admission: Left-sided weakness [R53.1]  Left leg weakness [R29.898]  Acute CVA (cerebrovascular accident) [facility]) [I63.9]  Cerebrovascular accident (CVA), unspecified mechanism ([address] [facility]) [I63.9]     Hospital Course:   Kimberlyn Began Sr. is a 69 y.o. male with with CAD s/p CABG with MAZE procedure (on ASA + Pravastatin), paroxysmal atrial fibrillation (on Eliquis), previous TIA, HTN, DM2, COPD, AAS s/p AVR, bipolar disorder, and PAD with chronic BLE wounds presented to the ER on 10/9 with left arm and leg numbness upon waking up. He states that he felt fine going to bed the night before and when he awoke, he noticed that his right arm and leg felt numb. He tried to walk around and get him to [facility] but they remain numb. He tried to take a shower, but fell and could not get up in the bathtub so EMS was called. They brought him to the ER to be evaluated. A Code S was called in the ER. CT head was unremarkable. He was not a tPA candidate due to low NIH score and being on Eliquis. Pt admitted for further evaluation and management      Subjective & 24hr Events:   10/10/23 - Pt sitting in chair. States no new issues or concerns today. LUE/LLE numbness is improving. Denies chest pain, shortness of breath, n/v.  Discussed discharge planning, possible need for rehab placement, pt in agreement. Assessment & Plan:     Principal Problem:    Left sided numbness - suspected CVA  - suspected R thalamic stroke, but pt unable to have MRI  - Neurology evaluated, recommends continuing Eliquis, start Zetia. BP goal <130/80 long term. No need for additional aspirin.   Outpt follow up at discharge with Richard Villanueva, NP.   - Carotid doppler showed no significant stenosis MCH 25.6 (L) 26.1 - 32.9 PG    MCHC 30.7 (L) 31.4 - 35.0 g/dL    RDW 15.7 (H) 11.9 - 14.6 %    Platelets 863 618 - 916 K/uL    MPV 9.6 9.4 - 12.3 FL    nRBC 0.00 0.0 - 0.2 K/uL   Basic Metabolic Panel w/ Reflex to MG    Collection Time: 10/10/23  4:59 AM   Result Value Ref Range    Sodium 136 133 - 143 mmol/L    Potassium 4.6 3.5 - 5.1 mmol/L    Chloride 106 101 - 110 mmol/L    CO2 26 21 - 32 mmol/L    Anion Gap 4 2 - 11 mmol/L    Glucose 157 (H) 65 - 100 mg/dL    BUN 13 8 - 23 MG/DL    Creatinine 1.00 0.8 - 1.5 MG/DL    Est, Glom Filt Rate >60 >60 ml/min/1.73m2    Calcium 9.0 8.3 - 10.4 MG/DL   Phosphorus    Collection Time: 10/10/23  4:59 AM   Result Value Ref Range    Phosphorus 3.3 2.3 - 3.7 MG/DL   TSH with Reflex    Collection Time: 10/10/23  4:59 AM   Result Value Ref Range    TSH w Free Thyroid if Abnormal 0.79 0.358 - 3.740 UIU/ML   Transferrin Saturation    Collection Time: 10/10/23  4:59 AM   Result Value Ref Range    Iron 42 35 - 150 ug/dL    TIBC 293 250 - 450 ug/dL    TRANSFERRIN SATURATION 14 (L) >20 %   Ferritin    Collection Time: 10/10/23  4:59 AM   Result Value Ref Range    Ferritin 18 8 - 388 NG/ML       Current Meds:  Current Facility-Administered Medications   Medication Dose Route Frequency    ezetimibe (ZETIA) tablet 10 mg  10 mg Oral Nightly    acetaminophen (TYLENOL) tablet 650 mg  650 mg Oral Q6H PRN    amiodarone (CORDARONE) tablet 200 mg  200 mg Oral Daily    apixaban (ELIQUIS) tablet 5 mg  5 mg Oral BID    aspirin chewable tablet 81 mg  81 mg Oral Daily    gabapentin (NEURONTIN) capsule 300 mg  300 mg Oral TID    [Held by provider] lisinopril (PRINIVIL;ZESTRIL) tablet 10 mg  10 mg Oral Daily    [Held by provider] metoprolol tartrate (LOPRESSOR) tablet 25 mg  25 mg Oral BID    pantoprazole (PROTONIX) tablet 40 mg  40 mg Oral Daily    sodium chloride flush 0.9 % injection 5-40 mL  5-40 mL IntraVENous 2 times per day    sodium chloride flush 0.9 % injection 5-40 mL  5-40 mL IntraVENous PRN
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
## 2023-10-11 PROBLEM — R09.89 SUSPECTED CEREBROVASCULAR ACCIDENT (CVA): Status: ACTIVE | Noted: 2023-10-11

## 2023-10-11 LAB
GLUCOSE BLD STRIP.AUTO-MCNC: 138 MG/DL (ref 65–100)
GLUCOSE BLD STRIP.AUTO-MCNC: 145 MG/DL (ref 65–100)
GLUCOSE BLD STRIP.AUTO-MCNC: 159 MG/DL (ref 65–100)
GLUCOSE BLD STRIP.AUTO-MCNC: 246 MG/DL (ref 65–100)
SERVICE CMNT-IMP: ABNORMAL

## 2023-10-11 PROCEDURE — G0378 HOSPITAL OBSERVATION PER HR: HCPCS

## 2023-10-11 PROCEDURE — 96376 TX/PRO/DX INJ SAME DRUG ADON: CPT

## 2023-10-11 PROCEDURE — 82962 GLUCOSE BLOOD TEST: CPT

## 2023-10-11 PROCEDURE — 94760 N-INVAS EAR/PLS OXIMETRY 1: CPT

## 2023-10-11 PROCEDURE — 1100000000 HC RM PRIVATE

## 2023-10-11 PROCEDURE — 6370000000 HC RX 637 (ALT 250 FOR IP): Performed by: STUDENT IN AN ORGANIZED HEALTH CARE EDUCATION/TRAINING PROGRAM

## 2023-10-11 PROCEDURE — 94640 AIRWAY INHALATION TREATMENT: CPT

## 2023-10-11 PROCEDURE — 6370000000 HC RX 637 (ALT 250 FOR IP): Performed by: PSYCHIATRY & NEUROLOGY

## 2023-10-11 PROCEDURE — 94664 DEMO&/EVAL PT USE INHALER: CPT

## 2023-10-11 PROCEDURE — 2500000003 HC RX 250 WO HCPCS: Performed by: INTERNAL MEDICINE

## 2023-10-11 PROCEDURE — 2580000003 HC RX 258: Performed by: INTERNAL MEDICINE

## 2023-10-11 PROCEDURE — 6370000000 HC RX 637 (ALT 250 FOR IP): Performed by: INTERNAL MEDICINE

## 2023-10-11 RX ORDER — FERROUS SULFATE 325(65) MG
325 TABLET ORAL
Status: DISCONTINUED | OUTPATIENT
Start: 2023-10-11 | End: 2023-10-13 | Stop reason: HOSPADM

## 2023-10-11 RX ADMIN — SODIUM CHLORIDE, PRESERVATIVE FREE 10 ML: 5 INJECTION INTRAVENOUS at 09:17

## 2023-10-11 RX ADMIN — OXYCODONE HYDROCHLORIDE 5 MG: 5 TABLET ORAL at 02:38

## 2023-10-11 RX ADMIN — METOPROLOL TARTRATE 25 MG: 25 TABLET, FILM COATED ORAL at 22:37

## 2023-10-11 RX ADMIN — HYDROMORPHONE HYDROCHLORIDE 0.5 MG: 1 INJECTION, SOLUTION INTRAMUSCULAR; INTRAVENOUS; SUBCUTANEOUS at 22:40

## 2023-10-11 RX ADMIN — PRAVASTATIN SODIUM 40 MG: 20 TABLET ORAL at 22:37

## 2023-10-11 RX ADMIN — OXYCODONE HYDROCHLORIDE 5 MG: 5 TABLET ORAL at 10:32

## 2023-10-11 RX ADMIN — EZETIMIBE 10 MG: 10 TABLET ORAL at 22:37

## 2023-10-11 RX ADMIN — APIXABAN 5 MG: 5 TABLET, FILM COATED ORAL at 21:00

## 2023-10-11 RX ADMIN — APIXABAN 5 MG: 5 TABLET, FILM COATED ORAL at 09:16

## 2023-10-11 RX ADMIN — FERROUS SULFATE TAB 325 MG (65 MG ELEMENTAL FE) 325 MG: 325 (65 FE) TAB at 09:19

## 2023-10-11 RX ADMIN — DICLOFENAC SODIUM 2 G: 10 GEL TOPICAL at 09:17

## 2023-10-11 RX ADMIN — HYDROMORPHONE HYDROCHLORIDE 0.5 MG: 1 INJECTION, SOLUTION INTRAMUSCULAR; INTRAVENOUS; SUBCUTANEOUS at 15:54

## 2023-10-11 RX ADMIN — ALBUTEROL SULFATE 2 PUFF: 90 AEROSOL, METERED RESPIRATORY (INHALATION) at 09:19

## 2023-10-11 RX ADMIN — POLYETHYLENE GLYCOL 3350 17 G: 17 POWDER, FOR SOLUTION ORAL at 22:45

## 2023-10-11 RX ADMIN — PANTOPRAZOLE SODIUM 40 MG: 40 TABLET, DELAYED RELEASE ORAL at 09:16

## 2023-10-11 RX ADMIN — SODIUM CHLORIDE, PRESERVATIVE FREE 10 ML: 5 INJECTION INTRAVENOUS at 22:39

## 2023-10-11 RX ADMIN — GABAPENTIN 300 MG: 300 CAPSULE ORAL at 21:00

## 2023-10-11 RX ADMIN — OXYCODONE HYDROCHLORIDE 5 MG: 5 TABLET ORAL at 19:36

## 2023-10-11 RX ADMIN — AMIODARONE HYDROCHLORIDE 200 MG: 200 TABLET ORAL at 09:12

## 2023-10-11 RX ADMIN — HYDROMORPHONE HYDROCHLORIDE 0.5 MG: 1 INJECTION, SOLUTION INTRAMUSCULAR; INTRAVENOUS; SUBCUTANEOUS at 09:12

## 2023-10-11 RX ADMIN — GABAPENTIN 300 MG: 300 CAPSULE ORAL at 13:34

## 2023-10-11 RX ADMIN — GABAPENTIN 300 MG: 300 CAPSULE ORAL at 09:16

## 2023-10-11 RX ADMIN — DICLOFENAC SODIUM 2 G: 10 GEL TOPICAL at 22:38

## 2023-10-11 ASSESSMENT — PAIN DESCRIPTION - ORIENTATION
ORIENTATION: LEFT

## 2023-10-11 ASSESSMENT — PAIN SCALES - GENERAL
PAINLEVEL_OUTOF10: 7
PAINLEVEL_OUTOF10: 6
PAINLEVEL_OUTOF10: 0
PAINLEVEL_OUTOF10: 10
PAINLEVEL_OUTOF10: 9
PAINLEVEL_OUTOF10: 0
PAINLEVEL_OUTOF10: 6
PAINLEVEL_OUTOF10: 6

## 2023-10-11 ASSESSMENT — PAIN DESCRIPTION - ONSET
ONSET: ON-GOING
ONSET: ON-GOING

## 2023-10-11 ASSESSMENT — PAIN DESCRIPTION - LOCATION
LOCATION: BACK;LEG
LOCATION: LEG
LOCATION: LEG
LOCATION: BACK
LOCATION: BACK;LEG
LOCATION: LEG;OTHER (COMMENT)

## 2023-10-11 ASSESSMENT — PAIN DESCRIPTION - DESCRIPTORS
DESCRIPTORS: ACHING

## 2023-10-11 ASSESSMENT — PAIN DESCRIPTION - PAIN TYPE
TYPE: CHRONIC PAIN
TYPE: CHRONIC PAIN

## 2023-10-11 ASSESSMENT — PAIN - FUNCTIONAL ASSESSMENT
PAIN_FUNCTIONAL_ASSESSMENT: PREVENTS OR INTERFERES SOME ACTIVE ACTIVITIES AND ADLS
PAIN_FUNCTIONAL_ASSESSMENT: PREVENTS OR INTERFERES SOME ACTIVE ACTIVITIES AND ADLS
PAIN_FUNCTIONAL_ASSESSMENT: ACTIVITIES ARE NOT PREVENTED

## 2023-10-11 ASSESSMENT — PAIN DESCRIPTION - FREQUENCY
FREQUENCY: CONTINUOUS
FREQUENCY: CONTINUOUS
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
## 2023-10-12 LAB
GLUCOSE BLD STRIP.AUTO-MCNC: 144 MG/DL (ref 65–100)
GLUCOSE BLD STRIP.AUTO-MCNC: 170 MG/DL (ref 65–100)
GLUCOSE BLD STRIP.AUTO-MCNC: 209 MG/DL (ref 65–100)
GLUCOSE BLD STRIP.AUTO-MCNC: 224 MG/DL (ref 65–100)
SERVICE CMNT-IMP: ABNORMAL

## 2023-10-12 PROCEDURE — 2500000003 HC RX 250 WO HCPCS: Performed by: INTERNAL MEDICINE

## 2023-10-12 PROCEDURE — 6370000000 HC RX 637 (ALT 250 FOR IP): Performed by: INTERNAL MEDICINE

## 2023-10-12 PROCEDURE — 82962 GLUCOSE BLOOD TEST: CPT

## 2023-10-12 PROCEDURE — 97112 NEUROMUSCULAR REEDUCATION: CPT

## 2023-10-12 PROCEDURE — 6370000000 HC RX 637 (ALT 250 FOR IP): Performed by: PSYCHIATRY & NEUROLOGY

## 2023-10-12 PROCEDURE — 97535 SELF CARE MNGMENT TRAINING: CPT

## 2023-10-12 PROCEDURE — 6370000000 HC RX 637 (ALT 250 FOR IP): Performed by: STUDENT IN AN ORGANIZED HEALTH CARE EDUCATION/TRAINING PROGRAM

## 2023-10-12 PROCEDURE — 6370000000 HC RX 637 (ALT 250 FOR IP): Performed by: PHYSICIAN ASSISTANT

## 2023-10-12 PROCEDURE — 97530 THERAPEUTIC ACTIVITIES: CPT

## 2023-10-12 PROCEDURE — 1100000000 HC RM PRIVATE

## 2023-10-12 PROCEDURE — 2580000003 HC RX 258: Performed by: INTERNAL MEDICINE

## 2023-10-12 RX ADMIN — EZETIMIBE 10 MG: 10 TABLET ORAL at 19:37

## 2023-10-12 RX ADMIN — INSULIN LISPRO 2 UNITS: 100 INJECTION, SOLUTION INTRAVENOUS; SUBCUTANEOUS at 12:20

## 2023-10-12 RX ADMIN — HYDROMORPHONE HYDROCHLORIDE 0.5 MG: 1 INJECTION, SOLUTION INTRAMUSCULAR; INTRAVENOUS; SUBCUTANEOUS at 14:15

## 2023-10-12 RX ADMIN — HYDROMORPHONE HYDROCHLORIDE 0.5 MG: 1 INJECTION, SOLUTION INTRAMUSCULAR; INTRAVENOUS; SUBCUTANEOUS at 19:37

## 2023-10-12 RX ADMIN — GABAPENTIN 300 MG: 300 CAPSULE ORAL at 08:55

## 2023-10-12 RX ADMIN — DICLOFENAC SODIUM 2 G: 10 GEL TOPICAL at 08:58

## 2023-10-12 RX ADMIN — SODIUM CHLORIDE, PRESERVATIVE FREE 10 ML: 5 INJECTION INTRAVENOUS at 14:15

## 2023-10-12 RX ADMIN — GABAPENTIN 300 MG: 300 CAPSULE ORAL at 14:15

## 2023-10-12 RX ADMIN — APIXABAN 5 MG: 5 TABLET, FILM COATED ORAL at 19:37

## 2023-10-12 RX ADMIN — AMIODARONE HYDROCHLORIDE 200 MG: 200 TABLET ORAL at 08:55

## 2023-10-12 RX ADMIN — INSULIN LISPRO 2 UNITS: 100 INJECTION, SOLUTION INTRAVENOUS; SUBCUTANEOUS at 10:02

## 2023-10-12 RX ADMIN — HYDROMORPHONE HYDROCHLORIDE 0.5 MG: 1 INJECTION, SOLUTION INTRAMUSCULAR; INTRAVENOUS; SUBCUTANEOUS at 08:53

## 2023-10-12 RX ADMIN — LISINOPRIL 10 MG: 5 TABLET ORAL at 08:55

## 2023-10-12 RX ADMIN — ACETAMINOPHEN 650 MG: 325 TABLET ORAL at 11:05

## 2023-10-12 RX ADMIN — PRAVASTATIN SODIUM 40 MG: 20 TABLET ORAL at 19:37

## 2023-10-12 RX ADMIN — SODIUM CHLORIDE, PRESERVATIVE FREE 10 ML: 5 INJECTION INTRAVENOUS at 08:54

## 2023-10-12 RX ADMIN — OXYCODONE HYDROCHLORIDE 5 MG: 5 TABLET ORAL at 11:05

## 2023-10-12 RX ADMIN — FERROUS SULFATE TAB 325 MG (65 MG ELEMENTAL FE) 325 MG: 325 (65 FE) TAB at 08:55

## 2023-10-12 RX ADMIN — METOPROLOL TARTRATE 25 MG: 25 TABLET, FILM COATED ORAL at 19:37

## 2023-10-12 RX ADMIN — PANTOPRAZOLE SODIUM 40 MG: 40 TABLET, DELAYED RELEASE ORAL at 08:55

## 2023-10-12 RX ADMIN — DICLOFENAC SODIUM 2 G: 10 GEL TOPICAL at 19:44

## 2023-10-12 RX ADMIN — OXYCODONE HYDROCHLORIDE 5 MG: 5 TABLET ORAL at 04:28

## 2023-10-12 RX ADMIN — METOPROLOL TARTRATE 25 MG: 25 TABLET, FILM COATED ORAL at 08:55

## 2023-10-12 RX ADMIN — SODIUM CHLORIDE, PRESERVATIVE FREE 10 ML: 5 INJECTION INTRAVENOUS at 19:44

## 2023-10-12 RX ADMIN — APIXABAN 5 MG: 5 TABLET, FILM COATED ORAL at 08:56

## 2023-10-12 RX ADMIN — GABAPENTIN 300 MG: 300 CAPSULE ORAL at 19:37

## 2023-10-12 ASSESSMENT — PAIN SCALES - GENERAL
PAINLEVEL_OUTOF10: 7
PAINLEVEL_OUTOF10: 8
PAINLEVEL_OUTOF10: 6
PAINLEVEL_OUTOF10: 7
PAINLEVEL_OUTOF10: 3
PAINLEVEL_OUTOF10: 0
PAINLEVEL_OUTOF10: 0
PAINLEVEL_OUTOF10: 5
PAINLEVEL_OUTOF10: 6

## 2023-10-12 ASSESSMENT — PAIN - FUNCTIONAL ASSESSMENT: PAIN_FUNCTIONAL_ASSESSMENT: ACTIVITIES ARE NOT PREVENTED

## 2023-10-12 ASSESSMENT — PAIN DESCRIPTION - LOCATION
LOCATION: BACK;LEG
LOCATION: GENERALIZED

## 2023-10-12 ASSESSMENT — PAIN DESCRIPTION - ORIENTATION: ORIENTATION: LEFT

## 2023-10-12 ASSESSMENT — PAIN DESCRIPTION - DESCRIPTORS: DESCRIPTORS: ACHING

## 2023-10-12 NOTE — PROGRESS NOTES
ACUTE OCCUPATIONAL THERAPY GOALS:   (Developed with and agreed upon by patient and/or caregiver.)  1. Patient will complete lower body bathing and dressing with SUPERVISION and adaptive equipment as needed. 2. Patient will complete toileting with SUPERVISION. 3. Patient will complete grooming ADL standing at sink with SUPERVISION. 4. Patient will tolerate 25 minutes of OT treatment with 1-2 rest breaks to increase activity tolerance for ADLs. 5. Patient will complete functional transfers with SUPERVISION and adaptive equipment as needed. 6. Patient will tolerate 10 minutes BUE exercises to increase strength for safe, functional transfers. Timeframe: 7 visits     Goals at initial evaluation were met, new goals made for continuation of care. This writer was evaluating therapist, no formal re-evaluation required at this time.        OCCUPATIONAL THERAPY: Daily Note PM   OT Visit Days: 2   Time In/Out  OT Charge Capture  Rehab Caseload Tracker  OT Orders    Marc Mejia Mcmmili Camilo is a 69 y.o. male   PRIMARY DIAGNOSIS: Suspected cerebrovascular accident (CVA)  Left-sided weakness [R53.1]  Left leg weakness [R29.898]  Acute CVA (cerebrovascular accident) ([address]) [I63.9]  Cerebrovascular accident (CVA), unspecified mechanism ([address]) [I63.9]  Suspected cerebrovascular accident (CVA) [R09.89]       Inpatient: Payor: Georges Blankenship / Plan: Jatin Situ / Product Type: *No Product type* /     ASSESSMENT:     REHAB RECOMMENDATIONS: CURRENT LEVEL OF FUNCTION:  (Most Recently Demonstrated)   Recommendation to date pending progress:  Setting:  Inpatient Rehab Facility     Equipment:    Rolling Walker--pt has SPC at home Bathing:  Not Tested  Dressing:  Stand by Assist  Feeding/Grooming:  Stand by Assist  Toileting:  Not Tested  Functional Mobility:  Contact Guard Assist RW     ASSESSMENT:  Mr. Elmo Ruiz  is a 70 y/o male presents with left sided weakness and numbness and hx of fall, undergoing CVA workup, [] [] [] [] [] [x] []    Supine to Sit [] [] [] [x] [] [] [] [] [] [] []    Scooting [] [] [] [x] [] [] [] [] [] [] []    Sit to Supine [] [] [] [] [] [] [] [] [] [x] []    Transfers    Sit to Stand [] [] [] [] [x] [] [] [] [] [] [] RW   Bed to Chair [] [] [] [] [x] [] [] [] [] [] [] RW   Stand to Sit [] [] [] [] [x] [] [] [] [] [] [] RW   Tub/Shower [] [] [] [] [] [] [] [] [] [x] []     Toilet [] [] [] [] [] [] [] [] [] [x] []      [] [] [] [] [] [] [] [] [] [x] []    I=Independent, Mod I=Modified Independent, S=Supervision/Setup, SBA=Standby Assistance, CGA=Contact Guard Assistance, Min=Minimal Assistance, Mod=Moderate Assistance, Max=Maximal Assistance, Total=Total Assistance, NT=Not Tested    ACTIVITIES OF DAILY LIVING: I Mod I S SBA CGA Min Mod Max Total NT Comments   BASIC ADLs:              Upper Body   Bathing [] [] [] [] [] [] [] [] [] [x]     Lower Body Bathing [] [] [] [] [] [] [] [] [] [x]     Toileting [] [] [] [] [] [] [] [] [] [x]    Upper Body Dressing [] [] [] [x] [] [] [] [] [] [] Donning/doffing shirt standing    Lower Body Dressing [] [] [] [x] [] [] [] [] [] [] Donning/doffing socks EOB   Feeding [] [] [] [] [] [] [] [] [] [x]    Grooming [] [] [] [x] [] [] [] [] [] [] Washing hands, washing face, shaving and combing hair standing at sink   Personal Device Care [] [] [] [] [] [] [] [] [] [x]    Functional Mobility [] [] [] [] [x] [] [] [] [] [] RW   I=Independent, Mod I=Modified Independent, S=Supervision/Setup, SBA=Standby Assistance, CGA=Contact Guard Assistance, Min=Minimal Assistance, Mod=Moderate Assistance, Max=Maximal Assistance, Total=Total Assistance, NT=Not Tested    BALANCE: Good Fair+ Fair Fair- Poor NT Comments   Sitting Static [x] [] [] [] [] []    Sitting Dynamic [] [x] [] [] [] []              Standing Static [] [x] [] [] [] []    Standing Dynamic [] [] [x] [] [] []        PLAN:     FREQUENCY/DURATION   OT Plan of Care: 3 times/week for duration of hospital stay or until stated

## 2023-10-12 NOTE — CARE COORDINATION
CM notified by 9th floor liaison that patient has insurance approval to admit to I. R.F level of care. 9th floor can accept patient tomorrow AM.  NO PPD or covid testing required. Attending notified.

## 2023-10-12 NOTE — PROGRESS NOTES
ACUTE PHYSICAL THERAPY GOALS:   (Developed with and agreed upon by patient and/or caregiver.)  (1.) Kimberlyn Began Sr.  will move from supine to sit and sit to supine , scoot up and down, and roll side to side with STAND BY ASSIST within 7 treatment day(s). (2.) Marc Gilbert Sr. will transfer from bed to chair and chair to bed with CONTACT GUARD ASSIST using the least restrictive device within 7 treatment day(s). (3.) Kimberlyn Began Sr. will ambulate with STAND BY ASSIST for 100 feet with the least restrictive device within 7 treatment day(s). - goal progressed 10/12/23  (4.) Marc Orvil Salt Sr. will perform standing static and dynamic balance activities x 20 minutes with STAND BY ASSIST to improve safety within 7 treatment day(s). - goal progressed 10/12/23  (5.) Marc Ordeannal Salt Sr. will perform therapeutic exercises x 20 min for HEP with INDEPENDENCE to improve strength, endurance, and functional mobility within 7 treatment day(s). PHYSICAL THERAPY: Daily Note PM   (Link to Caseload Tracking: PT Visit Days : 1  Time In/Out PT Charge Capture  Rehab Caseload Tracker  Orders    Marc Monique is a 69 y.o. male   PRIMARY DIAGNOSIS: Suspected cerebrovascular accident (CVA)  Left-sided weakness [R53.1]  Left leg weakness [R29.898]  Acute CVA (cerebrovascular accident) ([address]) [I63.9]  Cerebrovascular accident (CVA), unspecified mechanism ([address]) [I63.9]  Suspected cerebrovascular accident (CVA) [R09.89]       Inpatient: Payor: Gregoria Dailey / Plan: Carey Alfonso / Product Type: *No Product type* /     ASSESSMENT:     REHAB RECOMMENDATIONS:   Recommendation to date pending progress:  Setting:  Inpatient Rehab Facility    Equipment:    Rolling Walker     ASSESSMENT:  Mr. Laura Webb presents resting in bed, agreeable to therapy. Reporting pain in his LLE from his low back down his leg, particularly in his hip joint. X-ray imaging acute for negative injury.   This date pt performs mobility including bed mobility, sitting balance activities, sit <>stand transfers, standing balance activities, and ambulation in room with CGA to JENN Bose support at RW. Pt does not use a RW at baseline. Today able to increase standing activities to promote self care tasks and increase gait distance compared to previous session. Steady progress towards stated goals. Pt presents as functioning well below his baseline, with deficits in mobility including transfers, gait, balance, and activity tolerance. Pt will benefit from skilled therapy services to address stated deficits to promote return to highest level of function, independence, and safety. Will continue therapy efforts.      SUBJECTIVE:   Mr. Joi James states, \"This walker is pretty handy\"     Social/Functional Lives With: Family  Type of Home: House  Home Layout: One level  Home Access: Stairs to enter without rails  Entrance Stairs - Number of Steps: 5  Entrance Stairs - Rails: None  Bathroom Shower/Tub: Tub/Shower unit  Bathroom Toilet: Standard  Bathroom Equipment: Shower chair  Bathroom Accessibility: Accessible  Home Equipment: Di Area  Has the patient had two or more falls in the past year or any fall with injury in the past year?: Yes  Receives Help From: Family  ADL Assistance: [address] Independent  Homemaking Responsibilities: Yes  Ambulation Assistance: Independent  Transfer Assistance: Independent  OBJECTIVE:     PAIN: Cristian Cesario / O2: PRECAUTION / Marie Abebe / Manuel Ends:   Pre Treatment: 6/10         Post Treatment: 6/10 Vitals        Oxygen    Telemetry     RESTRICTIONS/PRECAUTIONS:  Restrictions/Precautions  Restrictions/Precautions: Fall Risk, Bed Alarm  Restrictions/Precautions: Fall Risk, Bed Alarm     MOBILITY: I Mod I S SBA CGA Min Mod Max Total  NT x2 Comments:   Bed Mobility    Rolling [] [] [] [] [] [x] [] [] [] [] []    Supine to Sit [] [] [] [] [] [x] [] [] [] [] []    Scooting [] [] [] [] [] [x] [] [] [] [] []
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
## 2023-10-12 NOTE — PLAN OF CARE
Problem: Discharge Planning  Goal: Discharge to home or other facility with appropriate resources  10/10/2023 0817 by Bear Geuvara RN  Outcome: Progressing  Flowsheets (Taken 10/10/2023 0800)  Discharge to home or other facility with appropriate resources: Identify barriers to discharge with patient and caregiver  10/9/2023 2146 by Audra Rocha RN  Outcome: Progressing  Flowsheets  Taken 10/9/2023 2000 by Audra Rocha RN  Discharge to home or other facility with appropriate resources: Identify barriers to discharge with patient and caregiver  Taken 10/9/2023 1432 by Bear Guevara RN  Discharge to home or other facility with appropriate resources: Identify barriers to discharge with patient and caregiver     Problem: Pain  Goal: Verbalizes/displays adequate comfort level or baseline comfort level  10/10/2023 0817 by Bear Guevara RN  Outcome: Progressing  Flowsheets (Taken 10/10/2023 0800)  Verbalizes/displays adequate comfort level or baseline comfort level: Encourage patient to monitor pain and request assistance  10/9/2023 2146 by Audra Rocha RN  Outcome: Progressing     Problem: Risk for Elopement  Goal: Patient will not exit the unit/facility without proper excort  10/10/2023 0817 by Bear Guevara RN  Outcome: Progressing  10/9/2023 2146 by Audra Rocha RN  Outcome: Progressing  Flowsheets (Taken 10/9/2023 1056 by Tom Mijares RN)  Nursing Interventions for Elopement Risk:   Assist with personal care needs such as toileting, eating, dressing, as needed to reduce the risk of wandering   Collaborate with family members/caregivers to mitigate the elopement risk     Problem: Skin/Tissue Integrity  Goal: Absence of new skin breakdown  Description: 1. Monitor for areas of redness and/or skin breakdown  2. Assess vascular access sites hourly  3. Every 4-6 hours minimum:  Change oxygen saturation probe site  4.   Every 4-6 hours:  If on nasal
Problem: Discharge Planning  Goal: Discharge to home or other facility with appropriate resources  10/11/2023 0959 by New Baumann RN  Outcome: Progressing  Flowsheets (Taken 10/11/2023 0800)  Discharge to home or other facility with appropriate resources:   Identify barriers to discharge with patient and caregiver   Arrange for needed discharge resources and transportation as appropriate   Identify discharge learning needs (meds, wound care, etc)  10/10/2023 2322 by Juaquin Spurling, RN  Outcome: [place] (Taken 10/10/2023 2000)  Discharge to home or other facility with appropriate resources: Identify barriers to discharge with patient and caregiver     Problem: Pain  Goal: Verbalizes/displays adequate comfort level or baseline comfort level  10/11/2023 0959 by New Baumann RN  Outcome: Progressing  10/10/2023 2322 by Juaquin Spurling, RN  Outcome: Progressing     Problem: Risk for Elopement  Goal: Patient will not exit the unit/facility without proper excort  10/11/2023 0959 by New Baumann RN  Outcome: Progressing  10/10/2023 2322 by Juaquin Spurling, RN  Outcome: Progressing     Problem: Skin/Tissue Integrity  Goal: Absence of new skin breakdown  Description: 1. Monitor for areas of redness and/or skin breakdown  2. Assess vascular access sites hourly  3. Every 4-6 hours minimum:  Change oxygen saturation probe site  4. Every 4-6 hours:  If on nasal continuous positive airway pressure, respiratory therapy assess nares and determine need for appliance change or resting period.   10/11/2023 0959 by New Baumann RN  Outcome: Progressing  10/10/2023 2322 by Juaquin Spurling, RN  Outcome: Progressing     Problem: ABCDS Injury Assessment  Goal: Absence of physical injury  10/11/2023 0959 by New Baumann RN  Outcome: Progressing  10/10/2023 2322 by Juaquin Spurling, RN  Outcome: Progressing     Problem: Safety - Adult  Goal: Free from fall injury  10/11/2023 0959 by
Problem: Discharge Planning  Goal: Discharge to home or other facility with appropriate resources  Outcome: Progressing  Flowsheets  Taken 10/9/2023 2000 by Femi Munoz RN  Discharge to home or other facility with appropriate resources: Identify barriers to discharge with patient and caregiver  Taken 10/9/2023 1432 by Winston Gaytan RN  Discharge to home or other facility with appropriate resources: Identify barriers to discharge with patient and caregiver     Problem: Pain  Goal: Verbalizes/displays adequate comfort level or baseline comfort level  Outcome: Progressing     Problem: Risk for Elopement  Goal: Patient will not exit the unit/facility without proper excort  Outcome: Progressing  Flowsheets (Taken 10/9/2023 1056 by Jeevan Porter RN)  Nursing Interventions for Elopement Risk:   Assist with personal care needs such as toileting, eating, dressing, as needed to reduce the risk of wandering   Collaborate with family members/caregivers to mitigate the elopement risk     Problem: Skin/Tissue Integrity  Goal: Absence of new skin breakdown  Description: 1. Monitor for areas of redness and/or skin breakdown  2. Assess vascular access sites hourly  3. Every 4-6 hours minimum:  Change oxygen saturation probe site  4. Every 4-6 hours:  If on nasal continuous positive airway pressure, respiratory therapy assess nares and determine need for appliance change or resting period.   Outcome: Progressing     Problem: ABCDS Injury Assessment  Goal: Absence of physical injury  Outcome: Progressing     Problem: Safety - Adult  Goal: Free from fall injury  Outcome: Progressing
Problem: Discharge Planning  Goal: Discharge to home or other facility with appropriate resources  Outcome: Progressing  Flowsheets (Taken 10/10/2023 2000)  Discharge to home or other facility with appropriate resources: Identify barriers to discharge with patient and caregiver     Problem: Pain  Goal: Verbalizes/displays adequate comfort level or baseline comfort level  Outcome: Progressing     Problem: Risk for Elopement  Goal: Patient will not exit the unit/facility without proper excort  Outcome: Progressing     Problem: Skin/Tissue Integrity  Goal: Absence of new skin breakdown  Description: 1. Monitor for areas of redness and/or skin breakdown  2. Assess vascular access sites hourly  3. Every 4-6 hours minimum:  Change oxygen saturation probe site  4. Every 4-6 hours:  If on nasal continuous positive airway pressure, respiratory therapy assess nares and determine need for appliance change or resting period.   Outcome: Progressing     Problem: ABCDS Injury Assessment  Goal: Absence of physical injury  Outcome: Progressing     Problem: Safety - Adult  Goal: Free from fall injury  Outcome: Progressing     Problem: Chronic Conditions and Co-morbidities  Goal: Patient's chronic conditions and co-morbidity symptoms are monitored and maintained or improved  Outcome: Progressing
Problem: ABCDS Injury Assessment  Goal: Absence of physical injury  10/11/2023 2138 by Jimbo Crow RN  Outcome: Progressing  10/11/2023 0959 by Nona Alvarez RN  Outcome: Progressing     Problem: Safety - Adult  Goal: Free from fall injury  10/11/2023 2138 by Jimbo Crow RN  Outcome: Progressing  10/11/2023 0959 by Nona Alvarez RN  Outcome: Progressing     Problem: Chronic Conditions and Co-morbidities  Goal: Patient's chronic conditions and co-morbidity symptoms are monitored and maintained or improved  10/11/2023 2138 by Jimbo Crow RN  Outcome: Progressing  Flowsheets (Taken 10/11/2023 2000)  Care Plan - Patient's Chronic Conditions and Co-Morbidity Symptoms are Monitored and Maintained or Improved: Monitor and assess patient's chronic conditions and comorbid symptoms for stability, deterioration, or improvement  10/11/2023 0959 by Nona Alvarez RN  Outcome: Progressing  Flowsheets (Taken 10/11/2023 0800)  Care Plan - Patient's Chronic Conditions and Co-Morbidity Symptoms are Monitored and Maintained or Improved: Monitor and assess patient's chronic conditions and comorbid symptoms for stability, deterioration, or improvement

## 2023-10-12 NOTE — PROGRESS NOTES
Physician Progress Note      Nga Jones  [facility] #:                  [id number]  :                       1955  ADMIT DATE:       10/9/2023 10:43 AM  DISCH DATE:  RESPONDING  PROVIDER #:        Larissa Vasquez MD          QUERY TEXT:    Patient admitted with CVA, noted to have Paroxysmal atrial fibrillation and is   maintained on apixaban (ELIQUIS) tablet 5 mg. If possible, please document in   progress notes and discharge summary if you are evaluating and/or treating   any of the following: The medical record reflects the following:  Risk Factors: CHF, PAD, CAD with prior CABG, CVA, DM3 HTN, 20-year-old  Clinical Indicators: IM progress note on 10/11 \"Paroxysmal atrial   fibrillation, continue amiodarone, eliquis, resume metoprolol tartrate. \"  Treatment: Apixaban  Options provided:  -- Secondary hypercoagulable state in a patient with atrial fibrillation  -- Other - I will add my own diagnosis  -- Disagree - Not applicable / Not valid  -- Disagree - Clinically unable to determine / Unknown  -- Refer to Clinical Documentation Reviewer    PROVIDER RESPONSE TEXT:    This patient has secondary hypercoagulable state in a patient with atrial   fibrillation. Query created by:  Krystal Longoria on 10/12/2023 2:26 PM      Electronically signed by:  Larissa Vasquez MD 10/12/2023 2:28 PM

## 2023-10-13 ENCOUNTER — HOSPITAL ENCOUNTER (INPATIENT)
Age: 68
LOS: 7 days | Discharge: HOME | DRG: 057 | End: 2023-10-20
Attending: PHYSICAL MEDICINE & REHABILITATION | Admitting: PHYSICAL MEDICINE & REHABILITATION
Payer: MEDICARE

## 2023-10-13 VITALS
WEIGHT: 251.1 LBS | HEART RATE: 65 BPM | OXYGEN SATURATION: 97 % | HEIGHT: 72 IN | DIASTOLIC BLOOD PRESSURE: 77 MMHG | RESPIRATION RATE: 17 BRPM | TEMPERATURE: 97.9 F | BODY MASS INDEX: 34.01 KG/M2 | SYSTOLIC BLOOD PRESSURE: 146 MMHG

## 2023-10-13 DIAGNOSIS — E11.51 TYPE 2 DIABETES MELLITUS WITH DIABETIC PERIPHERAL ANGIOPATHY WITHOUT GANGRENE, UNSPECIFIED WHETHER LONG TERM INSULIN USE (HCC): ICD-10-CM

## 2023-10-13 DIAGNOSIS — I63.9 STROKE DETERMINED BY CLINICAL ASSESSMENT (HCC): Primary | ICD-10-CM

## 2023-10-13 DIAGNOSIS — F31.9 BIPOLAR AFFECTIVE DISORDER, REMISSION STATUS UNSPECIFIED (HCC): Chronic | ICD-10-CM

## 2023-10-13 DIAGNOSIS — Z95.1 STATUS POST CORONARY ARTERY BYPASS GRAFT: ICD-10-CM

## 2023-10-13 DIAGNOSIS — L03.119 CELLULITIS OF ANTERIOR LOWER LEG: ICD-10-CM

## 2023-10-13 DIAGNOSIS — I25.110 CORONARY ARTERY DISEASE INVOLVING NATIVE CORONARY ARTERY OF NATIVE HEART WITH UNSTABLE ANGINA PECTORIS (HCC): ICD-10-CM

## 2023-10-13 DIAGNOSIS — J44.9 CHRONIC OBSTRUCTIVE PULMONARY DISEASE, UNSPECIFIED COPD TYPE (HCC): Chronic | ICD-10-CM

## 2023-10-13 DIAGNOSIS — I11.9 HYPERTENSIVE HEART DISEASE, UNSPECIFIED WHETHER HEART FAILURE PRESENT: Chronic | ICD-10-CM

## 2023-10-13 DIAGNOSIS — Z95.2 S/P AVR (AORTIC VALVE REPLACEMENT): ICD-10-CM

## 2023-10-13 DIAGNOSIS — R41.841 COGNITIVE COMMUNICATION DEFICIT: ICD-10-CM

## 2023-10-13 DIAGNOSIS — G45.9 TRANSIENT ISCHEMIC ATTACK: ICD-10-CM

## 2023-10-13 DIAGNOSIS — I69.322 DYSARTHRIA FOLLOWING CEREBRAL INFARCTION: ICD-10-CM

## 2023-10-13 DIAGNOSIS — I48.0 PAROXYSMAL ATRIAL FIBRILLATION (HCC): ICD-10-CM

## 2023-10-13 PROBLEM — I35.0 AORTIC VALVE STENOSIS: Status: RESOLVED | Noted: 2023-03-11 | Resolved: 2023-10-13

## 2023-10-13 LAB
GLUCOSE BLD STRIP.AUTO-MCNC: 127 MG/DL (ref 65–100)
GLUCOSE BLD STRIP.AUTO-MCNC: 135 MG/DL (ref 65–100)
GLUCOSE BLD STRIP.AUTO-MCNC: 135 MG/DL (ref 65–100)
GLUCOSE BLD STRIP.AUTO-MCNC: 153 MG/DL (ref 65–100)
SERVICE CMNT-IMP: ABNORMAL

## 2023-10-13 PROCEDURE — 6370000000 HC RX 637 (ALT 250 FOR IP): Performed by: INTERNAL MEDICINE

## 2023-10-13 PROCEDURE — 97162 PT EVAL MOD COMPLEX 30 MIN: CPT

## 2023-10-13 PROCEDURE — 97116 GAIT TRAINING THERAPY: CPT

## 2023-10-13 PROCEDURE — 97530 THERAPEUTIC ACTIVITIES: CPT

## 2023-10-13 PROCEDURE — 6370000000 HC RX 637 (ALT 250 FOR IP): Performed by: STUDENT IN AN ORGANIZED HEALTH CARE EDUCATION/TRAINING PROGRAM

## 2023-10-13 PROCEDURE — 2580000003 HC RX 258: Performed by: INTERNAL MEDICINE

## 2023-10-13 PROCEDURE — 6370000000 HC RX 637 (ALT 250 FOR IP): Performed by: PHYSICAL MEDICINE & REHABILITATION

## 2023-10-13 PROCEDURE — 99223 1ST HOSP IP/OBS HIGH 75: CPT | Performed by: PHYSICAL MEDICINE & REHABILITATION

## 2023-10-13 PROCEDURE — 2500000003 HC RX 250 WO HCPCS: Performed by: INTERNAL MEDICINE

## 2023-10-13 PROCEDURE — 82962 GLUCOSE BLOOD TEST: CPT

## 2023-10-13 PROCEDURE — 97165 OT EVAL LOW COMPLEX 30 MIN: CPT

## 2023-10-13 PROCEDURE — 1180000000 HC REHAB R&B

## 2023-10-13 RX ORDER — SODIUM CHLORIDE 9 MG/ML
INJECTION, SOLUTION INTRAVENOUS PRN
Status: CANCELLED | OUTPATIENT
Start: 2023-10-13

## 2023-10-13 RX ORDER — GABAPENTIN 300 MG/1
300 CAPSULE ORAL 3 TIMES DAILY
Status: DISCONTINUED | OUTPATIENT
Start: 2023-10-13 | End: 2023-10-20 | Stop reason: HOSPADM

## 2023-10-13 RX ORDER — EZETIMIBE 10 MG/1
10 TABLET ORAL NIGHTLY
Status: CANCELLED | OUTPATIENT
Start: 2023-10-13

## 2023-10-13 RX ORDER — LISINOPRIL 5 MG/1
10 TABLET ORAL DAILY
Status: CANCELLED | OUTPATIENT
Start: 2023-10-14

## 2023-10-13 RX ORDER — ACETAMINOPHEN 325 MG/1
650 TABLET ORAL EVERY 4 HOURS PRN
Status: CANCELLED | OUTPATIENT
Start: 2023-10-13

## 2023-10-13 RX ORDER — SODIUM CHLORIDE 9 MG/ML
INJECTION, SOLUTION INTRAVENOUS PRN
Status: DISCONTINUED | OUTPATIENT
Start: 2023-10-13 | End: 2023-10-20 | Stop reason: HOSPADM

## 2023-10-13 RX ORDER — ALBUTEROL SULFATE 90 UG/1
2 AEROSOL, METERED RESPIRATORY (INHALATION) EVERY 6 HOURS PRN
Status: DISCONTINUED | OUTPATIENT
Start: 2023-10-13 | End: 2023-10-20 | Stop reason: HOSPADM

## 2023-10-13 RX ORDER — EZETIMIBE 10 MG/1
10 TABLET ORAL NIGHTLY
Status: DISCONTINUED | OUTPATIENT
Start: 2023-10-13 | End: 2023-10-20 | Stop reason: HOSPADM

## 2023-10-13 RX ORDER — FERROUS SULFATE 325(65) MG
325 TABLET ORAL
Status: CANCELLED | OUTPATIENT
Start: 2023-10-14

## 2023-10-13 RX ORDER — BISACODYL 10 MG
10 SUPPOSITORY, RECTAL RECTAL DAILY PRN
Status: CANCELLED | OUTPATIENT
Start: 2023-10-13

## 2023-10-13 RX ORDER — SODIUM CHLORIDE 0.9 % (FLUSH) 0.9 %
5-40 SYRINGE (ML) INJECTION EVERY 12 HOURS SCHEDULED
Status: CANCELLED | OUTPATIENT
Start: 2023-10-13

## 2023-10-13 RX ORDER — SENNOSIDES A AND B 8.6 MG/1
1 TABLET, FILM COATED ORAL DAILY PRN
Status: DISCONTINUED | OUTPATIENT
Start: 2023-10-13 | End: 2023-10-20 | Stop reason: HOSPADM

## 2023-10-13 RX ORDER — SODIUM CHLORIDE 0.9 % (FLUSH) 0.9 %
5-40 SYRINGE (ML) INJECTION PRN
Status: DISCONTINUED | OUTPATIENT
Start: 2023-10-13 | End: 2023-10-20 | Stop reason: HOSPADM

## 2023-10-13 RX ORDER — FERROUS SULFATE 325(65) MG
325 TABLET ORAL
Status: DISCONTINUED | OUTPATIENT
Start: 2023-10-14 | End: 2023-10-20 | Stop reason: HOSPADM

## 2023-10-13 RX ORDER — ACETAMINOPHEN 325 MG/1
650 TABLET ORAL EVERY 6 HOURS PRN
Status: CANCELLED | OUTPATIENT
Start: 2023-10-13

## 2023-10-13 RX ORDER — AMITRIPTYLINE HYDROCHLORIDE 25 MG/1
25 TABLET, FILM COATED ORAL NIGHTLY
Status: DISCONTINUED | OUTPATIENT
Start: 2023-10-13 | End: 2023-10-20 | Stop reason: HOSPADM

## 2023-10-13 RX ORDER — INSULIN LISPRO 100 [IU]/ML
0-8 INJECTION, SOLUTION INTRAVENOUS; SUBCUTANEOUS
Status: CANCELLED | OUTPATIENT
Start: 2023-10-13

## 2023-10-13 RX ORDER — GABAPENTIN 300 MG/1
300 CAPSULE ORAL 3 TIMES DAILY
Status: CANCELLED | OUTPATIENT
Start: 2023-10-13

## 2023-10-13 RX ORDER — ONDANSETRON 4 MG/1
4 TABLET, ORALLY DISINTEGRATING ORAL EVERY 8 HOURS PRN
Status: CANCELLED | OUTPATIENT
Start: 2023-10-13

## 2023-10-13 RX ORDER — PANTOPRAZOLE SODIUM 40 MG/1
40 TABLET, DELAYED RELEASE ORAL
Status: DISCONTINUED | OUTPATIENT
Start: 2023-10-14 | End: 2023-10-20 | Stop reason: HOSPADM

## 2023-10-13 RX ORDER — INSULIN LISPRO 100 [IU]/ML
0-4 INJECTION, SOLUTION INTRAVENOUS; SUBCUTANEOUS NIGHTLY
Status: DISCONTINUED | OUTPATIENT
Start: 2023-10-13 | End: 2023-10-20 | Stop reason: HOSPADM

## 2023-10-13 RX ORDER — SODIUM CHLORIDE 0.9 % (FLUSH) 0.9 %
5-40 SYRINGE (ML) INJECTION PRN
Status: CANCELLED | OUTPATIENT
Start: 2023-10-13

## 2023-10-13 RX ORDER — ONDANSETRON 4 MG/1
4 TABLET, ORALLY DISINTEGRATING ORAL EVERY 8 HOURS PRN
Status: DISCONTINUED | OUTPATIENT
Start: 2023-10-13 | End: 2023-10-20 | Stop reason: HOSPADM

## 2023-10-13 RX ORDER — INSULIN LISPRO 100 [IU]/ML
0-4 INJECTION, SOLUTION INTRAVENOUS; SUBCUTANEOUS NIGHTLY
Status: CANCELLED | OUTPATIENT
Start: 2023-10-13

## 2023-10-13 RX ORDER — AMIODARONE HYDROCHLORIDE 200 MG/1
200 TABLET ORAL DAILY
Status: DISCONTINUED | OUTPATIENT
Start: 2023-10-14 | End: 2023-10-20 | Stop reason: HOSPADM

## 2023-10-13 RX ORDER — PANTOPRAZOLE SODIUM 40 MG/1
40 TABLET, DELAYED RELEASE ORAL DAILY
Status: CANCELLED | OUTPATIENT
Start: 2023-10-14

## 2023-10-13 RX ORDER — EZETIMIBE 10 MG/1
10 TABLET ORAL NIGHTLY
Qty: 30 TABLET | Refills: 0 | Status: SHIPPED | OUTPATIENT
Start: 2023-10-13

## 2023-10-13 RX ORDER — POLYETHYLENE GLYCOL 3350 17 G/17G
17 POWDER, FOR SOLUTION ORAL DAILY PRN
Status: DISCONTINUED | OUTPATIENT
Start: 2023-10-13 | End: 2023-10-20 | Stop reason: HOSPADM

## 2023-10-13 RX ORDER — ACETAMINOPHEN 325 MG/1
650 TABLET ORAL EVERY 6 HOURS PRN
Status: DISCONTINUED | OUTPATIENT
Start: 2023-10-13 | End: 2023-10-13 | Stop reason: SDUPTHER

## 2023-10-13 RX ORDER — INSULIN LISPRO 100 [IU]/ML
0-4 INJECTION, SOLUTION INTRAVENOUS; SUBCUTANEOUS
Status: CANCELLED | OUTPATIENT
Start: 2023-10-13

## 2023-10-13 RX ORDER — MULTIVIT WITH MINERALS/LUTEIN
250 TABLET ORAL DAILY
Status: DISCONTINUED | OUTPATIENT
Start: 2023-10-13 | End: 2023-10-20 | Stop reason: HOSPADM

## 2023-10-13 RX ORDER — INSULIN LISPRO 100 [IU]/ML
0-4 INJECTION, SOLUTION INTRAVENOUS; SUBCUTANEOUS
Status: DISCONTINUED | OUTPATIENT
Start: 2023-10-13 | End: 2023-10-13

## 2023-10-13 RX ORDER — LISINOPRIL 5 MG/1
10 TABLET ORAL DAILY
Status: DISCONTINUED | OUTPATIENT
Start: 2023-10-14 | End: 2023-10-14

## 2023-10-13 RX ORDER — ACETAMINOPHEN 325 MG/1
650 TABLET ORAL EVERY 4 HOURS PRN
Status: DISCONTINUED | OUTPATIENT
Start: 2023-10-13 | End: 2023-10-20 | Stop reason: HOSPADM

## 2023-10-13 RX ORDER — OXYCODONE HYDROCHLORIDE 5 MG/1
5 TABLET ORAL EVERY 6 HOURS PRN
Status: DISCONTINUED | OUTPATIENT
Start: 2023-10-13 | End: 2023-10-20 | Stop reason: HOSPADM

## 2023-10-13 RX ORDER — INSULIN LISPRO 100 [IU]/ML
0-8 INJECTION, SOLUTION INTRAVENOUS; SUBCUTANEOUS
Status: DISCONTINUED | OUTPATIENT
Start: 2023-10-13 | End: 2023-10-20 | Stop reason: HOSPADM

## 2023-10-13 RX ORDER — SODIUM CHLORIDE 0.9 % (FLUSH) 0.9 %
5-40 SYRINGE (ML) INJECTION EVERY 12 HOURS SCHEDULED
Status: DISCONTINUED | OUTPATIENT
Start: 2023-10-13 | End: 2023-10-14

## 2023-10-13 RX ORDER — SENNOSIDES A AND B 8.6 MG/1
1 TABLET, FILM COATED ORAL DAILY PRN
Status: CANCELLED | OUTPATIENT
Start: 2023-10-13

## 2023-10-13 RX ORDER — INSULIN LISPRO 100 [IU]/ML
0-4 INJECTION, SOLUTION INTRAVENOUS; SUBCUTANEOUS NIGHTLY
Status: DISCONTINUED | OUTPATIENT
Start: 2023-10-13 | End: 2023-10-13 | Stop reason: SDUPTHER

## 2023-10-13 RX ORDER — ONDANSETRON 2 MG/ML
4 INJECTION INTRAMUSCULAR; INTRAVENOUS EVERY 6 HOURS PRN
Status: CANCELLED | OUTPATIENT
Start: 2023-10-13

## 2023-10-13 RX ORDER — PRAVASTATIN SODIUM 20 MG
40 TABLET ORAL NIGHTLY
Status: DISCONTINUED | OUTPATIENT
Start: 2023-10-13 | End: 2023-10-13

## 2023-10-13 RX ORDER — ONDANSETRON 2 MG/ML
4 INJECTION INTRAMUSCULAR; INTRAVENOUS EVERY 6 HOURS PRN
Status: DISCONTINUED | OUTPATIENT
Start: 2023-10-13 | End: 2023-10-20 | Stop reason: HOSPADM

## 2023-10-13 RX ORDER — OXYCODONE HYDROCHLORIDE 5 MG/1
5 TABLET ORAL EVERY 6 HOURS PRN
Status: CANCELLED | OUTPATIENT
Start: 2023-10-13

## 2023-10-13 RX ORDER — FERROUS SULFATE 325(65) MG
325 TABLET ORAL
Qty: 30 TABLET | Refills: 0 | Status: SHIPPED | OUTPATIENT
Start: 2023-10-13

## 2023-10-13 RX ORDER — POLYETHYLENE GLYCOL 3350 17 G/17G
17 POWDER, FOR SOLUTION ORAL DAILY PRN
Status: CANCELLED | OUTPATIENT
Start: 2023-10-13

## 2023-10-13 RX ORDER — AMIODARONE HYDROCHLORIDE 200 MG/1
200 TABLET ORAL DAILY
Status: CANCELLED | OUTPATIENT
Start: 2023-10-14

## 2023-10-13 RX ORDER — ALBUTEROL SULFATE 90 UG/1
2 AEROSOL, METERED RESPIRATORY (INHALATION) EVERY 6 HOURS PRN
Status: CANCELLED | OUTPATIENT
Start: 2023-10-13

## 2023-10-13 RX ORDER — BISACODYL 10 MG
10 SUPPOSITORY, RECTAL RECTAL DAILY PRN
Status: DISCONTINUED | OUTPATIENT
Start: 2023-10-13 | End: 2023-10-20 | Stop reason: HOSPADM

## 2023-10-13 RX ORDER — PRAVASTATIN SODIUM 20 MG
40 TABLET ORAL NIGHTLY
Status: CANCELLED | OUTPATIENT
Start: 2023-10-13

## 2023-10-13 RX ADMIN — AMITRIPTYLINE HYDROCHLORIDE 25 MG: 25 TABLET, FILM COATED ORAL at 21:01

## 2023-10-13 RX ADMIN — HYDROMORPHONE HYDROCHLORIDE 0.5 MG: 1 INJECTION, SOLUTION INTRAMUSCULAR; INTRAVENOUS; SUBCUTANEOUS at 02:05

## 2023-10-13 RX ADMIN — OXYCODONE HYDROCHLORIDE 5 MG: 5 TABLET ORAL at 13:35

## 2023-10-13 RX ADMIN — SODIUM CHLORIDE, PRESERVATIVE FREE 10 ML: 5 INJECTION INTRAVENOUS at 09:37

## 2023-10-13 RX ADMIN — GABAPENTIN 300 MG: 300 CAPSULE ORAL at 13:35

## 2023-10-13 RX ADMIN — AMIODARONE HYDROCHLORIDE 200 MG: 200 TABLET ORAL at 09:35

## 2023-10-13 RX ADMIN — DICLOFENAC SODIUM 2 G: 10 GEL TOPICAL at 09:38

## 2023-10-13 RX ADMIN — GABAPENTIN 300 MG: 300 CAPSULE ORAL at 21:00

## 2023-10-13 RX ADMIN — METOPROLOL TARTRATE 25 MG: 25 TABLET, FILM COATED ORAL at 09:36

## 2023-10-13 RX ADMIN — METOPROLOL TARTRATE 25 MG: 25 TABLET, FILM COATED ORAL at 21:01

## 2023-10-13 RX ADMIN — GABAPENTIN 300 MG: 300 CAPSULE ORAL at 09:30

## 2023-10-13 RX ADMIN — HYDROMORPHONE HYDROCHLORIDE 0.5 MG: 1 INJECTION, SOLUTION INTRAMUSCULAR; INTRAVENOUS; SUBCUTANEOUS at 09:34

## 2023-10-13 RX ADMIN — PANTOPRAZOLE SODIUM 40 MG: 40 TABLET, DELAYED RELEASE ORAL at 09:37

## 2023-10-13 RX ADMIN — OXYCODONE HYDROCHLORIDE 5 MG: 5 TABLET ORAL at 19:30

## 2023-10-13 RX ADMIN — APIXABAN 5 MG: 5 TABLET, FILM COATED ORAL at 09:30

## 2023-10-13 RX ADMIN — APIXABAN 5 MG: 5 TABLET, FILM COATED ORAL at 21:00

## 2023-10-13 RX ADMIN — LISINOPRIL 10 MG: 5 TABLET ORAL at 09:37

## 2023-10-13 RX ADMIN — EZETIMIBE 10 MG: 10 TABLET ORAL at 21:00

## 2023-10-13 RX ADMIN — DICLOFENAC SODIUM 2 G: 10 GEL TOPICAL at 19:31

## 2023-10-13 RX ADMIN — FERROUS SULFATE TAB 325 MG (65 MG ELEMENTAL FE) 325 MG: 325 (65 FE) TAB at 08:30

## 2023-10-13 ASSESSMENT — PAIN DESCRIPTION - ONSET: ONSET: GRADUAL

## 2023-10-13 ASSESSMENT — PAIN DESCRIPTION - LOCATION
LOCATION: GENERALIZED
LOCATION: GENERALIZED
LOCATION: BACK
LOCATION: GENERALIZED

## 2023-10-13 ASSESSMENT — PAIN DESCRIPTION - PAIN TYPE: TYPE: ACUTE PAIN

## 2023-10-13 ASSESSMENT — PAIN DESCRIPTION - DESCRIPTORS
DESCRIPTORS: ACHING

## 2023-10-13 ASSESSMENT — PAIN SCALES - GENERAL
PAINLEVEL_OUTOF10: 0
PAINLEVEL_OUTOF10: 4
PAINLEVEL_OUTOF10: 2
PAINLEVEL_OUTOF10: 10
PAINLEVEL_OUTOF10: 3
PAINLEVEL_OUTOF10: 8
PAINLEVEL_OUTOF10: 0
PAINLEVEL_OUTOF10: 6

## 2023-10-13 ASSESSMENT — PAIN DESCRIPTION - ORIENTATION
ORIENTATION: MID
ORIENTATION: LEFT

## 2023-10-13 ASSESSMENT — PAIN - FUNCTIONAL ASSESSMENT: PAIN_FUNCTIONAL_ASSESSMENT: PREVENTS OR INTERFERES SOME ACTIVE ACTIVITIES AND ADLS

## 2023-10-13 ASSESSMENT — PAIN DESCRIPTION - FREQUENCY: FREQUENCY: INTERMITTENT

## 2023-10-13 NOTE — H&P
[facility]      Admission History and Physical Exam  INPATIENT REHABILITATION CENTER       Admit Date: 10/13/2023  Primary Care Provider: Viky Leiva DO  Specialty Group / Referring Service: Hospital Medicine    Chief Complaint: Impaired ability to ambulate, transfer, and carryout selfcare tasks d/t a clinical CVA with left hemiparesis and paresthesias. Admitting Diagnosis:   CVA (cerebral vascular accident) ([address]) [I63.9]  Stroke determined by clinical assessment ([address]) [I63.9]    Principal Problem:    Stroke determined by clinical assessment [facility])  Resolved Problems:    * No resolved hospital problems. *    Secondary diagnoses:  CAD s/p CABG with MAZE procedure  pAFib (on Eliquis)  TIA  HTN  DM2 with BLE peripheral neuropathy  GERD  COPD not on O2  AVS s/p AVR  Bipolar disorder  Chronic back pain  PAD with chronic BLE wounds     Acute Rehab Diagnoses:  Encounter for rehabilitation [Z51.89]   Abnormality of gait and mobility [R26.9]  Decreased independence for activities of daily living (ADL) [Z78.9]  Physical debility / deconditioning [R53.81]  Balance Impairment  Left hemiparesis and paresthesias  Debility    Medical Dx:  Past Medical History:   Diagnosis Date    Bipolar disorder ([address])     CAD (coronary artery disease)     CHF (congestive heart failure) ([address])     Chronic back pain     Hyperlipidemia     Hypertension     Neuropathy     Subdural hematoma ([address]) 12/30/2022    Type 2 diabetes mellitus without complication [facility])        Date of Evaluation: October 13, 2023    Marc Valdivia Alea Sr. is a 69 y.o. male patient at [facility] who was admitted to [address] [place] [address] on 10/13/2023 by Lorena Arguello MD of Physical Medicine and Rehab service with below-mentioned medical history.     HPI:   70 y/o RHD M pmh CAD s/p CABG with MAZE procedure, pAFib (on Eliquis), TIA, HTN, DM2 with BLE peripheral neuropathy, GERD, COPD not on O2, complex neurologic / medical condition, risks of further medical complications include but are not limited to: thromboembolism / pulmonary embolism, skin breakdown, pneumonia due to decreased mobility, CVA, MI, cardiac arrhythmias due to HTN, postural hypotension, respiratory compromise/failure, infections. For these ongoing medical issues, rehabilitation services could not be safely provided at a lower level of care such as a skilled nursing facility or nursing home. Time spent was 90 minutes with over 1/2 in direct patient care / examination, consultation with nursing, therapist and coordination of care.     Signed By: Diego Chen MD     October 13, 2023
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
## 2023-10-13 NOTE — CARE COORDINATION
Patient is medically ready to discharge and discharge order noted. Patient will admit to 9th floor [facility] Primary RN can call report to [phone].

## 2023-10-13 NOTE — PROGRESS NOTES
Called to [facility]   Patient ready for transfer  IRC :\"does not know about transfer and no room ready\"  IRC will call when they can receive the patient.

## 2023-10-13 NOTE — PROGRESS NOTES
TRANSFER - IN REPORT:    Verbal [facility] from [place] [facility]  [facility]   for routine progression of patient care      Report consisted of patient's Situation, Background, Assessment and   Recommendations(SBAR). Information from the following report(s) Nurse Handoff Report, Adult Overview, Intake/Output, MAR, and Recent Results was reviewed with the receiving nurse. Opportunity for questions and clarification was provided. Assessment completed upon patient's arrival to unit and care assumed. Dual Skin assessment performed with [place] RN. Multiple ulcers noted on bilateral lower legs. Pt states he has had them for a year. Wound care consulted.

## 2023-10-13 NOTE — PROGRESS NOTES
If you need to see a   -  just ask the nurse to call us. We look forward to serving your family!!         John Fleming

## 2023-10-14 ENCOUNTER — APPOINTMENT (OUTPATIENT)
Dept: ULTRASOUND IMAGING | Age: 68
DRG: 057 | End: 2023-10-14
Attending: PHYSICAL MEDICINE & REHABILITATION
Payer: MEDICARE

## 2023-10-14 LAB
GLUCOSE BLD STRIP.AUTO-MCNC: 114 MG/DL (ref 65–100)
GLUCOSE BLD STRIP.AUTO-MCNC: 123 MG/DL (ref 65–100)
GLUCOSE BLD STRIP.AUTO-MCNC: 155 MG/DL (ref 65–100)
GLUCOSE BLD STRIP.AUTO-MCNC: 178 MG/DL (ref 65–100)
SERVICE CMNT-IMP: ABNORMAL

## 2023-10-14 PROCEDURE — 92610 EVALUATE SWALLOWING FUNCTION: CPT

## 2023-10-14 PROCEDURE — 6370000000 HC RX 637 (ALT 250 FOR IP): Performed by: PHYSICAL MEDICINE & REHABILITATION

## 2023-10-14 PROCEDURE — 97535 SELF CARE MNGMENT TRAINING: CPT

## 2023-10-14 PROCEDURE — 97110 THERAPEUTIC EXERCISES: CPT

## 2023-10-14 PROCEDURE — 82962 GLUCOSE BLOOD TEST: CPT

## 2023-10-14 PROCEDURE — 94760 N-INVAS EAR/PLS OXIMETRY 1: CPT

## 2023-10-14 PROCEDURE — 1180000000 HC REHAB R&B

## 2023-10-14 PROCEDURE — 97116 GAIT TRAINING THERAPY: CPT

## 2023-10-14 PROCEDURE — 94640 AIRWAY INHALATION TREATMENT: CPT

## 2023-10-14 PROCEDURE — 92507 TX SP LANG VOICE COMM INDIV: CPT

## 2023-10-14 PROCEDURE — 99231 SBSQ HOSP IP/OBS SF/LOW 25: CPT | Performed by: PHYSICAL MEDICINE & REHABILITATION

## 2023-10-14 RX ORDER — LISINOPRIL 20 MG/1
20 TABLET ORAL DAILY
Status: DISCONTINUED | OUTPATIENT
Start: 2023-10-15 | End: 2023-10-16

## 2023-10-14 RX ADMIN — Medication 250 MG: at 08:24

## 2023-10-14 RX ADMIN — AMITRIPTYLINE HYDROCHLORIDE 25 MG: 25 TABLET, FILM COATED ORAL at 20:57

## 2023-10-14 RX ADMIN — GABAPENTIN 300 MG: 300 CAPSULE ORAL at 08:24

## 2023-10-14 RX ADMIN — PANTOPRAZOLE SODIUM 40 MG: 40 TABLET, DELAYED RELEASE ORAL at 05:32

## 2023-10-14 RX ADMIN — ALBUTEROL SULFATE 2 PUFF: 90 AEROSOL, METERED RESPIRATORY (INHALATION) at 15:16

## 2023-10-14 RX ADMIN — METOPROLOL TARTRATE 25 MG: 25 TABLET, FILM COATED ORAL at 20:57

## 2023-10-14 RX ADMIN — OXYCODONE HYDROCHLORIDE 5 MG: 5 TABLET ORAL at 15:00

## 2023-10-14 RX ADMIN — ACETAMINOPHEN 650 MG: 325 TABLET ORAL at 02:43

## 2023-10-14 RX ADMIN — DICLOFENAC SODIUM 2 G: 10 GEL TOPICAL at 21:08

## 2023-10-14 RX ADMIN — GABAPENTIN 300 MG: 300 CAPSULE ORAL at 15:00

## 2023-10-14 RX ADMIN — DICLOFENAC SODIUM 2 G: 10 GEL TOPICAL at 08:27

## 2023-10-14 RX ADMIN — APIXABAN 5 MG: 5 TABLET, FILM COATED ORAL at 20:57

## 2023-10-14 RX ADMIN — GABAPENTIN 300 MG: 300 CAPSULE ORAL at 20:57

## 2023-10-14 RX ADMIN — OXYCODONE HYDROCHLORIDE 5 MG: 5 TABLET ORAL at 08:24

## 2023-10-14 RX ADMIN — OXYCODONE HYDROCHLORIDE 5 MG: 5 TABLET ORAL at 02:43

## 2023-10-14 RX ADMIN — METOPROLOL TARTRATE 25 MG: 25 TABLET, FILM COATED ORAL at 08:24

## 2023-10-14 RX ADMIN — AMIODARONE HYDROCHLORIDE 200 MG: 200 TABLET ORAL at 08:24

## 2023-10-14 RX ADMIN — OXYCODONE HYDROCHLORIDE 5 MG: 5 TABLET ORAL at 20:56

## 2023-10-14 RX ADMIN — EZETIMIBE 10 MG: 10 TABLET ORAL at 20:57

## 2023-10-14 RX ADMIN — FERROUS SULFATE TAB 325 MG (65 MG ELEMENTAL FE) 325 MG: 325 (65 FE) TAB at 08:24

## 2023-10-14 RX ADMIN — APIXABAN 5 MG: 5 TABLET, FILM COATED ORAL at 08:24

## 2023-10-14 RX ADMIN — LISINOPRIL 10 MG: 5 TABLET ORAL at 08:24

## 2023-10-14 ASSESSMENT — PAIN SCALES - GENERAL
PAINLEVEL_OUTOF10: 6
PAINLEVEL_OUTOF10: 2
PAINLEVEL_OUTOF10: 2
PAINLEVEL_OUTOF10: 3

## 2023-10-14 ASSESSMENT — PAIN DESCRIPTION - ORIENTATION
ORIENTATION: LEFT
ORIENTATION: LEFT
ORIENTATION: LOWER

## 2023-10-14 ASSESSMENT — PAIN DESCRIPTION - DESCRIPTORS
DESCRIPTORS: ACHING

## 2023-10-14 ASSESSMENT — PAIN DESCRIPTION - LOCATION
LOCATION: BACK
LOCATION: BACK

## 2023-10-14 NOTE — PROGRESS NOTES
[facility]  SPEECH LANGUAGE PATHOLOGY: COMMUNICATION Initial Assessment and Discharge    MRN: [id number]    ADMISSION DATE: 10/13/2023  PRIMARY DIAGNOSIS: Stroke determined by clinical assessment [facility])  CVA (cerebral vascular accident) ([facility]) [I63.9]  Stroke determined by clinical assessment [facility]) [I63.9]    Date of Eval: 10/14/2023  ICD-10: Treatment Diagnosis:  R41.841 Cognitive-Communication Deficit  R47.1 Dysarthria and Anarthria    RECOMMENDATIONS:   Recommendations: Patient does not require skilled speech therapy at this time. No cognitive deficits noted / dysarthria/ dysphagia. Patient continues to require skilled intervention: Not at this time  Does not require at this time. ASSESSMENT   No cognitive deficits noted. Patient scored 26/30 on SLUMS . Patient did not complete High School. Score WFL for educational level. Patient with no dysarthria noted. Patient reports some mild facial numbness that has has gotten better. Patient with no dysarthria noted. GENERAL   Subjective:  I am doing much better. It is just my left leg and hip that is still week. Prior level of functioning: Patient was living at home and caring for 5year old granddaughter. Patient still drives. Pain:   Patient does not c/o pain                       OBJECTIVE   Patient seen for Assessment of cognition / dysarthria / dysphagia due to CVA. No dysarthria and dysphagia noted. Cognition tested [facility]. Tool Used: MODIFIED WENDI SCALE (mRS)   Score   No Symptoms  [] 0   No significant disability despite symptoms; able to carry out all usual duties and activities  [] 1   Slight disability; unable to carry out all previous activities but able to look after own affairs without assistance.    [] 2   Moderate disability; requiring some help but able to walk without assistance  [] 3   Moderately severe disability; unable to walk without assistance and unable to attend to own bodily needs without assistance  [] 4
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
## 2023-10-14 NOTE — PROGRESS NOTES
Occupational Therapy    OT Daily Note  Time In 745 am   Time Out 840 am     Subjective: Pt. was pleasant, cooperative, and agreeable to OT treatment session. Pain: Pt. Reported 7/10 back and L hip pain, however, agreeable to participation. Education: Pt. Educated on safe transfer techniques utilizing RW and grab bars during self-care ADLs in order to address LUE Weakness and decreased coordination s/p CVA. Interdisciplinary Communication: PT educated on pt. Progress during OT treatment session. Mobility   Score Comments   Sit to Supine 4: Supervision or touching A SUP   Sit to Stand 4: Supervision or touching A CGA with use of RW   Transfer Assist 4: Supervision or touching A CGA with use of RW     Activities of Daily Living   Score Comments   Oral Hygiene 6: Independent   I; w/c level   Bathing 4: Supervision or touching A  UB SUP; LB CGA when standing; sitting in shower chair otherwise   Upper Body  Dressing 4: Supervision or touching A  SUP   Lower Body Dressing 4: Supervision or touching A  CGA when standing; donning brief and pants   Donning/[place] Footwear  6: Independent I; donning B socks   Toilet Transfer 4: Supervision or touching A   CGA with use of RW   Toileting Hygiene 4: Supervision or touching A   CGA standing       Strengthening   Pt. Utilized UE bike for 12 minutes in order to improve BUE strength and activity tolerance in order for carry over with ADLs, functional transfers, and desired functional activities. Assessment: Pt. Was agreeable to OT treatment session. Pt. Performed ADL reflected in chart above. Pt. Performed therapeutic exercise as summarized above with 7/10 back and L hip pain. Pt. Demonstrated fair activity tolerance throughout OT treatment session. Pt. Was left in w/c with PT for subsequent PT treatment session in gym. Plan: Continue OT treatment in order to achieve goals in preparation for anticipated d/c disposition.     Jinger Pallas, OT   10/14/2023

## 2023-10-14 NOTE — PROGRESS NOTES
Physical Therapy  Facility/Department: [facility] 9 INPATIENT REHAB UNIT  Daily Treatment Note  NAME: Aj English Sr.  : 1955  MRN: [id number]    Date of Service: 10/14/2023    Discharge Recommendations:           Patient Diagnosis(es): There were no encounter diagnoses. Assessment         Plan          Restrictions        Subjective          Objective   Vitals                         Goals       Education       Therapy Time   Individual Concurrent Group Co-treatment   Time In 9723         Time Out 0916         Minutes 25         Timed Code Treatment Minutes: 25 Minutes       Carlos Reyes PTA         PHYSICAL THERAPY DAILY NOTE  Time In:  8:51 am  Time Out:  9:16 am  Total Treatment Time:  25 Minutes  Pt.  Seen for: AM, Gait Training, Therapeutic Exercise, and Transfer Training     Subjective: \"I'm having left hip pain which makes it tough\"         Objective:  Precautions: Falls    GROSS ASSESSMENT Daily Assessment           COGNITION Daily Assessment           BED/MAT MOBILITY Daily Assessment    Rolling Right: NT  Rolling Left: NT  Supine to Sit: NT  Sit to Supine: NT       TRANSFERS Daily Assessment    Sit to Stand: Min A  Stand to Sit: Min A  Transfer Type: Stand Pivot  Transfer Assistance: Min A  Car Transfers: NT         GAIT Daily Assessment   Additional 20ft using FWW min a Amount of Assistance: Min A  Distance (ft): 10  Assistive Device: RW  Surface: Level Surface       STEPS/STAIRS Daily Assessment    Steps Ambulated:  0  Level of Assistance:  NT  Railing:No Rails  Assistive Device: NT       BALANCE Daily Assessment    Static Sitting: Normal:  Pt. able to maintain balance w/o UE support  Dynamic Sitting: Good - accepts moderate challenge;  can maintain balance while picking object off the floor  Static Standing: Fair:  Pt. requires UE support;  may need occasional min A  Dynamic Standing: Poor - unable to accept challenge or move without LOB        WHEELCHAIR MOBILITY Daily Assessment    Able to Propel (ft): 0  Assistance: NT  Surface: NT  Wheelchair Set-up: NT       LOWER EXTREMITY EXERCISES Daily Assessment   Nu step level 1, 10 min continuous       Pain level: 5/10  Pain Location:  L hip  Pain Interventions: pain medication    Vital Signs: WNL      Pt. Left in room call light and all needs in reach         Assessment: pt displays antalgic gait pattern LLE due to pain levels increase WB.  Educated pacing techniques and learning about weight shifting to improve gait mechanics          Plan of Care: Continue with current POC    Terrance Bauer PTA  10/14/2023

## 2023-10-15 ENCOUNTER — APPOINTMENT (OUTPATIENT)
Dept: ULTRASOUND IMAGING | Age: 68
DRG: 057 | End: 2023-10-15
Attending: PHYSICAL MEDICINE & REHABILITATION
Payer: MEDICARE

## 2023-10-15 LAB
GLUCOSE BLD STRIP.AUTO-MCNC: 116 MG/DL (ref 65–100)
GLUCOSE BLD STRIP.AUTO-MCNC: 162 MG/DL (ref 65–100)
GLUCOSE BLD STRIP.AUTO-MCNC: 167 MG/DL (ref 65–100)
GLUCOSE BLD STRIP.AUTO-MCNC: 217 MG/DL (ref 65–100)
SERVICE CMNT-IMP: ABNORMAL

## 2023-10-15 PROCEDURE — 6370000000 HC RX 637 (ALT 250 FOR IP): Performed by: PHYSICAL MEDICINE & REHABILITATION

## 2023-10-15 PROCEDURE — 1180000000 HC REHAB R&B

## 2023-10-15 PROCEDURE — 82962 GLUCOSE BLOOD TEST: CPT

## 2023-10-15 PROCEDURE — 93970 EXTREMITY STUDY: CPT

## 2023-10-15 RX ADMIN — AMITRIPTYLINE HYDROCHLORIDE 25 MG: 25 TABLET, FILM COATED ORAL at 20:56

## 2023-10-15 RX ADMIN — GABAPENTIN 300 MG: 300 CAPSULE ORAL at 13:37

## 2023-10-15 RX ADMIN — PANTOPRAZOLE SODIUM 40 MG: 40 TABLET, DELAYED RELEASE ORAL at 06:06

## 2023-10-15 RX ADMIN — DICLOFENAC SODIUM 2 G: 10 GEL TOPICAL at 21:28

## 2023-10-15 RX ADMIN — GABAPENTIN 300 MG: 300 CAPSULE ORAL at 09:45

## 2023-10-15 RX ADMIN — FERROUS SULFATE TAB 325 MG (65 MG ELEMENTAL FE) 325 MG: 325 (65 FE) TAB at 09:45

## 2023-10-15 RX ADMIN — APIXABAN 5 MG: 5 TABLET, FILM COATED ORAL at 09:45

## 2023-10-15 RX ADMIN — APIXABAN 5 MG: 5 TABLET, FILM COATED ORAL at 20:56

## 2023-10-15 RX ADMIN — GABAPENTIN 300 MG: 300 CAPSULE ORAL at 20:56

## 2023-10-15 RX ADMIN — METOPROLOL TARTRATE 25 MG: 25 TABLET, FILM COATED ORAL at 09:45

## 2023-10-15 RX ADMIN — AMIODARONE HYDROCHLORIDE 200 MG: 200 TABLET ORAL at 09:45

## 2023-10-15 RX ADMIN — EZETIMIBE 10 MG: 10 TABLET ORAL at 20:56

## 2023-10-15 RX ADMIN — LISINOPRIL 20 MG: 20 TABLET ORAL at 09:45

## 2023-10-15 RX ADMIN — OXYCODONE HYDROCHLORIDE 5 MG: 5 TABLET ORAL at 18:24

## 2023-10-15 RX ADMIN — METOPROLOL TARTRATE 25 MG: 25 TABLET, FILM COATED ORAL at 20:55

## 2023-10-15 RX ADMIN — OXYCODONE HYDROCHLORIDE 5 MG: 5 TABLET ORAL at 06:06

## 2023-10-15 RX ADMIN — DICLOFENAC SODIUM 2 G: 10 GEL TOPICAL at 09:47

## 2023-10-15 RX ADMIN — OXYCODONE HYDROCHLORIDE 5 MG: 5 TABLET ORAL at 12:29

## 2023-10-15 RX ADMIN — Medication 250 MG: at 09:45

## 2023-10-15 ASSESSMENT — PAIN DESCRIPTION - LOCATION
LOCATION: LEG
LOCATION: BACK
LOCATION: BACK

## 2023-10-15 ASSESSMENT — PAIN SCALES - GENERAL
PAINLEVEL_OUTOF10: 2
PAINLEVEL_OUTOF10: 5
PAINLEVEL_OUTOF10: 6
PAINLEVEL_OUTOF10: 2
PAINLEVEL_OUTOF10: 6

## 2023-10-15 ASSESSMENT — PAIN DESCRIPTION - DESCRIPTORS
DESCRIPTORS: ACHING

## 2023-10-15 ASSESSMENT — PAIN DESCRIPTION - ORIENTATION
ORIENTATION: LOWER
ORIENTATION: LEFT

## 2023-10-16 ENCOUNTER — CARE COORDINATION (OUTPATIENT)
Dept: CARE COORDINATION | Facility: CLINIC | Age: 68
End: 2023-10-16

## 2023-10-16 LAB
ANION GAP SERPL CALC-SCNC: 5 MMOL/L (ref 2–11)
BUN SERPL-MCNC: 23 MG/DL (ref 8–23)
CALCIUM SERPL-MCNC: 8.8 MG/DL (ref 8.3–10.4)
CHLORIDE SERPL-SCNC: 108 MMOL/L (ref 101–110)
CO2 SERPL-SCNC: 26 MMOL/L (ref 21–32)
CREAT SERPL-MCNC: 1 MG/DL (ref 0.8–1.5)
ERYTHROCYTE [DISTWIDTH] IN BLOOD BY AUTOMATED COUNT: 16 % (ref 11.9–14.6)
GLUCOSE BLD STRIP.AUTO-MCNC: 130 MG/DL (ref 65–100)
GLUCOSE BLD STRIP.AUTO-MCNC: 164 MG/DL (ref 65–100)
GLUCOSE BLD STRIP.AUTO-MCNC: 174 MG/DL (ref 65–100)
GLUCOSE SERPL-MCNC: 186 MG/DL (ref 65–100)
HCT VFR BLD AUTO: 42.9 % (ref 41.1–50.3)
HGB BLD-MCNC: 13.2 G/DL (ref 13.6–17.2)
MCH RBC QN AUTO: 26.2 PG (ref 26.1–32.9)
MCHC RBC AUTO-ENTMCNC: 30.8 G/DL (ref 31.4–35)
MCV RBC AUTO: 85.1 FL (ref 82–102)
NRBC # BLD: 0 K/UL (ref 0–0.2)
PLATELET # BLD AUTO: 217 K/UL (ref 150–450)
PMV BLD AUTO: 10.3 FL (ref 9.4–12.3)
POTASSIUM SERPL-SCNC: 4.3 MMOL/L (ref 3.5–5.1)
RBC # BLD AUTO: 5.04 M/UL (ref 4.23–5.6)
SERVICE CMNT-IMP: ABNORMAL
SODIUM SERPL-SCNC: 139 MMOL/L (ref 133–143)
WBC # BLD AUTO: 8.4 K/UL (ref 4.3–11.1)

## 2023-10-16 PROCEDURE — 1180000000 HC REHAB R&B

## 2023-10-16 PROCEDURE — 82962 GLUCOSE BLOOD TEST: CPT

## 2023-10-16 PROCEDURE — 6370000000 HC RX 637 (ALT 250 FOR IP): Performed by: PHYSICAL MEDICINE & REHABILITATION

## 2023-10-16 PROCEDURE — 80048 BASIC METABOLIC PNL TOTAL CA: CPT

## 2023-10-16 PROCEDURE — 99232 SBSQ HOSP IP/OBS MODERATE 35: CPT | Performed by: PHYSICAL MEDICINE & REHABILITATION

## 2023-10-16 PROCEDURE — 36415 COLL VENOUS BLD VENIPUNCTURE: CPT

## 2023-10-16 PROCEDURE — 97110 THERAPEUTIC EXERCISES: CPT

## 2023-10-16 PROCEDURE — 6360000002 HC RX W HCPCS: Performed by: PHYSICAL MEDICINE & REHABILITATION

## 2023-10-16 PROCEDURE — 97116 GAIT TRAINING THERAPY: CPT

## 2023-10-16 PROCEDURE — 97530 THERAPEUTIC ACTIVITIES: CPT

## 2023-10-16 PROCEDURE — 97140 MANUAL THERAPY 1/> REGIONS: CPT

## 2023-10-16 PROCEDURE — 97535 SELF CARE MNGMENT TRAINING: CPT

## 2023-10-16 PROCEDURE — 85027 COMPLETE CBC AUTOMATED: CPT

## 2023-10-16 RX ORDER — KETOROLAC TROMETHAMINE 15 MG/ML
30 INJECTION, SOLUTION INTRAMUSCULAR; INTRAVENOUS ONCE
Status: COMPLETED | OUTPATIENT
Start: 2023-10-16 | End: 2023-10-16

## 2023-10-16 RX ORDER — LISINOPRIL 20 MG/1
20 TABLET ORAL 2 TIMES DAILY
Status: DISCONTINUED | OUTPATIENT
Start: 2023-10-16 | End: 2023-10-20 | Stop reason: HOSPADM

## 2023-10-16 RX ADMIN — DICLOFENAC SODIUM 2 G: 10 GEL TOPICAL at 08:03

## 2023-10-16 RX ADMIN — FERROUS SULFATE TAB 325 MG (65 MG ELEMENTAL FE) 325 MG: 325 (65 FE) TAB at 08:01

## 2023-10-16 RX ADMIN — LISINOPRIL 20 MG: 20 TABLET ORAL at 08:02

## 2023-10-16 RX ADMIN — EZETIMIBE 10 MG: 10 TABLET ORAL at 21:00

## 2023-10-16 RX ADMIN — OXYCODONE HYDROCHLORIDE 5 MG: 5 TABLET ORAL at 21:02

## 2023-10-16 RX ADMIN — OXYCODONE HYDROCHLORIDE 5 MG: 5 TABLET ORAL at 05:44

## 2023-10-16 RX ADMIN — AMIODARONE HYDROCHLORIDE 200 MG: 200 TABLET ORAL at 08:02

## 2023-10-16 RX ADMIN — OXYCODONE HYDROCHLORIDE 5 MG: 5 TABLET ORAL at 13:21

## 2023-10-16 RX ADMIN — APIXABAN 5 MG: 5 TABLET, FILM COATED ORAL at 21:01

## 2023-10-16 RX ADMIN — Medication 250 MG: at 08:02

## 2023-10-16 RX ADMIN — METOPROLOL TARTRATE 25 MG: 25 TABLET, FILM COATED ORAL at 21:01

## 2023-10-16 RX ADMIN — METOPROLOL TARTRATE 25 MG: 25 TABLET, FILM COATED ORAL at 08:02

## 2023-10-16 RX ADMIN — LISINOPRIL 20 MG: 20 TABLET ORAL at 21:00

## 2023-10-16 RX ADMIN — GABAPENTIN 300 MG: 300 CAPSULE ORAL at 13:22

## 2023-10-16 RX ADMIN — KETOROLAC TROMETHAMINE 30 MG: 15 INJECTION, SOLUTION INTRAMUSCULAR; INTRAVENOUS at 16:25

## 2023-10-16 RX ADMIN — PANTOPRAZOLE SODIUM 40 MG: 40 TABLET, DELAYED RELEASE ORAL at 05:44

## 2023-10-16 RX ADMIN — AMITRIPTYLINE HYDROCHLORIDE 25 MG: 25 TABLET, FILM COATED ORAL at 21:01

## 2023-10-16 RX ADMIN — GABAPENTIN 300 MG: 300 CAPSULE ORAL at 08:01

## 2023-10-16 RX ADMIN — APIXABAN 5 MG: 5 TABLET, FILM COATED ORAL at 08:01

## 2023-10-16 RX ADMIN — GABAPENTIN 300 MG: 300 CAPSULE ORAL at 21:00

## 2023-10-16 RX ADMIN — ACETAMINOPHEN 650 MG: 325 TABLET ORAL at 13:22

## 2023-10-16 ASSESSMENT — PAIN SCALES - GENERAL
PAINLEVEL_OUTOF10: 0
PAINLEVEL_OUTOF10: 10
PAINLEVEL_OUTOF10: 9
PAINLEVEL_OUTOF10: 6

## 2023-10-16 ASSESSMENT — PAIN DESCRIPTION - DESCRIPTORS
DESCRIPTORS: ACHING
DESCRIPTORS: ACHING

## 2023-10-16 ASSESSMENT — PAIN DESCRIPTION - LOCATION
LOCATION: LEG

## 2023-10-16 ASSESSMENT — PAIN DESCRIPTION - ONSET: ONSET: GRADUAL

## 2023-10-16 ASSESSMENT — PAIN DESCRIPTION - PAIN TYPE: TYPE: ACUTE PAIN

## 2023-10-16 ASSESSMENT — PAIN DESCRIPTION - FREQUENCY
FREQUENCY: INTERMITTENT
FREQUENCY: INTERMITTENT

## 2023-10-16 ASSESSMENT — PAIN DESCRIPTION - ORIENTATION: ORIENTATION: RIGHT;LEFT

## 2023-10-16 NOTE — PROGRESS NOTES
PHYSICAL THERAPY DAILY NOTE  Time In:  1300  Time Out:  1345  Total Treatment Time:  39 Minutes  Pt. Seen for: Gait Training, Therapeutic Exercise, and Other     Subjective: Pt reports he eats aspirin to get through life with pain. Objective:  Precautions: Falls    GROSS ASSESSMENT Daily Assessment    Pt reports that his hip has bothered him since his fall a few weeks before his stroke. He did not have imaging at that time and is not sure if it was bruised.         COGNITION Daily Assessment    Orientation:A&O x 3  Communication:able to express needs, able to follow one step commands without assistance  Social Interaction:cooperative, appropriate with PT, participating, motivated to improve  Memory:appears intact        BED/MAT MOBILITY Daily Assessment   On mat Rolling Right: Supervision/Standby Assist  Rolling Left: Supervision/Standby Assist  Supine to Sit: Supervision/Standby Assist  Sit to Supine: Supervision/Standby Assist       TRANSFERS Daily Assessment   With RW Sit to Stand: Supervision/Standby Assist  Stand to Sit: Supervision/Standby Assist  Transfer Type: Stand Pivot  Transfer Assistance: CGA  Car Transfers: NT         GAIT Daily Assessment   Rest due to pain Amount of Assistance: CGA  Distance (ft): 50ft  Assistive Device: RW  Surface: Level Surface       STEPS/STAIRS Daily Assessment    Steps Ambulated:  n/a  Level of Assistance:    Railing:  Assistive Device:        BALANCE Daily Assessment   With RW support Static Sitting: Normal:  Pt. able to maintain balance w/o UE support  Dynamic Sitting: Good - accepts moderate challenge;  can maintain balance while picking object off the floor  Static Standing: Good:  Pt. able to maintain balance w/o UE support;  exhibits some postural sway  Dynamic Standing: Fair - accepts minimal challenge;  can maintain balance while turning head/trunk       WHEELCHAIR MOBILITY Daily Assessment    Able to Propel (ft): n/a  Assistance:   Surface:   Wheelchair Set-up:

## 2023-10-16 NOTE — PROGRESS NOTES
OT Daily Note  Time In:    0705     Time Out: 0745        Subjective: \"I already showered by myself this morning. \"  Pain: indicated in L hip  Education: BUE strengthening and endurance  Interdisciplinary Communication: with PT regarding pt's BLE wounds    Mobility   Score Comments   Sit to Stand Supervision or touching assistance S   Transfer Assist Supervision or touching assistance SBA with RW     Strengthening   Pt completed 14 minutes on the ergometer frontwards and backwards with medium resistance to increase UB strength and activity tolerance for integration into functional transfers. Activity Tolerance   Pt completed visual perceptual, reaching, and bilateral hand coordination task utilizing pipe tree while following visual design. Pt with good coordination skills noted while connecting pipe tree pieces onto design at midline. Pt with appropriate reaching skills as well. Pt completed activity in seated position at table top. Assessment: Pt appears to have more sensory impairment in LUE vs motor deficits. Pt completed ADL prior to OT session, discussed waiting on therapist tomorrow morning to make sure he is completing it safely. Plan: Continue OT POC.      Sami Monterroso OT   10/16/2023 ]

## 2023-10-16 NOTE — CARE COORDINATION
Patient is currently admitted to hospital. Ccm will close case at this time but remain available as needed.

## 2023-10-16 NOTE — CARE COORDINATION
CM met with patient at bedside and discussed discharge plan and needs. Patient alert/orient x4. Patient verified demographic, no changes. Verified PCP and insurance. Denies any issues with purchasing or affording medication. Patient states he lives in a one level home with grandchildren that he has custody of                    10/14/23 0909   Service Assessment   Patient Orientation Alert and Oriented;Person;Place;Situation;Self   Cognition Alert   History Provided By Patient;Medical Record   Primary Caregiver Self   Support Systems Family Members   Patient's Healthcare Decision Maker is: Legal Next of [address] [place] [address]   PCP Verified by CM Yes   Last Visit to PCP Within last year   Prior Functional Level Independent in ADLs/IADLs   Current Functional Level Assistance with the following:;Bathing;Dressing; Toileting;Mobility   Can patient return to prior living arrangement Yes   Ability to make needs known: Good   Family able to assist with home care needs: Yes   Would you like for me to discuss the discharge plan with any other family members/significant others, and if so, who?  No   Financial Resources Medicare   Community Resources None   Social/Functional History   Lives With Family   Type of [address] One level   [address] to enter without rails   Entrance Stairs - Number of Steps 5   Entrance Stairs - Rails None   Bathroom Shower/Tub Tub/Shower unit   Bathroom Toilet Standard   Bathroom Equipment Shower chair;Commode   [address] [facility] Help From [address] Needs assistance   Toileting Needs assistance   [address] [facility] assistance   Homemaking Responsibilities Yes   Ambulation Assistance Needs assistance   Transfer Assistance Needs assistance   Active  Yes   Mode of Transportation Car   Discharge Planning   Type of [address] Family Members   Current Services Prior To Admission [address] [facility] Potential Assistance Needed Home Care   DME Ordered? Cane   Potential Assistance Purchasing Medications No   Type of [address]   Patient expects to be discharged to: House   One/Two Story Residence One story   History of falls? 0   Services At/After Discharge   Transition of Care Consult (CM Consult) Discharge [facility] Discharge None   [place] Resource Information Provided?  No   Mode of Transport at Discharge Other (see comment)   Confirm Follow Up Transport Family   Condition of Participation: Discharge Planning   The Plan for Transition of Care is related to the following treatment goals: based on clinical course

## 2023-10-16 NOTE — PROGRESS NOTES
OT Daily Note  Time In:    1116     Time Out: 1202        Subjective: \"I'm leaking stool. I need to go to the bathroom quickly. \"  Pain: indicated in L hip  Education: LUE sensory integration  Interdisciplinary Communication: with PT regarding pt's hip    Mobility   Score Comments   Sit to Stand Supervision or touching assistance S   Transfer Assist Supervision or touching assistance SBA with RW     Self-Care    Score Comments   Toilet Transfer 4: Supervision or touching A Transfer Type: SPT   Equipment: Rolling Walker   Comments: SBA   Toileting Hygiene 4: Supervision or touching A Output: BM  Comments: S          Visual-Perceptual   Pt completed a 28 piece jigsaw puzzle to increase UE strength, visual perceptual skills, coordination, and problem solving. Pt completed puzzle with mod > SBA assistance. Sensory Integration   Medium heavy resistance theraputty used to find 20 out of 20 beads in various methods to increase bilateral UE/ strength, sensation of LUE, and activity tolerance. Pt then used L hand to flatten theraputty for additional BUE strengthening. Pt then replaced beads back into theraputty with appropriate finger strength and [facility] skills. Assessment: Pt reports continued numbness on L side of body. Functional FMC, reaching, and strength on L side but continues with sensory impairments. Pt does state that he has a spot in the L side of his vision, will continue to assess vision for safety during ambulation and during IADLs. Plan: Continue OT POC.      Sami Sadler, OT   10/16/2023
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
## 2023-10-16 NOTE — CARE COORDINATION
Transition of care outreach postponed for 7 days due to patient's discharge to [facility] 9th floor SNF.

## 2023-10-16 NOTE — WOUND CARE
Bilateral lower legs improving. Patient states he has been using the Voltaren all over including wounds. Recommend to continue adaptic or Vaseline gauze over wounds with dry dressing and ace bandage from toes to knee change daily. OK to shower, remove bandage prior and replace after. Will monitor.

## 2023-10-17 LAB
GLUCOSE BLD STRIP.AUTO-MCNC: 122 MG/DL (ref 65–100)
GLUCOSE BLD STRIP.AUTO-MCNC: 152 MG/DL (ref 65–100)
GLUCOSE BLD STRIP.AUTO-MCNC: 166 MG/DL (ref 65–100)
GLUCOSE BLD STRIP.AUTO-MCNC: 171 MG/DL (ref 65–100)
SERVICE CMNT-IMP: ABNORMAL

## 2023-10-17 PROCEDURE — 97110 THERAPEUTIC EXERCISES: CPT

## 2023-10-17 PROCEDURE — 92523 SPEECH SOUND LANG COMPREHEN: CPT

## 2023-10-17 PROCEDURE — 1180000000 HC REHAB R&B

## 2023-10-17 PROCEDURE — 97116 GAIT TRAINING THERAPY: CPT

## 2023-10-17 PROCEDURE — 82962 GLUCOSE BLOOD TEST: CPT

## 2023-10-17 PROCEDURE — 97530 THERAPEUTIC ACTIVITIES: CPT

## 2023-10-17 PROCEDURE — 6370000000 HC RX 637 (ALT 250 FOR IP): Performed by: PHYSICAL MEDICINE & REHABILITATION

## 2023-10-17 PROCEDURE — 97535 SELF CARE MNGMENT TRAINING: CPT

## 2023-10-17 PROCEDURE — 99232 SBSQ HOSP IP/OBS MODERATE 35: CPT | Performed by: PHYSICAL MEDICINE & REHABILITATION

## 2023-10-17 PROCEDURE — 6370000000 HC RX 637 (ALT 250 FOR IP): Performed by: PHYSICIAN ASSISTANT

## 2023-10-17 RX ADMIN — GABAPENTIN 300 MG: 300 CAPSULE ORAL at 08:28

## 2023-10-17 RX ADMIN — Medication 250 MG: at 08:28

## 2023-10-17 RX ADMIN — METFORMIN HYDROCHLORIDE 500 MG: 500 TABLET ORAL at 08:28

## 2023-10-17 RX ADMIN — OXYCODONE HYDROCHLORIDE 5 MG: 5 TABLET ORAL at 08:31

## 2023-10-17 RX ADMIN — GABAPENTIN 300 MG: 300 CAPSULE ORAL at 20:47

## 2023-10-17 RX ADMIN — LISINOPRIL 20 MG: 20 TABLET ORAL at 19:54

## 2023-10-17 RX ADMIN — APIXABAN 5 MG: 5 TABLET, FILM COATED ORAL at 08:29

## 2023-10-17 RX ADMIN — METOPROLOL TARTRATE 25 MG: 25 TABLET, FILM COATED ORAL at 08:28

## 2023-10-17 RX ADMIN — PANTOPRAZOLE SODIUM 40 MG: 40 TABLET, DELAYED RELEASE ORAL at 05:30

## 2023-10-17 RX ADMIN — AMITRIPTYLINE HYDROCHLORIDE 25 MG: 25 TABLET, FILM COATED ORAL at 19:56

## 2023-10-17 RX ADMIN — ACETAMINOPHEN 650 MG: 325 TABLET ORAL at 19:55

## 2023-10-17 RX ADMIN — AMIODARONE HYDROCHLORIDE 200 MG: 200 TABLET ORAL at 08:29

## 2023-10-17 RX ADMIN — OXYCODONE HYDROCHLORIDE 5 MG: 5 TABLET ORAL at 01:29

## 2023-10-17 RX ADMIN — LISINOPRIL 20 MG: 20 TABLET ORAL at 08:29

## 2023-10-17 RX ADMIN — OXYCODONE HYDROCHLORIDE 5 MG: 5 TABLET ORAL at 18:07

## 2023-10-17 RX ADMIN — EZETIMIBE 10 MG: 10 TABLET ORAL at 19:55

## 2023-10-17 RX ADMIN — FERROUS SULFATE TAB 325 MG (65 MG ELEMENTAL FE) 325 MG: 325 (65 FE) TAB at 08:33

## 2023-10-17 RX ADMIN — METOPROLOL TARTRATE 25 MG: 25 TABLET, FILM COATED ORAL at 19:55

## 2023-10-17 RX ADMIN — GABAPENTIN 300 MG: 300 CAPSULE ORAL at 15:20

## 2023-10-17 RX ADMIN — APIXABAN 5 MG: 5 TABLET, FILM COATED ORAL at 20:46

## 2023-10-17 ASSESSMENT — PAIN SCALES - GENERAL
PAINLEVEL_OUTOF10: 8
PAINLEVEL_OUTOF10: 7

## 2023-10-17 ASSESSMENT — PAIN DESCRIPTION - DESCRIPTORS
DESCRIPTORS: ACHING
DESCRIPTORS: ACHING;DISCOMFORT;DULL
DESCRIPTORS: PINS AND NEEDLES;NAGGING
DESCRIPTORS: SORE;ACHING;DISCOMFORT

## 2023-10-17 ASSESSMENT — PAIN DESCRIPTION - LOCATION
LOCATION: ARM;LEG
LOCATION: LEG;ARM
LOCATION: LEG
LOCATION: LEG
LOCATION: ARM;LEG

## 2023-10-17 ASSESSMENT — PAIN DESCRIPTION - ORIENTATION
ORIENTATION: LEFT;RIGHT
ORIENTATION: LEFT;RIGHT
ORIENTATION: LEFT;LOWER
ORIENTATION: LEFT;LOWER
ORIENTATION: LEFT

## 2023-10-17 ASSESSMENT — PAIN - FUNCTIONAL ASSESSMENT
PAIN_FUNCTIONAL_ASSESSMENT: PREVENTS OR INTERFERES SOME ACTIVE ACTIVITIES AND ADLS

## 2023-10-17 NOTE — PROGRESS NOTES
OT DAILY NOTE    Time In:    6545     Time Out: 0745       Functional Mobility   Score Comments   Sit to Stand 6: Independent    Transfer Assist 4: Supervision or touching A SBA with RW     Activities of Daily Living   Score Comments   Eating Independent     Oral Hygiene Independent     Bathing Setup or clean-up assistance     Upper Body  Dressing Setup or clean-up assistance     Lower Body Dressing Setup or clean-up assistance     Donning/[place] Footwear Setup or clean-up assistance     Toilet Transfer Supervision or touching assistance S ambulating with [address] or clean-up assistance         Summary of Session: S: \"I can do all this by myself at home. \" Agreeable to therapy. Focus of session was on morning ADL routine. Patient was able to ambulate ~10 feet using a RW with SBA. Pain not expressed. Collaborated with PT and confirmed patient is on track to reach goals as documented in the care plan. Continue OT POC with focus on ADL/IADL skills, functional transfers, functional mobility, coordination, strength, static and dynamic balance, and activity tolerance to maximize safety and independence with ADLs and functional transfers. Patient ended session in recliner with call remote and phone within reach.        Sami Crocker OT  10/17/2023

## 2023-10-17 NOTE — PROGRESS NOTES
OT Daily Note  Time In:    1115     Time Out: 1201        Subjective: \"I have two pill boxes like this at home. \" \"My daughter can help me with cooking, pills, and driving when I get home if I need it. \"  Pain: indicated in L hip  Education: safety recommendations   Interdisciplinary Communication: with SLP regarding safety concerns    Mobility   Score Comments   Sit to Stand Supervision or touching assistance S   Transfer Assist Supervision or touching assistance SBA with RW     Self-Care    Score Comments   Toilet Transfer 4: Supervision or touching A Transfer Type:  ambulating  Equipment: Rolling Walker   Comments: S   Toileting Hygiene 5: S/U or clean-up assist Output: urine  Comments:         Activity Tolerance   Pt completed 10 minutes on the ergometer frontwards and backwards with medium resistance to increase UB strength and activity tolerance for integration into functional transfers. Cognition   Patient completed medication management simulation to sort \"pills\" (beads) from 4 medication bottles into pillboxes (AM and PM respectively) according to printed instructions on pill bottles. Patient completed 4/4 medications accurately, required min cues to problem solve prn medication (5th medication). Patient completed [facility] task to replace beads into medication bottles according to bead color upon completion of task. Assessment: Pt reports that his daughter as well as his friend Nathan Bauer can help him with cooking, cleaning, and potentially driving. Concerns over pt driving due to L sided visual impairment. Pt states vision is improving but functionally still misses things in his room or nearly runs into them with is walker. Plan: Continue OT POC.      Sami Medrano, TORSTEN   10/17/2023

## 2023-10-17 NOTE — DIABETES MGMT
Patient admitted with stroke. Admitting blood glucose 139. Blood glucose ranged 130-186 yesterday with patient receiving no diabetes medications. Blood glucose this morning was 166. Most recent poc glucose 171. Yesterday Creatinine 1.00. GFR >60. Reviewed patient current regimen: Metformin 500mg daily and Humalog correctional insulin. Patient seen for assessment regarding diabetes management. Patient has a past medical history of ulcers of both lower legs, CHF, chronic lumbar pain, HTN, afib, GERD, COPD, CVA. Patient states they have been living with diabetes for 15 years and voices a positive family history of diabetes. Patient states they  have do not use a glucometer at home, but states he has several. Discussed importance of using glucometer with unexpired test strips. Patient politely declines glucometer instruction stating he is comfortable with this. Patient states they are currently taking Metformin BID at home for management of diabetes. Patient voices that they have experienced hypoglycemia in the past. Educated regarding hypoglycemia signs, symptoms, and treatment. Patient has attended formal diabetes education in the past at the [facility]. Patient reports no current difficulty with affording their diabetic supplies. Patient given educational material, \"Diabetes Self-Management: A Patient Teaching Guide\", which was reviewed with patient. Explained basic physiology of diabetes, as well as causes, signs and symptoms, and treatments for hypoglycemia and hyperglycemia. Described the effects of poor glycemic control and the development of long-term complications such as renal, eye, nerve, and cardiovascular disease. Patient denies smoking. Patient voices numbness and tingling in feet. Described proper diabetic foot care and the importance of checking feet daily. Per patient they typically drink diet drinks or drinks with splenda.  Reviewed effects of sweetened beverages on glycemic control and

## 2023-10-17 NOTE — PROGRESS NOTES
[facility]  SPEECH LANGUAGE PATHOLOGY: COMMUNICATION Initial Assessment and Discharge    MRN: [id number]    ADMISSION DATE: 10/13/2023  PRIMARY DIAGNOSIS: Stroke determined by clinical assessment [facility])  CVA (cerebral vascular accident) ([address]) [I63.9]  Stroke determined by clinical assessment [facility]) [I63.9]    Date of Eval: 10/17/2023  ICD-10: Treatment Diagnosis:  R41.841 Cognitive-Communication Deficit  R47.1 Dysarthria and Anarthria    RECOMMENDATIONS:   Recommendations: Patient does not require skilled speech therapy at this time. No cognitive deficits noted / dysarthria/ dysphagia. Patient continues to require skilled intervention: Not at this time  Does not require at this time. ASSESSMENT   Patient demonstrates some age-related attention difficulties, but scores within normal limits on the [facility] [place] Cognitive Examination. Clock drawing was completed in a timely and appropriate manner. No ST is warranted at this time as cognition appears to be at baseline. GENERAL   Subjective: Patient is alert, amiable, and talkative. He reports no noticeable changes in his thinking or attention since his stroke. He has a 12th grade education and worked as a . Prior level of functioning: Patient was living at home and caring for 5year old granddaughter. Patient still drives. Pain: No pain reported. OBJECTIVE   Patient completed [place]'s Cognitive Examination with the following results:  [id number]:  Word Fluency:   Memory (Recent Events): 3/4  Language-Repetition: 4/4  Language-Namin/16  Language-Readin/1 (Read 3 of 5 words correctly)  Visuospatial-Clock 5/5  Visuospatial-Dots & Letters 8/8    Tool Used: MODIFIED WENDI SCALE (mRS)   Score   No Symptoms  [] 0   No significant disability despite symptoms; able to carry out all usual duties and activities  [] 1   Slight disability; unable to carry out all previous activities but able to look after own affairs without assistance.

## 2023-10-17 NOTE — PROGRESS NOTES
Inpatient Rehab Program  [address] [place] [address]  Tel: [phone]     Physical Medicine and Rehabilitation Progress Note      Kimberlyn Melissa Sr. Admit Date: 10/13/2023  Admit Diagnosis: CVA (cerebral vascular accident) [facility]) [I63.9]  Stroke determined by clinical assessment ([address]) [I63.9]    Subjective     Patient seen face-to-face. NAEO and he reports slept @3 hrs again last night, which he says is his norm. He is eating and drinking fine, and continent of bowel and bladder. He has his usual back and some LLE chronic pain; although, he says it's a little worse now. The pain meds are taking the edge off only. He had a fall 2-3 weeks ago. There is a MSK component and I trialed a Toradol injection. He said it worked great until about 1am then wore off and asked for another. I will hold off for now. He reports his therapies are going well and he is pleased so far. Lisinopril titrated to 20mg daily -> BID. BLE US Doppler neg for DVT.     Objective:     Current Facility-Administered Medications   Medication Dose Route Frequency    metFORMIN (GLUCOPHAGE) tablet 500 mg  500 mg Oral Daily with breakfast    lisinopril (PRINIVIL;ZESTRIL) tablet 20 mg  20 mg Oral BID    0.9 % sodium chloride infusion   IntraVENous PRN    albuterol sulfate HFA (PROVENTIL;VENTOLIN;PROAIR) 108 (90 Base) MCG/ACT inhaler 2 puff  2 puff Inhalation Q6H PRN    amiodarone (CORDARONE) tablet 200 mg  200 mg Oral Daily    diclofenac sodium (VOLTAREN) 1 % gel 2 g  2 g Topical BID    ezetimibe (ZETIA) tablet 10 mg  10 mg Oral Nightly    ferrous sulfate (IRON 325) tablet 325 mg  325 mg Oral Daily with breakfast    gabapentin (NEURONTIN) capsule 300 mg  300 mg Oral TID    insulin lispro (HUMALOG) injection vial 0-8 Units  0-8 Units SubCUTAneous TID WC    insulin lispro (HUMALOG) injection vial 0-4 Units  0-4 Units SubCUTAneous Nightly    metoprolol tartrate (LOPRESSOR) tablet 25 mg  25 mg Oral BID    ondansetron suppository or tablets for constipation. Pt reports normal bowel function without constipation on IPR admission. Follow up:  Shell Alexis DO (PCP) 1-2 weeks post IPR  Neurology  Wound care clinic   Vascular Surgery     Disposition: The patient's prognosis for significant practical improvement within a reasonable period of time appears fair/good and the estimated length of stay is 12 days; patient is expected to return home with family supervision and continued rehabilitation with home health therapy. Time spent was 36 minutes with over 1/2 in direct patient care / examination, consultation with nursing, therapist and coordination of care.        Signed By: Juan Jose Coyle MD     October 17, 2023

## 2023-10-17 NOTE — PROGRESS NOTES
Hourly rounding completed during shift. All needs met at this time. Medicated per [facility] for pain and was effective. Bed L/L with call bell in reach. Report given to oncoming RN.

## 2023-10-18 LAB
GLUCOSE BLD STRIP.AUTO-MCNC: 112 MG/DL (ref 65–100)
GLUCOSE BLD STRIP.AUTO-MCNC: 138 MG/DL (ref 65–100)
GLUCOSE BLD STRIP.AUTO-MCNC: 152 MG/DL (ref 65–100)
GLUCOSE BLD STRIP.AUTO-MCNC: 92 MG/DL (ref 65–100)
SERVICE CMNT-IMP: ABNORMAL
SERVICE CMNT-IMP: NORMAL

## 2023-10-18 PROCEDURE — 97535 SELF CARE MNGMENT TRAINING: CPT

## 2023-10-18 PROCEDURE — 97530 THERAPEUTIC ACTIVITIES: CPT

## 2023-10-18 PROCEDURE — 6370000000 HC RX 637 (ALT 250 FOR IP): Performed by: PHYSICAL MEDICINE & REHABILITATION

## 2023-10-18 PROCEDURE — 1180000000 HC REHAB R&B

## 2023-10-18 PROCEDURE — 6370000000 HC RX 637 (ALT 250 FOR IP): Performed by: PHYSICIAN ASSISTANT

## 2023-10-18 PROCEDURE — 97110 THERAPEUTIC EXERCISES: CPT

## 2023-10-18 PROCEDURE — 82962 GLUCOSE BLOOD TEST: CPT

## 2023-10-18 PROCEDURE — 97116 GAIT TRAINING THERAPY: CPT

## 2023-10-18 PROCEDURE — 6360000002 HC RX W HCPCS: Performed by: PHYSICAL MEDICINE & REHABILITATION

## 2023-10-18 RX ORDER — KETOROLAC TROMETHAMINE 15 MG/ML
30 INJECTION, SOLUTION INTRAMUSCULAR; INTRAVENOUS ONCE
Status: DISCONTINUED | OUTPATIENT
Start: 2023-10-18 | End: 2023-10-18

## 2023-10-18 RX ORDER — HYDRALAZINE HYDROCHLORIDE 50 MG/1
25 TABLET, FILM COATED ORAL EVERY 8 HOURS SCHEDULED
Status: DISCONTINUED | OUTPATIENT
Start: 2023-10-18 | End: 2023-10-20 | Stop reason: HOSPADM

## 2023-10-18 RX ORDER — KETOROLAC TROMETHAMINE 15 MG/ML
30 INJECTION, SOLUTION INTRAMUSCULAR; INTRAVENOUS ONCE
Status: COMPLETED | OUTPATIENT
Start: 2023-10-18 | End: 2023-10-18

## 2023-10-18 RX ADMIN — AMIODARONE HYDROCHLORIDE 200 MG: 200 TABLET ORAL at 08:03

## 2023-10-18 RX ADMIN — LISINOPRIL 20 MG: 20 TABLET ORAL at 20:58

## 2023-10-18 RX ADMIN — AMITRIPTYLINE HYDROCHLORIDE 25 MG: 25 TABLET, FILM COATED ORAL at 20:58

## 2023-10-18 RX ADMIN — METOPROLOL TARTRATE 25 MG: 25 TABLET, FILM COATED ORAL at 08:09

## 2023-10-18 RX ADMIN — OXYCODONE HYDROCHLORIDE 5 MG: 5 TABLET ORAL at 08:16

## 2023-10-18 RX ADMIN — METFORMIN HYDROCHLORIDE 500 MG: 500 TABLET ORAL at 08:01

## 2023-10-18 RX ADMIN — KETOROLAC TROMETHAMINE 30 MG: 15 INJECTION, SOLUTION INTRAMUSCULAR; INTRAVENOUS at 14:04

## 2023-10-18 RX ADMIN — HYDRALAZINE HYDROCHLORIDE 25 MG: 50 TABLET, FILM COATED ORAL at 09:25

## 2023-10-18 RX ADMIN — HYDRALAZINE HYDROCHLORIDE 25 MG: 50 TABLET, FILM COATED ORAL at 20:58

## 2023-10-18 RX ADMIN — APIXABAN 5 MG: 5 TABLET, FILM COATED ORAL at 20:58

## 2023-10-18 RX ADMIN — OXYCODONE HYDROCHLORIDE 5 MG: 5 TABLET ORAL at 02:08

## 2023-10-18 RX ADMIN — DICLOFENAC SODIUM 2 G: 10 GEL TOPICAL at 08:04

## 2023-10-18 RX ADMIN — EZETIMIBE 10 MG: 10 TABLET ORAL at 20:58

## 2023-10-18 RX ADMIN — GABAPENTIN 300 MG: 300 CAPSULE ORAL at 14:07

## 2023-10-18 RX ADMIN — FERROUS SULFATE TAB 325 MG (65 MG ELEMENTAL FE) 325 MG: 325 (65 FE) TAB at 08:03

## 2023-10-18 RX ADMIN — PANTOPRAZOLE SODIUM 40 MG: 40 TABLET, DELAYED RELEASE ORAL at 05:24

## 2023-10-18 RX ADMIN — GABAPENTIN 300 MG: 300 CAPSULE ORAL at 20:58

## 2023-10-18 RX ADMIN — APIXABAN 5 MG: 5 TABLET, FILM COATED ORAL at 08:02

## 2023-10-18 RX ADMIN — Medication 250 MG: at 08:09

## 2023-10-18 RX ADMIN — LISINOPRIL 20 MG: 20 TABLET ORAL at 08:02

## 2023-10-18 RX ADMIN — OXYCODONE HYDROCHLORIDE 5 MG: 5 TABLET ORAL at 21:06

## 2023-10-18 RX ADMIN — METOPROLOL TARTRATE 25 MG: 25 TABLET, FILM COATED ORAL at 20:58

## 2023-10-18 RX ADMIN — GABAPENTIN 300 MG: 300 CAPSULE ORAL at 08:01

## 2023-10-18 ASSESSMENT — PAIN SCALES - WONG BAKER: WONGBAKER_NUMERICALRESPONSE: 2

## 2023-10-18 ASSESSMENT — PAIN SCALES - GENERAL
PAINLEVEL_OUTOF10: 4
PAINLEVEL_OUTOF10: 9
PAINLEVEL_OUTOF10: 7
PAINLEVEL_OUTOF10: 6
PAINLEVEL_OUTOF10: 7
PAINLEVEL_OUTOF10: 7

## 2023-10-18 ASSESSMENT — PAIN DESCRIPTION - DESCRIPTORS
DESCRIPTORS: ACHING
DESCRIPTORS: ACHING

## 2023-10-18 ASSESSMENT — PAIN DESCRIPTION - ORIENTATION
ORIENTATION: LEFT

## 2023-10-18 ASSESSMENT — PAIN - FUNCTIONAL ASSESSMENT: PAIN_FUNCTIONAL_ASSESSMENT: PREVENTS OR INTERFERES SOME ACTIVE ACTIVITIES AND ADLS

## 2023-10-18 ASSESSMENT — PAIN DESCRIPTION - LOCATION
LOCATION: LEG;BACK
LOCATION: LEG;HIP
LOCATION: BUTTOCKS

## 2023-10-18 NOTE — PROGRESS NOTES
OT DAILY NOTE    Time In:    2141     Time Out: 0745       Functional Mobility   Score Comments   Sit to Stand 6: Independent    Transfer Assist 4: Supervision or touching A S ambulating with RW     Activities of Daily Living   Score Comments   Eating Independent     Oral Hygiene Independent     Bathing Independent     Upper Body  Dressing Independent     Lower Body Dressing Independent Pt picked out from clothing prior to showering and dressing   Donning/[place] Footwear Independent     Toilet Transfer Independent Kwadwo with SPT   Toileting Hygiene Independent         Summary of Session: S: \"I was hoping to go home tomorrow or Friday. \" Agreeable to therapy. Focus of session was on morning ADL routine. Patient was able to ambulate ~15 feet using a RW with SBA. Pain indicated in L hip. Pt engaged with pipetree to build UB activity tolerance and problem solving abilities to allow pt to complete IADLs more safely without fatigue. Collaborated with PT and confirmed patient is on track to reach goals as documented in the care plan. Continue OT POC with focus on ADL/IADL skills, functional transfers, functional mobility, coordination, strength, static and dynamic balance, and activity tolerance to maximize safety and independence with ADLs and functional transfers. Patient ended session in recliner with call remote and phone within reach.        Sami Brown, OT  10/18/2023

## 2023-10-18 NOTE — CARE COORDINATION
Interdisciplinary Wednesday, Team Conference Meeting Notes    Interdisciplinary team conference meeting completed to discuss plan of care. Estimated D/C Date: ***    Recommended Follow-Up Therapy:       Communication with family/caregivers: ***  Patient needs are continue to be followed by Dr. Yuri Acharya.  Patient has no discharge date / plan at this time scheduled for *****. Confirmation received. CM faxed Updated Clinicals CM will continue to follow patient      Name of Ins:   Secondary Ins:   Admitting Date:   LOS:  D/C Date:   Policy#:    Auth#:    Contact #:   Fax #:   Updated Clinicals Faxed:

## 2023-10-18 NOTE — PROGRESS NOTES
PHYSICAL THERAPY DAILY NOTE  Time In:  8475  Time Out:  1440  Total Treatment Time:  39 Minutes  Pt. Seen for: PM, Gait Training, Therapeutic Exercise, and Transfer Training     Subjective: I am ready to go home. I think I can manage         Objective:  Precautions: Falls    GROSS ASSESSMENT Daily Assessment    Pt sitting EOB upon arrival. Just had toradol injection to L buttock by RN. Discussed d/c plan       COGNITION Daily Assessment    Pt exhibits decreased insight. BED/MAT MOBILITY Daily Assessment    Rolling Right: mod ind  Rolling Left: mod ind  Supine to Sit: mod a for trunk  Sit to Supine: sba       TRANSFERS Daily Assessment   With RW Sit to Stand: Supervision/Standby Assist  Stand to Sit: Supervision/Standby Assist  Transfer Type: Stand Pivot  Transfer Assistance: CGA  Car Transfers: sba         GAIT Daily Assessment   Flexed posture, step to gait pattern due to hip pain, limited wt shift onto L  LE, heavy weight bearing on Ues, dec hip ext B   Requires seated rest due to pain Amount of Assistance: Supervision/Standby Assist  Distance (ft): 40ft x 2  Assistive Device: RW  Surface: Level Surface       STEPS/STAIRS Daily Assessment    1 curb step with sup/cga and RW        BALANCE Daily Assessment    Static Sitting: Good:  Pt. able to maintain balance w/o UE support;  exhibits some postural sway  Dynamic Sitting: Good - accepts moderate challenge;  can maintain balance while picking object off the floor  Static Standing: Fair:  Pt. requires UE support;  may need occasional min A  Dynamic Standing: Fair - accepts minimal challenge;  can maintain balance while turning head/trunk       WHEELCHAIR MOBILITY Daily Assessment   NT        LOWER EXTREMITY EXERCISES Daily Assessment    Assisted with ham stretch B, neural glides on L  Piriformis stretch with dec pain today.   Supine hooklying clams with green theraband     Pain level: 7-8  Pain Location:  L posterior hip  Pain Interventions: applied moist heat

## 2023-10-18 NOTE — DIABETES MGMT
Patient admitted with stroke. Blood glucose ranged 122-171 yesterday with patient receiving Metformin 500mg. Blood glucose this morning was 112. Reviewed patient current regimen: Metformin 500mg daily and Humalog correctional insulin. Glycemic control improving on current regimen. Explained the importance of blood glucose monitoring to patient and how this will provide their primary care provider with more information to help them safely titrate their regimen. Recommended frequency of daily alternating fasting and 2 hours post prandial and to record in log book to take to primary care provider appointment. Patient politely declines meter instruction stating he is comfortable with this. Patient will need prescription for glucometer kit, test strips, and lancets at discharge so that the patient may obtain the meter covered by their insurance. Reviewed importance of good glycemic control on wound healing. Reviewed relationship between hyperglycemia and infection. Reviewed target blood glucose goals. Encouraged compliance with discharge regimen. Encouraged patient to continue to work on lifestyle modifications and to follow up with primary care provider for further titration of regimen. Patient verbalized understanding and voices no further questions regarding diabetes management.

## 2023-10-18 NOTE — PROGRESS NOTES
OT Daily Note  Time In:    1116     Time Out: 1202        Subjective: \"Can I go home today? \"  Pain: indicated but better managed in lower back and hip  Education: safety recommendations  Interdisciplinary Communication: with PT regarding discharge    Mobility   Score Comments   Sit to Stand Kwadwo    Transfer Assist Supervision or touching assistance SBA with ambulation with RW     Activity Tolerance   Pt completed 10 minutes on the ergometer frontwards and backwards with medium resistance to increase UB strength and activity tolerance for integration into functional transfers. Visual-Perceptual   Pt completed a 24 piece jigsaw puzzle to increase UE strength, visual perceptual skills, coordination, and problem solving. Pt completed puzzle with min assistance. Self-Care    Score Comments   Toilet Transfer 6: Independent Transfer Type: SPT   Equipment: Rolling Walker   Comments: Toileting Hygiene 6: Independent Output: urine  Comments:             Assessment: Pt requesting to go home as soon as possible. Pt reports his hip and lower back pain is better but still there. Pt states that his vision seems back to baseline. Recommend pt having his daughter initially drive him to appointments until Neuro follow up. Plan: Continue OT POC.      Sami Sadler OT   10/18/2023

## 2023-10-19 ENCOUNTER — HOME HEALTH ADMISSION (OUTPATIENT)
Dept: HOME HEALTH SERVICES | Facility: HOME HEALTH | Age: 68
End: 2023-10-19

## 2023-10-19 LAB
GLUCOSE BLD STRIP.AUTO-MCNC: 112 MG/DL (ref 65–100)
GLUCOSE BLD STRIP.AUTO-MCNC: 127 MG/DL (ref 65–100)
GLUCOSE BLD STRIP.AUTO-MCNC: 144 MG/DL (ref 65–100)
GLUCOSE BLD STRIP.AUTO-MCNC: 90 MG/DL (ref 65–100)
SERVICE CMNT-IMP: ABNORMAL
SERVICE CMNT-IMP: NORMAL

## 2023-10-19 PROCEDURE — 97116 GAIT TRAINING THERAPY: CPT

## 2023-10-19 PROCEDURE — 99232 SBSQ HOSP IP/OBS MODERATE 35: CPT | Performed by: PHYSICAL MEDICINE & REHABILITATION

## 2023-10-19 PROCEDURE — 97110 THERAPEUTIC EXERCISES: CPT

## 2023-10-19 PROCEDURE — 97530 THERAPEUTIC ACTIVITIES: CPT

## 2023-10-19 PROCEDURE — 1180000000 HC REHAB R&B

## 2023-10-19 PROCEDURE — 97535 SELF CARE MNGMENT TRAINING: CPT

## 2023-10-19 PROCEDURE — 82962 GLUCOSE BLOOD TEST: CPT

## 2023-10-19 PROCEDURE — 6370000000 HC RX 637 (ALT 250 FOR IP): Performed by: PHYSICAL MEDICINE & REHABILITATION

## 2023-10-19 RX ORDER — IBUPROFEN 200 MG
CAPSULE ORAL 2 TIMES DAILY
Status: DISCONTINUED | OUTPATIENT
Start: 2023-10-19 | End: 2023-10-20 | Stop reason: HOSPADM

## 2023-10-19 RX ORDER — BACITRACIN ZINC AND POLYMYXIN B SULFATE 500; 1000 [USP'U]/G; [USP'U]/G
OINTMENT TOPICAL 2 TIMES DAILY
Status: DISCONTINUED | OUTPATIENT
Start: 2023-10-19 | End: 2023-10-19 | Stop reason: SDUPTHER

## 2023-10-19 RX ORDER — IBUPROFEN 200 MG
TABLET ORAL 2 TIMES DAILY
Status: DISCONTINUED | OUTPATIENT
Start: 2023-10-19 | End: 2023-10-19

## 2023-10-19 RX ADMIN — Medication 250 MG: at 08:12

## 2023-10-19 RX ADMIN — BACITRACIN ZINC NEOMYCIN SULFATE POLYMYXIN B SULFATE: 400; 3.5; 5 OINTMENT TOPICAL at 20:20

## 2023-10-19 RX ADMIN — LISINOPRIL 20 MG: 20 TABLET ORAL at 08:14

## 2023-10-19 RX ADMIN — DICLOFENAC SODIUM 2 G: 10 GEL TOPICAL at 21:27

## 2023-10-19 RX ADMIN — AMITRIPTYLINE HYDROCHLORIDE 25 MG: 25 TABLET, FILM COATED ORAL at 20:07

## 2023-10-19 RX ADMIN — ACETAMINOPHEN 650 MG: 325 TABLET ORAL at 20:07

## 2023-10-19 RX ADMIN — OXYCODONE HYDROCHLORIDE 5 MG: 5 TABLET ORAL at 08:17

## 2023-10-19 RX ADMIN — METOPROLOL TARTRATE 25 MG: 25 TABLET, FILM COATED ORAL at 08:12

## 2023-10-19 RX ADMIN — APIXABAN 5 MG: 5 TABLET, FILM COATED ORAL at 08:13

## 2023-10-19 RX ADMIN — EZETIMIBE 10 MG: 10 TABLET ORAL at 20:07

## 2023-10-19 RX ADMIN — PANTOPRAZOLE SODIUM 40 MG: 40 TABLET, DELAYED RELEASE ORAL at 05:39

## 2023-10-19 RX ADMIN — GABAPENTIN 300 MG: 300 CAPSULE ORAL at 15:00

## 2023-10-19 RX ADMIN — METOPROLOL TARTRATE 25 MG: 25 TABLET, FILM COATED ORAL at 20:07

## 2023-10-19 RX ADMIN — DICLOFENAC SODIUM 2 G: 10 GEL TOPICAL at 08:14

## 2023-10-19 RX ADMIN — METFORMIN HYDROCHLORIDE 850 MG: 850 TABLET ORAL at 08:12

## 2023-10-19 RX ADMIN — BACITRACIN ZINC NEOMYCIN SULFATE POLYMYXIN B SULFATE: 400; 3.5; 5 OINTMENT TOPICAL at 17:59

## 2023-10-19 RX ADMIN — APIXABAN 5 MG: 5 TABLET, FILM COATED ORAL at 20:07

## 2023-10-19 RX ADMIN — OXYCODONE HYDROCHLORIDE 5 MG: 5 TABLET ORAL at 21:26

## 2023-10-19 RX ADMIN — LISINOPRIL 20 MG: 20 TABLET ORAL at 20:07

## 2023-10-19 RX ADMIN — GABAPENTIN 300 MG: 300 CAPSULE ORAL at 08:13

## 2023-10-19 RX ADMIN — HYDRALAZINE HYDROCHLORIDE 25 MG: 50 TABLET, FILM COATED ORAL at 20:20

## 2023-10-19 RX ADMIN — OXYCODONE HYDROCHLORIDE 5 MG: 5 TABLET ORAL at 14:59

## 2023-10-19 RX ADMIN — HYDRALAZINE HYDROCHLORIDE 25 MG: 50 TABLET, FILM COATED ORAL at 15:00

## 2023-10-19 RX ADMIN — HYDRALAZINE HYDROCHLORIDE 25 MG: 50 TABLET, FILM COATED ORAL at 05:39

## 2023-10-19 RX ADMIN — FERROUS SULFATE TAB 325 MG (65 MG ELEMENTAL FE) 325 MG: 325 (65 FE) TAB at 08:12

## 2023-10-19 RX ADMIN — AMIODARONE HYDROCHLORIDE 200 MG: 200 TABLET ORAL at 08:13

## 2023-10-19 RX ADMIN — GABAPENTIN 300 MG: 300 CAPSULE ORAL at 20:07

## 2023-10-19 ASSESSMENT — PAIN DESCRIPTION - DESCRIPTORS
DESCRIPTORS: ACHING

## 2023-10-19 ASSESSMENT — PAIN DESCRIPTION - FREQUENCY: FREQUENCY: INTERMITTENT

## 2023-10-19 ASSESSMENT — PAIN DESCRIPTION - ORIENTATION
ORIENTATION: LEFT

## 2023-10-19 ASSESSMENT — PAIN SCALES - GENERAL
PAINLEVEL_OUTOF10: 7
PAINLEVEL_OUTOF10: 5
PAINLEVEL_OUTOF10: 4
PAINLEVEL_OUTOF10: 7
PAINLEVEL_OUTOF10: 0
PAINLEVEL_OUTOF10: 3

## 2023-10-19 ASSESSMENT — PAIN DESCRIPTION - ONSET: ONSET: GRADUAL

## 2023-10-19 ASSESSMENT — PAIN SCALES - WONG BAKER
WONGBAKER_NUMERICALRESPONSE: 0
WONGBAKER_NUMERICALRESPONSE: 0
WONGBAKER_NUMERICALRESPONSE: 2
WONGBAKER_NUMERICALRESPONSE: 2

## 2023-10-19 ASSESSMENT — PAIN DESCRIPTION - LOCATION
LOCATION: HIP
LOCATION: HIP
LOCATION: BACK;HIP

## 2023-10-19 ASSESSMENT — PAIN DESCRIPTION - PAIN TYPE: TYPE: ACUTE PAIN

## 2023-10-19 ASSESSMENT — PAIN - FUNCTIONAL ASSESSMENT: PAIN_FUNCTIONAL_ASSESSMENT: PREVENTS OR INTERFERES SOME ACTIVE ACTIVITIES AND ADLS

## 2023-10-19 NOTE — DIABETES MGMT
Patient admitted with stroke. Blood glucose ranged  yesterday with patient receiving Metformin 500mg. Blood glucose this morning was 112. Reviewed patient current regimen: Humalog correctional insulin and Metformin 850mg daily. Patient has received inpatient diabetes education and was given contact information for free outpatient diabetes education. Glycemic control overall stable on current regimen. Will sign off. Patient will need prescription for glucometer kit, test strips, and lancets at discharge so that the patient may obtain the meter covered by their insurance.

## 2023-10-19 NOTE — PROGRESS NOTES
OT Daily Note  Time In:    1030     Time Out: 1117        Subjective: \"It's hard to remember the alphabet. \"  Pain: not expressed  Education: BUE strengthening exercises  Interdisciplinary Communication: with PT regarding discharge    Mobility   Score Comments   Sit to Stand Independent    Transfer Assist Supervision or touching assistance S ambulating with RW     Visual-Perceptual   Pt engaged with electric snap circuit board completing projects 2 and 5 with only initial instructions required to follow written instructions to build visual models. Strengthening   . Pt completed the following exercises with 10 lb avel to promote UB strength, activity tolerance, and shoulder stabilization for integration into functional transfers:  Exercise Reps Comments   Protraction/Retraction 20    Abduction/Adduction 20    Circles 20 1/2 CW, 1/2 CCW   Vs 20    ABCs 1 set    Pt demonstrated good quality during all exercises. Activity Tolerance   Pt completed 11 minutes on the ergometer frontwards and backwards with medium resistance to increase UB strength and activity tolerance for integration into functional transfers. Assessment: Progressed well and ready for discharge tomorrow. Plan: Continue OT POC.      Sami Pickens OT   10/19/2023

## 2023-10-19 NOTE — PROGRESS NOTES
OT DISCHARGE NOTE    Time In:    5167     Time Out: 7505        Goals:  LTG 1: Patient will complete UB dressing with independence using AE/DME PRN within 10 days. GOAL MET 10/19/2023  LTG 2: Patient will complete LB dressing with independence using AE/DME PRN within 10 days. GOAL MET 10/19/2023  LTG 3: Patient will don footwear with independence using AE/DME PRN within 10 days. GOAL MET 10/19/2023  LTG 4: Patient will complete bathing with independence using AE/DME PRN within 10 days. GOAL MET 10/19/2023  LTG 5: Patient will complete toileting with independence using AE/DME PRN within 10 days. GOAL MET 10/19/2023  LTG 6: Pt/caregiver will verbalize  understanding of OT recommendations regarding ADL status, functional transfer status, home safety, DME, AE, energy conservation techniques, safety awareness, activity tolerance, and/or follow-up therapy to increase safety with functional tasks upon discharge. GOAL MET 10/19/2023    *Initial Assessment is the worst a patient did on that activity in the first 72 hrs of being admitted as gathered by the OT   *Discharge Assessment is an average of the patient's performance over the last 48 hrs   Initial Assessment Discharge Assessment 10/19/2023   Eating Independent:    Independent:      Oral Hygiene Independent:     Independent:      Bathing Setup or clean-up assistance:    Independent:      Upper Body Dressing Setup or clean-up assistance:    Independent:      Lower Body Dressing Setup or clean-up assistance:  Pt picked out from clothing prior to showering and dressing Independent:  Pt picked out from clothing prior to showering and dressing   Donning/[place] Footwear Setup or clean-up assistance:    Independent: Toilet Transfer Supervision or touching assistance:  S ambulating with RW Independent:      Toilet Hygiene Setup or clean-up assistance:    Independent:      BIMS: 13/15    Precautions at Discharge: Falls and Poor Safety Awareness  Discharge Location: home

## 2023-10-19 NOTE — PROGRESS NOTES
Inpatient Rehab Program  [address] [place] [address]  Tel: [phone]     Physical Medicine and Rehabilitation Progress Note      Gwyn Kemi Angel. Admit Date: 10/13/2023  Admit Diagnosis: CVA (cerebral vascular accident) [facility]) [I63.9]  Stroke determined by clinical assessment ([address]) [I63.9]    Subjective     Patient seen face-to-face. NAEO and he reports slept very well last night. He is eating and drinking fine, and continent of bowel and bladder. He has his minimal back and some LLE pain; although, he says it's a better now after 2 Toradol IM injections. The pain meds are taking the edge off only. He had a fall 2-3 weeks ago. There is a MSK component and I trialed 2 Toradol injections. He reports his therapies are going well and he is pleased so far. Lisinopril titrated to 20mg daily -> BID. BLE US Doppler neg for DVT. He is pleased to be going home tomorrow.     Objective:     Current Facility-Administered Medications   Medication Dose Route Frequency    neomycin-bacitracin-polymyxin (NEOSPORIN) ointment   Topical BID    metFORMIN (GLUCOPHAGE) tablet 850 mg  850 mg Oral Daily with breakfast    hydrALAZINE (APRESOLINE) tablet 25 mg  25 mg Oral 3 times per day    lisinopril (PRINIVIL;ZESTRIL) tablet 20 mg  20 mg Oral BID    0.9 % sodium chloride infusion   IntraVENous PRN    albuterol sulfate HFA (PROVENTIL;VENTOLIN;PROAIR) 108 (90 Base) MCG/ACT inhaler 2 puff  2 puff Inhalation Q6H PRN    amiodarone (CORDARONE) tablet 200 mg  200 mg Oral Daily    diclofenac sodium (VOLTAREN) 1 % gel 2 g  2 g Topical BID    ezetimibe (ZETIA) tablet 10 mg  10 mg Oral Nightly    ferrous sulfate (IRON 325) tablet 325 mg  325 mg Oral Daily with breakfast    gabapentin (NEURONTIN) capsule 300 mg  300 mg Oral TID    insulin lispro (HUMALOG) injection vial 0-8 Units  0-8 Units SubCUTAneous TID WC    insulin lispro (HUMALOG) injection vial 0-4 Units  0-4 Units SubCUTAneous Nightly    metoprolol address ADLs (bathing, LE dressing, toileting) and adaptive equipment as needed. - Continue Speech Therapy prn for: dysarthria, impaired communication skills; therapy schedule may be adjusted by MD based on patient's needs. Each of these therapies will be continued as above for the duration of the inpatient rehab stay. The patient will also require 24-hour skilled rehabilitation nursing for bowel and bladder management, skin care for decubitus ulcer prevention, pain management and ongoing medication administration. Plan / Recommendations / Medical Decision Making:     Below are the active medical comorbidities / hospital conditions which will affect rehab course with plan for mitigation. Continue daily physician / PA medical management:     CVA (cerebral vascular accident) ([address]) [I63.9]  Stroke determined by clinical assessment ([address]) [I63.9] -  Start interdisciplinary approach to rehabilitation including PT and OT 3 hours a day, nursing and physiatry and disease specific education.   -Progressive mobility  -Eliquis 5mg BID and Zetia 10mg qhs for secondary CVA ppx. HTN, pAFib, CAD s/p CABG and AVR - BP managed medically on Metoprolol Tartrate 25mg BID, Lisinopril 10mg daily (->20mg daily 10/14 -> BID 10/16), Amiodarone 200mg daily, Eliquis 5mg BID. Diabetes mellitus - HgbA1c 7.3% on 10/9/23; impaired / poor glycemic control. Will require ACHS glucose monitoring and medication adjustment to optimize control in setting of acute illness and hospitalization.   -Gabapentin 300mg TID and Elavil 25mg qhs for PN  -Continue SSI and regular ADA diet with thins     Pain management - Will require regular pain assessment and comprehensive pain management. Continue Voltaren gel BID to lower legs, Tylenol prn, and Oxycodone 5mg q6h. -Trial Toradol 30mg IM injection left gluteus 10/16 and 10/18. It worked OK with continued improvement. Pneumonia prophylaxis - In setting of 30 years smoking.  Incentive spirometer

## 2023-10-20 ENCOUNTER — TELEPHONE (OUTPATIENT)
Dept: NEUROLOGY | Age: 68
End: 2023-10-20

## 2023-10-20 VITALS
TEMPERATURE: 97.7 F | OXYGEN SATURATION: 97 % | HEIGHT: 72 IN | SYSTOLIC BLOOD PRESSURE: 137 MMHG | RESPIRATION RATE: 18 BRPM | HEART RATE: 61 BPM | WEIGHT: 252 LBS | BODY MASS INDEX: 34.13 KG/M2 | DIASTOLIC BLOOD PRESSURE: 71 MMHG

## 2023-10-20 LAB
GLUCOSE BLD STRIP.AUTO-MCNC: 102 MG/DL (ref 65–100)
GLUCOSE BLD STRIP.AUTO-MCNC: 115 MG/DL (ref 65–100)
SERVICE CMNT-IMP: ABNORMAL
SERVICE CMNT-IMP: ABNORMAL

## 2023-10-20 PROCEDURE — 6360000002 HC RX W HCPCS: Performed by: PHYSICAL MEDICINE & REHABILITATION

## 2023-10-20 PROCEDURE — 82962 GLUCOSE BLOOD TEST: CPT

## 2023-10-20 PROCEDURE — 6370000000 HC RX 637 (ALT 250 FOR IP): Performed by: PHYSICAL MEDICINE & REHABILITATION

## 2023-10-20 PROCEDURE — 99239 HOSP IP/OBS DSCHRG MGMT >30: CPT | Performed by: PHYSICAL MEDICINE & REHABILITATION

## 2023-10-20 RX ORDER — ASCORBIC ACID 250 MG
250 TABLET ORAL DAILY
Qty: 90 TABLET | Refills: 0 | Status: SHIPPED | OUTPATIENT
Start: 2023-10-21 | End: 2024-01-19

## 2023-10-20 RX ORDER — IBUPROFEN 200 MG
CAPSULE ORAL
Qty: 1 EACH | Refills: 0 | Status: SHIPPED | OUTPATIENT
Start: 2023-10-20

## 2023-10-20 RX ORDER — AMITRIPTYLINE HYDROCHLORIDE 25 MG/1
25 TABLET, FILM COATED ORAL NIGHTLY
Qty: 30 TABLET | Refills: 0 | Status: SHIPPED | OUTPATIENT
Start: 2023-10-20 | End: 2023-11-19

## 2023-10-20 RX ORDER — LISINOPRIL 20 MG/1
20 TABLET ORAL 2 TIMES DAILY
Qty: 60 TABLET | Refills: 0 | Status: SHIPPED | OUTPATIENT
Start: 2023-10-20 | End: 2023-11-15

## 2023-10-20 RX ORDER — KETOROLAC TROMETHAMINE 15 MG/ML
30 INJECTION, SOLUTION INTRAMUSCULAR; INTRAVENOUS ONCE
Status: COMPLETED | OUTPATIENT
Start: 2023-10-20 | End: 2023-10-20

## 2023-10-20 RX ORDER — HYDRALAZINE HYDROCHLORIDE 25 MG/1
25 TABLET, FILM COATED ORAL EVERY 8 HOURS SCHEDULED
Qty: 90 TABLET | Refills: 0 | Status: SHIPPED | OUTPATIENT
Start: 2023-10-20 | End: 2023-11-19

## 2023-10-20 RX ADMIN — LISINOPRIL 20 MG: 20 TABLET ORAL at 08:36

## 2023-10-20 RX ADMIN — FERROUS SULFATE TAB 325 MG (65 MG ELEMENTAL FE) 325 MG: 325 (65 FE) TAB at 08:35

## 2023-10-20 RX ADMIN — HYDRALAZINE HYDROCHLORIDE 25 MG: 50 TABLET, FILM COATED ORAL at 05:04

## 2023-10-20 RX ADMIN — APIXABAN 5 MG: 5 TABLET, FILM COATED ORAL at 08:36

## 2023-10-20 RX ADMIN — BACITRACIN ZINC NEOMYCIN SULFATE POLYMYXIN B SULFATE: 400; 3.5; 5 OINTMENT TOPICAL at 08:39

## 2023-10-20 RX ADMIN — AMIODARONE HYDROCHLORIDE 200 MG: 200 TABLET ORAL at 08:35

## 2023-10-20 RX ADMIN — KETOROLAC TROMETHAMINE 30 MG: 15 INJECTION, SOLUTION INTRAMUSCULAR; INTRAVENOUS at 11:17

## 2023-10-20 RX ADMIN — GABAPENTIN 300 MG: 300 CAPSULE ORAL at 08:36

## 2023-10-20 RX ADMIN — OXYCODONE HYDROCHLORIDE 5 MG: 5 TABLET ORAL at 05:04

## 2023-10-20 RX ADMIN — DICLOFENAC SODIUM 2 G: 10 GEL TOPICAL at 08:38

## 2023-10-20 RX ADMIN — PANTOPRAZOLE SODIUM 40 MG: 40 TABLET, DELAYED RELEASE ORAL at 05:04

## 2023-10-20 RX ADMIN — METOPROLOL TARTRATE 25 MG: 25 TABLET, FILM COATED ORAL at 08:36

## 2023-10-20 RX ADMIN — METFORMIN HYDROCHLORIDE 850 MG: 850 TABLET ORAL at 08:36

## 2023-10-20 RX ADMIN — Medication 250 MG: at 08:35

## 2023-10-20 ASSESSMENT — PAIN DESCRIPTION - ONSET: ONSET: GRADUAL

## 2023-10-20 ASSESSMENT — PAIN DESCRIPTION - PAIN TYPE: TYPE: ACUTE PAIN

## 2023-10-20 ASSESSMENT — PAIN DESCRIPTION - DESCRIPTORS: DESCRIPTORS: ACHING

## 2023-10-20 ASSESSMENT — PAIN SCALES - GENERAL
PAINLEVEL_OUTOF10: 5
PAINLEVEL_OUTOF10: 0

## 2023-10-20 ASSESSMENT — PAIN SCALES - WONG BAKER
WONGBAKER_NUMERICALRESPONSE: 0
WONGBAKER_NUMERICALRESPONSE: 0

## 2023-10-20 ASSESSMENT — PAIN - FUNCTIONAL ASSESSMENT: PAIN_FUNCTIONAL_ASSESSMENT: PREVENTS OR INTERFERES SOME ACTIVE ACTIVITIES AND ADLS

## 2023-10-20 ASSESSMENT — PAIN DESCRIPTION - ORIENTATION: ORIENTATION: LEFT

## 2023-10-20 ASSESSMENT — PAIN DESCRIPTION - LOCATION: LOCATION: HIP

## 2023-10-20 ASSESSMENT — PAIN DESCRIPTION - FREQUENCY: FREQUENCY: INTERMITTENT

## 2023-10-20 NOTE — TELEPHONE ENCOUNTER
Care Transitions Initial Follow Up Call    Outreach made within 2 business days of discharge: Yes    Patient: Niall Asif Sr. Patient : 1955   MRN: [id number]  Reason for Admission: There are no discharge diagnoses documented for the most recent discharge. Discharge Date: 10/20/23       Spoke with: pt    Discharge department/facility: [facility]    TCM Interactive Patient Contact:  Was patient able to fill all prescriptions: Yes  Was patient instructed to bring all medications to the follow-up visit: Yes  Is patient taking all medications as directed in the discharge summary?  Yes  Does patient understand their discharge instructions: Yes  Does patient have questions or concerns that need addressed prior to 7-14 day follow up office visit: yes - NO    Scheduled appointment with PCP within 7-14 days    Follow Up  Future Appointments   Date Time Provider [address] [place] [address]   2023  9:00 AM Eamon Gardiner, APRN [facility]   2023  9:00 AM Ishan Law DO [facility]   2023  9:20 AM [facility]       Lele Zuniga MA

## 2023-10-20 NOTE — CARE COORDINATION
Patient has discharge orders for today. CM received a e-mail from [address] [place] [address] with [facility] station that they won't be able to serve patient. CM provided patient with [facility] list again and requested a referral to [facility]. Referral completed. Patient has been accepted and selected at [facility]. Information placed on follow-up. Referral for DME's (NICOLE) provided to patient from [facility]. Provider for DME's (NICOLE) is [facility].  Patient signed Patient consent and acknowledgement and plan of Service form. Faxed to [facility] and confirmation received. Daughter at bedside to transport patient home. Patient has met all treatment goals / milestones. CM will continue to follow and remain available for any needs, concerns or questions that may arise. 10/14/23 0934   Service Assessment   Patient Orientation Alert and Oriented;Person;Place;Situation;Self   Cognition Alert   History Provided By Patient;Medical Record   Primary Caregiver Self   Support Systems Family Members   Patient's Healthcare Decision Maker is: Legal Next of [address] [place] [address]   PCP Verified by CM Yes   Last Visit to PCP Within last year   Prior Functional Level Independent in ADLs/IADLs   Current Functional Level Assistance with the following:;Bathing;Dressing; Toileting;Mobility   Can patient return to prior living arrangement Yes   Ability to make needs known: Good   Family able to assist with home care needs: Yes   Would you like for me to discuss the discharge plan with any other family members/significant others, and if so, who?  No   Financial Resources Medicare   Community Resources None   Social/Functional History   Lives With Family   Type of [address] [place] [address]  One level   [address] [facility] [address] to enter without rails   Entrance Stairs - Number of Steps 5   Entrance Stairs - Rails None   Bathroom Shower/Tub Tub/Shower unit   Bathroom Toilet Standard   Bathroom Equipment Shower chair;Commode   Bathroom Accessibility [facility] Help From Family   ADL Assistance Needs assistance   Toileting Needs assistance   Homemaking Assistance Needs assistance   Homemaking Responsibilities Yes   Ambulation Assistance Needs assistance   Transfer Assistance Needs assistance   Active  Yes   Mode of Transportation Car   Discharge Planning   Type of Residence House   Living Arrangements Family Members   Current Services Prior To Admission [address]   DME Ordered? Cane   Potential Assistance Purchasing Medications No   Type of [address]   Patient expects to be discharged to: House   One/Two Story Residence One story   History of falls? 0   Services At/After Discharge   Transition of Care Consult (CM Consult) Discharge Replaced by [facility] None    Resource Information Provided?  No   Mode of Transport at Discharge Other (see comment)   Confirm Follow Up Transport Family   Condition of Participation: Discharge Planning   The Plan for Transition of Care is related to the following treatment goals: based on clinical course
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
## 2023-10-20 NOTE — PLAN OF CARE
Problem: Safety - Adult  Goal: Free from fall injury  Outcome: Progressing     Problem: Skin/Tissue Integrity  Goal: Absence of new skin breakdown  Description: 1. Monitor for areas of redness and/or skin breakdown  2. Assess vascular access sites hourly  3. Every 4-6 hours minimum:  Change oxygen saturation probe site  4. Every 4-6 hours:  If on nasal continuous positive airway pressure, respiratory therapy assess nares and determine need for appliance change or resting period.   Outcome: Progressing     Problem: ABCDS Injury Assessment  Goal: Absence of physical injury  Outcome: Progressing     Problem: Pain  Goal: Verbalizes/displays adequate comfort level or baseline comfort level  Outcome: Progressing     Problem: Chronic Conditions and Co-morbidities  Goal: Patient's chronic conditions and co-morbidity symptoms are monitored and maintained or improved  Outcome: Progressing

## 2023-10-23 ENCOUNTER — CARE COORDINATION (OUTPATIENT)
Dept: CARE COORDINATION | Facility: CLINIC | Age: 68
End: 2023-10-23

## 2023-11-15 ENCOUNTER — HOSPITAL ENCOUNTER (INPATIENT)
Age: 68
LOS: 2 days | Discharge: HOME HEALTH | End: 2023-11-17
Attending: GENERAL PRACTICE | Admitting: INTERNAL MEDICINE
Payer: MEDICARE

## 2023-11-15 ENCOUNTER — APPOINTMENT (OUTPATIENT)
Dept: CT IMAGING | Age: 68
End: 2023-11-15
Payer: MEDICARE

## 2023-11-15 ENCOUNTER — APPOINTMENT (OUTPATIENT)
Dept: GENERAL RADIOLOGY | Age: 68
End: 2023-11-15
Payer: MEDICARE

## 2023-11-15 DIAGNOSIS — I21.4 NSTEMI (NON-ST ELEVATED MYOCARDIAL INFARCTION) (HCC): ICD-10-CM

## 2023-11-15 DIAGNOSIS — R07.9 CHEST PAIN, UNSPECIFIED TYPE: Primary | ICD-10-CM

## 2023-11-15 DIAGNOSIS — I25.110 CORONARY ARTERY DISEASE INVOLVING NATIVE CORONARY ARTERY OF NATIVE HEART WITH UNSTABLE ANGINA PECTORIS (HCC): ICD-10-CM

## 2023-11-15 LAB
ALBUMIN SERPL-MCNC: 3.3 G/DL (ref 3.2–4.6)
ALBUMIN/GLOB SERPL: 0.8 (ref 0.4–1.6)
ALP SERPL-CCNC: 91 U/L (ref 50–136)
ALT SERPL-CCNC: 20 U/L (ref 12–65)
ANION GAP SERPL CALC-SCNC: 6 MMOL/L (ref 2–11)
ANION GAP SERPL CALC-SCNC: 6 MMOL/L (ref 2–11)
AST SERPL-CCNC: 12 U/L (ref 15–37)
BASOPHILS # BLD: 0.1 K/UL (ref 0–0.2)
BASOPHILS NFR BLD: 1 % (ref 0–2)
BILIRUB SERPL-MCNC: 0.2 MG/DL (ref 0.2–1.1)
BUN SERPL-MCNC: 11 MG/DL (ref 8–23)
BUN SERPL-MCNC: 13 MG/DL (ref 8–23)
CALCIUM SERPL-MCNC: 8.7 MG/DL (ref 8.3–10.4)
CALCIUM SERPL-MCNC: 8.7 MG/DL (ref 8.3–10.4)
CHLORIDE SERPL-SCNC: 107 MMOL/L (ref 101–110)
CHLORIDE SERPL-SCNC: 107 MMOL/L (ref 101–110)
CO2 SERPL-SCNC: 25 MMOL/L (ref 21–32)
CO2 SERPL-SCNC: 25 MMOL/L (ref 21–32)
CREAT SERPL-MCNC: 0.7 MG/DL (ref 0.8–1.5)
CREAT SERPL-MCNC: 0.8 MG/DL (ref 0.8–1.5)
DIFFERENTIAL METHOD BLD: ABNORMAL
EOSINOPHIL # BLD: 0.5 K/UL (ref 0–0.8)
EOSINOPHIL NFR BLD: 6 % (ref 0.5–7.8)
ERYTHROCYTE [DISTWIDTH] IN BLOOD BY AUTOMATED COUNT: 16.3 % (ref 11.9–14.6)
ERYTHROCYTE [DISTWIDTH] IN BLOOD BY AUTOMATED COUNT: 16.4 % (ref 11.9–14.6)
GLOBULIN SER CALC-MCNC: 3.9 G/DL (ref 2.8–4.5)
GLUCOSE SERPL-MCNC: 141 MG/DL (ref 65–100)
GLUCOSE SERPL-MCNC: 142 MG/DL (ref 65–100)
HCT VFR BLD AUTO: 40.3 % (ref 41.1–50.3)
HCT VFR BLD AUTO: 41 % (ref 41.1–50.3)
HGB BLD-MCNC: 12.6 G/DL (ref 13.6–17.2)
HGB BLD-MCNC: 13 G/DL (ref 13.6–17.2)
IMM GRANULOCYTES # BLD AUTO: 0 K/UL (ref 0–0.5)
IMM GRANULOCYTES NFR BLD AUTO: 0 % (ref 0–5)
LYMPHOCYTES # BLD: 2.8 K/UL (ref 0.5–4.6)
LYMPHOCYTES NFR BLD: 33 % (ref 13–44)
MCH RBC QN AUTO: 26.1 PG (ref 26.1–32.9)
MCH RBC QN AUTO: 26.3 PG (ref 26.1–32.9)
MCHC RBC AUTO-ENTMCNC: 31.3 G/DL (ref 31.4–35)
MCHC RBC AUTO-ENTMCNC: 31.7 G/DL (ref 31.4–35)
MCV RBC AUTO: 83 FL (ref 82–102)
MCV RBC AUTO: 83.6 FL (ref 82–102)
MONOCYTES # BLD: 0.6 K/UL (ref 0.1–1.3)
MONOCYTES NFR BLD: 7 % (ref 4–12)
NEUTS SEG # BLD: 4.5 K/UL (ref 1.7–8.2)
NEUTS SEG NFR BLD: 53 % (ref 43–78)
NRBC # BLD: 0 K/UL (ref 0–0.2)
NRBC # BLD: 0 K/UL (ref 0–0.2)
PLATELET # BLD AUTO: 195 K/UL (ref 150–450)
PLATELET # BLD AUTO: 210 K/UL (ref 150–450)
PMV BLD AUTO: 9.5 FL (ref 9.4–12.3)
PMV BLD AUTO: 9.8 FL (ref 9.4–12.3)
POTASSIUM SERPL-SCNC: 4.2 MMOL/L (ref 3.5–5.1)
POTASSIUM SERPL-SCNC: 4.3 MMOL/L (ref 3.5–5.1)
PROT SERPL-MCNC: 7.2 G/DL (ref 6.3–8.2)
RBC # BLD AUTO: 4.82 M/UL (ref 4.23–5.6)
RBC # BLD AUTO: 4.94 M/UL (ref 4.23–5.6)
SODIUM SERPL-SCNC: 138 MMOL/L (ref 133–143)
SODIUM SERPL-SCNC: 138 MMOL/L (ref 133–143)
TROPONIN I SERPL HS-MCNC: 157.5 PG/ML (ref 0–14)
TROPONIN I SERPL HS-MCNC: 200.8 PG/ML (ref 0–14)
WBC # BLD AUTO: 8.4 K/UL (ref 4.3–11.1)
WBC # BLD AUTO: 8.5 K/UL (ref 4.3–11.1)

## 2023-11-15 PROCEDURE — 80053 COMPREHEN METABOLIC PANEL: CPT

## 2023-11-15 PROCEDURE — 4A023N7 MEASUREMENT OF CARDIAC SAMPLING AND PRESSURE, LEFT HEART, PERCUTANEOUS APPROACH: ICD-10-PCS | Performed by: INTERNAL MEDICINE

## 2023-11-15 PROCEDURE — 99285 EMERGENCY DEPT VISIT HI MDM: CPT

## 2023-11-15 PROCEDURE — 99223 1ST HOSP IP/OBS HIGH 75: CPT | Performed by: INTERNAL MEDICINE

## 2023-11-15 PROCEDURE — 2580000003 HC RX 258: Performed by: INTERNAL MEDICINE

## 2023-11-15 PROCEDURE — 71045 X-RAY EXAM CHEST 1 VIEW: CPT

## 2023-11-15 PROCEDURE — 85025 COMPLETE CBC W/AUTO DIFF WBC: CPT

## 2023-11-15 PROCEDURE — 85027 COMPLETE CBC AUTOMATED: CPT

## 2023-11-15 PROCEDURE — 84484 ASSAY OF TROPONIN QUANT: CPT

## 2023-11-15 PROCEDURE — 93005 ELECTROCARDIOGRAM TRACING: CPT | Performed by: GENERAL PRACTICE

## 2023-11-15 PROCEDURE — B2111ZZ FLUOROSCOPY OF MULTIPLE CORONARY ARTERIES USING LOW OSMOLAR CONTRAST: ICD-10-PCS | Performed by: INTERNAL MEDICINE

## 2023-11-15 PROCEDURE — 6370000000 HC RX 637 (ALT 250 FOR IP): Performed by: INTERNAL MEDICINE

## 2023-11-15 PROCEDURE — 2140000001 HC CVICU INTERMEDIATE R&B

## 2023-11-15 PROCEDURE — 96375 TX/PRO/DX INJ NEW DRUG ADDON: CPT

## 2023-11-15 PROCEDURE — 96374 THER/PROPH/DIAG INJ IV PUSH: CPT

## 2023-11-15 PROCEDURE — B2131ZZ FLUOROSCOPY OF MULTIPLE CORONARY ARTERY BYPASS GRAFTS USING LOW OSMOLAR CONTRAST: ICD-10-PCS | Performed by: INTERNAL MEDICINE

## 2023-11-15 PROCEDURE — 36415 COLL VENOUS BLD VENIPUNCTURE: CPT

## 2023-11-15 PROCEDURE — 6370000000 HC RX 637 (ALT 250 FOR IP): Performed by: NURSE PRACTITIONER

## 2023-11-15 PROCEDURE — 6360000002 HC RX W HCPCS: Performed by: GENERAL PRACTICE

## 2023-11-15 PROCEDURE — 70450 CT HEAD/BRAIN W/O DYE: CPT

## 2023-11-15 RX ORDER — FERROUS SULFATE 325(65) MG
325 TABLET ORAL
Status: DISCONTINUED | OUTPATIENT
Start: 2023-11-16 | End: 2023-11-17 | Stop reason: HOSPADM

## 2023-11-15 RX ORDER — AMIODARONE HYDROCHLORIDE 200 MG/1
200 TABLET ORAL DAILY
Status: DISCONTINUED | OUTPATIENT
Start: 2023-11-15 | End: 2023-11-17 | Stop reason: HOSPADM

## 2023-11-15 RX ORDER — ALBUTEROL SULFATE 90 UG/1
2 AEROSOL, METERED RESPIRATORY (INHALATION) EVERY 6 HOURS PRN
Status: DISCONTINUED | OUTPATIENT
Start: 2023-11-15 | End: 2023-11-17 | Stop reason: HOSPADM

## 2023-11-15 RX ORDER — MORPHINE SULFATE 4 MG/ML
4 INJECTION, SOLUTION INTRAMUSCULAR; INTRAVENOUS
Status: COMPLETED | OUTPATIENT
Start: 2023-11-15 | End: 2023-11-15

## 2023-11-15 RX ORDER — AMITRIPTYLINE HYDROCHLORIDE 25 MG/1
25 TABLET, FILM COATED ORAL NIGHTLY
Status: DISCONTINUED | OUTPATIENT
Start: 2023-11-15 | End: 2023-11-17 | Stop reason: HOSPADM

## 2023-11-15 RX ORDER — LISINOPRIL 20 MG/1
20 TABLET ORAL 2 TIMES DAILY
Status: DISCONTINUED | OUTPATIENT
Start: 2023-11-15 | End: 2023-11-17 | Stop reason: HOSPADM

## 2023-11-15 RX ORDER — ASPIRIN 81 MG/1
81 TABLET, CHEWABLE ORAL DAILY
Status: DISCONTINUED | OUTPATIENT
Start: 2023-11-15 | End: 2023-11-17 | Stop reason: HOSPADM

## 2023-11-15 RX ORDER — EZETIMIBE 10 MG/1
10 TABLET ORAL NIGHTLY
Status: DISCONTINUED | OUTPATIENT
Start: 2023-11-15 | End: 2023-11-17 | Stop reason: HOSPADM

## 2023-11-15 RX ORDER — SODIUM CHLORIDE 0.9 % (FLUSH) 0.9 %
5-40 SYRINGE (ML) INJECTION EVERY 12 HOURS SCHEDULED
Status: DISCONTINUED | OUTPATIENT
Start: 2023-11-15 | End: 2023-11-16 | Stop reason: HOSPADM

## 2023-11-15 RX ORDER — SODIUM CHLORIDE 9 MG/ML
INJECTION, SOLUTION INTRAVENOUS PRN
Status: DISCONTINUED | OUTPATIENT
Start: 2023-11-15 | End: 2023-11-16 | Stop reason: HOSPADM

## 2023-11-15 RX ORDER — ASPIRIN 325 MG
325 TABLET ORAL 3 TIMES DAILY
Status: ON HOLD | COMMUNITY
End: 2023-11-17 | Stop reason: HOSPADM

## 2023-11-15 RX ORDER — PANTOPRAZOLE SODIUM 40 MG/1
40 TABLET, DELAYED RELEASE ORAL DAILY
Status: DISCONTINUED | OUTPATIENT
Start: 2023-11-15 | End: 2023-11-17 | Stop reason: HOSPADM

## 2023-11-15 RX ORDER — METOPROLOL TARTRATE 50 MG/1
50 TABLET, FILM COATED ORAL 2 TIMES DAILY
Status: DISCONTINUED | OUTPATIENT
Start: 2023-11-15 | End: 2023-11-17 | Stop reason: HOSPADM

## 2023-11-15 RX ORDER — HYDRALAZINE HYDROCHLORIDE 50 MG/1
50 TABLET, FILM COATED ORAL EVERY 8 HOURS SCHEDULED
Status: DISCONTINUED | OUTPATIENT
Start: 2023-11-15 | End: 2023-11-17 | Stop reason: HOSPADM

## 2023-11-15 RX ORDER — GABAPENTIN 300 MG/1
300 CAPSULE ORAL 3 TIMES DAILY
Status: DISCONTINUED | OUTPATIENT
Start: 2023-11-15 | End: 2023-11-17 | Stop reason: HOSPADM

## 2023-11-15 RX ORDER — SODIUM CHLORIDE 0.9 % (FLUSH) 0.9 %
5-40 SYRINGE (ML) INJECTION PRN
Status: DISCONTINUED | OUTPATIENT
Start: 2023-11-15 | End: 2023-11-16 | Stop reason: HOSPADM

## 2023-11-15 RX ORDER — PRAVASTATIN SODIUM 20 MG
40 TABLET ORAL NIGHTLY
Status: DISCONTINUED | OUTPATIENT
Start: 2023-11-15 | End: 2023-11-17 | Stop reason: HOSPADM

## 2023-11-15 RX ORDER — ONDANSETRON 2 MG/ML
4 INJECTION INTRAMUSCULAR; INTRAVENOUS ONCE
Status: COMPLETED | OUTPATIENT
Start: 2023-11-15 | End: 2023-11-15

## 2023-11-15 RX ORDER — SODIUM CHLORIDE 9 MG/ML
INJECTION, SOLUTION INTRAVENOUS CONTINUOUS
Status: DISCONTINUED | OUTPATIENT
Start: 2023-11-15 | End: 2023-11-16

## 2023-11-15 RX ORDER — HYDROCODONE BITARTRATE AND ACETAMINOPHEN 5; 325 MG/1; MG/1
1 TABLET ORAL EVERY 4 HOURS PRN
Status: DISCONTINUED | OUTPATIENT
Start: 2023-11-15 | End: 2023-11-17 | Stop reason: HOSPADM

## 2023-11-15 RX ADMIN — ONDANSETRON 4 MG: 2 INJECTION INTRAMUSCULAR; INTRAVENOUS at 17:35

## 2023-11-15 RX ADMIN — SODIUM CHLORIDE: 9 INJECTION, SOLUTION INTRAVENOUS at 22:37

## 2023-11-15 RX ADMIN — MORPHINE SULFATE 4 MG: 4 INJECTION, SOLUTION INTRAMUSCULAR; INTRAVENOUS at 17:36

## 2023-11-15 RX ADMIN — NITROGLYCERIN 0.5 INCH: 20 OINTMENT TOPICAL at 23:18

## 2023-11-15 RX ADMIN — SODIUM CHLORIDE, PRESERVATIVE FREE 10 ML: 5 INJECTION INTRAVENOUS at 22:36

## 2023-11-15 RX ADMIN — EZETIMIBE 10 MG: 10 TABLET ORAL at 23:17

## 2023-11-15 RX ADMIN — PRAVASTATIN SODIUM 40 MG: 20 TABLET ORAL at 23:17

## 2023-11-15 RX ADMIN — METOPROLOL TARTRATE 50 MG: 50 TABLET ORAL at 23:18

## 2023-11-15 RX ADMIN — GABAPENTIN 300 MG: 300 CAPSULE ORAL at 21:35

## 2023-11-15 RX ADMIN — AMITRIPTYLINE HYDROCHLORIDE 25 MG: 25 TABLET, FILM COATED ORAL at 23:18

## 2023-11-15 RX ADMIN — HYDROCODONE BITARTRATE AND ACETAMINOPHEN 1 TABLET: 5; 325 TABLET ORAL at 23:18

## 2023-11-15 RX ADMIN — HYDRALAZINE HYDROCHLORIDE 50 MG: 50 TABLET, FILM COATED ORAL at 23:18

## 2023-11-15 RX ADMIN — LISINOPRIL 20 MG: 20 TABLET ORAL at 23:18

## 2023-11-15 ASSESSMENT — PAIN DESCRIPTION - LOCATION: LOCATION: CHEST

## 2023-11-15 ASSESSMENT — PAIN DESCRIPTION - PAIN TYPE: TYPE: ACUTE PAIN

## 2023-11-15 ASSESSMENT — PAIN - FUNCTIONAL ASSESSMENT
PAIN_FUNCTIONAL_ASSESSMENT: 0-10
PAIN_FUNCTIONAL_ASSESSMENT: ACTIVITIES ARE NOT PREVENTED

## 2023-11-15 ASSESSMENT — PAIN SCALES - GENERAL
PAINLEVEL_OUTOF10: 3
PAINLEVEL_OUTOF10: 8
PAINLEVEL_OUTOF10: 8

## 2023-11-15 ASSESSMENT — PAIN DESCRIPTION - DESCRIPTORS
DESCRIPTORS: BURNING
DESCRIPTORS: BURNING

## 2023-11-15 ASSESSMENT — PAIN DESCRIPTION - ORIENTATION
ORIENTATION: RIGHT;LEFT
ORIENTATION: LEFT

## 2023-11-15 ASSESSMENT — PAIN DESCRIPTION - ONSET: ONSET: ON-GOING

## 2023-11-15 ASSESSMENT — PAIN DESCRIPTION - FREQUENCY: FREQUENCY: CONTINUOUS

## 2023-11-15 NOTE — ED PROVIDER NOTES
Emergency Department Provider Note       PCP: Hal Hinojosa DO   Age: 69 y.o. Sex: male     DISPOSITION Decision To Admit 11/15/2023 07:46:42 PM       ICD-10-CM    1. Chest pain, unspecified type  R07.9           Medical Decision Making     Complexity of Problems Addressed:  1 or more acute illnesses that pose a threat to life or bodily function. Data Reviewed and Analyzed:  I independently ordered and reviewed each unique test.  I reviewed external records: provider visit note from outside specialist.  I reviewed external records: previous lab results from outside ED. I reviewed external records: previous imaging study including radiologist interpretation. I interpreted the X-rays chest x-ray had no acute infiltrate or edema. I reviewed and agree with radiology for. I interpreted the CT Scan CT scan the brain shows no evidence of hemorrhage or infarct. I have reviewed and agree with radiology report. Discussion of management or test interpretation. Patient presented with chest pain and some weakness on the left side. However, this weakness appears to be at baseline. Chest pain was also atypical but the patient was not a great historian. There was some chest wall component but his troponins are both more elevated than they have ever been here. Unclear at this time if this represents NSTEMI but I have involved cardiology. They will be coming to see him and I will defer to them if they want to start heparin. The patient was admitted and I have discussed patient management with the admitting provider. The management of this patient was discussed with an external consultant. Risk of Complications and/or Morbidity of Patient Management:  Discussion with external consultants. Chronic medical problems impacting care include CAD, CVA. Shared medical decision making was utilized in creating the patients health plan today.          History       Patient presents because today he

## 2023-11-15 NOTE — ED TRIAGE NOTES
Pt arrives to ed via [facility] from home, called for weakness, htn, blurred vision. /100. Chest pain on left side no radiation, 2 sl nitro en route. Weakness and bilateral blurry vision began at 0500, cva last month, pt still experiences deficit weakness in left side. Pt states weakness on left side is worse today. New pressure 160/80 with ems. Pt states burning chest pain on left side 1/10 after ntiro, pt also states recent fall and hitting left side of chest mechanical trip and fall no loc no head strike.

## 2023-11-16 ENCOUNTER — APPOINTMENT (OUTPATIENT)
Dept: NON INVASIVE DIAGNOSTICS | Age: 68
End: 2023-11-16
Attending: INTERNAL MEDICINE
Payer: MEDICARE

## 2023-11-16 LAB
ECHO AO ASC DIAM: 4.2 CM
ECHO AO ASCENDING AORTA INDEX: 1.83 CM/M2
ECHO AO ROOT DIAM: 3.3 CM
ECHO AO ROOT INDEX: 1.43 CM/M2
ECHO AV AREA PEAK VELOCITY: 1.9 CM2
ECHO AV AREA VTI: 2 CM2
ECHO AV AREA/BSA PEAK VELOCITY: 0.8 CM2/M2
ECHO AV AREA/BSA VTI: 0.9 CM2/M2
ECHO AV MEAN GRADIENT: 8 MMHG
ECHO AV MEAN GRADIENT: 8 MMHG
ECHO AV MEAN VELOCITY: 1.3 M/S
ECHO AV PEAK GRADIENT: 14 MMHG
ECHO AV PEAK VELOCITY: 1.9 M/S
ECHO AV VELOCITY RATIO: 0.58
ECHO AV VTI: 41 CM
ECHO BSA: 2.35 M2
ECHO BSA: 2.35 M2
ECHO EST RA PRESSURE: 3 MMHG
ECHO IVC PROX: 1.6 CM
ECHO LA AREA 2C: 14.4 CM2
ECHO LA AREA 4C: 25.9 CM2
ECHO LA DIAMETER INDEX: 1.91 CM/M2
ECHO LA DIAMETER: 4.4 CM
ECHO LA MAJOR AXIS: 7.4 CM
ECHO LA MINOR AXIS: 5.7 CM
ECHO LA TO AORTIC ROOT RATIO: 1.33
ECHO LA VOL MOD A2C: 30 ML (ref 18–58)
ECHO LA VOL MOD A4C: 74 ML (ref 18–58)
ECHO LA VOLUME INDEX MOD A2C: 13 ML/M2 (ref 16–34)
ECHO LA VOLUME INDEX MOD A4C: 32 ML/M2 (ref 16–34)
ECHO LV E' LATERAL VELOCITY: 7 CM/S
ECHO LV E' SEPTAL VELOCITY: 5 CM/S
ECHO LV EDV A2C: 87 ML
ECHO LV EDV A4C: 146 ML
ECHO LV EDV INDEX A4C: 63 ML/M2
ECHO LV EDV NDEX A2C: 38 ML/M2
ECHO LV EJECTION FRACTION A2C: 61 %
ECHO LV EJECTION FRACTION A4C: 63 %
ECHO LV EJECTION FRACTION BIPLANE: 63 % (ref 55–100)
ECHO LV ESV A2C: 34 ML
ECHO LV ESV A4C: 54 ML
ECHO LV ESV INDEX A2C: 15 ML/M2
ECHO LV ESV INDEX A4C: 23 ML/M2
ECHO LV FRACTIONAL SHORTENING: 28 % (ref 28–44)
ECHO LV INTERNAL DIMENSION DIASTOLE INDEX: 1.87 CM/M2
ECHO LV INTERNAL DIMENSION DIASTOLIC: 4.3 CM (ref 4.2–5.9)
ECHO LV INTERNAL DIMENSION SYSTOLIC INDEX: 1.35 CM/M2
ECHO LV INTERNAL DIMENSION SYSTOLIC: 3.1 CM
ECHO LV IVSD: 1.2 CM (ref 0.6–1)
ECHO LV MASS 2D: 184.7 G (ref 88–224)
ECHO LV MASS INDEX 2D: 80.3 G/M2 (ref 49–115)
ECHO LV POSTERIOR WALL DIASTOLIC: 1.2 CM (ref 0.6–1)
ECHO LV RELATIVE WALL THICKNESS RATIO: 0.56
ECHO LVOT AREA: 3.1 CM2
ECHO LVOT AV VTI INDEX: 0.62
ECHO LVOT DIAM: 2 CM
ECHO LVOT MEAN GRADIENT: 3 MMHG
ECHO LVOT PEAK GRADIENT: 5 MMHG
ECHO LVOT PEAK VELOCITY: 1.1 M/S
ECHO LVOT STROKE VOLUME INDEX: 34.8 ML/M2
ECHO LVOT SV: 80.1 ML
ECHO LVOT VTI: 25.5 CM
ECHO MV A VELOCITY: 0.77 M/S
ECHO MV E DECELERATION TIME (DT): 301 MS
ECHO MV E VELOCITY: 1.3 M/S
ECHO MV E/A RATIO: 1.69
ECHO MV E/E' LATERAL: 18.57
ECHO MV E/E' RATIO (AVERAGED): 22.29
ECHO PV ACCELERATION TIME (AT): 119 MS
ECHO PV MAX VELOCITY: 0.9 M/S
ECHO PV PEAK GRADIENT: 3 MMHG
ECHO RV BASAL DIMENSION: 3.8 CM
ECHO RV FREE WALL PEAK S': 13 CM/S
ECHO RV INTERNAL DIMENSION: 3 CM
ECHO RV TAPSE: 1.8 CM (ref 1.7–?)
GLUCOSE BLD STRIP.AUTO-MCNC: 202 MG/DL (ref 65–100)
SERVICE CMNT-IMP: ABNORMAL

## 2023-11-16 PROCEDURE — 82962 GLUCOSE BLOOD TEST: CPT

## 2023-11-16 PROCEDURE — C1769 GUIDE WIRE: HCPCS | Performed by: INTERNAL MEDICINE

## 2023-11-16 PROCEDURE — 2580000003 HC RX 258: Performed by: INTERNAL MEDICINE

## 2023-11-16 PROCEDURE — 93455 CORONARY ART/GRFT ANGIO S&I: CPT | Performed by: INTERNAL MEDICINE

## 2023-11-16 PROCEDURE — 6370000000 HC RX 637 (ALT 250 FOR IP): Performed by: NURSE PRACTITIONER

## 2023-11-16 PROCEDURE — 2500000003 HC RX 250 WO HCPCS: Performed by: INTERNAL MEDICINE

## 2023-11-16 PROCEDURE — 99152 MOD SED SAME PHYS/QHP 5/>YRS: CPT | Performed by: INTERNAL MEDICINE

## 2023-11-16 PROCEDURE — 6360000002 HC RX W HCPCS: Performed by: INTERNAL MEDICINE

## 2023-11-16 PROCEDURE — C1887 CATHETER, GUIDING: HCPCS | Performed by: INTERNAL MEDICINE

## 2023-11-16 PROCEDURE — 2140000001 HC CVICU INTERMEDIATE R&B

## 2023-11-16 PROCEDURE — 6370000000 HC RX 637 (ALT 250 FOR IP): Performed by: INTERNAL MEDICINE

## 2023-11-16 PROCEDURE — C8929 TTE W OR WO FOL WCON,DOPPLER: HCPCS

## 2023-11-16 PROCEDURE — 6360000004 HC RX CONTRAST MEDICATION: Performed by: INTERNAL MEDICINE

## 2023-11-16 PROCEDURE — C1894 INTRO/SHEATH, NON-LASER: HCPCS | Performed by: INTERNAL MEDICINE

## 2023-11-16 PROCEDURE — 2709999900 HC NON-CHARGEABLE SUPPLY: Performed by: INTERNAL MEDICINE

## 2023-11-16 PROCEDURE — 93306 TTE W/DOPPLER COMPLETE: CPT | Performed by: INTERNAL MEDICINE

## 2023-11-16 PROCEDURE — 99153 MOD SED SAME PHYS/QHP EA: CPT | Performed by: INTERNAL MEDICINE

## 2023-11-16 RX ORDER — SODIUM CHLORIDE 0.9 % (FLUSH) 0.9 %
5-40 SYRINGE (ML) INJECTION PRN
Status: DISCONTINUED | OUTPATIENT
Start: 2023-11-16 | End: 2023-11-17 | Stop reason: HOSPADM

## 2023-11-16 RX ORDER — MIDAZOLAM HYDROCHLORIDE 1 MG/ML
INJECTION INTRAMUSCULAR; INTRAVENOUS PRN
Status: DISCONTINUED | OUTPATIENT
Start: 2023-11-16 | End: 2023-11-16 | Stop reason: HOSPADM

## 2023-11-16 RX ORDER — LIDOCAINE HYDROCHLORIDE 10 MG/ML
INJECTION, SOLUTION INFILTRATION; PERINEURAL PRN
Status: DISCONTINUED | OUTPATIENT
Start: 2023-11-16 | End: 2023-11-16 | Stop reason: HOSPADM

## 2023-11-16 RX ORDER — ISOSORBIDE MONONITRATE 30 MG/1
30 TABLET, EXTENDED RELEASE ORAL DAILY
Status: DISCONTINUED | OUTPATIENT
Start: 2023-11-16 | End: 2023-11-17 | Stop reason: HOSPADM

## 2023-11-16 RX ORDER — HEPARIN SODIUM 200 [USP'U]/100ML
INJECTION, SOLUTION INTRAVENOUS CONTINUOUS PRN
Status: DISCONTINUED | OUTPATIENT
Start: 2023-11-16 | End: 2023-11-16 | Stop reason: HOSPADM

## 2023-11-16 RX ORDER — SODIUM CHLORIDE 0.9 % (FLUSH) 0.9 %
5-40 SYRINGE (ML) INJECTION EVERY 12 HOURS SCHEDULED
Status: DISCONTINUED | OUTPATIENT
Start: 2023-11-16 | End: 2023-11-17 | Stop reason: HOSPADM

## 2023-11-16 RX ORDER — SODIUM CHLORIDE 9 MG/ML
INJECTION, SOLUTION INTRAVENOUS PRN
Status: DISCONTINUED | OUTPATIENT
Start: 2023-11-16 | End: 2023-11-17 | Stop reason: HOSPADM

## 2023-11-16 RX ORDER — ACETAMINOPHEN 325 MG/1
650 TABLET ORAL EVERY 4 HOURS PRN
Status: DISCONTINUED | OUTPATIENT
Start: 2023-11-16 | End: 2023-11-17 | Stop reason: HOSPADM

## 2023-11-16 RX ADMIN — HYDROCODONE BITARTRATE AND ACETAMINOPHEN 1 TABLET: 5; 325 TABLET ORAL at 08:12

## 2023-11-16 RX ADMIN — METOPROLOL TARTRATE 50 MG: 50 TABLET ORAL at 21:51

## 2023-11-16 RX ADMIN — ASPIRIN 81 MG: 81 TABLET, CHEWABLE ORAL at 08:02

## 2023-11-16 RX ADMIN — AMITRIPTYLINE HYDROCHLORIDE 25 MG: 25 TABLET, FILM COATED ORAL at 21:52

## 2023-11-16 RX ADMIN — NITROGLYCERIN 0.5 INCH: 20 OINTMENT TOPICAL at 06:32

## 2023-11-16 RX ADMIN — METOPROLOL TARTRATE 50 MG: 50 TABLET ORAL at 08:01

## 2023-11-16 RX ADMIN — GABAPENTIN 300 MG: 300 CAPSULE ORAL at 21:52

## 2023-11-16 RX ADMIN — PANTOPRAZOLE SODIUM 40 MG: 40 TABLET, DELAYED RELEASE ORAL at 08:02

## 2023-11-16 RX ADMIN — LISINOPRIL 20 MG: 20 TABLET ORAL at 21:57

## 2023-11-16 RX ADMIN — GABAPENTIN 300 MG: 300 CAPSULE ORAL at 08:02

## 2023-11-16 RX ADMIN — FERROUS SULFATE TAB 325 MG (65 MG ELEMENTAL FE) 325 MG: 325 (65 FE) TAB at 08:03

## 2023-11-16 RX ADMIN — NITROGLYCERIN 0.5 INCH: 20 OINTMENT TOPICAL at 12:27

## 2023-11-16 RX ADMIN — SODIUM CHLORIDE, PRESERVATIVE FREE 10 ML: 5 INJECTION INTRAVENOUS at 08:08

## 2023-11-16 RX ADMIN — LISINOPRIL 20 MG: 20 TABLET ORAL at 08:02

## 2023-11-16 RX ADMIN — HYDROCODONE BITARTRATE AND ACETAMINOPHEN 1 TABLET: 5; 325 TABLET ORAL at 21:51

## 2023-11-16 RX ADMIN — SODIUM CHLORIDE, PRESERVATIVE FREE 0.3 ML: 5 INJECTION INTRAVENOUS at 08:38

## 2023-11-16 RX ADMIN — HYDROCODONE BITARTRATE AND ACETAMINOPHEN 1 TABLET: 5; 325 TABLET ORAL at 02:52

## 2023-11-16 RX ADMIN — PRAVASTATIN SODIUM 40 MG: 20 TABLET ORAL at 21:51

## 2023-11-16 RX ADMIN — HYDRALAZINE HYDROCHLORIDE 50 MG: 50 TABLET, FILM COATED ORAL at 06:32

## 2023-11-16 RX ADMIN — EZETIMIBE 10 MG: 10 TABLET ORAL at 21:51

## 2023-11-16 RX ADMIN — AMIODARONE HYDROCHLORIDE 200 MG: 200 TABLET ORAL at 08:02

## 2023-11-16 ASSESSMENT — PAIN - FUNCTIONAL ASSESSMENT
PAIN_FUNCTIONAL_ASSESSMENT: ACTIVITIES ARE NOT PREVENTED
PAIN_FUNCTIONAL_ASSESSMENT: PREVENTS OR INTERFERES WITH MANY ACTIVE NOT PASSIVE ACTIVITIES

## 2023-11-16 ASSESSMENT — PAIN DESCRIPTION - FREQUENCY: FREQUENCY: CONTINUOUS

## 2023-11-16 ASSESSMENT — PAIN DESCRIPTION - ORIENTATION: ORIENTATION: RIGHT;LOWER

## 2023-11-16 ASSESSMENT — PAIN SCALES - GENERAL
PAINLEVEL_OUTOF10: 7
PAINLEVEL_OUTOF10: 0
PAINLEVEL_OUTOF10: 0
PAINLEVEL_OUTOF10: 5
PAINLEVEL_OUTOF10: 9
PAINLEVEL_OUTOF10: 0
PAINLEVEL_OUTOF10: 8

## 2023-11-16 ASSESSMENT — PAIN DESCRIPTION - LOCATION
LOCATION: BACK;LEG;HEAD
LOCATION: LEG

## 2023-11-16 ASSESSMENT — PAIN SCALES - WONG BAKER
WONGBAKER_NUMERICALRESPONSE: 0
WONGBAKER_NUMERICALRESPONSE: NO HURT

## 2023-11-16 ASSESSMENT — PAIN DESCRIPTION - DESCRIPTORS
DESCRIPTORS: SHOOTING;TINGLING
DESCRIPTORS: BURNING
DESCRIPTORS: BURNING

## 2023-11-16 ASSESSMENT — PAIN DESCRIPTION - ONSET: ONSET: ON-GOING

## 2023-11-16 ASSESSMENT — PAIN DESCRIPTION - PAIN TYPE: TYPE: ACUTE PAIN

## 2023-11-16 NOTE — PLAN OF CARE
Problem: Discharge Planning  Goal: Discharge to home or other facility with appropriate resources  Outcome: Progressing  Flowsheets  Taken 11/16/2023 0956 by Dennise Hallman RN  Discharge to home or other facility with appropriate resources:   Identify barriers to discharge with patient and caregiver   Arrange for needed discharge resources and transportation as appropriate   Identify discharge learning needs (meds, wound care, etc)   Refer to discharge planning if patient needs post-hospital services based on physician order or complex needs related to functional status, cognitive ability or social support system  Taken 11/15/2023 2213 by Natalya Sheehan RN  Discharge to home or other facility with appropriate resources:   Identify barriers to discharge with patient and caregiver   Arrange for needed discharge resources and transportation as appropriate   Identify discharge learning needs (meds, wound care, etc)     Problem: Pain  Goal: Verbalizes/displays adequate comfort level or baseline comfort level  Outcome: Progressing  Flowsheets (Taken 11/16/2023 0956)  Verbalizes/displays adequate comfort level or baseline comfort level:   Encourage patient to monitor pain and request assistance   Assess pain using appropriate pain scale   Administer analgesics based on type and severity of pain and evaluate response   Implement non-pharmacological measures as appropriate and evaluate response   Notify Licensed Independent Practitioner if interventions unsuccessful or patient reports new pain     Problem: Safety - Adult  Goal: Free from fall injury  Outcome: Progressing  Flowsheets (Taken 11/16/2023 0956)  Free From Fall Injury: Instruct family/caregiver on patient safety

## 2023-11-16 NOTE — ED NOTES
TRANSFER - OUT REPORT:    Verbal report given to [facility] RN on Marc Bazan Sr.  being transferred to [facility] for routine progression of patient care       Report consisted of patient's Situation, Background, Assessment and   Recommendations(SBAR). Information from the following report(s) ED SBAR was reviewed with the receiving nurse. Amalia Fall Assessment:    Presents to emergency department  because of falls (Syncope, seizure, or loss of consciousness): No  Age > 70: No  Altered Mental Status, Intoxication with alcohol or substance confusion (Disorientation, impaired judgment, poor safety awaremess, or inability to follow instructions): No  Impaired Mobility: Ambulates or transfers with assistive devices or assistance; Unable to ambulate or transer.: Yes  Nursing Judgement: Yes          Lines:   Peripheral IV 11/15/23 Distal;Right Cephalic (Active)        Opportunity for questions and clarification was provided.       Patient transported with:  Monitor and Registered Nurse          Raissa Claros RN  11/15/23 8300

## 2023-11-16 NOTE — H&P
[facility] CARDIOLOGY  [id number] [place] [address]  PHONE: [phone]        11/15/23      NAME:  Stephany Vazquez Sr.  : 1955  MRN: [id number]      SUBJECTIVE:   Stephany Vazquez Sr. is a 69 y.o. male seen for a consultation visit regarding the following:     Chief Complaint   Patient presents with    Chest Pain            HPI:    78-year-old gentleman with 2 days of intermittent chest pain. He describes it as a retrosternal burning. There is no radiation. There is no associated symptoms. He has chronic orthopnea that is unchanged. He denies any aggravating factors. The chest pains were improved with nitroglycerin. He was markedly hypertensive on admission. He also complains of some ulceration on his anterior left lower extremity.       Allergies   Allergen Reactions    Atorvastatin     Other Shortness Of Breath     Oyster    Rosuvastatin Shortness Of Breath    Oyster Extract Other (See Comments) and Nausea And Vomiting     N & V, diarrhea, abdominal cramping       Past Medical History:   Diagnosis Date    Bipolar disorder ([address])     CAD (coronary artery disease)     CHF (congestive heart failure) (HCC)     Chronic back pain     Hyperlipidemia     Hypertension     Neuropathy     Subdural hematoma ([address]) 2022    Type 2 diabetes mellitus without complication ([facility])      Past Surgical History:   Procedure Laterality Date    CABG WITH AORTIC VALVE REPLACEMENT N/A 3/15/2023    CORONARY ARTERY BYPASS GRAFT (CABG X 2, LIMA; ENDOSCOPIC VEIN HARVEST,LEFT GREATER SAPHENOUS VEIN; LEFT ATRIAL APPENDAGE CLIPPING; AORTIC VALVE REPLACEMENT; MAZE performed by Brittani Caldwell MD at [address] N/A 3/13/2023    LEFT HEART CATH / CORONARY ANGIOGRAPHY performed by Saintclair Haggard, MD at [facility] CARDIAC CATH LAB    HERNIA REPAIR      TRANSESOPHAGEAL ECHOCARDIOGRAM N/A 3/15/2023    TRANSESOPHAGEAL ECHOCARDIOGRAM performed by Brittani Caldwell MD at

## 2023-11-16 NOTE — PLAN OF CARE
Severe multivessel coronary artery disease. 2/2 patent bypass grafts. It appears as though very small right PDA was lost and that may be the explanation for his recent acute event. He has exceedingly small coronary arteries that are heavily diseased. We will plan on medical management with long-acting nitrates and resuming his Eliquis tomorrow morning.     Shwana Saldaña MD

## 2023-11-16 NOTE — CARE COORDINATION
CM spoke to patient at bedside on this day. Patient confirmed demographic information. Patient reports that he lives with a friend in a 1 story house and has 1 step to enter in front, 1 step to enter at side entrance, and 3 steps in the back. Patient reports that he is independent with ADLs, drives, and uses a cane and walker. Patient confirmed PCP information and last PCP visit was 10/27/23. Patient stated that he is current with [facility]. Per Danielle from [facility], patient is not known to [facility] at this time. Per previous notes, referral was sent to [facility]. Per Nikhil Martinez, at [facility] patient is not known to [facility] at this time. No CM needs voiced or noted. CM will continue to follow patient for discharge planning needs. 11/16/23 1059   Service Assessment   Patient Orientation Alert and Oriented   Cognition Alert   History Provided By Patient   Primary [facility]   Patient's Healthcare Decision Maker is: Legal Next of [address] [place] [address]   PCP Verified by CM Yes  (confirmed PCP is Dr. Jose Pete)   Last Visit to PCP Within last 3 months  (last PCP visit 10/27/23)   Prior Functional Level Independent in ADLs/IADLs   Current Functional Level Other (see comment)  (TBD by clinicals)   Can patient return to prior living arrangement Yes   Ability to make needs known: Good   Family able to assist with home care needs: Yes   Would you like for me to discuss the discharge plan with any other family members/significant others, and if so, who?  Yes   Financial Resources Medicare;Medicaid  ([facility] Medicare and Medicaid [place])   Community Resources None   CM/SW Referral Other (see comment)  (discharge planning)   Social/Functional History   Lives With Friend(s)   Type of [facility]  One level   Home Access Stairs to enter without rails   Entrance Stairs - Number of Steps 1   Bathroom Shower/Tub Tub/Shower unit   Bathroom Toilet Standard   Bathroom Equipment None   Bathroom

## 2023-11-17 VITALS
HEIGHT: 72 IN | SYSTOLIC BLOOD PRESSURE: 148 MMHG | TEMPERATURE: 97.9 F | RESPIRATION RATE: 22 BRPM | OXYGEN SATURATION: 97 % | DIASTOLIC BLOOD PRESSURE: 72 MMHG | WEIGHT: 240 LBS | BODY MASS INDEX: 32.51 KG/M2 | HEART RATE: 74 BPM

## 2023-11-17 PROBLEM — I25.118 CORONARY ARTERY DISEASE OF NATIVE ARTERY OF NATIVE HEART WITH STABLE ANGINA PECTORIS (HCC): Status: ACTIVE | Noted: 2023-03-18

## 2023-11-17 PROBLEM — R07.9 CHEST PAIN: Status: RESOLVED | Noted: 2023-03-13 | Resolved: 2023-11-17

## 2023-11-17 PROBLEM — R07.9 CHEST PAIN: Status: ACTIVE | Noted: 2023-03-13

## 2023-11-17 PROBLEM — I21.4 NSTEMI (NON-ST ELEVATED MYOCARDIAL INFARCTION) (HCC): Status: RESOLVED | Noted: 2023-11-15 | Resolved: 2023-11-17

## 2023-11-17 PROCEDURE — 6370000000 HC RX 637 (ALT 250 FOR IP): Performed by: INTERNAL MEDICINE

## 2023-11-17 PROCEDURE — 99238 HOSP IP/OBS DSCHRG MGMT 30/<: CPT | Performed by: INTERNAL MEDICINE

## 2023-11-17 PROCEDURE — 6370000000 HC RX 637 (ALT 250 FOR IP): Performed by: NURSE PRACTITIONER

## 2023-11-17 PROCEDURE — 6370000000 HC RX 637 (ALT 250 FOR IP): Performed by: PHYSICIAN ASSISTANT

## 2023-11-17 PROCEDURE — 2580000003 HC RX 258: Performed by: INTERNAL MEDICINE

## 2023-11-17 RX ORDER — ONDANSETRON 4 MG/1
4 TABLET, ORALLY DISINTEGRATING ORAL EVERY 8 HOURS PRN
Status: DISCONTINUED | OUTPATIENT
Start: 2023-11-17 | End: 2023-11-17 | Stop reason: HOSPADM

## 2023-11-17 RX ORDER — HYDRALAZINE HYDROCHLORIDE 50 MG/1
50 TABLET, FILM COATED ORAL EVERY 8 HOURS SCHEDULED
Qty: 90 TABLET | Refills: 3 | Status: SHIPPED | OUTPATIENT
Start: 2023-11-17

## 2023-11-17 RX ORDER — METOPROLOL TARTRATE 50 MG/1
50 TABLET, FILM COATED ORAL 2 TIMES DAILY
Qty: 60 TABLET | Refills: 3 | Status: SHIPPED | OUTPATIENT
Start: 2023-11-17

## 2023-11-17 RX ORDER — ISOSORBIDE MONONITRATE 30 MG/1
30 TABLET, EXTENDED RELEASE ORAL DAILY
Qty: 30 TABLET | Refills: 3 | Status: SHIPPED | OUTPATIENT
Start: 2023-11-17

## 2023-11-17 RX ORDER — AMLODIPINE BESYLATE 5 MG/1
5 TABLET ORAL DAILY
Qty: 30 TABLET | Refills: 3 | Status: SHIPPED | OUTPATIENT
Start: 2023-11-17

## 2023-11-17 RX ORDER — ASPIRIN 81 MG/1
81 TABLET, CHEWABLE ORAL DAILY
Qty: 30 TABLET | Refills: 3 | Status: SHIPPED | OUTPATIENT
Start: 2023-11-18

## 2023-11-17 RX ORDER — AMLODIPINE BESYLATE 5 MG/1
5 TABLET ORAL DAILY
Status: DISCONTINUED | OUTPATIENT
Start: 2023-11-17 | End: 2023-11-17 | Stop reason: HOSPADM

## 2023-11-17 RX ADMIN — HYDROCODONE BITARTRATE AND ACETAMINOPHEN 1 TABLET: 5; 325 TABLET ORAL at 06:26

## 2023-11-17 RX ADMIN — ONDANSETRON 4 MG: 4 TABLET, ORALLY DISINTEGRATING ORAL at 09:23

## 2023-11-17 RX ADMIN — HYDRALAZINE HYDROCHLORIDE 50 MG: 50 TABLET, FILM COATED ORAL at 06:27

## 2023-11-17 RX ADMIN — FERROUS SULFATE TAB 325 MG (65 MG ELEMENTAL FE) 325 MG: 325 (65 FE) TAB at 08:27

## 2023-11-17 RX ADMIN — AMLODIPINE BESYLATE 5 MG: 5 TABLET ORAL at 08:28

## 2023-11-17 RX ADMIN — AMIODARONE HYDROCHLORIDE 200 MG: 200 TABLET ORAL at 08:27

## 2023-11-17 RX ADMIN — METOPROLOL TARTRATE 50 MG: 50 TABLET ORAL at 08:27

## 2023-11-17 RX ADMIN — HYDRALAZINE HYDROCHLORIDE 50 MG: 50 TABLET, FILM COATED ORAL at 00:15

## 2023-11-17 RX ADMIN — SODIUM CHLORIDE, PRESERVATIVE FREE 10 ML: 5 INJECTION INTRAVENOUS at 08:28

## 2023-11-17 RX ADMIN — ISOSORBIDE MONONITRATE 30 MG: 30 TABLET, EXTENDED RELEASE ORAL at 08:27

## 2023-11-17 RX ADMIN — PANTOPRAZOLE SODIUM 40 MG: 40 TABLET, DELAYED RELEASE ORAL at 08:27

## 2023-11-17 RX ADMIN — ASPIRIN 81 MG: 81 TABLET, CHEWABLE ORAL at 08:28

## 2023-11-17 RX ADMIN — LISINOPRIL 20 MG: 20 TABLET ORAL at 08:26

## 2023-11-17 RX ADMIN — GABAPENTIN 300 MG: 300 CAPSULE ORAL at 08:27

## 2023-11-17 RX ADMIN — APIXABAN 5 MG: 5 TABLET, FILM COATED ORAL at 08:27

## 2023-11-17 ASSESSMENT — PAIN SCALES - GENERAL
PAINLEVEL_OUTOF10: 6
PAINLEVEL_OUTOF10: 0
PAINLEVEL_OUTOF10: 2

## 2023-11-17 ASSESSMENT — PAIN - FUNCTIONAL ASSESSMENT: PAIN_FUNCTIONAL_ASSESSMENT: PREVENTS OR INTERFERES WITH MANY ACTIVE NOT PASSIVE ACTIVITIES

## 2023-11-17 ASSESSMENT — PAIN DESCRIPTION - LOCATION: LOCATION: KNEE

## 2023-11-17 ASSESSMENT — PAIN DESCRIPTION - ORIENTATION: ORIENTATION: RIGHT;LEFT

## 2023-11-17 ASSESSMENT — PAIN DESCRIPTION - DESCRIPTORS: DESCRIPTORS: ACHING

## 2023-11-17 NOTE — PLAN OF CARE
Problem: Discharge Planning  Goal: Discharge to home or other facility with appropriate resources  Outcome: Adequate for Discharge  Flowsheets (Taken 11/17/2023 0740)  Discharge to home or other facility with appropriate resources:   Identify barriers to discharge with patient and caregiver   Arrange for needed discharge resources and transportation as appropriate   Identify discharge learning needs (meds, wound care, etc)   Refer to discharge planning if patient needs post-hospital services based on physician order or complex needs related to functional status, cognitive ability or social support system     Problem: Pain  Goal: Verbalizes/displays adequate comfort level or baseline comfort level  Outcome: Adequate for Discharge     Problem: Safety - Adult  Goal: Free from fall injury  Outcome: Adequate for Discharge  Flowsheets (Taken 11/17/2023 0748)  Free From Fall Injury: Instruct family/caregiver on patient safety     Problem: ABCDS Injury Assessment  Goal: Absence of physical injury  Outcome: Adequate for Discharge     Problem: Chronic Conditions and Co-morbidities  Goal: Patient's chronic conditions and co-morbidity symptoms are monitored and maintained or improved  Outcome: Adequate for Discharge  Flowsheets (Taken 11/17/2023 0740)  Care Plan - Patient's Chronic Conditions and Co-Morbidity Symptoms are Monitored and Maintained or Improved: Monitor and assess patient's chronic conditions and comorbid symptoms for stability, deterioration, or improvement

## 2023-11-17 NOTE — PROGRESS NOTES
Discharge order received. Discharge instructions provided to patient. Patient is aware of medication regimen and all associated side effects. Patient is aware of follow up appointments, diet restrictions, and activity restrictions. Opportunity provided to ask questions and answered. All belongings with patient upon discharge. Patient is aware of whom to call in case of emergency. Patient discharged to home in stable condition. with son.

## 2023-11-17 NOTE — PROGRESS NOTES
Cardiac Rehab: Spoke with patient regarding referral to cardiac rehab. Patient meets admission criteria based on NSTEMI (11/16/23). Written information about Cardiac Rehab given and reviewed with patient. Discussed lifestyle modifications to promote cardiac wellness. Pt declined Cardiac Rehab after his CABG surgery also. 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 His Cardiologist is Dr. Madina Arizmendi.       Thank you,  JOSE ReeceN, RN  Cardiopulmonary Rehabilitation Nurse Liaison  [facility]

## 2023-11-17 NOTE — PROGRESS NOTES
Please excuse Mona Rodriguez for being late to work today. He is the caregiver of Monica Putnam who was released from the hospital today. Please call the unit if you have any questions or concerns.   Sincerely;  Roosevelt Huynh [address] [place] [address] Road  [phone]

## 2023-11-17 NOTE — PROGRESS NOTES
Spiritual Care Visit, initial visit. Visited with patient at bedside. Patient was very desirous to be discharged.  listened as he spoke of his spiritual and medical issues. Indicates that he believes his selena is strong, however. Prayed for patient's healing and health. Visit by Austyn Means, Staff .  UMANG.Bryan., Th.B., B.A.

## 2023-11-17 NOTE — DISCHARGE SUMMARY
[facility] Cardiology Discharge Summary     Patient ID:  Suad Webb  [id number]  77 y.o.  1955    Admit date: 11/15/2023    Discharge date:  11/17/2023    Admitting Physician: Gabriella Perrin MD     Discharge Physician: Dr. Brianna Red    Admission Diagnoses: NSTEMI (non-ST elevated myocardial infarction) [facility]) [I21.4]  Coronary artery disease involving native coronary artery of native heart with unstable angina pectoris [facility]) [I25.110]  Chest pain, unspecified type [R07.9]    Discharge Diagnoses:   Patient Active Problem List    Diagnosis Date Noted    Chest pain 03/13/2023    Coronary artery disease of native artery of native heart with stable angina pectoris ([address]) 03/18/2023    New onset atrial fibrillation ([address]) 03/15/2023    Status post coronary artery bypass graft 03/15/2023    S/P AVR (aortic valve replacement) 03/15/2023    Paroxysmal atrial fibrillation ([address]) 03/12/2023    Pain and swelling of right lower extremity 02/14/2023    Chronic venous hypertension (idiopathic) with other complications of bilateral lower extremity 01/11/2023    Sebaceous cyst 01/11/2023    Chronic diastolic congestive heart failure ([address]) 08/26/2015    Accelerating angina ([address]) 03/11/2023    HTN (hypertension) 07/20/2014    Type 2 diabetes mellitus ([address]) 11/05/2010       Cardiology Procedures this admission:  Diagnostic left heart catheterization  EchoCardiogram  Consults: none    Hospital Course: Patient presented to [facility] ED w/ 2 days of intermittent CP that was improved with nitroglycerin. Workup in ED revealed: SBP > 200s, Hstrop 157 and 200.8. Given CP Cardiology was consulted and admitted of further evaluation/treatment. Antihypertensives added and arrangements for [facility] were made. Echo performed with results as below:  11/15/23 TTE  Interpretation Summary    Left Ventricle: Normal left ventricular systolic function with a visually estimated EF of 55 - 60%.  Left ventricle size is normal. Mildly Refills: 5      pantoprazole (PROTONIX) 40 MG tablet Take 1 tablet by mouth daily  Qty: 30 tablet, Refills: 6      nitroGLYCERIN (NITROSTAT) 0.4 MG SL tablet Place 1 tablet under the tongue every 5 minutes as needed for Chest pain up to max of 3 total doses. If no relief after 1 dose, call 911. Qty: 25 tablet, Refills: 3      acetaminophen (TYLENOL) 325 MG tablet Take 2 tablets by mouth every 6 hours as needed for Pain  Qty: 120 tablet, Refills: 3      amiodarone (CORDARONE) 200 MG tablet Take 1 tablet by mouth 2 times daily for 14 days, THEN 1 tablet daily for 14 days. Qty: 42 tablet, Refills: 0      glucose monitoring (FREESTYLE FREEDOM) kit 1 kit by Does not apply route daily  Qty: 1 kit, Refills: 0      blood glucose monitor strips Test one time a day & as needed for symptoms of irregular blood glucose. Dispense sufficient amount for indicated testing frequency plus additional to accommodate PRN testing needs. Qty: 100 strip, Refills: 5    Comments: Brand per patient preference. May round up to next available package size.            STOP taking these medications       aspirin 325 MG tablet Comments:   Reason for Stopping:                 Signed:  Ramona Smith PA-C  11/17/2023  9:02 AM

## 2023-11-17 NOTE — CARE COORDINATION
Patient has discharge orders in on this day. CM spoke to patient and informed him he was \"unknown\" to [facility] and [facility]. Patient chose for referral to be sent to [facility] home [facility]. CM sent referral accordingly. CM called [facility] and spoke to Driver Roselinetatiana to inform her patient will be discharged today. Patient is aware and in agreement with this discharge today. No CM needs voiced or noted at this time.      ASSESSMENT NOTE    Attending Physician: Konrad Rice MD  Admit Problem: NSTEMI (non-ST elevated myocardial infarction) [facility]) [I21.4]  Coronary artery disease involving native coronary artery of native heart with unstable angina pectoris ([address]) [I25.110]  Chest pain, unspecified type [R07.9]  Date/Time of Admission: 11/15/2023  3:08 PM  Problem List:  Patient Active Problem List   Diagnosis    Bipolar I disorder with vandana ([address])    Type 2 diabetes mellitus ([address])    Anxiety    Dyslipidemia    Hypertensive cardiovascular disease    Migraine    Left leg weakness    MRI contraindicated due to metal implant    Weakness of left arm    HTN (hypertension)    Transient ischemic attack    S/P PTCA (percutaneous transluminal coronary angioplasty)    Unintentional weight loss    GERD (gastroesophageal reflux disease)    Painful diabetic neuropathy (HCC)    COPD (chronic obstructive pulmonary disease) (HCC)    Coronary atherosclerosis    Ulcers of both lower legs (HCC)    Adenomatous polyp of colon    Bipolar disorder (HCC)    Chronic diastolic congestive heart failure (HCC)    Chronic lumbar pain    Folate deficiency    Disease of basal ganglia    Spondylosis of lumbar region without myelopathy or radiculopathy    Thrombocytopenia (HCC)    Mild cognitive impairment    Presbyesophagus    Primary insomnia    Chronic venous hypertension (idiopathic) with other complications of bilateral lower extremity    Sebaceous cyst    Pain and swelling of right lower extremity    Paroxysmal atrial treatment goals: anticipates returning home   The Patient and/or Patient Representative was provided with a Choice of Provider? Patient   Name of the Patient Representative who was provided with the Choice of Provider and agrees with the Discharge Plan? The Patient and/or Patient Representative Agree with the Discharge Plan? Yes   Freedom of Choice list was provided with basic dialogue that supports the individualized plan of care/goals, treatment preferences, and shares the quality data associated with the providers?  Yes     Documentation for Discharge Appeal  Discharge Appealed by     Date notified by QIO of appeal request:     Time notified by QIO of appeal request:     Detailed Notice of Discharge given to:     Date Notice of Discharge given:     Time Notice of Discharge given:     Date records sent to [address] [place] [address]     Time records sent to [address] [place] [address]     Date Notified of Outcome     Time Notified of Outcome     Outcome of appeal           Massiel Forrest 11/17/23 9:40 AM

## 2023-11-17 NOTE — DISCHARGE INSTRUCTIONS
You are being discharged home in stable condition on a low saturated fat, low cholesterol and low salt diet. You are instructed to advance activities as tolerated to the limit of fatigue or shortness of breath. You are instructed to avoid all heavy lifting, straining, stooping or squatting for 3-5 days. You are instructed to watch the cath site for bleeding/oozing; if seen, apply firm pressure with a clean cloth and call [facility] Cardiology at [phone]. You are instructed to watch for signs of infection which include: increasing area of redness, fever/hot to touch or purulent drainage at the catheterization site. You are instructed not to soak in a bathtub for 7-10 days, but are cleared to shower. You are instructed to call the office or return to the ER for immediate evaluation for any shortness of breath or chest pain not relieved by NTG.

## 2023-11-20 ENCOUNTER — TELEPHONE (OUTPATIENT)
Age: 68
End: 2023-11-20

## 2023-11-20 NOTE — TELEPHONE ENCOUNTER
Care Transitions Initial Follow Up Call    Call within 2 business days of discharge: Yes     Patient: Nic Boyce Sr. Patient : 1955 MRN: [id number]    RARS: Readmission Risk Score: 28.6    Spoke with: PATIENT    Non-face-to-face services provided:  Transitional  Care after DC from [facility] NSTEMI    Follow Up  Future Appointments   Date Time Provider [address] [place] [address]   2023 11:00 AM Pancho Barton DO UCDG [facility] AMB   2023  8:00 AM Kvng Singleton DO BSPCP [facility] AMB       Essence Dean RN

## 2023-11-21 ENCOUNTER — OFFICE VISIT (OUTPATIENT)
Age: 68
End: 2023-11-21

## 2023-11-21 VITALS
SYSTOLIC BLOOD PRESSURE: 124 MMHG | WEIGHT: 249 LBS | BODY MASS INDEX: 33.72 KG/M2 | HEIGHT: 72 IN | HEART RATE: 72 BPM | DIASTOLIC BLOOD PRESSURE: 70 MMHG

## 2023-11-21 DIAGNOSIS — I25.10 CORONARY ARTERY DISEASE INVOLVING NATIVE CORONARY ARTERY OF NATIVE HEART WITHOUT ANGINA PECTORIS: Primary | ICD-10-CM

## 2023-11-21 DIAGNOSIS — I48.0 PAROXYSMAL ATRIAL FIBRILLATION (HCC): ICD-10-CM

## 2023-11-21 DIAGNOSIS — I35.0 AORTIC VALVE STENOSIS, ETIOLOGY OF CARDIAC VALVE DISEASE UNSPECIFIED: ICD-10-CM

## 2023-11-21 DIAGNOSIS — Z95.1 HX OF CABG: ICD-10-CM

## 2023-11-21 DIAGNOSIS — E78.2 MIXED HYPERLIPIDEMIA: ICD-10-CM

## 2023-11-21 DIAGNOSIS — I10 ESSENTIAL HYPERTENSION: ICD-10-CM

## 2023-11-21 RX ORDER — LISINOPRIL 20 MG/1
20 TABLET ORAL 2 TIMES DAILY
Qty: 90 TABLET | Refills: 3 | Status: SHIPPED | OUTPATIENT
Start: 2023-11-21 | End: 2024-05-19

## 2023-11-21 ASSESSMENT — ENCOUNTER SYMPTOMS
GASTROINTESTINAL NEGATIVE: 1
SHORTNESS OF BREATH: 0
COUGH: 0

## 2023-12-27 ENCOUNTER — TELEPHONE (OUTPATIENT)
Dept: PHARMACY | Facility: CLINIC | Age: 68
End: 2023-12-27

## 2023-12-27 NOTE — TELEPHONE ENCOUNTER
[facility] CLINICAL PHARMACY: ADHERENCE REVIEW  Identified care gap per [employer]: fills at [facility] : ACE/ARB, Diabetes, and Statin adherence    ASSESSMENT    ACE/ARB ADHERENCE - passed in     DIABETES ADHERENCE - passed in     Lab Results   Component Value Date    LABA1C 7.3 (H) 10/09/2023    LABA1C 7.3 (H) 2023    LABA1C 7.5 (H) 2023     NOTE: A1c <9%     [facility] Records claims through 23 (Prior Year PDC = not reported; YTD [address] = 80%; Potential Fail Date: 23):   Pravastatin 40 mg tab last filled on 23 for 100 day supply. Next refill due: 23    Per chart pt had a few hospitalizations as well as time in SNF, may explain why late to refill. Per 23 cardiology HFU pt to continue pravastatin. Prescribed si tablet/capsule daily    Per Reconcile Dispense History:   PRAVASTATIN SODIUM 40 MG TAB 2023 100 100 each Ernesto Singleton, DO [facility] . .. PRAVASTATIN SODIUM 40 MG TAB 2023 90 90 each Kimberly Casey, [facility] . .. PRAVASTATIN SODIUM  40 MG TABS 2023 30 30 tablet Nils Gardiner MD [id number]. ..   3RR per hyperlink; last Rx 9/5/23 x 100 day supply 3 refills - believe has refill at pharmacy    Lab Results   Component Value Date    CHOL 140 10/09/2023    TRIG 70 10/09/2023    HDL 42 10/09/2023    LDLCALC 84 10/09/2023     ALT   Date Value Ref Range Status   11/15/2023 20 12 - 65 U/L Final     AST   Date Value Ref Range Status   11/15/2023 12 (L) 15 - 37 U/L Final     The ASCVD Risk score (Irina ENGLISH, et al., 2019) failed to calculate for the following reasons:     The patient has a prior MI or stroke diagnosis     PLAN  Per insurer report, LIS-2 - may be able to receive up to a 100-day supply for the same cost as a 30-day supply    The following are interventions that have been identified:   Patient overdue refilling pravastatin and active on home

## 2023-12-27 NOTE — TELEPHONE ENCOUNTER
Per Corby Carreno and Kimberley Mean, Pravastatin 40mg will be refilled and ready for  later today with a 0.00 copay     Reached [patient and he wanted to know what time to  his medication and I let him know after 2pm today     For Pharmacy Admin Tracking Only    Program: Marvel in place:  No  Recommendation Provided To: Patient/Caregiver: 1 via Telephone and Pharmacy: 1  Intervention Detail: Adherence Monitorin  Intervention Accepted By: Patient/Caregiver: 1 and Pharmacy: 1  Gap Closed?: No   Time Spent (min): 10

## 2024-01-10 ENCOUNTER — APPOINTMENT (OUTPATIENT)
Dept: CT IMAGING | Age: 69
End: 2024-01-10
Payer: MEDICARE

## 2024-01-10 ENCOUNTER — HOSPITAL ENCOUNTER (INPATIENT)
Age: 69
LOS: 2 days | Discharge: HOME | End: 2024-01-12
Attending: EMERGENCY MEDICINE
Payer: MEDICARE

## 2024-01-10 ENCOUNTER — APPOINTMENT (OUTPATIENT)
Dept: GENERAL RADIOLOGY | Age: 69
End: 2024-01-10
Payer: MEDICARE

## 2024-01-10 ENCOUNTER — APPOINTMENT (OUTPATIENT)
Dept: ULTRASOUND IMAGING | Age: 69
End: 2024-01-10
Payer: MEDICARE

## 2024-01-10 DIAGNOSIS — I63.9 CEREBROVASCULAR ACCIDENT (CVA), UNSPECIFIED MECHANISM (HCC): ICD-10-CM

## 2024-01-10 DIAGNOSIS — I50.33 ACUTE ON CHRONIC DIASTOLIC CONGESTIVE HEART FAILURE (HCC): ICD-10-CM

## 2024-01-10 DIAGNOSIS — I63.9 ACUTE STROKE DUE TO ISCHEMIA (HCC): ICD-10-CM

## 2024-01-10 DIAGNOSIS — I63.9 STROKE DETERMINED BY CLINICAL ASSESSMENT (HCC): Primary | ICD-10-CM

## 2024-01-10 DIAGNOSIS — I73.9 PVD (PERIPHERAL VASCULAR DISEASE) (HCC): ICD-10-CM

## 2024-01-10 PROBLEM — I61.9 CVA (CEREBROVASCULAR ACCIDENT DUE TO INTRACEREBRAL HEMORRHAGE) (HCC): Status: ACTIVE | Noted: 2024-01-10

## 2024-01-10 LAB
ALBUMIN SERPL-MCNC: 3.7 G/DL (ref 3.2–4.6)
ALBUMIN/GLOB SERPL: 0.9 (ref 0.4–1.6)
ALP SERPL-CCNC: 64 U/L (ref 50–136)
ALT SERPL-CCNC: 23 U/L (ref 12–65)
ANION GAP SERPL CALC-SCNC: 10 MMOL/L (ref 2–11)
AST SERPL-CCNC: 18 U/L (ref 15–37)
BASOPHILS # BLD: 0 K/UL (ref 0–0.2)
BASOPHILS NFR BLD: 1 % (ref 0–2)
BILIRUB SERPL-MCNC: 0.5 MG/DL (ref 0.2–1.1)
BILIRUB UR QL: NEGATIVE
BUN SERPL-MCNC: 16 MG/DL (ref 8–23)
CALCIUM SERPL-MCNC: 9.1 MG/DL (ref 8.3–10.4)
CHLORIDE SERPL-SCNC: 100 MMOL/L (ref 103–113)
CHOLEST SERPL-MCNC: 120 MG/DL
CO2 SERPL-SCNC: 27 MMOL/L (ref 21–32)
CREAT SERPL-MCNC: 1.1 MG/DL (ref 0.8–1.5)
DIFFERENTIAL METHOD BLD: ABNORMAL
EKG ATRIAL RATE: 73 BPM
EKG DIAGNOSIS: NORMAL
EKG P AXIS: 53 DEGREES
EKG P-R INTERVAL: 156 MS
EKG Q-T INTERVAL: 416 MS
EKG QRS DURATION: 84 MS
EKG QTC CALCULATION (BAZETT): 458 MS
EKG R AXIS: 50 DEGREES
EKG T AXIS: 25 DEGREES
EKG VENTRICULAR RATE: 73 BPM
EOSINOPHIL # BLD: 0.3 K/UL (ref 0–0.8)
EOSINOPHIL NFR BLD: 3 % (ref 0.5–7.8)
ERYTHROCYTE [DISTWIDTH] IN BLOOD BY AUTOMATED COUNT: 16.7 % (ref 11.9–14.6)
EST. AVERAGE GLUCOSE BLD GHB EST-MCNC: 163 MG/DL
FERRITIN SERPL-MCNC: 14 NG/ML (ref 8–388)
FOLATE SERPL-MCNC: 18.6 NG/ML (ref 3.1–17.5)
GLOBULIN SER CALC-MCNC: 4.2 G/DL (ref 2.8–4.5)
GLUCOSE BLD STRIP.AUTO-MCNC: 129 MG/DL (ref 65–100)
GLUCOSE BLD STRIP.AUTO-MCNC: 171 MG/DL (ref 65–100)
GLUCOSE SERPL-MCNC: 171 MG/DL (ref 65–100)
GLUCOSE UR QL STRIP.AUTO: NEGATIVE MG/DL
HBA1C MFR BLD: 7.3 % (ref 4.8–5.6)
HCT VFR BLD AUTO: 38 % (ref 41.1–50.3)
HDLC SERPL-MCNC: 49 MG/DL (ref 40–60)
HDLC SERPL: 2.4
HGB BLD-MCNC: 12.1 G/DL (ref 13.6–17.2)
IMM GRANULOCYTES # BLD AUTO: 0 K/UL (ref 0–0.5)
IMM GRANULOCYTES NFR BLD AUTO: 0 % (ref 0–5)
IRON SATN MFR SERPL: 13 %
IRON SERPL-MCNC: 43 UG/DL (ref 35–150)
KETONES UR-MCNC: NEGATIVE MG/DL
LDLC SERPL CALC-MCNC: 57.2 MG/DL
LEUKOCYTE ESTERASE UR QL STRIP: NEGATIVE
LYMPHOCYTES # BLD: 1.9 K/UL (ref 0.5–4.6)
LYMPHOCYTES NFR BLD: 22 % (ref 13–44)
MCH RBC QN AUTO: 26.4 PG (ref 26.1–32.9)
MCHC RBC AUTO-ENTMCNC: 31.8 G/DL (ref 31.4–35)
MCV RBC AUTO: 83 FL (ref 82–102)
MONOCYTES # BLD: 0.9 K/UL (ref 0.1–1.3)
MONOCYTES NFR BLD: 11 % (ref 4–12)
NEUTS SEG # BLD: 5.6 K/UL (ref 1.7–8.2)
NEUTS SEG NFR BLD: 63 % (ref 43–78)
NITRITE UR QL: NEGATIVE
NRBC # BLD: 0 K/UL (ref 0–0.2)
NT PRO BNP: 1036 PG/ML (ref 5–125)
PH UR: 7 (ref 5–9)
PLATELET # BLD AUTO: 178 K/UL (ref 150–450)
PMV BLD AUTO: 10 FL (ref 9.4–12.3)
POTASSIUM SERPL-SCNC: 3.8 MMOL/L (ref 3.5–5.1)
PROT SERPL-MCNC: 7.9 G/DL (ref 6.3–8.2)
PROT UR QL: NEGATIVE MG/DL
RBC # BLD AUTO: 4.58 M/UL (ref 4.23–5.6)
RBC # UR STRIP: NEGATIVE
SERVICE CMNT-IMP: ABNORMAL
SERVICE CMNT-IMP: ABNORMAL
SERVICE CMNT-IMP: NORMAL
SODIUM SERPL-SCNC: 137 MMOL/L (ref 136–146)
SP GR UR: 1.02 (ref 1–1.02)
TIBC SERPL-MCNC: 342 UG/DL (ref 250–450)
TRIGL SERPL-MCNC: 69 MG/DL (ref 35–150)
TROPONIN I SERPL HS-MCNC: 17.6 PG/ML (ref 0–14)
TROPONIN I SERPL HS-MCNC: 18.9 PG/ML (ref 0–14)
UROBILINOGEN UR QL: 0.2 EU/DL (ref 0.2–1)
VIT B12 SERPL-MCNC: 929 PG/ML (ref 193–986)
VLDLC SERPL CALC-MCNC: 13.8 MG/DL (ref 6–23)
WBC # BLD AUTO: 8.8 K/UL (ref 4.3–11.1)

## 2024-01-10 PROCEDURE — 70450 CT HEAD/BRAIN W/O DYE: CPT

## 2024-01-10 PROCEDURE — 80053 COMPREHEN METABOLIC PANEL: CPT

## 2024-01-10 PROCEDURE — 99222 1ST HOSP IP/OBS MODERATE 55: CPT | Performed by: STUDENT IN AN ORGANIZED HEALTH CARE EDUCATION/TRAINING PROGRAM

## 2024-01-10 PROCEDURE — 84484 ASSAY OF TROPONIN QUANT: CPT

## 2024-01-10 PROCEDURE — 83540 ASSAY OF IRON: CPT

## 2024-01-10 PROCEDURE — 1100000003 HC PRIVATE W/ TELEMETRY

## 2024-01-10 PROCEDURE — 93010 ELECTROCARDIOGRAM REPORT: CPT | Performed by: INTERNAL MEDICINE

## 2024-01-10 PROCEDURE — 80061 LIPID PANEL: CPT

## 2024-01-10 PROCEDURE — 93005 ELECTROCARDIOGRAM TRACING: CPT | Performed by: EMERGENCY MEDICINE

## 2024-01-10 PROCEDURE — 6370000000 HC RX 637 (ALT 250 FOR IP): Performed by: EMERGENCY MEDICINE

## 2024-01-10 PROCEDURE — 6370000000 HC RX 637 (ALT 250 FOR IP): Performed by: STUDENT IN AN ORGANIZED HEALTH CARE EDUCATION/TRAINING PROGRAM

## 2024-01-10 PROCEDURE — 83880 ASSAY OF NATRIURETIC PEPTIDE: CPT

## 2024-01-10 PROCEDURE — 82607 VITAMIN B-12: CPT

## 2024-01-10 PROCEDURE — 96374 THER/PROPH/DIAG INJ IV PUSH: CPT

## 2024-01-10 PROCEDURE — 70496 CT ANGIOGRAPHY HEAD: CPT | Performed by: RADIOLOGY

## 2024-01-10 PROCEDURE — 71046 X-RAY EXAM CHEST 2 VIEWS: CPT

## 2024-01-10 PROCEDURE — 1100000000 HC RM PRIVATE

## 2024-01-10 PROCEDURE — 6360000002 HC RX W HCPCS: Performed by: FAMILY MEDICINE

## 2024-01-10 PROCEDURE — 85025 COMPLETE CBC W/AUTO DIFF WBC: CPT

## 2024-01-10 PROCEDURE — 83550 IRON BINDING TEST: CPT

## 2024-01-10 PROCEDURE — 6370000000 HC RX 637 (ALT 250 FOR IP): Performed by: FAMILY MEDICINE

## 2024-01-10 PROCEDURE — 82728 ASSAY OF FERRITIN: CPT

## 2024-01-10 PROCEDURE — 82746 ASSAY OF FOLIC ACID SERUM: CPT

## 2024-01-10 PROCEDURE — 96375 TX/PRO/DX INJ NEW DRUG ADDON: CPT

## 2024-01-10 PROCEDURE — 81003 URINALYSIS AUTO W/O SCOPE: CPT

## 2024-01-10 PROCEDURE — 2580000003 HC RX 258: Performed by: STUDENT IN AN ORGANIZED HEALTH CARE EDUCATION/TRAINING PROGRAM

## 2024-01-10 PROCEDURE — 6360000002 HC RX W HCPCS: Performed by: EMERGENCY MEDICINE

## 2024-01-10 PROCEDURE — 6360000004 HC RX CONTRAST MEDICATION: Performed by: EMERGENCY MEDICINE

## 2024-01-10 PROCEDURE — 82962 GLUCOSE BLOOD TEST: CPT

## 2024-01-10 PROCEDURE — 99285 EMERGENCY DEPT VISIT HI MDM: CPT

## 2024-01-10 PROCEDURE — 83036 HEMOGLOBIN GLYCOSYLATED A1C: CPT

## 2024-01-10 PROCEDURE — 70498 CT ANGIOGRAPHY NECK: CPT | Performed by: RADIOLOGY

## 2024-01-10 PROCEDURE — 70498 CT ANGIOGRAPHY NECK: CPT

## 2024-01-10 RX ORDER — AMLODIPINE BESYLATE 5 MG/1
5 TABLET ORAL DAILY
Status: DISCONTINUED | OUTPATIENT
Start: 2024-01-10 | End: 2024-01-12 | Stop reason: HOSPADM

## 2024-01-10 RX ORDER — ACETAMINOPHEN 325 MG/1
650 TABLET ORAL EVERY 6 HOURS PRN
Status: DISCONTINUED | OUTPATIENT
Start: 2024-01-10 | End: 2024-01-10

## 2024-01-10 RX ORDER — ACETAMINOPHEN 650 MG/1
650 SUPPOSITORY RECTAL EVERY 6 HOURS PRN
Status: DISCONTINUED | OUTPATIENT
Start: 2024-01-10 | End: 2024-01-12 | Stop reason: HOSPADM

## 2024-01-10 RX ORDER — POTASSIUM CHLORIDE 7.45 MG/ML
10 INJECTION INTRAVENOUS PRN
Status: DISCONTINUED | OUTPATIENT
Start: 2024-01-10 | End: 2024-01-12 | Stop reason: HOSPADM

## 2024-01-10 RX ORDER — MAGNESIUM SULFATE IN WATER 40 MG/ML
2000 INJECTION, SOLUTION INTRAVENOUS PRN
Status: DISCONTINUED | OUTPATIENT
Start: 2024-01-10 | End: 2024-01-12 | Stop reason: HOSPADM

## 2024-01-10 RX ORDER — INSULIN LISPRO 100 [IU]/ML
0-4 INJECTION, SOLUTION INTRAVENOUS; SUBCUTANEOUS
Status: DISCONTINUED | OUTPATIENT
Start: 2024-01-10 | End: 2024-01-12 | Stop reason: HOSPADM

## 2024-01-10 RX ORDER — ONDANSETRON 2 MG/ML
4 INJECTION INTRAMUSCULAR; INTRAVENOUS EVERY 6 HOURS PRN
Status: DISCONTINUED | OUTPATIENT
Start: 2024-01-10 | End: 2024-01-12 | Stop reason: HOSPADM

## 2024-01-10 RX ORDER — ASPIRIN 81 MG/1
81 TABLET, CHEWABLE ORAL DAILY
Status: DISCONTINUED | OUTPATIENT
Start: 2024-01-11 | End: 2024-01-12 | Stop reason: HOSPADM

## 2024-01-10 RX ORDER — ASPIRIN 81 MG/1
81 TABLET, CHEWABLE ORAL ONCE
Status: COMPLETED | OUTPATIENT
Start: 2024-01-10 | End: 2024-01-10

## 2024-01-10 RX ORDER — GABAPENTIN 300 MG/1
300 CAPSULE ORAL 3 TIMES DAILY
Status: DISCONTINUED | OUTPATIENT
Start: 2024-01-10 | End: 2024-01-12 | Stop reason: HOSPADM

## 2024-01-10 RX ORDER — ISOSORBIDE MONONITRATE 30 MG/1
30 TABLET, EXTENDED RELEASE ORAL DAILY
Status: DISCONTINUED | OUTPATIENT
Start: 2024-01-10 | End: 2024-01-12 | Stop reason: HOSPADM

## 2024-01-10 RX ORDER — OXYCODONE HYDROCHLORIDE 5 MG/1
10 TABLET ORAL EVERY 4 HOURS PRN
Status: DISCONTINUED | OUTPATIENT
Start: 2024-01-10 | End: 2024-01-12 | Stop reason: HOSPADM

## 2024-01-10 RX ORDER — PANTOPRAZOLE SODIUM 40 MG/1
40 TABLET, DELAYED RELEASE ORAL
Status: DISCONTINUED | OUTPATIENT
Start: 2024-01-11 | End: 2024-01-12 | Stop reason: HOSPADM

## 2024-01-10 RX ORDER — EZETIMIBE 10 MG/1
10 TABLET ORAL NIGHTLY
Status: DISCONTINUED | OUTPATIENT
Start: 2024-01-10 | End: 2024-01-12 | Stop reason: HOSPADM

## 2024-01-10 RX ORDER — METOPROLOL TARTRATE 50 MG/1
50 TABLET, FILM COATED ORAL 2 TIMES DAILY
Status: DISCONTINUED | OUTPATIENT
Start: 2024-01-10 | End: 2024-01-12 | Stop reason: HOSPADM

## 2024-01-10 RX ORDER — SODIUM CHLORIDE 9 MG/ML
INJECTION, SOLUTION INTRAVENOUS PRN
Status: DISCONTINUED | OUTPATIENT
Start: 2024-01-10 | End: 2024-01-12 | Stop reason: HOSPADM

## 2024-01-10 RX ORDER — SODIUM CHLORIDE 0.9 % (FLUSH) 0.9 %
5-40 SYRINGE (ML) INJECTION EVERY 12 HOURS SCHEDULED
Status: DISCONTINUED | OUTPATIENT
Start: 2024-01-10 | End: 2024-01-12 | Stop reason: HOSPADM

## 2024-01-10 RX ORDER — ONDANSETRON 2 MG/ML
4 INJECTION INTRAMUSCULAR; INTRAVENOUS
Status: COMPLETED | OUTPATIENT
Start: 2024-01-10 | End: 2024-01-10

## 2024-01-10 RX ORDER — PRAVASTATIN SODIUM 20 MG
40 TABLET ORAL NIGHTLY
Status: DISCONTINUED | OUTPATIENT
Start: 2024-01-10 | End: 2024-01-12 | Stop reason: HOSPADM

## 2024-01-10 RX ORDER — POLYETHYLENE GLYCOL 3350 17 G/17G
17 POWDER, FOR SOLUTION ORAL DAILY PRN
Status: DISCONTINUED | OUTPATIENT
Start: 2024-01-10 | End: 2024-01-12 | Stop reason: HOSPADM

## 2024-01-10 RX ORDER — ONDANSETRON 4 MG/1
4 TABLET, ORALLY DISINTEGRATING ORAL EVERY 8 HOURS PRN
Status: DISCONTINUED | OUTPATIENT
Start: 2024-01-10 | End: 2024-01-12 | Stop reason: HOSPADM

## 2024-01-10 RX ORDER — MORPHINE SULFATE 2 MG/ML
2 INJECTION, SOLUTION INTRAMUSCULAR; INTRAVENOUS ONCE
Status: COMPLETED | OUTPATIENT
Start: 2024-01-10 | End: 2024-01-10

## 2024-01-10 RX ORDER — FUROSEMIDE 10 MG/ML
60 INJECTION INTRAMUSCULAR; INTRAVENOUS ONCE
Status: COMPLETED | OUTPATIENT
Start: 2024-01-10 | End: 2024-01-10

## 2024-01-10 RX ORDER — ACETAMINOPHEN 500 MG
1000 TABLET ORAL
Status: COMPLETED | OUTPATIENT
Start: 2024-01-10 | End: 2024-01-10

## 2024-01-10 RX ORDER — ACETAMINOPHEN 500 MG
1000 TABLET ORAL EVERY 6 HOURS PRN
Status: DISCONTINUED | OUTPATIENT
Start: 2024-01-10 | End: 2024-01-12 | Stop reason: HOSPADM

## 2024-01-10 RX ORDER — HYDRALAZINE HYDROCHLORIDE 25 MG/1
50 TABLET, FILM COATED ORAL EVERY 8 HOURS SCHEDULED
Status: DISCONTINUED | OUTPATIENT
Start: 2024-01-10 | End: 2024-01-12 | Stop reason: HOSPADM

## 2024-01-10 RX ORDER — INSULIN LISPRO 100 [IU]/ML
0-4 INJECTION, SOLUTION INTRAVENOUS; SUBCUTANEOUS NIGHTLY
Status: DISCONTINUED | OUTPATIENT
Start: 2024-01-10 | End: 2024-01-12 | Stop reason: HOSPADM

## 2024-01-10 RX ORDER — POTASSIUM CHLORIDE 20 MEQ/1
40 TABLET, EXTENDED RELEASE ORAL PRN
Status: DISCONTINUED | OUTPATIENT
Start: 2024-01-10 | End: 2024-01-12 | Stop reason: HOSPADM

## 2024-01-10 RX ORDER — SODIUM CHLORIDE 0.9 % (FLUSH) 0.9 %
5-40 SYRINGE (ML) INJECTION PRN
Status: DISCONTINUED | OUTPATIENT
Start: 2024-01-10 | End: 2024-01-12 | Stop reason: HOSPADM

## 2024-01-10 RX ORDER — LISINOPRIL 20 MG/1
20 TABLET ORAL 2 TIMES DAILY
Status: DISCONTINUED | OUTPATIENT
Start: 2024-01-10 | End: 2024-01-12 | Stop reason: HOSPADM

## 2024-01-10 RX ADMIN — MORPHINE SULFATE 2 MG: 2 INJECTION, SOLUTION INTRAMUSCULAR; INTRAVENOUS at 22:21

## 2024-01-10 RX ADMIN — SODIUM CHLORIDE, PRESERVATIVE FREE 5 ML: 5 INJECTION INTRAVENOUS at 22:50

## 2024-01-10 RX ADMIN — ONDANSETRON 4 MG: 2 INJECTION INTRAMUSCULAR; INTRAVENOUS at 11:58

## 2024-01-10 RX ADMIN — EZETIMIBE 10 MG: 10 TABLET ORAL at 20:16

## 2024-01-10 RX ADMIN — FUROSEMIDE 60 MG: 10 INJECTION, SOLUTION INTRAMUSCULAR; INTRAVENOUS at 11:58

## 2024-01-10 RX ADMIN — ACETAMINOPHEN 1000 MG: 500 TABLET ORAL at 11:57

## 2024-01-10 RX ADMIN — APIXABAN 5 MG: 5 TABLET, FILM COATED ORAL at 20:16

## 2024-01-10 RX ADMIN — GABAPENTIN 300 MG: 300 CAPSULE ORAL at 20:18

## 2024-01-10 RX ADMIN — GABAPENTIN 300 MG: 300 CAPSULE ORAL at 14:36

## 2024-01-10 RX ADMIN — APIXABAN 5 MG: 5 TABLET, FILM COATED ORAL at 11:57

## 2024-01-10 RX ADMIN — METOPROLOL TARTRATE 50 MG: 50 TABLET ORAL at 20:16

## 2024-01-10 RX ADMIN — IOPAMIDOL 80 ML: 755 INJECTION, SOLUTION INTRAVENOUS at 11:17

## 2024-01-10 RX ADMIN — ASPIRIN 81 MG: 81 TABLET, CHEWABLE ORAL at 11:57

## 2024-01-10 RX ADMIN — PRAVASTATIN SODIUM 40 MG: 20 TABLET ORAL at 20:15

## 2024-01-10 RX ADMIN — OXYCODONE HYDROCHLORIDE 10 MG: 5 TABLET ORAL at 20:17

## 2024-01-10 ASSESSMENT — PAIN SCALES - GENERAL
PAINLEVEL_OUTOF10: 8
PAINLEVEL_OUTOF10: 4

## 2024-01-10 ASSESSMENT — ENCOUNTER SYMPTOMS
COLOR CHANGE: 1
SHORTNESS OF BREATH: 1

## 2024-01-10 ASSESSMENT — LIFESTYLE VARIABLES
HOW OFTEN DO YOU HAVE A DRINK CONTAINING ALCOHOL: 2-3 TIMES A WEEK
HOW MANY STANDARD DRINKS CONTAINING ALCOHOL DO YOU HAVE ON A TYPICAL DAY: 3 OR 4

## 2024-01-10 ASSESSMENT — PAIN DESCRIPTION - ORIENTATION: ORIENTATION: LEFT

## 2024-01-10 ASSESSMENT — PAIN - FUNCTIONAL ASSESSMENT: PAIN_FUNCTIONAL_ASSESSMENT: 0-10

## 2024-01-10 NOTE — ED TRIAGE NOTES
Patient ambulatory to triage with CO pain and numbness to left side that started \"yesterday around dinner time\". Hx CVA on 10/9 with the same symptoms. Pt also CO pain, redness and swelling to LLE with multiple scabbed areas. Denies fevers. Compliant with eliquis

## 2024-01-10 NOTE — ED PROVIDER NOTES
Emergency Department Provider Note       PCP: Marisa Singleton DO   Age: 68 y.o.   Sex: male     DISPOSITION Decision To Admit 01/10/2024 11:37:25 AM       ICD-10-CM    1. Stroke determined by clinical assessment ([facility])  I63.9       2. Acute stroke due to ischemia (HCC)  I63.9 Echo (TTE) complete (PRN contrast/bubble/strain/3D)     Echo (TTE) complete (PRN contrast/bubble/strain/3D)      3. Cerebrovascular accident (CVA), unspecified mechanism (HCC)  I63.9       4. Acute on chronic diastolic congestive heart failure (HCC)  I50.33           Medical Decision Making     Complexity of Problems Addressed:  1 or more chronic illnesses with a severe exacerbation or progression.  1 or more acute illnesses that pose a threat to life or bodily function.     Data Reviewed and Analyzed:   I independently ordered and reviewed each unique test.  I reviewed external records: ED visit note from an outside group.  I reviewed external records: provider visit note from PCP.  I reviewed external records: provider visit note from outside specialist.  I reviewed external records: previous EKG including cardiologist interpretation.    I reviewed external records: previous lab results from outside ED.  I reviewed external records: previous imaging study including radiologist interpretation.       I independently ordered and interpreted the ED EKG in the absence of a Cardiologist.    Rate: 73  EKG Interpretation: EKG Interpretation: sinus rhythm, no evidence of arrhythmia  ST Segments: Normal ST segments - NO STEMI      I interpreted the X-rays no pneumothorax.  I interpreted the CT Scan no intracranial hemorrhage.    Discussion of management or test interpretation.  DDX:    TIA, CVA, ischemic stroke, Bell's palsy, intracranial hemorrhage,  subdural hematoma, subarachnoid hemorrhage,    tension headache, migraine headache, brain tumor, cluster headache, sinusitis    electrolyte abnormality, renal failure, malignant hypertension,  glucose. Dispense sufficient amount for indicated testing frequency plus additional to accommodate PRN testing needs.    DICLOFENAC SODIUM (VOLTAREN) 1 % GEL    Apply 2 g topically 2 times daily    EZETIMIBE (ZETIA) 10 MG TABLET    Take 1 tablet by mouth nightly    FERROUS SULFATE (IRON 325) 325 (65 FE) MG TABLET    Take 1 tablet by mouth daily (with breakfast)    GABAPENTIN (NEURONTIN) 300 MG CAPSULE    Take 1 capsule by mouth 3 times daily for 180 days. Intended supply: 30 days    GLUCOSE MONITORING (FREESTYLE FREEDOM) KIT    1 kit by Does not apply route daily    HYDRALAZINE (APRESOLINE) 50 MG TABLET    Take 1 tablet by mouth every 8 hours    ISOSORBIDE MONONITRATE (IMDUR) 30 MG EXTENDED RELEASE TABLET    Take 1 tablet by mouth daily    LISINOPRIL (PRINIVIL;ZESTRIL) 20 MG TABLET    Take 1 tablet by mouth in the morning and at bedtime for 360 doses    METFORMIN (GLUCOPHAGE) 850 MG TABLET    Take 1 tablet by mouth daily (with breakfast)    METOPROLOL TARTRATE (LOPRESSOR) 50 MG TABLET    Take 1 tablet by mouth 2 times daily    NEOMYCIN-BACITRACIN-POLYMYXIN (NEOSPORIN) 3.5-400-5000 OINT OINTMENT    Apply topically 4 times daily.    NITROGLYCERIN (NITROSTAT) 0.4 MG SL TABLET    Place 1 tablet under the tongue every 5 minutes as needed for Chest pain up to max of 3 total doses. If no relief after 1 dose, call 911.    PANTOPRAZOLE (PROTONIX) 40 MG TABLET    Take 1 tablet by mouth daily    PRAVASTATIN (PRAVACHOL) 40 MG TABLET    Take 1 tablet by mouth nightly    VITAMIN C (VITAMIN C) 250 MG TABLET    Take 1 tablet by mouth daily        Results for orders placed or performed during the hospital encounter of 01/10/24   XR CHEST (2 VW)    Narrative    Chest X-ray    INDICATION: Chest pain and numbness to the left side which began yesterday.    COMPARISON: Prior chest radiograph 11/15/2023.    PA and lateral views of the chest were obtained.    FINDINGS: Mild bibasilar opacities likely atelectasis. No focal

## 2024-01-10 NOTE — H&P
(H) 4.8 - 5.6 %    eAG 163 mg/dL   Lipid Panel    Collection Time: 01/10/24 12:03 PM   Result Value Ref Range    Cholesterol, Total 120 <200 MG/DL    Triglycerides 69 35 - 150 MG/DL    HDL 49 40 - 60 MG/DL    LDL Calculated 57.2 <100 MG/DL    VLDL Cholesterol Calculated 13.8 6.0 - 23.0 MG/DL    Chol/HDL Ratio 2.4         I have personally reviewed imaging studies:  CTA HEAD NECK W CONTRAST    Result Date: 1/10/2024  CTA HEAD NECK W CONTRAST  Indication: cva Cerebral infarction, unspecified / Reason for exam:->cva Technique: Axial images of the neck and head were obtained after the uneventful administration of 80 ml Isovue 370 iodinated contrast media. Contrast was used to best identify the arterial structures.  Images were reviewed on a separate, free standing, three-dimensional workstation as per the referring physicians request.  All stenosis percentages derived by comparing the narrowest segment with the distal Internal Carotid Artery luminal diameter, as described in the North American Symptomatic Carotid Endarterectomy Trial (NASCET) criteria. All CT scans at this facility are performed using dose reduction/dose modulation techniques, as appropriate the performed exam, including the following: Automated Exposure Control; Adjustment of the mA and/or kV according to patient size (this includes techniques or standardized protocols for targeted exams where dose is matched to indication/reason for exam); and Use of Iterative Reconstruction Technique.  Comparison: None. Findings: Lungs: Normal Soft Tissues: Normal Cervical Spine: Degenerative changes Aorta: Conventional 3 vessel arch Great Vessels: Patent Right ICA: Some hard plaque of the bulb and the cavernous sinus % Stenosis: less than 50 Right ECA: Patent Right MCA: Patent Right DOMINICK: Patent Left ICA: Some hard plaque of the bulb and the cavernous sinus % Stenosis: Less than 50 Left ECA:  Patent Left MCA: Patent Left DOMINICK: Patent Right Vertebral: Appears  chronically occluded Left Vertebral: Patent Dominance: Left Basilar: Patent Right PCA: Patent Left PCA: Patent Other Vascular: Negative     No evidence of acute large vessel occlusion.     CT HEAD WO CONTRAST    Result Date: 1/10/2024  Head CT INDICATION: Pain Multiple axial images obtained through the brain without intravenous contrast. Radiation dose reduction techniques were used for this study:  All CT scans performed at this facility use one or all of the following: Automated exposure control, adjustment of the mA and/or kVp according to patient's size, iterative reconstruction. COMPARISON: CT head November 15, 2023 FINDINGS: Chronic microvascular ischemic and atrophic changes. There is no CT evidence of acute hemorrhage or infarction. The ventricles are normal in size. There are no extra-axial fluid collections. No masses are seen. The sinuses are clear. There are no bony lesions.     No CT evidence of acute intracranial abnormality. Chronic microvascular ischemic and atrophic changes.    XR CHEST (2 VW)    Result Date: 1/10/2024  Chest X-ray INDICATION: Chest pain and numbness to the left side which began yesterday. COMPARISON: Prior chest radiograph 11/15/2023. PA and lateral views of the chest were obtained. FINDINGS: Mild bibasilar opacities likely atelectasis. No focal consolidation. No pleural effusion. No pneumothorax. Stable postsurgical change of the chest. The heart size is normal.  The bony thorax is intact.      Mild bibasilar opacities likely atelectasis, otherwise lungs are clear.         Signed:  Darlene Andrade MD    Part of this note may have been written by using a voice dictation software.  The note has been proof read but may still contain some grammatical/other typographical errors.
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
## 2024-01-11 ENCOUNTER — HOSPITAL ENCOUNTER (INPATIENT)
Dept: ULTRASOUND IMAGING | Age: 69
End: 2024-01-11
Payer: MEDICARE

## 2024-01-11 ENCOUNTER — APPOINTMENT (OUTPATIENT)
Dept: NON INVASIVE DIAGNOSTICS | Age: 69
End: 2024-01-11
Attending: STUDENT IN AN ORGANIZED HEALTH CARE EDUCATION/TRAINING PROGRAM
Payer: MEDICARE

## 2024-01-11 PROBLEM — I73.9 PERIPHERAL VASCULAR DISEASE (HCC): Status: ACTIVE | Noted: 2024-01-11

## 2024-01-11 LAB
ALBUMIN SERPL-MCNC: 3.4 G/DL (ref 3.2–4.6)
ANION GAP SERPL CALC-SCNC: 5 MMOL/L (ref 2–11)
BASOPHILS # BLD: 0 K/UL (ref 0–0.2)
BASOPHILS NFR BLD: 1 % (ref 0–2)
BUN SERPL-MCNC: 17 MG/DL (ref 8–23)
CALCIUM SERPL-MCNC: 8.5 MG/DL (ref 8.3–10.4)
CHLORIDE SERPL-SCNC: 105 MMOL/L (ref 103–113)
CO2 SERPL-SCNC: 27 MMOL/L (ref 21–32)
CREAT SERPL-MCNC: 1 MG/DL (ref 0.8–1.5)
DIFFERENTIAL METHOD BLD: ABNORMAL
ECHO AV MEAN GRADIENT: 7 MMHG
ECHO AV MEAN VELOCITY: 1.2 M/S
ECHO AV PEAK GRADIENT: 14 MMHG
ECHO AV PEAK VELOCITY: 1.9 M/S
ECHO AV VELOCITY RATIO: 0.53
ECHO AV VTI: 39.8 CM
ECHO BSA: 2.4 M2
ECHO LVOT AV VTI INDEX: 0.64
ECHO LVOT MEAN GRADIENT: 2 MMHG
ECHO LVOT PEAK GRADIENT: 4 MMHG
ECHO LVOT PEAK VELOCITY: 1 M/S
ECHO LVOT VTI: 25.5 CM
EOSINOPHIL # BLD: 0.2 K/UL (ref 0–0.8)
EOSINOPHIL NFR BLD: 4 % (ref 0.5–7.8)
ERYTHROCYTE [DISTWIDTH] IN BLOOD BY AUTOMATED COUNT: 16.7 % (ref 11.9–14.6)
GLUCOSE BLD STRIP.AUTO-MCNC: 127 MG/DL (ref 65–100)
GLUCOSE BLD STRIP.AUTO-MCNC: 140 MG/DL (ref 65–100)
GLUCOSE BLD STRIP.AUTO-MCNC: 167 MG/DL (ref 65–100)
GLUCOSE BLD STRIP.AUTO-MCNC: 198 MG/DL (ref 65–100)
GLUCOSE SERPL-MCNC: 145 MG/DL (ref 65–100)
HCT VFR BLD AUTO: 38.8 % (ref 41.1–50.3)
HGB BLD-MCNC: 12.1 G/DL (ref 13.6–17.2)
IMM GRANULOCYTES # BLD AUTO: 0 K/UL (ref 0–0.5)
IMM GRANULOCYTES NFR BLD AUTO: 0 % (ref 0–5)
LYMPHOCYTES # BLD: 2 K/UL (ref 0.5–4.6)
LYMPHOCYTES NFR BLD: 32 % (ref 13–44)
MCH RBC QN AUTO: 26.7 PG (ref 26.1–32.9)
MCHC RBC AUTO-ENTMCNC: 31.2 G/DL (ref 31.4–35)
MCV RBC AUTO: 85.5 FL (ref 82–102)
MONOCYTES # BLD: 0.8 K/UL (ref 0.1–1.3)
MONOCYTES NFR BLD: 13 % (ref 4–12)
NEUTS SEG # BLD: 3.1 K/UL (ref 1.7–8.2)
NEUTS SEG NFR BLD: 50 % (ref 43–78)
NRBC # BLD: 0 K/UL (ref 0–0.2)
PHOSPHATE SERPL-MCNC: 4.2 MG/DL (ref 2.3–3.7)
PLATELET # BLD AUTO: 167 K/UL (ref 150–450)
PMV BLD AUTO: 9.9 FL (ref 9.4–12.3)
POTASSIUM SERPL-SCNC: 3.8 MMOL/L (ref 3.5–5.1)
RBC # BLD AUTO: 4.54 M/UL (ref 4.23–5.6)
SERVICE CMNT-IMP: ABNORMAL
SODIUM SERPL-SCNC: 137 MMOL/L (ref 136–146)
WBC # BLD AUTO: 6.2 K/UL (ref 4.3–11.1)

## 2024-01-11 PROCEDURE — 93925 LOWER EXTREMITY STUDY: CPT | Performed by: RADIOLOGY

## 2024-01-11 PROCEDURE — 93922 UPR/L XTREMITY ART 2 LEVELS: CPT

## 2024-01-11 PROCEDURE — 99232 SBSQ HOSP IP/OBS MODERATE 35: CPT | Performed by: NURSE PRACTITIONER

## 2024-01-11 PROCEDURE — 97535 SELF CARE MNGMENT TRAINING: CPT

## 2024-01-11 PROCEDURE — 2580000003 HC RX 258: Performed by: INTERNAL MEDICINE

## 2024-01-11 PROCEDURE — 1100000003 HC PRIVATE W/ TELEMETRY

## 2024-01-11 PROCEDURE — 97161 PT EVAL LOW COMPLEX 20 MIN: CPT

## 2024-01-11 PROCEDURE — 97530 THERAPEUTIC ACTIVITIES: CPT

## 2024-01-11 PROCEDURE — 2580000003 HC RX 258: Performed by: STUDENT IN AN ORGANIZED HEALTH CARE EDUCATION/TRAINING PROGRAM

## 2024-01-11 PROCEDURE — 36415 COLL VENOUS BLD VENIPUNCTURE: CPT

## 2024-01-11 PROCEDURE — 6370000000 HC RX 637 (ALT 250 FOR IP): Performed by: FAMILY MEDICINE

## 2024-01-11 PROCEDURE — 2500000003 HC RX 250 WO HCPCS: Performed by: STUDENT IN AN ORGANIZED HEALTH CARE EDUCATION/TRAINING PROGRAM

## 2024-01-11 PROCEDURE — 85025 COMPLETE CBC W/AUTO DIFF WBC: CPT

## 2024-01-11 PROCEDURE — 6370000000 HC RX 637 (ALT 250 FOR IP): Performed by: STUDENT IN AN ORGANIZED HEALTH CARE EDUCATION/TRAINING PROGRAM

## 2024-01-11 PROCEDURE — 80069 RENAL FUNCTION PANEL: CPT

## 2024-01-11 PROCEDURE — 82962 GLUCOSE BLOOD TEST: CPT

## 2024-01-11 PROCEDURE — 6360000004 HC RX CONTRAST MEDICATION: Performed by: INTERNAL MEDICINE

## 2024-01-11 PROCEDURE — C8924 2D TTE W OR W/O FOL W/CON,FU: HCPCS

## 2024-01-11 PROCEDURE — 97165 OT EVAL LOW COMPLEX 30 MIN: CPT

## 2024-01-11 RX ADMIN — OXYCODONE HYDROCHLORIDE 10 MG: 5 TABLET ORAL at 09:47

## 2024-01-11 RX ADMIN — SODIUM CHLORIDE, PRESERVATIVE FREE 5 ML: 5 INJECTION INTRAVENOUS at 20:59

## 2024-01-11 RX ADMIN — APIXABAN 5 MG: 5 TABLET, FILM COATED ORAL at 09:45

## 2024-01-11 RX ADMIN — METOPROLOL TARTRATE 50 MG: 50 TABLET ORAL at 09:45

## 2024-01-11 RX ADMIN — ACETAMINOPHEN 1000 MG: 500 TABLET ORAL at 21:11

## 2024-01-11 RX ADMIN — PRAVASTATIN SODIUM 40 MG: 20 TABLET ORAL at 20:57

## 2024-01-11 RX ADMIN — EZETIMIBE 10 MG: 10 TABLET ORAL at 20:57

## 2024-01-11 RX ADMIN — SODIUM CHLORIDE, PRESERVATIVE FREE 0.6 ML: 5 INJECTION INTRAVENOUS at 10:35

## 2024-01-11 RX ADMIN — ACETAMINOPHEN 1000 MG: 500 TABLET ORAL at 13:57

## 2024-01-11 RX ADMIN — TUBERCULIN PURIFIED PROTEIN DERIVATIVE 5 UNITS: 5 INJECTION, SOLUTION INTRADERMAL at 14:35

## 2024-01-11 RX ADMIN — GABAPENTIN 300 MG: 300 CAPSULE ORAL at 13:58

## 2024-01-11 RX ADMIN — ASPIRIN 81 MG 81 MG: 81 TABLET ORAL at 09:45

## 2024-01-11 RX ADMIN — OXYCODONE HYDROCHLORIDE 10 MG: 5 TABLET ORAL at 23:26

## 2024-01-11 RX ADMIN — SODIUM CHLORIDE, PRESERVATIVE FREE 10 ML: 5 INJECTION INTRAVENOUS at 09:45

## 2024-01-11 RX ADMIN — METOPROLOL TARTRATE 50 MG: 50 TABLET ORAL at 20:57

## 2024-01-11 RX ADMIN — PANTOPRAZOLE SODIUM 40 MG: 40 TABLET, DELAYED RELEASE ORAL at 05:17

## 2024-01-11 RX ADMIN — OXYCODONE HYDROCHLORIDE 10 MG: 5 TABLET ORAL at 01:33

## 2024-01-11 RX ADMIN — GABAPENTIN 300 MG: 300 CAPSULE ORAL at 20:57

## 2024-01-11 RX ADMIN — APIXABAN 5 MG: 5 TABLET, FILM COATED ORAL at 20:57

## 2024-01-11 RX ADMIN — GABAPENTIN 300 MG: 300 CAPSULE ORAL at 09:45

## 2024-01-11 ASSESSMENT — PAIN DESCRIPTION - DESCRIPTORS
DESCRIPTORS: ACHING
DESCRIPTORS: TINGLING

## 2024-01-11 ASSESSMENT — PAIN DESCRIPTION - ONSET
ONSET: GRADUAL

## 2024-01-11 ASSESSMENT — PAIN DESCRIPTION - PAIN TYPE
TYPE: ACUTE PAIN

## 2024-01-11 ASSESSMENT — PAIN SCALES - GENERAL
PAINLEVEL_OUTOF10: 0
PAINLEVEL_OUTOF10: 8
PAINLEVEL_OUTOF10: 3
PAINLEVEL_OUTOF10: 2
PAINLEVEL_OUTOF10: 4
PAINLEVEL_OUTOF10: 4
PAINLEVEL_OUTOF10: 0
PAINLEVEL_OUTOF10: 7
PAINLEVEL_OUTOF10: 0
PAINLEVEL_OUTOF10: 7

## 2024-01-11 ASSESSMENT — PAIN DESCRIPTION - LOCATION
LOCATION: LEG
LOCATION: HIP;LEG
LOCATION: LEG
LOCATION: LEG

## 2024-01-11 ASSESSMENT — PAIN - FUNCTIONAL ASSESSMENT
PAIN_FUNCTIONAL_ASSESSMENT: ACTIVITIES ARE NOT PREVENTED

## 2024-01-11 ASSESSMENT — PAIN DESCRIPTION - ORIENTATION
ORIENTATION: LEFT

## 2024-01-11 ASSESSMENT — PAIN SCALES - WONG BAKER: WONGBAKER_NUMERICALRESPONSE: 0

## 2024-01-11 ASSESSMENT — PAIN DESCRIPTION - FREQUENCY
FREQUENCY: INTERMITTENT

## 2024-01-11 NOTE — PROGRESS NOTES
Neurology Daily Progress Note     Assessment:     68 year old male with a history of right thalamic stroke who presented with worsening left sided sensory symptoms, most likely recrudescence of old stroke symptoms. He is unable to have MRI. He is on maximal medical therapy with aspirin and Eliquis.     Plan:     Continue aspirin and Eliquis    Patient has statin allergy    Neuropathic agents can be adjusted as needed by PCP for post-stroke pain    PT/OT    No need for neurology follow up    Patient counseled to avoid use of alcohol     We will sign off.     Subjective:        Interval history:    Patient doing well. Reports left sided pain has improved some since swelling has gone down. CTA without LVO or significant stenosis. Repeat TTE pending. LDL at goal. A1c 7.3.     History:    Marc Horton Sr. is a 68 y.o. male who is being seen for left sided sensory disturbance.    Review of systems negative with exception of pertinent positives and negatives noted above.       Objective:     Physical Exam:  General - Well developed, well nourished, in no apparent distress. Pleasant and conversant.   HEENT - Normocephalic, atraumatic. Conjunctiva are clear.   Neck - Supple without masses  Extremities - Peripheral pulses intact. No edema and no rashes.     Neurological examination - Comprehension, attention, memory and reasoning are intact. Language and speech are normal.   On cranial nerve examination, (II, III, IV, VI) pupils are equal, round, and reactive to light.  Extraocular motility is normal. (V, VII) Face is symmetric. (VIII) Hearing is intact.    Motor examination - There is normal muscle tone and bulk. Power is full throughout. Sensation is impaired to light touch on the left.       Signed By: SONY Miles NP     January 11, 2024      I spent 35 minutes today with the patient, which included chart review, obtaining history from the patient/family/other providers, physical exam,

## 2024-01-11 NOTE — ED NOTES
TRANSFER - OUT REPORT:    Verbal report given to SEJAL Perry on Marc Horton .  being transferred to [facility] for routine progression of patient care       Report consisted of patient's Situation, Background, Assessment and   Recommendations(SBAR).     Information from the following report(s) ED SBAR was reviewed with the receiving nurse.    Lines:   Peripheral IV 01/10/24 Right Antecubital (Active)   Site Assessment Clean, dry & intact 01/10/24 0841   Line Status Blood return noted 01/10/24 0841   Line Care Cap changed 01/10/24 0841   Phlebitis Assessment No symptoms 01/10/24 0841   Infiltration Assessment 0 01/10/24 0841   Alcohol Cap Used No 01/10/24 0841   Dressing Status New dressing applied 01/10/24 0841   Dressing Type Transparent 01/10/24 0841   Dressing Intervention New 01/10/24 0841        Opportunity for questions and clarification was provided.      Patient transported with:  Ashley Rivera RN  01/10/24 1709

## 2024-01-11 NOTE — PROGRESS NOTES
ACUTE OCCUPATIONAL THERAPY GOALS:   (Developed with and agreed upon by patient and/or caregiver.)  1. Patient will complete lower body bathing and dressing with MODIFIED INDEPENDENCE and adaptive equipment as needed.     2. Patient will complete toileting with INDEPENDENCE.  3. Patient will complete grooming ADL standing at sink with INDEPENDENCE.  4. Patient will tolerate 25 minutes of OT treatment with 1-2 rest breaks to increase activity tolerance for ADLs.   5. Patient will complete functional transfers with MODIFIED INDEPENDENCE and adaptive equipment as needed.   6. Patient will tolerate 10 minutes BUE exercises to increase strength for safe, functional transfers.     Timeframe: 7 visits     OCCUPATIONAL THERAPY Initial Assessment and Daily Note       OT Visit Days: 1  Acknowledge Orders  Time  OT Charge Capture  Rehab Caseload Tracker      Marc Horton Sr. is a 68 y.o. male   PRIMARY DIAGNOSIS: <principal problem not specified>  PVD (peripheral vascular disease) ([facility]) [I73.9]  CVA (cerebrovascular accident due to intracerebral hemorrhage) ([facility]) [I61.9]  Acute on chronic diastolic congestive heart failure ([facility]) [I50.33]  Stroke determined by clinical assessment ([facility]) [I63.9]  Cerebrovascular accident (CVA), unspecified mechanism ([facility]) [I63.9]  Acute stroke due to ischemia ([facility]) [I63.9]       Reason for Referral: Generalized Muscle Weakness (M62.81)  Difficulty in walking, Not elsewhere classified (R26.2)  Other abnormalities of gait and mobility (R26.89)  History of falling (Z91.81)  Inpatient: Payor: [facility] MEDICARE / Plan: [employer] DUAL COMPLETE / Product Type: *No Product type* /     ASSESSMENT:     REHAB RECOMMENDATIONS:   Recommendation to date pending progress:  Setting:  Outpatient Therapy    Equipment:    To Be Determined--pt has RW and SPC at home     ASSESSMENT:  Mr. Horton is a 67 y/o male presents with L sided numbness and blurry vision, undergoing CVA workup. Pt with hx of R

## 2024-01-11 NOTE — DISCHARGE INSTRUCTIONS
Stroke: After Your Visit    Your Care Instructions    Risk factors for stroke include being overweight, smoking, and sedentary lifestyle. This means that the blood flow to a part of your brain was blocked for some time, which damages the nerve cells in that part of the brain. The part of your body controlled by that part of your brain may not function properly now.    The brain is an amazing organ that can heal itself to some degree. The stroke you had damaged part of your brain, but other parts of your brain may take over in some way for the damaged areas. You have already started this process.    Going home may be hard for you and your family. The more you can try to do for yourself, the better. Remember to take each day one at a time.    Follow-up care is a key part of your treatment and safety. Be sure to make and go to all appointments, and call your doctor if you are having problems. It's also a good idea to know your test results and keep a list of the medicines you take.    How can you care for yourself at home?   Enter a stroke rehabilitation (rehab) program, if your doctor recommends it. Physical, speech, and occupational therapies can help you manage bathing, dressing, eating, and other basics of daily living.  Eat a heart-healthy diet that is low in cholesterol, saturated fat, and salt. Eat lots of fresh fruits and vegetables and foods high in fiber.  Increase your activities slowly. Take short rest breaks when you get tired. Gradually increase the amount you walk. Start out by walking a little more than you did the day before.  Do not drive until your doctor says it is okay.  It is normal to feel sad or depressed after a stroke. If the “blues” last, talk to your doctor.  If you are having problems with urine leakage, go to the bathroom at regular times, including when you first wake up and at bedtime. Also, limit fluids after dinner.  If you are constipated, drink plenty of fluids, enough so that your

## 2024-01-11 NOTE — PLAN OF CARE
Problem: Discharge Planning  Goal: Discharge to home or other facility with appropriate resources  Outcome: Progressing  Flowsheets (Taken 1/10/2024 3753)  Discharge to home or other facility with appropriate resources: Identify barriers to discharge with patient and caregiver     Problem: Pain  Goal: Verbalizes/displays adequate comfort level or baseline comfort level  Outcome: Progressing     Problem: Safety - Adult  Goal: Free from fall injury  Outcome: Progressing     Problem: Skin/Tissue Integrity  Goal: Absence of new skin breakdown  Description: 1.  Monitor for areas of redness and/or skin breakdown  2.  Assess vascular access sites hourly  3.  Every 4-6 hours minimum:  Change oxygen saturation probe site  4.  Every 4-6 hours:  If on nasal continuous positive airway pressure, respiratory therapy assess nares and determine need for appliance change or resting period.  Outcome: Progressing

## 2024-01-11 NOTE — PROGRESS NOTES
ACUTE PHYSICAL THERAPY GOALS:   (Developed with and agreed upon by patient and/or caregiver.)  Pt will perform all bed mobility and transfers Mod (I) c LRAD and no LOB or miss-steps in 7 therapy sessions.  Pt will ambulate 250 ft Mod (I) with use of LRAD, no LOB or miss-steps and breaks as needed in 7 therapy sessions.  Pt will perform standing dynamic balance activities with minimal postural sway in 7 therapy sessions.  Pt will tolerate multiple sets and reps of BLE exercises in 7 therapy sessions.      PHYSICAL THERAPY Initial Assessment, Daily Note, and AM  (Link to Caseload Tracking: PT Visit Days : 1  Acknowledge Orders  Time In/Out  PT Charge Capture  Rehab Caseload Tracker    Marc Horton Sr. is a 68 y.o. male   PRIMARY DIAGNOSIS: <principal problem not specified>  PVD (peripheral vascular disease) ([facility]) [I73.9]  CVA (cerebrovascular accident due to intracerebral hemorrhage) ([facility]) [I61.9]  Acute on chronic diastolic congestive heart failure ([facility]) [I50.33]  Stroke determined by clinical assessment ([facility]) [I63.9]  Cerebrovascular accident (CVA), unspecified mechanism ([facility]) [I63.9]  Acute stroke due to ischemia ([facility]) [I63.9]       Reason for Referral: Other lack of cordination (R27.8)  Difficulty in walking, Not elsewhere classified (R26.2)  Other abnormalities of gait and mobility (R26.89)  Inpatient: Payor: [facility] MEDICARE / Plan: [employer] DUAL COMPLETE / Product Type: *No Product type* /     ASSESSMENT:     REHAB RECOMMENDATIONS:   Recommendation to date pending progress:  Setting:  Home Health Therapy    Equipment:    None  To Be Determined     ASSESSMENT:  Mr. Horton Is a 68 y.o. male presenting to PT after being admitted on 1/10/23 for stroke-like symptoms associated with L-sided Numbness and pain. At time of initial evaluation, pt just below baseline LOF with deficits in strength, transfers, balance, gait and activity tolerance limiting his overall functional mobility. Today, pt performed  S=Supervision, SBA=Standby Assistance, CGA=Contact Guard Assistance,   Min=Minimal Assistance, Mod=Moderate Assistance, Max=Maximal Assistance, Total=Total Assistance, NT=Not Tested    PLAN:   FREQUENCY AND DURATION: 3 times/week for duration of hospital stay or until stated goals are met, whichever comes first.    THERAPY PROGNOSIS: Fair    PROBLEM LIST:   (Skilled intervention is medically necessary to address:)  Decreased Activity Tolerance  Decreased Balance  Decreased Gait Ability  Decreased Strength  Decreased Transfer Abilities  Increased Pain INTERVENTIONS PLANNED:   (Benefits and precautions of physical therapy have been discussed with the patient.)  Therapeutic Activity  Therapeutic Exercise/HEP  Gait Training  Education       TREATMENT:   EVALUATION: LOW COMPLEXITY: (Untimed Charge)    TREATMENT:   Therapeutic Activity (23 Minutes): Therapeutic activity included Scooting, Ambulation on level ground, Sitting balance , and Standing balance to improve functional Activity tolerance, Balance, Mobility, and Strength.    TREATMENT GRID:  N/A    AFTER TREATMENT PRECAUTIONS: Bed/Chair Locked, Call light within reach, Chair, Needs within reach, and RN notified    INTERDISCIPLINARY COLLABORATION:  RN/ PCT and PT/ PTA    EDUCATION: Education Given To: Patient  Education Provided: Role of Therapy    TIME IN/OUT:  Time In: 0820  Time Out: 0845  Minutes: 25    Williams Young, PT

## 2024-01-11 NOTE — PROGRESS NOTES
TRANSFER - IN REPORT:    Verbal report received from Julissa Horton Sr.  being received from ED for routine progression of patient care      Report consisted of patient's Situation, Background, Assessment and   Recommendations(SBAR).     Information from the following report(s) ED Encounter Summary, ED SBAR, Adult Overview, and MAR was reviewed with the receiving nurse.    Opportunity for questions and clarification was provided.      Assessment completed upon patient's arrival to unit and care assumed.
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
## 2024-01-11 NOTE — ED NOTES
Attempted to call report. Nurse said room was not clean. Will call back shortly     Ashley Gallardo RN  01/10/24 2021

## 2024-01-12 ENCOUNTER — HOME HEALTH ADMISSION (OUTPATIENT)
Dept: HOME HEALTH SERVICES | Facility: HOME HEALTH | Age: 69
End: 2024-01-12
Payer: MEDICARE

## 2024-01-12 VITALS
RESPIRATION RATE: 18 BRPM | DIASTOLIC BLOOD PRESSURE: 76 MMHG | BODY MASS INDEX: 33.72 KG/M2 | SYSTOLIC BLOOD PRESSURE: 154 MMHG | HEART RATE: 74 BPM | TEMPERATURE: 98 F | WEIGHT: 249 LBS | HEIGHT: 72 IN | OXYGEN SATURATION: 94 %

## 2024-01-12 LAB
GLUCOSE BLD STRIP.AUTO-MCNC: 146 MG/DL (ref 65–100)
SERVICE CMNT-IMP: ABNORMAL

## 2024-01-12 PROCEDURE — 6370000000 HC RX 637 (ALT 250 FOR IP): Performed by: STUDENT IN AN ORGANIZED HEALTH CARE EDUCATION/TRAINING PROGRAM

## 2024-01-12 PROCEDURE — 6370000000 HC RX 637 (ALT 250 FOR IP): Performed by: FAMILY MEDICINE

## 2024-01-12 PROCEDURE — 2580000003 HC RX 258: Performed by: STUDENT IN AN ORGANIZED HEALTH CARE EDUCATION/TRAINING PROGRAM

## 2024-01-12 PROCEDURE — 82962 GLUCOSE BLOOD TEST: CPT

## 2024-01-12 RX ORDER — EZETIMIBE 10 MG/1
10 TABLET ORAL NIGHTLY
Qty: 30 TABLET | Refills: 0 | Status: SHIPPED | OUTPATIENT
Start: 2024-01-12

## 2024-01-12 RX ADMIN — METOPROLOL TARTRATE 50 MG: 50 TABLET ORAL at 09:52

## 2024-01-12 RX ADMIN — OXYCODONE HYDROCHLORIDE 10 MG: 5 TABLET ORAL at 09:55

## 2024-01-12 RX ADMIN — ISOSORBIDE MONONITRATE 30 MG: 30 TABLET, EXTENDED RELEASE ORAL at 09:52

## 2024-01-12 RX ADMIN — APIXABAN 5 MG: 5 TABLET, FILM COATED ORAL at 09:53

## 2024-01-12 RX ADMIN — SODIUM CHLORIDE, PRESERVATIVE FREE 5 ML: 5 INJECTION INTRAVENOUS at 09:53

## 2024-01-12 RX ADMIN — OXYCODONE HYDROCHLORIDE 10 MG: 5 TABLET ORAL at 03:28

## 2024-01-12 RX ADMIN — GABAPENTIN 300 MG: 300 CAPSULE ORAL at 09:52

## 2024-01-12 RX ADMIN — ASPIRIN 81 MG 81 MG: 81 TABLET ORAL at 09:52

## 2024-01-12 RX ADMIN — PANTOPRAZOLE SODIUM 40 MG: 40 TABLET, DELAYED RELEASE ORAL at 05:13

## 2024-01-12 ASSESSMENT — PAIN SCALES - GENERAL
PAINLEVEL_OUTOF10: 8
PAINLEVEL_OUTOF10: 7
PAINLEVEL_OUTOF10: 0
PAINLEVEL_OUTOF10: 0

## 2024-01-12 ASSESSMENT — PAIN DESCRIPTION - LOCATION
LOCATION: GENERALIZED
LOCATION: LEG

## 2024-01-12 ASSESSMENT — PAIN - FUNCTIONAL ASSESSMENT
PAIN_FUNCTIONAL_ASSESSMENT: PREVENTS OR INTERFERES SOME ACTIVE ACTIVITIES AND ADLS
PAIN_FUNCTIONAL_ASSESSMENT: PREVENTS OR INTERFERES SOME ACTIVE ACTIVITIES AND ADLS

## 2024-01-12 ASSESSMENT — PAIN DESCRIPTION - ONSET: ONSET: GRADUAL

## 2024-01-12 ASSESSMENT — PAIN DESCRIPTION - ORIENTATION
ORIENTATION: LEFT
ORIENTATION: LEFT

## 2024-01-12 ASSESSMENT — PAIN DESCRIPTION - DESCRIPTORS
DESCRIPTORS: ACHING
DESCRIPTORS: DISCOMFORT

## 2024-01-12 ASSESSMENT — PAIN DESCRIPTION - FREQUENCY: FREQUENCY: INTERMITTENT

## 2024-01-12 ASSESSMENT — PAIN DESCRIPTION - PAIN TYPE: TYPE: ACUTE PAIN;CHRONIC PAIN

## 2024-01-12 NOTE — PROGRESS NOTES
Physician Progress Note      PATIENT:               BRITTANY VAZQUEZ  [facility] #:                  [id number]  :                       1955  ADMIT DATE:       1/10/2024 8:34 AM  DISCH DATE:        2024 12:40 PM  RESPONDING  PROVIDER #:        Cuong Kwon DO          QUERY TEXT:    Patient admitted with CVA, noted to have paroxysmal atrial fibrillation and is   maintained on eliquis. If possible, please document in progress notes and   discharge summary if you are evaluating and/or treating any of the following:    The medical record reflects the following:  Risk Factors: 68 y.o. male, CHF, HTN, DM, CVA  Clinical Indicators: Paroxysmal A-fib  Treatment: Eliquis    Thank you,  Amelie Connolly RN, BSN, CDI  [email or web address]  .  Options provided:  -- Secondary hypercoagulable state in a patient with atrial fibrillation  -- Other - I will add my own diagnosis  -- Disagree - Not applicable / Not valid  -- Disagree - Clinically unable to determine / Unknown  -- Refer to Clinical Documentation Reviewer    PROVIDER RESPONSE TEXT:    This patient has secondary hypercoagulable state in a patient with atrial   fibrillation.    Query created by: Amelie Connolly on 2024 1:08 PM      Electronically signed by:  Cuong Kwon DO 2024 3:12 PM

## 2024-01-12 NOTE — PLAN OF CARE
Problem: Discharge Planning  Goal: Discharge to home or other facility with appropriate resources  1/12/2024 1208 by Alissa Avelar RN  Outcome: Completed  1/11/2024 2356 by Olesya Medina RN  Outcome: Progressing     Problem: Pain  Goal: Verbalizes/displays adequate comfort level or baseline comfort level  1/12/2024 1208 by Alissa Avelar RN  Outcome: Completed  1/11/2024 2356 by Olesya Medina RN  Outcome: Progressing     Problem: Safety - Adult  Goal: Free from fall injury  1/12/2024 1208 by Alissa Avelar RN  Outcome: Completed  Flowsheets (Taken 1/12/2024 5954)  Free From Fall Injury: Instruct family/caregiver on patient safety  1/11/2024 2356 by Olesya Medina RN  Outcome: Progressing     Problem: Skin/Tissue Integrity  Goal: Absence of new skin breakdown  Description: 1.  Monitor for areas of redness and/or skin breakdown  2.  Assess vascular access sites hourly  3.  Every 4-6 hours minimum:  Change oxygen saturation probe site  4.  Every 4-6 hours:  If on nasal continuous positive airway pressure, respiratory therapy assess nares and determine need for appliance change or resting period.  1/12/2024 1208 by Alissa Avelar RN  Outcome: Completed  1/11/2024 2356 by Olesya Medina RN  Outcome: Progressing

## 2024-01-12 NOTE — PLAN OF CARE
Problem: Discharge Planning  Goal: Discharge to home or other facility with appropriate resources  1/11/2024 2356 by Olesya Medina RN  Outcome: Progressing  1/11/2024 1031 by Lluvia Cobb RN  Outcome: Progressing  Flowsheets (Taken 1/11/2024 0800)  Discharge to home or other facility with appropriate resources: Identify barriers to discharge with patient and caregiver     Problem: Pain  Goal: Verbalizes/displays adequate comfort level or baseline comfort level  1/11/2024 2356 by Olesya Medina RN  Outcome: Progressing  1/11/2024 1031 by Lluvia Cobb RN  Outcome: Progressing     Problem: Safety - Adult  Goal: Free from fall injury  1/11/2024 2356 by Olesya Medina RN  Outcome: Progressing  1/11/2024 1031 by Lluvia Cobb RN  Outcome: Progressing  Flowsheets (Taken 1/11/2024 1030)  Free From Fall Injury: Instruct family/caregiver on patient safety     Problem: Skin/Tissue Integrity  Goal: Absence of new skin breakdown  Description: 1.  Monitor for areas of redness and/or skin breakdown  2.  Assess vascular access sites hourly  3.  Every 4-6 hours minimum:  Change oxygen saturation probe site  4.  Every 4-6 hours:  If on nasal continuous positive airway pressure, respiratory therapy assess nares and determine need for appliance change or resting period.  1/11/2024 2356 by Olesya Medina RN  Outcome: Progressing  1/11/2024 1031 by Lluvia Cobb RN  Outcome: Progressing

## 2024-01-12 NOTE — DISCHARGE SUMMARY
Hospitalist Discharge Summary   Admit Date:  1/10/2024  8:34 AM   DC Note date: 2024  Name:  Marc Horton Sr.   Age:  68 y.o.  Sex:  male  :  1955   MRN:  [id number]   Room:  [facility]  PCP:  Marisa Singleton DO    Presenting Complaint: Numbness     Initial Admission Diagnosis: PVD (peripheral vascular disease) ([facility]) [I73.9]  CVA (cerebrovascular accident due to intracerebral hemorrhage) ([facility]) [I61.9]  Acute on chronic diastolic congestive heart failure ([facility]) [I50.33]  Stroke determined by clinical assessment ([facility]) [I63.9]  Cerebrovascular accident (CVA), unspecified mechanism ([facility]) [I63.9]  Acute stroke due to ischemia ([facility]) [I63.9]     Problem List for this Hospitalization (present on admission):    Active Problems:    Bipolar disorder ([facility])    Chronic diastolic congestive heart failure ([facility])    Paroxysmal atrial fibrillation ([facility])    Status post coronary artery bypass graft    S/P AVR (aortic valve replacement)    Coronary artery disease of native artery of native heart with stable angina pectoris ([facility])    Type 2 diabetes mellitus ([facility])    Dyslipidemia    HTN (hypertension)    GERD (gastroesophageal reflux disease)    Painful diabetic neuropathy ([facility])    CVA (cerebral vascular accident) ([facility])    Peripheral vascular disease ([facility])  Resolved Problems:    * No resolved hospital problems. *      Hospital Course:      Marc Horton Sr. is a 68 y.o. male with medical history of  cad, chf, htn, history of aortic valve replacement, t2dm, who presented with numbness of the left arm and leg for 1 day.  States he has had a stroke with residual left-sided weakness in the past.   CT scan images including CTA negative for new event-unable to have MRI due to shrapnel.  Seen by neurology and signed off-documented statin allergy per neurology and recommend continue Zetia antiplatelet with aspirin and Eliquis.  Patient felt to have acceleration of residual symptoms old CVA.  No need for neurology  Gradient 7 mmHg    AV VTI 39.8 cm    AV Peak Velocity 1.9 m/s    AV Peak Gradient 14 mmHg    LVOT Mean Gradient 2 mmHg    LVOT VTI 25.5 cm    LVOT Peak Velocity 1.0 m/s    LVOT Peak Gradient 4 mmHg    Body Surface Area 2.4 m2    AV Velocity Ratio 0.53     LVOT:AV VTI Index 0.64    POCT Glucose    Collection Time: 01/11/24 12:21 PM   Result Value Ref Range    POC Glucose 167 (H) 65 - 100 mg/dL    Performed by: Francesca    POCT Glucose    Collection Time: 01/11/24  4:20 PM   Result Value Ref Range    POC Glucose 198 (H) 65 - 100 mg/dL    Performed by: Francesca    POCT Glucose    Collection Time: 01/11/24  9:02 PM   Result Value Ref Range    POC Glucose 140 (H) 65 - 100 mg/dL    Performed by: Bryan    POCT Glucose    Collection Time: 01/12/24  6:39 AM   Result Value Ref Range    POC Glucose 146 (H) 65 - 100 mg/dL    Performed by: Shan        Allergies   Allergen Reactions    Atorvastatin     Other Shortness Of Breath     Oyster    Rosuvastatin Shortness Of Breath    Oyster Extract Other (See Comments) and Nausea And Vomiting     N & V, diarrhea, abdominal cramping       Immunization History   Administered Date(s) Administered    PPD Test 03/15/2023, 01/11/2024    Td vaccine (adult) 05/07/1991       Recent Vital Data:  Patient Vitals for the past 24 hrs:   Temp Pulse Resp BP SpO2   01/12/24 0400 98.1 °F (36.7 °C) 79 -- (!) 157/86 94 %   01/12/24 0358 -- -- 16 -- --   01/12/24 0000 98.4 °F (36.9 °C) 67 16 126/63 94 %   01/11/24 2356 -- -- 18 -- --   01/11/24 2326 -- -- 16 -- --   01/11/24 2000 97.7 °F (36.5 °C) 73 20 137/72 93 %   01/11/24 1600 98.1 °F (36.7 °C) 69 16 114/70 93 %   01/11/24 1200 98 °F (36.7 °C) 77 15 124/69 96 %       Oxygen Therapy  SpO2: 94 %  Pulse via Oximetry: 79 beats per minute  O2 Device: None (Room air)    Estimated body mass index is 33.77 kg/m² as calculated from the following:    Height as of this encounter: 1.829 m (6').    Weight as of this

## 2024-01-14 ENCOUNTER — HOME CARE VISIT (OUTPATIENT)
Dept: SCHEDULING | Facility: HOME HEALTH | Age: 69
End: 2024-01-14
Payer: MEDICARE

## 2024-01-14 VITALS
RESPIRATION RATE: 16 BRPM | SYSTOLIC BLOOD PRESSURE: 142 MMHG | DIASTOLIC BLOOD PRESSURE: 70 MMHG | HEART RATE: 76 BPM | TEMPERATURE: 97.1 F | OXYGEN SATURATION: 95 %

## 2024-01-14 PROCEDURE — G0151 HHCP-SERV OF PT,EA 15 MIN: HCPCS

## 2024-01-15 ENCOUNTER — OFFICE VISIT (OUTPATIENT)
Dept: VASCULAR SURGERY | Age: 69
End: 2024-01-15
Payer: MEDICARE

## 2024-01-15 ENCOUNTER — CARE COORDINATION (OUTPATIENT)
Dept: CARE COORDINATION | Facility: CLINIC | Age: 69
End: 2024-01-15

## 2024-01-15 VITALS
WEIGHT: 255 LBS | BODY MASS INDEX: 34.54 KG/M2 | HEART RATE: 75 BPM | DIASTOLIC BLOOD PRESSURE: 73 MMHG | SYSTOLIC BLOOD PRESSURE: 159 MMHG | OXYGEN SATURATION: 98 % | HEIGHT: 72 IN

## 2024-01-15 DIAGNOSIS — I87.393 CHRONIC VENOUS HYPERTENSION (IDIOPATHIC) WITH OTHER COMPLICATIONS OF BILATERAL LOWER EXTREMITY: Chronic | ICD-10-CM

## 2024-01-15 DIAGNOSIS — I73.9 PAD (PERIPHERAL ARTERY DISEASE) (HCC): Primary | ICD-10-CM

## 2024-01-15 DIAGNOSIS — I87.2 VENOUS STASIS DERMATITIS OF BOTH LOWER EXTREMITIES: ICD-10-CM

## 2024-01-15 PROCEDURE — G8427 DOCREV CUR MEDS BY ELIG CLIN: HCPCS | Performed by: STUDENT IN AN ORGANIZED HEALTH CARE EDUCATION/TRAINING PROGRAM

## 2024-01-15 PROCEDURE — 3078F DIAST BP <80 MM HG: CPT | Performed by: STUDENT IN AN ORGANIZED HEALTH CARE EDUCATION/TRAINING PROGRAM

## 2024-01-15 PROCEDURE — 99204 OFFICE O/P NEW MOD 45 MIN: CPT | Performed by: STUDENT IN AN ORGANIZED HEALTH CARE EDUCATION/TRAINING PROGRAM

## 2024-01-15 PROCEDURE — G8417 CALC BMI ABV UP PARAM F/U: HCPCS | Performed by: STUDENT IN AN ORGANIZED HEALTH CARE EDUCATION/TRAINING PROGRAM

## 2024-01-15 PROCEDURE — 3075F SYST BP GE 130 - 139MM HG: CPT | Performed by: STUDENT IN AN ORGANIZED HEALTH CARE EDUCATION/TRAINING PROGRAM

## 2024-01-15 PROCEDURE — G8484 FLU IMMUNIZE NO ADMIN: HCPCS | Performed by: STUDENT IN AN ORGANIZED HEALTH CARE EDUCATION/TRAINING PROGRAM

## 2024-01-15 NOTE — CARE COORDINATION
Care Transitions Initial Follow Up Call    Call within 2 business days of discharge: Yes  Patient: Marc Horton Sr. Patient : 1955   MRN: [id number]  Reason for Admission: Numbness  Discharge Date: 24 RARS: Readmission Risk Score: 23.7  CTN attempted to contact patient for transitions of care outreach after hospital discharge on 24. Unable to reach at this time. HIPPA compliant message left with contact information requesting a return call. Will try again later.   Last Discharge Facility       Date Complaint Diagnosis Description Type Department Provider    1/10/24 Numbness Stroke determined by clinical assessment (HCC) ... ED to Hosp-Admission (Discharged) (ADMITTED) [id number] Cuong Kwon DO; Elías Pena...        Was this an external facility discharge? No Discharge Facility: [facility]  Follow Up  Future Appointments   Date Time Provider Department Center   1/15/2024  9:45 AM Leeroy Christensen MD [facility] AMB   2024 10:20 AM Marisa Singleton DO [facility] AMB   3/12/2024  9:30 AM Kirill Pride DO [facility] AMB   Shawna Agustin RN

## 2024-01-15 NOTE — PROGRESS NOTES
for uncontrolled diabetic symptoms including polyuria, polydipsia and poor wound healing.     Physical Examination:   Height: 1.829 m (6'), Weight - Scale: 115.7 kg (255 lb), BP: (!) 159/73    General: No acute distress  HENT: AT  CV: Regular rhythm.    LUNG: No respiratory distress on RA  Abdominal: No distension.  Extremities:  Bilateral edema   Ulcerations with clean base without drainage  Vascular:   Radial:  L    2+     R    2+    Femoral: L    2+     R    2+  DP:   L    dop     R    2+     Past Medical History:   Diagnosis Date    Bipolar disorder ([facility])     CAD (coronary artery disease)     CHF (congestive heart failure) ([facility])     Chronic back pain     Hyperlipidemia     Hypertension     Neuropathy     Subdural hematoma ([facility]) 12/30/2022    Type 2 diabetes mellitus without complication ([facility])        Current Outpatient Medications   Medication Sig Dispense Refill    aspirin 325 MG tablet Take 2 tablets by mouth three times daily      pravastatin (PRAVACHOL) 40 MG tablet Take 1 tablet by mouth daily      ezetimibe (ZETIA) 10 MG tablet Take 1 tablet by mouth nightly 30 tablet 0    lisinopril (PRINIVIL;ZESTRIL) 20 MG tablet Take 1 tablet by mouth in the morning and at bedtime for 360 doses 90 tablet 3    isosorbide mononitrate (IMDUR) 30 MG extended release tablet Take 1 tablet by mouth daily 30 tablet 3    hydrALAZINE (APRESOLINE) 50 MG tablet Take 1 tablet by mouth every 8 hours 90 tablet 3    metoprolol tartrate (LOPRESSOR) 50 MG tablet Take 1 tablet by mouth 2 times daily 60 tablet 3    amLODIPine (NORVASC) 5 MG tablet Take 1 tablet by mouth daily 30 tablet 3    apixaban (ELIQUIS) 5 MG TABS tablet Take 1 tablet by mouth 2 times daily 60 tablet 6    metFORMIN (GLUCOPHAGE) 850 MG tablet Take 1 tablet by mouth daily (with breakfast) 60 tablet 1    gabapentin (NEURONTIN) 300 MG capsule Take 1 capsule by mouth 3 times daily for 180 days. Intended supply: 30 days 90 capsule 5    pantoprazole (PROTONIX) 40 MG tablet

## 2024-01-16 ENCOUNTER — CARE COORDINATION (OUTPATIENT)
Dept: CARE COORDINATION | Facility: CLINIC | Age: 69
End: 2024-01-16

## 2024-01-16 NOTE — CARE COORDINATION
discharged with a pulse oximeter? no  Non-face-to-face services provided:  Scheduled appointment with PCP-Patient declines follow up with PCP and is requesting a new PCP. Patient spoke with [facility] Physician referral line and staff gave him contact info for [facility]. Patient has a new patient appointment scheduled for 3/11/24 at 9:20 am.   Scheduled appointment with Specialist-Patient already completed follow up with Vascular on 1/15/24  Obtained and reviewed discharge summary and/or continuity of care documents  Education of patient/family/caregiver/guardian to support self-management-symptoms management, medication management, management of medical conditions, safety precautions, self monitoring and when to seek care. CTN encouraged patient to monitor daily weights and FSBS.    Patient encouraged to monitor and record weights daily and report a gain of 2 lbs in a day or 5 lbs in a week to MD for review. Patient encouraged to attend follow up appointments. Patient was discharged with [facility] and patient reports he is already working with staff.   Offered patient enrollment in the Remote Patient Monitoring (RPM) program for in-home monitoring: Patient declined.  Care Transitions 24 Hour Call    Schedule Follow Up Appointment with PCP: Completed  Do you have a copy of your discharge instructions?: Yes  Do you have all of your prescriptions and are they filled?: Yes  Have you been contacted by a [facility] Pharmacist?: No  Have you scheduled your follow up appointment?: Yes  How are you going to get to your appointment?: Car - drive self  Patient DME: Straight cane  Do you have support at home?: Grandchild, Partner/Spouse/SO  Do you feel like you have everything you need to keep you well at home?: Yes  Are you an active caregiver in your home?: Yes  Care Transitions Interventions     Discussed follow-up appointments. If no appointment was previously scheduled, appointment scheduling offered: Yes.   Is

## 2024-01-16 NOTE — PROGRESS NOTES
Physician Progress Note      PATIENT:               BRITTANY VAZQUEZ  CSN #:                  [id number]  :                       1955  ADMIT DATE:       1/10/2024 8:34 AM  DISCH DATE:        2024 12:40 PM  RESPONDING  PROVIDER #:        Cuong Kwon DO          QUERY TEXT:    Pt admitted with CVA and has CHF documented. If possible, please document in   progress notes and discharge summary further specificity regarding the type   and acuity of CHF:    The medical record reflects the following:  Risk Factors: 68 y.o. male with medical history of cad, chf, htn, history of   aortic valve replacement  Clinical Indicators:  Chronic diastolic congestive heart failure listed under   active problems.  Acute on chronic diastolic congestive heart failure listed under initial   admission diagnosis.  Pro-BNP 1,036  Echo: Left Ventricle: Normal left ventricular systolic function with a   visually estimated EF of 60 - 65%. Left ventricle size is normal. Normal wall   thickness. Normal wall motion. Normal diastolic function.  Treatment: 60mg IV Lasix once, daily weights, strict I&O's, Echo    Thank you,  Amelie Connolly RN, BSN, [facility]  [email or web address]  .  Options provided:  -- Acute on Chronic Diastolic CHF/HFpEF  -- Chronic Diastolic CHF/HFpEF  -- Other - I will add my own diagnosis  -- Disagree - Not applicable / Not valid  -- Disagree - Clinically unable to determine / Unknown  -- Refer to Clinical Documentation Reviewer    PROVIDER RESPONSE TEXT:    This patient is in acute on chronic diastolic CHF/HFpEF.    Query created by: Amelie Connolly on 2024 8:47 AM      Electronically signed by:  Cuong Kwon DO 2024 11:08 AM

## 2024-01-17 ENCOUNTER — HOME CARE VISIT (OUTPATIENT)
Dept: SCHEDULING | Facility: HOME HEALTH | Age: 69
End: 2024-01-17
Payer: MEDICARE

## 2024-01-17 VITALS
DIASTOLIC BLOOD PRESSURE: 80 MMHG | TEMPERATURE: 97.1 F | RESPIRATION RATE: 16 BRPM | HEART RATE: 60 BPM | SYSTOLIC BLOOD PRESSURE: 170 MMHG | OXYGEN SATURATION: 97 %

## 2024-01-17 PROCEDURE — G0157 HHC PT ASSISTANT EA 15: HCPCS

## 2024-01-18 ENCOUNTER — HOME CARE VISIT (OUTPATIENT)
Dept: SCHEDULING | Facility: HOME HEALTH | Age: 69
End: 2024-01-18
Payer: MEDICARE

## 2024-01-18 ENCOUNTER — APPOINTMENT (OUTPATIENT)
Dept: CT IMAGING | Age: 69
End: 2024-01-18
Payer: MEDICARE

## 2024-01-18 ENCOUNTER — HOSPITAL ENCOUNTER (EMERGENCY)
Age: 69
Discharge: LEFT BEFORE DISCHARGE | End: 2024-01-18
Attending: EMERGENCY MEDICINE
Payer: MEDICARE

## 2024-01-18 VITALS
RESPIRATION RATE: 19 BRPM | DIASTOLIC BLOOD PRESSURE: 85 MMHG | SYSTOLIC BLOOD PRESSURE: 156 MMHG | HEART RATE: 69 BPM | TEMPERATURE: 98.4 F | OXYGEN SATURATION: 97 %

## 2024-01-18 DIAGNOSIS — I73.9 PVD (PERIPHERAL VASCULAR DISEASE) (HCC): ICD-10-CM

## 2024-01-18 DIAGNOSIS — I87.2 CHRONIC VENOUS STASIS DERMATITIS OF BOTH LOWER EXTREMITIES: ICD-10-CM

## 2024-01-18 DIAGNOSIS — R60.9 PERIPHERAL EDEMA: Primary | ICD-10-CM

## 2024-01-18 LAB
ALBUMIN SERPL-MCNC: 3.4 G/DL (ref 3.2–4.6)
ALBUMIN/GLOB SERPL: 0.8 (ref 0.4–1.6)
ALP SERPL-CCNC: 60 U/L (ref 50–136)
ALT SERPL-CCNC: 23 U/L (ref 12–65)
ANION GAP SERPL CALC-SCNC: 2 MMOL/L (ref 2–11)
AST SERPL-CCNC: 15 U/L (ref 15–37)
BASOPHILS # BLD: 0.1 K/UL (ref 0–0.2)
BASOPHILS NFR BLD: 1 % (ref 0–2)
BILIRUB SERPL-MCNC: 0.5 MG/DL (ref 0.2–1.1)
BUN SERPL-MCNC: 14 MG/DL (ref 8–23)
CALCIUM SERPL-MCNC: 9 MG/DL (ref 8.3–10.4)
CHLORIDE SERPL-SCNC: 110 MMOL/L (ref 103–113)
CO2 SERPL-SCNC: 22 MMOL/L (ref 21–32)
CREAT SERPL-MCNC: 1 MG/DL (ref 0.8–1.5)
CRP SERPL-MCNC: 0.8 MG/DL (ref 0–0.9)
DIFFERENTIAL METHOD BLD: ABNORMAL
EOSINOPHIL # BLD: 0.2 K/UL (ref 0–0.8)
EOSINOPHIL NFR BLD: 3 % (ref 0.5–7.8)
ERYTHROCYTE [DISTWIDTH] IN BLOOD BY AUTOMATED COUNT: 15.9 % (ref 11.9–14.6)
GLOBULIN SER CALC-MCNC: 4.2 G/DL (ref 2.8–4.5)
GLUCOSE SERPL-MCNC: 143 MG/DL (ref 65–100)
HCT VFR BLD AUTO: 37.6 % (ref 41.1–50.3)
HGB BLD-MCNC: 11.8 G/DL (ref 13.6–17.2)
IMM GRANULOCYTES # BLD AUTO: 0 K/UL (ref 0–0.5)
IMM GRANULOCYTES NFR BLD AUTO: 0 % (ref 0–5)
LYMPHOCYTES # BLD: 2.1 K/UL (ref 0.5–4.6)
LYMPHOCYTES NFR BLD: 30 % (ref 13–44)
MCH RBC QN AUTO: 26.2 PG (ref 26.1–32.9)
MCHC RBC AUTO-ENTMCNC: 31.4 G/DL (ref 31.4–35)
MCV RBC AUTO: 83.4 FL (ref 82–102)
MONOCYTES # BLD: 0.6 K/UL (ref 0.1–1.3)
MONOCYTES NFR BLD: 9 % (ref 4–12)
NEUTS SEG # BLD: 4 K/UL (ref 1.7–8.2)
NEUTS SEG NFR BLD: 57 % (ref 43–78)
NRBC # BLD: 0 K/UL (ref 0–0.2)
NT PRO BNP: 1135 PG/ML (ref 5–125)
PLATELET # BLD AUTO: 201 K/UL (ref 150–450)
PMV BLD AUTO: 9.7 FL (ref 9.4–12.3)
POTASSIUM SERPL-SCNC: 3.8 MMOL/L (ref 3.5–5.1)
PROT SERPL-MCNC: 7.6 G/DL (ref 6.3–8.2)
RBC # BLD AUTO: 4.51 M/UL (ref 4.23–5.6)
SODIUM SERPL-SCNC: 134 MMOL/L (ref 136–146)
WBC # BLD AUTO: 7 K/UL (ref 4.3–11.1)

## 2024-01-18 PROCEDURE — 85025 COMPLETE CBC W/AUTO DIFF WBC: CPT

## 2024-01-18 PROCEDURE — G0299 HHS/HOSPICE OF RN EA 15 MIN: HCPCS

## 2024-01-18 PROCEDURE — 6360000004 HC RX CONTRAST MEDICATION: Performed by: EMERGENCY MEDICINE

## 2024-01-18 PROCEDURE — 83880 ASSAY OF NATRIURETIC PEPTIDE: CPT

## 2024-01-18 PROCEDURE — 86140 C-REACTIVE PROTEIN: CPT

## 2024-01-18 PROCEDURE — 80053 COMPREHEN METABOLIC PANEL: CPT

## 2024-01-18 PROCEDURE — 99285 EMERGENCY DEPT VISIT HI MDM: CPT

## 2024-01-18 PROCEDURE — 93005 ELECTROCARDIOGRAM TRACING: CPT | Performed by: EMERGENCY MEDICINE

## 2024-01-18 PROCEDURE — G0152 HHCP-SERV OF OT,EA 15 MIN: HCPCS

## 2024-01-18 PROCEDURE — 75635 CT ANGIO ABDOMINAL ARTERIES: CPT

## 2024-01-18 RX ORDER — FUROSEMIDE 10 MG/ML
40 INJECTION INTRAMUSCULAR; INTRAVENOUS ONCE
Status: DISCONTINUED | OUTPATIENT
Start: 2024-01-18 | End: 2024-01-18 | Stop reason: HOSPADM

## 2024-01-18 RX ADMIN — IOPAMIDOL 100 ML: 755 INJECTION, SOLUTION INTRAVENOUS at 20:04

## 2024-01-18 ASSESSMENT — ENCOUNTER SYMPTOMS
DIARRHEA: 0
VOMITING: 0
COUGH: 0
ABDOMINAL PAIN: 0
SHORTNESS OF BREATH: 0
NAUSEA: 0

## 2024-01-18 ASSESSMENT — PAIN SCALES - GENERAL: PAINLEVEL_OUTOF10: 10

## 2024-01-18 ASSESSMENT — PAIN DESCRIPTION - DESCRIPTORS: DESCRIPTORS: SORE

## 2024-01-18 ASSESSMENT — PAIN - FUNCTIONAL ASSESSMENT: PAIN_FUNCTIONAL_ASSESSMENT: 0-10

## 2024-01-18 ASSESSMENT — PAIN DESCRIPTION - ORIENTATION: ORIENTATION: RIGHT;LEFT

## 2024-01-18 ASSESSMENT — PAIN DESCRIPTION - LOCATION: LOCATION: LEG

## 2024-01-18 NOTE — ED TRIAGE NOTES
Pt ambulatory to ER from home, reports BLE redness and infection in lower legs. Released from  Hospital last Friday after having a stroke. Was sent to a MD and had dopplers, reports noted blockages.

## 2024-01-18 NOTE — ED PROVIDER NOTES
Emergency Department Provider Note       PCP: Marisa Singleton DO   Age: 68 y.o.   Sex: male     DISPOSITION Spicer 01/18/2024 08:44:13 PM       ICD-10-CM    1. Peripheral edema  R60.9       2. PVD (peripheral vascular disease) ([facility])  I73.9       3. Chronic venous stasis dermatitis of both lower extremities  I87.2           Medical Decision Making     Complexity of Problems Addressed:  1 or more chronic illnesses with a severe exacerbation or progression.  1 or more acute illnesses that pose a threat to life or bodily function.     Data Reviewed and Analyzed:   I independently ordered and reviewed each unique test.  I reviewed external records: previous lab results from outside ED.  I reviewed external records: previous imaging study including radiologist interpretation.   Arterial vascular duplex ultrasound bilateral lower extremities from 1/11/2024 reviewed.  SEGMENTAL BP:  The right and left lower extremity segmental blood pressures are  fairly symmetric and unremarkable.      AHMET:  Right = 1.8             Left = 0.68     RIGHT LOWER EXTREMITY: Flow waveform and peak systolic velocity of the right  common femoral profundofemoral arteries are normal. Diffuse disease of the right superficial femoral artery with a moderate stenosis of the distal right SFA. Right distal SFA velocity is 245 cm/s. Right popliteal artery is diffusely diseased without high-grade stenosis. Mild disease of the right tibial arteries.     LEFT LOWER EXTREMITY: Patent left common femoral profundofemoral arteries. Moderate disease of the left superficial femoral artery with a mid left superficial femoral artery stenosis. Peak stalk velocity 198 cm/s. Patent left popliteal artery. Severe peroneal artery disease. Moderate tibial artery disease.     ABDOMEN: Abdominal aorta not well seen due to overlying bowel gas.     IMPRESSION:  Moderate right peripheral arterial disease with moderate stenosis of distal right superficial femoral artery.

## 2024-01-19 ENCOUNTER — CARE COORDINATION (OUTPATIENT)
Dept: CARE COORDINATION | Facility: CLINIC | Age: 69
End: 2024-01-19

## 2024-01-19 ENCOUNTER — HOME CARE VISIT (OUTPATIENT)
Dept: HOME HEALTH SERVICES | Facility: HOME HEALTH | Age: 69
End: 2024-01-19
Payer: MEDICARE

## 2024-01-19 VITALS
RESPIRATION RATE: 16 BRPM | TEMPERATURE: 98 F | DIASTOLIC BLOOD PRESSURE: 80 MMHG | OXYGEN SATURATION: 98 % | HEART RATE: 88 BPM | SYSTOLIC BLOOD PRESSURE: 168 MMHG

## 2024-01-19 LAB
EKG ATRIAL RATE: 75 BPM
EKG DIAGNOSIS: NORMAL
EKG P AXIS: 69 DEGREES
EKG P-R INTERVAL: 154 MS
EKG Q-T INTERVAL: 390 MS
EKG QRS DURATION: 82 MS
EKG QTC CALCULATION (BAZETT): 435 MS
EKG R AXIS: 63 DEGREES
EKG T AXIS: 52 DEGREES
EKG VENTRICULAR RATE: 75 BPM

## 2024-01-19 ASSESSMENT — ENCOUNTER SYMPTOMS
PAIN LOCATION - PAIN QUALITY: ACHE
BOWEL INCONTINENCE: 1
TROUBLE SWALLOWING: 1
SKIN LESIONS: 1
DYSPNEA ACTIVITY LEVEL: AFTER AMBULATING LESS THAN 10 FT

## 2024-01-19 NOTE — DISCHARGE INSTRUCTIONS
You are leaving AGAINST MEDICAL ADVICE.  You are leaving before the CTA has been read of your bilateral lower extremities.  Follow-up with primary care physician.  Return if symptoms worsen

## 2024-01-19 NOTE — ED NOTES
Pt called for nurse. States he has been waiting and no one has been to see him. RN went to get doctor, who came to the bedside to advise that we were waiting on his CTA results. Pt was belligerent and cursing, would not listen to provider explanation. Per request IV was pulled. Staff unable to get a fresh set of vitals and pt and young (little) girl left. Charge notified. Pt refused to sign AMA paperwork.     Nubia Juarez RN  01/18/24 [id number]

## 2024-01-19 NOTE — CARE COORDINATION
Care Transitions Follow Up Call  Patient: Marc Horton Sr.  Patient : 1955   MRN: [id number]  Reason for Admission:  Stroke determined by clinical assessment  Discharge Date: 24 RARS: Readmission Risk Score: 23.7  Patient went to [facility] ED for evaluation of BLE redness, but he left AMA. CTN attempted outreach, UTR.  Follow Up  Future Appointments   Date Time Provider Department Center   2/15/2024 10:15 AM Leeroy Christensen MD [facility]   3/11/2024  9:20 AM Meliza Martino PA-C [facility]   3/12/2024  9:30 AM Kirill Pride DO [facility] AM[facility]     Care Transitions Subsequent and Final Call    Subsequent and Final Calls  Care Transitions Interventions  Other Interventions:         Shawna Agustin RN

## 2024-01-20 ENCOUNTER — HOME CARE VISIT (OUTPATIENT)
Dept: HOME HEALTH SERVICES | Facility: HOME HEALTH | Age: 69
End: 2024-01-20
Payer: MEDICARE

## 2024-01-22 VITALS
SYSTOLIC BLOOD PRESSURE: 134 MMHG | OXYGEN SATURATION: 98 % | TEMPERATURE: 97.9 F | DIASTOLIC BLOOD PRESSURE: 76 MMHG | HEART RATE: 76 BPM | RESPIRATION RATE: 18 BRPM

## 2024-01-22 ASSESSMENT — ENCOUNTER SYMPTOMS
STOOL DESCRIPTION: FORMED
PAIN LOCATION - PAIN QUALITY: ACHING
DYSPNEA ACTIVITY LEVEL: AFTER AMBULATING MORE THAN 20 FT

## 2024-01-26 ENCOUNTER — APPOINTMENT (OUTPATIENT)
Dept: GENERAL RADIOLOGY | Age: 69
End: 2024-01-26
Payer: MEDICARE

## 2024-01-26 ENCOUNTER — HOSPITAL ENCOUNTER (EMERGENCY)
Age: 69
Discharge: HOME | End: 2024-01-26
Attending: EMERGENCY MEDICINE
Payer: MEDICARE

## 2024-01-26 VITALS
OXYGEN SATURATION: 95 % | HEART RATE: 59 BPM | BODY MASS INDEX: 38.06 KG/M2 | WEIGHT: 281 LBS | RESPIRATION RATE: 21 BRPM | DIASTOLIC BLOOD PRESSURE: 77 MMHG | TEMPERATURE: 97.7 F | HEIGHT: 72 IN | SYSTOLIC BLOOD PRESSURE: 142 MMHG

## 2024-01-26 DIAGNOSIS — R06.02 SHORTNESS OF BREATH: Primary | ICD-10-CM

## 2024-01-26 DIAGNOSIS — I50.9 ACUTE ON CHRONIC CONGESTIVE HEART FAILURE, UNSPECIFIED HEART FAILURE TYPE (HCC): ICD-10-CM

## 2024-01-26 DIAGNOSIS — I20.9 ANGINA PECTORIS (HCC): ICD-10-CM

## 2024-01-26 LAB
ANION GAP SERPL CALC-SCNC: 3 MMOL/L (ref 2–11)
BASOPHILS # BLD: 0.1 K/UL (ref 0–0.2)
BASOPHILS NFR BLD: 1 % (ref 0–2)
BUN SERPL-MCNC: 25 MG/DL (ref 8–23)
CALCIUM SERPL-MCNC: 8.9 MG/DL (ref 8.3–10.4)
CHLORIDE SERPL-SCNC: 108 MMOL/L (ref 103–113)
CO2 SERPL-SCNC: 25 MMOL/L (ref 21–32)
CREAT SERPL-MCNC: 1 MG/DL (ref 0.8–1.5)
DIFFERENTIAL METHOD BLD: ABNORMAL
EOSINOPHIL # BLD: 0.1 K/UL (ref 0–0.8)
EOSINOPHIL NFR BLD: 2 % (ref 0.5–7.8)
ERYTHROCYTE [DISTWIDTH] IN BLOOD BY AUTOMATED COUNT: 15.6 % (ref 11.9–14.6)
GLUCOSE SERPL-MCNC: 114 MG/DL (ref 65–100)
HCT VFR BLD AUTO: 33.1 % (ref 41.1–50.3)
HGB BLD-MCNC: 10.5 G/DL (ref 13.6–17.2)
IMM GRANULOCYTES # BLD AUTO: 0 K/UL (ref 0–0.5)
IMM GRANULOCYTES NFR BLD AUTO: 0 % (ref 0–5)
LYMPHOCYTES # BLD: 2.2 K/UL (ref 0.5–4.6)
LYMPHOCYTES NFR BLD: 31 % (ref 13–44)
MCH RBC QN AUTO: 26.3 PG (ref 26.1–32.9)
MCHC RBC AUTO-ENTMCNC: 31.7 G/DL (ref 31.4–35)
MCV RBC AUTO: 82.8 FL (ref 82–102)
MONOCYTES # BLD: 0.7 K/UL (ref 0.1–1.3)
MONOCYTES NFR BLD: 10 % (ref 4–12)
NEUTS SEG # BLD: 4.1 K/UL (ref 1.7–8.2)
NEUTS SEG NFR BLD: 56 % (ref 43–78)
NRBC # BLD: 0 K/UL (ref 0–0.2)
PLATELET # BLD AUTO: 177 K/UL (ref 150–450)
PLATELET COMMENT: ABNORMAL
PMV BLD AUTO: 10.4 FL (ref 9.4–12.3)
POTASSIUM SERPL-SCNC: 3.9 MMOL/L (ref 3.5–5.1)
RBC # BLD AUTO: 4 M/UL (ref 4.23–5.6)
RBC MORPH BLD: ABNORMAL
RBC MORPH BLD: ABNORMAL
SODIUM SERPL-SCNC: 136 MMOL/L (ref 136–146)
TROPONIN I SERPL HS-MCNC: 17.6 PG/ML (ref 0–14)
TROPONIN I SERPL HS-MCNC: 17.7 PG/ML (ref 0–14)
WBC # BLD AUTO: 7.2 K/UL (ref 4.3–11.1)
WBC MORPH BLD: ABNORMAL

## 2024-01-26 PROCEDURE — 96374 THER/PROPH/DIAG INJ IV PUSH: CPT

## 2024-01-26 PROCEDURE — 94760 N-INVAS EAR/PLS OXIMETRY 1: CPT

## 2024-01-26 PROCEDURE — 80048 BASIC METABOLIC PNL TOTAL CA: CPT

## 2024-01-26 PROCEDURE — 6370000000 HC RX 637 (ALT 250 FOR IP): Performed by: EMERGENCY MEDICINE

## 2024-01-26 PROCEDURE — 99285 EMERGENCY DEPT VISIT HI MDM: CPT

## 2024-01-26 PROCEDURE — 93005 ELECTROCARDIOGRAM TRACING: CPT | Performed by: EMERGENCY MEDICINE

## 2024-01-26 PROCEDURE — 6360000002 HC RX W HCPCS: Performed by: EMERGENCY MEDICINE

## 2024-01-26 PROCEDURE — 84484 ASSAY OF TROPONIN QUANT: CPT

## 2024-01-26 PROCEDURE — 71046 X-RAY EXAM CHEST 2 VIEWS: CPT

## 2024-01-26 PROCEDURE — 85025 COMPLETE CBC W/AUTO DIFF WBC: CPT

## 2024-01-26 RX ORDER — FUROSEMIDE 10 MG/ML
40 INJECTION INTRAMUSCULAR; INTRAVENOUS ONCE
Status: COMPLETED | OUTPATIENT
Start: 2024-01-26 | End: 2024-01-26

## 2024-01-26 RX ADMIN — FUROSEMIDE 40 MG: 10 INJECTION, SOLUTION INTRAMUSCULAR; INTRAVENOUS at 18:21

## 2024-01-26 RX ADMIN — NITROGLYCERIN 0.5 INCH: 20 OINTMENT TOPICAL at 18:21

## 2024-01-26 ASSESSMENT — PAIN SCALES - GENERAL: PAINLEVEL_OUTOF10: 8

## 2024-01-26 ASSESSMENT — PAIN DESCRIPTION - LOCATION: LOCATION: CHEST

## 2024-01-26 ASSESSMENT — PAIN DESCRIPTION - ORIENTATION: ORIENTATION: MID

## 2024-01-26 ASSESSMENT — PAIN DESCRIPTION - DESCRIPTORS: DESCRIPTORS: STABBING

## 2024-01-26 ASSESSMENT — PAIN - FUNCTIONAL ASSESSMENT: PAIN_FUNCTIONAL_ASSESSMENT: 0-10

## 2024-01-26 ASSESSMENT — LIFESTYLE VARIABLES
HOW MANY STANDARD DRINKS CONTAINING ALCOHOL DO YOU HAVE ON A TYPICAL DAY: 1 OR 2
HOW OFTEN DO YOU HAVE A DRINK CONTAINING ALCOHOL: MONTHLY OR LESS

## 2024-01-26 NOTE — ED PROVIDER NOTES
Emergency Department Provider Note       PCP: Marisa Singleton DO   Age: 68 y.o.   Sex: male     DISPOSITION Decision To Discharge 01/26/2024 09:59:35 PM       ICD-10-CM    1. Shortness of breath  R06.02       2. Acute on chronic congestive heart failure, unspecified heart failure type (HCC)  I50.9       3. Angina pectoris (HCC)  I20.9           Medical Decision Making     Complexity of Problems Addressed:  1 or more acute illnesses that pose a threat to life or bodily function.     Data Reviewed and Analyzed:  I independently ordered and reviewed each unique test.             ED EKG Interpretation  EKG was interpreted in the absence of a cardiologist.   Rate: 60      EKG Interpretation:sinus rhythm, no evidence of arrhythmia      ST Segments:Normal ST segments - NO STEMI    Discussion of management or test interpretation.  Patient is doing much better he feels better after the diuresis.  He is no longer having any difficulty breathing he was never hypoxic his room air sat now is 97%.  Patient does have significant cardiac disease but on review of his last cath medical management was the recommended step.  Patient comfortable going home prefers this and agrees to follow-up with [facility] cardiology if symptoms worsen in the meantime he will return here.  He will double his dose of Lasix over the weekend.           Risk of Complications and/or Morbidity of Patient Management:  Prescription drug management performed.  Shared medical decision making was utilized in creating the patients health plan today.        Is this patient to be included in the SEP-1 core measure due to severe sepsis or septic shock? No Exclusion criteria - the patient is NOT to be included for SEP-1 Core Measure due to: Infection is not suspected         History      Patient has history of extensive coronary artery disease and had a CABG previously.  He also has hypertension, A-fib, and congestive heart failure. he also has had recent

## 2024-01-26 NOTE — ED TRIAGE NOTES
Pt coming from home via [facility]. Pt c/o stabbing midsternal chest pain that is worse with movement. Pt has had little urine output today, pt takes lasix and has had increased leg swelling for around a week.     Pt hx CABG    Ems vitals; /80, 97% RA, , HR 60 and sinus.   Ems gave 0.4mg NTG SL, 81mg ASA prior to EMS arrival, pt said little relief.

## 2024-01-27 LAB
EKG ATRIAL RATE: 59 BPM
EKG DIAGNOSIS: NORMAL
EKG P AXIS: 31 DEGREES
EKG P-R INTERVAL: 156 MS
EKG Q-T INTERVAL: 437 MS
EKG QRS DURATION: 92 MS
EKG QTC CALCULATION (BAZETT): 437 MS
EKG R AXIS: 44 DEGREES
EKG T AXIS: 59 DEGREES
EKG VENTRICULAR RATE: 60 BPM

## 2024-01-27 PROCEDURE — 93010 ELECTROCARDIOGRAM REPORT: CPT | Performed by: INTERNAL MEDICINE

## 2024-01-27 NOTE — ED NOTES
I have reviewed discharge instructions with the patient.  The patient verbalized understanding.    Patient left ED via Discharge Method: ambulatory to Home with [employer].    Opportunity for questions and clarification provided.       Patient given 0 scripts.         To continue your aftercare when you leave the hospital, you may receive an automated call from our care team to check in on how you are doing.  This is a free service and part of our promise to provide the best care and service to meet your aftercare needs.” If you have questions, or wish to unsubscribe from this service please call [phone].  Thank you for Choosing our [facility] Emergency Department.       Goyo Moody RN  01/26/24 [id number]

## 2024-01-29 ENCOUNTER — CARE COORDINATION (OUTPATIENT)
Dept: CARE COORDINATION | Facility: CLINIC | Age: 69
End: 2024-01-29

## 2024-01-29 NOTE — CARE COORDINATION
Ambulatory Care Coordination Note  2024    Patient Current Location:  [place]    ACM contacted the patient by telephone. Verified name and  with patient as identifiers. Provided introduction to self, and explanation of the ACM role.     ACM: Gale Ruiz RN    Challenges to be reviewed by the provider   Additional needs identified to be addressed with provider: Yes  Notified pcp of er admission and need for follow up               Method of communication with provider: staff message.    See assessment below. Ccm outreached to patient. Patient agreeable to ccm services. Reviewed with patient discharge summary. Patient verbalized understanding. No new medications ordered. Patient states no sob or chest pain at this time. Patient reports multiple falls within the year. Reviewed with patient falls precautions and safety precautions. Patient verbalized understanding. Discussed importance of keeping floors free of items and color. Patient verbalized understanding. Patient states he is at cardiologist right now. Ccm notified pcp of er admission. Discussed with patient that he would benefit from RPM program. Patient declined at this time. Patient states no questions or concerns. [facility] discussed that I would outreach next week. Patient agreeable.     Offered patient enrollment in the Remote Patient Monitoring (RPM) program for in-home monitoring: Patient declined.    Ambulatory Care Coordination Assessment    Care Coordination Protocol  Referral from Primary Care Provider: No  Week 1 - Initial Assessment     Do you have all of your prescriptions and are they filled?: Yes  Barriers to medication adherence: None  Are you able to afford your medications?: Yes  How often do you have trouble taking your medications the way you have been told to take them?: I always take them as prescribed.     Do you have Home O2 Therapy?: No      Ability to seek help/take action for Emergent Urgent situations i.e. fire, crime,

## 2024-02-02 ENCOUNTER — TELEPHONE (OUTPATIENT)
Dept: FAMILY MEDICINE CLINIC | Facility: CLINIC | Age: 69
End: 2024-02-02

## 2024-02-02 NOTE — TELEPHONE ENCOUNTER
Care Transitions Initial Follow Up Call    Outreach made within 2 business days of discharge: Yes    Patient: Marc Horton Sr. Patient : 1955   MRN: [id number]  Reason for Admission: There are no discharge diagnoses documented for the most recent discharge.  Discharge Date: 24       Spoke with: patient    Discharge department/facility:     [facility] Interactive Patient Contact:  Was patient able to fill all prescriptions: Yes  Was patient instructed to bring all medications to the follow-up visit: Yes  Is patient taking all medications as directed in the discharge summary? Yes  Does patient understand their discharge instructions: Yes  Does patient have questions or concerns that need addressed prior to 7-14 day follow up office visit: yes -    Scheduled appointment with PCP within 7-14 days    Follow Up  Future Appointments   Date Time Provider Department Center   2024 10:00 AM Meliza Martino PA-C [facility]   2/15/2024 10:15 AM Leeroy Christensen MD [facility] AM[facility]   3/12/2024  9:30 AM Kirill Pride DO [facility] AM[facility]       Leanne Malone

## 2024-02-05 ENCOUNTER — CARE COORDINATION (OUTPATIENT)
Dept: CARE COORDINATION | Facility: CLINIC | Age: 69
End: 2024-02-05

## 2024-02-05 ENCOUNTER — TELEPHONE (OUTPATIENT)
Dept: PRIMARY CARE CLINIC | Facility: CLINIC | Age: 69
End: 2024-02-05

## 2024-02-05 DIAGNOSIS — I10 HYPERTENSION, UNSPECIFIED TYPE: ICD-10-CM

## 2024-02-05 DIAGNOSIS — I50.9 CONGESTIVE HEART FAILURE, UNSPECIFIED HF CHRONICITY, UNSPECIFIED HEART FAILURE TYPE (HCC): Primary | ICD-10-CM

## 2024-02-05 RX ORDER — ALBUTEROL SULFATE 90 UG/1
2 AEROSOL, METERED RESPIRATORY (INHALATION) EVERY 6 HOURS PRN
Qty: 18 G | Refills: 0 | Status: SHIPPED | OUTPATIENT
Start: 2024-02-05

## 2024-02-05 NOTE — PROGRESS NOTES
Remote Patient Monitoring Treatment Plan    Received request from AC/Gale Jordan RN  to order remote patient monitoring for in home monitoring of CHF; Condition managed by cardiology.  HTN  and order completed.     Patient will be monitoring blood pressure   pulse ox   weight.      Patient will engage in Remote Patient Monitoring each day to develop the skills necessary for self management.       RPM Care Team Responsibilities:   Alerts will be reviewed daily and addressed within 2-4 hours during operational hours (Monday -Friday 9 am-4 pm)  Alert response and intervention documented in patient medical record  Alert response escalated to PCP per protocol and documented in patient medical record  Patient monitored over approximately  days  Discharge from program based on self-management readiness    See care coordination encounters for additional details.

## 2024-02-05 NOTE — CARE COORDINATION
Ambulatory Care Coordination Note  2024    Patient Current Location:  [place]     ACM contacted the patient by telephone. Verified name and  with patient as identifiers. Provided introduction to self, and explanation of the ACM role.     Challenges to be reviewed by the provider   Additional needs identified to be addressed with provider: Yes  Notified that patient is asking for an inhaler               Method of communication with provider: staff message.    ACM: Gale Ruiz RN    Ccm outreached to patient. Patient states he has been having some sob. Patient is requesting inhaler. Ccm left message with pcp office. Ccm also discussed rpm program and patient is agreeable. Patient states no questions or concerns. Ccm discussed that I would follow up next week. Patient agreeable.     Offered patient enrollment in the Remote Patient Monitoring (RPM) program for in-home monitoring: Yes, patient enrolled:     Remote Patient Monitoring Enrollment Note      Date/Time: 2024 10:46 AM    Offered patient enrollment in the [facility] Remote Patient Monitoring (RPM) program for in home monitoring for CHF and HTN.  Patient accepted RPM services.    Patient will be monitoring the following daily:  blood pressure reading, blood pressure heart rate, pulse ox reading, pulse ox heart rate, and weight    ACM reviewed the information below with patient:    Emergency Contact (name and contact number): anand wood [phone]    [x] A member from the care coordination team will reach out to notify the patient once the RPM kit is ordered.   [x] Once the kit is delivered, the HRS team will contact the patient after UPS deliver to assist with set up.            [x] Determined BP cuff size: large (13.8\"-19.68\")      [x] Determined weight scale: regular (<330lbs)                                                [x] Hours of ACM monitoring - Monday-Friday [id number]                     All questions about RPM program

## 2024-02-05 NOTE — CARE COORDINATION
Remote Patient Kit Ordering Note      Date/Time:  2/5/2024 11:22 AM      [x] [employer] confirmed patient shipping address  [x] Patient will receive package over the next 1-3 business days. Someone 21 years or older must be present to sign for UPS delivery.  [x] HRS will contact patient within 24 hours, an HRS  will call the patient directly: If the patient does not answer, HRS will follow up with the clinical team notifying them about the unsuccessful attempt to contact the patient. HRS will make three call attempts to the patient.Provide patient with [facility] Virtual install number is: [phone].  [x] ACM will contact patient once equipment is active to welcome them to the program.                                                         [x] Hours of RPM monitoring - Monday-Friday [id number]; encourage patient to get vitals entered by Noon each day to have the alert addressed same day.  [x][facility] Patient flyer to patient.                     All questions answered at this time. ACM made aware the RPM kit has been ordered.      EMTP notified patient of RPM equipment order.

## 2024-02-05 NOTE — TELEPHONE ENCOUNTER
----- Message from Gale Ruiz RN sent at 2/5/2024 10:30 AM EST -----  Regarding: patient  Good morning! I just spoke with this patient and he is asking about an inhaler? Not sure if he is on one already but he states he needs one. Thanks!

## 2024-02-07 ENCOUNTER — TELEPHONE (OUTPATIENT)
Dept: FAMILY MEDICINE CLINIC | Facility: CLINIC | Age: 69
End: 2024-02-07

## 2024-02-07 ENCOUNTER — CARE COORDINATION (OUTPATIENT)
Dept: CARE COORDINATION | Facility: CLINIC | Age: 69
End: 2024-02-07

## 2024-02-07 ENCOUNTER — OFFICE VISIT (OUTPATIENT)
Dept: FAMILY MEDICINE CLINIC | Facility: CLINIC | Age: 69
End: 2024-02-07
Payer: MEDICARE

## 2024-02-07 VITALS
BODY MASS INDEX: 35.87 KG/M2 | HEART RATE: 79 BPM | DIASTOLIC BLOOD PRESSURE: 90 MMHG | TEMPERATURE: 98.3 F | OXYGEN SATURATION: 97 % | HEIGHT: 72 IN | SYSTOLIC BLOOD PRESSURE: 150 MMHG | WEIGHT: 264.8 LBS

## 2024-02-07 DIAGNOSIS — I69.354 HEMIPLEGIA OF LEFT NONDOMINANT SIDE AS LATE EFFECT OF CEREBRAL INFARCTION, UNSPECIFIED HEMIPLEGIA TYPE (HCC): ICD-10-CM

## 2024-02-07 DIAGNOSIS — I87.393 CHRONIC VENOUS HYPERTENSION (IDIOPATHIC) WITH OTHER COMPLICATIONS OF BILATERAL LOWER EXTREMITY: Chronic | ICD-10-CM

## 2024-02-07 DIAGNOSIS — L97.919 ULCERS OF BOTH LOWER LEGS (HCC): ICD-10-CM

## 2024-02-07 DIAGNOSIS — R10.30 INGUINAL PAIN, UNSPECIFIED LATERALITY: ICD-10-CM

## 2024-02-07 DIAGNOSIS — I10 PRIMARY HYPERTENSION: Chronic | ICD-10-CM

## 2024-02-07 DIAGNOSIS — I25.10 ATHEROSCLEROSIS OF CORONARY ARTERY, UNSPECIFIED VESSEL OR LESION TYPE, UNSPECIFIED WHETHER ANGINA PRESENT, UNSPECIFIED WHETHER NATIVE OR TRANSPLANTED HEART: ICD-10-CM

## 2024-02-07 DIAGNOSIS — E11.59 TYPE 2 DIABETES MELLITUS WITH OTHER CIRCULATORY COMPLICATION, WITHOUT LONG-TERM CURRENT USE OF INSULIN (HCC): ICD-10-CM

## 2024-02-07 DIAGNOSIS — E66.01 SEVERE OBESITY (BMI 35.0-39.9) WITH COMORBIDITY (HCC): ICD-10-CM

## 2024-02-07 DIAGNOSIS — L97.929 ULCERS OF BOTH LOWER LEGS (HCC): ICD-10-CM

## 2024-02-07 DIAGNOSIS — I25.118 CORONARY ARTERY DISEASE OF NATIVE ARTERY OF NATIVE HEART WITH STABLE ANGINA PECTORIS (HCC): ICD-10-CM

## 2024-02-07 DIAGNOSIS — Z91.81 AT HIGH RISK FOR FALLS: ICD-10-CM

## 2024-02-07 DIAGNOSIS — J41.0 SIMPLE CHRONIC BRONCHITIS (HCC): ICD-10-CM

## 2024-02-07 DIAGNOSIS — I73.9 PERIPHERAL VASCULAR DISEASE (HCC): ICD-10-CM

## 2024-02-07 DIAGNOSIS — L03.119 CELLULITIS OF ANTERIOR LOWER LEG: Primary | ICD-10-CM

## 2024-02-07 PROBLEM — Z99.11 ENCOUNTER FOR WEANING FROM VENTILATOR (HCC): Status: RESOLVED | Noted: 2023-03-15 | Resolved: 2024-02-07

## 2024-02-07 LAB
ALBUMIN, URINE, POC: 150 MG/L
BILIRUBIN, URINE, POC: ABNORMAL
BLOOD URINE, POC: NEGATIVE
CREATININE, URINE, POC: 100 MG/DL
GLUCOSE URINE, POC: ABNORMAL
KETONES, URINE, POC: ABNORMAL
LEUKOCYTE ESTERASE, URINE, POC: ABNORMAL
MICROALB/CREAT RATIO, POC: ABNORMAL MG/G
NITRITE, URINE, POC: NEGATIVE
PH, URINE, POC: 5.5 (ref 4.6–8)
PROTEIN,URINE, POC: 100
SPECIFIC GRAVITY, URINE, POC: 1.02 (ref 1–1.03)
URINALYSIS CLARITY, POC: CLEAR
URINALYSIS COLOR, POC: YELLOW
UROBILINOGEN, POC: NORMAL

## 2024-02-07 PROCEDURE — 1111F DSCHRG MED/CURRENT MED MERGE: CPT | Performed by: PHYSICIAN ASSISTANT

## 2024-02-07 PROCEDURE — G8428 CUR MEDS NOT DOCUMENT: HCPCS | Performed by: PHYSICIAN ASSISTANT

## 2024-02-07 PROCEDURE — 1036F TOBACCO NON-USER: CPT | Performed by: PHYSICIAN ASSISTANT

## 2024-02-07 PROCEDURE — 3080F DIAST BP >= 90 MM HG: CPT | Performed by: PHYSICIAN ASSISTANT

## 2024-02-07 PROCEDURE — 3051F HG A1C>EQUAL 7.0%<8.0%: CPT | Performed by: PHYSICIAN ASSISTANT

## 2024-02-07 PROCEDURE — 2022F DILAT RTA XM EVC RTNOPTHY: CPT | Performed by: PHYSICIAN ASSISTANT

## 2024-02-07 PROCEDURE — 3023F SPIROM DOC REV: CPT | Performed by: PHYSICIAN ASSISTANT

## 2024-02-07 PROCEDURE — 1123F ACP DISCUSS/DSCN MKR DOCD: CPT | Performed by: PHYSICIAN ASSISTANT

## 2024-02-07 PROCEDURE — 3017F COLORECTAL CA SCREEN DOC REV: CPT | Performed by: PHYSICIAN ASSISTANT

## 2024-02-07 PROCEDURE — G8484 FLU IMMUNIZE NO ADMIN: HCPCS | Performed by: PHYSICIAN ASSISTANT

## 2024-02-07 PROCEDURE — 3077F SYST BP >= 140 MM HG: CPT | Performed by: PHYSICIAN ASSISTANT

## 2024-02-07 PROCEDURE — 99205 OFFICE O/P NEW HI 60 MIN: CPT | Performed by: PHYSICIAN ASSISTANT

## 2024-02-07 PROCEDURE — 82044 UR ALBUMIN SEMIQUANTITATIVE: CPT | Performed by: PHYSICIAN ASSISTANT

## 2024-02-07 PROCEDURE — 81003 URINALYSIS AUTO W/O SCOPE: CPT | Performed by: PHYSICIAN ASSISTANT

## 2024-02-07 PROCEDURE — G8417 CALC BMI ABV UP PARAM F/U: HCPCS | Performed by: PHYSICIAN ASSISTANT

## 2024-02-07 RX ORDER — GABAPENTIN 300 MG/1
CAPSULE ORAL
Qty: 120 CAPSULE | Refills: 1 | Status: SHIPPED | OUTPATIENT
Start: 2024-02-07 | End: 2024-04-07

## 2024-02-07 RX ORDER — FLUTICASONE FUROATE, UMECLIDINIUM BROMIDE AND VILANTEROL TRIFENATATE 100; 62.5; 25 UG/1; UG/1; UG/1
1 POWDER RESPIRATORY (INHALATION) DAILY
Qty: 1 EACH | Refills: 5 | Status: SHIPPED | OUTPATIENT
Start: 2024-02-07

## 2024-02-07 RX ORDER — AMOXICILLIN AND CLAVULANATE POTASSIUM 875; 125 MG/1; MG/1
1 TABLET, FILM COATED ORAL 2 TIMES DAILY
Qty: 20 TABLET | Refills: 0 | Status: SHIPPED | OUTPATIENT
Start: 2024-02-07 | End: 2024-02-17

## 2024-02-07 RX ORDER — FERROUS SULFATE 325(65) MG
325 TABLET ORAL
Qty: 90 TABLET | Refills: 1 | Status: SHIPPED | OUTPATIENT
Start: 2024-02-07
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
## 2024-02-08 ENCOUNTER — CARE COORDINATION (OUTPATIENT)
Dept: CARE COORDINATION | Age: 69
End: 2024-02-08

## 2024-02-08 ENCOUNTER — CARE COORDINATION (OUTPATIENT)
Dept: CARE COORDINATION | Facility: CLINIC | Age: 69
End: 2024-02-08

## 2024-02-08 NOTE — TELEPHONE ENCOUNTER
Please advise him that I recommend starting back on eliquis to prevent further heart attacks ,strokes, death  or clots  And advise to start his carvediolol and return in 2 weeks for blood pressure recheck

## 2024-02-08 NOTE — CARE COORDINATION
Remote Alert Monitoring Note  Rpm alert to be reviewed by the provider   red alert   blood pressure reading (162/103)   Additional needs to be addressed by N/A: No                    Date/Time:  2024 1:49 PM  [facility] Verified patients name and  as identifiers.  Background: Pt enrolled in RPM r/t HTN and CHF.   Refer to 911 immediately if:  Patient unresponsive or unable to provide history  Change in cognition or sudden confusion  Patient unable to respond in complete sentences  Intense chest pain/tightness  Any concern for any clinical emergency  Red Alert: Provider response time of 1 hr required for any red alert requiring intervention  Yellow Alert: Provider response time of 3hr required for any escalated yellow alert    BP Triage  Are you having any Chest Pain? no   Are you having any Shortness of Breath? With activity   Do you have a headache or have any vision changes? no   Are you having any numbness or tingling? Chronic    Are you having any other health concerns or issues? no        Clinical Interventions: Reviewed and followed up on alerts and treatments-Pt speaking incomplete sentences, denies CP, SOB, headache, and vision changes at this time. Pt reports numbness to last 3 fingers on both hands is chronic and he experiences SOB with activity. Pulse ox of 96% noted. Per pt, SOB has improved since taking Lasix 40 mg and using Trelegy inhaler. Pt reports compliance with Norvasc, lisinopril, lopressor, and is agreeable to recheck BP. Updated reading Left arm - 116/71. Right arm - 155/65 (pt wanted to compare BP on both arms) Updated readings are within RPM parameters. Pt currently scheduled for PCP OV on 24 and Cardiology OV on 24. Pt verbalizes understanding of when to notify provider(s) of changes / concerns and when to seek emergent medical care. Per Telephone Encounter note from 24, pt is not taking Furosemide.

## 2024-02-08 NOTE — CARE COORDINATION
Health  RPM Outreach    Arrived at patients residence and obtained verbal consent for visit from patient who was A&O x 4. Assisted in setting up RPM equipment and with the installation call. All patient questions answered to patients satisfaction. No issues or concerns noted with patients ability to use the RPM equipment. Visit ended.

## 2024-02-08 NOTE — CARE COORDINATION
Remote Patient Monitoring Note      Date/Time:  2024 3:37 PM  Patient Current Location: [place]    LPN contacted patient by telephone regarding red alert received for Multiple different weight entries.. Verified patients name and  as identifiers.    Background: High Blood-Pressure, CHF    Clinical Interventions: Reviewed and followed up on alerts and treatments-   Patient states he is not at home but assumes his family was using the scale.  He states he weighed once and it was 260.  All other weights removed from HRS.    Plan/Follow Up: Will continue to review, monitor and address alerts with follow up based on severity of symptoms and risk factors.       SEBASTIAN Lopez [facility]/ CTN/ Remote Patient Monitoring  [phone]

## 2024-02-08 NOTE — CARE COORDINATION
Remote Patient Monitoring Welcome Note  Date/Time:  2024 1:45 PM  Patient Current Location: [place]  Verified patients name and  as identifiers.  Completed and confirmed the following:   Emergency Contact: anand wood [phone]  [x] Patient received all RPM equipment (tablet, scale, blood pressure device and cuff, and pulse oximeter)  Cuff Size: large (13.8\"-19.68\")    Weight Scale:  regular              [x] Instructed patient keep box for use when returning equipment                                                          [x] Reviewed Patient Welcome Letter with patient    [x]  Reviewed Consent Form  Copy of consent form in chart.                 [x] Reviewed expectations for patient and care team  Monitoring hours M-F 9-4pm  It is important to take your vitals every day, even on the weekends,to keep your care team aware of how you are doing every day of the week.  Completing monitoring by 12pm on  so that alerts can be responded to in the same day  Patient weighs self at same time every day (or after urinating and waking up)  Take blood pressure 1-2 hrs after medications   RPM team may have different phone area code (including [place], [place], [place] or [place])                              [x] Instructed patient to keep scale on flat surface                                                         [x] Instructed patient to keep tablet plugged in at all times                         [x] Instructed how to contact IT support ([phone])  [x] Provided Remote Patient Monitoring care  information                All questions answered at this time.

## 2024-02-10 LAB
BACTERIA SPEC CULT: NORMAL
SERVICE CMNT-IMP: NORMAL

## 2024-02-12 ENCOUNTER — ENROLLMENT (OUTPATIENT)
Dept: PHARMACY | Facility: CLINIC | Age: 69
End: 2024-02-12

## 2024-02-12 ENCOUNTER — CARE COORDINATION (OUTPATIENT)
Dept: CARE COORDINATION | Facility: CLINIC | Age: 69
End: 2024-02-12

## 2024-02-13 ENCOUNTER — CARE COORDINATION (OUTPATIENT)
Dept: CARE COORDINATION | Facility: CLINIC | Age: 69
End: 2024-02-13

## 2024-02-13 NOTE — CARE COORDINATION
Remote Patient Monitoring Note      Date/Time:  2/13/2024 10:45 AM  Patient Current Location: [place]  LPN attempted to contact patient by telephone regarding red alert received for blood pressure reading (171/109) and weight increase (of 5 lbs In Last 7 Days). Unable to reach pt and unable to leave message; voicemail not set up.     Background: Pt enrolled in RPM r/t HTN and CHF.       Plan/Follow Up: Will continue to review, monitor and address alerts with follow up based on severity of symptoms and risk factors.

## 2024-02-13 NOTE — CARE COORDINATION
Remote Patient Monitoring Note      Date/Time:  2/13/2024 1:59 PM  Patient Current Location: [place]  LPN second attempt to contact patient by telephone regarding red alert received for blood pressure reading (171/109) and weight increase (of 5 lbs In Last 7 Days). Unable to reach pt and unable to leave message; voicemail not set up. LPN attempted to contact patients emergency contact, Ria Milan. Unable to reach and unable to leave message; voicemail not set up. Will route to [facility].       Background: Pt enrolled in RPM r/t HTN and CHF.         Plan/Follow Up: Will continue to review, monitor and address alerts with follow up based on severity of symptoms and risk factors.

## 2024-02-14 ENCOUNTER — CARE COORDINATION (OUTPATIENT)
Dept: CARE COORDINATION | Facility: CLINIC | Age: 69
End: 2024-02-14

## 2024-02-14 ENCOUNTER — OFFICE VISIT (OUTPATIENT)
Dept: FAMILY MEDICINE CLINIC | Facility: CLINIC | Age: 69
End: 2024-02-14

## 2024-02-14 VITALS
HEART RATE: 72 BPM | BODY MASS INDEX: 35.21 KG/M2 | OXYGEN SATURATION: 96 % | WEIGHT: 260 LBS | TEMPERATURE: 97.8 F | SYSTOLIC BLOOD PRESSURE: 160 MMHG | DIASTOLIC BLOOD PRESSURE: 70 MMHG | HEIGHT: 72 IN

## 2024-02-14 DIAGNOSIS — Z91.199 PERSONAL HISTORY OF NONCOMPLIANCE WITH MEDICAL TREATMENT: ICD-10-CM

## 2024-02-14 DIAGNOSIS — I25.10 ATHEROSCLEROSIS OF CORONARY ARTERY, UNSPECIFIED VESSEL OR LESION TYPE, UNSPECIFIED WHETHER ANGINA PRESENT, UNSPECIFIED WHETHER NATIVE OR TRANSPLANTED HEART: ICD-10-CM

## 2024-02-14 DIAGNOSIS — I10 PRIMARY HYPERTENSION: ICD-10-CM

## 2024-02-14 DIAGNOSIS — L03.119 CELLULITIS OF ANTERIOR LOWER LEG: Primary | ICD-10-CM

## 2024-02-14 DIAGNOSIS — R60.9 PERIPHERAL EDEMA: ICD-10-CM

## 2024-02-14 PROBLEM — R60.0 PERIPHERAL EDEMA: Status: ACTIVE | Noted: 2024-02-14

## 2024-02-14 NOTE — PROGRESS NOTES
without myelopathy or radiculopathy    Mild cognitive impairment    Presbyesophagus    Primary insomnia    Chronic venous hypertension (idiopathic) with other complications of bilateral lower extremity    Sebaceous cyst    Pain and swelling of right lower extremity    Paroxysmal atrial fibrillation (HCC)    Accelerating angina (HCC)    New onset atrial fibrillation (HCC)    Atelectasis    Status post coronary artery bypass graft    S/P AVR (aortic valve replacement)    Coronary artery disease of native artery of native heart with stable angina pectoris (HCC)    Hyponatremia    SBO (small bowel obstruction) (HCC)    Cellulitis of anterior lower leg    Suspected cerebrovascular accident (CVA)    CVA (cerebral vascular accident) (HCC)    CVA (cerebrovascular accident due to intracerebral hemorrhage) (HCC)    Peripheral vascular disease (HCC)    Hemiplegia of left nondominant side as late effect of cerebral infarction, unspecified hemiplegia type (HCC)       Social History:   Social History     Tobacco Use    Smoking status: Former     Current packs/day: 0.00     Types: Cigarettes     Quit date: 1998     Years since quittin.1    Smokeless tobacco: Never   Substance Use Topics    Alcohol use: Not Currently       Family History: No family history on file.    Surgical History:  Past Surgical History:   Procedure Laterality Date    CABG WITH AORTIC VALVE REPLACEMENT N/A 3/15/2023    CORONARY ARTERY BYPASS GRAFT (CABG X 2, LIMA; ENDOSCOPIC VEIN HARVEST,LEFT GREATER SAPHENOUS VEIN; LEFT ATRIAL APPENDAGE CLIPPING; AORTIC VALVE REPLACEMENT; MAZE performed by Jesse Lares MD at [facility] MAIN OR    CARDIAC PROCEDURE N/A 3/13/2023    LEFT HEART CATH / CORONARY ANGIOGRAPHY performed by Keshawn Connors MD at [facility] CARDIAC CATH LAB    HERNIA REPAIR      TRANSESOPHAGEAL ECHOCARDIOGRAM N/A 3/15/2023    TRANSESOPHAGEAL ECHOCARDIOGRAM performed by Jesse Lares MD at [facility] MAIN OR       ROS  Review of Systems   Constitutional:

## 2024-02-14 NOTE — CARE COORDINATION
Remote Patient Monitoring Note      Date/Time:  2/14/2024 3:55 PM  Patient Current Location: [place]  Pt has not updated / rechecked BP as of this time. Will route to [facility].     Background:  Pt enrolled in RPM r/t HTN and CHF.       Plan/Follow Up: Will continue to review, monitor and address alerts with follow up based on severity of symptoms and risk factors.

## 2024-02-14 NOTE — CARE COORDINATION
Remote Patient Monitoring Note      Date/Time:  2/14/2024 10:03 AM  Patient Current Location: [facility] red alert and yellow alert received for blood pressure reading (174/90) and weight increase (of 5 lbs In Last 7 Days). Pt currently at PCP OV. HRS weights reviewed. Weight of 170.6# recorded on 02/09/24 is 91.4# less than initial recorded weight on 02/09/24. Current recorded weight is 259.5# and within patients typical range. LPN will attempt to contact pt at later time to address alerts received.     Background: Pt enrolled in [facility] r/t HTN and CHF.       Plan/Follow Up: Will continue to review, monitor and address alerts with follow up based on severity of symptoms and risk factors.

## 2024-02-14 NOTE — CARE COORDINATION
Remote Alert Monitoring Note  Rpm alert to be reviewed by the provider   red alert and yellow alert   blood pressure reading (174/90) and weight (Increase of 5 lbs In Last 7 Days)   Additional needs to be addressed by N/A: No                    Date/Time:  2024 11:58 AM  Patient Current Location: [place]  LPN contacted patient by telephone. Verified patients name and  as identifiers.  Background: Pt enrolled in RPM r/t HTN and CHF.   Refer to 911 immediately if:  Patient unresponsive or unable to provide history  Change in cognition or sudden confusion  Patient unable to respond in complete sentences  Intense chest pain/tightness  Any concern for any clinical emergency  Red Alert: Provider response time of 1 hr required for any red alert requiring intervention  Yellow Alert: Provider response time of 3hr required for any escalated yellow alert    BP Triage  Are you having any Chest Pain? no   Are you having any Shortness of Breath? no   Do you have a headache or have any vision changes? no   Are you having any numbness or tingling? no   Are you having any other health concerns or issues? no          Clinical Interventions: Reviewed and followed up on alerts and treatments-Pt denies acute distress / is asymptomatic. Pt completed PCP OV today and states he received \"a new medicine for my blood pressure\". Pt reports he is unable to review HTN medications at this time, states \"I am doing fine\", and is agreeable to recheck BP when he returns home. Per pt, weight of 170.6# recorded on 24 was due to a family member using scale; weight removed from HRS per request. Current recorded weight is 259.5# and within patients typical range. Pt verbalizes understanding of RPM monitoring hours, when to notify provider(s) of changes / concerns, and when to seek emergent medical care.   Plan/Follow Up: Will continue to review, monitor and address alerts with follow up based on severity of symptoms and risk

## 2024-02-15 ENCOUNTER — CARE COORDINATION (OUTPATIENT)
Dept: CARE COORDINATION | Facility: CLINIC | Age: 69
End: 2024-02-15

## 2024-02-15 NOTE — CARE COORDINATION
ACM introduced self to pt.  Discussed PCP appt yesterday, pt states he is not taking 3 of meds because they make him feel \"goofy\" but he is \"in the field\" and unable to obtain the names of them at this time.  Pt reports leg pain, PCP aware.  Pt then thanked [facility] for call and quickly ended call.  ACM will reach back out at a later time.     Same medications  See eye doctor and GI doctor     Diet: low fat, high fiber, watch carbs  Exercise: 20-30 minutes 3-4 times weekly  Weight management-work on losing 5-10#s    Seat belts when in the car  Smoke and carbon monoxide detectors in the house  If you have any firearms they need to be stored safely  Summer: bug spray

## 2024-02-16 ENCOUNTER — CARE COORDINATION (OUTPATIENT)
Dept: CARE COORDINATION | Facility: CLINIC | Age: 69
End: 2024-02-16

## 2024-02-16 VITALS
BODY MASS INDEX: 36.21 KG/M2 | SYSTOLIC BLOOD PRESSURE: 122 MMHG | HEART RATE: 94 BPM | WEIGHT: 267 LBS | DIASTOLIC BLOOD PRESSURE: 73 MMHG | OXYGEN SATURATION: 97 %

## 2024-02-16 NOTE — CARE COORDINATION
Remote Patient Monitoring Note      Date/Time:  2/16/2024 3:58 PM  Patient Current Location: [place]  No response to escalated alert received as of this time. Pt previously verbalized understanding of when to notify provider(s) of changes / concerns and when to seek emergent medical care. Pt has rechecked BP x 2 since phone encounter. 2:37 PM BP of 109/59 and 3:06 PM BP of 122/73 noted in HRS and are within RPM parameters. Alert was escalated to PCP based on PCP documentation from 02/14/24, \"He has some lasix 20mg  he orders from oversees and was taking one in day and sometimes remembering bid   so advised to stop 20mg and take the 40mg to help with consistant doseing\". Will route update to ACM.     Background: Pt enrolled in RPM r/t HTN and CHF.       Plan/Follow Up: Will continue to review, monitor and address alerts with follow up based on severity of symptoms and risk factors.         
on 02/14/24. Per PCP documentation from 02/14/24, \"He has some lasix 20mg  he orders from oversees and was taking one in day and sometimes remembering bid   so advised to stop 20mg and take the 40mg to help with consistant doseing\". Pt currently scheduled for PCP OV on 02/21/24 and Cardiology OV on 03/12/24. Pt verbalizes understanding of when to notify provider(s) of changes / concerns and when to seek emergent medical care. Will route to PCP and ACM.     Plan/Follow Up: Will continue to review, monitor and address alerts with follow up based on severity of symptoms and risk factors.

## 2024-02-19 ENCOUNTER — CARE COORDINATION (OUTPATIENT)
Dept: CARE COORDINATION | Facility: CLINIC | Age: 69
End: 2024-02-19

## 2024-02-19 NOTE — CARE COORDINATION
Remote Patient Monitoring Note      Date/Time:  2/19/2024 4:15 PM  Patient Current Location: [place]  Pt has not updated / rechecked BP as of this time. Will route to [facility].     Background:  Pt enrolled in RPM r/t HTN and CHF.      Plan/Follow Up: Will continue to review, monitor and address alerts with follow up based on severity of symptoms and risk factors.

## 2024-02-21 ASSESSMENT — ENCOUNTER SYMPTOMS
DYSPNEA ACTIVITY LEVEL: AFTER AMBULATING MORE THAN 20 FT
PAIN LOCATION - PAIN QUALITY: ACHING

## 2024-02-22 ENCOUNTER — CARE COORDINATION (OUTPATIENT)
Dept: CARE COORDINATION | Facility: CLINIC | Age: 69
End: 2024-02-22

## 2024-02-22 NOTE — CARE COORDINATION
Ambulatory Care Coordination Note  2024    Patient Current Location:  Home: [address] [place] [id number]     ACM contacted the patient by telephone. Verified name and  with patient as identifiers. Provided introduction to self, and explanation of the ACM role.     Challenges to be reviewed by the provider   Additional needs identified to be addressed with provider: Yes  Requests referral to pain mgmt               Method of communication with provider: staff message.    ACM: Gale Rivera RN    Pt reports pain in legs, hx DM neuropathy.  Pt reports was seen by venous surgeon and told he wouldn't benefit from surgery.  Pt requests to be referred to pain mgmt.     Encouraged pt to do VS w/ RPM, pt reports he forgets to do it by noon, reminded him of monitoring time and he can do afternoon as well.  Pt states he is away from home but will do when he returns.     ACM will message PCP regarding pain mgmt referral.    Offered patient enrollment in the Remote Patient Monitoring (RPM) program for in-home monitoring: Yes, patient already enrolled.       Goals Addressed                   This Visit's Progress     Conditions and Symptoms   On track     I will schedule office visits, as directed by my provider.  I will keep my appointment or reschedule if I have to cancel.  I will notify my provider of any barriers to my plan of care.  I will notify my provider of any symptoms that indicate a worsening of my condition.    Barriers: none  Plan for overcoming my barriers: N/A  Confidence: 8/10  Anticipated Goal Completion Date: 24         Reduce Falls    On track     I will reduce my risk of falls by the following: Remove rugs or use non slip rugs  Use walking aids like cane or walker    Barriers: impairment:  patient states he trips over things a lot.   Plan for overcoming my barriers: discussed to create clear paths for patient to ambulate  Confidence: 7/10  Anticipated Goal Completion Date: 24

## 2024-02-26 ENCOUNTER — CARE COORDINATION (OUTPATIENT)
Dept: CARE COORDINATION | Facility: CLINIC | Age: 69
End: 2024-02-26

## 2024-02-26 NOTE — CARE COORDINATION
Remote Patient Monitoring Note      Date/Time:  2024 2:12 PM  Patient Current Location: [place]  Pt has not updated / rechecked pulse ox as of this time. LPN contacted patient by telephone regarding red alert received for pulse ox reading (91%). Verified patients name and  as identifiers.    Background: Pt enrolled in RPM r/t HTN and CHF.    Clinical Interventions: Reviewed and followed up on alerts and treatments-Pt speaking in complete sentences and continues to deny CP, SOB, and new or worsening edema at this time. Pt states he is still outside and is agreeable to recheck pulse ox \"in a few minutes\". Pt again verbalizes understanding of RPM monitoring hours, when to notify provider(s) of changes / concerns, and when to seek emergent medical care.   Plan/Follow Up: Will continue to review, monitor and address alerts with follow up based on severity of symptoms and risk factors.

## 2024-02-26 NOTE — CARE COORDINATION
Remote Patient Monitoring Note      Date/Time:  2/26/2024 2:58 PM  Patient Current Location: [place]  Pulse ox recheck of 95% noted in HRS and is within RPM parameters. No further outreach by this LPN indicated at this time.    Background: Pt enrolled in RPM r/t HTN and CHF.        Plan/Follow Up: Will continue to review, monitor and address alerts with follow up based on severity of symptoms and risk factors.

## 2024-02-26 NOTE — CARE COORDINATION
Remote Alert Monitoring Note  Rpm alert to be reviewed by the provider   red alert   pulse ox reading (91%)   Additional needs to be addressed by N/A: No                    Date/Time:  2024 10:21 AM  Patient Current Location: [facility] contacted patient by telephone. Verified patients name and  as identifiers.  Background:  Pt enrolled in RPM r/t HTN and CHF.   Refer to 911 immediately if:  Patient unresponsive or unable to provide history  Change in cognition or sudden confusion  Patient unable to respond in complete sentences  Intense chest pain/tightness  Any concern for any clinical emergency  Red Alert: Provider response time of 1 hr required for any red alert requiring intervention  Yellow Alert: Provider response time of 3hr required for any escalated yellow alert    O2 Triage  Are you having any Chest Pain? no   Are you having any Shortness of Breath? no   Swelling in your hands or feet? yes     Are you having any other health concerns or issues? no      Clinical Interventions: Reviewed and followed up on alerts and treatments-Pt speaking in complete sentences, denies CP, SOB, and new or worsening edema at this time. Pt states swelling to feet is \"all the time\" and \"the same as always\". Pt reports taking Lasix 40 mg and states he will recheck pulse ox \"around lunch time\". Pt currently outside and expressed desire to end phone encounter. Unable to confirm compliance with inhalers. Pt verbalized understanding of RPM monitoring hours, when to notify provider(s) of changes / concerns, and when to seek emergent medical care.   Plan/Follow Up: Will continue to review, monitor and address alerts with follow up based on severity of symptoms and risk factors.

## 2024-02-27 DIAGNOSIS — G62.9 NEUROPATHY: Primary | ICD-10-CM

## 2024-02-28 ENCOUNTER — CARE COORDINATION (OUTPATIENT)
Dept: CARE COORDINATION | Facility: CLINIC | Age: 69
End: 2024-02-28

## 2024-02-28 NOTE — CARE COORDINATION
Remote Patient Monitoring Note      Date/Time:  2024 3:59 PM  Patient Current Location: [place]  LPN contacted patient by telephone regarding multiple recorded weights. Verified patients name and  as identifiers.    Background: Pt enrolled in RPM r/t HTN and CHF.   Clinical Interventions: Pt states he did not weigh self this morning and that \"family members must have been\" using RPM scale. Pt confirms other recorded metrics are his. Weights removed from HRS. Discussed RPM adherence and need to complete all daily metrics with pt. Pt verbalizes understanding and is agreeable to weigh self upon rising, after voiding, before eating or drinking, and while wearing the same amount of clothing he typically wears when weighing self.      Plan/Follow Up: Will continue to review, monitor and address alerts with follow up based on severity of symptoms and risk factors.

## 2024-02-29 ENCOUNTER — TELEPHONE (OUTPATIENT)
Facility: CLINIC | Age: 69
End: 2024-02-29

## 2024-02-29 ENCOUNTER — TELEPHONE (OUTPATIENT)
Age: 69
End: 2024-02-29

## 2024-02-29 ENCOUNTER — CARE COORDINATION (OUTPATIENT)
Dept: CARE COORDINATION | Facility: CLINIC | Age: 69
End: 2024-02-29

## 2024-02-29 DIAGNOSIS — I50.32 CHRONIC HEART FAILURE WITH PRESERVED EJECTION FRACTION (HFPEF) (HCC): Primary | ICD-10-CM

## 2024-02-29 RX ORDER — FUROSEMIDE 40 MG/1
40 TABLET ORAL DAILY PRN
Qty: 60 TABLET | Refills: 11 | Status: SHIPPED | OUTPATIENT
Start: 2024-02-29 | End: 2024-03-04 | Stop reason: SDUPTHER

## 2024-02-29 NOTE — TELEPHONE ENCOUNTER
Barber Rice, Ira Chowdary RN  Caller: Unspecified (Today, 10:38 AM)  BP is 76/61 now? Not 176/61? I would recheck and if BP still that low he should be evaluated in the ED. If BP fluctuating that much then I really need a better handle on his compliance and what he is taking daily, as those Bps are very labile. Can he come into the office sooner with us or his PCP?

## 2024-02-29 NOTE — TELEPHONE ENCOUNTER
Ingrid Gutierrez LPN  [facility] Cardiology Triage; Meliza Martino PA-C; Gael Rivera, SEJAL14 minutes ago (10:22 AM)     SP  See yellow box in patient encounter.     Ingrid Gutierrez LPN14 minutes ago (10:22 AM)     SP  Remote Alert Monitoring Note       Rpm alert to be reviewed by the provider    red alert   blood pressure reading (185/109), pulse ox reading (90%), and weight (Increase of 5 lbs In Last 7 Days)   Additional needs to be addressed by PCP and Cardiologist: Pt enrolled in RPM r/t HTN and CHF.   Pt speaking in complete sentences, denies CP, headache, and vision changes at this time. Pt reports SOB with exertion, neuropathy related numbness/tingling, and edema to BLE. Pt states right leg is swollen more than left. Pt has not taken Norvasc, Lisinopril, Lopressor, or Trelegy as of this time. Pt states he took Lasix 20 mg this AM and plans to take an additional Lasix 40 mg when he returns home. No current order for Lasix noted in Epic. Per PCP documentation from 02/14/24, \"He has some lasix 20mg  he orders from oversees and was taking one in day and sometimes remembering bid so advised to stop 20mg and take the 40mg to help with consistant doseing\". Current recorded weight is 268.5#. 5.0# increase since 02/23/24. Discussed medication compliance with pt. Pt verbalizes understanding, states he will take medications as prescribed, and recheck BP and pulse ox when he returns home. Pt currently scheduled for PCP OV on 03/04/24 and Cardiology OV on 03/12/24. Pt verbalizes understanding of RPM monitoring hours, when to notify provider(s) of changes / concerns, and when to seek emergent medical care.  Please advise.   Metrics added below for review.

## 2024-02-29 NOTE — TELEPHONE ENCOUNTER
Advised patient of Dr. Rice's response. Scheduled next available MA appointment with Dr. Pride on 3/8/24 at 1:00 pm. Patient verbalized understanding.

## 2024-02-29 NOTE — CARE COORDINATION
Remote Patient Monitoring Note      Date/Time:  2/29/2024 12:09 PM  Patient Current Location: [place]  Ira Bravo, RN routed alert to Barber Rice DO stating, \"Please address this triage note for Dr. Pride. Home monitoring report. Thanks!\".  No response received as of this time. Pt rechecked pulse ox at 11:46 AM and BP at 11:47 AM. Updated pulse ox of 94% noted and is within RPM parameters. Updated BP is 76/61 and BP HR 65. Will route to RPM NP.     Background: Pt enrolled in [facility] r/t HTN and CHF.       Plan/Follow Up: Will continue to review, monitor and address alerts with follow up based on severity of symptoms and risk factors.         
consistant doseing\". Current recorded weight is 268.5#. 5.0# increase since 02/23/24. Discussed medication compliance with pt. Pt verbalizes understanding, states he will take medications as prescribed, and recheck BP and pulse ox when he returns home. Pt currently scheduled for PCP OV on 03/04/24 and Cardiology OV on 03/12/24. Pt verbalizes understanding of RPM monitoring hours, when to notify provider(s) of changes / concerns, and when to seek emergent medical care. Will route to ACM, PCP, and [facility] Cardiology Triage Pool.   Plan/Follow Up: Will continue to review, monitor and address alerts with follow up based on severity of symptoms and risk factors.

## 2024-02-29 NOTE — TELEPHONE ENCOUNTER
Catalina Powell, APRN - Meliza Sapp PA-C; [facility] Cardiology Clinical Staff; [facility] Cardiology Triage; Gale Rivera, SEJAL4 minutes ago (2:12 PM)     PF  Eda Haider and provider at [facility],  I just spoke with Mr. Horton in response to a red alert for BP and weight gain. He's asymptomatic. Repeat BP was 118/95. He is going to take an extra dose of Lasix 40mg, as prescribed, and weigh again in the morning. We will continue to monitor him. Thank you for the opportunity to participate in the care of your patient. If you have any questions please contact me. Sincerely, Tania Powell, NINA MSN FNP-C; Remote Patient Monitoring NP; Ph: [phone]

## 2024-02-29 NOTE — TELEPHONE ENCOUNTER
Ingrid Gutierrez, Catalina Charlton, SONY - CNP31 minutes ago (12:16 PM)     SP  FYI  Pt rechecked pulse ox at 11:46 AM and BP at 11:47 AM. Updated pulse ox of 94% noted and is within RPM parameters. Updated BP is 76/61 and BP HR 65.

## 2024-02-29 NOTE — TELEPHONE ENCOUNTER
Barber Rice, Ira Chowdary RN  Caller: Unspecified (Today, 10:38 AM)  Please have patient take Lasix 40mg PO twice daily for the next 3-5 days until his weight goes back down to where it was. Thereafter he can take the Lasix as needed to maintain his weight (take for weight gain > 2 lbs in one day or > 5 lbs in one week). Please let us know if he continues to gain weight. If his BP remains > 170 despite taking his medications, please let us know and we can consider adjustment. Otherwise he can wait till his upcoming follow up with Dr. Pride to check back in. Thanks.

## 2024-02-29 NOTE — TELEPHONE ENCOUNTER
02/29/24 1:06 PM    REMOTE PATIENT MONITORING red ALERT RESPONSE         ASSESSMENT AND PLAN   HTN  BP WNL with recheck 118/95  Asymptomatic   Weight Gain  Patient taking an extra dose of lasix 40mg , and he will weigh again in the morning.   asymptomatic  Will continue to monitor.    CHIEF COMPLAINT    [facility] red alerts  High BP and Weight increase of 5 lbs in 7 days    HPI    Marc Horton Sr. is a 68 y.o. male who is enrolled in [facility] for CHF and HTN.   Initial call: patient was out feeding his chickens.   Spoke with patient and VS are WNL. He is about to take an extra Lasix for the 5 pound weight gain, as prescribed. He remains asymptomatic.     REVIEW OF SYSTEMS    -cp, -SOB, -headache, -edema     CURRENT MEDICATIONS    Current Outpatient Rx   Medication Sig Dispense Refill    furosemide (LASIX) 40 MG tablet Take 1 tablet by mouth daily as needed (weight gain > 2 lbs in one day) 60 tablet 11    fluticasone-umeclidin-vilant (TRELEGY ELLIPTA) 100-62.5-25 MCG/ACT AEPB inhaler Inhale 1 puff into the lungs daily 1 each 5    ferrous sulfate (IRON 325) 325 (65 Fe) MG tablet Take 1 tablet by mouth daily (with breakfast) 90 tablet 1    gabapentin (NEURONTIN) 300 MG capsule Take 1 capsule in morning, and lunch and 2 capusles at night Intended supply: 30 days 120 capsule 1    albuterol sulfate HFA (PROVENTIL HFA) 108 (90 Base) MCG/ACT inhaler Inhale 2 puffs into the lungs every 6 hours as needed for Wheezing 18 g 0    aspirin 325 MG tablet Take 2 tablets by mouth three times daily      pravastatin (PRAVACHOL) 40 MG tablet Take 1 tablet by mouth daily (Patient not taking: Reported on 2/14/2024)      ezetimibe (ZETIA) 10 MG tablet Take 1 tablet by mouth nightly (Patient not taking: Reported on 2/14/2024) 30 tablet 0    lisinopril (PRINIVIL;ZESTRIL) 20 MG tablet Take 1 tablet by mouth in the morning and at bedtime for 360 doses 90 tablet 3    metoprolol tartrate (LOPRESSOR) 50 MG tablet Take 1 tablet by mouth 2 times daily

## 2024-03-01 ENCOUNTER — CARE COORDINATION (OUTPATIENT)
Dept: CARE COORDINATION | Facility: CLINIC | Age: 69
End: 2024-03-01

## 2024-03-01 NOTE — CARE COORDINATION
Remote Patient Monitoring Note      Date/Time:  3/1/2024 3:59 PM  Patient Current Location: [place]  Pt has not updated / rechecked BP as of this time. Will route to [facility].     Background:  Pt enrolled in [facility] r/t HTN and CHF.        Plan/Follow Up: Will continue to review, monitor and address alerts with follow up based on severity of symptoms and risk factors.

## 2024-03-01 NOTE — CARE COORDINATION
Remote Alert Monitoring Note  [facility]pm alert to be reviewed by the provider   red alert   blood pressure reading (183/128)   Additional needs to be addressed by N/A: No                    Date/Time:  3/1/2024 9:25 AM  Patient Current Location: [facility] contacted patient by telephone. Verified patients name and  as identifiers.  Background: Pt enrolled in [facility] r/t HTN and CHF.    Refer to 911 immediately if:  Patient unresponsive or unable to provide history  Change in cognition or sudden confusion  Patient unable to respond in complete sentences  Intense chest pain/tightness  Any concern for any clinical emergency  Red Alert: Provider response time of 1 hr required for any red alert requiring intervention  Yellow Alert: Provider response time of 3hr required for any escalated yellow alert    BP Triage  Are you having any Chest Pain? no   Are you having any Shortness of Breath? Yes, with exertion    Do you have a headache or have any vision changes? no   Are you having any numbness or tingling? Yes, related to neuropathy per pt   Are you having any other health concerns or issues? no        Clinical Interventions: Reviewed and followed up on alerts and treatments-Pt denies CP, headache, and vision changes at this time. Pt reports SOB with exertion is improving and neuropathy related numbness/tingling is unchanged. BP reading was obtained prior to pt taking HTN medications. Pt reports taking Lasix 40 mg this AM with \"a little\" improvement to BLE edema. Pt states he will take remaining AM medications after breakfast and recheck BP 1.5 - 2 hours after taking medications. Pt confirms he has [facility] contact information if needed, verbalizes understanding of [facility] monitoring hours, when to notify provider(s) of changes / concerns, and when to seek emergent medical care.   Plan/Follow Up: Will continue to review, monitor and address alerts with follow up based on severity of symptoms and risk factors.

## 2024-03-04 ENCOUNTER — CARE COORDINATION (OUTPATIENT)
Dept: CARE COORDINATION | Facility: CLINIC | Age: 69
End: 2024-03-04

## 2024-03-04 ENCOUNTER — OFFICE VISIT (OUTPATIENT)
Dept: FAMILY MEDICINE CLINIC | Facility: CLINIC | Age: 69
End: 2024-03-04
Payer: MEDICARE

## 2024-03-04 ENCOUNTER — APPOINTMENT (OUTPATIENT)
Dept: CT IMAGING | Age: 69
DRG: 391 | End: 2024-03-04
Payer: MEDICARE

## 2024-03-04 ENCOUNTER — HOSPITAL ENCOUNTER (INPATIENT)
Age: 69
LOS: 3 days | Discharge: HOME | DRG: 391 | End: 2024-03-07
Attending: EMERGENCY MEDICINE | Admitting: FAMILY MEDICINE
Payer: MEDICARE

## 2024-03-04 ENCOUNTER — APPOINTMENT (OUTPATIENT)
Dept: GENERAL RADIOLOGY | Age: 69
DRG: 391 | End: 2024-03-04
Payer: MEDICARE

## 2024-03-04 VITALS
TEMPERATURE: 98 F | HEART RATE: 87 BPM | WEIGHT: 270.6 LBS | BODY MASS INDEX: 36.65 KG/M2 | DIASTOLIC BLOOD PRESSURE: 60 MMHG | SYSTOLIC BLOOD PRESSURE: 144 MMHG | OXYGEN SATURATION: 98 % | HEIGHT: 72 IN

## 2024-03-04 DIAGNOSIS — G47.34 NOCTURNAL HYPOXIA: ICD-10-CM

## 2024-03-04 DIAGNOSIS — M54.41 CHRONIC BILATERAL LOW BACK PAIN WITH BILATERAL SCIATICA: ICD-10-CM

## 2024-03-04 DIAGNOSIS — R79.89 ELEVATED TROPONIN: ICD-10-CM

## 2024-03-04 DIAGNOSIS — I10 ESSENTIAL HYPERTENSION: ICD-10-CM

## 2024-03-04 DIAGNOSIS — R60.9 PERIPHERAL EDEMA: ICD-10-CM

## 2024-03-04 DIAGNOSIS — R29.898 LEFT LEG WEAKNESS: ICD-10-CM

## 2024-03-04 DIAGNOSIS — I50.32 CHRONIC HEART FAILURE WITH PRESERVED EJECTION FRACTION (HFPEF) (HCC): ICD-10-CM

## 2024-03-04 DIAGNOSIS — E66.01 MORBID OBESITY (HCC): ICD-10-CM

## 2024-03-04 DIAGNOSIS — E11.42 DM TYPE 2 WITH DIABETIC PERIPHERAL NEUROPATHY (HCC): ICD-10-CM

## 2024-03-04 DIAGNOSIS — R29.6 FALLING: ICD-10-CM

## 2024-03-04 DIAGNOSIS — E11.59 TYPE 2 DIABETES MELLITUS WITH OTHER CIRCULATORY COMPLICATION, WITHOUT LONG-TERM CURRENT USE OF INSULIN (HCC): ICD-10-CM

## 2024-03-04 DIAGNOSIS — R91.8 BILATERAL PULMONARY INFILTRATES: ICD-10-CM

## 2024-03-04 DIAGNOSIS — I10 PRIMARY HYPERTENSION: Primary | ICD-10-CM

## 2024-03-04 DIAGNOSIS — G89.29 CHRONIC BILATERAL LOW BACK PAIN WITH BILATERAL SCIATICA: ICD-10-CM

## 2024-03-04 DIAGNOSIS — E53.8 FOLATE DEFICIENCY: Chronic | ICD-10-CM

## 2024-03-04 DIAGNOSIS — I25.10 ATHEROSCLEROSIS OF CORONARY ARTERY, UNSPECIFIED VESSEL OR LESION TYPE, UNSPECIFIED WHETHER ANGINA PRESENT, UNSPECIFIED WHETHER NATIVE OR TRANSPLANTED HEART: ICD-10-CM

## 2024-03-04 DIAGNOSIS — J42 CHRONIC BRONCHITIS, UNSPECIFIED CHRONIC BRONCHITIS TYPE (HCC): Chronic | ICD-10-CM

## 2024-03-04 DIAGNOSIS — M54.42 CHRONIC BILATERAL LOW BACK PAIN WITH BILATERAL SCIATICA: ICD-10-CM

## 2024-03-04 DIAGNOSIS — E08.43 DIABETES MELLITUS DUE TO UNDERLYING CONDITION WITH DIABETIC AUTONOMIC NEUROPATHY, WITHOUT LONG-TERM CURRENT USE OF INSULIN (HCC): ICD-10-CM

## 2024-03-04 DIAGNOSIS — Z95.2 S/P AVR (AORTIC VALVE REPLACEMENT): ICD-10-CM

## 2024-03-04 DIAGNOSIS — R07.9 CHEST PAIN, UNSPECIFIED TYPE: Primary | ICD-10-CM

## 2024-03-04 DIAGNOSIS — R29.898 WEAKNESS OF LEFT ARM: ICD-10-CM

## 2024-03-04 DIAGNOSIS — Z98.61 S/P PTCA (PERCUTANEOUS TRANSLUMINAL CORONARY ANGIOPLASTY): Chronic | ICD-10-CM

## 2024-03-04 DIAGNOSIS — K57.32 DIVERTICULITIS OF COLON: ICD-10-CM

## 2024-03-04 DIAGNOSIS — I25.118 CORONARY ARTERY DISEASE OF NATIVE ARTERY OF NATIVE HEART WITH STABLE ANGINA PECTORIS (HCC): ICD-10-CM

## 2024-03-04 PROBLEM — I21.4 NSTEMI (NON-ST ELEVATED MYOCARDIAL INFARCTION) (HCC): Status: ACTIVE | Noted: 2024-03-04

## 2024-03-04 LAB
ALBUMIN SERPL-MCNC: 3.2 G/DL (ref 3.2–4.6)
ALBUMIN SERPL-MCNC: 3.3 G/DL (ref 3.2–4.6)
ALBUMIN/GLOB SERPL: 0.8 (ref 0.4–1.6)
ALBUMIN/GLOB SERPL: 0.9 (ref 0.4–1.6)
ALP SERPL-CCNC: 64 U/L (ref 50–136)
ALP SERPL-CCNC: 66 U/L (ref 50–136)
ALT SERPL-CCNC: 20 U/L (ref 12–65)
ALT SERPL-CCNC: 20 U/L (ref 12–65)
ANION GAP SERPL CALC-SCNC: 5 MMOL/L (ref 2–11)
ANION GAP SERPL CALC-SCNC: 6 MMOL/L (ref 2–11)
AST SERPL-CCNC: 13 U/L (ref 15–37)
AST SERPL-CCNC: 16 U/L (ref 15–37)
BACTERIA URNS QL MICRO: NEGATIVE /HPF
BASOPHILS # BLD: 0.1 K/UL (ref 0–0.2)
BASOPHILS NFR BLD: 1 % (ref 0–2)
BILIRUB SERPL-MCNC: 0.6 MG/DL (ref 0.2–1.1)
BILIRUB SERPL-MCNC: 0.6 MG/DL (ref 0.2–1.1)
BILIRUB UR QL: NEGATIVE
BUN SERPL-MCNC: 18 MG/DL (ref 8–23)
BUN SERPL-MCNC: 19 MG/DL (ref 8–23)
CALCIUM SERPL-MCNC: 9.1 MG/DL (ref 8.3–10.4)
CALCIUM SERPL-MCNC: 9.2 MG/DL (ref 8.3–10.4)
CASTS URNS QL MICRO: NORMAL /LPF
CHLORIDE SERPL-SCNC: 104 MMOL/L (ref 103–113)
CHLORIDE SERPL-SCNC: 107 MMOL/L (ref 103–113)
CHOLEST SERPL-MCNC: 115 MG/DL
CO2 SERPL-SCNC: 28 MMOL/L (ref 21–32)
CO2 SERPL-SCNC: 28 MMOL/L (ref 21–32)
CREAT SERPL-MCNC: 0.9 MG/DL (ref 0.8–1.5)
CREAT SERPL-MCNC: 1.1 MG/DL (ref 0.8–1.5)
DIFFERENTIAL METHOD BLD: ABNORMAL
EOSINOPHIL # BLD: 0.2 K/UL (ref 0–0.8)
EOSINOPHIL NFR BLD: 3 % (ref 0.5–7.8)
EPI CELLS #/AREA URNS HPF: NORMAL /HPF
ERYTHROCYTE [DISTWIDTH] IN BLOOD BY AUTOMATED COUNT: 17.9 % (ref 11.9–14.6)
ERYTHROCYTE [DISTWIDTH] IN BLOOD BY AUTOMATED COUNT: 18.1 % (ref 11.9–14.6)
EST. AVERAGE GLUCOSE BLD GHB EST-MCNC: 154 MG/DL
GLOBULIN SER CALC-MCNC: 3.7 G/DL (ref 2.8–4.5)
GLOBULIN SER CALC-MCNC: 4 G/DL (ref 2.8–4.5)
GLUCOSE SERPL-MCNC: 176 MG/DL (ref 65–100)
GLUCOSE SERPL-MCNC: 204 MG/DL (ref 65–100)
GLUCOSE UR QL STRIP.AUTO: 100 MG/DL
HBA1C MFR BLD: 7 % (ref 4.8–5.6)
HCT VFR BLD AUTO: 34.9 % (ref 41.1–50.3)
HCT VFR BLD AUTO: 35.1 % (ref 41.1–50.3)
HDLC SERPL-MCNC: 34 MG/DL (ref 40–60)
HDLC SERPL: 3.4
HGB BLD-MCNC: 10.8 G/DL (ref 13.6–17.2)
HGB BLD-MCNC: 10.9 G/DL (ref 13.6–17.2)
IMM GRANULOCYTES # BLD AUTO: 0 K/UL (ref 0–0.5)
IMM GRANULOCYTES NFR BLD AUTO: 0 % (ref 0–5)
KETONES UR-MCNC: NEGATIVE MG/DL
LACTATE SERPL-SCNC: 2.1 MMOL/L (ref 0.4–2)
LDLC SERPL CALC-MCNC: 67.2 MG/DL
LEUKOCYTE ESTERASE UR QL STRIP: NEGATIVE
LIPASE SERPL-CCNC: 504 U/L (ref 73–393)
LYMPHOCYTES # BLD: 1.9 K/UL (ref 0.5–4.6)
LYMPHOCYTES NFR BLD: 23 % (ref 13–44)
MCH RBC QN AUTO: 25.4 PG (ref 26.1–32.9)
MCH RBC QN AUTO: 25.6 PG (ref 26.1–32.9)
MCHC RBC AUTO-ENTMCNC: 30.9 G/DL (ref 31.4–35)
MCHC RBC AUTO-ENTMCNC: 31.1 G/DL (ref 31.4–35)
MCV RBC AUTO: 81.9 FL (ref 82–102)
MCV RBC AUTO: 82.4 FL (ref 82–102)
MONOCYTES # BLD: 0.7 K/UL (ref 0.1–1.3)
MONOCYTES NFR BLD: 9 % (ref 4–12)
NEUTS SEG # BLD: 5.4 K/UL (ref 1.7–8.2)
NEUTS SEG NFR BLD: 64 % (ref 43–78)
NITRITE UR QL: NEGATIVE
NRBC # BLD: 0 K/UL (ref 0–0.2)
NRBC # BLD: 0 K/UL (ref 0–0.2)
NT PRO BNP: 1870 PG/ML (ref 5–125)
PH UR: 7 (ref 5–9)
PLATELET # BLD AUTO: 175 K/UL (ref 150–450)
PLATELET # BLD AUTO: 175 K/UL (ref 150–450)
PMV BLD AUTO: 9.9 FL (ref 9.4–12.3)
PMV BLD AUTO: 9.9 FL (ref 9.4–12.3)
POTASSIUM SERPL-SCNC: 3.5 MMOL/L (ref 3.5–5.1)
POTASSIUM SERPL-SCNC: 3.7 MMOL/L (ref 3.5–5.1)
PROT SERPL-MCNC: 6.9 G/DL (ref 6.3–8.2)
PROT SERPL-MCNC: 7.3 G/DL (ref 6.3–8.2)
PROT UR QL: ABNORMAL MG/DL
RBC # BLD AUTO: 4.26 M/UL (ref 4.23–5.6)
RBC # BLD AUTO: 4.26 M/UL (ref 4.23–5.6)
RBC # UR STRIP: NEGATIVE
RBC #/AREA URNS HPF: NORMAL /HPF
SERVICE CMNT-IMP: ABNORMAL
SODIUM SERPL-SCNC: 138 MMOL/L (ref 136–146)
SODIUM SERPL-SCNC: 140 MMOL/L (ref 136–146)
SP GR UR: 1.02 (ref 1–1.02)
TRIGL SERPL-MCNC: 69 MG/DL (ref 35–150)
TROPONIN I SERPL HS-MCNC: 705.8 PG/ML (ref 0–14)
TROPONIN I SERPL HS-MCNC: 797.5 PG/ML (ref 0–14)
UROBILINOGEN UR QL: 2 EU/DL (ref 0.2–1)
VLDLC SERPL CALC-MCNC: 13.8 MG/DL (ref 6–23)
WBC # BLD AUTO: 11.1 K/UL (ref 4.3–11.1)
WBC # BLD AUTO: 8.3 K/UL (ref 4.3–11.1)
WBC URNS QL MICRO: NORMAL /HPF

## 2024-03-04 PROCEDURE — 84484 ASSAY OF TROPONIN QUANT: CPT

## 2024-03-04 PROCEDURE — G8427 DOCREV CUR MEDS BY ELIG CLIN: HCPCS | Performed by: PHYSICIAN ASSISTANT

## 2024-03-04 PROCEDURE — 83690 ASSAY OF LIPASE: CPT

## 2024-03-04 PROCEDURE — 1100000000 HC RM PRIVATE

## 2024-03-04 PROCEDURE — 87635 SARS-COV-2 COVID-19 AMP PRB: CPT

## 2024-03-04 PROCEDURE — 81003 URINALYSIS AUTO W/O SCOPE: CPT

## 2024-03-04 PROCEDURE — 81015 MICROSCOPIC EXAM OF URINE: CPT

## 2024-03-04 PROCEDURE — 6360000004 HC RX CONTRAST MEDICATION: Performed by: EMERGENCY MEDICINE

## 2024-03-04 PROCEDURE — 85027 COMPLETE CBC AUTOMATED: CPT

## 2024-03-04 PROCEDURE — G8417 CALC BMI ABV UP PARAM F/U: HCPCS | Performed by: PHYSICIAN ASSISTANT

## 2024-03-04 PROCEDURE — 6370000000 HC RX 637 (ALT 250 FOR IP): Performed by: EMERGENCY MEDICINE

## 2024-03-04 PROCEDURE — 3051F HG A1C>EQUAL 7.0%<8.0%: CPT | Performed by: PHYSICIAN ASSISTANT

## 2024-03-04 PROCEDURE — 2022F DILAT RTA XM EVC RTNOPTHY: CPT | Performed by: PHYSICIAN ASSISTANT

## 2024-03-04 PROCEDURE — 3077F SYST BP >= 140 MM HG: CPT | Performed by: PHYSICIAN ASSISTANT

## 2024-03-04 PROCEDURE — 87040 BLOOD CULTURE FOR BACTERIA: CPT

## 2024-03-04 PROCEDURE — 85730 THROMBOPLASTIN TIME PARTIAL: CPT

## 2024-03-04 PROCEDURE — 74177 CT ABD & PELVIS W/CONTRAST: CPT

## 2024-03-04 PROCEDURE — 3023F SPIROM DOC REV: CPT | Performed by: PHYSICIAN ASSISTANT

## 2024-03-04 PROCEDURE — 83605 ASSAY OF LACTIC ACID: CPT

## 2024-03-04 PROCEDURE — 1036F TOBACCO NON-USER: CPT | Performed by: PHYSICIAN ASSISTANT

## 2024-03-04 PROCEDURE — 85520 HEPARIN ASSAY: CPT

## 2024-03-04 PROCEDURE — 99214 OFFICE O/P EST MOD 30 MIN: CPT | Performed by: PHYSICIAN ASSISTANT

## 2024-03-04 PROCEDURE — 2580000003 HC RX 258: Performed by: EMERGENCY MEDICINE

## 2024-03-04 PROCEDURE — 3078F DIAST BP <80 MM HG: CPT | Performed by: PHYSICIAN ASSISTANT

## 2024-03-04 PROCEDURE — 96375 TX/PRO/DX INJ NEW DRUG ADDON: CPT

## 2024-03-04 PROCEDURE — 6360000002 HC RX W HCPCS: Performed by: EMERGENCY MEDICINE

## 2024-03-04 PROCEDURE — 83880 ASSAY OF NATRIURETIC PEPTIDE: CPT

## 2024-03-04 PROCEDURE — 80053 COMPREHEN METABOLIC PANEL: CPT

## 2024-03-04 PROCEDURE — 1123F ACP DISCUSS/DSCN MKR DOCD: CPT | Performed by: PHYSICIAN ASSISTANT

## 2024-03-04 PROCEDURE — 85610 PROTHROMBIN TIME: CPT

## 2024-03-04 PROCEDURE — 96374 THER/PROPH/DIAG INJ IV PUSH: CPT

## 2024-03-04 PROCEDURE — 87502 INFLUENZA DNA AMP PROBE: CPT

## 2024-03-04 PROCEDURE — G8484 FLU IMMUNIZE NO ADMIN: HCPCS | Performed by: PHYSICIAN ASSISTANT

## 2024-03-04 PROCEDURE — 6360000002 HC RX W HCPCS: Performed by: FAMILY MEDICINE

## 2024-03-04 PROCEDURE — 71045 X-RAY EXAM CHEST 1 VIEW: CPT

## 2024-03-04 PROCEDURE — 99285 EMERGENCY DEPT VISIT HI MDM: CPT

## 2024-03-04 PROCEDURE — 2580000003 HC RX 258: Performed by: FAMILY MEDICINE

## 2024-03-04 PROCEDURE — 3017F COLORECTAL CA SCREEN DOC REV: CPT | Performed by: PHYSICIAN ASSISTANT

## 2024-03-04 PROCEDURE — 93005 ELECTROCARDIOGRAM TRACING: CPT | Performed by: EMERGENCY MEDICINE

## 2024-03-04 PROCEDURE — 2500000003 HC RX 250 WO HCPCS: Performed by: EMERGENCY MEDICINE

## 2024-03-04 RX ORDER — ALBUTEROL SULFATE 90 UG/1
2 AEROSOL, METERED RESPIRATORY (INHALATION) EVERY 6 HOURS PRN
Qty: 18 G | Refills: 0 | Status: SHIPPED | OUTPATIENT
Start: 2024-03-04 | End: 2024-03-04 | Stop reason: SDUPTHER

## 2024-03-04 RX ORDER — METRONIDAZOLE 500 MG/100ML
500 INJECTION, SOLUTION INTRAVENOUS ONCE
Status: DISCONTINUED | OUTPATIENT
Start: 2024-03-04 | End: 2024-03-04

## 2024-03-04 RX ORDER — POTASSIUM CHLORIDE 20 MEQ/1
40 TABLET, EXTENDED RELEASE ORAL PRN
Status: DISCONTINUED | OUTPATIENT
Start: 2024-03-04 | End: 2024-03-07 | Stop reason: HOSPADM

## 2024-03-04 RX ORDER — LISINOPRIL 20 MG/1
20 TABLET ORAL 2 TIMES DAILY
Status: DISCONTINUED | OUTPATIENT
Start: 2024-03-04 | End: 2024-03-07 | Stop reason: HOSPADM

## 2024-03-04 RX ORDER — POLYETHYLENE GLYCOL 3350 17 G/17G
17 POWDER, FOR SOLUTION ORAL DAILY PRN
Status: DISCONTINUED | OUTPATIENT
Start: 2024-03-04 | End: 2024-03-07 | Stop reason: HOSPADM

## 2024-03-04 RX ORDER — SODIUM CHLORIDE 0.9 % (FLUSH) 0.9 %
5-40 SYRINGE (ML) INJECTION EVERY 12 HOURS SCHEDULED
Status: DISCONTINUED | OUTPATIENT
Start: 2024-03-04 | End: 2024-03-07 | Stop reason: HOSPADM

## 2024-03-04 RX ORDER — FUROSEMIDE 40 MG/1
40 TABLET ORAL DAILY PRN
Qty: 60 TABLET | Refills: 11 | Status: SHIPPED | OUTPATIENT
Start: 2024-03-04

## 2024-03-04 RX ORDER — BISACODYL 10 MG
10 SUPPOSITORY, RECTAL RECTAL DAILY PRN
Status: DISCONTINUED | OUTPATIENT
Start: 2024-03-04 | End: 2024-03-07 | Stop reason: HOSPADM

## 2024-03-04 RX ORDER — FLUTICASONE FUROATE, UMECLIDINIUM BROMIDE AND VILANTEROL TRIFENATATE 100; 62.5; 25 UG/1; UG/1; UG/1
1 POWDER RESPIRATORY (INHALATION) DAILY
Qty: 1 EACH | Refills: 11 | Status: SHIPPED | OUTPATIENT
Start: 2024-03-04

## 2024-03-04 RX ORDER — DEXTROSE MONOHYDRATE 100 MG/ML
INJECTION, SOLUTION INTRAVENOUS CONTINUOUS PRN
Status: DISCONTINUED | OUTPATIENT
Start: 2024-03-04 | End: 2024-03-07 | Stop reason: HOSPADM

## 2024-03-04 RX ORDER — LISINOPRIL 20 MG/1
20 TABLET ORAL 2 TIMES DAILY
Qty: 60 TABLET | Refills: 5 | Status: SHIPPED | OUTPATIENT
Start: 2024-03-04 | End: 2024-08-31

## 2024-03-04 RX ORDER — INSULIN LISPRO 100 [IU]/ML
0-4 INJECTION, SOLUTION INTRAVENOUS; SUBCUTANEOUS NIGHTLY
Status: DISCONTINUED | OUTPATIENT
Start: 2024-03-04 | End: 2024-03-05

## 2024-03-04 RX ORDER — ONDANSETRON 2 MG/ML
4 INJECTION INTRAMUSCULAR; INTRAVENOUS
Status: COMPLETED | OUTPATIENT
Start: 2024-03-04 | End: 2024-03-04

## 2024-03-04 RX ORDER — ALBUTEROL SULFATE 90 UG/1
2 AEROSOL, METERED RESPIRATORY (INHALATION) EVERY 6 HOURS PRN
Qty: 18 G | Refills: 11 | Status: SHIPPED | OUTPATIENT
Start: 2024-03-04

## 2024-03-04 RX ORDER — GABAPENTIN 300 MG/1
600 CAPSULE ORAL NIGHTLY
Status: DISCONTINUED | OUTPATIENT
Start: 2024-03-04 | End: 2024-03-07 | Stop reason: HOSPADM

## 2024-03-04 RX ORDER — HYDROMORPHONE HYDROCHLORIDE 1 MG/ML
1 INJECTION, SOLUTION INTRAMUSCULAR; INTRAVENOUS; SUBCUTANEOUS
Status: COMPLETED | OUTPATIENT
Start: 2024-03-04 | End: 2024-03-04

## 2024-03-04 RX ORDER — NITROGLYCERIN 0.4 MG/1
0.4 TABLET SUBLINGUAL EVERY 5 MIN PRN
Status: DISCONTINUED | OUTPATIENT
Start: 2024-03-04 | End: 2024-03-07 | Stop reason: HOSPADM

## 2024-03-04 RX ORDER — SODIUM CHLORIDE, SODIUM LACTATE, POTASSIUM CHLORIDE, CALCIUM CHLORIDE 600; 310; 30; 20 MG/100ML; MG/100ML; MG/100ML; MG/100ML
INJECTION, SOLUTION INTRAVENOUS CONTINUOUS
Status: ACTIVE | OUTPATIENT
Start: 2024-03-04 | End: 2024-03-05

## 2024-03-04 RX ORDER — SODIUM CHLORIDE 9 MG/ML
INJECTION, SOLUTION INTRAVENOUS PRN
Status: DISCONTINUED | OUTPATIENT
Start: 2024-03-04 | End: 2024-03-07 | Stop reason: HOSPADM

## 2024-03-04 RX ORDER — FUROSEMIDE 10 MG/ML
60 INJECTION INTRAMUSCULAR; INTRAVENOUS
Status: COMPLETED | OUTPATIENT
Start: 2024-03-04 | End: 2024-03-04

## 2024-03-04 RX ORDER — MAGNESIUM HYDROXIDE/ALUMINUM HYDROXICE/SIMETHICONE 120; 1200; 1200 MG/30ML; MG/30ML; MG/30ML
30 SUSPENSION ORAL EVERY 6 HOURS PRN
Status: DISCONTINUED | OUTPATIENT
Start: 2024-03-04 | End: 2024-03-07 | Stop reason: HOSPADM

## 2024-03-04 RX ORDER — ACETAMINOPHEN 325 MG/1
650 TABLET ORAL EVERY 6 HOURS PRN
Status: DISCONTINUED | OUTPATIENT
Start: 2024-03-04 | End: 2024-03-07 | Stop reason: HOSPADM

## 2024-03-04 RX ORDER — MORPHINE SULFATE 2 MG/ML
2 INJECTION, SOLUTION INTRAMUSCULAR; INTRAVENOUS EVERY 4 HOURS PRN
Status: DISCONTINUED | OUTPATIENT
Start: 2024-03-04 | End: 2024-03-07 | Stop reason: HOSPADM

## 2024-03-04 RX ORDER — HEPARIN SODIUM 10000 [USP'U]/100ML
5-30 INJECTION, SOLUTION INTRAVENOUS CONTINUOUS
Status: DISCONTINUED | OUTPATIENT
Start: 2024-03-04 | End: 2024-03-05

## 2024-03-04 RX ORDER — HEPARIN SODIUM 1000 [USP'U]/ML
2000 INJECTION, SOLUTION INTRAVENOUS; SUBCUTANEOUS PRN
Status: DISCONTINUED | OUTPATIENT
Start: 2024-03-04 | End: 2024-03-05 | Stop reason: ALTCHOICE

## 2024-03-04 RX ORDER — FAMOTIDINE 20 MG/1
10 TABLET, FILM COATED ORAL DAILY PRN
Status: DISCONTINUED | OUTPATIENT
Start: 2024-03-04 | End: 2024-03-07 | Stop reason: HOSPADM

## 2024-03-04 RX ORDER — AMLODIPINE BESYLATE 5 MG/1
5 TABLET ORAL DAILY
Status: DISCONTINUED | OUTPATIENT
Start: 2024-03-05 | End: 2024-03-07 | Stop reason: HOSPADM

## 2024-03-04 RX ORDER — METOPROLOL TARTRATE 50 MG/1
50 TABLET, FILM COATED ORAL 2 TIMES DAILY
Status: DISCONTINUED | OUTPATIENT
Start: 2024-03-04 | End: 2024-03-07 | Stop reason: HOSPADM

## 2024-03-04 RX ORDER — MAGNESIUM SULFATE IN WATER 40 MG/ML
2000 INJECTION, SOLUTION INTRAVENOUS PRN
Status: DISCONTINUED | OUTPATIENT
Start: 2024-03-04 | End: 2024-03-07 | Stop reason: HOSPADM

## 2024-03-04 RX ORDER — AMLODIPINE BESYLATE 5 MG/1
5 TABLET ORAL DAILY
Qty: 30 TABLET | Refills: 5 | Status: SHIPPED | OUTPATIENT
Start: 2024-03-04

## 2024-03-04 RX ORDER — IBUPROFEN 600 MG/1
1 TABLET ORAL PRN
Status: DISCONTINUED | OUTPATIENT
Start: 2024-03-04 | End: 2024-03-07 | Stop reason: HOSPADM

## 2024-03-04 RX ORDER — GABAPENTIN 300 MG/1
300 CAPSULE ORAL 2 TIMES DAILY
Status: DISCONTINUED | OUTPATIENT
Start: 2024-03-05 | End: 2024-03-07 | Stop reason: HOSPADM

## 2024-03-04 RX ORDER — HEPARIN SODIUM 1000 [USP'U]/ML
4000 INJECTION, SOLUTION INTRAVENOUS; SUBCUTANEOUS ONCE
Status: COMPLETED | OUTPATIENT
Start: 2024-03-04 | End: 2024-03-04

## 2024-03-04 RX ORDER — INSULIN LISPRO 100 [IU]/ML
0-4 INJECTION, SOLUTION INTRAVENOUS; SUBCUTANEOUS
Status: DISCONTINUED | OUTPATIENT
Start: 2024-03-05 | End: 2024-03-05

## 2024-03-04 RX ORDER — POTASSIUM CHLORIDE 7.45 MG/ML
10 INJECTION INTRAVENOUS PRN
Status: DISCONTINUED | OUTPATIENT
Start: 2024-03-04 | End: 2024-03-07 | Stop reason: HOSPADM

## 2024-03-04 RX ORDER — GABAPENTIN 300 MG/1
600 CAPSULE ORAL
COMMUNITY

## 2024-03-04 RX ORDER — ASPIRIN 81 MG/1
324 TABLET, CHEWABLE ORAL
Status: DISCONTINUED | OUTPATIENT
Start: 2024-03-04 | End: 2024-03-04

## 2024-03-04 RX ORDER — SODIUM CHLORIDE 0.9 % (FLUSH) 0.9 %
5-40 SYRINGE (ML) INJECTION PRN
Status: DISCONTINUED | OUTPATIENT
Start: 2024-03-04 | End: 2024-03-07 | Stop reason: HOSPADM

## 2024-03-04 RX ORDER — ALBUTEROL SULFATE 90 UG/1
2 AEROSOL, METERED RESPIRATORY (INHALATION) EVERY 6 HOURS PRN
Status: DISCONTINUED | OUTPATIENT
Start: 2024-03-04 | End: 2024-03-07 | Stop reason: HOSPADM

## 2024-03-04 RX ORDER — METOPROLOL TARTRATE 50 MG/1
50 TABLET, FILM COATED ORAL 2 TIMES DAILY
Qty: 60 TABLET | Refills: 5 | Status: SHIPPED | OUTPATIENT
Start: 2024-03-04

## 2024-03-04 RX ORDER — HEPARIN SODIUM 1000 [USP'U]/ML
4000 INJECTION, SOLUTION INTRAVENOUS; SUBCUTANEOUS PRN
Status: DISCONTINUED | OUTPATIENT
Start: 2024-03-04 | End: 2024-03-05 | Stop reason: ALTCHOICE

## 2024-03-04 RX ADMIN — FUROSEMIDE 60 MG: 10 INJECTION, SOLUTION INTRAMUSCULAR; INTRAVENOUS at 22:14

## 2024-03-04 RX ADMIN — HEPARIN SODIUM 4000 UNITS: 1000 INJECTION INTRAVENOUS; SUBCUTANEOUS at 23:41

## 2024-03-04 RX ADMIN — HEPARIN SODIUM 8 UNITS/KG/HR: 10000 INJECTION, SOLUTION INTRAVENOUS at 23:44

## 2024-03-04 RX ADMIN — AZITHROMYCIN MONOHYDRATE 500 MG: 500 INJECTION, POWDER, LYOPHILIZED, FOR SOLUTION INTRAVENOUS at 22:21

## 2024-03-04 RX ADMIN — PIPERACILLIN AND TAZOBACTAM 4500 MG: 4; .5 INJECTION, POWDER, FOR SOLUTION INTRAVENOUS at 23:52

## 2024-03-04 RX ADMIN — IOPAMIDOL 100 ML: 755 INJECTION, SOLUTION INTRAVENOUS at 21:49

## 2024-03-04 RX ADMIN — ONDANSETRON 4 MG: 2 INJECTION INTRAMUSCULAR; INTRAVENOUS at 20:47

## 2024-03-04 RX ADMIN — HYDROMORPHONE HYDROCHLORIDE 1 MG: 1 INJECTION, SOLUTION INTRAMUSCULAR; INTRAVENOUS; SUBCUTANEOUS at 20:46

## 2024-03-04 RX ADMIN — NITROGLYCERIN 1 INCH: 20 OINTMENT TOPICAL at 22:14

## 2024-03-04 RX ADMIN — WATER 1000 MG: 1 INJECTION INTRAMUSCULAR; INTRAVENOUS; SUBCUTANEOUS at 22:17

## 2024-03-04 ASSESSMENT — PATIENT HEALTH QUESTIONNAIRE - PHQ9
SUM OF ALL RESPONSES TO PHQ QUESTIONS 1-9: 0
1. LITTLE INTEREST OR PLEASURE IN DOING THINGS: 0
SUM OF ALL RESPONSES TO PHQ QUESTIONS 1-9: 0

## 2024-03-04 ASSESSMENT — PAIN SCALES - GENERAL
PAINLEVEL_OUTOF10: 9
PAINLEVEL_OUTOF10: 8
PAINLEVEL_OUTOF10: 9

## 2024-03-04 ASSESSMENT — ENCOUNTER SYMPTOMS
SHORTNESS OF BREATH: 0
COUGH: 0
ABDOMINAL PAIN: 1
COLOR CHANGE: 0
BACK PAIN: 0
DIARRHEA: 0
NAUSEA: 0
BLOOD IN STOOL: 0
SHORTNESS OF BREATH: 1
RHINORRHEA: 0
VOMITING: 0
COUGH: 0

## 2024-03-04 ASSESSMENT — PAIN - FUNCTIONAL ASSESSMENT: PAIN_FUNCTIONAL_ASSESSMENT: 0-10

## 2024-03-04 ASSESSMENT — LIFESTYLE VARIABLES
HOW MANY STANDARD DRINKS CONTAINING ALCOHOL DO YOU HAVE ON A TYPICAL DAY: 3 OR 4
HOW OFTEN DO YOU HAVE A DRINK CONTAINING ALCOHOL: 2-3 TIMES A WEEK

## 2024-03-04 ASSESSMENT — PAIN DESCRIPTION - DESCRIPTORS
DESCRIPTORS: BURNING;SHARP;STABBING
DESCRIPTORS: STABBING;BURNING

## 2024-03-04 ASSESSMENT — PAIN DESCRIPTION - LOCATION
LOCATION: ABDOMEN;CHEST
LOCATION: CHEST;ABDOMEN
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
## 2024-03-04 NOTE — PROGRESS NOTES
[facility]  [address]   [place] [address]  Phone: [phone]  Fax [phone]  Provider : Meliza Martino PA-C      Patient: Marc Horton  YOB: 1955  Patient Age 68 y.o.  Patient sex: male  Medical Record:  [id number]  Visit Date:3/4/2024   Author:  Meliza Martino PA-C    Family Practice Clinic Note     Chief complaint  Marc Horton  is a 68 y.o. male who was seen on 03/04/24  1:20 PM  for the following reasons:    Chief Complaint   Patient presents with    Follow-up    Leg Pain     bilateral       Current Medical problems addressed    He notes he has had more trouble walking.   He notes he has been falling a lot more being weak and wants to have a scooter to use when shopping or going to the [employer] lock..    He notes he used to see pain management and was drinking liquour and stopped going to pain management to avoid addiction issues and stopped going around 2008 and worried about liver disease.  He has managed on gabapentin til now but having more  pain in legs, knees and hips bilaterally and in hands and fingers -  -- after open heart surgery got numbness in left hand    Patient is being seen today for a face to face examination for a power wheelchair.  We have reviewed history as noted below for full history of present conditions and past medical history that is relevant to the patient's mobility.    Symptoms that limit ambulation include:  Diagnosis tat are responsible for these symptoms include: DM type 2 with neuropathy, chronic back pain,   Medictions or other treatment for these symptoms include: gabapentin and injections in back and hip   Progression of ambulation difficulty over time  uses cane and having more falls in the last week -- 3 falls this week  Other diagnosis that may relate to ambulatory problems   see notes above   How far the patient can walk without stopping and with what assistive devices such as cane or walker are states as:  about 20 feet with a

## 2024-03-05 PROBLEM — R91.8 BILATERAL PULMONARY INFILTRATES: Status: ACTIVE | Noted: 2024-03-05

## 2024-03-05 PROBLEM — K57.92 DIVERTICULITIS: Status: ACTIVE | Noted: 2024-03-05

## 2024-03-05 PROBLEM — R07.9 CHEST PAIN: Status: ACTIVE | Noted: 2024-03-05

## 2024-03-05 PROBLEM — E87.20 LACTIC ACIDOSIS: Status: ACTIVE | Noted: 2024-03-05

## 2024-03-05 PROBLEM — R79.89 ELEVATED TROPONIN: Status: ACTIVE | Noted: 2024-03-05

## 2024-03-05 PROBLEM — I21.4 NSTEMI (NON-ST ELEVATED MYOCARDIAL INFARCTION) (HCC): Status: RESOLVED | Noted: 2024-03-04 | Resolved: 2024-03-05

## 2024-03-05 LAB
ALBUMIN SERPL-MCNC: 3.2 G/DL (ref 3.2–4.6)
ALBUMIN/GLOB SERPL: 0.8 (ref 0.4–1.6)
ALP SERPL-CCNC: 64 U/L (ref 50–136)
ALT SERPL-CCNC: 19 U/L (ref 12–65)
ANION GAP SERPL CALC-SCNC: 3 MMOL/L (ref 2–11)
APTT PPP: 35.3 SEC (ref 23.3–37.4)
AST SERPL-CCNC: 17 U/L (ref 15–37)
BASOPHILS # BLD: 0.1 K/UL (ref 0–0.2)
BASOPHILS NFR BLD: 1 % (ref 0–2)
BILIRUB DIRECT SERPL-MCNC: 0.2 MG/DL
BILIRUB SERPL-MCNC: 0.6 MG/DL (ref 0.2–1.1)
BUN SERPL-MCNC: 15 MG/DL (ref 8–23)
CALCIUM SERPL-MCNC: 8.8 MG/DL (ref 8.3–10.4)
CHLORIDE SERPL-SCNC: 107 MMOL/L (ref 103–113)
CO2 SERPL-SCNC: 30 MMOL/L (ref 21–32)
CREAT SERPL-MCNC: 0.9 MG/DL (ref 0.8–1.5)
DIFFERENTIAL METHOD BLD: ABNORMAL
EKG ATRIAL RATE: 86 BPM
EKG DIAGNOSIS: NORMAL
EKG P AXIS: 63 DEGREES
EKG P-R INTERVAL: 145 MS
EKG Q-T INTERVAL: 383 MS
EKG QRS DURATION: 91 MS
EKG QTC CALCULATION (BAZETT): 456 MS
EKG R AXIS: 61 DEGREES
EKG T AXIS: 60 DEGREES
EKG VENTRICULAR RATE: 85 BPM
EOSINOPHIL # BLD: 0.2 K/UL (ref 0–0.8)
EOSINOPHIL NFR BLD: 2 % (ref 0.5–7.8)
ERYTHROCYTE [DISTWIDTH] IN BLOOD BY AUTOMATED COUNT: 18.2 % (ref 11.9–14.6)
FLUAV RNA SPEC QL NAA+PROBE: NOT DETECTED
FLUBV RNA SPEC QL NAA+PROBE: NOT DETECTED
GLOBULIN SER CALC-MCNC: 4.1 G/DL (ref 2.8–4.5)
GLUCOSE BLD STRIP.AUTO-MCNC: 101 MG/DL (ref 65–100)
GLUCOSE BLD STRIP.AUTO-MCNC: 101 MG/DL (ref 65–100)
GLUCOSE BLD STRIP.AUTO-MCNC: 113 MG/DL (ref 65–100)
GLUCOSE BLD STRIP.AUTO-MCNC: 141 MG/DL (ref 65–100)
GLUCOSE BLD STRIP.AUTO-MCNC: 147 MG/DL (ref 65–100)
GLUCOSE BLD STRIP.AUTO-MCNC: 185 MG/DL (ref 65–100)
GLUCOSE SERPL-MCNC: 146 MG/DL (ref 65–100)
HCT VFR BLD AUTO: 36.1 % (ref 41.1–50.3)
HGB BLD-MCNC: 11 G/DL (ref 13.6–17.2)
IMM GRANULOCYTES # BLD AUTO: 0 K/UL (ref 0–0.5)
IMM GRANULOCYTES NFR BLD AUTO: 1 % (ref 0–5)
INR PPP: 1.3
LACTATE SERPL-SCNC: 0.7 MMOL/L (ref 0.4–2)
LACTATE SERPL-SCNC: 2 MMOL/L (ref 0.4–2)
LYMPHOCYTES # BLD: 1.5 K/UL (ref 0.5–4.6)
LYMPHOCYTES NFR BLD: 17 % (ref 13–44)
MCH RBC QN AUTO: 25.6 PG (ref 26.1–32.9)
MCHC RBC AUTO-ENTMCNC: 30.5 G/DL (ref 31.4–35)
MCV RBC AUTO: 84.1 FL (ref 82–102)
MONOCYTES # BLD: 0.7 K/UL (ref 0.1–1.3)
MONOCYTES NFR BLD: 9 % (ref 4–12)
NEUTS SEG # BLD: 6.2 K/UL (ref 1.7–8.2)
NEUTS SEG NFR BLD: 71 % (ref 43–78)
NRBC # BLD: 0 K/UL (ref 0–0.2)
PLATELET # BLD AUTO: 164 K/UL (ref 150–450)
PMV BLD AUTO: 9.5 FL (ref 9.4–12.3)
POTASSIUM SERPL-SCNC: 4.5 MMOL/L (ref 3.5–5.1)
PROT SERPL-MCNC: 7.3 G/DL (ref 6.3–8.2)
PROTHROMBIN TIME: 16.8 SEC (ref 11.3–14.9)
RBC # BLD AUTO: 4.29 M/UL (ref 4.23–5.6)
SARS-COV-2 RDRP RESP QL NAA+PROBE: NOT DETECTED
SERVICE CMNT-IMP: ABNORMAL
SODIUM SERPL-SCNC: 140 MMOL/L (ref 136–146)
SOURCE: NORMAL
TROPONIN I SERPL HS-MCNC: 250.4 PG/ML (ref 0–14)
TROPONIN I SERPL HS-MCNC: 277.8 PG/ML (ref 0–14)
TROPONIN I SERPL HS-MCNC: 323.8 PG/ML (ref 0–14)
TROPONIN I SERPL HS-MCNC: 450.4 PG/ML (ref 0–14)
TROPONIN I SERPL HS-MCNC: 501.4 PG/ML (ref 0–14)
TROPONIN I SERPL HS-MCNC: 534.9 PG/ML (ref 0–14)
TROPONIN I SERPL HS-MCNC: 565.2 PG/ML (ref 0–14)
UFH PPP CHRO-ACNC: 0.1 IU/ML (ref 0.3–0.7)
UFH PPP CHRO-ACNC: <0.1 IU/ML (ref 0.3–0.7)
WBC # BLD AUTO: 8.7 K/UL (ref 4.3–11.1)

## 2024-03-05 PROCEDURE — 2700000000 HC OXYGEN THERAPY PER DAY

## 2024-03-05 PROCEDURE — 80048 BASIC METABOLIC PNL TOTAL CA: CPT

## 2024-03-05 PROCEDURE — 94761 N-INVAS EAR/PLS OXIMETRY MLT: CPT

## 2024-03-05 PROCEDURE — 2580000003 HC RX 258: Performed by: FAMILY MEDICINE

## 2024-03-05 PROCEDURE — 6370000000 HC RX 637 (ALT 250 FOR IP): Performed by: HOSPITALIST

## 2024-03-05 PROCEDURE — 6360000002 HC RX W HCPCS: Performed by: FAMILY MEDICINE

## 2024-03-05 PROCEDURE — 83605 ASSAY OF LACTIC ACID: CPT

## 2024-03-05 PROCEDURE — 6370000000 HC RX 637 (ALT 250 FOR IP): Performed by: FAMILY MEDICINE

## 2024-03-05 PROCEDURE — 36415 COLL VENOUS BLD VENIPUNCTURE: CPT

## 2024-03-05 PROCEDURE — 84484 ASSAY OF TROPONIN QUANT: CPT

## 2024-03-05 PROCEDURE — 6370000000 HC RX 637 (ALT 250 FOR IP): Performed by: INTERNAL MEDICINE

## 2024-03-05 PROCEDURE — 94640 AIRWAY INHALATION TREATMENT: CPT

## 2024-03-05 PROCEDURE — 94760 N-INVAS EAR/PLS OXIMETRY 1: CPT

## 2024-03-05 PROCEDURE — 99222 1ST HOSP IP/OBS MODERATE 55: CPT | Performed by: INTERNAL MEDICINE

## 2024-03-05 PROCEDURE — 93010 ELECTROCARDIOGRAM REPORT: CPT | Performed by: INTERNAL MEDICINE

## 2024-03-05 PROCEDURE — 2140000000 HC CCU INTERMEDIATE R&B

## 2024-03-05 PROCEDURE — 6360000002 HC RX W HCPCS: Performed by: HOSPITALIST

## 2024-03-05 PROCEDURE — 85025 COMPLETE CBC W/AUTO DIFF WBC: CPT

## 2024-03-05 PROCEDURE — 6370000000 HC RX 637 (ALT 250 FOR IP): Performed by: NURSE PRACTITIONER

## 2024-03-05 PROCEDURE — 87040 BLOOD CULTURE FOR BACTERIA: CPT

## 2024-03-05 PROCEDURE — 2580000003 HC RX 258: Performed by: HOSPITALIST

## 2024-03-05 PROCEDURE — 80076 HEPATIC FUNCTION PANEL: CPT

## 2024-03-05 PROCEDURE — 6360000002 HC RX W HCPCS: Performed by: EMERGENCY MEDICINE

## 2024-03-05 PROCEDURE — 85520 HEPARIN ASSAY: CPT

## 2024-03-05 PROCEDURE — 82962 GLUCOSE BLOOD TEST: CPT

## 2024-03-05 RX ORDER — INSULIN LISPRO 100 [IU]/ML
0-4 INJECTION, SOLUTION INTRAVENOUS; SUBCUTANEOUS EVERY 4 HOURS
Status: DISCONTINUED | OUTPATIENT
Start: 2024-03-05 | End: 2024-03-06

## 2024-03-05 RX ORDER — SODIUM CHLORIDE, SODIUM LACTATE, POTASSIUM CHLORIDE, CALCIUM CHLORIDE 600; 310; 30; 20 MG/100ML; MG/100ML; MG/100ML; MG/100ML
INJECTION, SOLUTION INTRAVENOUS CONTINUOUS
Status: ACTIVE | OUTPATIENT
Start: 2024-03-05 | End: 2024-03-05

## 2024-03-05 RX ORDER — EZETIMIBE 10 MG/1
10 TABLET ORAL NIGHTLY
Status: DISCONTINUED | OUTPATIENT
Start: 2024-03-05 | End: 2024-03-07 | Stop reason: HOSPADM

## 2024-03-05 RX ORDER — GUAIFENESIN 600 MG/1
1200 TABLET, EXTENDED RELEASE ORAL 2 TIMES DAILY
Status: DISCONTINUED | OUTPATIENT
Start: 2024-03-05 | End: 2024-03-07 | Stop reason: HOSPADM

## 2024-03-05 RX ORDER — ASPIRIN 81 MG/1
81 TABLET, CHEWABLE ORAL DAILY
Status: DISCONTINUED | OUTPATIENT
Start: 2024-03-05 | End: 2024-03-07 | Stop reason: HOSPADM

## 2024-03-05 RX ORDER — BUDESONIDE 0.5 MG/2ML
0.5 INHALANT ORAL
Status: DISCONTINUED | OUTPATIENT
Start: 2024-03-05 | End: 2024-03-07 | Stop reason: HOSPADM

## 2024-03-05 RX ORDER — OXYCODONE HYDROCHLORIDE 5 MG/1
5 TABLET ORAL EVERY 4 HOURS PRN
Status: DISCONTINUED | OUTPATIENT
Start: 2024-03-05 | End: 2024-03-07 | Stop reason: HOSPADM

## 2024-03-05 RX ORDER — BUDESONIDE 0.5 MG/2ML
0.5 INHALANT ORAL
Status: DISCONTINUED | OUTPATIENT
Start: 2024-03-05 | End: 2024-03-05

## 2024-03-05 RX ORDER — LEVALBUTEROL INHALATION SOLUTION 0.63 MG/3ML
0.63 SOLUTION RESPIRATORY (INHALATION)
Status: DISCONTINUED | OUTPATIENT
Start: 2024-03-05 | End: 2024-03-07 | Stop reason: HOSPADM

## 2024-03-05 RX ORDER — ENOXAPARIN SODIUM 100 MG/ML
30 INJECTION SUBCUTANEOUS EVERY 12 HOURS
Status: DISCONTINUED | OUTPATIENT
Start: 2024-03-05 | End: 2024-03-05

## 2024-03-05 RX ORDER — LEVALBUTEROL INHALATION SOLUTION 0.63 MG/3ML
0.63 SOLUTION RESPIRATORY (INHALATION)
Status: DISCONTINUED | OUTPATIENT
Start: 2024-03-05 | End: 2024-03-05

## 2024-03-05 RX ADMIN — LISINOPRIL 20 MG: 20 TABLET ORAL at 00:48

## 2024-03-05 RX ADMIN — METOPROLOL TARTRATE 50 MG: 50 TABLET, FILM COATED ORAL at 20:45

## 2024-03-05 RX ADMIN — LISINOPRIL 20 MG: 20 TABLET ORAL at 08:34

## 2024-03-05 RX ADMIN — TIOTROPIUM BROMIDE INHALATION SPRAY 2 PUFF: 3.12 SPRAY, METERED RESPIRATORY (INHALATION) at 08:33

## 2024-03-05 RX ADMIN — GUAIFENESIN 1200 MG: 600 TABLET ORAL at 10:53

## 2024-03-05 RX ADMIN — GABAPENTIN 600 MG: 300 CAPSULE ORAL at 20:45

## 2024-03-05 RX ADMIN — METOPROLOL TARTRATE 50 MG: 50 TABLET, FILM COATED ORAL at 00:47

## 2024-03-05 RX ADMIN — APIXABAN 5 MG: 5 TABLET, FILM COATED ORAL at 20:45

## 2024-03-05 RX ADMIN — SODIUM CHLORIDE, POTASSIUM CHLORIDE, SODIUM LACTATE AND CALCIUM CHLORIDE: 600; 310; 30; 20 INJECTION, SOLUTION INTRAVENOUS at 03:03

## 2024-03-05 RX ADMIN — ENOXAPARIN SODIUM 30 MG: 100 INJECTION SUBCUTANEOUS at 09:31

## 2024-03-05 RX ADMIN — LEVALBUTEROL HYDROCHLORIDE 0.63 MG: 0.63 SOLUTION RESPIRATORY (INHALATION) at 11:26

## 2024-03-05 RX ADMIN — VANCOMYCIN HYDROCHLORIDE 2000 MG: 10 INJECTION, POWDER, LYOPHILIZED, FOR SOLUTION INTRAVENOUS at 00:51

## 2024-03-05 RX ADMIN — MORPHINE SULFATE 2 MG: 2 INJECTION, SOLUTION INTRAMUSCULAR; INTRAVENOUS at 21:05

## 2024-03-05 RX ADMIN — METOPROLOL TARTRATE 50 MG: 50 TABLET, FILM COATED ORAL at 08:34

## 2024-03-05 RX ADMIN — SODIUM CHLORIDE, PRESERVATIVE FREE 10 ML: 5 INJECTION INTRAVENOUS at 20:48

## 2024-03-05 RX ADMIN — BUDESONIDE 500 MCG: 0.5 SUSPENSION RESPIRATORY (INHALATION) at 20:55

## 2024-03-05 RX ADMIN — EZETIMIBE 10 MG: 10 TABLET ORAL at 20:45

## 2024-03-05 RX ADMIN — ASPIRIN 81 MG: 81 TABLET, CHEWABLE ORAL at 08:34

## 2024-03-05 RX ADMIN — GUAIFENESIN 1200 MG: 600 TABLET ORAL at 20:45

## 2024-03-05 RX ADMIN — AMLODIPINE BESYLATE 5 MG: 5 TABLET ORAL at 08:35

## 2024-03-05 RX ADMIN — SODIUM CHLORIDE, PRESERVATIVE FREE 10 ML: 5 INJECTION INTRAVENOUS at 03:03

## 2024-03-05 RX ADMIN — ACETAMINOPHEN 650 MG: 325 TABLET ORAL at 17:38

## 2024-03-05 RX ADMIN — GABAPENTIN 300 MG: 300 CAPSULE ORAL at 08:34

## 2024-03-05 RX ADMIN — GABAPENTIN 600 MG: 300 CAPSULE ORAL at 00:47

## 2024-03-05 RX ADMIN — GABAPENTIN 300 MG: 300 CAPSULE ORAL at 12:49

## 2024-03-05 RX ADMIN — HEPARIN SODIUM 2000 UNITS: 1000 INJECTION INTRAVENOUS; SUBCUTANEOUS at 08:35

## 2024-03-05 RX ADMIN — SODIUM CHLORIDE, POTASSIUM CHLORIDE, SODIUM LACTATE AND CALCIUM CHLORIDE: 600; 310; 30; 20 INJECTION, SOLUTION INTRAVENOUS at 11:50

## 2024-03-05 RX ADMIN — PIPERACILLIN AND TAZOBACTAM 3375 MG: 3; .375 INJECTION, POWDER, FOR SOLUTION INTRAVENOUS at 07:52

## 2024-03-05 RX ADMIN — PIPERACILLIN AND TAZOBACTAM 3375 MG: 3; .375 INJECTION, POWDER, FOR SOLUTION INTRAVENOUS at 22:25

## 2024-03-05 RX ADMIN — LISINOPRIL 20 MG: 20 TABLET ORAL at 20:45

## 2024-03-05 RX ADMIN — PIPERACILLIN AND TAZOBACTAM 3375 MG: 3; .375 INJECTION, POWDER, FOR SOLUTION INTRAVENOUS at 12:49

## 2024-03-05 ASSESSMENT — PAIN DESCRIPTION - DESCRIPTORS
DESCRIPTORS: BURNING;STABBING
DESCRIPTORS: BURNING

## 2024-03-05 ASSESSMENT — ENCOUNTER SYMPTOMS
ALLERGIC/IMMUNOLOGIC NEGATIVE: 1
RESPIRATORY NEGATIVE: 1
EYES NEGATIVE: 1
ABDOMINAL PAIN: 1

## 2024-03-05 ASSESSMENT — PAIN DESCRIPTION - ORIENTATION
ORIENTATION: MID;LEFT
ORIENTATION: MID;LEFT

## 2024-03-05 ASSESSMENT — PAIN DESCRIPTION - LOCATION
LOCATION: ABDOMEN;CHEST
LOCATION: GENERALIZED
LOCATION: ABDOMEN;CHEST

## 2024-03-05 ASSESSMENT — PAIN DESCRIPTION - PAIN TYPE
TYPE: ACUTE PAIN
TYPE: ACUTE PAIN

## 2024-03-05 ASSESSMENT — PAIN SCALES - GENERAL
PAINLEVEL_OUTOF10: 3
PAINLEVEL_OUTOF10: 3
PAINLEVEL_OUTOF10: 0
PAINLEVEL_OUTOF10: 9
PAINLEVEL_OUTOF10: 0

## 2024-03-05 NOTE — ED TRIAGE NOTES
Coming from home via ems. Has been short of breath since yesterday and abdominal pain. LUQ pain, that radiates to chest. Burning mid sternal chest/upper abdomen pain  324 aspirin and 1 nitro given by ems. No relief.   Pt states took 40mg lasix earlier today

## 2024-03-05 NOTE — ED NOTES
Spoke with pharm, danae, informed to increase rate by 2 and give prn hep per shana.     Marcus Kebede, RN  03/05/24 0751

## 2024-03-05 NOTE — CONSULTS
mouth in the morning and at bedtime for 360 doses    metFORMIN (GLUCOPHAGE) 850 MG tablet Take 1 tablet by mouth daily (with breakfast)    metoprolol tartrate (LOPRESSOR) 50 MG tablet Take 1 tablet by mouth 2 times daily    gabapentin (NEURONTIN) 300 MG capsule Take 1 capsule in morning, and lunch and 2 capusles at night Intended supply: 30 days (Patient taking differently: Take 1 capsule by mouth in the morning and at bedtime. Take 1 capsule in morning, and lunch and 2 capusles at night Intended supply: 30 days)    aspirin 325 MG tablet Take 2 tablets by mouth three times daily       Review of Systems    Review of Systems   Constitutional: Positive for chills.   HENT: Negative.     Eyes: Negative.    Cardiovascular:  Positive for chest pain.   Respiratory: Negative.     Endocrine: Negative.    Hematologic/Lymphatic: Negative.    Skin: Negative.    Musculoskeletal: Negative.    Gastrointestinal:  Positive for abdominal pain.   Genitourinary: Negative.    Neurological: Negative.    Psychiatric/Behavioral: Negative.     Allergic/Immunologic: Negative.            Subjective:   BP (!) 148/85   Pulse 88   Temp 98.3 °F (36.8 °C) (Oral)   Resp 19   Ht 1.829 m (6')   Wt 124.3 kg (274 lb)   SpO2 94%   BMI 37.16 kg/m²   Physical Exam  HENT:      Mouth/Throat:      Mouth: Mucous membranes are moist.   Eyes:      Pupils: Pupils are equal, round, and reactive to light.   Cardiovascular:      Rate and Rhythm: Normal rate and regular rhythm.   Abdominal:      General: Bowel sounds are normal.      Tenderness: There is abdominal tenderness.   Skin:     General: Skin is warm and dry.   Neurological:      Mental Status: He is alert and oriented to person, place, and time.   Psychiatric:         Mood and Affect: Mood normal.          Cardiographics    Telemetry: normal sinus rhythm  ECG: normal sinus rhythm  Echocardiogram:  1/11/2024    Left Ventricle: Normal left ventricular systolic function with a visually estimated EF of  most likley cause of NSTEMI being loss of PDA (vessel collapsed proximal and distal to previous stent) -- this vessel was left to medical management.       COPD (chronic obstructive pulmonary disease) ([facility])      CVA (cerebral vascular accident) ([facility])          Thank you very much for this referral. We appreciate the opportunity to participate in this patient's care. We will follow along with above stated plan.    SONY Madison - CNP  Consulting MD: Shannen

## 2024-03-05 NOTE — ED NOTES
Lab called for critical troponin of 565.2 and Dr. Diaz and Dr. Avalos was notified.      Durga Nick, RN  03/05/24 0127       Durga Nick, RN  03/05/24 0159

## 2024-03-05 NOTE — FLOWSHEET NOTE
03/05/24 1143   Dual Clinician Skin Assessment   Dual Skin Assessment (4 Eyes) X   Second Clinical  (First and Last Name) Laly POST   Skin Integumentary    Skin Integumentary (WDL) X   Skin Color Bronze;Ecchymosis (comment);Red;Narendra  (leathery)   Skin Condition/Temp Warm;Dry;Flaky;Fragile;Swollen/edematous   Skin Integrity Ecchymosis;Scars (comment);Vascular discoloration or hemochromatosis/staining   Location scattered scarring and bruising; red narendra BLE     Dual skin assessment preformed with Laly POST.  Pt has scattered scarring and bruising throughout all extremities and trunk.  Pt has red/narendra skin BLE.  Pt skin is bronze and leathery throughout.  Heels and sacrum intact.  No other redness, breakdown, or impairments noted.      4 Eyes Skin Assessment     NAME:  Marc Horton .  YOB: 1955  MEDICAL RECORD NUMBER:  [id number]    The patient is being assessed for  Admission    I agree that at least one RN has performed a thorough Head to Toe Skin Assessment on the patient. ALL assessment sites listed below have been assessed.      Areas assessed by both nurses:    Head, Face, Ears, Shoulders, Back, Chest, Arms, Elbows, Hands, Sacrum. Buttock, Coccyx, Ischium, Legs. Feet and Heels, and Under Medical Devices         Does the Patient have a Wound? No noted wound(s)       Arnulfo Prevention initiated by RN: No  Wound Care Orders initiated by RN: No    Pressure Injury (Stage 3,4, Unstageable, DTI, NWPT, and Complex wounds) if present, place Wound referral order by RN under : No    New Ostomies, if present place, Ostomy referral order under : No     Nurse 1 eSignature: Electronically signed by Taya Oneill RN on 3/5/24 at 1:35 PM EST    **SHARE this note so that the co-signing nurse can place an eSignature**    Nurse 2 eSignature: Electronically signed by Laly Milan RN on 3/5/24 at 3:58 PM EST

## 2024-03-05 NOTE — H&P
Units    insulin lispro (HUMALOG) injection vial 0-4 Units    glucose chewable tablet 16 g    OR Linked Order Group     dextrose bolus 10% 125 mL     dextrose bolus 10% 250 mL    Glucagon Emergency KIT 1 mg    dextrose 10 % infusion    tiotropium (SPIRIVA RESPIMAT) 2.5 MCG/ACT inhaler 2 puff    mometasone-formoterol (DULERA) 200-5 MCG/ACT inhaler 2 puff    piperacillin-tazobactam (ZOSYN) 3,375 mg in sodium chloride 0.9 % 50 mL IVPB (mini-bag)     Order Specific Question:   Antimicrobial Indications     Answer:   Pneumonia (CAP)     Order Specific Question:   Antimicrobial Indications     Answer:   Intra-Abdominal Infection     Order Specific Question:   CAP duration of therapy     Answer:   7 days    nicotine (NICODERM CQ) 7 MG/24HR 1 patch    piperacillin-tazobactam (ZOSYN) 4,500 mg in sodium chloride 0.9 % 100 mL IVPB (mini-bag)     Order Specific Question:   Antimicrobial Indications     Answer:   Pneumonia (CAP)     Order Specific Question:   Antimicrobial Indications     Answer:   Intra-Abdominal Infection     Order Specific Question:   CAP duration of therapy     Answer:   7 days    sodium chloride flush 0.9 % injection 5-40 mL    sodium chloride flush 0.9 % injection 5-40 mL    0.9 % sodium chloride infusion    OR Linked Order Group     potassium chloride (KLOR-CON M) extended release tablet 40 mEq     potassium bicarb-citric acid (EFFER-K) effervescent tablet 40 mEq     potassium chloride 10 mEq/100 mL IVPB (Peripheral Line)    magnesium sulfate 2000 mg in 50 mL IVPB premix    polyethylene glycol (GLYCOLAX) packet 17 g    bisacodyl (DULCOLAX) suppository 10 mg    famotidine (PEPCID) tablet 10 mg    aluminum & magnesium hydroxide-simethicone (MAALOX) 200-200-20 MG/5ML suspension 30 mL    OR Linked Order Group     acetaminophen (TYLENOL) tablet 650 mg     acetaminophen (TYLENOL) suppository 650 mg    nitroGLYCERIN (NITROSTAT) SL tablet 0.4 mg    morphine injection 2 mg       Prior to Admit  abnormality.     Findings suggestive of early acute uncomplicated diverticulitis involving the distal descending colon versus colitis. Trace bilateral pleural effusions with adjacent atelectasis, right greater than left, superimposed infection should be excluded clinically. Additional chronic findings as above. If there are any questions about this report, I can be reached on [email or web address] or at [phone]     XR CHEST PORTABLE    Result Date: 3/4/2024  Chest X-ray INDICATION: Left-sided chest pain, abdominal pain COMPARISON: 1/26/2024 TECHNIQUE: AP/PA view of the chest was obtained. FINDINGS: Patchy right lower lobe opacities are present. There is no pleural effusion or pneumothorax. Mild to moderate cardiomegaly. Atrial appendage occlusion clip. Sternotomy wires. Moderate vascular congestion.  The bony thorax is intact.      Cardiomegaly with vascular congestion. Patchy right lower lobe opacities may represent pneumonia or asymmetric pulmonary edema. Clinical correlation recommended.         Signed:  ANASTASIA ANDERS DO    Part of this note may have been written by using a voice dictation software.  The note has been proof read but may still contain some grammatical/other typographical errors.

## 2024-03-05 NOTE — ED NOTES
TRANSFER - OUT REPORT:    Verbal report given to wilver on Marc Horton .  being transferred to 3rd floor for routine progression of patient care       Report consisted of patient's Situation, Background, Assessment and   Recommendations(SBAR).     Information from the following report(s) Nurse Handoff Report was reviewed with the receiving nurse.    Natalia Fall Assessment:    Presents to emergency department  because of falls (Syncope, seizure, or loss of consciousness): No  Age > 70: Yes  Altered Mental Status, Intoxication with alcohol or substance confusion (Disorientation, impaired judgment, poor safety awaremess, or inability to follow instructions): No  Impaired Mobility: Ambulates or transfers with assistive devices or assistance; Unable to ambulate or transer.: Yes  Nursing Judgement: Yes          Lines:   Peripheral IV 03/04/24 Right Antecubital (Active)       Peripheral IV 03/04/24 Posterior;Right Forearm (Active)        Opportunity for questions and clarification was provided.      Patient transported with:  Marcus Oh, SEJAL  03/05/24 4452

## 2024-03-05 NOTE — PROGRESS NOTES
Hospitalist Progress Note   Admit Date:  3/4/2024  8:22 PM   Name:  Marc Horton Sr.   Age:  68 y.o.  Sex:  male  :  1955   MRN:  [id number]   Room:  ER/    Presenting/Chief Complaint: Shortness of Breath, Abdominal Pain, and Chest Pain     Reason(s) for Admission: NSTEMI (non-ST elevated myocardial infarction) (HCC) [I21.4]     Hospital Course:   Marc Horton Sr. is a 68 y.o. male with medical Hx of HTN, DM type II, chronic back pain, peripheral edema, CAD s/p PCI, s/p aortic valve replacement. Ulcers to both legs. Bipolar. COPD, dCHF EF 55%, paroxysmal a fib no longer on anticoagulation, CVA admitted on 3/4 with acute diverticulitis involving descending colon as seen on CTAP.  Most recent cardiac cath from 23 -Patient with severe diffuse multivessel coronary artery disease.  Recent history of coronary artery bypass grafting with a patent LIMA to LAD and patent saphenous vein graft to obtuse marginal 1.  Troponin 797.5. Lipase 504.     Subjective & 24hr Events:   Pt seen at bedside, resting in bed comfortably.  Pt is alert/awake, on 2 lt NC, AOX3.  Pt reports feeling well, stated that abdominal pain has improved since yesterday.  Denies nausea/vomiting, diarrhea, chest pain, fever, chills.  Does report of cough.  Denies any overnight events.    ROS: 10 point ROS is negative except what mentioned above.      Assessment & Plan:     Principal Problem:    Diverticulitis  Plan: 3/5-  Clinical picture with radiographic evidence of descending colon diverticulitis.  Continue empiric IV zosyn, D1. Stopping Vancomycin today.  Pt continues to be NPO except for meds at this time, will consider switching to clears for dinner today.  LR @ 75 cc/hr  Pain control with prn morphine and roxicodone.    Active Problems:    Elevated troponin    Chronic diastolic congestive heart failure (HCC)    S/P PTCA (percutaneous transluminal coronary angioplasty)    HTN (hypertension)    S/P AVR (aortic

## 2024-03-05 NOTE — PROGRESS NOTES
VANCO DAILY FOLLOW UP NOTE  [facility]   Pharmacy Pharmacokinetic Monitoring Service - Vancomycin    Consulting Provider: Joe   Indication: CAP  Target Concentration: Goal AUC/PATRICK 400-600 mg*hr/L  Day of Therapy: 1  Additional Antimicrobials: Zosyn    Pertinent Laboratory Values:   Wt Readings from Last 1 Encounters:   03/04/24 124.3 kg (274 lb)     Temp Readings from Last 1 Encounters:   03/04/24 98.3 °F (36.8 °C) (Oral)     Recent Labs     03/04/24  1257 03/04/24  2049 03/04/24  2210 03/05/24  0208   BUN 19 18  --   --    CREATININE 1.10 0.90  --   --    WBC 8.3 11.1  --   --    LACACIDPL  --   --  2.1* 0.7     Estimated Creatinine Clearance: 104 mL/min (based on SCr of 0.9 mg/dL).    No results found for: \"VANCOTROUGH\", \"VANCORANDOM\"    MRSA Nasal Swab: showed MRSA positive result on 3/13/23    Assessment:  Date/Time Dose Concentration AUC         Note: Serum concentrations collected for AUC dosing may appear elevated if collected in close proximity to the dose administered, this is not necessarily an indication of toxicity    Plan:  Dosing recommendations based on Bayesian software  Start vancomycin 2000mg q24h.  Anticipated AUC of 511 and trough concentration of 10.3 at steady state  Renal labs as indicated   Vancomycin concentrations will be ordered as clinically appropriate   Pharmacy will continue to monitor patient and adjust therapy as indicated    Thank you for the consult,  Marisa Lira [facility]

## 2024-03-05 NOTE — ACP (ADVANCE CARE PLANNING)
[facility] Hospitalist Service  At the heart of better care     Advance Care Planning   Admit Date:  3/4/2024  8:22 PM   Name:  Marc Horton Sr.   Age:  68 y.o.  Sex:  male  :  1955   MRN:  [id number]   Room:  [place] [address]    Marc Horton Sr. has capacity to make his own decisions:   Yes    Patient / surrogate decision-maker directed code status:  Full Code    Patient or surrogate consented to discussion of the current conditions, workup, management plans, prognosis, and the risk for further deterioration.  Time spent: 17 minutes in direct discussion.      Signed:  ANASTASIA ANDERS DO

## 2024-03-05 NOTE — PROGRESS NOTES
TRANSFER - IN REPORT:    Verbal report received from Marcus POST on [facility].  being received from ED for routine progression of patient care      Report consisted of patient's Situation, Background, Assessment and   Recommendations(SBAR).     Information from the following report(s) Nurse Handoff Report was reviewed with the receiving nurse.    Opportunity for questions and clarification was provided.      Assessment completed upon patient's arrival to unit and care assumed.

## 2024-03-05 NOTE — ED PROVIDER NOTES
Emergency Department Provider Note       PCP: Meliza Martino PA-C   Age: 68 y.o.   Sex: male     DISPOSITION Admitted 03/04/2024 11:02:49 PM       ICD-10-CM    1. Chest pain, unspecified type  R07.9       2. Bilateral pulmonary infiltrates  R91.8       3. Diverticulitis of colon  K57.32       4. Elevated troponin  R79.89           Medical Decision Making     Patient presented with left-sided chest pain and abdominal pain.  He was concerned his diverticulitis had returned.  Significant coronary history present as well.  Troponin did come back elevated significantly more so than previous troponins.  BNP elevated.  Chest x-ray showed bilateral infiltrates edema versus infection.  Both were treated.  CT abdomen did show diverticulitis versus colitis.  Patient is a mildly elevated lipase as well.  Heparin started due to elevated troponin.  Aspirin provided prior to arrival.  Will admit to hospitalist for treatment of diverticulitis pneumonia and possible CHF from possible NSTEMI.  Cardiology consult to be obtained as inpatient.      1 or more acute illnesses that pose a threat to life or bodily function.   1 or more chronic illnesses with a severe exacerbation or progression.  Parental controlled substances given in the ED.  Drug therapy given requiring intensive monitoring for toxicity.  Discussion with external consultants.  Considerations: The following items were considered but not ordered: CT PE protocol but he was taken to CT abdomen prior to my consideration of this.    I independently ordered and reviewed each unique test.  I reviewed external records: provider visit note from PCP.  I reviewed external records: provider visit note from outside specialist.  I reviewed external records: previous EKG including cardiologist interpretation.    I reviewed external records: previous imaging study including radiologist interpretation.   The patients assessment required an independent historian: ems.  The reason they  acidosis from acute   pancreatitis and suspected pneumonia. IV heparin started by   admitting team. OK to continue for now. Will start 81mg ASA daily   (patient reportedly taking 750mg ASA TID per PTA med list).       Lactic acidosis -- initial lactic acid was elevated at 2.1. No   repeat done. Check STAT       Possible pneumonia -- IV ABX per primary team      Bipolar disorder ([facility])      Chronic diastolic congestive heart failure (HCC) -- given IV   lasix in the ER. Abdominal anasarca seen on CT. Vascular   congestion seen on CXR. May need to continue IV diuretics,   reassess in the AM      S/P AVR (aortic valve replacement)      Dyslipidemia -- not on statin therapy due to intolerance to all   in the past.       HTN (hypertension) -- uncontrolled in the ER. Continue   anti-hypertensives. Monitor BP closely. Titrate medications as   needed       S/P PTCA (percutaneous transluminal coronary angioplasty) --   last LHC was 11/15/23 that showed severe MVD with recent history   of BACG with LIMA to LAD (patent), SVG to OM1 (patent) --   obstructive small vessel disease in multiple different arteries   with most likley cause of NSTEMI being loss of PDA (vessel   collapsed proximal and distal to previous stent) -- this vessel   was left to medical management.       COPD (chronic obstructive pulmonary disease) ([facility])      CVA (cerebral vascular accident) ([facility])          Thank you very much for this referral. We appreciate the   opportunity to participate in this patient's care. We will follow   along with above stated plan.    SONY Madison - CNP  Consulting MD: Shannen   POCT Urinalysis no Micro   Result Value Ref Range    Specific Gravity, Urine, POC 1.020 1.001 - 1.023      pH, Urine, POC 7.0 5.0 - 9.0      Protein, Urine, POC TRACE (A) NEG mg/dL    Glucose, UA  (A) NEG mg/dL    Ketones, Urine, POC Negative NEG mg/dL    Bilirubin, Urine, POC Negative NEG      Blood, UA POC Negative NEG      URINE

## 2024-03-06 LAB
ANION GAP SERPL CALC-SCNC: 0 MMOL/L (ref 2–11)
BASOPHILS # BLD: 0.1 K/UL (ref 0–0.2)
BASOPHILS NFR BLD: 1 % (ref 0–2)
BUN SERPL-MCNC: 18 MG/DL (ref 8–23)
CALCIUM SERPL-MCNC: 9.1 MG/DL (ref 8.3–10.4)
CHLORIDE SERPL-SCNC: 105 MMOL/L (ref 103–113)
CO2 SERPL-SCNC: 31 MMOL/L (ref 21–32)
CREAT SERPL-MCNC: 0.9 MG/DL (ref 0.8–1.5)
DIFFERENTIAL METHOD BLD: ABNORMAL
EOSINOPHIL # BLD: 0.3 K/UL (ref 0–0.8)
EOSINOPHIL NFR BLD: 4 % (ref 0.5–7.8)
ERYTHROCYTE [DISTWIDTH] IN BLOOD BY AUTOMATED COUNT: 18 % (ref 11.9–14.6)
GLUCOSE BLD STRIP.AUTO-MCNC: 108 MG/DL (ref 65–100)
GLUCOSE BLD STRIP.AUTO-MCNC: 140 MG/DL (ref 65–100)
GLUCOSE BLD STRIP.AUTO-MCNC: 167 MG/DL (ref 65–100)
GLUCOSE BLD STRIP.AUTO-MCNC: 211 MG/DL (ref 65–100)
GLUCOSE SERPL-MCNC: 98 MG/DL (ref 65–100)
HCT VFR BLD AUTO: 38.4 % (ref 41.1–50.3)
HGB BLD-MCNC: 11.5 G/DL (ref 13.6–17.2)
IMM GRANULOCYTES # BLD AUTO: 0 K/UL (ref 0–0.5)
IMM GRANULOCYTES NFR BLD AUTO: 0 % (ref 0–5)
LYMPHOCYTES # BLD: 1.7 K/UL (ref 0.5–4.6)
LYMPHOCYTES NFR BLD: 25 % (ref 13–44)
MCH RBC QN AUTO: 25.2 PG (ref 26.1–32.9)
MCHC RBC AUTO-ENTMCNC: 29.9 G/DL (ref 31.4–35)
MCV RBC AUTO: 84.2 FL (ref 82–102)
MONOCYTES # BLD: 0.7 K/UL (ref 0.1–1.3)
MONOCYTES NFR BLD: 10 % (ref 4–12)
NEUTS SEG # BLD: 3.9 K/UL (ref 1.7–8.2)
NEUTS SEG NFR BLD: 60 % (ref 43–78)
NRBC # BLD: 0 K/UL (ref 0–0.2)
PLATELET # BLD AUTO: 142 K/UL (ref 150–450)
PLATELET COMMENT: SLIGHT
PMV BLD AUTO: 9.5 FL (ref 9.4–12.3)
POTASSIUM SERPL-SCNC: 4.6 MMOL/L (ref 3.5–5.1)
RBC # BLD AUTO: 4.56 M/UL (ref 4.23–5.6)
RBC MORPH BLD: ABNORMAL
SERVICE CMNT-IMP: ABNORMAL
SODIUM SERPL-SCNC: 136 MMOL/L (ref 136–146)
WBC # BLD AUTO: 6.7 K/UL (ref 4.3–11.1)
WBC MORPH BLD: ABNORMAL

## 2024-03-06 PROCEDURE — 94761 N-INVAS EAR/PLS OXIMETRY MLT: CPT

## 2024-03-06 PROCEDURE — 6360000002 HC RX W HCPCS: Performed by: FAMILY MEDICINE

## 2024-03-06 PROCEDURE — 80048 BASIC METABOLIC PNL TOTAL CA: CPT

## 2024-03-06 PROCEDURE — 6370000000 HC RX 637 (ALT 250 FOR IP): Performed by: FAMILY MEDICINE

## 2024-03-06 PROCEDURE — 6370000000 HC RX 637 (ALT 250 FOR IP): Performed by: NURSE PRACTITIONER

## 2024-03-06 PROCEDURE — 2140000000 HC CCU INTERMEDIATE R&B

## 2024-03-06 PROCEDURE — 97535 SELF CARE MNGMENT TRAINING: CPT

## 2024-03-06 PROCEDURE — 2580000003 HC RX 258: Performed by: FAMILY MEDICINE

## 2024-03-06 PROCEDURE — 6360000002 HC RX W HCPCS: Performed by: HOSPITALIST

## 2024-03-06 PROCEDURE — 2700000000 HC OXYGEN THERAPY PER DAY

## 2024-03-06 PROCEDURE — 94760 N-INVAS EAR/PLS OXIMETRY 1: CPT

## 2024-03-06 PROCEDURE — 82962 GLUCOSE BLOOD TEST: CPT

## 2024-03-06 PROCEDURE — 94640 AIRWAY INHALATION TREATMENT: CPT

## 2024-03-06 PROCEDURE — 97112 NEUROMUSCULAR REEDUCATION: CPT

## 2024-03-06 PROCEDURE — 36415 COLL VENOUS BLD VENIPUNCTURE: CPT

## 2024-03-06 PROCEDURE — 99232 SBSQ HOSP IP/OBS MODERATE 35: CPT | Performed by: INTERNAL MEDICINE

## 2024-03-06 PROCEDURE — 85025 COMPLETE CBC W/AUTO DIFF WBC: CPT

## 2024-03-06 PROCEDURE — 97161 PT EVAL LOW COMPLEX 20 MIN: CPT

## 2024-03-06 PROCEDURE — 97165 OT EVAL LOW COMPLEX 30 MIN: CPT

## 2024-03-06 PROCEDURE — 97530 THERAPEUTIC ACTIVITIES: CPT

## 2024-03-06 PROCEDURE — 6370000000 HC RX 637 (ALT 250 FOR IP): Performed by: HOSPITALIST

## 2024-03-06 PROCEDURE — 6370000000 HC RX 637 (ALT 250 FOR IP): Performed by: INTERNAL MEDICINE

## 2024-03-06 RX ORDER — FUROSEMIDE 40 MG/1
40 TABLET ORAL 2 TIMES DAILY
Status: DISCONTINUED | OUTPATIENT
Start: 2024-03-06 | End: 2024-03-07 | Stop reason: HOSPADM

## 2024-03-06 RX ORDER — INSULIN LISPRO 100 [IU]/ML
0-4 INJECTION, SOLUTION INTRAVENOUS; SUBCUTANEOUS
Status: DISCONTINUED | OUTPATIENT
Start: 2024-03-06 | End: 2024-03-07 | Stop reason: HOSPADM

## 2024-03-06 RX ADMIN — BUDESONIDE 500 MCG: 0.5 SUSPENSION RESPIRATORY (INHALATION) at 21:29

## 2024-03-06 RX ADMIN — OXYCODONE HYDROCHLORIDE 5 MG: 5 TABLET ORAL at 19:33

## 2024-03-06 RX ADMIN — PIPERACILLIN AND TAZOBACTAM 3375 MG: 3; .375 INJECTION, POWDER, FOR SOLUTION INTRAVENOUS at 05:30

## 2024-03-06 RX ADMIN — LISINOPRIL 20 MG: 20 TABLET ORAL at 08:51

## 2024-03-06 RX ADMIN — LEVALBUTEROL HYDROCHLORIDE 0.63 MG: 0.63 SOLUTION RESPIRATORY (INHALATION) at 11:48

## 2024-03-06 RX ADMIN — SODIUM CHLORIDE, PRESERVATIVE FREE 10 ML: 5 INJECTION INTRAVENOUS at 08:58

## 2024-03-06 RX ADMIN — METOPROLOL TARTRATE 50 MG: 50 TABLET, FILM COATED ORAL at 19:33

## 2024-03-06 RX ADMIN — LISINOPRIL 20 MG: 20 TABLET ORAL at 19:33

## 2024-03-06 RX ADMIN — OXYCODONE HYDROCHLORIDE 5 MG: 5 TABLET ORAL at 05:41

## 2024-03-06 RX ADMIN — LEVALBUTEROL HYDROCHLORIDE 0.63 MG: 0.63 SOLUTION RESPIRATORY (INHALATION) at 15:54

## 2024-03-06 RX ADMIN — APIXABAN 5 MG: 5 TABLET, FILM COATED ORAL at 19:33

## 2024-03-06 RX ADMIN — GUAIFENESIN 1200 MG: 600 TABLET ORAL at 08:50

## 2024-03-06 RX ADMIN — LEVALBUTEROL HYDROCHLORIDE 0.63 MG: 0.63 SOLUTION RESPIRATORY (INHALATION) at 21:29

## 2024-03-06 RX ADMIN — ASPIRIN 81 MG: 81 TABLET, CHEWABLE ORAL at 08:50

## 2024-03-06 RX ADMIN — SODIUM CHLORIDE, PRESERVATIVE FREE 10 ML: 5 INJECTION INTRAVENOUS at 19:33

## 2024-03-06 RX ADMIN — FUROSEMIDE 40 MG: 40 TABLET ORAL at 17:44

## 2024-03-06 RX ADMIN — AMLODIPINE BESYLATE 5 MG: 5 TABLET ORAL at 08:50

## 2024-03-06 RX ADMIN — BUDESONIDE 500 MCG: 0.5 SUSPENSION RESPIRATORY (INHALATION) at 07:56

## 2024-03-06 RX ADMIN — APIXABAN 5 MG: 5 TABLET, FILM COATED ORAL at 08:50

## 2024-03-06 RX ADMIN — LEVALBUTEROL HYDROCHLORIDE 0.63 MG: 0.63 SOLUTION RESPIRATORY (INHALATION) at 07:56

## 2024-03-06 RX ADMIN — EZETIMIBE 10 MG: 10 TABLET ORAL at 19:33

## 2024-03-06 RX ADMIN — GUAIFENESIN 1200 MG: 600 TABLET ORAL at 19:33

## 2024-03-06 RX ADMIN — OXYCODONE HYDROCHLORIDE 5 MG: 5 TABLET ORAL at 11:59

## 2024-03-06 RX ADMIN — FUROSEMIDE 40 MG: 40 TABLET ORAL at 11:59

## 2024-03-06 RX ADMIN — TIOTROPIUM BROMIDE INHALATION SPRAY 2 PUFF: 3.12 SPRAY, METERED RESPIRATORY (INHALATION) at 07:56

## 2024-03-06 RX ADMIN — GABAPENTIN 300 MG: 300 CAPSULE ORAL at 11:59

## 2024-03-06 RX ADMIN — INSULIN LISPRO 1 UNITS: 100 INJECTION, SOLUTION INTRAVENOUS; SUBCUTANEOUS at 10:09

## 2024-03-06 RX ADMIN — GABAPENTIN 600 MG: 300 CAPSULE ORAL at 19:33

## 2024-03-06 RX ADMIN — LEVALBUTEROL HYDROCHLORIDE 0.63 MG: 0.63 SOLUTION RESPIRATORY (INHALATION) at 04:14

## 2024-03-06 RX ADMIN — PIPERACILLIN AND TAZOBACTAM 3375 MG: 3; .375 INJECTION, POWDER, FOR SOLUTION INTRAVENOUS at 14:33

## 2024-03-06 RX ADMIN — GABAPENTIN 300 MG: 300 CAPSULE ORAL at 08:50

## 2024-03-06 RX ADMIN — PIPERACILLIN AND TAZOBACTAM 3375 MG: 3; .375 INJECTION, POWDER, FOR SOLUTION INTRAVENOUS at 22:14

## 2024-03-06 RX ADMIN — METOPROLOL TARTRATE 50 MG: 50 TABLET, FILM COATED ORAL at 08:49

## 2024-03-06 ASSESSMENT — PAIN SCALES - GENERAL
PAINLEVEL_OUTOF10: 6
PAINLEVEL_OUTOF10: 0
PAINLEVEL_OUTOF10: 6
PAINLEVEL_OUTOF10: 6
PAINLEVEL_OUTOF10: 0
PAINLEVEL_OUTOF10: 0

## 2024-03-06 ASSESSMENT — PAIN DESCRIPTION - DESCRIPTORS
DESCRIPTORS: SHOOTING
DESCRIPTORS: BURNING

## 2024-03-06 ASSESSMENT — PAIN DESCRIPTION - LOCATION
LOCATION: ABDOMEN
LOCATION: LEG;ABDOMEN
LOCATION: LEG;ABDOMEN

## 2024-03-06 ASSESSMENT — PAIN DESCRIPTION - ORIENTATION: ORIENTATION: MID

## 2024-03-06 ASSESSMENT — PAIN DESCRIPTION - PAIN TYPE
TYPE: ACUTE PAIN
TYPE: ACUTE PAIN

## 2024-03-06 NOTE — PROGRESS NOTES
ACUTE OCCUPATIONAL THERAPY GOALS:   (Developed with and agreed upon by patient and/or caregiver.)  Patient will complete self-grooming tasks, standing EOS, with SUPERVISION. - MET  Patient will complete functional mobility for household distances with SBA and adaptive equipment as needed.     Time Frame: 1 visit    OCCUPATIONAL THERAPY Initial Assessment, Daily Note, and AM       OT Visit Days: 1   Acknowledge Orders  Time  OT Charge Capture  Rehab Caseload Tracker      Marc Horton Sr. is a 68 y.o. male   PRIMARY DIAGNOSIS: Diverticulitis  Diverticulitis of colon [K57.32]  Elevated troponin [R79.89]  NSTEMI (non-ST elevated myocardial infarction) (HCC) [I21.4]  Chest pain, unspecified type [R07.9]  Bilateral pulmonary infiltrates [R91.8]       Reason for Referral: Generalized Muscle Weakness (M62.81)  Other lack of cordination (R27.8)  Difficulty in walking, Not elsewhere classified (R26.2)  Inpatient: Payor: [facility] MEDICARE / Plan: "[employer] DUAL COMPLETE / Product Type: *No Product type* /     ASSESSMENT:     REHAB RECOMMENDATIONS:   Recommendation to date pending progress:  Setting:  No further skilled occupational therapy after discharge from hospital    Equipment:    None     ASSESSMENT:  Mr. Horton presents with deficits in overall strength, activity tolerance, activities of daily living performance, and functional mobility. Presents for NSTEIMI, chest pain, and SOB. Alert and resting on RA. Of note, pt lives with GF and family; independent at baseline for ADLs and functional mobility.         Today, BUE are generally decreased but WFL. SBA for all fx transfers; SBA x RW for fx mobility for household distances with fair + dynamic standing balance. Did require 2 seated rest breaks with mobility however tolerated well with vitals remaining WFL. Performed self-grooming tasks, including washing face and brushing teeth, standing EOS, with supervision. At this time, Marc Horton Sr. is  have been discussed with the patient.)  discharge         TREATMENT:     EVALUATION: LOW COMPLEXITY: (Untimed Charge)    TREATMENT:   Neuromuscular Re-education (13 Minutes): Patient participated in neuromuscular re-education including weight shifting, postural training, midline training, standing tolerance activity , sitting balance activity , and activity tolerance  with minimal and need for verbal cues and tactile cues to improve sitting balance, standing balance, posture, coordination, static balance, and dynamic balance in order to prepare for functional task, prepare for seated ADLs, prepare for standing ADLs, prepare for functional transfer, prepare for discharge home , and prepare for self care..   Self Care (10 minutes): Patient participated in lower body dressing and grooming ADLs in unsupported sitting and standing with minimal verbal cueing to increase independence, decrease assistance required, increase activity tolerance, and increase safety awareness. Patient also participated in functional mobility, functional transfer, and energy conservation training to increase independence, decrease assistance required, increase activity tolerance, and increase safety awareness.     TREATMENT GRID:  N/A    AFTER TREATMENT PRECAUTIONS: Bed/Chair Locked, Call light within reach, Chair, Needs within reach, and RN notified    INTERDISCIPLINARY COLLABORATION:  RN/ PCT, PT/ PTA, and OT/ MASSEY    EDUCATION:  Education Given To: Patient  Education Provided: Role of Therapy;Plan of Care  Barriers to Learning: None  Education Outcome: Verbalized understanding;Demonstrated understanding      TOTAL TREATMENT DURATION AND TIME:  Time In: 1042  Time Out: 1107  Minutes: 25    Meagan Ramirez, OT

## 2024-03-06 NOTE — PROGRESS NOTES
ACUTE PHYSICAL THERAPY GOALS:   (Developed with and agreed upon by patient and/or caregiver.)  LTG:  (1.)Mr. Horton will move from supine to sit and sit to supine , scoot up and down, and roll side to side in bed with INDEPENDENCE within 7 treatment day(s).    (2.)Mr. Horton will transfer from bed to chair and chair to bed with INDEPENDENCE using the least restrictive device within 7 treatment day(s).    (3.)Mr. Horton will ambulate with MODIFIED INDEPENDENCE for 250 feet with the least restrictive device within 7 treatment day(s).  (4.)Mr. Horton will participate in therapeutic activity/exercises x 25 minutes for increased activity tolerance within 7 treatment days.    ________________________________________________________________________________________________        PHYSICAL THERAPY Initial Assessment and AM  (Link to Caseload Tracking: PT Visit Days : 1  Acknowledge Orders  Time In/Out  PT Charge Capture  Rehab Caseload Tracker    Marc Horton Sr. is a 68 y.o. male   PRIMARY DIAGNOSIS: Diverticulitis  Diverticulitis of colon [K57.32]  Elevated troponin [R79.89]  NSTEMI (non-ST elevated myocardial infarction) (HCC) [I21.4]  Chest pain, unspecified type [R07.9]  Bilateral pulmonary infiltrates [R91.8]       Reason for Referral: Generalized Muscle Weakness (M62.81)  Difficulty in walking, Not elsewhere classified (R26.2)  Inpatient: Payor: [facility] MEDICARE / Plan: [employer] DUAL COMPLETE / Product Type: *No Product type* /     ASSESSMENT:     REHAB RECOMMENDATIONS:   Recommendation to date pending progress:  Setting:  No further skilled physical therapy after discharge from hospital    Equipment:    Rolling Walker     ASSESSMENT:  Mr. Horton was admitted to the hospital with c/o abdominal pain and SOB.  Pt has a history of COPD and DM2.  He presents to PT with WFL AROM and decreased strength in B LEs.  Pt noted with pitting edema today in B Les.  He was able to stand today with SBA and good-fair  level ground, and Standing balance to improve functional Activity tolerance, Balance, Mobility, and Strength.    TREATMENT GRID:  N/A    AFTER TREATMENT PRECAUTIONS: Bed/Chair Locked, Call light within reach, Chair, and Needs within reach    INTERDISCIPLINARY COLLABORATION:  RN/ PCT, PT/ PTA, and OT/ MASSEY    EDUCATION: Education Given To: Patient  Education Provided: Role of Therapy    TIME IN/OUT:  Time In: 1042  Time Out: 1107  Minutes: 25    Nallely Jessica, PT

## 2024-03-06 NOTE — PROGRESS NOTES
am  3/6/2024 6:46 AM    Admit Date: 3/4/2024    Admit Diagnosis: Diverticulitis of colon [K57.32]  Elevated troponin [R79.89]  NSTEMI (non-ST elevated myocardial infarction) (HCC) [I21.4]  Chest pain, unspecified type [R07.9]  Bilateral pulmonary infiltrates [R91.8]      Subjective:    Patient : Patient says they are doing ok this morning -- resting in armchair in room. Patient reports no chest pain or palpitations, no worsening SOB. No fever or chills.     Objective:    BP (!) 149/77   Pulse 59   Temp 97.5 °F (36.4 °C) (Oral)   Resp 18   Ht 1.829 m (6')   Wt 121.9 kg (268 lb 12.8 oz)   SpO2 98%   BMI 36.46 kg/m²     ROS:  General ROS: negative for - chills  Hematological and Lymphatic ROS: negative for - blood clots or jaundice  Respiratory ROS: +cough  Cardiovascular ROS: no chest pain or dyspnea on exertion  Gastrointestinal ROS: no abdominal pain, change in bowel habits, or black or bloody stools  Neurological ROS: no TIA or stroke symptoms    Physical Exam:    Physical Examination: General appearance - Appearance: alert, well appearing, and in no distress and chronically ill appearing.   Neck/lymph - supple, no significant adenopathy  Chest/CV - clear to auscultation, no wheezes, rales or rhonchi, symmetric air entry, no tachypnea, retractions or cyanosis  Heart - normal rate, regular rhythm, normal S1, S2, no murmurs, rubs, clicks or gallops  Abdomen/GI - soft, nontender, nondistended, no masses or organomegaly   Musculoskeletal - no joint tenderness, deformity or swelling  Extremities - +1 BLE edema  Skin - normal coloration and turgor, no rashes, no suspicious skin lesions noted    Current Facility-Administered Medications   Medication Dose Route Frequency    aspirin chewable tablet 81 mg  81 mg Oral Daily    guaiFENesin (MUCINEX) extended release tablet 1,200 mg  1,200 mg Oral BID    budesonide (PULMICORT) nebulizer suspension 500 mcg  0.5 mg Nebulization BID RT    levalbuterol (XOPENEX) nebulization  agree with the following assessment and plan and findings. I was present for and observed the key components of this note.  Any appropriate additions or editing of the information have been done by me.    Ronaldo Tompkins MD, FACC  Cardiology

## 2024-03-06 NOTE — PROGRESS NOTES
Hospitalist Progress Note   Admit Date:  3/4/2024  8:22 PM   Name:  Marc Horton Sr.   Age:  68 y.o.  Sex:  male  :  1955   MRN:  [id number]   Room:  [facility]    Presenting/Chief Complaint: Shortness of Breath, Abdominal Pain, and Chest Pain     Reason(s) for Admission: Diverticulitis of colon [K57.32]  Elevated troponin [R79.89]  NSTEMI (non-ST elevated myocardial infarction) (HCC) [I21.4]  Chest pain, unspecified type [R07.9]  Bilateral pulmonary infiltrates [R91.8]     Hospital Course:   Marc Horton Sr. is a 68 y.o. male with medical history of HTN, DM type II, chronic back pain, peripheral edema, CAD s/p PCI, s/p aortic valve replacement. Ulcers to both legs. Bipolar. COPD, dCHF EF 55%, paroxysmal a fib no longer on anticoagulation, CVA admitted on 3/4 with acute diverticulitis involving descending colon as seen on CTAP.    Patient was also found to have an elevated troponin to 797 which did trend downwards.  Also had an elevated lipase of 504.  Patient was evaluated by cardiology who attributed the elevated troponins to type II demand ischemia.  Patient was started on IV antibiotic therapy and a liquid diet.    Subjective & 24hr Events:   Patient was seen and evaluated at bedside this morning.  Currently reports that his abdominal pain is much better.  Tolerated his liquid diet without issue.  Denies any fevers, chills, chest pain, nausea, vomiting.  Still having some left lower quadrant abdominal pain.  Still believes his abdomen is very distended.    Requesting his Lasix to be resumed in the hospital.      Assessment & Plan:     Acute diverticulitis  Patient having left lower quadrant abdominal pain.  CT imaging does correspond with diverticulitis.  Tolerating liquid diet, asking to advance to regular diet.  -Continue with Zosyn  -Advance diet to regular  -Transition to p.o. antibiotics once tolerating regular diet  -As needed pain control with morphine and Roxicodone    Elevated  nourished.  No acute distress.  Sitting in chair.  Head:  Normocephalic, atraumatic  Eyes:  Sclerae appear normal.  Pupils equally round.  ENT:  Nares appear normal.  Moist oral mucosa  Neck:  No restricted ROM.  Trachea midline   CV:   RRR.  No m/r/g.  No jugular venous distension.  Lungs:   CTAB.  No wheezing, rhonchi, or rales.  Symmetric expansion.  Abdomen:   Soft, tender to palpation of the left upper and lower quadrant.  Slight distention.  No obvious fluid wave, bowel sounds in all 4 quadrants  Extremities: No cyanosis or clubbing.  1+ edema bilateral lower extremities.  Skin:     Venous stasis changes noted bilaterally.  Small abrasion noted on right ankle.  Neuro:  CN II-XII grossly intact.    Psych:  Normal mood and affect.      I have personally reviewed labs and tests:  Recent Labs:  Recent Results (from the past 48 hour(s))   EKG 12 Lead    Collection Time: 03/04/24  8:30 PM   Result Value Ref Range    Ventricular Rate 85 BPM    Atrial Rate 86 BPM    P-R Interval 145 ms    QRS Duration 91 ms    Q-T Interval 383 ms    QTc Calculation (Bazett) 456 ms    P Axis 63 degrees    R Axis 61 degrees    T Axis 60 degrees    Diagnosis       Age not entered, assumed to be  48 years old for purpose of ECG   interpretation  Sinus rhythm  Atrial premature complex  Nonspecific T abnormalities, lateral leads    Confirmed by BAYRON MEDLEY (), DESMOND HECK ([id number]) on 3/5/2024 6:42:38 AM     CBC    Collection Time: 03/04/24  8:49 PM   Result Value Ref Range    WBC 11.1 4.3 - 11.1 K/uL    RBC 4.26 4.23 - 5.6 M/uL    Hemoglobin 10.9 (L) 13.6 - 17.2 g/dL    Hematocrit 35.1 (L) 41.1 - 50.3 %    MCV 82.4 82 - 102 FL    MCH 25.6 (L) 26.1 - 32.9 PG    MCHC 31.1 (L) 31.4 - 35.0 g/dL    RDW 18.1 (H) 11.9 - 14.6 %    Platelets 175 150 - 450 K/uL    MPV 9.9 9.4 - 12.3 FL    nRBC 0.00 0.0 - 0.2 K/uL   Comprehensive Metabolic Panel    Collection Time: 03/04/24  8:49 PM   Result Value Ref Range    Sodium 138 136 - 146 mmol/L    Potassium 3.5

## 2024-03-06 NOTE — PROGRESS NOTES
Patient called  with call light and stated his foot was bleeding. This RN immediately went to bedside.  Patient bleeding from inside of right foot next to ankle.  Patient stated \"I was putting on lotion and I guess I put it on too hard\".  This RN held pressure and applied surgiseal, gauze and kerlex dressing. Patient tolerated well. No signs of distress.  At time of this note bleeding has ceased.  Patient instructed to use call light if site starts to bleed again.

## 2024-03-06 NOTE — PLAN OF CARE
Problem: Safety - Adult  Goal: Free from fall injury  Outcome: Progressing     Problem: Discharge Planning  Goal: Discharge to home or other facility with appropriate resources  Outcome: Progressing  Flowsheets (Taken 3/5/2024 1143 by Taya Oneill, RN)  Discharge to home or other facility with appropriate resources: Identify barriers to discharge with patient and caregiver     Problem: Pain  Goal: Verbalizes/displays adequate comfort level or baseline comfort level  Outcome: Progressing  Flowsheets (Taken 3/5/2024 1143 by Taya Oneill, RN)  Verbalizes/displays adequate comfort level or baseline comfort level: Encourage patient to monitor pain and request assistance

## 2024-03-06 NOTE — CARE COORDINATION
Patient admitted with diverticulitis. OT/PT evaluations recommend home health for transition home.  CM scanned medical record transition of care needs. CM attempted to interview patient but patient sleeping up in chair.  CM follow and remain available for consult.   03/06/24 7966   Service Assessment   Patient Orientation Alert and Oriented   Cognition Alert   History Provided By Medical Record   Primary Caregiver Self   Accompanied By/Relationship No one   Support Systems Spouse/Significant Other;Family Members   Patient's Healthcare Decision Maker is: Legal Next of Kin   PCP Verified by CM Yes   Last Visit to PCP Within last 3 months   Prior Functional Level Independent in ADLs/IADLs   Current Functional Level Independent in ADLs/IADLs   Can patient return to prior living arrangement Yes   Ability to make needs known: Good   Family able to assist with home care needs: Yes   Would you like for me to discuss the discharge plan with any other family members/significant others, and if so, who? Yes  (Family)   Financial Resources Medicare;Medicaid   Community Resources None   Social/Functional History   Lives With Spouse   Type of Home House   Home Equipment None   Receives Help From Family   ADL Assistance Independent   Homemaking Assistance Independent   Ambulation Assistance Independent   Transfer Assistance Independent   Mode of Transportation Car   Occupation Retired   Discharge Planning   Type of Residence House   Living Arrangements Spouse/Significant Other   Current Services Prior To Admission None   Potential Assistance Needed N/A   DME Ordered? No   Potential Assistance Purchasing Medications No   Type of Home Care Services None   Patient expects to be discharged to: House   Services At/After Discharge   Transition of Care Consult (CM Consult) Discharge Planning   [place] Resource Information Provided? No   Mode of Transport at Discharge Other (see comment)  (Car)   Confirm Follow Up Transport Family    Condition of Participation: Discharge Planning   The Plan for Transition of Care is related to the following treatment goals: home with family assistance

## 2024-03-06 NOTE — PROGRESS NOTES
Physician Progress Note      PATIENT:               BRITTANY VAZQUEZ  CSN #:                  [id number]  :                       1955  ADMIT DATE:       3/4/2024 8:22 PM  DISCH DATE:  RESPONDING  PROVIDER #:        Vitor Billingsley MD          QUERY TEXT:    Patient admitted with diverticulitis. H&P notes, \"Suspected CAP\" but not   mentioned in future IM note. If possible, please document in the progress   notes and discharge summary if pneumonia was:    The medical record reflects the following:  Risk Factors: Age, COPD, dCHF    Clinical Indicators: H&P notes, \"Suspected CAP, diverticulitis. Blood   cultures. Zosyn, Vanc. Check COVID and influenza.\"  Admission CTAP notes, \"Trace bilateral pleural effusions with adjacent   atelectasis, right greater than left, superimposed infection should be   excluded clinically.\"  Admission chest x-ray, \"Patchy right lower lobe opacities may represent   pneumonia or asymmetric pulmonary edema.\"  3/5 Cardiology note reports suspected pneumonia.    Treatment: Blood cultures, Vanc and Zosyn    Thank you,  Nicholas Andrews RN CDI  [email or web address]  Options provided:  -- Pneumonia confirmed after study  -- Pneumonia ruled out after study  -- Other - I will add my own diagnosis  -- Disagree - Not applicable / Not valid  -- Disagree - Clinically unable to determine / Unknown  -- Refer to Clinical Documentation Reviewer    PROVIDER RESPONSE TEXT:    Provider is clinically unable to determine a response to this query.    Query created by: Nicholas Andrews on 3/6/2024 12:38 PM      Electronically signed by:  Vitor Billingsley MD 3/6/2024 12:50 PM

## 2024-03-07 VITALS
BODY MASS INDEX: 36.41 KG/M2 | OXYGEN SATURATION: 96 % | RESPIRATION RATE: 18 BRPM | HEIGHT: 72 IN | TEMPERATURE: 98.2 F | WEIGHT: 268.8 LBS | DIASTOLIC BLOOD PRESSURE: 75 MMHG | HEART RATE: 58 BPM | SYSTOLIC BLOOD PRESSURE: 146 MMHG

## 2024-03-07 LAB
ANION GAP SERPL CALC-SCNC: 4 MMOL/L (ref 2–11)
BASOPHILS # BLD: 0.1 K/UL (ref 0–0.2)
BASOPHILS NFR BLD: 1 % (ref 0–2)
BUN SERPL-MCNC: 15 MG/DL (ref 8–23)
CALCIUM SERPL-MCNC: 8.5 MG/DL (ref 8.3–10.4)
CHLORIDE SERPL-SCNC: 105 MMOL/L (ref 103–113)
CO2 SERPL-SCNC: 28 MMOL/L (ref 21–32)
CREAT SERPL-MCNC: 0.8 MG/DL (ref 0.8–1.5)
DIFFERENTIAL METHOD BLD: ABNORMAL
EOSINOPHIL # BLD: 0.2 K/UL (ref 0–0.8)
EOSINOPHIL NFR BLD: 3 % (ref 0.5–7.8)
ERYTHROCYTE [DISTWIDTH] IN BLOOD BY AUTOMATED COUNT: 17.8 % (ref 11.9–14.6)
GLUCOSE BLD STRIP.AUTO-MCNC: 161 MG/DL (ref 65–100)
GLUCOSE BLD STRIP.AUTO-MCNC: 219 MG/DL (ref 65–100)
GLUCOSE SERPL-MCNC: 169 MG/DL (ref 65–100)
HCT VFR BLD AUTO: 35.4 % (ref 41.1–50.3)
HGB BLD-MCNC: 10.8 G/DL (ref 13.6–17.2)
IMM GRANULOCYTES # BLD AUTO: 0 K/UL (ref 0–0.5)
IMM GRANULOCYTES NFR BLD AUTO: 0 % (ref 0–5)
LYMPHOCYTES # BLD: 1.4 K/UL (ref 0.5–4.6)
LYMPHOCYTES NFR BLD: 19 % (ref 13–44)
MCH RBC QN AUTO: 25.2 PG (ref 26.1–32.9)
MCHC RBC AUTO-ENTMCNC: 30.5 G/DL (ref 31.4–35)
MCV RBC AUTO: 82.7 FL (ref 82–102)
MONOCYTES # BLD: 0.8 K/UL (ref 0.1–1.3)
MONOCYTES NFR BLD: 11 % (ref 4–12)
NEUTS SEG # BLD: 4.8 K/UL (ref 1.7–8.2)
NEUTS SEG NFR BLD: 66 % (ref 43–78)
NRBC # BLD: 0 K/UL (ref 0–0.2)
PLATELET # BLD AUTO: 150 K/UL (ref 150–450)
PMV BLD AUTO: 10 FL (ref 9.4–12.3)
POTASSIUM SERPL-SCNC: 4 MMOL/L (ref 3.5–5.1)
RBC # BLD AUTO: 4.28 M/UL (ref 4.23–5.6)
SERVICE CMNT-IMP: ABNORMAL
SERVICE CMNT-IMP: ABNORMAL
SODIUM SERPL-SCNC: 137 MMOL/L (ref 136–146)
WBC # BLD AUTO: 7.3 K/UL (ref 4.3–11.1)

## 2024-03-07 PROCEDURE — 6370000000 HC RX 637 (ALT 250 FOR IP): Performed by: NURSE PRACTITIONER

## 2024-03-07 PROCEDURE — 6360000002 HC RX W HCPCS: Performed by: FAMILY MEDICINE

## 2024-03-07 PROCEDURE — 6370000000 HC RX 637 (ALT 250 FOR IP): Performed by: HOSPITALIST

## 2024-03-07 PROCEDURE — 6370000000 HC RX 637 (ALT 250 FOR IP): Performed by: INTERNAL MEDICINE

## 2024-03-07 PROCEDURE — 85025 COMPLETE CBC W/AUTO DIFF WBC: CPT

## 2024-03-07 PROCEDURE — 6360000002 HC RX W HCPCS: Performed by: HOSPITALIST

## 2024-03-07 PROCEDURE — 36415 COLL VENOUS BLD VENIPUNCTURE: CPT

## 2024-03-07 PROCEDURE — 82962 GLUCOSE BLOOD TEST: CPT

## 2024-03-07 PROCEDURE — 80048 BASIC METABOLIC PNL TOTAL CA: CPT

## 2024-03-07 PROCEDURE — 94640 AIRWAY INHALATION TREATMENT: CPT

## 2024-03-07 PROCEDURE — 6370000000 HC RX 637 (ALT 250 FOR IP): Performed by: FAMILY MEDICINE

## 2024-03-07 PROCEDURE — 94761 N-INVAS EAR/PLS OXIMETRY MLT: CPT

## 2024-03-07 PROCEDURE — 94760 N-INVAS EAR/PLS OXIMETRY 1: CPT

## 2024-03-07 PROCEDURE — 2580000003 HC RX 258: Performed by: FAMILY MEDICINE

## 2024-03-07 RX ORDER — EZETIMIBE 10 MG/1
10 TABLET ORAL NIGHTLY
Qty: 30 TABLET | Refills: 1 | Status: SHIPPED | OUTPATIENT
Start: 2024-03-07

## 2024-03-07 RX ORDER — METRONIDAZOLE 500 MG/1
500 TABLET ORAL 3 TIMES DAILY
Qty: 21 TABLET | Refills: 0 | Status: SHIPPED | OUTPATIENT
Start: 2024-03-07 | End: 2024-03-14

## 2024-03-07 RX ORDER — CIPROFLOXACIN 500 MG/1
500 TABLET, FILM COATED ORAL 2 TIMES DAILY
Qty: 14 TABLET | Refills: 0 | Status: SHIPPED | OUTPATIENT
Start: 2024-03-07 | End: 2024-03-14

## 2024-03-07 RX ORDER — GUAIFENESIN 600 MG/1
1200 TABLET, EXTENDED RELEASE ORAL 2 TIMES DAILY
Qty: 60 TABLET | Refills: 0 | Status: SHIPPED | OUTPATIENT
Start: 2024-03-07

## 2024-03-07 RX ADMIN — FUROSEMIDE 40 MG: 40 TABLET ORAL at 09:14

## 2024-03-07 RX ADMIN — LISINOPRIL 20 MG: 20 TABLET ORAL at 09:14

## 2024-03-07 RX ADMIN — PIPERACILLIN AND TAZOBACTAM 3375 MG: 3; .375 INJECTION, POWDER, FOR SOLUTION INTRAVENOUS at 05:45

## 2024-03-07 RX ADMIN — SODIUM CHLORIDE, PRESERVATIVE FREE 10 ML: 5 INJECTION INTRAVENOUS at 09:20

## 2024-03-07 RX ADMIN — AMLODIPINE BESYLATE 5 MG: 5 TABLET ORAL at 09:14

## 2024-03-07 RX ADMIN — OXYCODONE HYDROCHLORIDE 5 MG: 5 TABLET ORAL at 00:21

## 2024-03-07 RX ADMIN — GUAIFENESIN 1200 MG: 600 TABLET ORAL at 09:14

## 2024-03-07 RX ADMIN — GABAPENTIN 300 MG: 300 CAPSULE ORAL at 09:14

## 2024-03-07 RX ADMIN — APIXABAN 5 MG: 5 TABLET, FILM COATED ORAL at 09:14

## 2024-03-07 RX ADMIN — OXYCODONE HYDROCHLORIDE 5 MG: 5 TABLET ORAL at 05:42

## 2024-03-07 RX ADMIN — LEVALBUTEROL HYDROCHLORIDE 0.63 MG: 0.63 SOLUTION RESPIRATORY (INHALATION) at 08:12

## 2024-03-07 RX ADMIN — ASPIRIN 81 MG: 81 TABLET, CHEWABLE ORAL at 09:14

## 2024-03-07 RX ADMIN — TIOTROPIUM BROMIDE INHALATION SPRAY 2 PUFF: 3.12 SPRAY, METERED RESPIRATORY (INHALATION) at 08:12

## 2024-03-07 RX ADMIN — BUDESONIDE 500 MCG: 0.5 SUSPENSION RESPIRATORY (INHALATION) at 08:12

## 2024-03-07 RX ADMIN — METOPROLOL TARTRATE 50 MG: 50 TABLET, FILM COATED ORAL at 09:14

## 2024-03-07 RX ADMIN — INSULIN LISPRO 1 UNITS: 100 INJECTION, SOLUTION INTRAVENOUS; SUBCUTANEOUS at 12:02

## 2024-03-07 ASSESSMENT — PAIN SCALES - GENERAL
PAINLEVEL_OUTOF10: 10
PAINLEVEL_OUTOF10: 5
PAINLEVEL_OUTOF10: 6

## 2024-03-07 ASSESSMENT — PAIN DESCRIPTION - LOCATION
LOCATION: ABDOMEN;LEG
LOCATION: LEG
LOCATION: LEG;ABDOMEN

## 2024-03-07 ASSESSMENT — PAIN DESCRIPTION - DESCRIPTORS
DESCRIPTORS: ACHING

## 2024-03-07 ASSESSMENT — PAIN DESCRIPTION - ORIENTATION
ORIENTATION: MID
ORIENTATION: MID

## 2024-03-07 ASSESSMENT — PAIN DESCRIPTION - ONSET: ONSET: ON-GOING

## 2024-03-07 ASSESSMENT — PAIN DESCRIPTION - PAIN TYPE
TYPE: ACUTE PAIN
TYPE: ACUTE PAIN

## 2024-03-07 ASSESSMENT — PAIN DESCRIPTION - FREQUENCY: FREQUENCY: INTERMITTENT

## 2024-03-07 NOTE — CARE COORDINATION
CM met with patient and her son for discussion of transition of care. Therapy recommending SNF.   Son reports patient was in [facility] about 10 days. He states he appealed patient's discharge at that time but it was declined by [facility]. Son prefers for patient to return to [facility].  CM sent referral.  In IDR, patient medically stable for discharge.

## 2024-03-07 NOTE — PLAN OF CARE
Problem: Safety - Adult  Goal: Free from fall injury  Outcome: Completed     Problem: Discharge Planning  Goal: Discharge to home or other facility with appropriate resources  Outcome: Completed  Flowsheets (Taken 3/7/2024 0800 by Melanie Le RN)  Discharge to home or other facility with appropriate resources: Identify barriers to discharge with patient and caregiver     Problem: Pain  Goal: Verbalizes/displays adequate comfort level or baseline comfort level  Outcome: Completed

## 2024-03-07 NOTE — PROGRESS NOTES
Patient 3 min walk with O2 check on room air. Patient complaint of chest tightness and SOB and leg pain 2.25 mins into walk. Patient returned to room and O2 applied after walk for rest and recovery. Details below. Plan of care ongoing.   03/07/24 1325   Resting (Room Air)   SpO2 96   HR 73   During Walk (Room Air)   SpO2 85   HR 79   Walk/Assistance Device Walker   Rate of Dyspnea 2   Symptoms Leg pain;Shortness of breath;Chest tightness;Fatigue;Dizziness   After Walk   SpO2 96   HR 69   O2 Device Nasal cannula   O2 Flow Rate (l/min) 2 l/min   Rate of Dyspnea 1   Does the Patient Qualify for Home O2 Yes   Liter Flow at Rest 2   Does the Patient Need Portable Oxygen Tanks Yes

## 2024-03-07 NOTE — DISCHARGE INSTRUCTIONS
Diverticulitis: Care Instructions  Overview     Diverticulitis occurs when pouches form in the wall of the colon and become inflamed or infected. It can be very painful.  Doctors aren't sure what causes diverticulitis. There is no proof that foods such as nuts, seeds, or berries cause it or make it worse. A low-fiber diet can cause small, hard stools. This means it takes more pressure in the colon to move stools out of the body. This puts more pressure on the walls of the colon. The pressure from this may cause pouches to form in weak spots along the colon.  This condition may sometimes be treated by resting the bowel and taking medicines.  As you recover, high-fiber foods may help you avoid future problems.  Follow-up care is a key part of your treatment and safety. Be sure to make and go to all appointments, and call your doctor if you are having problems. It's also a good idea to know your test results and keep a list of the medicines you take.  How can you care for yourself at home?  Drink plenty of fluids. If you have kidney, heart, or liver disease and have to limit fluids, talk with your doctor before you increase the amount of fluids you drink.  Stay with liquids or start with small amounts of food until you are feeling better. Then you can return to regular foods and slowly increase the amount of fiber in your diet.  Get extra rest until you are feeling better.  Be safe with medicines. Read and follow all instructions on the label.  If the doctor gave you a prescription medicine for pain, take it as prescribed.  If you are not taking a prescription pain medicine, ask your doctor if you can take an over-the-counter medicine.  If your doctor prescribed antibiotics, take them as directed. Do not stop taking them just because you feel better. You need to take the full course of antibiotics.  Do not use laxatives or enemas unless your doctor tells you to use them.  When should you call for help?   Call your  up-to-date, and complete, but no guarantee is made to that effect. Drug information contained herein may be time sensitive. [employer] information has been compiled for use by healthcare practitioners and consumers in the [place] and therefore [employer] does not warrant that uses outside of the [place] are appropriate, unless specifically indicated otherwise. [facility] drug information does not endorse drugs, diagnose patients or recommend therapy. [employer] drug information is an informational resource designed to assist licensed healthcare practitioners in caring for their patients and/or to serve consumers viewing this service as a supplement to, and not a substitute for, the expertise, skill, knowledge and judgment of healthcare practitioners. The absence of a warning for a given drug or drug combination in no way should be construed to indicate that the drug or drug combination is safe, effective or appropriate for any given patient. [facility] does not assume any responsibility for any aspect of healthcare administered with the aid of information [facility] provides. The information contained herein is not intended to cover all possible uses, directions, precautions, warnings, drug interactions, allergic reactions, or adverse effects. If you have questions about the drugs you are taking, check with your doctor, nurse or pharmacist.  Copyright 9523-1204 [facility]. Version: 6.01. Revision date: 8/24/2021.  Care instructions adapted under license by [employer]. If you have questions about a medical condition or this instruction, always ask your healthcare professional. [employer] disclaims any warranty or liability for your use of this information.

## 2024-03-07 NOTE — DISCHARGE SUMMARY
Hospitalist Discharge Summary   Admit Date:  3/4/2024  8:22 PM   DC Note date: 3/7/2024  Name:  Marc Horton Sr.   Age:  68 y.o.  Sex:  male  :  1955   MRN:  [id number]   Room:  [facility]  PCP:  Meliza Martino PA-C    Presenting Complaint: Shortness of Breath, Abdominal Pain, and Chest Pain     Initial Admission Diagnosis: Diverticulitis of colon [K57.32]  Elevated troponin [R79.89]  NSTEMI (non-ST elevated myocardial infarction) ([facility]) [I21.4]  Chest pain, unspecified type [R07.9]  Bilateral pulmonary infiltrates [R91.8]     Problem List for this Hospitalization (present on admission):    Principal Problem:    Diverticulitis  Active Problems:    Elevated troponin    Lactic acidosis    Bilateral pulmonary infiltrates    Chest pain    Bipolar disorder (HCC)    Chronic diastolic congestive heart failure (HCC)    Chronic lumbar pain    Chronic venous hypertension (idiopathic) with other complications of bilateral lower extremity    S/P AVR (aortic valve replacement)    Type 2 diabetes mellitus (HCC)    Dyslipidemia    HTN (hypertension)    S/P PTCA (percutaneous transluminal coronary angioplasty)    Painful diabetic neuropathy (HCC)    COPD (chronic obstructive pulmonary disease) ([facility])    CVA (cerebral vascular accident) ([facility])  Resolved Problems:    NSTEMI (non-ST elevated myocardial infarction) ([facility])      Hospital Course:  68 y.o. male with medical history of chronic peripheral edema and venous ulcers of LE's, CAD s/p PCI, s/p aortic valve replacement, COPD, dCHF EF 55%, paroxysmal a fib no longer on anticoagulation, CVA who presented on 3/4 with left-sided abdominal pain radiating to chest associated with palpitations. In ED, Troponin 797.5. Lipase 504. CT abdomen pelvis with contrast - Findings suggestive of early acute uncomplicated diverticulitis involving the distal descending colon versus colitis; trace b/l pleural effusions with atelectasis    Pt was admitted on 3/4 with acute diverticulitis              Nares appear normal.  Moist oral mucosa  Neck:               No restricted ROM.  Trachea midline   CV:                  RRR.  No m/r/g.  No jugular venous distension.  Lungs:             CTAB.  No wheezing, rhonchi, or rales.  Symmetric expansion.  Abdomen:        Soft, nontender to palpation.  Slight distention.  No obvious fluid wave, bowel sounds in all 4 quadrants  Extremities:     No cyanosis or clubbing.  1+ edema bilateral lower extremities.  Skin:                Venous stasis changes noted bilaterally.  Small abrasion noted on right ankle.  Neuro:             CN II-XII grossly intact.    Psych:             Normal mood and affect.      Signed:  Cyndie Villanueva DO    Part of this note may have been written by using a voice dictation software.  The note has been proof read but may still contain some grammatical/other typographical errors.
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
## 2024-03-07 NOTE — PROGRESS NOTES
Placed on for Patient comfort. O2 sats are appropriate before application. Patient is resting and no distress noted   03/07/24 1135   Oxygen Therapy/Pulse Ox   O2 Device Nasal cannula   Pulse 58   SpO2 96 %

## 2024-03-08 ENCOUNTER — CARE COORDINATION (OUTPATIENT)
Dept: CARE COORDINATION | Facility: CLINIC | Age: 69
End: 2024-03-08

## 2024-03-08 NOTE — CARE COORDINATION
RNCM attempted to reach pt, no answer and unable to leave vm.  Will continue attempts to reach pt.

## 2024-03-09 LAB
BACTERIA SPEC CULT: NORMAL
SERVICE CMNT-IMP: NORMAL

## 2024-03-10 LAB
BACTERIA SPEC CULT: NORMAL
SERVICE CMNT-IMP: NORMAL

## 2024-03-11 NOTE — PROGRESS NOTES
Physician Progress Note      PATIENT:               BRITTANY VAZQUEZ  CSN #:                  [id number]  :                       1955  ADMIT DATE:       3/4/2024 8:22 PM  DISCH DATE:        3/7/2024 2:12 PM  RESPONDING  PROVIDER #:        Cyndie Villanueva DO          QUERY TEXT:    Patient admitted with acute diverticulitis. Noted documentation of sepsis from   diverticulitis in cardiology notes. In order to support the diagnosis of   sepsis, please include additional clinical indicators in your documentation.    Or please document if the diagnosis of sepsis has been ruled out after further   study.    The medical record reflects the following:  Risk Factors: Diverticulitis, DM2, elevated trops  Clinical Indicators: 3/5 Cardiology note, \"Elevated serum troponin. Likely   elevated in the setting of sepsis/diverticulitis with known CAD.\"  3/6 Cardiology note, \"Troponins likely elevated 2/2 demand ischemia in the   setting of diverticulitis/sepsis.\"  Admission WBC 11.1k, lactic acid 2.1, Tachycardia to 106, and afebrile  Treatment: Vanc, Zosyn, blood cultures, IVF    Thank you,  Nicholas Andrews RN CDI  [email or web address]  Options provided:  -- Sepsis was ruled out  -- Sepsis present as evidenced by, Please document evidence.  -- Other - I will add my own diagnosis  -- Disagree - Not applicable / Not valid  -- Disagree - Clinically unable to determine / Unknown  -- Refer to Clinical Documentation Reviewer    PROVIDER RESPONSE TEXT:    Sepsis was ruled out after study.    Query created by: Nicholas Andrews on 3/11/2024 9:50 AM      Electronically signed by:  Cyndie Villanueva DO 3/11/2024 10:45 AM

## 2024-03-12 ENCOUNTER — HOSPITAL ENCOUNTER (INPATIENT)
Age: 69
LOS: 5 days | Discharge: HOME | DRG: 292 | End: 2024-03-18
Attending: EMERGENCY MEDICINE | Admitting: HOSPITALIST
Payer: MEDICARE

## 2024-03-12 ENCOUNTER — APPOINTMENT (OUTPATIENT)
Dept: GENERAL RADIOLOGY | Age: 69
DRG: 292 | End: 2024-03-12
Payer: MEDICARE

## 2024-03-12 ENCOUNTER — OFFICE VISIT (OUTPATIENT)
Age: 69
End: 2024-03-12
Payer: MEDICARE

## 2024-03-12 VITALS
SYSTOLIC BLOOD PRESSURE: 114 MMHG | HEART RATE: 68 BPM | WEIGHT: 273 LBS | HEIGHT: 72 IN | BODY MASS INDEX: 36.98 KG/M2 | DIASTOLIC BLOOD PRESSURE: 50 MMHG

## 2024-03-12 DIAGNOSIS — Z95.1 HX OF CABG: ICD-10-CM

## 2024-03-12 DIAGNOSIS — Z09 HOSPITAL DISCHARGE FOLLOW-UP: Primary | ICD-10-CM

## 2024-03-12 DIAGNOSIS — I48.0 PAROXYSMAL ATRIAL FIBRILLATION (HCC): ICD-10-CM

## 2024-03-12 DIAGNOSIS — I25.10 CORONARY ARTERY DISEASE INVOLVING NATIVE CORONARY ARTERY OF NATIVE HEART WITHOUT ANGINA PECTORIS: Primary | ICD-10-CM

## 2024-03-12 DIAGNOSIS — R60.0 BILATERAL LEG EDEMA: ICD-10-CM

## 2024-03-12 DIAGNOSIS — E78.2 MIXED HYPERLIPIDEMIA: ICD-10-CM

## 2024-03-12 DIAGNOSIS — L03.119 CELLULITIS OF LOWER EXTREMITY, UNSPECIFIED LATERALITY: ICD-10-CM

## 2024-03-12 DIAGNOSIS — I50.9 CONGESTIVE HEART FAILURE, UNSPECIFIED HF CHRONICITY, UNSPECIFIED HEART FAILURE TYPE (HCC): ICD-10-CM

## 2024-03-12 DIAGNOSIS — Z95.2 H/O AORTIC VALVE REPLACEMENT: ICD-10-CM

## 2024-03-12 DIAGNOSIS — I10 ESSENTIAL HYPERTENSION: ICD-10-CM

## 2024-03-12 DIAGNOSIS — R60.1 ANASARCA: ICD-10-CM

## 2024-03-12 DIAGNOSIS — I35.0 AORTIC VALVE STENOSIS, ETIOLOGY OF CARDIAC VALVE DISEASE UNSPECIFIED: ICD-10-CM

## 2024-03-12 DIAGNOSIS — R07.9 CHEST PAIN, UNSPECIFIED TYPE: ICD-10-CM

## 2024-03-12 DIAGNOSIS — E11.40 PAINFUL DIABETIC NEUROPATHY (HCC): Chronic | ICD-10-CM

## 2024-03-12 DIAGNOSIS — I50.31 ACUTE DIASTOLIC (CONGESTIVE) HEART FAILURE (HCC): ICD-10-CM

## 2024-03-12 LAB
BASOPHILS # BLD: 0 K/UL (ref 0–0.2)
BASOPHILS NFR BLD: 1 % (ref 0–2)
DIFFERENTIAL METHOD BLD: ABNORMAL
EOSINOPHIL # BLD: 0.2 K/UL (ref 0–0.8)
EOSINOPHIL NFR BLD: 2 % (ref 0.5–7.8)
ERYTHROCYTE [DISTWIDTH] IN BLOOD BY AUTOMATED COUNT: 17.9 % (ref 11.9–14.6)
HCT VFR BLD AUTO: 34.4 % (ref 41.1–50.3)
HGB BLD-MCNC: 10.7 G/DL (ref 13.6–17.2)
IMM GRANULOCYTES # BLD AUTO: 0 K/UL (ref 0–0.5)
IMM GRANULOCYTES NFR BLD AUTO: 0 % (ref 0–5)
LYMPHOCYTES # BLD: 1.6 K/UL (ref 0.5–4.6)
LYMPHOCYTES NFR BLD: 19 % (ref 13–44)
MCH RBC QN AUTO: 25.2 PG (ref 26.1–32.9)
MCHC RBC AUTO-ENTMCNC: 31.1 G/DL (ref 31.4–35)
MCV RBC AUTO: 80.9 FL (ref 82–102)
MONOCYTES # BLD: 1 K/UL (ref 0.1–1.3)
MONOCYTES NFR BLD: 12 % (ref 4–12)
NEUTS SEG # BLD: 5.6 K/UL (ref 1.7–8.2)
NEUTS SEG NFR BLD: 66 % (ref 43–78)
NRBC # BLD: 0 K/UL (ref 0–0.2)
PLATELET # BLD AUTO: 150 K/UL (ref 150–450)
PMV BLD AUTO: 9.6 FL (ref 9.4–12.3)
RBC # BLD AUTO: 4.25 M/UL (ref 4.23–5.6)
WBC # BLD AUTO: 8.4 K/UL (ref 4.3–11.1)

## 2024-03-12 PROCEDURE — 85025 COMPLETE CBC W/AUTO DIFF WBC: CPT

## 2024-03-12 PROCEDURE — 6360000002 HC RX W HCPCS: Performed by: EMERGENCY MEDICINE

## 2024-03-12 PROCEDURE — 3017F COLORECTAL CA SCREEN DOC REV: CPT | Performed by: INTERNAL MEDICINE

## 2024-03-12 PROCEDURE — 71046 X-RAY EXAM CHEST 2 VIEWS: CPT

## 2024-03-12 PROCEDURE — 1036F TOBACCO NON-USER: CPT | Performed by: INTERNAL MEDICINE

## 2024-03-12 PROCEDURE — G8417 CALC BMI ABV UP PARAM F/U: HCPCS | Performed by: INTERNAL MEDICINE

## 2024-03-12 PROCEDURE — 99214 OFFICE O/P EST MOD 30 MIN: CPT | Performed by: INTERNAL MEDICINE

## 2024-03-12 PROCEDURE — 96374 THER/PROPH/DIAG INJ IV PUSH: CPT

## 2024-03-12 PROCEDURE — G8427 DOCREV CUR MEDS BY ELIG CLIN: HCPCS | Performed by: INTERNAL MEDICINE

## 2024-03-12 PROCEDURE — 3074F SYST BP LT 130 MM HG: CPT | Performed by: INTERNAL MEDICINE

## 2024-03-12 PROCEDURE — 84484 ASSAY OF TROPONIN QUANT: CPT

## 2024-03-12 PROCEDURE — 83880 ASSAY OF NATRIURETIC PEPTIDE: CPT

## 2024-03-12 PROCEDURE — 3078F DIAST BP <80 MM HG: CPT | Performed by: INTERNAL MEDICINE

## 2024-03-12 PROCEDURE — G8484 FLU IMMUNIZE NO ADMIN: HCPCS | Performed by: INTERNAL MEDICINE

## 2024-03-12 PROCEDURE — 99285 EMERGENCY DEPT VISIT HI MDM: CPT

## 2024-03-12 PROCEDURE — 1123F ACP DISCUSS/DSCN MKR DOCD: CPT | Performed by: INTERNAL MEDICINE

## 2024-03-12 PROCEDURE — 96375 TX/PRO/DX INJ NEW DRUG ADDON: CPT

## 2024-03-12 PROCEDURE — 1111F DSCHRG MED/CURRENT MED MERGE: CPT | Performed by: INTERNAL MEDICINE

## 2024-03-12 PROCEDURE — 93005 ELECTROCARDIOGRAM TRACING: CPT | Performed by: EMERGENCY MEDICINE

## 2024-03-12 PROCEDURE — 80048 BASIC METABOLIC PNL TOTAL CA: CPT

## 2024-03-12 RX ORDER — FUROSEMIDE 10 MG/ML
60 INJECTION INTRAMUSCULAR; INTRAVENOUS ONCE
Status: COMPLETED | OUTPATIENT
Start: 2024-03-12 | End: 2024-03-12

## 2024-03-12 RX ORDER — KETOROLAC TROMETHAMINE 15 MG/ML
15 INJECTION, SOLUTION INTRAMUSCULAR; INTRAVENOUS ONCE
Status: COMPLETED | OUTPATIENT
Start: 2024-03-12 | End: 2024-03-12

## 2024-03-12 RX ADMIN — FUROSEMIDE 60 MG: 10 INJECTION, SOLUTION INTRAMUSCULAR; INTRAVENOUS at 22:41

## 2024-03-12 RX ADMIN — KETOROLAC TROMETHAMINE 15 MG: 15 INJECTION, SOLUTION INTRAMUSCULAR; INTRAVENOUS at 23:09

## 2024-03-12 ASSESSMENT — PAIN DESCRIPTION - ORIENTATION: ORIENTATION: LEFT

## 2024-03-12 ASSESSMENT — PAIN DESCRIPTION - DESCRIPTORS: DESCRIPTORS: TIGHTNESS;SHARP

## 2024-03-12 ASSESSMENT — ENCOUNTER SYMPTOMS
SHORTNESS OF BREATH: 1
ABDOMINAL PAIN: 0
HEMATOCHEZIA: 0
COUGH: 0
VOMITING: 0
NAUSEA: 0
GASTROINTESTINAL NEGATIVE: 1

## 2024-03-12 ASSESSMENT — PAIN DESCRIPTION - LOCATION: LOCATION: CHEST

## 2024-03-12 ASSESSMENT — PAIN - FUNCTIONAL ASSESSMENT: PAIN_FUNCTIONAL_ASSESSMENT: 0-10

## 2024-03-12 ASSESSMENT — PAIN SCALES - GENERAL: PAINLEVEL_OUTOF10: 8

## 2024-03-12 NOTE — PROGRESS NOTES
mouth daily, Disp: 30 tablet, Rfl: 5    furosemide (LASIX) 40 MG tablet, Take 1 tablet by mouth daily as needed (weight gain > 2 lbs in one day), Disp: 60 tablet, Rfl: 11    lisinopril (PRINIVIL;ZESTRIL) 20 MG tablet, Take 1 tablet by mouth in the morning and at bedtime for 360 doses, Disp: 60 tablet, Rfl: 5    metFORMIN (GLUCOPHAGE) 850 MG tablet, Take 1 tablet by mouth daily (with breakfast), Disp: 60 tablet, Rfl: 5    metoprolol tartrate (LOPRESSOR) 50 MG tablet, Take 1 tablet by mouth 2 times daily, Disp: 60 tablet, Rfl: 5    gabapentin (NEURONTIN) 300 MG capsule, Take 1 capsule in morning, and lunch and 2 capusles at night Intended supply: 30 days (Patient taking differently: Take 1 capsule by mouth in the morning and at bedtime. Take 1 capsule in morning, and lunch and 2 capusles at night Intended supply: 30 days), Disp: 120 capsule, Rfl: 1    aspirin 325 MG tablet, Take 2 tablets by mouth three times daily, Disp: , Rfl:     guaiFENesin (MUCINEX) 600 MG extended release tablet, Take 2 tablets by mouth 2 times daily (Patient not taking: Reported on 3/12/2024), Disp: 60 tablet, Rfl: 0    gabapentin (NEURONTIN) 300 MG capsule, Take 2 capsules by mouth nightly., Disp: , Rfl:      ASSESSMENT/PLAN:    Cardiovascular Testing:  -Cath 11/16/2023: Severe native CAD, patent LIMA to LAD, patent SVG to OM1, PDA stenosis, sub-2 mm vessel left medical therapy.  - Echo 11/16/23: LVEF 55-60 %, RV normal, Valves: Bioprosthetic AVR peak 14 mmHg, Ao 42 mm.   - Cath 3/13/23: Severe multivessel coronary artery disease, moderate aortic stenosis.  To CABG.  - Echo 3/12/23: LVEF 55-60 %, RV normal, Valves: Moderate AS     1. Coronary artery disease involving native coronary artery of native heart without angina pectoris  2. Hx of CABG  -Doing fairly well  continue ASA 81, pravastatin, metoprolol.  -Patient with major adverse reactions reportedly to rosuvastatin, atorvastatin.  -Recent cath with small vessel disease left to medical

## 2024-03-13 ENCOUNTER — APPOINTMENT (OUTPATIENT)
Dept: NON INVASIVE DIAGNOSTICS | Age: 69
DRG: 292 | End: 2024-03-13
Payer: MEDICARE

## 2024-03-13 PROBLEM — I50.31 ACUTE DIASTOLIC (CONGESTIVE) HEART FAILURE (HCC): Status: ACTIVE | Noted: 2024-03-13

## 2024-03-13 LAB
ANION GAP SERPL CALC-SCNC: 6 MMOL/L (ref 2–11)
ANION GAP SERPL CALC-SCNC: 7 MMOL/L (ref 2–11)
BUN SERPL-MCNC: 19 MG/DL (ref 8–23)
BUN SERPL-MCNC: 19 MG/DL (ref 8–23)
CALCIUM SERPL-MCNC: 8.8 MG/DL (ref 8.3–10.4)
CALCIUM SERPL-MCNC: 9 MG/DL (ref 8.3–10.4)
CHLORIDE SERPL-SCNC: 105 MMOL/L (ref 103–113)
CHLORIDE SERPL-SCNC: 107 MMOL/L (ref 103–113)
CO2 SERPL-SCNC: 28 MMOL/L (ref 21–32)
CO2 SERPL-SCNC: 29 MMOL/L (ref 21–32)
CREAT SERPL-MCNC: 0.9 MG/DL (ref 0.8–1.5)
CREAT SERPL-MCNC: 0.9 MG/DL (ref 0.8–1.5)
ECHO BSA: 2.48 M2
ECHO LV EDV A4C: 137 ML
ECHO LV EDV INDEX A4C: 57 ML/M2
ECHO LV EJECTION FRACTION A4C: 55 %
ECHO LV ESV A4C: 62 ML
ECHO LV ESV INDEX A4C: 26 ML/M2
ECHO LV FRACTIONAL SHORTENING: 29 % (ref 28–44)
ECHO LV INTERNAL DIMENSION DIASTOLE INDEX: 2.03 CM/M2
ECHO LV INTERNAL DIMENSION DIASTOLIC: 4.9 CM (ref 4.2–5.9)
ECHO LV INTERNAL DIMENSION SYSTOLIC INDEX: 1.45 CM/M2
ECHO LV INTERNAL DIMENSION SYSTOLIC: 3.5 CM
ECHO LV IVSD: 0.7 CM (ref 0.6–1)
ECHO LV MASS 2D: 110.8 G (ref 88–224)
ECHO LV MASS INDEX 2D: 46 G/M2 (ref 49–115)
ECHO LV POSTERIOR WALL DIASTOLIC: 0.7 CM (ref 0.6–1)
ECHO LV RELATIVE WALL THICKNESS RATIO: 0.29
EKG ATRIAL RATE: 80 BPM
EKG DIAGNOSIS: NORMAL
EKG P AXIS: 53 DEGREES
EKG P-R INTERVAL: 143 MS
EKG Q-T INTERVAL: 387 MS
EKG QRS DURATION: 93 MS
EKG QTC CALCULATION (BAZETT): 441 MS
EKG R AXIS: 54 DEGREES
EKG T AXIS: 45 DEGREES
EKG VENTRICULAR RATE: 78 BPM
GLUCOSE BLD STRIP.AUTO-MCNC: 117 MG/DL (ref 65–100)
GLUCOSE BLD STRIP.AUTO-MCNC: 124 MG/DL (ref 65–100)
GLUCOSE BLD STRIP.AUTO-MCNC: 184 MG/DL (ref 65–100)
GLUCOSE BLD STRIP.AUTO-MCNC: 190 MG/DL (ref 65–100)
GLUCOSE SERPL-MCNC: 117 MG/DL (ref 65–100)
GLUCOSE SERPL-MCNC: 129 MG/DL (ref 65–100)
IRON SERPL-MCNC: 23 UG/DL (ref 35–150)
MAGNESIUM SERPL-MCNC: 2.1 MG/DL (ref 1.8–2.4)
NT PRO BNP: 2082 PG/ML (ref 5–125)
POTASSIUM SERPL-SCNC: 3.9 MMOL/L (ref 3.5–5.1)
POTASSIUM SERPL-SCNC: 3.9 MMOL/L (ref 3.5–5.1)
SERVICE CMNT-IMP: ABNORMAL
SODIUM SERPL-SCNC: 140 MMOL/L (ref 136–146)
SODIUM SERPL-SCNC: 142 MMOL/L (ref 136–146)
TRANSFERRIN SERPL-MCNC: 250 MG/DL (ref 202–364)
TROPONIN I SERPL HS-MCNC: 18 PG/ML (ref 0–14)
TROPONIN I SERPL HS-MCNC: 21 PG/ML (ref 0–14)
TROPONIN I SERPL HS-MCNC: 23.9 PG/ML (ref 0–14)

## 2024-03-13 PROCEDURE — 84484 ASSAY OF TROPONIN QUANT: CPT

## 2024-03-13 PROCEDURE — 6360000004 HC RX CONTRAST MEDICATION: Performed by: STUDENT IN AN ORGANIZED HEALTH CARE EDUCATION/TRAINING PROGRAM

## 2024-03-13 PROCEDURE — 6360000002 HC RX W HCPCS: Performed by: HOSPITALIST

## 2024-03-13 PROCEDURE — 80048 BASIC METABOLIC PNL TOTAL CA: CPT

## 2024-03-13 PROCEDURE — 82962 GLUCOSE BLOOD TEST: CPT

## 2024-03-13 PROCEDURE — 6360000002 HC RX W HCPCS: Performed by: STUDENT IN AN ORGANIZED HEALTH CARE EDUCATION/TRAINING PROGRAM

## 2024-03-13 PROCEDURE — 6370000000 HC RX 637 (ALT 250 FOR IP): Performed by: NURSE PRACTITIONER

## 2024-03-13 PROCEDURE — 83735 ASSAY OF MAGNESIUM: CPT

## 2024-03-13 PROCEDURE — 2580000003 HC RX 258: Performed by: STUDENT IN AN ORGANIZED HEALTH CARE EDUCATION/TRAINING PROGRAM

## 2024-03-13 PROCEDURE — 93010 ELECTROCARDIOGRAM REPORT: CPT | Performed by: INTERNAL MEDICINE

## 2024-03-13 PROCEDURE — 83540 ASSAY OF IRON: CPT

## 2024-03-13 PROCEDURE — 1100000003 HC PRIVATE W/ TELEMETRY

## 2024-03-13 PROCEDURE — 99223 1ST HOSP IP/OBS HIGH 75: CPT | Performed by: INTERNAL MEDICINE

## 2024-03-13 PROCEDURE — 2580000003 HC RX 258: Performed by: HOSPITALIST

## 2024-03-13 PROCEDURE — C8924 2D TTE W OR W/O FOL W/CON,FU: HCPCS

## 2024-03-13 PROCEDURE — 36415 COLL VENOUS BLD VENIPUNCTURE: CPT

## 2024-03-13 PROCEDURE — 1100000000 HC RM PRIVATE

## 2024-03-13 PROCEDURE — 93308 TTE F-UP OR LMTD: CPT | Performed by: INTERNAL MEDICINE

## 2024-03-13 PROCEDURE — 84466 ASSAY OF TRANSFERRIN: CPT

## 2024-03-13 PROCEDURE — 6370000000 HC RX 637 (ALT 250 FOR IP): Performed by: STUDENT IN AN ORGANIZED HEALTH CARE EDUCATION/TRAINING PROGRAM

## 2024-03-13 PROCEDURE — 6370000000 HC RX 637 (ALT 250 FOR IP): Performed by: HOSPITALIST

## 2024-03-13 RX ORDER — POLYETHYLENE GLYCOL 3350 17 G/17G
17 POWDER, FOR SOLUTION ORAL DAILY PRN
Status: DISCONTINUED | OUTPATIENT
Start: 2024-03-13 | End: 2024-03-18 | Stop reason: HOSPADM

## 2024-03-13 RX ORDER — ONDANSETRON 4 MG/1
4 TABLET, ORALLY DISINTEGRATING ORAL EVERY 8 HOURS PRN
Status: DISCONTINUED | OUTPATIENT
Start: 2024-03-13 | End: 2024-03-18 | Stop reason: HOSPADM

## 2024-03-13 RX ORDER — AMLODIPINE BESYLATE 5 MG/1
5 TABLET ORAL DAILY
Status: DISCONTINUED | OUTPATIENT
Start: 2024-03-13 | End: 2024-03-13

## 2024-03-13 RX ORDER — INSULIN LISPRO 100 [IU]/ML
0-8 INJECTION, SOLUTION INTRAVENOUS; SUBCUTANEOUS
Status: DISCONTINUED | OUTPATIENT
Start: 2024-03-13 | End: 2024-03-18 | Stop reason: HOSPADM

## 2024-03-13 RX ORDER — LISINOPRIL 20 MG/1
20 TABLET ORAL 2 TIMES DAILY
Status: DISCONTINUED | OUTPATIENT
Start: 2024-03-13 | End: 2024-03-15

## 2024-03-13 RX ORDER — MAGNESIUM SULFATE IN WATER 40 MG/ML
2000 INJECTION, SOLUTION INTRAVENOUS PRN
Status: DISCONTINUED | OUTPATIENT
Start: 2024-03-13 | End: 2024-03-15

## 2024-03-13 RX ORDER — POTASSIUM CHLORIDE 7.45 MG/ML
10 INJECTION INTRAVENOUS PRN
Status: DISCONTINUED | OUTPATIENT
Start: 2024-03-13 | End: 2024-03-15

## 2024-03-13 RX ORDER — METOPROLOL TARTRATE 50 MG/1
50 TABLET, FILM COATED ORAL 2 TIMES DAILY
Status: DISCONTINUED | OUTPATIENT
Start: 2024-03-13 | End: 2024-03-18 | Stop reason: HOSPADM

## 2024-03-13 RX ORDER — POTASSIUM CHLORIDE 20 MEQ/1
40 TABLET, EXTENDED RELEASE ORAL PRN
Status: DISCONTINUED | OUTPATIENT
Start: 2024-03-13 | End: 2024-03-15

## 2024-03-13 RX ORDER — IBUPROFEN 600 MG/1
1 TABLET ORAL PRN
Status: DISCONTINUED | OUTPATIENT
Start: 2024-03-13 | End: 2024-03-18 | Stop reason: HOSPADM

## 2024-03-13 RX ORDER — LISINOPRIL 5 MG/1
5 TABLET ORAL DAILY
Status: DISCONTINUED | OUTPATIENT
Start: 2024-03-13 | End: 2024-03-13

## 2024-03-13 RX ORDER — SODIUM CHLORIDE 9 MG/ML
INJECTION, SOLUTION INTRAVENOUS PRN
Status: DISCONTINUED | OUTPATIENT
Start: 2024-03-13 | End: 2024-03-18 | Stop reason: HOSPADM

## 2024-03-13 RX ORDER — SODIUM CHLORIDE 0.9 % (FLUSH) 0.9 %
5-40 SYRINGE (ML) INJECTION EVERY 12 HOURS SCHEDULED
Status: DISCONTINUED | OUTPATIENT
Start: 2024-03-13 | End: 2024-03-18 | Stop reason: HOSPADM

## 2024-03-13 RX ORDER — DOXYCYCLINE HYCLATE 100 MG/1
100 CAPSULE ORAL EVERY 12 HOURS SCHEDULED
Status: COMPLETED | OUTPATIENT
Start: 2024-03-13 | End: 2024-03-18

## 2024-03-13 RX ORDER — EZETIMIBE 10 MG/1
10 TABLET ORAL NIGHTLY
Status: DISCONTINUED | OUTPATIENT
Start: 2024-03-13 | End: 2024-03-15

## 2024-03-13 RX ORDER — SODIUM CHLORIDE 0.9 % (FLUSH) 0.9 %
5-40 SYRINGE (ML) INJECTION PRN
Status: DISCONTINUED | OUTPATIENT
Start: 2024-03-13 | End: 2024-03-18 | Stop reason: HOSPADM

## 2024-03-13 RX ORDER — DEXTROSE MONOHYDRATE 100 MG/ML
INJECTION, SOLUTION INTRAVENOUS CONTINUOUS PRN
Status: DISCONTINUED | OUTPATIENT
Start: 2024-03-13 | End: 2024-03-18 | Stop reason: HOSPADM

## 2024-03-13 RX ORDER — ONDANSETRON 2 MG/ML
4 INJECTION INTRAMUSCULAR; INTRAVENOUS EVERY 6 HOURS PRN
Status: DISCONTINUED | OUTPATIENT
Start: 2024-03-13 | End: 2024-03-18 | Stop reason: HOSPADM

## 2024-03-13 RX ORDER — ACETAMINOPHEN 325 MG/1
650 TABLET ORAL EVERY 6 HOURS PRN
Status: DISCONTINUED | OUTPATIENT
Start: 2024-03-13 | End: 2024-03-18 | Stop reason: HOSPADM

## 2024-03-13 RX ORDER — HYDROCODONE BITARTRATE AND ACETAMINOPHEN 5; 325 MG/1; MG/1
1 TABLET ORAL EVERY 6 HOURS PRN
Status: DISCONTINUED | OUTPATIENT
Start: 2024-03-13 | End: 2024-03-18 | Stop reason: HOSPADM

## 2024-03-13 RX ORDER — INSULIN LISPRO 100 [IU]/ML
0-4 INJECTION, SOLUTION INTRAVENOUS; SUBCUTANEOUS NIGHTLY
Status: DISCONTINUED | OUTPATIENT
Start: 2024-03-13 | End: 2024-03-18 | Stop reason: HOSPADM

## 2024-03-13 RX ORDER — GABAPENTIN 300 MG/1
300 CAPSULE ORAL 3 TIMES DAILY
Status: DISCONTINUED | OUTPATIENT
Start: 2024-03-13 | End: 2024-03-18 | Stop reason: HOSPADM

## 2024-03-13 RX ORDER — BUMETANIDE 0.25 MG/ML
2 INJECTION INTRAMUSCULAR; INTRAVENOUS DAILY
Status: DISCONTINUED | OUTPATIENT
Start: 2024-03-13 | End: 2024-03-16 | Stop reason: SDUPTHER

## 2024-03-13 RX ORDER — AMLODIPINE BESYLATE 10 MG/1
10 TABLET ORAL DAILY
Status: DISCONTINUED | OUTPATIENT
Start: 2024-03-13 | End: 2024-03-15

## 2024-03-13 RX ORDER — ASPIRIN 81 MG/1
81 TABLET, CHEWABLE ORAL DAILY
Status: DISCONTINUED | OUTPATIENT
Start: 2024-03-13 | End: 2024-03-18 | Stop reason: HOSPADM

## 2024-03-13 RX ADMIN — EZETIMIBE 10 MG: 10 TABLET ORAL at 21:21

## 2024-03-13 RX ADMIN — DOXYCYCLINE HYCLATE 100 MG: 100 CAPSULE ORAL at 21:21

## 2024-03-13 RX ADMIN — SODIUM CHLORIDE, PRESERVATIVE FREE 0.45 ML: 5 INJECTION INTRAVENOUS at 09:41

## 2024-03-13 RX ADMIN — Medication 2500 MG: at 02:22

## 2024-03-13 RX ADMIN — METOPROLOL TARTRATE 50 MG: 50 TABLET, FILM COATED ORAL at 07:59

## 2024-03-13 RX ADMIN — APIXABAN 5 MG: 5 TABLET, FILM COATED ORAL at 21:21

## 2024-03-13 RX ADMIN — GABAPENTIN 300 MG: 300 CAPSULE ORAL at 07:59

## 2024-03-13 RX ADMIN — SODIUM CHLORIDE 3000 MG: 900 INJECTION INTRAVENOUS at 21:26

## 2024-03-13 RX ADMIN — AMLODIPINE BESYLATE 10 MG: 10 TABLET ORAL at 07:59

## 2024-03-13 RX ADMIN — APIXABAN 5 MG: 5 TABLET, FILM COATED ORAL at 08:00

## 2024-03-13 RX ADMIN — HYDROCODONE BITARTRATE AND ACETAMINOPHEN 1 TABLET: 5; 325 TABLET ORAL at 15:01

## 2024-03-13 RX ADMIN — HYDROCODONE BITARTRATE AND ACETAMINOPHEN 1 TABLET: 5; 325 TABLET ORAL at 21:34

## 2024-03-13 RX ADMIN — SODIUM CHLORIDE 3000 MG: 900 INJECTION INTRAVENOUS at 15:07

## 2024-03-13 RX ADMIN — GABAPENTIN 300 MG: 300 CAPSULE ORAL at 13:58

## 2024-03-13 RX ADMIN — ASPIRIN 81 MG: 81 TABLET, CHEWABLE ORAL at 12:20

## 2024-03-13 RX ADMIN — BUMETANIDE 2 MG: 0.25 INJECTION INTRAMUSCULAR; INTRAVENOUS at 07:58

## 2024-03-13 RX ADMIN — WATER 2000 MG: 1 INJECTION INTRAMUSCULAR; INTRAVENOUS; SUBCUTANEOUS at 01:39

## 2024-03-13 RX ADMIN — METOPROLOL TARTRATE 50 MG: 50 TABLET, FILM COATED ORAL at 21:21

## 2024-03-13 RX ADMIN — SODIUM CHLORIDE, PRESERVATIVE FREE 10 ML: 5 INJECTION INTRAVENOUS at 21:21

## 2024-03-13 RX ADMIN — LISINOPRIL 20 MG: 20 TABLET ORAL at 21:21

## 2024-03-13 RX ADMIN — ACETAMINOPHEN 650 MG: 325 TABLET ORAL at 12:36

## 2024-03-13 RX ADMIN — GABAPENTIN 300 MG: 300 CAPSULE ORAL at 21:21

## 2024-03-13 RX ADMIN — SODIUM CHLORIDE, PRESERVATIVE FREE 10 ML: 5 INJECTION INTRAVENOUS at 07:59

## 2024-03-13 RX ADMIN — CEFEPIME 2000 MG: 2 INJECTION, POWDER, FOR SOLUTION INTRAVENOUS at 12:23

## 2024-03-13 RX ADMIN — LISINOPRIL 20 MG: 20 TABLET ORAL at 08:00

## 2024-03-13 ASSESSMENT — PAIN DESCRIPTION - FREQUENCY
FREQUENCY: INTERMITTENT
FREQUENCY: INTERMITTENT

## 2024-03-13 ASSESSMENT — PAIN SCALES - GENERAL
PAINLEVEL_OUTOF10: 4
PAINLEVEL_OUTOF10: 3
PAINLEVEL_OUTOF10: 0
PAINLEVEL_OUTOF10: 6
PAINLEVEL_OUTOF10: 0
PAINLEVEL_OUTOF10: 0
PAINLEVEL_OUTOF10: 5
PAINLEVEL_OUTOF10: 5
PAINLEVEL_OUTOF10: 0

## 2024-03-13 ASSESSMENT — PAIN DESCRIPTION - PAIN TYPE
TYPE: ACUTE PAIN
TYPE: ACUTE PAIN

## 2024-03-13 ASSESSMENT — PAIN DESCRIPTION - LOCATION
LOCATION: BACK;LEG
LOCATION: LEG
LOCATION: BACK;LEG
LOCATION: LEG
LOCATION: LEG

## 2024-03-13 ASSESSMENT — PAIN DESCRIPTION - ONSET
ONSET: ON-GOING
ONSET: ON-GOING

## 2024-03-13 ASSESSMENT — PAIN DESCRIPTION - ORIENTATION
ORIENTATION: RIGHT;LEFT
ORIENTATION: RIGHT;LEFT;LOWER
ORIENTATION: LEFT;RIGHT
ORIENTATION: RIGHT;LEFT;LOWER
ORIENTATION: RIGHT;LEFT

## 2024-03-13 ASSESSMENT — PAIN DESCRIPTION - DESCRIPTORS
DESCRIPTORS: ACHING
DESCRIPTORS: ACHING;THROBBING
DESCRIPTORS: THROBBING
DESCRIPTORS: ACHING;BURNING

## 2024-03-13 NOTE — ED TRIAGE NOTES
Ambulatory to triage. States history of CHF. Pt states hx of 6 cardiac stents. Chest pain 8/10 left sided. States BLE edema.

## 2024-03-13 NOTE — ED NOTES
TRANSFER - OUT REPORT:    Verbal report given to SEJAL Alvarado on Marc Horton Sr.  being transferred to [facility] for routine progression of patient care       Report consisted of patient's Situation, Background, Assessment and   Recommendations(SBAR).     Information from the following report(s) Nurse Handoff Report, Index, ED Encounter Summary, ED SBAR, Intake/Output, and MAR was reviewed with the receiving nurse.    Lawrence Fall Assessment:    Presents to emergency department  because of falls (Syncope, seizure, or loss of consciousness): No  Age > 70: Yes  Altered Mental Status, Intoxication with alcohol or substance confusion (Disorientation, impaired judgment, poor safety awaremess, or inability to follow instructions): No  Impaired Mobility: Ambulates or transfers with assistive devices or assistance; Unable to ambulate or transer.: No             Lines:   Peripheral IV 03/12/24 Left;Proximal Forearm (Active)        Opportunity for questions and clarification was provided.      Patient transported with:  Registered Nurse           Shyla, Lacey, RN  03/13/24 0133

## 2024-03-13 NOTE — PLAN OF CARE
Problem: Discharge Planning  Goal: Discharge to home or other facility with appropriate resources  Outcome: Progressing  Flowsheets (Taken 3/13/2024 0200)  Discharge to home or other facility with appropriate resources:   Identify barriers to discharge with patient and caregiver   Arrange for needed discharge resources and transportation as appropriate   Identify discharge learning needs (meds, wound care, etc)   Arrange for interpreters to assist at discharge as needed   Refer to discharge planning if patient needs post-hospital services based on physician order or complex needs related to functional status, cognitive ability or social support system     Problem: Pain  Goal: Verbalizes/displays adequate comfort level or baseline comfort level  Outcome: Progressing  Flowsheets (Taken 3/13/2024 0200)  Verbalizes/displays adequate comfort level or baseline comfort level:   Encourage patient to monitor pain and request assistance   Assess pain using appropriate pain scale   Administer analgesics based on type and severity of pain and evaluate response   Implement non-pharmacological measures as appropriate and evaluate response   Consider cultural and social influences on pain and pain management   Notify Licensed Independent Practitioner if interventions unsuccessful or patient reports new pain     Problem: Safety - Adult  Goal: Free from fall injury  Outcome: Progressing

## 2024-03-13 NOTE — CONSULTS
[facility] Cardiology Initial Cardiac Evaluation                Date of  Admission: 3/12/2024  9:51 PM     PCP: Meliza Martino PA-C  Requested by: Dr Chau   Primary Cardiologist: Dr Pride  APC Attending: Dr Connors    Reason for Evaluation: Elevated HS Trop above baseline      Marc Horton Sr. is a 68 y.o. male with hx of CAD s/p CABG with AV replacement, PAF, a flutter, DM type II, HLD, subdural hematoma, and neuropathy.  The patient was recent diagnosed with sepsis 2nd to diverticulitis and was discharged on 3/7/2024.  At that time he was noted to have elevated HS Trop peaking at 795 trending down to 323.8.  He had EKG at that time and symptoms not consistent with NSTEMI related to CAD.  He has chronic LE edema with ulcers as well.  He was treated with IVF and IV ABX with a 10 day course at discharge.  He saw Dr Pride in  office yesterday on 3/12 with increased LLE and scrotal edema as well while on 40 mg of PO BID of lasix.  His abd pain from diverticulitis has resolved with no other GI  complaints while in the office.  He was to continued BID lasix for the next 2-3 days and to be seen in the ED if symptoms continued.    The patient has not been able to walk due to swelling.  Per the patient he has had chest pain for 4 to 5 days described as burning to the left side of his chest that increases with palpation and deep breathing.  He has no complaints of shortness of breath, nausea, vomiting, diarrhea, fever, weakness, dizziness, falls, or any other new symptoms.  Patient's main concern is of the increased edema to the point that he cannot walk from his baseline.  Reviw of labs show normal BMP except for slightly elevated GGL, NT Pro BNP 2082, HS Trop 18/21/23.9, Hgb 10.7, with a low Iron as well.  He currently is negative  894 ml of fluid this admission.      Recent Cardiac Synopsis  LHC/NST: 11/15/23    CARDIAC PROCEDURE 11/16/2023  4:44 PM (Final)    Conclusion  Patient with severe diffuse  lispro (HUMALOG) injection vial 0-4 Units  0-4 Units SubCUTAneous Nightly    glucose chewable tablet 16 g  4 tablet Oral PRN    dextrose bolus 10% 125 mL  125 mL IntraVENous PRN    Or    dextrose bolus 10% 250 mL  250 mL IntraVENous PRN    Glucagon Emergency KIT 1 mg  1 mg SubCUTAneous PRN    dextrose 10 % infusion   IntraVENous Continuous PRN    amLODIPine (NORVASC) tablet 5 mg  5 mg Oral Daily    apixaban (ELIQUIS) tablet 5 mg  5 mg Oral BID    gabapentin (NEURONTIN) capsule 300 mg  300 mg Oral TID    lisinopril (PRINIVIL;ZESTRIL) tablet 20 mg  20 mg Oral BID    metoprolol tartrate (LOPRESSOR) tablet 50 mg  50 mg Oral BID    ezetimibe (ZETIA) tablet 10 mg  10 mg Oral Nightly    vancomycin (VANCOCIN) 1250 mg in sodium chloride 0.9% 250 mL IVPB  1,250 mg IntraVENous Q12H       Review of Systems   Constitutional: Negative for chills, diaphoresis, fever, malaise/fatigue, night sweats, weight gain and weight loss.   HENT: Negative.     Eyes: Negative.    Cardiovascular:  Positive for chest pain (Atypical burning to the touch and deep breathing 4-5 days) and leg swelling (and scrotal edema, chronic but worse).   Respiratory: Negative.     Endocrine: Negative.    Hematologic/Lymphatic: Negative.    Skin: Negative.    Musculoskeletal: Negative.    Gastrointestinal:  Negative for heartburn, nausea and vomiting.   Genitourinary: Negative.    Neurological: Negative.  Negative for dizziness and weakness.   Psychiatric/Behavioral: Negative.          Physical Exam  Vitals:    03/13/24 0113 03/13/24 0130 03/13/24 0200 03/13/24 0202   BP: (!) 179/70 (!) 153/67  (!) 169/89   Pulse: 80 82  80   Resp: 26 28  20   Temp:    97.9 °F (36.6 °C)   TempSrc:    Oral   SpO2: 90% 92%  100%   Weight:   121.6 kg (268 lb)    Height:   1.829 m (6')        Patient Vitals for the past 96 hrs (Last 3 readings):   Weight   03/13/24 0200 121.6 kg (268 lb)   03/12/24 2154 123.8 kg (273 lb)         Intake/Output Summary (Last 24 hours) at 3/13/2024

## 2024-03-13 NOTE — H&P
Hospitalist History and Physical   Admit Date:  3/12/2024  9:51 PM   Name:  Marc Horton Sr.   Age:  68 y.o.  Sex:  male  :  1955   MRN:  [id number]   Room:  [facility]/    Presenting/Chief Complaint: Chest Pain and Shortness of Breath     Reason(s) for Admission: Anasarca [R60.1]  Cellulitis of lower extremity, unspecified laterality [L03.119]  Acute diastolic (congestive) heart failure (HCC) [I50.31]  Congestive heart failure, unspecified HF chronicity, unspecified heart failure type (HCC) [I50.9]     History of Present Illness:       68 years old gentleman with past medical history of chronic peripheral edema, venous stasis ulcers of lower extremity, coronary artery disease status post PCI, history of arctic valve replacement, COPD, chronic diastolic congestive heart failure, paroxysmal atrial fibrillation  on anticoagulation, history of CVA was recently admitted for diverticulitis and chest pain, diverticulitis was treated, patient has elevated troponins, evaluated by cardiology, impression was demand ischemia, patient was eventually discharged with Cipro and Flagyl to finish 10-day course on 3/7 presented back to emergency room with worsening shortness of breath, intermittent chest discomfort over left side of chest, bilateral lower extremity edema which continued to get worse associated with redness spreading towards bilateral upper extremity.  Patient was getting 40 mg of Lasix, he increased taking Lasix to 60 mg with no improvement.  Patient reports some cough and sputum production but denies any fever.  Chest x-ray was done shows pulmonary vascular congestion, proBNP is elevated.  Patient reports difficulty walking secondary to severe swelling and pain in bilateral lower extremity.  Reports scrotal swelling as well.          Assessment & Plan:     Acute diastolic congestive heart failure: Initiated on Bumex, monitor urine output, creatinine, patient also complaining of intermittent chest  mg in sodium chloride 0.9 % 500 mL IVPB     Order Specific Question:   Antimicrobial Indications     Answer:   Skin and Soft Tissue Infection     Order Specific Question:   Skin duration of therapy     Answer:   5 days       Prior to Admit Medications:  Current Outpatient Medications   Medication Instructions    albuterol sulfate HFA (PROVENTIL;VENTOLIN;PROAIR) 108 (90 Base) MCG/ACT inhaler 2 puffs, Inhalation, EVERY 6 HOURS PRN    amLODIPine (NORVASC) 5 mg, Oral, DAILY    apixaban (ELIQUIS) 5 mg, Oral, 2 TIMES DAILY    aspirin 650 mg, Oral, 3 TIMES DAILY RESP    ciprofloxacin (CIPRO) 500 mg, Oral, 2 TIMES DAILY    ezetimibe (ZETIA) 10 mg, Oral, NIGHTLY    fluticasone-umeclidin-vilant (TRELEGY ELLIPTA) 100-62.5-25 MCG/ACT AEPB inhaler 1 puff, Inhalation, DAILY    furosemide (LASIX) 40 mg, Oral, DAILY PRN    gabapentin (NEURONTIN) 300 MG capsule Take 1 capsule in morning, and lunch and 2 capusles at night Intended supply: 30 days    gabapentin (NEURONTIN) 600 mg, Oral, EVERY BEDTIME    guaiFENesin (MUCINEX) 1,200 mg, Oral, 2 TIMES DAILY    lisinopril (PRINIVIL;ZESTRIL) 20 mg, Oral, 2 times daily    metFORMIN (GLUCOPHAGE) 850 mg, Oral, DAILY WITH BREAKFAST    metoprolol tartrate (LOPRESSOR) 50 mg, Oral, 2 TIMES DAILY    metroNIDAZOLE (FLAGYL) 500 mg, Oral, 3 TIMES DAILY       I have personally reviewed labs and tests:  Recent Results (from the past 24 hour(s))   EKG 12 Lead    Collection Time: 03/12/24  9:52 PM   Result Value Ref Range    Ventricular Rate 78 BPM    Atrial Rate 80 BPM    P-R Interval 143 ms    QRS Duration 93 ms    Q-T Interval 387 ms    QTc Calculation (Bazett) 441 ms    P Axis 53 degrees    R Axis 54 degrees    T Axis 45 degrees    Diagnosis       Sinus rhythm  Multiform ventricular premature complexes     Basic Metabolic Panel    Collection Time: 03/12/24 10:57 PM   Result Value Ref Range    Sodium 142 136 - 146 mmol/L    Potassium 3.9 3.5 - 5.1 mmol/L    Chloride 107 103 - 113 mmol/L    CO2 29 21 -

## 2024-03-13 NOTE — PROGRESS NOTES
VANCO DAILY NOTE  [facility]   Pharmacy Pharmacokinetic Monitoring Service - Vancomycin    Consulting Provider: Mike   Indication: BLE cellulitis  Target Concentration: Goal AUC/PATRICK 400-600 mg*hr/L  Day of Therapy: 1  Additional Antimicrobials: Cefepime    Pertinent Laboratory Values:   Wt Readings from Last 1 Encounters:   03/13/24 121.6 kg (268 lb)     Temp Readings from Last 1 Encounters:   03/13/24 97.9 °F (36.6 °C) (Oral)     Recent Labs     03/12/24  2257   BUN 19   CREATININE 0.90   WBC 8.4     Estimated Creatinine Clearance: 103 mL/min (based on SCr of 0.9 mg/dL).    No results found for: \"VANCOTROUGH\", \"VANCORANDOM\"    MRSA Nasal Swab: N/A. Non-respiratory infection    Assessment:  Date/Time Dose Concentration AUC         Note: Serum concentrations collected for AUC dosing may appear elevated if collected in close proximity to the dose administered, this is not necessarily an indication of toxicity    Plan:  Dosing recommendations based on Bayesian software  Start vancomycin 2500 mg x1, then 1250 mg q 12 hours  Anticipated AUC of 497 and trough concentration of 15.1 at steady state  Renal labs as indicated   Vancomycin concentrations will be ordered as clinically appropriate   Pharmacy will continue to monitor patient and adjust therapy as indicated    Thank you for the consult,  Gale Gonzales, PharmD, BCPS

## 2024-03-13 NOTE — PROGRESS NOTES
Patient was seen and examined at the bedside.  Stated he uses 2 LNC at night.  He was hypertensive.  On physical exam,  patient has bilateral leg swelling.  Discontinued cefepime and vancomycin.  Started on Unasyn and Doxy.  Increase Norvasc to 10 Mg daily.  Please refer to H&P in a.m. this is nonbillable note.

## 2024-03-13 NOTE — ED PROVIDER NOTES
Emergency Department Provider Note       PCP: Meliza Martino PA-C   Age: 68 y.o.   Sex: male     DISPOSITION Decision To Admit 2024 12:32:18 AM       ICD-10-CM    1. Congestive heart failure, unspecified HF chronicity, unspecified heart failure type (HCC)  I50.9       2. Anasarca  R60.1       3. Cellulitis of lower extremity, unspecified laterality  L03.119           Medical Decision Making     Worsening cellulitis extending to groin with anasarca.  Patient diuresing with IV Lasix.  Discussed with hospitalist for admission.     1 or more acute illnesses that pose a threat to life or bodily function.   Drug therapy given requiring intensive monitoring for toxicity.  Discussion with external consultants.  Shared medical decision making was utilized in creating the patients health plan today.    I independently ordered and reviewed each unique test.  I reviewed external records: ED visit note from an outside group.  I reviewed external records: provider visit note from PCP.  I reviewed external records: provider visit note from outside specialist.  I reviewed external records: previous EKG including cardiologist interpretation.    I reviewed external records: previous lab results from outside ED.  I reviewed external records: previous imaging study including radiologist interpretation.   Hospitalist Discharge Summary   Admit Date:     3/4/2024  8:22 PM   DC Note date: 3/7/2024  Name:              Marc Horton Sr.   Age:                 68 y.o.  Sex:                 male  :               1955   MRN:               [id number]   Room:              [facility]  PCP:                Meliza Martino PA-C     Presenting Complaint: Shortness of Breath, Abdominal Pain, and Chest Pain     Initial Admission Diagnosis: Diverticulitis of colon [K57.32]  Elevated troponin [R79.89]  NSTEMI (non-ST elevated myocardial infarction) (HCC) [I21.4]  Chest pain, unspecified type [R07.9]  Bilateral pulmonary infiltrates

## 2024-03-13 NOTE — CONSULTS
Will follow this patient for CHF education pending echo results and  the provider's diagnosis. Thank you so much for this consult.

## 2024-03-13 NOTE — PROGRESS NOTES
VANCO DAILY FOLLOW UP NOTE  [facility]   Pharmacy Pharmacokinetic Monitoring Service - Vancomycin    Consulting Provider: Dr. Mckeon   Indication: SSTI  Target Concentration: Goal AUC/PATRICK 400-600 mg*hr/L  Day of Therapy: 1 of 5  Additional Antimicrobials: cefepime    Pertinent Laboratory Values:   Wt Readings from Last 1 Encounters:   03/13/24 121.6 kg (268 lb)     Temp Readings from Last 1 Encounters:   03/13/24 97.9 °F (36.6 °C) (Oral)     Recent Labs     03/12/24  2257 03/13/24  0341   BUN 19 19   CREATININE 0.90 0.90   WBC 8.4  --      Estimated Creatinine Clearance: 103 mL/min (based on SCr of 0.9 mg/dL).    No results found for: \"VANCOTROUGH\", \"VANCORANDOM\"    MRSA Nasal Swab: N/A. Non-respiratory infection    Assessment:  Date/Time Dose Concentration AUC         Note: Serum concentrations collected for AUC dosing may appear elevated if collected in close proximity to the dose administered, this is not necessarily an indication of toxicity    Plan:  Dosing recommendations based on Bayesian software  Continue vancomycin 1250 mg every 12 hours  Renal labs as indicated   Vancomycin concentration ordered for 3/14 @ 0600    Pharmacy will continue to monitor patient and adjust therapy as indicated    Thank you for the consult,  Dara Ayala RPH

## 2024-03-13 NOTE — CONSULTS
Nutrition Note:   Acknowledge Consult for Education: CHF diet education    Initial education to be completed by CHF Nurse Navigator. Will defer RD intervention at this time. Navigator will reconsult RD if additional diet education intervention needed.    NAM MARINO, RD

## 2024-03-14 ENCOUNTER — APPOINTMENT (OUTPATIENT)
Dept: ULTRASOUND IMAGING | Age: 69
DRG: 292 | End: 2024-03-14
Payer: MEDICARE

## 2024-03-14 LAB
ANION GAP SERPL CALC-SCNC: 4 MMOL/L (ref 2–11)
BUN SERPL-MCNC: 18 MG/DL (ref 8–23)
CALCIUM SERPL-MCNC: 8.7 MG/DL (ref 8.3–10.4)
CHLORIDE SERPL-SCNC: 104 MMOL/L (ref 103–113)
CHOLEST SERPL-MCNC: 80 MG/DL
CO2 SERPL-SCNC: 31 MMOL/L (ref 21–32)
CREAT SERPL-MCNC: 0.9 MG/DL (ref 0.8–1.5)
GLUCOSE BLD STRIP.AUTO-MCNC: 156 MG/DL (ref 65–100)
GLUCOSE BLD STRIP.AUTO-MCNC: 162 MG/DL (ref 65–100)
GLUCOSE BLD STRIP.AUTO-MCNC: 171 MG/DL (ref 65–100)
GLUCOSE SERPL-MCNC: 114 MG/DL (ref 65–100)
HDLC SERPL-MCNC: 32 MG/DL (ref 40–60)
HDLC SERPL: 2.5
LDLC SERPL CALC-MCNC: 40.4 MG/DL
MAGNESIUM SERPL-MCNC: 2.2 MG/DL (ref 1.8–2.4)
POTASSIUM SERPL-SCNC: 3.8 MMOL/L (ref 3.5–5.1)
SERVICE CMNT-IMP: ABNORMAL
SODIUM SERPL-SCNC: 139 MMOL/L (ref 136–146)
TRIGL SERPL-MCNC: 38 MG/DL (ref 35–150)
VLDLC SERPL CALC-MCNC: 7.6 MG/DL (ref 6–23)

## 2024-03-14 PROCEDURE — 83735 ASSAY OF MAGNESIUM: CPT

## 2024-03-14 PROCEDURE — 80061 LIPID PANEL: CPT

## 2024-03-14 PROCEDURE — 80048 BASIC METABOLIC PNL TOTAL CA: CPT

## 2024-03-14 PROCEDURE — 2580000003 HC RX 258: Performed by: HOSPITALIST

## 2024-03-14 PROCEDURE — 2580000003 HC RX 258: Performed by: STUDENT IN AN ORGANIZED HEALTH CARE EDUCATION/TRAINING PROGRAM

## 2024-03-14 PROCEDURE — 6360000002 HC RX W HCPCS: Performed by: HOSPITALIST

## 2024-03-14 PROCEDURE — 99232 SBSQ HOSP IP/OBS MODERATE 35: CPT | Performed by: INTERNAL MEDICINE

## 2024-03-14 PROCEDURE — 6370000000 HC RX 637 (ALT 250 FOR IP): Performed by: HOSPITALIST

## 2024-03-14 PROCEDURE — 93922 UPR/L XTREMITY ART 2 LEVELS: CPT

## 2024-03-14 PROCEDURE — 6360000002 HC RX W HCPCS: Performed by: STUDENT IN AN ORGANIZED HEALTH CARE EDUCATION/TRAINING PROGRAM

## 2024-03-14 PROCEDURE — 36415 COLL VENOUS BLD VENIPUNCTURE: CPT

## 2024-03-14 PROCEDURE — 1100000003 HC PRIVATE W/ TELEMETRY

## 2024-03-14 PROCEDURE — 82962 GLUCOSE BLOOD TEST: CPT

## 2024-03-14 PROCEDURE — 6370000000 HC RX 637 (ALT 250 FOR IP): Performed by: STUDENT IN AN ORGANIZED HEALTH CARE EDUCATION/TRAINING PROGRAM

## 2024-03-14 PROCEDURE — 6370000000 HC RX 637 (ALT 250 FOR IP): Performed by: NURSE PRACTITIONER

## 2024-03-14 RX ADMIN — AMLODIPINE BESYLATE 10 MG: 10 TABLET ORAL at 08:19

## 2024-03-14 RX ADMIN — HYDROCODONE BITARTRATE AND ACETAMINOPHEN 1 TABLET: 5; 325 TABLET ORAL at 20:31

## 2024-03-14 RX ADMIN — SODIUM CHLORIDE 3000 MG: 900 INJECTION INTRAVENOUS at 08:30

## 2024-03-14 RX ADMIN — HYDROCODONE BITARTRATE AND ACETAMINOPHEN 1 TABLET: 5; 325 TABLET ORAL at 03:40

## 2024-03-14 RX ADMIN — SODIUM CHLORIDE, PRESERVATIVE FREE 10 ML: 5 INJECTION INTRAVENOUS at 08:19

## 2024-03-14 RX ADMIN — LISINOPRIL 20 MG: 20 TABLET ORAL at 20:21

## 2024-03-14 RX ADMIN — APIXABAN 5 MG: 5 TABLET, FILM COATED ORAL at 20:20

## 2024-03-14 RX ADMIN — METOPROLOL TARTRATE 50 MG: 50 TABLET, FILM COATED ORAL at 20:21

## 2024-03-14 RX ADMIN — APIXABAN 5 MG: 5 TABLET, FILM COATED ORAL at 08:19

## 2024-03-14 RX ADMIN — GABAPENTIN 300 MG: 300 CAPSULE ORAL at 14:07

## 2024-03-14 RX ADMIN — LISINOPRIL 20 MG: 20 TABLET ORAL at 08:19

## 2024-03-14 RX ADMIN — SODIUM CHLORIDE 3000 MG: 900 INJECTION INTRAVENOUS at 20:26

## 2024-03-14 RX ADMIN — GABAPENTIN 300 MG: 300 CAPSULE ORAL at 20:20

## 2024-03-14 RX ADMIN — ASPIRIN 81 MG: 81 TABLET, CHEWABLE ORAL at 08:19

## 2024-03-14 RX ADMIN — DOXYCYCLINE HYCLATE 100 MG: 100 CAPSULE ORAL at 20:21

## 2024-03-14 RX ADMIN — HYDROCODONE BITARTRATE AND ACETAMINOPHEN 1 TABLET: 5; 325 TABLET ORAL at 14:07

## 2024-03-14 RX ADMIN — BUMETANIDE 2 MG: 0.25 INJECTION INTRAMUSCULAR; INTRAVENOUS at 08:24

## 2024-03-14 RX ADMIN — SODIUM CHLORIDE 3000 MG: 900 INJECTION INTRAVENOUS at 14:10

## 2024-03-14 RX ADMIN — GABAPENTIN 300 MG: 300 CAPSULE ORAL at 08:19

## 2024-03-14 RX ADMIN — METOPROLOL TARTRATE 50 MG: 50 TABLET, FILM COATED ORAL at 08:19

## 2024-03-14 RX ADMIN — SODIUM CHLORIDE, PRESERVATIVE FREE 10 ML: 5 INJECTION INTRAVENOUS at 20:21

## 2024-03-14 RX ADMIN — EZETIMIBE 10 MG: 10 TABLET ORAL at 20:21

## 2024-03-14 RX ADMIN — DOXYCYCLINE HYCLATE 100 MG: 100 CAPSULE ORAL at 08:19

## 2024-03-14 RX ADMIN — SODIUM CHLORIDE 3000 MG: 900 INJECTION INTRAVENOUS at 03:05

## 2024-03-14 ASSESSMENT — PAIN - FUNCTIONAL ASSESSMENT: PAIN_FUNCTIONAL_ASSESSMENT: ACTIVITIES ARE NOT PREVENTED

## 2024-03-14 ASSESSMENT — PAIN DESCRIPTION - ORIENTATION
ORIENTATION: LEFT;RIGHT
ORIENTATION: LEFT;RIGHT
ORIENTATION: RIGHT;LEFT

## 2024-03-14 ASSESSMENT — PAIN SCALES - GENERAL
PAINLEVEL_OUTOF10: 5
PAINLEVEL_OUTOF10: 6
PAINLEVEL_OUTOF10: 0
PAINLEVEL_OUTOF10: 6
PAINLEVEL_OUTOF10: 0
PAINLEVEL_OUTOF10: 4
PAINLEVEL_OUTOF10: 0
PAINLEVEL_OUTOF10: 0

## 2024-03-14 ASSESSMENT — PAIN DESCRIPTION - LOCATION
LOCATION: LEG

## 2024-03-14 ASSESSMENT — PAIN DESCRIPTION - DESCRIPTORS
DESCRIPTORS: ACHING;THROBBING
DESCRIPTORS: ACHING

## 2024-03-14 NOTE — PROGRESS NOTES
[facility] CARDIOLOGY PROGRESS NOTE           3/14/2024 12:47 PM    Admit Date: 3/12/2024      Subjective:   Patient reports continues to have some shortness of breath, leg swelling as well as scrotal edema.  Denies chest pain or chest pressure, racing heart or palpitations, abdominal pain, nausea, vomiting.    ROS:  Cardiovascular:  As noted above    Objective:      Vitals:    03/13/24 2350 03/14/24 0438 03/14/24 0726 03/14/24 1129   BP: 116/68 131/88 (!) 146/74 131/65   Pulse: 59 64 57 59   Resp: 16 18 18 20   Temp: 98.1 °F (36.7 °C) 97.5 °F (36.4 °C) 97.7 °F (36.5 °C) 97.3 °F (36.3 °C)   TempSrc: Oral Oral Oral Axillary   SpO2: 96% 93% 100% 91%   Weight:  123.2 kg (271 lb 8 oz)     Height:         Physical Exam:  General-No Acute Distress, awake, alert   Neck- supple, no JVD  CV- regular rate and rhythm, faint systolic murmur right upper sternal border  Lung-decreased at the bases bilaterally  Abd- soft, nontender, nondistended  Ext-2+ leg edema bilaterally.  Skin- warm and dry    Data Review:   Recent Labs     03/12/24  2257 03/13/24  0341 03/14/24  0400    140 139   K 3.9 3.9 3.8   MG  --  2.1 2.2   BUN 19 19 18   WBC 8.4  --   --    HGB 10.7*  --   --    HCT 34.4*  --   --      --   --    CHOL  --   --  80   HDL  --   --  32*       Assessment/Plan:     Active Hospital Problems    Lower extremity edema  - Symptoms continue, reasonable to continue diuretics as tolerated by renal function.  -Prior echo Normal LVEF, RV mildly dilated with normal systolic function.      Paroxysmal atrial fibrillation (HCC)  - Continue metoprolol, Eliquis.    CAD with prior CABG  - No chest pain, continue ASA, statin, metoprolol.    History aortic valve replacement  - Continue diuretics, prior echo with appropriate gradients across the valve January 10,  2024      HTN (hypertension)  -Controlled at this time      Dyslipidemia  -Continue statin      Type 2 diabetes mellitus (HCC)    GERD  (gastroesophageal reflux disease)  -Per primary team    Kirill Pride DO  3/14/2024 12:47 PM

## 2024-03-14 NOTE — PROGRESS NOTES
Physician Progress Note      PATIENT:               BRITTANY VAZQUEZ  CSN #:                  [id number]  :                       1955  ADMIT DATE:       3/12/2024 9:51 PM  DISCH DATE:  RESPONDING  PROVIDER #:        Kirill Pride DO          QUERY TEXT:    Pt admitted with CHF exacerbation. If possible, please document in progress   notes and discharge summary further specificity regarding the type and acuity   of CHF:    The medical record reflects the following:  Risk Factors: SOB, BLE & scrotal swelling  Clinical Indicators: Per H&P \"Acute diastolic congestive heart failure\"  CXR: Stable pulmonary edema and cardiomegaly  Echo: Normal left ventricular systolic function with a visually estimated EF   of 60 - 65%. Left ventricle size is normal. Normal wall thickness. Septal   flattening in diastole consistent with right ventricular volume overload.   Normal wall motion. Diastolic function was not assessed.  Pro-BNP: 2,082  Treatment: IV Bumex, cardiology consult, CXR, Echo  Options provided:  -- Acute on Chronic Diastolic CHF/HFpEF  -- Acute Diastolic CHF/HFpEF  -- Chronic Diastolic CHF/HFpEF  -- Other - I will add my own diagnosis  -- Disagree - Not applicable / Not valid  -- Disagree - Clinically unable to determine / Unknown  -- Refer to Clinical Documentation Reviewer    PROVIDER RESPONSE TEXT:    Provider is clinically unable to determine a response to this query.    Query created by: Amelie Connolly on 3/14/2024 2:48 PM      Electronically signed by:  Kirill Pride DO 3/14/2024 3:24 PM

## 2024-03-14 NOTE — PROGRESS NOTES
Hospitalist Progress Note   Admit Date:  3/12/2024  9:51 PM   Name:  Marc Horton Sr.   Age:  68 y.o.  Sex:  male  :  1955   MRN:  [id number]   Room:  [facility]/    Presenting/Chief Complaint: Chest Pain and Shortness of Breath     Reason(s) for Admission: Anasarca [R60.1]  Cellulitis of lower extremity, unspecified laterality [L03.119]  Acute diastolic (congestive) heart failure (HCC) [I50.31]  Congestive heart failure, unspecified HF chronicity, unspecified heart failure type (HCC) [I50.9]     Hospital Course:   68 years old gentleman with past medical history of chronic peripheral edema, venous stasis ulcers of lower extremity, coronary artery disease status post PCI, history of arctic valve replacement, COPD, chronic diastolic congestive heart failure, paroxysmal atrial fibrillation  on anticoagulation, history of CVA was recently admitted for diverticulitis and chest pain, diverticulitis was treated, patient has elevated troponins, evaluated by cardiology, impression was demand ischemia, patient was eventually discharged with Cipro and Flagyl to finish 10-day course on 3/7 presented back to emergency room with worsening shortness of breath, intermittent chest discomfort over left side of chest, bilateral lower extremity edema which continued to get worse associated with redness spreading towards bilateral upper extremity.  Patient was getting 40 mg of Lasix, he increased taking Lasix to 60 mg with no improvement.  Patient reports some cough and sputum production but denies any fever.  Chest x-ray was done shows pulmonary vascular congestion, proBNP is elevated.  Patient reports difficulty walking secondary to severe swelling and pain in bilateral lower extremity.  Reports scrotal swelling as well.         Subjective & 24hr Events:   Patient was seen and examined at the bedside.  He was sitting comfortably on the chair.  His leg swelling is improving.  Patient denies chest pain, shortness of

## 2024-03-14 NOTE — PROGRESS NOTES
Physician Progress Note      PATIENT:               BRITTANY VAZQUEZ  CSN #:                  [id number]  :                       1955  ADMIT DATE:       3/12/2024 9:51 PM  DISCH DATE:  RESPONDING  PROVIDER #:        Padmaja Guerrero MD          QUERY TEXT:    Patient admitted with CHF exacerbation, noted to have paroxysmal atrial   fibrillation and is maintained on eliquis. If possible, please document in   progress notes and discharge summary if you are evaluating and/or treating any   of the following:    The medical record reflects the following:  Risk Factors: 68 y.o. male, CHF, HTN, DM, CAD  Clinical Indicators: Paroxysmal atrial fibrillation  Treatment: Eliquis    Thank you,  Amelie Connolly RN, BSN, CDI  [email or web address]  .  Options provided:  -- Secondary hypercoagulable state in a patient with atrial fibrillation  -- Other - I will add my own diagnosis  -- Disagree - Not applicable / Not valid  -- Disagree - Clinically unable to determine / Unknown  -- Refer to Clinical Documentation Reviewer    PROVIDER RESPONSE TEXT:    This patient has secondary hypercoagulable state in a patient with atrial   fibrillation.    Query created by: Amelie Connolly on 3/13/2024 1:41 PM      Electronically signed by:  Padmaja Guerrero MD 3/14/2024 11:32 AM
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
## 2024-03-15 LAB
ANION GAP SERPL CALC-SCNC: 4 MMOL/L (ref 2–11)
BASOPHILS # BLD: 0.1 K/UL (ref 0–0.2)
BASOPHILS NFR BLD: 1 % (ref 0–2)
BUN SERPL-MCNC: 17 MG/DL (ref 8–23)
CALCIUM SERPL-MCNC: 9.2 MG/DL (ref 8.3–10.4)
CHLORIDE SERPL-SCNC: 103 MMOL/L (ref 103–113)
CO2 SERPL-SCNC: 31 MMOL/L (ref 21–32)
CREAT SERPL-MCNC: 0.8 MG/DL (ref 0.8–1.5)
DIFFERENTIAL METHOD BLD: ABNORMAL
EOSINOPHIL # BLD: 0.3 K/UL (ref 0–0.8)
EOSINOPHIL NFR BLD: 4 % (ref 0.5–7.8)
ERYTHROCYTE [DISTWIDTH] IN BLOOD BY AUTOMATED COUNT: 17.9 % (ref 11.9–14.6)
GLUCOSE BLD STRIP.AUTO-MCNC: 110 MG/DL (ref 65–100)
GLUCOSE BLD STRIP.AUTO-MCNC: 133 MG/DL (ref 65–100)
GLUCOSE BLD STRIP.AUTO-MCNC: 167 MG/DL (ref 65–100)
GLUCOSE BLD STRIP.AUTO-MCNC: 168 MG/DL (ref 65–100)
GLUCOSE SERPL-MCNC: 123 MG/DL (ref 65–100)
HCT VFR BLD AUTO: 36.9 % (ref 41.1–50.3)
HGB BLD-MCNC: 11.3 G/DL (ref 13.6–17.2)
IMM GRANULOCYTES # BLD AUTO: 0 K/UL (ref 0–0.5)
IMM GRANULOCYTES NFR BLD AUTO: 0 % (ref 0–5)
LYMPHOCYTES # BLD: 1.4 K/UL (ref 0.5–4.6)
LYMPHOCYTES NFR BLD: 21 % (ref 13–44)
MAGNESIUM SERPL-MCNC: 2.3 MG/DL (ref 1.8–2.4)
MCH RBC QN AUTO: 25 PG (ref 26.1–32.9)
MCHC RBC AUTO-ENTMCNC: 30.6 G/DL (ref 31.4–35)
MCV RBC AUTO: 81.6 FL (ref 82–102)
MONOCYTES # BLD: 0.8 K/UL (ref 0.1–1.3)
MONOCYTES NFR BLD: 12 % (ref 4–12)
NEUTS SEG # BLD: 4.1 K/UL (ref 1.7–8.2)
NEUTS SEG NFR BLD: 62 % (ref 43–78)
NRBC # BLD: 0 K/UL (ref 0–0.2)
PHOSPHATE SERPL-MCNC: 3 MG/DL (ref 2.3–3.7)
PLATELET # BLD AUTO: 170 K/UL (ref 150–450)
PMV BLD AUTO: 9.9 FL (ref 9.4–12.3)
POTASSIUM SERPL-SCNC: 4.1 MMOL/L (ref 3.5–5.1)
RBC # BLD AUTO: 4.52 M/UL (ref 4.23–5.6)
SERVICE CMNT-IMP: ABNORMAL
SODIUM SERPL-SCNC: 138 MMOL/L (ref 136–146)
WBC # BLD AUTO: 6.7 K/UL (ref 4.3–11.1)

## 2024-03-15 PROCEDURE — 6370000000 HC RX 637 (ALT 250 FOR IP): Performed by: STUDENT IN AN ORGANIZED HEALTH CARE EDUCATION/TRAINING PROGRAM

## 2024-03-15 PROCEDURE — 84100 ASSAY OF PHOSPHORUS: CPT

## 2024-03-15 PROCEDURE — 83735 ASSAY OF MAGNESIUM: CPT

## 2024-03-15 PROCEDURE — 82962 GLUCOSE BLOOD TEST: CPT

## 2024-03-15 PROCEDURE — 6370000000 HC RX 637 (ALT 250 FOR IP): Performed by: HOSPITALIST

## 2024-03-15 PROCEDURE — 99232 SBSQ HOSP IP/OBS MODERATE 35: CPT | Performed by: INTERNAL MEDICINE

## 2024-03-15 PROCEDURE — 85025 COMPLETE CBC W/AUTO DIFF WBC: CPT

## 2024-03-15 PROCEDURE — 2580000003 HC RX 258: Performed by: HOSPITALIST

## 2024-03-15 PROCEDURE — 80048 BASIC METABOLIC PNL TOTAL CA: CPT

## 2024-03-15 PROCEDURE — 6370000000 HC RX 637 (ALT 250 FOR IP): Performed by: NURSE PRACTITIONER

## 2024-03-15 PROCEDURE — 6360000002 HC RX W HCPCS: Performed by: STUDENT IN AN ORGANIZED HEALTH CARE EDUCATION/TRAINING PROGRAM

## 2024-03-15 PROCEDURE — 6360000002 HC RX W HCPCS: Performed by: HOSPITALIST

## 2024-03-15 PROCEDURE — 36415 COLL VENOUS BLD VENIPUNCTURE: CPT

## 2024-03-15 PROCEDURE — 2580000003 HC RX 258: Performed by: STUDENT IN AN ORGANIZED HEALTH CARE EDUCATION/TRAINING PROGRAM

## 2024-03-15 PROCEDURE — 1100000003 HC PRIVATE W/ TELEMETRY

## 2024-03-15 RX ORDER — LISINOPRIL 20 MG/1
40 TABLET ORAL DAILY
Status: DISCONTINUED | OUTPATIENT
Start: 2024-03-15 | End: 2024-03-18 | Stop reason: HOSPADM

## 2024-03-15 RX ORDER — DOXYCYCLINE HYCLATE 100 MG/1
100 CAPSULE ORAL EVERY 12 HOURS SCHEDULED
Qty: 14 CAPSULE | Refills: 0 | Status: SHIPPED | OUTPATIENT
Start: 2024-03-15 | End: 2024-03-22

## 2024-03-15 RX ORDER — BUMETANIDE 2 MG/1
2 TABLET ORAL DAILY
Qty: 30 TABLET | Refills: 3 | Status: SHIPPED | OUTPATIENT
Start: 2024-03-15 | End: 2024-06-13

## 2024-03-15 RX ORDER — LISINOPRIL 20 MG/1
40 TABLET ORAL 2 TIMES DAILY
Status: DISCONTINUED | OUTPATIENT
Start: 2024-03-15 | End: 2024-03-15

## 2024-03-15 RX ORDER — ASPIRIN 81 MG/1
81 TABLET, CHEWABLE ORAL DAILY
Qty: 360 TABLET | Refills: 0
Start: 2024-03-16 | End: 2025-03-11

## 2024-03-15 RX ADMIN — HYDROCODONE BITARTRATE AND ACETAMINOPHEN 1 TABLET: 5; 325 TABLET ORAL at 12:28

## 2024-03-15 RX ADMIN — GABAPENTIN 300 MG: 300 CAPSULE ORAL at 07:38

## 2024-03-15 RX ADMIN — LISINOPRIL 40 MG: 20 TABLET ORAL at 12:24

## 2024-03-15 RX ADMIN — SODIUM CHLORIDE, PRESERVATIVE FREE 10 ML: 5 INJECTION INTRAVENOUS at 07:30

## 2024-03-15 RX ADMIN — METOPROLOL TARTRATE 50 MG: 50 TABLET, FILM COATED ORAL at 07:38

## 2024-03-15 RX ADMIN — SODIUM CHLORIDE 3000 MG: 900 INJECTION INTRAVENOUS at 03:39

## 2024-03-15 RX ADMIN — GABAPENTIN 300 MG: 300 CAPSULE ORAL at 14:08

## 2024-03-15 RX ADMIN — ASPIRIN 81 MG: 81 TABLET, CHEWABLE ORAL at 07:38

## 2024-03-15 RX ADMIN — GABAPENTIN 300 MG: 300 CAPSULE ORAL at 20:16

## 2024-03-15 RX ADMIN — APIXABAN 5 MG: 5 TABLET, FILM COATED ORAL at 20:16

## 2024-03-15 RX ADMIN — SODIUM CHLORIDE 3000 MG: 900 INJECTION INTRAVENOUS at 20:50

## 2024-03-15 RX ADMIN — LISINOPRIL 20 MG: 20 TABLET ORAL at 07:38

## 2024-03-15 RX ADMIN — METOPROLOL TARTRATE 50 MG: 50 TABLET, FILM COATED ORAL at 20:16

## 2024-03-15 RX ADMIN — APIXABAN 5 MG: 5 TABLET, FILM COATED ORAL at 07:38

## 2024-03-15 RX ADMIN — SODIUM CHLORIDE 3000 MG: 900 INJECTION INTRAVENOUS at 07:46

## 2024-03-15 RX ADMIN — HYDROCODONE BITARTRATE AND ACETAMINOPHEN 1 TABLET: 5; 325 TABLET ORAL at 18:32

## 2024-03-15 RX ADMIN — AMLODIPINE BESYLATE 10 MG: 10 TABLET ORAL at 07:38

## 2024-03-15 RX ADMIN — SODIUM CHLORIDE 3000 MG: 900 INJECTION INTRAVENOUS at 14:09

## 2024-03-15 RX ADMIN — DOXYCYCLINE HYCLATE 100 MG: 100 CAPSULE ORAL at 07:37

## 2024-03-15 RX ADMIN — SODIUM CHLORIDE, PRESERVATIVE FREE 5 ML: 5 INJECTION INTRAVENOUS at 20:16

## 2024-03-15 RX ADMIN — DOXYCYCLINE HYCLATE 100 MG: 100 CAPSULE ORAL at 20:16

## 2024-03-15 RX ADMIN — HYDROCODONE BITARTRATE AND ACETAMINOPHEN 1 TABLET: 5; 325 TABLET ORAL at 06:18

## 2024-03-15 RX ADMIN — BUMETANIDE 2 MG: 0.25 INJECTION INTRAMUSCULAR; INTRAVENOUS at 07:37

## 2024-03-15 ASSESSMENT — PAIN DESCRIPTION - LOCATION
LOCATION: LEG

## 2024-03-15 ASSESSMENT — PAIN SCALES - GENERAL
PAINLEVEL_OUTOF10: 6
PAINLEVEL_OUTOF10: 5
PAINLEVEL_OUTOF10: 0
PAINLEVEL_OUTOF10: 4
PAINLEVEL_OUTOF10: 0

## 2024-03-15 ASSESSMENT — PAIN DESCRIPTION - DESCRIPTORS
DESCRIPTORS: SORE;THROBBING
DESCRIPTORS: ACHING

## 2024-03-15 ASSESSMENT — PAIN DESCRIPTION - ORIENTATION
ORIENTATION: RIGHT;LEFT

## 2024-03-15 ASSESSMENT — PAIN - FUNCTIONAL ASSESSMENT: PAIN_FUNCTIONAL_ASSESSMENT: ACTIVITIES ARE NOT PREVENTED

## 2024-03-15 NOTE — CARE COORDINATION
CM reviewed clinical record. Diuresing. Room air. ECHO. PT/OT recommending no further skilled therapy.  CM anticipates patient discharging home with his SO. Patient has a live in non medical caregiver.   His primary has initiated CLTC aide application and DME (electric scooter).  DCP: Home with family assistance.

## 2024-03-15 NOTE — PROGRESS NOTES
Spiritual Care Visit, initial visit.    Visited with patient at bedside.    Prayed for patient's healing and health.    Visit by Philip Cheney, Staff . Tamica., Sofia.LEYDI., B.A.

## 2024-03-15 NOTE — PROGRESS NOTES
Hospitalist Progress Note   Admit Date:  3/12/2024  9:51 PM   Name:  Marc Horton Sr.   Age:  68 y.o.  Sex:  male  :  1955   MRN:  [id number]   [facility]:  [facility]/    Presenting/Chief Complaint: Chest Pain and Shortness of Breath     Reason(s) for Admission: Anasarca [R60.1]  Cellulitis of lower extremity, unspecified laterality [L03.119]  Acute diastolic (congestive) heart failure (HCC) [I50.31]  Congestive heart failure, unspecified HF chronicity, unspecified heart failure type (HCC) [I50.9]     Hospital Course:   68 years old gentleman with past medical history of chronic peripheral edema, venous stasis ulcers of lower extremity, coronary artery disease status post PCI, history of arctic valve replacement, COPD, chronic diastolic congestive heart failure, paroxysmal atrial fibrillation  on anticoagulation, history of CVA was recently admitted for diverticulitis and chest pain, diverticulitis was treated, patient has elevated troponins, evaluated by cardiology, impression was demand ischemia, patient was eventually discharged with Cipro and Flagyl to finish 10-day course on 3/7 presented back to emergency room with worsening shortness of breath, intermittent chest discomfort over left side of chest, bilateral lower extremity edema which continued to get worse associated with redness spreading towards bilateral upper extremity.  Patient was getting 40 mg of Lasix, he increased taking Lasix to 60 mg with no improvement.  Patient reports some cough and sputum production but denies any fever.  Chest x-ray was done shows pulmonary vascular congestion, proBNP is elevated.  Patient reports difficulty walking secondary to severe swelling and pain in bilateral lower extremity.  Reports scrotal swelling as well.      Today, sit in a chair, feel better, sweeling and redness in legs improving      Assessment & Plan:     Acute diastolic congestive heart failure: IO -1.5L  Telemonitoring  Continue IV Bumex,  improving              Neuro:  grossly intact.    Psych:  Normal mood and affect.      I have personally reviewed labs and tests:  Recent Labs:  Recent Results (from the past 48 hour(s))   POCT Glucose    Collection Time: 03/13/24  4:06 PM   Result Value Ref Range    POC Glucose 117 (H) 65 - 100 mg/dL    Performed by: WilderTRAVIS    POCT Glucose    Collection Time: 03/13/24  7:55 PM   Result Value Ref Range    POC Glucose 184 (H) 65 - 100 mg/dL    Performed by: Ric    Basic Metabolic Panel    Collection Time: 03/14/24  4:00 AM   Result Value Ref Range    Sodium 139 136 - 146 mmol/L    Potassium 3.8 3.5 - 5.1 mmol/L    Chloride 104 103 - 113 mmol/L    CO2 31 21 - 32 mmol/L    Anion Gap 4 2 - 11 mmol/L    Glucose 114 (H) 65 - 100 mg/dL    BUN 18 8 - 23 MG/DL    Creatinine 0.90 0.8 - 1.5 MG/DL    Est, Glom Filt Rate >60 >60 ml/min/1.73m2    Calcium 8.7 8.3 - 10.4 MG/DL   Magnesium    Collection Time: 03/14/24  4:00 AM   Result Value Ref Range    Magnesium 2.2 1.8 - 2.4 mg/dL   Lipid Panel    Collection Time: 03/14/24  4:00 AM   Result Value Ref Range    Cholesterol, Total 80 <200 MG/DL    Triglycerides 38 35 - 150 MG/DL    HDL 32 (L) 40 - 60 MG/DL    LDL Calculated 40.4 <100 MG/DL    VLDL Cholesterol Calculated 7.6 6.0 - 23.0 MG/DL    Chol/HDL Ratio 2.5     POCT Glucose    Collection Time: 03/14/24 11:30 AM   Result Value Ref Range    POC Glucose 171 (H) 65 - 100 mg/dL    Performed by: HarryCT    POCT Glucose    Collection Time: 03/14/24  5:15 PM   Result Value Ref Range    POC Glucose 156 (H) 65 - 100 mg/dL    Performed by: SonidoaPCT    POCT Glucose    Collection Time: 03/14/24  8:21 PM   Result Value Ref Range    POC Glucose 162 (H) 65 - 100 mg/dL    Performed by: [email or web address]    Basic Metabolic Panel    Collection Time: 03/15/24  4:57 AM   Result Value Ref Range    Sodium 138 136 - 146 mmol/L    Potassium 4.1 3.5 - 5.1 mmol/L    Chloride 103 103 - 113 mmol/L    CO2

## 2024-03-16 LAB
GLUCOSE BLD STRIP.AUTO-MCNC: 128 MG/DL (ref 65–100)
GLUCOSE BLD STRIP.AUTO-MCNC: 129 MG/DL (ref 65–100)
GLUCOSE BLD STRIP.AUTO-MCNC: 152 MG/DL (ref 65–100)
GLUCOSE BLD STRIP.AUTO-MCNC: 158 MG/DL (ref 65–100)
MRSA DNA SPEC QL NAA+PROBE: NOT DETECTED
S AUREUS CPE NOSE QL NAA+PROBE: NOT DETECTED
SERVICE CMNT-IMP: ABNORMAL

## 2024-03-16 PROCEDURE — 6370000000 HC RX 637 (ALT 250 FOR IP): Performed by: NURSE PRACTITIONER

## 2024-03-16 PROCEDURE — 99232 SBSQ HOSP IP/OBS MODERATE 35: CPT | Performed by: INTERNAL MEDICINE

## 2024-03-16 PROCEDURE — 1100000003 HC PRIVATE W/ TELEMETRY

## 2024-03-16 PROCEDURE — 2580000003 HC RX 258: Performed by: HOSPITALIST

## 2024-03-16 PROCEDURE — 6360000002 HC RX W HCPCS: Performed by: HOSPITALIST

## 2024-03-16 PROCEDURE — 6360000002 HC RX W HCPCS: Performed by: INTERNAL MEDICINE

## 2024-03-16 PROCEDURE — 6370000000 HC RX 637 (ALT 250 FOR IP): Performed by: HOSPITALIST

## 2024-03-16 PROCEDURE — 82962 GLUCOSE BLOOD TEST: CPT

## 2024-03-16 PROCEDURE — 6370000000 HC RX 637 (ALT 250 FOR IP): Performed by: STUDENT IN AN ORGANIZED HEALTH CARE EDUCATION/TRAINING PROGRAM

## 2024-03-16 PROCEDURE — 2580000003 HC RX 258: Performed by: STUDENT IN AN ORGANIZED HEALTH CARE EDUCATION/TRAINING PROGRAM

## 2024-03-16 PROCEDURE — 87641 MR-STAPH DNA AMP PROBE: CPT

## 2024-03-16 PROCEDURE — 6360000002 HC RX W HCPCS: Performed by: STUDENT IN AN ORGANIZED HEALTH CARE EDUCATION/TRAINING PROGRAM

## 2024-03-16 RX ADMIN — METOPROLOL TARTRATE 50 MG: 50 TABLET, FILM COATED ORAL at 08:56

## 2024-03-16 RX ADMIN — SODIUM CHLORIDE, PRESERVATIVE FREE 10 ML: 5 INJECTION INTRAVENOUS at 09:04

## 2024-03-16 RX ADMIN — HYDROCODONE BITARTRATE AND ACETAMINOPHEN 1 TABLET: 5; 325 TABLET ORAL at 10:29

## 2024-03-16 RX ADMIN — GABAPENTIN 300 MG: 300 CAPSULE ORAL at 14:21

## 2024-03-16 RX ADMIN — METOPROLOL TARTRATE 50 MG: 50 TABLET, FILM COATED ORAL at 20:22

## 2024-03-16 RX ADMIN — BUMETANIDE 2 MG: 0.25 INJECTION INTRAMUSCULAR; INTRAVENOUS at 08:58

## 2024-03-16 RX ADMIN — HYDROCODONE BITARTRATE AND ACETAMINOPHEN 1 TABLET: 5; 325 TABLET ORAL at 16:56

## 2024-03-16 RX ADMIN — GABAPENTIN 300 MG: 300 CAPSULE ORAL at 08:55

## 2024-03-16 RX ADMIN — DOXYCYCLINE HYCLATE 100 MG: 100 CAPSULE ORAL at 08:55

## 2024-03-16 RX ADMIN — DOXYCYCLINE HYCLATE 100 MG: 100 CAPSULE ORAL at 20:22

## 2024-03-16 RX ADMIN — GABAPENTIN 300 MG: 300 CAPSULE ORAL at 20:22

## 2024-03-16 RX ADMIN — HYDROCODONE BITARTRATE AND ACETAMINOPHEN 1 TABLET: 5; 325 TABLET ORAL at 04:12

## 2024-03-16 RX ADMIN — SODIUM CHLORIDE 3000 MG: 900 INJECTION INTRAVENOUS at 09:08

## 2024-03-16 RX ADMIN — SODIUM CHLORIDE 3000 MG: 900 INJECTION INTRAVENOUS at 03:32

## 2024-03-16 RX ADMIN — LISINOPRIL 40 MG: 20 TABLET ORAL at 08:55

## 2024-03-16 RX ADMIN — SODIUM CHLORIDE, PRESERVATIVE FREE 5 ML: 5 INJECTION INTRAVENOUS at 20:22

## 2024-03-16 RX ADMIN — BUMETANIDE 1 MG/HR: 0.25 INJECTION INTRAMUSCULAR; INTRAVENOUS at 20:24

## 2024-03-16 RX ADMIN — ASPIRIN 81 MG: 81 TABLET, CHEWABLE ORAL at 08:55

## 2024-03-16 RX ADMIN — APIXABAN 5 MG: 5 TABLET, FILM COATED ORAL at 20:22

## 2024-03-16 RX ADMIN — APIXABAN 5 MG: 5 TABLET, FILM COATED ORAL at 08:55

## 2024-03-16 ASSESSMENT — PAIN DESCRIPTION - ORIENTATION
ORIENTATION: RIGHT;LEFT

## 2024-03-16 ASSESSMENT — PAIN - FUNCTIONAL ASSESSMENT
PAIN_FUNCTIONAL_ASSESSMENT: PREVENTS OR INTERFERES SOME ACTIVE ACTIVITIES AND ADLS

## 2024-03-16 ASSESSMENT — PAIN SCALES - GENERAL
PAINLEVEL_OUTOF10: 6
PAINLEVEL_OUTOF10: 5
PAINLEVEL_OUTOF10: 0
PAINLEVEL_OUTOF10: 6
PAINLEVEL_OUTOF10: 0
PAINLEVEL_OUTOF10: 6
PAINLEVEL_OUTOF10: 5
PAINLEVEL_OUTOF10: 0

## 2024-03-16 ASSESSMENT — PAIN DESCRIPTION - DESCRIPTORS
DESCRIPTORS: ACHING
DESCRIPTORS: ACHING;DISCOMFORT
DESCRIPTORS: ACHING
DESCRIPTORS: ACHING;TIGHTNESS
DESCRIPTORS: ACHING;TIGHTNESS

## 2024-03-16 ASSESSMENT — PAIN DESCRIPTION - LOCATION
LOCATION: GENERALIZED
LOCATION: BACK;LEG;SHOULDER
LOCATION: BACK;LEG;SHOULDER
LOCATION: SHOULDER;BACK;LEG
LOCATION: BACK;LEG;SHOULDER
LOCATION: BACK;SHOULDER;LEG

## 2024-03-16 NOTE — PROGRESS NOTES
Hospitalist Progress Note   Admit Date:  3/12/2024  9:51 PM   Name:  Marc Horton Sr.   Age:  68 y.o.  Sex:  male  :  1955   MRN:  [id number]   Room:  [facility]/    Presenting/Chief Complaint: Chest Pain and Shortness of Breath     Reason(s) for Admission: Anasarca [R60.1]  Cellulitis of lower extremity, unspecified laterality [L03.119]  Acute diastolic (congestive) heart failure (HCC) [I50.31]  Congestive heart failure, unspecified HF chronicity, unspecified heart failure type (HCC) [I50.9]     Hospital Course:   68 years old gentleman with past medical history of chronic peripheral edema, venous stasis ulcers of lower extremity, coronary artery disease status post PCI, history of arctic valve replacement, COPD, chronic diastolic congestive heart failure, paroxysmal atrial fibrillation  on anticoagulation, history of CVA was recently admitted for diverticulitis and chest pain, diverticulitis was treated, patient has elevated troponins, evaluated by cardiology, impression was demand ischemia, patient was eventually discharged with Cipro and Flagyl to finish 10-day course on 3/7 presented back to emergency room with worsening shortness of breath, intermittent chest discomfort over left side of chest, bilateral lower extremity edema which continued to get worse associated with redness spreading towards bilateral upper extremity.  Patient was getting 40 mg of Lasix, he increased taking Lasix to 60 mg with no improvement.  Patient reports some cough and sputum production but denies any fever.  Chest x-ray was done shows pulmonary vascular congestion, proBNP is elevated.  Patient reports difficulty walking secondary to severe swelling and pain in bilateral lower extremity.  Reports scrotal swelling as well.      Today, sit in a chair, feel better, sweeling and redness in legs improving, no ac events      Assessment & Plan:     Acute diastolic congestive heart failure: IO -3L  Telemonitoring  Continue IV  PRN    Or    acetaminophen (TYLENOL) suppository 650 mg  650 mg Rectal Q6H PRN    bumetanide (BUMEX) injection 2 mg  2 mg IntraVENous Daily    insulin lispro (HUMALOG) injection vial 0-8 Units  0-8 Units SubCUTAneous TID WC    insulin lispro (HUMALOG) injection vial 0-4 Units  0-4 Units SubCUTAneous Nightly    glucose chewable tablet 16 g  4 tablet Oral PRN    dextrose bolus 10% 125 mL  125 mL IntraVENous PRN    Or    dextrose bolus 10% 250 mL  250 mL IntraVENous PRN    Glucagon Emergency KIT 1 mg  1 mg SubCUTAneous PRN    dextrose 10 % infusion   IntraVENous Continuous PRN    apixaban (ELIQUIS) tablet 5 mg  5 mg Oral BID    gabapentin (NEURONTIN) capsule 300 mg  300 mg Oral TID    metoprolol tartrate (LOPRESSOR) tablet 50 mg  50 mg Oral BID    aspirin chewable tablet 81 mg  81 mg Oral Daily    ampicillin-sulbactam (UNASYN) 3,000 mg in sodium chloride 0.9 % 100 mL IVPB (mini-bag)  3,000 mg IntraVENous Q6H    doxycycline hyclate (VIBRAMYCIN) capsule 100 mg  100 mg Oral 2 times per day    HYDROcodone-acetaminophen (NORCO) 5-325 MG per tablet 1 tablet  1 tablet Oral Q6H PRN       Signed:  Bowen Cooper MD    Part of this note may have been written by using a voice dictation software.  The note has been proof read but may still contain some grammatical/other typographical errors.

## 2024-03-16 NOTE — PROGRESS NOTES
am  3/16/2024 7:06 AM    Admit Date: 3/12/2024    Admit Diagnosis: Anasarca [R60.1]  Cellulitis of lower extremity, unspecified laterality [L03.119]  Acute diastolic (congestive) heart failure (HCC) [I50.31]  Congestive heart failure, unspecified HF chronicity, unspecified heart failure type (HCC) [I50.9]      Subjective:    Patient : Patient is feeling well this morning. He has shortness of breath, but it is improved. Lower extremity edema is improving with diuretics. He has no chest pain or palpitations.     Objective:    /89   Pulse 66   Temp 97.9 °F (36.6 °C)   Resp 18   Ht 1.829 m (6')   Wt 122.2 kg (269 lb 4.8 oz)   SpO2 98%   BMI 36.52 kg/m²     ROS:  General ROS: negative for - chills  Hematological and Lymphatic ROS: negative for - blood clots or jaundice  Respiratory ROS: positive for - shortness of breath  Cardiovascular ROS: no chest pain or dyspnea on exertion  Gastrointestinal ROS: no abdominal pain, change in bowel habits, or black or bloody stools  Neurological ROS: no TIA or stroke symptoms    Physical Exam:      Physical Examination: General appearance - Appearance: alert, well appearing, and in no distress.   Neck/lymph - supple, no significant adenopathy  Chest/CV - clear to auscultation, no wheezes, rales or rhonchi, symmetric air entry  Heart - normal rate, regular rhythm, normal S1, S2, no murmurs, rubs, clicks or gallops  Abdomen/GI - soft, nontender, nondistended, no masses or organomegaly   Musculoskeletal - no joint tenderness, deformity or swelling  Extremities - peripheral pulses normal, bilateral lower extremity edema  Skin - normal coloration and turgor, no rashes, no suspicious skin lesions noted    Current Facility-Administered Medications   Medication Dose Route Frequency    lisinopril (PRINIVIL;ZESTRIL) tablet 40 mg  40 mg Oral Daily    sodium chloride flush 0.9 % injection 5-40 mL  5-40 mL IntraVENous 2 times per day    sodium chloride flush 0.9 % injection 5-40 mL   transluminal coronary angioplasty)    Unintentional weight loss    GERD (gastroesophageal reflux disease)    Painful diabetic neuropathy (HCC)    COPD (chronic obstructive pulmonary disease) (HCC)    Coronary atherosclerosis    Ulcers of both lower legs (HCC)    Adenomatous polyp of colon    Bipolar disorder (HCC)    Chronic diastolic congestive heart failure (HCC)    Chronic lumbar pain    Folate deficiency    Disease of basal ganglia    Spondylosis of lumbar region without myelopathy or radiculopathy    Mild cognitive impairment    Presbyesophagus    Primary insomnia    Chronic venous hypertension (idiopathic) with other complications of bilateral lower extremity    Sebaceous cyst    Pain and swelling of right lower extremity    Paroxysmal atrial fibrillation (HCC)    Accelerating angina (HCC)    New onset atrial fibrillation (HCC)    Atelectasis    Status post coronary artery bypass graft    S/P AVR (aortic valve replacement)    Coronary artery disease of native artery of native heart with stable angina pectoris (HCC)    Hyponatremia    SBO (small bowel obstruction) (HCC)    Cellulitis of anterior lower leg    Suspected cerebrovascular accident (CVA)    CVA (cerebral vascular accident) (HCC)    CVA (cerebrovascular accident due to intracerebral hemorrhage) (HCC)    Peripheral vascular disease (HCC)    Hemiplegia of left nondominant side as late effect of cerebral infarction, unspecified hemiplegia type (HCC)    Peripheral edema    Elevated troponin    Lactic acidosis    Diverticulitis    Bilateral pulmonary infiltrates    Chest pain    Acute diastolic (congestive) heart failure (HCC)     PLAN    Lower Extremity Edema  -bilateral edema with minimal improvement on lasix. Pt reports continued shortness of breath  -echo with normal EF, findings consistent with RV volume overload and cor pulmonale   -will continue bumex drip and monitor for symptom improvement. Monitor renal function    Paroxysmal Atrial

## 2024-03-17 LAB
ANION GAP SERPL CALC-SCNC: 2 MMOL/L (ref 2–11)
BUN SERPL-MCNC: 17 MG/DL (ref 8–23)
CALCIUM SERPL-MCNC: 9.2 MG/DL (ref 8.3–10.4)
CHLORIDE SERPL-SCNC: 100 MMOL/L (ref 103–113)
CO2 SERPL-SCNC: 36 MMOL/L (ref 21–32)
CREAT SERPL-MCNC: 1 MG/DL (ref 0.8–1.5)
GLUCOSE BLD STRIP.AUTO-MCNC: 133 MG/DL (ref 65–100)
GLUCOSE BLD STRIP.AUTO-MCNC: 139 MG/DL (ref 65–100)
GLUCOSE BLD STRIP.AUTO-MCNC: 147 MG/DL (ref 65–100)
GLUCOSE BLD STRIP.AUTO-MCNC: 155 MG/DL (ref 65–100)
GLUCOSE BLD STRIP.AUTO-MCNC: 161 MG/DL (ref 65–100)
GLUCOSE SERPL-MCNC: 119 MG/DL (ref 65–100)
POTASSIUM SERPL-SCNC: 4.1 MMOL/L (ref 3.5–5.1)
SERVICE CMNT-IMP: ABNORMAL
SODIUM SERPL-SCNC: 138 MMOL/L (ref 136–146)

## 2024-03-17 PROCEDURE — 82962 GLUCOSE BLOOD TEST: CPT

## 2024-03-17 PROCEDURE — 36415 COLL VENOUS BLD VENIPUNCTURE: CPT

## 2024-03-17 PROCEDURE — 6370000000 HC RX 637 (ALT 250 FOR IP): Performed by: HOSPITALIST

## 2024-03-17 PROCEDURE — 2580000003 HC RX 258: Performed by: HOSPITALIST

## 2024-03-17 PROCEDURE — 6370000000 HC RX 637 (ALT 250 FOR IP): Performed by: NURSE PRACTITIONER

## 2024-03-17 PROCEDURE — 6360000002 HC RX W HCPCS: Performed by: INTERNAL MEDICINE

## 2024-03-17 PROCEDURE — 6370000000 HC RX 637 (ALT 250 FOR IP): Performed by: STUDENT IN AN ORGANIZED HEALTH CARE EDUCATION/TRAINING PROGRAM

## 2024-03-17 PROCEDURE — 1100000003 HC PRIVATE W/ TELEMETRY

## 2024-03-17 PROCEDURE — 80048 BASIC METABOLIC PNL TOTAL CA: CPT

## 2024-03-17 PROCEDURE — 99232 SBSQ HOSP IP/OBS MODERATE 35: CPT | Performed by: INTERNAL MEDICINE

## 2024-03-17 RX ADMIN — ASPIRIN 81 MG: 81 TABLET, CHEWABLE ORAL at 08:13

## 2024-03-17 RX ADMIN — SODIUM CHLORIDE, PRESERVATIVE FREE 5 ML: 5 INJECTION INTRAVENOUS at 20:06

## 2024-03-17 RX ADMIN — METOPROLOL TARTRATE 50 MG: 50 TABLET, FILM COATED ORAL at 20:06

## 2024-03-17 RX ADMIN — DOXYCYCLINE HYCLATE 100 MG: 100 CAPSULE ORAL at 20:06

## 2024-03-17 RX ADMIN — HYDROCODONE BITARTRATE AND ACETAMINOPHEN 1 TABLET: 5; 325 TABLET ORAL at 08:29

## 2024-03-17 RX ADMIN — GABAPENTIN 300 MG: 300 CAPSULE ORAL at 08:13

## 2024-03-17 RX ADMIN — HYDROCODONE BITARTRATE AND ACETAMINOPHEN 1 TABLET: 5; 325 TABLET ORAL at 20:05

## 2024-03-17 RX ADMIN — BUMETANIDE 1 MG/HR: 0.25 INJECTION INTRAMUSCULAR; INTRAVENOUS at 04:32

## 2024-03-17 RX ADMIN — GABAPENTIN 300 MG: 300 CAPSULE ORAL at 20:06

## 2024-03-17 RX ADMIN — APIXABAN 5 MG: 5 TABLET, FILM COATED ORAL at 08:13

## 2024-03-17 RX ADMIN — APIXABAN 5 MG: 5 TABLET, FILM COATED ORAL at 20:06

## 2024-03-17 RX ADMIN — BUMETANIDE 1 MG/HR: 0.25 INJECTION INTRAMUSCULAR; INTRAVENOUS at 18:12

## 2024-03-17 RX ADMIN — LISINOPRIL 40 MG: 20 TABLET ORAL at 08:13

## 2024-03-17 RX ADMIN — GABAPENTIN 300 MG: 300 CAPSULE ORAL at 14:41

## 2024-03-17 RX ADMIN — DOXYCYCLINE HYCLATE 100 MG: 100 CAPSULE ORAL at 08:13

## 2024-03-17 RX ADMIN — METOPROLOL TARTRATE 50 MG: 50 TABLET, FILM COATED ORAL at 08:13

## 2024-03-17 RX ADMIN — SODIUM CHLORIDE, PRESERVATIVE FREE 10 ML: 5 INJECTION INTRAVENOUS at 08:15

## 2024-03-17 ASSESSMENT — PAIN DESCRIPTION - LOCATION
LOCATION: LEG
LOCATION: LEG;BACK;SHOULDER
LOCATION: LEG;BACK;SHOULDER

## 2024-03-17 ASSESSMENT — PAIN SCALES - GENERAL
PAINLEVEL_OUTOF10: 4
PAINLEVEL_OUTOF10: 2
PAINLEVEL_OUTOF10: 0
PAINLEVEL_OUTOF10: 0
PAINLEVEL_OUTOF10: 7
PAINLEVEL_OUTOF10: 0
PAINLEVEL_OUTOF10: 2
PAINLEVEL_OUTOF10: 0

## 2024-03-17 ASSESSMENT — PAIN DESCRIPTION - DESCRIPTORS
DESCRIPTORS: ACHING;CRAMPING;DISCOMFORT
DESCRIPTORS: ACHING
DESCRIPTORS: ACHING

## 2024-03-17 ASSESSMENT — PAIN DESCRIPTION - ORIENTATION
ORIENTATION: RIGHT;LEFT

## 2024-03-17 ASSESSMENT — PAIN DESCRIPTION - PAIN TYPE
TYPE: ACUTE PAIN
TYPE: ACUTE PAIN

## 2024-03-17 ASSESSMENT — PAIN DESCRIPTION - ONSET
ONSET: ON-GOING
ONSET: ON-GOING

## 2024-03-17 ASSESSMENT — PAIN DESCRIPTION - FREQUENCY
FREQUENCY: INTERMITTENT
FREQUENCY: INTERMITTENT

## 2024-03-17 NOTE — PROGRESS NOTES
am  3/17/2024 6:40 AM    Admit Date: 3/12/2024    Admit Diagnosis: Anasarca [R60.1]  Cellulitis of lower extremity, unspecified laterality [L03.119]  Acute diastolic (congestive) heart failure (HCC) [I50.31]  Congestive heart failure, unspecified HF chronicity, unspecified heart failure type (HCC) [I50.9]      Subjective:    Patient : Pt edema continues to improve on bumex drip he is negative 5 L since yesterday. He has no cardiac complaints, no chest pain, shortness of breath, or palpitations.    Objective:    /62   Pulse 64   Temp 98.2 °F (36.8 °C)   Resp 20   Ht 1.829 m (6')   Wt 116.2 kg (256 lb 3.2 oz)   SpO2 99%   BMI 34.75 kg/m²     ROS:  General ROS: negative for - chills  Hematological and Lymphatic ROS: negative for - blood clots or jaundice  Respiratory ROS: no cough, shortness of breath, or wheezing  Cardiovascular ROS: no chest pain or dyspnea on exertion  Gastrointestinal ROS: no abdominal pain, change in bowel habits, or black or bloody stools  Neurological ROS: no TIA or stroke symptoms    Physical Exam:      Physical Examination: General appearance - Appearance: alert, well appearing, and in no distress.   Neck/lymph - supple, no significant adenopathy  Chest/CV - clear to auscultation, no wheezes, rales or rhonchi, symmetric air entry  Heart - normal rate, regular rhythm, normal S1, S2, no murmurs, rubs, clicks or gallops  Abdomen/GI - soft, nontender, nondistended, no masses or organomegaly   Musculoskeletal - no joint tenderness, deformity or swelling  Extremities - peripheral pulses normal, bilateral lower extremity edema improved, no clubbing or cyanosis  Skin - normal coloration and turgor, no rashes, no suspicious skin lesions noted    Current Facility-Administered Medications   Medication Dose Route Frequency    bumetanide (BUMEX) 12.5 mg in 50 mL infusion  1 mg/hr IntraVENous Continuous    lisinopril (PRINIVIL;ZESTRIL) tablet 40 mg  40 mg Oral Daily    sodium chloride flush 0.9  lopressor  -continue current medications     CAD, history of prior CABG  -continue aspirin. Pt reports no chest pain     HTN  -BP stable on lopressor and lisinopril.   -continue medications and monitor      Dyslipidemia  -continue current medications     Aortic Valve Replacement  -will continue bumex, stable on most recent echo     Type 2 DM  -sliding scale insulin    Daija Ornelas  I have personally seen and evaluated the patient and reviewed the students note and agree with the following assessment and plan and findings. I was present for and observed the key components of this note.  Any appropriate additions or editing of the information have been done by me.    Ronaldo Tompkins MD, FACC  Cardiology

## 2024-03-17 NOTE — PLAN OF CARE
Problem: Discharge Planning  Goal: Discharge to home or other facility with appropriate resources  3/17/2024 0958 by Christophe Thompson RN  Outcome: Progressing  Flowsheets (Taken 3/17/2024 0829)  Discharge to home or other facility with appropriate resources: Identify barriers to discharge with patient and caregiver  3/16/2024 2121 by Rosi Ross RN  Outcome: Progressing  Flowsheets  Taken 3/16/2024 2017 by Rosi Ross RN  Discharge to home or other facility with appropriate resources: Identify barriers to discharge with patient and caregiver  Taken 3/16/2024 0900 by Michell Soto RN  Discharge to home or other facility with appropriate resources:   Identify barriers to discharge with patient and caregiver   Arrange for needed discharge resources and transportation as appropriate   Identify discharge learning needs (meds, wound care, etc)     Problem: Pain  Goal: Verbalizes/displays adequate comfort level or baseline comfort level  3/17/2024 0958 by Christophe Thompson RN  Outcome: Progressing  Flowsheets (Taken 3/17/2024 0829)  Verbalizes/displays adequate comfort level or baseline comfort level: Encourage patient to monitor pain and request assistance  3/16/2024 2121 by Rosi Ross RN  Outcome: Progressing     Problem: Safety - Adult  Goal: Free from fall injury  3/17/2024 0958 by Christophe Thompson RN  Outcome: Progressing  3/16/2024 2121 by Rosi Ross RN  Outcome: Progressing  Flowsheets  Taken 3/16/2024 2017 by Rosi Ross RN  Free From Fall Injury: Instruct family/caregiver on patient safety  Taken 3/16/2024 1648 by Sintia Tristan RN  Free From Fall Injury: Instruct family/caregiver on patient safety  Taken 3/16/2024 0900 by Sintia Tristan RN  Free From Fall Injury: Instruct family/caregiver on patient safety     Problem: ABCDS Injury Assessment  Goal: Absence of physical injury  3/17/2024 0958 by Christophe Thompson RN  Outcome: Progressing  3/16/2024 2121 by Cody  SEJAL Aranda  Outcome: Progressing  Flowsheets (Taken 3/16/2024 2017)  Absence of Physical Injury: Implement safety measures based on patient assessment     Problem: Chronic Conditions and Co-morbidities  Goal: Patient's chronic conditions and co-morbidity symptoms are monitored and maintained or improved  3/17/2024 0958 by Christophe Thompson RN  Outcome: Progressing  Flowsheets (Taken 3/17/2024 0829)  Care Plan - Patient's Chronic Conditions and Co-Morbidity Symptoms are Monitored and Maintained or Improved: Monitor and assess patient's chronic conditions and comorbid symptoms for stability, deterioration, or improvement  3/16/2024 2121 by Rosi Ross RN  Outcome: Progressing  Flowsheets  Taken 3/16/2024 2017 by Rosi Ross RN  Care Plan - Patient's Chronic Conditions and Co-Morbidity Symptoms are Monitored and Maintained or Improved: Monitor and assess patient's chronic conditions and comorbid symptoms for stability, deterioration, or improvement  Taken 3/16/2024 0900 by Michell Soto RN  Care Plan - Patient's Chronic Conditions and Co-Morbidity Symptoms are Monitored and Maintained or Improved:   Monitor and assess patient's chronic conditions and comorbid symptoms for stability, deterioration, or improvement   Collaborate with multidisciplinary team to address chronic and comorbid conditions and prevent exacerbation or deterioration   Update acute care plan with appropriate goals if chronic or comorbid symptoms are exacerbated and prevent overall improvement and discharge

## 2024-03-17 NOTE — PLAN OF CARE
Problem: Discharge Planning  Goal: Discharge to home or other facility with appropriate resources  Outcome: Progressing  Flowsheets  Taken 3/16/2024 2017 by Rosi Ross RN  Discharge to home or other facility with appropriate resources: Identify barriers to discharge with patient and caregiver  Taken 3/16/2024 0900 by Michell Soto RN  Discharge to home or other facility with appropriate resources:   Identify barriers to discharge with patient and caregiver   Arrange for needed discharge resources and transportation as appropriate   Identify discharge learning needs (meds, wound care, etc)     Problem: Pain  Goal: Verbalizes/displays adequate comfort level or baseline comfort level  Outcome: Progressing     Problem: Safety - Adult  Goal: Free from fall injury  Outcome: Progressing  Flowsheets  Taken 3/16/2024 2017 by Rosi Ross RN  Free From Fall Injury: Instruct family/caregiver on patient safety  Taken 3/16/2024 1648 by Sintia Tristan RN  Free From Fall Injury: Instruct family/caregiver on patient safety  Taken 3/16/2024 0900 by Sintia Tristan RN  Free From Fall Injury: Instruct family/caregiver on patient safety     Problem: ABCDS Injury Assessment  Goal: Absence of physical injury  Outcome: Progressing  Flowsheets (Taken 3/16/2024 2017)  Absence of Physical Injury: Implement safety measures based on patient assessment     Problem: Chronic Conditions and Co-morbidities  Goal: Patient's chronic conditions and co-morbidity symptoms are monitored and maintained or improved  Outcome: Progressing  Flowsheets  Taken 3/16/2024 2017 by Rosi Ross RN  Care Plan - Patient's Chronic Conditions and Co-Morbidity Symptoms are Monitored and Maintained or Improved: Monitor and assess patient's chronic conditions and comorbid symptoms for stability, deterioration, or improvement  Taken 3/16/2024 0900 by Michell Soto RN  Care Plan - Patient's Chronic Conditions and Co-Morbidity Symptoms are

## 2024-03-17 NOTE — PROGRESS NOTES
nondistended.  Extremities: No cyanosis or clubbing.  Bilateral pitting edema +2 is present, rash is improving              Neuro:  grossly intact.    Psych:  Normal mood and affect.      I have personally reviewed labs and tests:  Recent Labs:  Recent Results (from the past 48 hour(s))   POCT Glucose    Collection Time: 03/15/24  5:13 PM   Result Value Ref Range    POC Glucose 110 (H) 65 - 100 mg/dL    Performed by: Dory    POCT Glucose    Collection Time: 03/15/24  8:08 PM   Result Value Ref Range    POC Glucose 167 (H) 65 - 100 mg/dL    Performed by: Ugo    MRSA/Staph Aureus DNA    Collection Time: 03/16/24  6:50 AM    Specimen: Nasal Swab   Result Value Ref Range    MRSA by PCR Not detected NOTD      SA by PCR Not detected NOTD     POCT Glucose    Collection Time: 03/16/24  8:21 AM   Result Value Ref Range    POC Glucose 128 (H) 65 - 100 mg/dL    Performed by: Edwar    POCT Glucose    Collection Time: 03/16/24 11:55 AM   Result Value Ref Range    POC Glucose 158 (H) 65 - 100 mg/dL    Performed by: Edwar    POCT Glucose    Collection Time: 03/16/24  4:51 PM   Result Value Ref Range    POC Glucose 129 (H) 65 - 100 mg/dL    Performed by: Edwar    POCT Glucose    Collection Time: 03/16/24  8:14 PM   Result Value Ref Range    POC Glucose 152 (H) 65 - 100 mg/dL    Performed by: Homa    Basic Metabolic Panel    Collection Time: 03/17/24  3:43 AM   Result Value Ref Range    Sodium 138 136 - 146 mmol/L    Potassium 4.1 3.5 - 5.1 mmol/L    Chloride 100 (L) 103 - 113 mmol/L    CO2 36 (H) 21 - 32 mmol/L    Anion Gap 2 2 - 11 mmol/L    Glucose 119 (H) 65 - 100 mg/dL    BUN 17 8 - 23 MG/DL    Creatinine 1.00 0.8 - 1.5 MG/DL    Est, Glom Filt Rate >60 >60 ml/min/1.73m2    Calcium 9.2 8.3 - 10.4 MG/DL   POCT Glucose    Collection Time: 03/17/24  8:13 AM   Result Value Ref Range    POC Glucose 139 (H) 65 - 100 mg/dL    Performed by: Malini        No results  for input(s): \"COVID19\" in the last 72 hours.    Current Meds:  Current Facility-Administered Medications   Medication Dose Route Frequency    bumetanide (BUMEX) 12.5 mg in 50 mL infusion  1 mg/hr IntraVENous Continuous    lisinopril (PRINIVIL;ZESTRIL) tablet 40 mg  40 mg Oral Daily    sodium chloride flush 0.9 % injection 5-40 mL  5-40 mL IntraVENous 2 times per day    sodium chloride flush 0.9 % injection 5-40 mL  5-40 mL IntraVENous PRN    0.9 % sodium chloride infusion   IntraVENous PRN    ondansetron (ZOFRAN-ODT) disintegrating tablet 4 mg  4 mg Oral Q8H PRN    Or    ondansetron (ZOFRAN) injection 4 mg  4 mg IntraVENous Q6H PRN    polyethylene glycol (GLYCOLAX) packet 17 g  17 g Oral Daily PRN    acetaminophen (TYLENOL) tablet 650 mg  650 mg Oral Q6H PRN    Or    acetaminophen (TYLENOL) suppository 650 mg  650 mg Rectal Q6H PRN    insulin lispro (HUMALOG) injection vial 0-8 Units  0-8 Units SubCUTAneous TID WC    insulin lispro (HUMALOG) injection vial 0-4 Units  0-4 Units SubCUTAneous Nightly    glucose chewable tablet 16 g  4 tablet Oral PRN    dextrose bolus 10% 125 mL  125 mL IntraVENous PRN    Or    dextrose bolus 10% 250 mL  250 mL IntraVENous PRN    Glucagon Emergency KIT 1 mg  1 mg SubCUTAneous PRN    dextrose 10 % infusion   IntraVENous Continuous PRN    apixaban (ELIQUIS) tablet 5 mg  5 mg Oral BID    gabapentin (NEURONTIN) capsule 300 mg  300 mg Oral TID    metoprolol tartrate (LOPRESSOR) tablet 50 mg  50 mg Oral BID    aspirin chewable tablet 81 mg  81 mg Oral Daily    doxycycline hyclate (VIBRAMYCIN) capsule 100 mg  100 mg Oral 2 times per day    HYDROcodone-acetaminophen (NORCO) 5-325 MG per tablet 1 tablet  1 tablet Oral Q6H PRN       Signed:  Bowen Cooper MD    Part of this note may have been written by using a voice dictation software.  The note has been proof read but may still contain some grammatical/other typographical errors.

## 2024-03-18 VITALS
DIASTOLIC BLOOD PRESSURE: 62 MMHG | HEIGHT: 72 IN | BODY MASS INDEX: 33.17 KG/M2 | WEIGHT: 244.9 LBS | RESPIRATION RATE: 18 BRPM | OXYGEN SATURATION: 96 % | HEART RATE: 63 BPM | SYSTOLIC BLOOD PRESSURE: 108 MMHG | TEMPERATURE: 98.4 F

## 2024-03-18 LAB
ANION GAP SERPL CALC-SCNC: 4 MMOL/L (ref 2–11)
BUN SERPL-MCNC: 21 MG/DL (ref 8–23)
CALCIUM SERPL-MCNC: 9.5 MG/DL (ref 8.3–10.4)
CHLORIDE SERPL-SCNC: 96 MMOL/L (ref 103–113)
CO2 SERPL-SCNC: 36 MMOL/L (ref 21–32)
CREAT SERPL-MCNC: 1.1 MG/DL (ref 0.8–1.5)
GLUCOSE BLD STRIP.AUTO-MCNC: 137 MG/DL (ref 65–100)
GLUCOSE BLD STRIP.AUTO-MCNC: 205 MG/DL (ref 65–100)
GLUCOSE SERPL-MCNC: 211 MG/DL (ref 65–100)
MAGNESIUM SERPL-MCNC: 2 MG/DL (ref 1.8–2.4)
POTASSIUM SERPL-SCNC: 3.4 MMOL/L (ref 3.5–5.1)
SERVICE CMNT-IMP: ABNORMAL
SERVICE CMNT-IMP: ABNORMAL
SODIUM SERPL-SCNC: 136 MMOL/L (ref 136–146)

## 2024-03-18 PROCEDURE — 6370000000 HC RX 637 (ALT 250 FOR IP): Performed by: HOSPITALIST

## 2024-03-18 PROCEDURE — 6360000002 HC RX W HCPCS: Performed by: INTERNAL MEDICINE

## 2024-03-18 PROCEDURE — 97161 PT EVAL LOW COMPLEX 20 MIN: CPT

## 2024-03-18 PROCEDURE — 80048 BASIC METABOLIC PNL TOTAL CA: CPT

## 2024-03-18 PROCEDURE — 97165 OT EVAL LOW COMPLEX 30 MIN: CPT

## 2024-03-18 PROCEDURE — 83735 ASSAY OF MAGNESIUM: CPT

## 2024-03-18 PROCEDURE — 97530 THERAPEUTIC ACTIVITIES: CPT

## 2024-03-18 PROCEDURE — 97112 NEUROMUSCULAR REEDUCATION: CPT

## 2024-03-18 PROCEDURE — 6370000000 HC RX 637 (ALT 250 FOR IP): Performed by: STUDENT IN AN ORGANIZED HEALTH CARE EDUCATION/TRAINING PROGRAM

## 2024-03-18 PROCEDURE — 82962 GLUCOSE BLOOD TEST: CPT

## 2024-03-18 PROCEDURE — 6370000000 HC RX 637 (ALT 250 FOR IP): Performed by: NURSE PRACTITIONER

## 2024-03-18 PROCEDURE — 99232 SBSQ HOSP IP/OBS MODERATE 35: CPT | Performed by: INTERNAL MEDICINE

## 2024-03-18 PROCEDURE — 36415 COLL VENOUS BLD VENIPUNCTURE: CPT

## 2024-03-18 RX ADMIN — METOPROLOL TARTRATE 50 MG: 50 TABLET, FILM COATED ORAL at 09:18

## 2024-03-18 RX ADMIN — HYDROCODONE BITARTRATE AND ACETAMINOPHEN 1 TABLET: 5; 325 TABLET ORAL at 09:47

## 2024-03-18 RX ADMIN — DOXYCYCLINE HYCLATE 100 MG: 100 CAPSULE ORAL at 09:19

## 2024-03-18 RX ADMIN — GABAPENTIN 300 MG: 300 CAPSULE ORAL at 13:47

## 2024-03-18 RX ADMIN — BUMETANIDE 1 MG/HR: 0.25 INJECTION INTRAMUSCULAR; INTRAVENOUS at 09:24

## 2024-03-18 RX ADMIN — INSULIN LISPRO 2 UNITS: 100 INJECTION, SOLUTION INTRAVENOUS; SUBCUTANEOUS at 09:47

## 2024-03-18 RX ADMIN — GABAPENTIN 300 MG: 300 CAPSULE ORAL at 09:19

## 2024-03-18 RX ADMIN — APIXABAN 5 MG: 5 TABLET, FILM COATED ORAL at 09:19

## 2024-03-18 RX ADMIN — LISINOPRIL 40 MG: 20 TABLET ORAL at 09:19

## 2024-03-18 RX ADMIN — ASPIRIN 81 MG: 81 TABLET, CHEWABLE ORAL at 09:18

## 2024-03-18 ASSESSMENT — PAIN SCALES - GENERAL
PAINLEVEL_OUTOF10: 0
PAINLEVEL_OUTOF10: 7
PAINLEVEL_OUTOF10: 0
PAINLEVEL_OUTOF10: 8

## 2024-03-18 ASSESSMENT — PAIN DESCRIPTION - ORIENTATION: ORIENTATION: OTHER (COMMENT)

## 2024-03-18 ASSESSMENT — PAIN DESCRIPTION - LOCATION: LOCATION: GENERALIZED

## 2024-03-18 ASSESSMENT — PAIN DESCRIPTION - FREQUENCY: FREQUENCY: INTERMITTENT

## 2024-03-18 ASSESSMENT — PAIN DESCRIPTION - DESCRIPTORS: DESCRIPTORS: ACHING;DISCOMFORT

## 2024-03-18 ASSESSMENT — PAIN DESCRIPTION - PAIN TYPE: TYPE: ACUTE PAIN

## 2024-03-18 NOTE — PLAN OF CARE
Problem: Discharge Planning  Goal: Discharge to home or other facility with appropriate resources  Outcome: Completed     Problem: Pain  Goal: Verbalizes/displays adequate comfort level or baseline comfort level  Outcome: Completed     Problem: Safety - Adult  Goal: Free from fall injury  Outcome: Completed     Problem: ABCDS Injury Assessment  Goal: Absence of physical injury  Outcome: Completed     Problem: Chronic Conditions and Co-morbidities  Goal: Patient's chronic conditions and co-morbidity symptoms are monitored and maintained or improved  Outcome: Completed

## 2024-03-18 NOTE — NURSE NAVIGATOR
CHF teaching completed.CHF background completed. Teaching emphasis on Call Cardiology STAT ,if any of the following are noted:  Short of Breath; with activity or without/ while reclined, Generalize weakness, edema are noted individually or grouped.  Cardiac Diet  and salt/fluid restrictions covered.  Daily WTs emphasized.  Planner with summarization sheet provided with cardiology service and phone number and bathroom scale offered.  Total time @ BS is 1 hour and pt verbalize understanding/past post test.  Pt instructed to call myself, if any questions occur.  He states he is already familiar with these teachings.

## 2024-03-18 NOTE — PROGRESS NOTES
ACUTE OCCUPATIONAL THERAPY GOALS:   (Developed with and agreed upon by patient and/or caregiver.)  Patient will tolerate 10 minutes OT treatment with self-incorporated rest breaks as needed. - MET    Time Frame: 1 visit     OCCUPATIONAL THERAPY Initial Assessment, Daily Note, Discharge, and AM       OT Visit Days: 1   Acknowledge Orders  Time  OT Charge Capture  Rehab Caseload Tracker      Marc Horton Sr. is a 68 y.o. male   PRIMARY DIAGNOSIS: Acute diastolic (congestive) heart failure (HCC)  Anasarca [R60.1]  Cellulitis of lower extremity, unspecified laterality [L03.119]  Acute diastolic (congestive) heart failure (HCC) [I50.31]  Congestive heart failure, unspecified HF chronicity, unspecified heart failure type (HCC) [I50.9]       Reason for Referral: Generalized Muscle Weakness (M62.81)  Other lack of cordination (R27.8)  Difficulty in walking, Not elsewhere classified (R26.2)  Inpatient: Payor: [facility] MEDICARE / Plan: [employer] DUAL COMPLETE / Product Type: *No Product type* /     ASSESSMENT:     REHAB RECOMMENDATIONS:   Recommendation to date pending progress:  Setting:  No further OT needs    Equipment:    None     ASSESSMENT:  Mr. Horton presents for chest pain and b/l edema; hx of chronic a-fib. Alert and doing well today; resting on RA. Of note, pt lives alone in 1-story home. Independent for ADLs and functional mobility.       Today, BUE are generally decreased but WFL. Patient completed all functional transfers with SBA. SBA x RW for fx mobility for household distances; did require one seated rest break senior care through mobility however SPO2 maintaining WFL on RA. Patient educated on energy conservation techniques for home use. Overall tolerated well. Left sitting upright in chair with needs met. At this time, Marc Horton Sr. is functioning below baseline for activities of daily living and functional mobility. Patient would benefit from skilled OT services to address OT goals and

## 2024-03-18 NOTE — PROGRESS NOTES
ACUTE PHYSICAL THERAPY GOALS:   (Developed with and agreed upon by patient and/or caregiver.)  Pt will perform all bed mobility and transfers under (S) without LOB or miss-steps in 7 therapy sessions.  Pt will ambulate 250 ft under (S) without LOB or miss-steps and breaks as needed in 7 therapy sessions.  Pt will perform standing dynamic balance activities with minimal postural sway in 7 therapy sessions.    ALL GOALS MET on 3/18/24 Eval      PHYSICAL THERAPY Initial Assessment, Daily Note, Discharge, and AM  (Link to Caseload Tracking: PT Visit Days : 1  Acknowledge Orders  Time In/Out  PT Charge Capture  Rehab Caseload Tracker    Marc Horton Sr. is a 68 y.o. male   PRIMARY DIAGNOSIS: Acute diastolic (congestive) heart failure (HCC)  Anasarca [R60.1]  Cellulitis of lower extremity, unspecified laterality [L03.119]  Acute diastolic (congestive) heart failure (HCC) [I50.31]  Congestive heart failure, unspecified HF chronicity, unspecified heart failure type (HCC) [I50.9]       Reason for Referral: Other abnormalities of gait and mobility (R26.89)  Inpatient: Payor: [facility] MEDICARE / Plan: [id number] DUAL COMPLETE / Product Type: *No Product type* /     ASSESSMENT:     REHAB RECOMMENDATIONS:   Recommendation to date pending progress:  Setting:  No further skilled physical therapy after discharge from hospital    Equipment:    None  To Be Determined     ASSESSMENT:  Mr. Horton Is a 68 y.o. male presenting to after being admitted on 3/12/24 for acute diastolic HF. At time of initial evaluation, pt presents at approximate baseline LOF with no major deficits beyond slightly diminished activity tolerance limiting his overall functional mobility. Today, pt performed all mobility at (I)/(S) levels including ambulation of 250' c a single seated break. Of note, pt ambulating with decreased felicita and slightly increased sway although he ultimately did well to avoid any LOB/ miss-steps. Furthermore, pt performed  []  NT   Coordination [x]      Tone [x]     Edema []    Activity Tolerance []  Slightly diminished but approaching baseline     []      COGNITION/  PERCEPTION: Intact Impaired (Comments):   Orientation [x]     Vision [x]  Not Formally Assessed but WFL   Hearing [x]  Not Formally Assessed but WFL   Cognition  [x]       MOBILITY: I Mod I S SBA CGA Min Mod Max Total  NT x2 Comments:   Bed Mobility    Rolling [] [] [] [] [] [] [] [] [] [] []    Supine to Sit [] [] [] [] [] [] [] [] [] [] []    Scooting [x] [] [] [] [] [] [] [] [] [] []    Sit to Supine [] [] [] [] [] [] [] [] [] [] []    Transfers    Sit to Stand [x] [] [] [] [] [] [] [] [] [] []    Bed to Chair [] [] [] [] [] [] [] [] [] [] []    Stand to Sit [x] [] [] [] [] [] [] [] [] [] []     [] [] [] [] [] [] [] [] [] [] []    I=Independent, Mod I=Modified Independent, S=Supervision, SBA=Standby Assistance, CGA=Contact Guard Assistance,   Min=Minimal Assistance, Mod=Moderate Assistance, Max=Maximal Assistance, Total=Total Assistance, NT=Not Tested    GAIT: I Mod I S SBA CGA Min Mod Max Total  NT x2 Comments:   Level of Assistance [] [] [x] [] [] [] [] [] [] [] []    Distance  250' (1 seated break)    DME None    Gait Quality Decreased felicita     Weightbearing Status      Stairs      I=Independent, Mod I=Modified Independent, S=Supervision, SBA=Standby Assistance, CGA=Contact Guard Assistance,   Min=Minimal Assistance, Mod=Moderate Assistance, Max=Maximal Assistance, Total=Total Assistance, NT=Not Tested    PLAN:   FREQUENCY AND DURATION:  (Eval and DC) for duration of hospital stay or until stated goals are met, whichever comes first.    THERAPY PROGNOSIS: Fair    PROBLEM LIST:   (Skilled intervention is medically necessary to address:)  Decreased Activity Tolerance INTERVENTIONS PLANNED:   (Benefits and precautions of physical therapy have been discussed with the patient.)  Therapeutic Activity  Education       TREATMENT:   EVALUATION: LOW COMPLEXITY: (Untimed

## 2024-03-18 NOTE — PROGRESS NOTES
[facility] CARDIOLOGY PROGRESS NOTE           3/18/2024 3:00 PM    Admit Date: 3/12/2024      Subjective:   Patient denies chest pain, shortness of breath, cough, wheeze, abdominal pain, leg swelling or scrotal swelling, racing heart or palpitations.  States he feels much better than arrival.  Wants to go home today he reports.    ROS:  Cardiovascular:  As noted above    Objective:      Vitals:    03/18/24 0400 03/18/24 0455 03/18/24 0908 03/18/24 1243   BP:  137/76 112/86 108/62   Pulse:  65 71 63   Resp:  18 18 18   Temp:  97.9 °F (36.6 °C) 97.9 °F (36.6 °C) 98.4 °F (36.9 °C)   TempSrc:  Oral Oral Oral   SpO2:  95% 95% 96%   Weight: 111.1 kg (244 lb 14.4 oz)      Height:           Physical Exam:  General-No Acute Distress awake, alert, sitting comfortably in bed  Neck- supple, no JVD  CV- regular rate and rhythm no MRG  Lung- clear bilaterally  Abd- soft, nontender, nondistended  Ext-mild lower extremity edema bilaterally.  Skin- warm and dry    Data Review:   Recent Labs     03/17/24  0343 03/18/24  0910    136   K 4.1 3.4*   MG  --  2.0   BUN 17 21     Net IO Since Admission: -14,502.74 mL [03/18/24 1501]     Assessment/Plan:     Active Hospital Problems    Paroxysmal atrial fibrillation (HCC)      Acute diastolic (congestive) heart failure (HCC)    Lower extremity edema  - Symptoms continue, reasonable to continue diuretics as tolerated by renal function.  -Prior echo Normal LVEF, RV mildly dilated with normal systolic function.  -Volume status has improved, reasonable to change to p.o. diuretics  - Look to add further CHF medical therapy as outpatient       Paroxysmal atrial fibrillation (HCC)  - Continue metoprolol, Eliquis.     CAD with prior CABG  - No chest pain, continue ASA, statin, metoprolol.     History aortic valve replacement  - Continue diuretics, prior echo with appropriate gradients across the valve January 10,  2024       HTN (hypertension)  -Controlled at this time

## 2024-03-18 NOTE — DISCHARGE INSTRUCTIONS
doxycycline (oral/injection)  Pronunciation:  DOX meliza mosley  Brand:  Acticlate, Adoxa, Alodox, Avidoxy, Doryx, Mondoxyne NL, Monodox, Morgidox, Okebo, Oracea, Oraxyl, Targadox, Vibramycin  What is the most important information I should know about doxycycline?  You should not take this medicine if you are allergic to any tetracycline antibiotic.  Children younger than 8 years old should use doxycycline only in cases of severe or life-threatening conditions. This medicine can cause permanent yellowing or graying of the teeth in children  Using doxycycline during pregnancy could harm the unborn baby or cause permanent tooth discoloration later in the baby's life.    What is doxycycline?  Doxycycline is a tetracycline antibiotic that  Doxycycline is used to treat many different bacterial infections, such as acne, urinary tract infections, intestinal infections, eye infections, gonorrhea, chlamydia, periodontitis (gum disease), and others.  Doxycycline is also used to treat blemishes, bumps, and acne-like lesions caused by rosacea. Doxycycline will not treat facial redness caused by rosacea.  Some forms of doxycycline are used to prevent malaria, to treat anthrax, or to treat infections caused by mites, ticks, or lice.  Doxycycline may also be used for purposes not listed in this medication guide.  What should I discuss with my healthcare provider before taking doxycycline?  You should not take this medicine if you are allergic to doxycycline or other tetracycline antibiotics such as demeclocycline, minocycline, tetracycline, or tigecycline.  Tell your doctor if you have ever had:  liver disease;  kidney disease;  asthma or sulfite allergy;  increased pressure inside your skull; or  if you also take isotretinoin, seizure medicine, or a blood thinner such as warfarin (Coumadin).  If you are using doxycycline to treat gonorrhea, your doctor may test you to make sure you do not also have syphilis, another sexually  to.  What are the possible side effects of doxycycline?  Get emergency medical help if you have signs of an allergic reaction (hives, difficult breathing, swelling in your face or throat) or a severe skin reaction (fever, sore throat, burning in your eyes, skin pain, red or purple skin rash that spreads and causes blistering and peeling).  Seek medical treatment if you have a serious drug reaction that can affect many parts of your body. Symptoms may include: skin rash, fever, swollen glands, flu-like symptoms, muscle aches, severe weakness, unusual bruising, or yellowing of your skin or eyes. This reaction may occur several weeks after you began using doxycycline.  Call your doctor at once if you have:  severe stomach pain, diarrhea that is watery or bloody;  throat irritation, trouble swallowing;  chest pain, irregular heart rhythm, feeling short of breath;  little or no urination;  low white blood cell counts --fever, chills, swollen glands, body aches, weakness, pale skin, easy bruising or bleeding;  increased pressure inside the skull --severe headaches, ringing in your ears, dizziness, nausea, vision problems, pain behind your eyes; or  signs of liver or pancreas problems --loss of appetite, upper stomach pain (that may spread to your back), tiredness, nausea or vomiting, fast heart rate, dark urine, jaundice (yellowing of the skin or eyes).  Common side effects may include:  nausea, vomiting, upset stomach, loss of appetite;  mild diarrhea;  skin rash or itching;  darkened skin color; or  vaginal itching or discharge.  This is not a complete list of side effects and others may occur. Call your doctor for medical advice about side effects. You may report side effects to FDA at 8-241-FDA-2858.  What other drugs will affect doxycycline?  Sometimes it is not safe to use certain medications at the same time. Some drugs can affect your blood levels of other drugs you take, which may increase side effects or make

## 2024-03-18 NOTE — CARE COORDINATION
CM reviewed clinical record and met with patient. Remains on Bumex drip. Patient reports \"I have to go home today\". CM encouraged patient to discuss with Cardiologist on rounds.  Discharge order placed.   PT/OT recommending no further skilled therapy.  Patient reports he has arranged for transportation by car home.   DCP; Home with family assistance.

## 2024-03-18 NOTE — PROGRESS NOTES
Patient states pain has improved but is more agitated and requesting discharge papers or AMA papers. Both hospitalist and cardiology made aware. Patient is still on Bumex gtt at this time.

## 2024-03-18 NOTE — PLAN OF CARE
Problem: Discharge Planning  Goal: Discharge to home or other facility with appropriate resources  3/17/2024 2206 by Rosi Ross RN  Outcome: Progressing  Flowsheets (Taken 3/17/2024 2003)  Discharge to home or other facility with appropriate resources: Identify barriers to discharge with patient and caregiver     Problem: Pain  Goal: Verbalizes/displays adequate comfort level or baseline comfort level  3/17/2024 2206 by Rosi Ross RN  Outcome: Progressing  Flowsheets (Taken 3/17/2024 2003)  Verbalizes/displays adequate comfort level or baseline comfort level: Encourage patient to monitor pain and request assistance     Problem: Safety - Adult  Goal: Free from fall injury  3/17/2024 2206 by Rosi Ross RN  Outcome: Progressing  Flowsheets (Taken 3/17/2024 2003)  Free From Fall Injury: Instruct family/caregiver on patient safety     Problem: ABCDS Injury Assessment  Goal: Absence of physical injury  3/17/2024 2206 by Rosi Ross RN  Outcome: Progressing  Flowsheets (Taken 3/17/2024 2003)  Absence of Physical Injury: Implement safety measures based on patient assessment     Problem: Chronic Conditions and Co-morbidities  Goal: Patient's chronic conditions and co-morbidity symptoms are monitored and maintained or improved  3/17/2024 2206 by Rosi Ross RN  Outcome: Progressing  Flowsheets (Taken 3/17/2024 2003)  Care Plan - Patient's Chronic Conditions and Co-Morbidity Symptoms are Monitored and Maintained or Improved: Monitor and assess patient's chronic conditions and comorbid symptoms for stability, deterioration, or improvement

## 2024-03-19 ENCOUNTER — TELEPHONE (OUTPATIENT)
Age: 69
End: 2024-03-19

## 2024-03-19 ENCOUNTER — CARE COORDINATION (OUTPATIENT)
Dept: CARE COORDINATION | Facility: CLINIC | Age: 69
End: 2024-03-19

## 2024-03-19 NOTE — DISCHARGE SUMMARY
Hospitalist Discharge Summary   Admit Date:  3/12/2024  9:51 PM   DC Note date: 3/18/2024  Name:  Marc Horton Sr.   Age:  68 y.o.  Sex:  male  :  1955   MRN:  [id number]   Room:  [facility]  PCP:  Meliza Martino PA-C    Presenting Complaint: Chest Pain and Shortness of Breath     Initial Admission Diagnosis: Anasarca [R60.1]  Cellulitis of lower extremity, unspecified laterality [L03.119]  Acute diastolic (congestive) heart failure (HCC) [I50.31]  Congestive heart failure, unspecified HF chronicity, unspecified heart failure type (HCC) [I50.9]     Problem List for this Hospitalization (present on admission):    Principal Problem:    Acute diastolic (congestive) heart failure (HCC)  Active Problems:    Paroxysmal atrial fibrillation (HCC)    Type 2 diabetes mellitus (HCC)    Dyslipidemia    HTN (hypertension)    GERD (gastroesophageal reflux disease)  Resolved Problems:    * No resolved hospital problems. *      Hospital Course:  68 years old gentleman with past medical history of chronic peripheral edema, venous stasis ulcers of lower extremity, coronary artery disease status post PCI, history of arctic valve replacement, COPD, chronic diastolic congestive heart failure, paroxysmal atrial fibrillation  on anticoagulation, history of CVA was recently admitted for diverticulitis and chest pain, diverticulitis was treated, patient has elevated troponins, evaluated by cardiology, impression was demand ischemia, patient was eventually discharged with Cipro and Flagyl to finish 10-day course on 3/7 presented back to emergency room with worsening shortness of breath, intermittent chest discomfort over left side of chest, bilateral lower extremity edema which continued to get worse associated with redness spreading towards bilateral upper extremity.  Patient was getting 40 mg of Lasix, he increased taking Lasix to 60 mg with no improvement.  Patient reports some cough and sputum production but denies any

## 2024-03-19 NOTE — CARE COORDINATION
discharged with a pulse oximeter? no    Non-face-to-face services provided:  Scheduled appointment with PCP-Patient declines CTN assistance to scheduled PCP follow up at this time; patient prefers to call for his appointment. CTN encouraged follow up within 7 days.  Scheduled appointment with Specialist-Cardiology on 3/21/24 at 1:30 pm (patient is aware and he plans to attend)  Obtained and reviewed discharge summary and/or continuity of care documents  Education of patient/family/caregiver/guardian to support self-management-medication management, the importance of taking all medications as directed, attending follow up appointments as scheduled, self monitoring and when to seek care, and use of RPM.   Patient reports he is doing well since discharge. Patient assured CTN he obtained all his new medications and agrees to take as directed. Patient is aware of upcoming appointment and states he will attend.   Offered patient enrollment in the Remote Patient Monitoring (RPM) program for in-home monitoring: Yes, patient already enrolled. Patient still has RPM kit at home.   CTN will send message to clinical team to notify them of patient's discharge from hospital. ACM is aware and will continue to follow patient.   Care Transitions 24 Hour Call    Schedule Follow Up Appointment with PCP: Completed  Do you have a copy of your discharge instructions?: Yes  Do you have all of your prescriptions and are they filled?: Yes  Have you scheduled your follow up appointment?: Yes  How are you going to get to your appointment?: Car - drive self  Patient DME: Straight cane, Walker  Do you have support at home?: Grandchild, Partner/Spouse/SO  Do you feel like you have everything you need to keep you well at home?: Yes  Are you an active caregiver in your home?: Yes  Care Transitions Interventions     Discussed follow-up appointments. If no appointment was previously scheduled, appointment scheduling offered: Yes.   Is follow up

## 2024-03-19 NOTE — TELEPHONE ENCOUNTER
Care Transitions Initial Follow Up Call    Call within 2 business days of discharge: Yes     Patient: Marc Horton Sr. Patient : 1955 MRN: [id number]    RARS: Readmission Risk Score: 25.7    Spoke with: PATIENT    Non-face-to-face services provided:  Transitional Care fu after DC from [facility]  3/18/24 admit for CHF.He has all his meds and is doing well.I went over importance of med compliance,diet,activity,CHF monitoring w/daily weights,fluid intake and fu appt.3/21/24.All questions answered and pt.advised to call PRN and v/u.    Follow Up  Future Appointments   Date Time Provider Department Center   3/21/2024  1:30 PM Kirill Pride, DO [facility] AM[facility]   2024  8:30 AM Kirill Pride, DO [facility] AM[facility]   2024 10:40 AM Iván Spencer MD [facility] AM[facility]       Lois Hansen RN

## 2024-03-21 ENCOUNTER — OFFICE VISIT (OUTPATIENT)
Age: 69
End: 2024-03-21

## 2024-03-21 VITALS
BODY MASS INDEX: 34.54 KG/M2 | HEIGHT: 72 IN | HEART RATE: 72 BPM | WEIGHT: 255 LBS | SYSTOLIC BLOOD PRESSURE: 138 MMHG | DIASTOLIC BLOOD PRESSURE: 86 MMHG

## 2024-03-21 DIAGNOSIS — I50.32 CHRONIC HEART FAILURE WITH PRESERVED EJECTION FRACTION (HCC): Primary | ICD-10-CM

## 2024-03-21 DIAGNOSIS — J44.9 CHRONIC OBSTRUCTIVE PULMONARY DISEASE, UNSPECIFIED COPD TYPE (HCC): Chronic | ICD-10-CM

## 2024-03-21 RX ORDER — ALBUTEROL SULFATE 90 UG/1
2 AEROSOL, METERED RESPIRATORY (INHALATION) EVERY 6 HOURS PRN
Qty: 18 G | Refills: 0 | Status: SHIPPED | OUTPATIENT
Start: 2024-03-21

## 2024-03-21 RX ORDER — SPIRONOLACTONE 25 MG/1
12.5 TABLET ORAL DAILY
Qty: 45 TABLET | Refills: 1 | Status: SHIPPED | OUTPATIENT
Start: 2024-03-21

## 2024-03-21 ASSESSMENT — ENCOUNTER SYMPTOMS
COUGH: 0
SHORTNESS OF BREATH: 0
RESPIRATORY NEGATIVE: 1
GASTROINTESTINAL NEGATIVE: 1

## 2024-03-21 NOTE — PROGRESS NOTES
Priority: Low    Peripheral vascular disease ([facility]) 01/11/2024     Priority: Low    CVA (cerebrovascular accident due to intracerebral hemorrhage) ([facility]) 01/10/2024     Priority: Low    CVA (cerebral vascular accident) ([facility]) 10/13/2023     Priority: Low    Suspected cerebrovascular accident (CVA) 10/11/2023     Priority: Low    Cellulitis of anterior lower leg 05/23/2023     Priority: Low    SBO (small bowel obstruction) ([facility]) 05/18/2023     Priority: Low    Hyponatremia 03/29/2023     Priority: Low    Accelerating angina ([facility]) 03/11/2023     Priority: Low     Added automatically from request for surgery [id number]      MRI contraindicated due to metal implant 09/12/2017     Priority: Low    GERD (gastroesophageal reflux disease)      Priority: Low     divertculitis        Anxiety 06/21/2016     Priority: Low    Unintentional weight loss 06/21/2016     Priority: Low    Painful diabetic neuropathy ([facility]) 06/21/2016     Priority: Low    COPD (chronic obstructive pulmonary disease) ([facility]) 01/21/2016     Priority: Low    Coronary atherosclerosis 08/26/2015     Priority: Low     Last Assessment & Plan:   Formatting of this note might be different from the original.  Stable on daily aspirin.      Migraine 07/20/2014     Priority: Low    HTN (hypertension) 07/20/2014     Priority: Low     Last Assessment & Plan:   Formatting of this note might be different from the original.  Controlled      Bipolar I disorder with vandana ([facility]) 05/16/2011     Priority: Low    Left leg weakness 05/12/2011     Priority: Low    Weakness of left arm 05/12/2011     Priority: Low    Transient ischemic attack 03/29/2011     Priority: Low    Type 2 diabetes mellitus ([facility]) 11/05/2010     Priority: Low    Dyslipidemia 11/05/2010     Priority: Low    Hypertensive cardiovascular disease 11/05/2010     Priority: Low    S/P PTCA (percutaneous transluminal coronary angioplasty) 11/05/2010     Priority: Low     11/2010:  PDA 2.25x23, 2.25x8 [place].  mRCA 2.5x28,

## 2024-03-25 ENCOUNTER — CARE COORDINATION (OUTPATIENT)
Dept: CARE COORDINATION | Facility: CLINIC | Age: 69
End: 2024-03-25

## 2024-03-25 NOTE — CARE COORDINATION
Remote Patient Monitoring Note      Date/Time:  3/25/2024 2:33 PM  Patient Current Location: [place]  LPN contacted patient by telephone regarding yellow alert received for no BP or weight taken > 2 days. Pt has not updated metrics x 5 days. Verified patients name and  as identifiers.    Background: Pt enrolled in RPM r/t HTN and CHF.  Clinical Interventions: Discussed RPM adherence and need to complete daily metrics with pt. Pt verbalizes understanding, states he has been \"real busy\", and \"I'll get back with it tomorrow\". Pt also verbalizes understanding of when to notify provider(s) of changes / concerns and when to seek emergent medical care.     Plan/Follow Up: Will continue to review, monitor and address alerts with follow up based on severity of symptoms and risk factors.

## 2024-03-26 ENCOUNTER — CARE COORDINATION (OUTPATIENT)
Dept: CARE COORDINATION | Facility: CLINIC | Age: 69
End: 2024-03-26

## 2024-03-26 ENCOUNTER — CARE COORDINATION (OUTPATIENT)
Dept: CASE MANAGEMENT | Age: 69
End: 2024-03-26

## 2024-03-26 NOTE — CARE COORDINATION
Ambulatory Care Coordination Note  3/26/2024    Patient Current Location:  Home: [address] [place] [phone]     ACM contacted the patient by telephone. Verified name and  with patient as identifiers. Provided introduction to self, and explanation of the ACM role.     Challenges to be reviewed by the provider   Additional needs identified to be addressed with provider: No  none               Method of communication with provider: none.    ACM: Gale Rivera RN    Pt reports he has gone from 258 to 251lbs since yesterday, lost several inches in the waist. Has been seen by cardiology since JEFFERY.  Requests f/u w/ PCP and statement from MD for his granddaughter who has missed school while staying with him during multiple hospitalizations. ACM called and scheduled fu w/ PCP for 4/15/24 at 1140, also messaged PCP w/ request for MD statement for gdgtrs school. Discussed RPM compliance, explained purpose of daily weights pt observe for subtle fluid fluctuation, pt states \"I'll try to do better\".  ACM scheduled f/u for continued HF educ.     Offered patient enrollment in the Remote Patient Monitoring (RPM) program for in-home m     Goals Addressed                   This Visit's Progress     Conditions and Symptoms   On track     I will schedule office visits, as directed by my provider.  I will keep my appointment or reschedule if I have to cancel.  I will notify my provider of any barriers to my plan of care.  I will notify my provider of any symptoms that indicate a worsening of my condition.    Barriers: none  Plan for overcoming my barriers: N/A  Confidence: 10  Anticipated Goal Completion Date: 24         Reduce Falls    On track     I will reduce my risk of falls by the following: Remove rugs or use non slip rugs  Use walking aids like cane or walker    Barriers: impairment:  patient states he trips over things a lot.   Plan for overcoming my barriers: discussed to create clear paths for patient

## 2024-03-26 NOTE — CARE COORDINATION
Remote Patient Monitoring Note  Date/Time:  3/26/2024 3:04 PM  Patient Current Location: Home: [address] [place] [phone]  LPN contacted patient by telephone regarding  NO METRICS x 6 days  Verified patients name and  as identifiers.  Background: enrolled in RPM for HTN AND CHF  Clinical Interventions: Reviewed and followed up on alerts and treatments-spoke to Marc regarding RPM alert for no metrics x 6 days. Pt states \" I have been sick\" \" I have been busy\" Educated on need to check metrics daily for compliance with RPM. Pt states \" I may just turn it in then\"  pt states \" I will try to do better\"    Plan/Follow Up: Will continue to review, monitor and address alerts with follow up based on severity of symptoms and risk factors.

## 2024-03-27 ENCOUNTER — CARE COORDINATION (OUTPATIENT)
Dept: CASE MANAGEMENT | Age: 69
End: 2024-03-27

## 2024-03-27 NOTE — CARE COORDINATION
Remote Patient Monitoring Note  Date/Time:  3/27/2024 3:07 PM  Patient Current Location: Home: [address] [place] [phone]  LPN contacted patient by telephone regarding  No metrics x 7 days   Verified patients name and  as identifiers.  Background: enrolled in RPM for HTN AND CHF  Clinical Interventions: Reviewed and followed up on alerts and treatments-spoke to Marc regarding RPM alert for no mettrics x 7 days. 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     Plan/Follow Up: Will continue to review, monitor and address alerts with follow up based on severity of symptoms and risk factors.

## 2024-03-28 ENCOUNTER — CARE COORDINATION (OUTPATIENT)
Dept: CARE COORDINATION | Facility: CLINIC | Age: 69
End: 2024-03-28

## 2024-03-28 NOTE — CARE COORDINATION
Remote Patient Monitoring Note      Date/Time:  3/28/2024 11:51 AM  Patient Current Location: [facility] yellow alert received for no BP or weight taken > 2 days. Pt has previously been educated on RPM adherence and verbalized understanding. RPM team has spoken with pt 3 days in a row regarding need to complete daily metrics. Pt has not updated metrics x 8 days and is non-adherent with RPM. Will escalate to ACM and RPM  with request to pause pt.     Background: Pt enrolled in RPM r/t HTN and CHF.       Plan/Follow Up: Will continue to review, monitor and address alerts with follow up based on severity of symptoms and risk factors.

## 2024-04-04 ENCOUNTER — CARE COORDINATION (OUTPATIENT)
Dept: CARE COORDINATION | Facility: CLINIC | Age: 69
End: 2024-04-04

## 2024-04-04 PROBLEM — R79.89 ELEVATED TROPONIN: Status: RESOLVED | Noted: 2024-03-05 | Resolved: 2024-04-04

## 2024-04-04 NOTE — CARE COORDINATION
AC spoke briefly w/ pt who states he is super now and no longer needs services. Pt reports he will be boxing RPM up and sending it back today. Pt thanked [facility] for calls and told to have a blessed day.  Will notify RPM team.

## 2024-04-04 NOTE — CARE COORDINATION
Marc Horton Sr.  4/4/24    Care Coordination  placed call to patient to arrange RPM kit  through [employer].     CCSS spoke to Patient reviewed with Patient how to pack equipment in original packing. [employer] will  equipment in 2-4 days.   All questions and concerns answered.

## 2024-04-05 DIAGNOSIS — I10 PRIMARY HYPERTENSION: Primary | Chronic | ICD-10-CM

## 2024-04-05 DIAGNOSIS — I50.32 CHRONIC DIASTOLIC CONGESTIVE HEART FAILURE (HCC): ICD-10-CM

## 2024-04-05 NOTE — PROGRESS NOTES
Remote Patient Order Discontinued    Received request from Gale Rivera RN   to discontinue order for remote patient monitoring of CHF and HTN and order completed.

## 2024-04-11 ENCOUNTER — TELEPHONE (OUTPATIENT)
Dept: PHARMACY | Facility: CLINIC | Age: 69
End: 2024-04-11

## 2024-04-11 NOTE — TELEPHONE ENCOUNTER
Kirill Pride, DO: per below, appt with you 4/12/24 at 8:30am  - chart reviewed due to insurer-identified gap: CAD & DM but no statin Rx  - per chart unable to tolerate statins (he is currently prescribed ezetimibe) and can be excluded from metric if certain dx/ICD-10 code done at provider visit    If appropriate, please consider adding CMS allowable diagnosis within your 4/12/24 visit encounter to exclude patient from statin quality metric:  \"statin myopathy\" (ICD-10 G72.0, T46.6X5A)   \"myalgia due to statin\" (ICD-10 M79.10, T46.6X5A) also option if preferred instead  this will complete/close this insurer-identified gap for this calendar year    Thank you,  Maribel Montes, PharmD, [facility]  Population Health Pharmacist  [facility]  Department, toll free: [phone], option 2    ==============================================================    POPULATION HEALTH CLINICAL PHARMACY: STATIN THERAPY REVIEW  Identified statin use in persons with cardiovascular disease care gap per [place]. Records dated 4/8/24.     ASSESSMENT:  Patient has been identified as having a diagnosis for clinical ASCVD or event (e.g., inpatient hospitalization for MI, CABG, PCI or other revascularization procedures) in the measurement year and not currently filling a moderate or high intensity statin. Patients included in this care gap are males age 21-75 and females age 40-75.     Noted pt also with DM currently filling metformin - likely will be in statin use in persons with diabetes (SUPD) metric also.    Per chart review, patient is prescribed ezetimibe. Has tried several statins in the past and unable to tolerate - appears due to muscle related side effects. Most recently was prescribed pravastatin - was discontinued 3/4/24 PCP OV with reason \"side effects,\" appears pt was having issues with arm/leg weakness at this visit.    Per 1/10/23 note:  Did not tolerate statins in the past due to muscle aches    Per 7/23/2019

## 2024-04-16 NOTE — TELEPHONE ENCOUNTER
Provider response noted, thank you.    Pt no show to 4/15/24 PCP OV.    Letter sent regarding adherence and changing to 100 day supply (of note pt LIS).    For Pharmacy Admin Tracking Only    Program: [employer]  CPA in place:  No  Gap Closed?: No   Time Spent (min): 15

## 2024-04-26 ENCOUNTER — TELEPHONE (OUTPATIENT)
Dept: PHARMACY | Facility: CLINIC | Age: 69
End: 2024-04-26

## 2024-04-26 NOTE — TELEPHONE ENCOUNTER
[facility] CLINICAL PHARMACY: ADHERENCE REVIEW  Identified care gap per [employer]: fills at [facility] : ACE/ARB and Diabetes adherence    ASSESSMENT    ACE/ARB ADHERENCE    Insurance Records claims through 2024 (Prior Year PDC = 98% - PASSED ; YTD PDC = 65%; Potential Fail Date: 24):   Lisinopril 20 mg tab last filled on 3/4/24 for 30 day supply (60 tabs). Next refill due: 4/3/24  2023 max refill due date 3/21/24; see below, dose change in  likely contributing to max refill due date    Prescribed si tablet/capsule twice daily    Per Reconcile Dispense History:  LISINOPRIL 20 MG TABLET 2024 30 60 each Meliza Martino PA-C [facility] ...   LISINOPRIL 20 MG TABLET 2024 30 30 each Orin Moscoso MD [facility] ...   LISINOPRIL 20 MG TABLET 2023 30 60 each Kirill Pride, [facility] ...   LISINOPRIL 20 MG TABLET 2023 30 60 each iKrill Pride [facility] ...   LISINOPRIL 20 MG TABLET 10/20/2023 30 60 each Terry Higgins MD [facility] ...   LISINOPRIL 10 MG TABLET 2023 100 100 each Marisa Singleton [facility] ...   5RR per hyperlink; last Rx 3/4/24 x 30 day supply 5 refills - believe has refill at pharmacy    BP Readings from Last 3 Encounters:   24 138/86   24 108/62   24 (!) 114/50     Estimated Creatinine Clearance: 82 mL/min (based on SCr of 1.1 mg/dL).  Lab Results   Component Value Date    CREATININE 1.10 2024     Lab Results   Component Value Date    K 3.4 (L) 2024     DIABETES ADHERENCE    Insurance Records claims through 2024 (Prior Year PDC = 98% - PASSED ; YTD PDC = FIRST FILL; Potential Fail Date: 24):   Metformin 850 mg tab last filled on 3/4/24 for 30 day supply (30 tabs). Next refill due: 4/3/24  2023 max refill due date 24; see below - dose change in  likely extended  max refill due date    Prescribed

## 2024-04-29 NOTE — TELEPHONE ENCOUNTER
Another attempt made to reach patient. He answered but phone connection was very poor. He asked for call back tomorrow.

## 2024-05-06 NOTE — TELEPHONE ENCOUNTER
Per Eileen, a 30ds of Metformin 850mg once daily and Lisinopril 20mg once daily will be ready for  later today.    Reached patient and he stated he takes both medications once daily and will be to  her refills     For Pharmacy Admin Tracking Only    Program: [employer]  CPA in place:  No  Recommendation Provided To: Patient/Caregiver: 1 via Telephone and Pharmacy: 1  Intervention Detail: Adherence Monitorin  Intervention Accepted By: Patient/Caregiver: 1 and Pharmacy: 1  Gap Closed?: Yes   Time Spent (min): 10      POCUS: Emergency Department Focused Ultrasound performed at patient's bedside.  The complete report will be available in PACS.

## 2024-05-15 ENCOUNTER — TELEPHONE (OUTPATIENT)
Age: 69
End: 2024-05-15

## 2024-05-15 NOTE — TELEPHONE ENCOUNTER
Patient called stating that he needs a letter signed by Dr Pride stating all the times he was seen in the hospital. Patient also needs the note to say that it's okay not to wear his seatbelt as well. Patient will be stopping by today. Patient also said something about a seatbelt and the the school is getting on him. Patient would like a call back.
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
## 2024-05-16 NOTE — TELEPHONE ENCOUNTER
OK to provide note stating that the patient was admitted to the hospital from 3/12/2024 through 3/18/2024.    I would recommend that he continues to wear his seatbelt when in the car, consider changing to the other side of the car where the shoulder belt may not press on his collarbone.  Recommend he reach out to orthopedics regarding further recommendations.    Thanks.

## 2024-05-16 NOTE — TELEPHONE ENCOUNTER
Attempted to reach out to patient at both numbers on chart. Unable to lvm as voicemail had not been setup yet.
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
## 2024-05-23 ENCOUNTER — TELEPHONE (OUTPATIENT)
Dept: FAMILY MEDICINE CLINIC | Facility: CLINIC | Age: 69
End: 2024-05-23

## 2024-06-04 ENCOUNTER — APPOINTMENT (OUTPATIENT)
Dept: CT IMAGING | Age: 69
End: 2024-06-04
Payer: MEDICARE

## 2024-06-04 ENCOUNTER — APPOINTMENT (OUTPATIENT)
Dept: GENERAL RADIOLOGY | Age: 69
End: 2024-06-04
Payer: MEDICARE

## 2024-06-04 ENCOUNTER — HOSPITAL ENCOUNTER (EMERGENCY)
Age: 69
Discharge: HOME | End: 2024-06-05
Attending: EMERGENCY MEDICINE
Payer: MEDICARE

## 2024-06-04 VITALS
BODY MASS INDEX: 30.58 KG/M2 | OXYGEN SATURATION: 100 % | HEIGHT: 72 IN | TEMPERATURE: 97.4 F | WEIGHT: 225.8 LBS | HEART RATE: 97 BPM | SYSTOLIC BLOOD PRESSURE: 152 MMHG | RESPIRATION RATE: 18 BRPM | DIASTOLIC BLOOD PRESSURE: 82 MMHG

## 2024-06-04 DIAGNOSIS — S20.212A CONTUSION OF RIB ON LEFT SIDE, INITIAL ENCOUNTER: ICD-10-CM

## 2024-06-04 DIAGNOSIS — S09.90XA CLOSED HEAD INJURY, INITIAL ENCOUNTER: Primary | ICD-10-CM

## 2024-06-04 DIAGNOSIS — S02.32XA CLOSED FRACTURE OF LEFT ORBITAL FLOOR, INITIAL ENCOUNTER (HCC): ICD-10-CM

## 2024-06-04 PROCEDURE — 6370000000 HC RX 637 (ALT 250 FOR IP): Performed by: EMERGENCY MEDICINE

## 2024-06-04 PROCEDURE — 70486 CT MAXILLOFACIAL W/O DYE: CPT

## 2024-06-04 PROCEDURE — 99284 EMERGENCY DEPT VISIT MOD MDM: CPT

## 2024-06-04 PROCEDURE — 90471 IMMUNIZATION ADMIN: CPT | Performed by: EMERGENCY MEDICINE

## 2024-06-04 PROCEDURE — 70450 CT HEAD/BRAIN W/O DYE: CPT

## 2024-06-04 PROCEDURE — 6360000002 HC RX W HCPCS: Performed by: EMERGENCY MEDICINE

## 2024-06-04 PROCEDURE — 71101 X-RAY EXAM UNILAT RIBS/CHEST: CPT

## 2024-06-04 PROCEDURE — 90714 TD VACC NO PRESV 7 YRS+ IM: CPT | Performed by: EMERGENCY MEDICINE

## 2024-06-04 RX ORDER — HYDROCODONE BITARTRATE AND ACETAMINOPHEN 5; 325 MG/1; MG/1
1 TABLET ORAL
Status: COMPLETED | OUTPATIENT
Start: 2024-06-04 | End: 2024-06-04

## 2024-06-04 RX ADMIN — CLOSTRIDIUM TETANI TOXOID ANTIGEN (FORMALDEHYDE INACTIVATED) AND CORYNEBACTERIUM DIPHTHERIAE TOXOID ANTIGEN (FORMALDEHYDE INACTIVATED) 0.5 ML: 5; 2 INJECTION, SUSPENSION INTRAMUSCULAR at 23:15

## 2024-06-04 RX ADMIN — HYDROCODONE BITARTRATE AND ACETAMINOPHEN 1 TABLET: 5; 325 TABLET ORAL at 23:13

## 2024-06-04 ASSESSMENT — PAIN - FUNCTIONAL ASSESSMENT: PAIN_FUNCTIONAL_ASSESSMENT: 0-10

## 2024-06-04 ASSESSMENT — ENCOUNTER SYMPTOMS
ABDOMINAL PAIN: 0
SHORTNESS OF BREATH: 0
COUGH: 0
DIARRHEA: 0
VOMITING: 0
FACIAL SWELLING: 0

## 2024-06-04 ASSESSMENT — PAIN DESCRIPTION - ORIENTATION: ORIENTATION: LEFT

## 2024-06-04 ASSESSMENT — PAIN DESCRIPTION - LOCATION: LOCATION: HEAD;RIB CAGE

## 2024-06-04 ASSESSMENT — PAIN SCALES - GENERAL: PAINLEVEL_OUTOF10: 10

## 2024-06-05 ASSESSMENT — LIFESTYLE VARIABLES
HOW OFTEN DO YOU HAVE A DRINK CONTAINING ALCOHOL: PATIENT DECLINED
HOW MANY STANDARD DRINKS CONTAINING ALCOHOL DO YOU HAVE ON A TYPICAL DAY: PATIENT DECLINED
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
## 2024-06-05 NOTE — ED NOTES
Patient mobility status  with no difficulty. Provider aware     I have reviewed discharge instructions with the patient.  The patient verbalized understanding.    Patient left ED via Discharge Method: ambulatory to Home with Friend.    Opportunity for questions and clarification provided.     Patient given 0 scripts.            Michell Alberts LPN  06/05/24 0128

## 2024-06-05 NOTE — ED NOTES
Visual acuity performed. Patient stated he wears glasses for reading and has hx of cataracts in both eyes. Both eyes: 20/200, R eye: 20/200, and L eye: 20/200. Provider notified     Michell Alberts LPN  06/05/24 0125       Michell Alberts LPN  06/05/24 0126

## 2024-06-05 NOTE — DISCHARGE INSTRUCTIONS
Follow-up with ENT specialist for questionable fracture of your left orbital floor.  Take motrin for pain. Return for worsening or concerning symptoms.

## 2024-06-06 ENCOUNTER — CARE COORDINATION (OUTPATIENT)
Dept: CARE COORDINATION | Facility: CLINIC | Age: 69
End: 2024-06-06

## 2024-06-06 NOTE — CARE COORDINATION
This patient was received as a referral from Population health report .  Follow up after ED visit.     LPN CC contacted the patient by telephone. Verified name and  with patient as identifiers. Provided introduction to self, and explanation of the LPN CC role.   Patient declined care management services at this time.  Patient did request assistance with scheduling ED follow up with PCP, message sent to office with request to call patient to schedule.        Last Discharge Facility       Date Complaint Diagnosis Description Type Department Provider    24 Head Injury; Leg Pain; Rib Injury Closed head injury, initial encounter ... ED (DISCHARGE) Shasha Holder MD              Noted following upcoming appointments from discharge chart review:   [facility] follow up appointment(s):   Future Appointments   Date Time Provider Department Center   2024 10:40 AM Iván Spencer MD [facility]     Non-[facility] follow up appointment(s): na

## 2024-06-12 DIAGNOSIS — I10 ESSENTIAL HYPERTENSION: ICD-10-CM

## 2024-06-12 DIAGNOSIS — I10 PRIMARY HYPERTENSION: ICD-10-CM

## 2024-06-12 DIAGNOSIS — E11.59 TYPE 2 DIABETES MELLITUS WITH OTHER CIRCULATORY COMPLICATION, WITHOUT LONG-TERM CURRENT USE OF INSULIN (HCC): ICD-10-CM

## 2024-06-17 RX ORDER — LISINOPRIL 20 MG/1
20 TABLET ORAL 2 TIMES DAILY
Qty: 200 TABLET | Refills: 1 | Status: SHIPPED | OUTPATIENT
Start: 2024-06-17

## 2024-06-18 NOTE — TELEPHONE ENCOUNTER
Meliza Martino PA-C, patient would like to change to 3 month supply of metformin & lisinopril to decrease trips to the pharmacy and save on copay costs. Rxs pended for your signature/modification as appropriate - he is eligible for 100 day supply per his insurance company if OK with you.    Last Visit: 3/4/24  Next Visit: to be scheduled    Thank you,  Maribel Montes, PharmD, [facility]  Population Health Pharmacist  [facility] Clinical Pharmacy  Department, toll free: [phone], option 1   ====================================================================================  Pt no show to 6/13/24 OV.     Reached pt at home number to review. States he is still taking both medications as prescribed - reviewed current sigs and he confirmed taking as prescribed. Is needing refilled and is interested in extended day supply to save trips to the pharmacy as well as copay costs. Advised he may be due to be seen.  
Rxs signed by provider - thank you!    For Pharmacy Admin Tracking Only    Program: [employer]  CPA in place:  No  Recommendation Provided To: Provider: 2 via Note to Provider and Patient/Caregiver: 2 via Telephone  Intervention Detail: Adherence Monitorin and New Rx: 2, reason: Improve Adherence  Intervention Accepted By: Provider: 2 and Patient/Caregiver: 2  Gap Closed?: Yes   Time Spent (min): 20   
has tried at least:  - atorvastatin 40 mg  - pravastatin 40 mg  - rosuvastatin (allergy list)    Allergies   Allergen Reactions    Atorvastatin     Other Shortness Of Breath     Oyster    Rosuvastatin Shortness Of Breath    Oyster Extract Other (See Comments) and Nausea And Vomiting     N & V, diarrhea, abdominal cramping        Component      Latest Ref Rng 3/14/2024   Cholesterol, Total      <200 MG/DL 80    Triglycerides      35 - 150 MG/DL 38    HDL Cholesterol      40 - 60 MG/DL 32 (L)    LDL Cholesterol      <100 MG/DL 40.4    VLDL      6.0 - 23.0 MG/DL 7.6    Chol/HDL Ratio        2.5       Legend:  (L) Low    ALT   Date Value Ref Range Status   03/05/2024 19 12 - 65 U/L Final     AST   Date Value Ref Range Status   03/05/2024 17 15 - 37 U/L Final       The ASCVD Risk score (Irina ENGLISH, et al., 2019) failed to calculate for the following reasons:    The patient has a prior MI or stroke diagnosis     ACE/ARB ADHERENCE    Insurance Records claims through 6/6/24 (2023 PDC = 98%; YTD PDC =  65%; Potential Fail Date: 6/28/24 ):   Lisinopril 20 mg tab last filled on 5/6/24 for 30 day supply (60 tabs). Next refill due: 6/5/24    Per Reconciled Dispense Report:  LISINOPRIL 20 MG TABLET 05/06/2024 30 60 each Meliza Martino PA-C [facility] ...   LISINOPRIL 20 MG TABLET 03/04/2024 30 60 each Meliza Martino PA-C [facility] ...   LISINOPRIL 20 MG TABLET 01/22/2024 30 30 each Orin Moscoso MD [facility] ...   4RR per hyperlink    BP Readings from Last 3 Encounters:   06/04/24 (!) 152/82   03/21/24 138/86   03/18/24 108/62     Estimated Creatinine Clearance: 77 mL/min (based on SCr of 1.1 mg/dL).    DIABETES ADHERENCE    Insurance Records claims through 6/6/24 (2023 PDC = 98%; YTD PDC =  63%; Potential Fail Date: 7/1/24 ):   Metformin 850 mg tab last filled on 5/6/24 for 30 day supply. Next refill due: 6/5/24    Per Reconciled Dispense Report:  METFORMIN  MG TABLET 05/06/2024 30

## 2024-08-18 ENCOUNTER — APPOINTMENT (OUTPATIENT)
Dept: GENERAL RADIOLOGY | Age: 69
End: 2024-08-18
Payer: MEDICARE

## 2024-08-18 ENCOUNTER — HOSPITAL ENCOUNTER (EMERGENCY)
Age: 69
Discharge: HOME | End: 2024-08-19
Attending: EMERGENCY MEDICINE
Payer: MEDICARE

## 2024-08-18 ENCOUNTER — APPOINTMENT (OUTPATIENT)
Dept: CT IMAGING | Age: 69
End: 2024-08-18
Payer: MEDICARE

## 2024-08-18 VITALS
WEIGHT: 234.6 LBS | SYSTOLIC BLOOD PRESSURE: 142 MMHG | DIASTOLIC BLOOD PRESSURE: 70 MMHG | BODY MASS INDEX: 31.77 KG/M2 | TEMPERATURE: 98.7 F | HEIGHT: 72 IN | HEART RATE: 85 BPM | OXYGEN SATURATION: 94 % | RESPIRATION RATE: 22 BRPM
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
## 2024-08-18 LAB
ANION GAP SERPL CALC-SCNC: 7 MMOL/L (ref 9–18)
BASOPHILS # BLD: 0 K/UL (ref 0–0.2)
BASOPHILS NFR BLD: 0 % (ref 0–2)
BUN SERPL-MCNC: 14 MG/DL (ref 8–23)
CALCIUM SERPL-MCNC: 8.6 MG/DL (ref 8.8–10.2)
CHLORIDE SERPL-SCNC: 102 MMOL/L (ref 98–107)
CO2 SERPL-SCNC: 27 MMOL/L (ref 20–28)
CREAT SERPL-MCNC: 0.79 MG/DL (ref 0.8–1.3)
DIFFERENTIAL METHOD BLD: ABNORMAL
EOSINOPHIL # BLD: 0.1 K/UL (ref 0–0.8)
EOSINOPHIL NFR BLD: 0 % (ref 0.5–7.8)
ERYTHROCYTE [DISTWIDTH] IN BLOOD BY AUTOMATED COUNT: 15.6 % (ref 11.9–14.6)
ETHANOL SERPL-MCNC: <11 MG/DL (ref 0–0.08)
GLUCOSE SERPL-MCNC: 189 MG/DL (ref 70–99)
HCT VFR BLD AUTO: 42.7 % (ref 41.1–50.3)
HGB BLD-MCNC: 13.6 G/DL (ref 13.6–17.2)
IMM GRANULOCYTES # BLD AUTO: 0 K/UL (ref 0–0.5)
IMM GRANULOCYTES NFR BLD AUTO: 0 % (ref 0–5)
LIPASE SERPL-CCNC: 194 U/L (ref 13–60)
LYMPHOCYTES # BLD: 2.7 K/UL (ref 0.5–4.6)
LYMPHOCYTES NFR BLD: 20 % (ref 13–44)
MAGNESIUM SERPL-MCNC: 1.8 MG/DL (ref 1.8–2.4)
MCH RBC QN AUTO: 28.7 PG (ref 26.1–32.9)
MCHC RBC AUTO-ENTMCNC: 31.9 G/DL (ref 31.4–35)
MCV RBC AUTO: 90.1 FL (ref 82–102)
MONOCYTES # BLD: 1.3 K/UL (ref 0.1–1.3)
MONOCYTES NFR BLD: 9 % (ref 4–12)
NEUTS SEG # BLD: 9.6 K/UL (ref 1.7–8.2)
NEUTS SEG NFR BLD: 71 % (ref 43–78)
NRBC # BLD: 0 K/UL (ref 0–0.2)
PLATELET # BLD AUTO: 125 K/UL (ref 150–450)
PMV BLD AUTO: 10.8 FL (ref 9.4–12.3)
POTASSIUM SERPL-SCNC: 3.6 MMOL/L (ref 3.5–5.1)
RBC # BLD AUTO: 4.74 M/UL (ref 4.23–5.6)
SODIUM SERPL-SCNC: 136 MMOL/L (ref 136–145)
TROPONIN T SERPL HS-MCNC: 40 NG/L (ref 0–22)
TROPONIN T SERPL HS-MCNC: 40 NG/L (ref 0–22)
WBC # BLD AUTO: 13.7 K/UL (ref 4.3–11.1)

## 2024-08-18 PROCEDURE — 84484 ASSAY OF TROPONIN QUANT: CPT

## 2024-08-18 PROCEDURE — 71260 CT THORAX DX C+: CPT

## 2024-08-18 PROCEDURE — 82077 ASSAY SPEC XCP UR&BREATH IA: CPT

## 2024-08-18 PROCEDURE — 83735 ASSAY OF MAGNESIUM: CPT

## 2024-08-18 PROCEDURE — 83690 ASSAY OF LIPASE: CPT

## 2024-08-18 PROCEDURE — 72125 CT NECK SPINE W/O DYE: CPT

## 2024-08-18 PROCEDURE — 85025 COMPLETE CBC W/AUTO DIFF WBC: CPT

## 2024-08-18 PROCEDURE — 6360000004 HC RX CONTRAST MEDICATION: Performed by: EMERGENCY MEDICINE

## 2024-08-18 PROCEDURE — 93005 ELECTROCARDIOGRAM TRACING: CPT | Performed by: EMERGENCY MEDICINE

## 2024-08-18 PROCEDURE — 80048 BASIC METABOLIC PNL TOTAL CA: CPT

## 2024-08-18 PROCEDURE — 99285 EMERGENCY DEPT VISIT HI MDM: CPT

## 2024-08-18 PROCEDURE — 71045 X-RAY EXAM CHEST 1 VIEW: CPT

## 2024-08-18 PROCEDURE — 70450 CT HEAD/BRAIN W/O DYE: CPT

## 2024-08-18 PROCEDURE — 94760 N-INVAS EAR/PLS OXIMETRY 1: CPT

## 2024-08-18 RX ADMIN — IOPAMIDOL 100 ML: 755 INJECTION, SOLUTION INTRAVENOUS at 21:44

## 2024-08-18 ASSESSMENT — PAIN DESCRIPTION - LOCATION: LOCATION: CHEST

## 2024-08-18 ASSESSMENT — PAIN DESCRIPTION - DESCRIPTORS: DESCRIPTORS: ACHING

## 2024-08-18 ASSESSMENT — LIFESTYLE VARIABLES
HOW OFTEN DO YOU HAVE A DRINK CONTAINING ALCOHOL: 4 OR MORE TIMES A WEEK
HOW MANY STANDARD DRINKS CONTAINING ALCOHOL DO YOU HAVE ON A TYPICAL DAY: 1 OR 2

## 2024-08-18 ASSESSMENT — PAIN SCALES - GENERAL: PAINLEVEL_OUTOF10: 5

## 2024-08-18 ASSESSMENT — PAIN DESCRIPTION - ORIENTATION: ORIENTATION: LEFT

## 2024-08-18 ASSESSMENT — PAIN - FUNCTIONAL ASSESSMENT: PAIN_FUNCTIONAL_ASSESSMENT: 0-10

## 2024-08-19 LAB
EKG ATRIAL RATE: 85 BPM
EKG DIAGNOSIS: NORMAL
EKG P AXIS: 56 DEGREES
EKG P-R INTERVAL: 136 MS
EKG Q-T INTERVAL: 386 MS
EKG QRS DURATION: 87 MS
EKG QTC CALCULATION (BAZETT): 459 MS
EKG R AXIS: 51 DEGREES
EKG T AXIS: 99 DEGREES
EKG VENTRICULAR RATE: 85 BPM

## 2024-08-19 PROCEDURE — 93010 ELECTROCARDIOGRAM REPORT: CPT | Performed by: INTERNAL MEDICINE

## 2024-08-19 NOTE — ED TRIAGE NOTES
Patient arrives via ems from home. Patient had a fall this morning and fell on the left side. Patient then devolved chest pain on the left side of his chest that is reproducible with palpation. Patient also developed left sided weakness at 0900 after the fall. Fire department gave the patient 324 mg of aspirin prior to EMS arrival. Patient is drowsy, but responds to verbal stimuli at time of triage. left sided weakness since 0900 this morning. [place] 0 and RACE 0 per ems.

## 2024-08-19 NOTE — ED PROVIDER NOTES
Recommendation   Final Result   No evidence of acute traumatic pathology in the chest, abdomen, or   pelvis.      Mild patchy bilateral groundglass opacities, likely atelectasis and or mild   edema.      Left inguinal fat-containing hernia.                     Electronically signed by SCOOBY POLLACK      XR CHEST PORTABLE   Final Result   Pulmonary vascular prominence with mild infiltrate.         Electronically signed by SCOOBY POLLACK         NIH Stroke Scale  Interval: Baseline  Level of Consciousness (1a): Not alert, but arousable by minor stimulation to obey  LOC Questions (1b): Answers both correctly  LOC Commands (1c): Performs both tasks correctly  Best Gaze (2): Normal  Visual (3): No visual loss  Facial Palsy (4): Normal symmetrical movement  Motor Arm, Left (5a): No drift  Motor Arm, Right (5b): No drift  Motor Leg, Left (6a): No drift  Motor Leg, Right (6b): No drift  Limb Ataxia (7): Absent  Sensory (8): Normal  Best Language (9): No aphasia  Dysarthria (10): Normal  Extinction and Inattention (11): No abnormality  Total: 1         No results for input(s): \"COVID19\" in the last 72 hours.     Voice dictation software was used during the making of this note.  This software is not perfect and grammatical and other typographical errors may be present.  This note has not been completely proofread for errors.     Tariq Ramirez MD  08/18/24 [id number]

## 2024-08-19 NOTE — ED NOTES
Patient mobility status  with no difficulty. Provider aware     I have reviewed discharge instructions with the patient.  The patient verbalized understanding.    Patient left ED via Discharge Method: ambulatory to Home with  [facility] .    Opportunity for questions and clarification provided.     Patient given 0 scripts.           Edelmira Arnett RN  08/19/24 0008

## 2024-08-26 ENCOUNTER — CARE COORDINATION (OUTPATIENT)
Dept: CARE COORDINATION | Facility: CLINIC | Age: 69
End: 2024-08-26

## 2024-08-26 NOTE — CARE COORDINATION
Ambulatory Care Coordination Note     8/26/2024 11:44 AM     ACM outreach attempt by this ACM today to offer care management services. ACM was unable to reach the patient by telephone today;  no answer     ACM: Gale Rivera RN       PCP/Specialist follow up:

## 2024-08-27 ENCOUNTER — CARE COORDINATION (OUTPATIENT)
Dept: CARE COORDINATION | Facility: CLINIC | Age: 69
End: 2024-08-27

## 2024-08-27 NOTE — CARE COORDINATION
Ambulatory Care Coordination Note     8/27/2024 11:08 AM     patient outreach attempt by this ACM today to offer care management services. ACM was unable to reach the patient by telephone today;  no answer.   ACM called number listed as pt's mobile, which is relative who states she is not in communication w/ pt at this time.  No further outreach attempts at this time as pt is unable to be reached.      Patient closed (unable to reach patient) from the High Risk Care Management program on 8/27/2024.

## 2024-10-02 ENCOUNTER — TELEPHONE (OUTPATIENT)
Dept: PHARMACY | Facility: CLINIC | Age: 69
End: 2024-10-02

## 2024-10-02 NOTE — TELEPHONE ENCOUNTER
[facility] CLINICAL PHARMACY: ADHERENCE REVIEW  Identified care gap per [employer]: fills at [facility] : ACE/ARB and Diabetes adherence    ASSESSMENT    ACE/ARB ADHERENCE    Insurance Records claims through 2024 (Prior Year PDC = 98% - PASSED ; YTD PDC = 76%; Potential Fail Date: 10/7/24):   Lisinopril 20 mg tab last filled on 24 for 100 day supply (220 tabs). Next refill due: 24    Prescribed si tablet/capsule twice daily    Per Reconcile Dispense History:   LISINOPRIL 20 MG TABLET 2024 100 200 each Meliza Martino PA-C [facility] ...   LISINOPRIL 20 MG TABLET 2024 30 60 each Meliza Martino PA-C [facility] ...   LISINOPRIL 20 MG TABLET 2024 30 60 each Meliza Martino PA-C [facility] ...   LISINOPRIL 20 MG TABLET 2024 30 30 each Orin Moscoso MD [facility] ...   1RR per hyperlink; last Rx 6/17/24 x 100 day supply 1 refill - believe has refill at pharmacy    BP Readings from Last 3 Encounters:   24 (!) 142/70   24 (!) 152/82   24 138/86     Estimated Creatinine Clearance: 110 mL/min (A) (based on SCr of 0.79 mg/dL (L)).  Lab Results   Component Value Date    CREATININE 0.79 (L) 2024     Lab Results   Component Value Date    K 3.6 2024     DIABETES ADHERENCE    Insurance Records claims through 2024 (Prior Year PDC = 98% - PASSED ; YTD PDC = 77%; Potential Fail Date: 10/10/24):   Metformin 850 mg tab last filled on 24 for 100 day supply. Next refill due: 24    Prescribed si tablet/capsule daily    Per Reconcile Dispense History:   METFORMIN  MG TABLET 2024 100 100 each Meliza Martino PA-C [facility] ...   METFORMIN  MG TABLET 2024 30 30 each Meliza Martino PA-C [facility] ...   METFORMIN  MG TABLET 2024 30 30 each Meliza Martino PA-C [facility] ...   1RR per hyperlink; last Rx 6/17/24 x 100 day

## 2024-10-03 NOTE — TELEPHONE ENCOUNTER
Per [facility] [place], a 100ds of Lisinopril 20 mg tab  once daily and Metformin 850 mg tab once daily will be refilled and ready for  later today.    Unable to reach pt  No    [employer] message sent    For Pharmacy Admin Tracking Only    Program: [employer]  CPA in place:  No  Recommendation Provided To: Pharmacy: 2  Intervention Detail: Adherence Monitorin  Intervention Accepted By: Pharmacy: 2  Gap Closed?: No   Time Spent (min): 10

## 2024-11-24 ENCOUNTER — APPOINTMENT (OUTPATIENT)
Dept: CT IMAGING | Age: 69
DRG: 871 | End: 2024-11-24
Payer: MEDICAID

## 2024-11-24 ENCOUNTER — HOSPITAL ENCOUNTER (INPATIENT)
Age: 69
LOS: 9 days | Discharge: NURSING, REHAB OR LONG-TERM CARE | DRG: 871 | End: 2024-12-04
Attending: EMERGENCY MEDICINE | Admitting: INTERNAL MEDICINE
Payer: MEDICAID

## 2024-11-24 ENCOUNTER — APPOINTMENT (OUTPATIENT)
Dept: GENERAL RADIOLOGY | Age: 69
DRG: 871 | End: 2024-11-24
Payer: MEDICAID

## 2024-11-24 DIAGNOSIS — K92.1 MELENA: ICD-10-CM

## 2024-11-24 DIAGNOSIS — E87.1 HYPONATREMIA: ICD-10-CM

## 2024-11-24 DIAGNOSIS — A41.9 SEPTICEMIA (HCC): Primary | ICD-10-CM

## 2024-11-24 DIAGNOSIS — L03.116 CELLULITIS OF LEFT LOWER EXTREMITY: ICD-10-CM

## 2024-11-24 LAB
ALBUMIN SERPL-MCNC: 2.7 G/DL (ref 3.2–4.6)
ALBUMIN/GLOB SERPL: 0.7 (ref 1–1.9)
ALP SERPL-CCNC: 55 U/L (ref 40–129)
ALT SERPL-CCNC: 19 U/L (ref 8–55)
ANION GAP SERPL CALC-SCNC: 15 MMOL/L (ref 7–16)
AST SERPL-CCNC: 35 U/L (ref 15–37)
BASOPHILS # BLD: 0.1 K/UL (ref 0–0.2)
BASOPHILS NFR BLD: 0 % (ref 0–2)
BILIRUB SERPL-MCNC: 0.7 MG/DL (ref 0–1.2)
BUN SERPL-MCNC: 26 MG/DL (ref 8–23)
CALCIUM SERPL-MCNC: 8.6 MG/DL (ref 8.8–10.2)
CHLORIDE SERPL-SCNC: 93 MMOL/L (ref 98–107)
CO2 SERPL-SCNC: 19 MMOL/L (ref 20–29)
CREAT SERPL-MCNC: 1.45 MG/DL (ref 0.8–1.3)
D DIMER PPP FEU-MCNC: 3.72 UG/ML(FEU)
DIFFERENTIAL METHOD BLD: ABNORMAL
EKG ATRIAL RATE: 126 BPM
EKG DIAGNOSIS: NORMAL
EKG P AXIS: 68 DEGREES
EKG P-R INTERVAL: 133 MS
EKG Q-T INTERVAL: 294 MS
EKG QRS DURATION: 85 MS
EKG QTC CALCULATION (BAZETT): 414 MS
EKG R AXIS: 48 DEGREES
EKG T AXIS: 182 DEGREES
EKG VENTRICULAR RATE: 119 BPM
EOSINOPHIL # BLD: 0 K/UL (ref 0–0.8)
EOSINOPHIL NFR BLD: 0 % (ref 0.5–7.8)
ERYTHROCYTE [DISTWIDTH] IN BLOOD BY AUTOMATED COUNT: 14.9 % (ref 11.9–14.6)
GLOBULIN SER CALC-MCNC: 3.9 G/DL (ref 2.3–3.5)
GLUCOSE SERPL-MCNC: 232 MG/DL (ref 70–99)
HCT VFR BLD AUTO: 47.3 % (ref 41.1–50.3)
HGB BLD-MCNC: 16.2 G/DL (ref 13.6–17.2)
IMM GRANULOCYTES # BLD AUTO: 0.2 K/UL (ref 0–0.5)
IMM GRANULOCYTES NFR BLD AUTO: 1 % (ref 0–5)
LACTATE SERPL-SCNC: 2.7 MMOL/L (ref 0.5–2)
LACTATE SERPL-SCNC: 3.1 MMOL/L (ref 0.5–2)
LYMPHOCYTES # BLD: 0.8 K/UL (ref 0.5–4.6)
LYMPHOCYTES NFR BLD: 4 % (ref 13–44)
MCH RBC QN AUTO: 29.1 PG (ref 26.1–32.9)
MCHC RBC AUTO-ENTMCNC: 34.2 G/DL (ref 31.4–35)
MCV RBC AUTO: 84.9 FL (ref 82–102)
MONOCYTES # BLD: 0.9 K/UL (ref 0.1–1.3)
MONOCYTES NFR BLD: 4 % (ref 4–12)
NEUTS SEG # BLD: 20.4 K/UL (ref 1.7–8.2)
NEUTS SEG NFR BLD: 91 % (ref 43–78)
NRBC # BLD: 0 K/UL (ref 0–0.2)
NT PRO BNP: 7568 PG/ML (ref 0–125)
PLATELET # BLD AUTO: 143 K/UL (ref 150–450)
PMV BLD AUTO: 10.7 FL (ref 9.4–12.3)
POTASSIUM SERPL-SCNC: 3.6 MMOL/L (ref 3.5–5.1)
PROCALCITONIN SERPL-MCNC: 3.37 NG/ML (ref 0–0.1)
PROT SERPL-MCNC: 6.6 G/DL (ref 6.3–8.2)
RBC # BLD AUTO: 5.57 M/UL (ref 4.23–5.6)
SODIUM SERPL-SCNC: 127 MMOL/L (ref 136–145)
TROPONIN T SERPL HS-MCNC: 49 NG/L (ref 0–22)
TROPONIN T SERPL HS-MCNC: 59 NG/L (ref 0–22)
WBC # BLD AUTO: 22.3 K/UL (ref 4.3–11.1)

## 2024-11-24 PROCEDURE — 71045 X-RAY EXAM CHEST 1 VIEW: CPT

## 2024-11-24 PROCEDURE — 93010 ELECTROCARDIOGRAM REPORT: CPT | Performed by: INTERNAL MEDICINE

## 2024-11-24 PROCEDURE — 93005 ELECTROCARDIOGRAM TRACING: CPT | Performed by: EMERGENCY MEDICINE

## 2024-11-24 PROCEDURE — 96374 THER/PROPH/DIAG INJ IV PUSH: CPT

## 2024-11-24 PROCEDURE — 85379 FIBRIN DEGRADATION QUANT: CPT

## 2024-11-24 PROCEDURE — 71260 CT THORAX DX C+: CPT

## 2024-11-24 PROCEDURE — 99285 EMERGENCY DEPT VISIT HI MDM: CPT

## 2024-11-24 PROCEDURE — 83880 ASSAY OF NATRIURETIC PEPTIDE: CPT

## 2024-11-24 PROCEDURE — 6360000004 HC RX CONTRAST MEDICATION: Performed by: EMERGENCY MEDICINE

## 2024-11-24 PROCEDURE — 96361 HYDRATE IV INFUSION ADD-ON: CPT

## 2024-11-24 PROCEDURE — 83605 ASSAY OF LACTIC ACID: CPT

## 2024-11-24 PROCEDURE — 6370000000 HC RX 637 (ALT 250 FOR IP): Performed by: EMERGENCY MEDICINE

## 2024-11-24 PROCEDURE — 2580000003 HC RX 258: Performed by: EMERGENCY MEDICINE

## 2024-11-24 PROCEDURE — 80053 COMPREHEN METABOLIC PANEL: CPT

## 2024-11-24 PROCEDURE — 84145 PROCALCITONIN (PCT): CPT

## 2024-11-24 PROCEDURE — 96375 TX/PRO/DX INJ NEW DRUG ADDON: CPT

## 2024-11-24 PROCEDURE — 87040 BLOOD CULTURE FOR BACTERIA: CPT

## 2024-11-24 PROCEDURE — 85025 COMPLETE CBC W/AUTO DIFF WBC: CPT

## 2024-11-24 PROCEDURE — 96365 THER/PROPH/DIAG IV INF INIT: CPT

## 2024-11-24 PROCEDURE — 84484 ASSAY OF TROPONIN QUANT: CPT

## 2024-11-24 PROCEDURE — 6360000002 HC RX W HCPCS: Performed by: EMERGENCY MEDICINE

## 2024-11-24 RX ORDER — IOPAMIDOL 755 MG/ML
100 INJECTION, SOLUTION INTRAVASCULAR
Status: COMPLETED | OUTPATIENT
Start: 2024-11-24 | End: 2024-11-24

## 2024-11-24 RX ORDER — 0.9 % SODIUM CHLORIDE 0.9 %
30 INTRAVENOUS SOLUTION INTRAVENOUS ONCE
Status: COMPLETED | OUTPATIENT
Start: 2024-11-24 | End: 2024-11-25

## 2024-11-24 RX ORDER — ACETAMINOPHEN 500 MG
1000 TABLET ORAL
Status: COMPLETED | OUTPATIENT
Start: 2024-11-24 | End: 2024-11-24

## 2024-11-24 RX ORDER — ONDANSETRON 2 MG/ML
4 INJECTION INTRAMUSCULAR; INTRAVENOUS
Status: COMPLETED | OUTPATIENT
Start: 2024-11-24 | End: 2024-11-24

## 2024-11-24 RX ADMIN — SODIUM CHLORIDE 1500 MG: 9 INJECTION, SOLUTION INTRAVENOUS at 22:50

## 2024-11-24 RX ADMIN — ONDANSETRON 4 MG: 2 INJECTION, SOLUTION INTRAMUSCULAR; INTRAVENOUS at 22:19

## 2024-11-24 RX ADMIN — CEFTRIAXONE 1000 MG: 1 INJECTION, POWDER, FOR SOLUTION INTRAMUSCULAR; INTRAVENOUS at 21:44

## 2024-11-24 RX ADMIN — IOPAMIDOL 100 ML: 755 INJECTION, SOLUTION INTRAVENOUS at 22:34

## 2024-11-24 RX ADMIN — ACETAMINOPHEN 1000 MG: 500 TABLET, FILM COATED ORAL at 21:44

## 2024-11-24 RX ADMIN — SODIUM CHLORIDE 3129 ML: 9 INJECTION, SOLUTION INTRAVENOUS at 21:43

## 2024-11-24 ASSESSMENT — PAIN SCALES - GENERAL: PAINLEVEL_OUTOF10: 0

## 2024-11-25 PROBLEM — I48.0 PAF (PAROXYSMAL ATRIAL FIBRILLATION) (HCC): Status: ACTIVE | Noted: 2024-11-25

## 2024-11-25 PROBLEM — Z95.2 H/O AORTIC VALVE REPLACEMENT: Status: ACTIVE | Noted: 2024-11-25

## 2024-11-25 PROBLEM — L03.119 CELLULITIS OF MULTIPLE SITES OF LOWER EXTREMITY: Status: ACTIVE | Noted: 2024-11-25

## 2024-11-25 PROBLEM — A41.9 SEPTICEMIA (HCC): Status: ACTIVE | Noted: 2024-11-25

## 2024-11-25 PROBLEM — L03.116 CELLULITIS OF LEFT LOWER EXTREMITY: Status: ACTIVE | Noted: 2024-11-25

## 2024-11-25 PROBLEM — Z95.1 HX OF CABG: Status: ACTIVE | Noted: 2024-11-25

## 2024-11-25 LAB
ANION GAP SERPL CALC-SCNC: 12 MMOL/L (ref 7–16)
BUN SERPL-MCNC: 25 MG/DL (ref 8–23)
CALCIUM SERPL-MCNC: 8 MG/DL (ref 8.8–10.2)
CHLORIDE SERPL-SCNC: 97 MMOL/L (ref 98–107)
CO2 SERPL-SCNC: 19 MMOL/L (ref 20–29)
CREAT SERPL-MCNC: 1.22 MG/DL (ref 0.8–1.3)
EST. AVERAGE GLUCOSE BLD GHB EST-MCNC: 158 MG/DL
GLUCOSE BLD STRIP.AUTO-MCNC: 166 MG/DL (ref 65–100)
GLUCOSE BLD STRIP.AUTO-MCNC: 190 MG/DL (ref 65–100)
GLUCOSE BLD STRIP.AUTO-MCNC: 201 MG/DL (ref 65–100)
GLUCOSE SERPL-MCNC: 185 MG/DL (ref 70–99)
HBA1C MFR BLD: 7.1 % (ref 0–5.6)
LACTATE SERPL-SCNC: 1.4 MMOL/L (ref 0.5–2)
MAGNESIUM SERPL-MCNC: 1.8 MG/DL (ref 1.8–2.4)
OSMOLALITY SERPL: 274 MOSM/KG H2O (ref 280–301)
OSMOLALITY UR: 683 MOSM/KG H2O (ref 50–1400)
POTASSIUM SERPL-SCNC: 3.6 MMOL/L (ref 3.5–5.1)
SERVICE CMNT-IMP: ABNORMAL
SODIUM SERPL-SCNC: 128 MMOL/L (ref 136–145)
SODIUM UR-SCNC: <20 MMOL/L

## 2024-11-25 PROCEDURE — 94640 AIRWAY INHALATION TREATMENT: CPT

## 2024-11-25 PROCEDURE — 1100000003 HC PRIVATE W/ TELEMETRY

## 2024-11-25 PROCEDURE — 2580000003 HC RX 258: Performed by: INTERNAL MEDICINE

## 2024-11-25 PROCEDURE — 83735 ASSAY OF MAGNESIUM: CPT

## 2024-11-25 PROCEDURE — 83930 ASSAY OF BLOOD OSMOLALITY: CPT

## 2024-11-25 PROCEDURE — 82962 GLUCOSE BLOOD TEST: CPT

## 2024-11-25 PROCEDURE — 6370000000 HC RX 637 (ALT 250 FOR IP): Performed by: INTERNAL MEDICINE

## 2024-11-25 PROCEDURE — 83935 ASSAY OF URINE OSMOLALITY: CPT

## 2024-11-25 PROCEDURE — 6360000002 HC RX W HCPCS: Performed by: INTERNAL MEDICINE

## 2024-11-25 PROCEDURE — 6370000000 HC RX 637 (ALT 250 FOR IP): Performed by: NURSE PRACTITIONER

## 2024-11-25 PROCEDURE — 80048 BASIC METABOLIC PNL TOTAL CA: CPT

## 2024-11-25 PROCEDURE — 2500000003 HC RX 250 WO HCPCS: Performed by: NURSE PRACTITIONER

## 2024-11-25 PROCEDURE — 83605 ASSAY OF LACTIC ACID: CPT

## 2024-11-25 PROCEDURE — 99222 1ST HOSP IP/OBS MODERATE 55: CPT | Performed by: INTERNAL MEDICINE

## 2024-11-25 PROCEDURE — 84300 ASSAY OF URINE SODIUM: CPT

## 2024-11-25 PROCEDURE — 2580000003 HC RX 258: Performed by: NURSE PRACTITIONER

## 2024-11-25 PROCEDURE — 83036 HEMOGLOBIN GLYCOSYLATED A1C: CPT

## 2024-11-25 PROCEDURE — 36415 COLL VENOUS BLD VENIPUNCTURE: CPT

## 2024-11-25 PROCEDURE — 94760 N-INVAS EAR/PLS OXIMETRY 1: CPT

## 2024-11-25 PROCEDURE — 6360000002 HC RX W HCPCS: Performed by: NURSE PRACTITIONER

## 2024-11-25 RX ORDER — LINEZOLID 2 MG/ML
600 INJECTION, SOLUTION INTRAVENOUS EVERY 12 HOURS
Status: DISCONTINUED | OUTPATIENT
Start: 2024-11-25 | End: 2024-11-26

## 2024-11-25 RX ORDER — ONDANSETRON 4 MG/1
4 TABLET, ORALLY DISINTEGRATING ORAL EVERY 8 HOURS PRN
Status: DISCONTINUED | OUTPATIENT
Start: 2024-11-25 | End: 2024-12-04 | Stop reason: HOSPADM

## 2024-11-25 RX ORDER — DOXYCYCLINE 100 MG/1
100 CAPSULE ORAL EVERY 12 HOURS SCHEDULED
Status: DISCONTINUED | OUTPATIENT
Start: 2024-11-25 | End: 2024-11-25

## 2024-11-25 RX ORDER — POTASSIUM CHLORIDE 7.45 MG/ML
10 INJECTION INTRAVENOUS PRN
Status: DISCONTINUED | OUTPATIENT
Start: 2024-11-25 | End: 2024-11-25

## 2024-11-25 RX ORDER — NICOTINE 21 MG/24HR
1 PATCH, TRANSDERMAL 24 HOURS TRANSDERMAL DAILY PRN
Status: DISCONTINUED | OUTPATIENT
Start: 2024-11-25 | End: 2024-12-04 | Stop reason: HOSPADM

## 2024-11-25 RX ORDER — MAGNESIUM SULFATE IN WATER 40 MG/ML
2000 INJECTION, SOLUTION INTRAVENOUS ONCE
Status: COMPLETED | OUTPATIENT
Start: 2024-11-25 | End: 2024-11-25

## 2024-11-25 RX ORDER — DEXTROSE MONOHYDRATE 100 MG/ML
INJECTION, SOLUTION INTRAVENOUS CONTINUOUS PRN
Status: DISCONTINUED | OUTPATIENT
Start: 2024-11-25 | End: 2024-11-25 | Stop reason: SDUPTHER

## 2024-11-25 RX ORDER — POLYETHYLENE GLYCOL 3350 17 G/17G
17 POWDER, FOR SOLUTION ORAL DAILY PRN
Status: DISCONTINUED | OUTPATIENT
Start: 2024-11-25 | End: 2024-12-04 | Stop reason: HOSPADM

## 2024-11-25 RX ORDER — INSULIN LISPRO 100 [IU]/ML
0-8 INJECTION, SOLUTION INTRAVENOUS; SUBCUTANEOUS
Status: DISCONTINUED | OUTPATIENT
Start: 2024-11-25 | End: 2024-12-04 | Stop reason: HOSPADM

## 2024-11-25 RX ORDER — BISACODYL 10 MG
10 SUPPOSITORY, RECTAL RECTAL DAILY PRN
Status: DISCONTINUED | OUTPATIENT
Start: 2024-11-25 | End: 2024-12-04 | Stop reason: HOSPADM

## 2024-11-25 RX ORDER — POTASSIUM CHLORIDE 1500 MG/1
40 TABLET, EXTENDED RELEASE ORAL ONCE
Status: COMPLETED | OUTPATIENT
Start: 2024-11-25 | End: 2024-11-25

## 2024-11-25 RX ORDER — TRAMADOL HYDROCHLORIDE 50 MG/1
50 TABLET ORAL EVERY 6 HOURS PRN
Status: DISCONTINUED | OUTPATIENT
Start: 2024-11-25 | End: 2024-12-04 | Stop reason: HOSPADM

## 2024-11-25 RX ORDER — SODIUM CHLORIDE 9 MG/ML
INJECTION, SOLUTION INTRAVENOUS CONTINUOUS
Status: DISCONTINUED | OUTPATIENT
Start: 2024-11-25 | End: 2024-11-25

## 2024-11-25 RX ORDER — ARFORMOTEROL TARTRATE 15 UG/2ML
15 SOLUTION RESPIRATORY (INHALATION)
Status: DISCONTINUED | OUTPATIENT
Start: 2024-11-25 | End: 2024-12-04 | Stop reason: HOSPADM

## 2024-11-25 RX ORDER — INSULIN LISPRO 100 [IU]/ML
0-8 INJECTION, SOLUTION INTRAVENOUS; SUBCUTANEOUS
Status: DISCONTINUED | OUTPATIENT
Start: 2024-11-25 | End: 2024-11-25 | Stop reason: SDUPTHER

## 2024-11-25 RX ORDER — SODIUM CHLORIDE 0.9 % (FLUSH) 0.9 %
5-40 SYRINGE (ML) INJECTION PRN
Status: DISCONTINUED | OUTPATIENT
Start: 2024-11-25 | End: 2024-12-04 | Stop reason: HOSPADM

## 2024-11-25 RX ORDER — ONDANSETRON 2 MG/ML
4 INJECTION INTRAMUSCULAR; INTRAVENOUS EVERY 6 HOURS PRN
Status: DISCONTINUED | OUTPATIENT
Start: 2024-11-25 | End: 2024-12-04 | Stop reason: HOSPADM

## 2024-11-25 RX ORDER — IBUPROFEN 400 MG/1
600 TABLET, FILM COATED ORAL EVERY 6 HOURS PRN
Status: DISCONTINUED | OUTPATIENT
Start: 2024-11-25 | End: 2024-12-04 | Stop reason: HOSPADM

## 2024-11-25 RX ORDER — SODIUM CHLORIDE 9 MG/ML
INJECTION, SOLUTION INTRAVENOUS PRN
Status: DISCONTINUED | OUTPATIENT
Start: 2024-11-25 | End: 2024-12-04 | Stop reason: HOSPADM

## 2024-11-25 RX ORDER — MAGNESIUM HYDROXIDE/ALUMINUM HYDROXICE/SIMETHICONE 120; 1200; 1200 MG/30ML; MG/30ML; MG/30ML
30 SUSPENSION ORAL EVERY 6 HOURS PRN
Status: DISCONTINUED | OUTPATIENT
Start: 2024-11-25 | End: 2024-12-04 | Stop reason: HOSPADM

## 2024-11-25 RX ORDER — IBUPROFEN 600 MG/1
1 TABLET ORAL PRN
Status: DISCONTINUED | OUTPATIENT
Start: 2024-11-25 | End: 2024-11-25 | Stop reason: SDUPTHER

## 2024-11-25 RX ORDER — EZETIMIBE 10 MG/1
10 TABLET ORAL NIGHTLY
Status: DISCONTINUED | OUTPATIENT
Start: 2024-11-25 | End: 2024-12-02

## 2024-11-25 RX ORDER — DEXTROSE MONOHYDRATE 25 G/50ML
25 INJECTION, SOLUTION INTRAVENOUS PRN
Status: DISCONTINUED | OUTPATIENT
Start: 2024-11-25 | End: 2024-11-29 | Stop reason: SDUPTHER

## 2024-11-25 RX ORDER — POTASSIUM CHLORIDE 1500 MG/1
40 TABLET, EXTENDED RELEASE ORAL PRN
Status: DISCONTINUED | OUTPATIENT
Start: 2024-11-25 | End: 2024-12-04 | Stop reason: HOSPADM

## 2024-11-25 RX ORDER — LEVALBUTEROL INHALATION SOLUTION 1.25 MG/3ML
1.25 SOLUTION RESPIRATORY (INHALATION) EVERY 6 HOURS PRN
Status: DISCONTINUED | OUTPATIENT
Start: 2024-11-25 | End: 2024-12-04 | Stop reason: HOSPADM

## 2024-11-25 RX ORDER — SODIUM CHLORIDE 0.9 % (FLUSH) 0.9 %
5-40 SYRINGE (ML) INJECTION EVERY 12 HOURS SCHEDULED
Status: DISCONTINUED | OUTPATIENT
Start: 2024-11-25 | End: 2024-12-04 | Stop reason: HOSPADM

## 2024-11-25 RX ORDER — METOPROLOL TARTRATE 50 MG
50 TABLET ORAL 2 TIMES DAILY
Status: DISCONTINUED | OUTPATIENT
Start: 2024-11-25 | End: 2024-11-26

## 2024-11-25 RX ORDER — ACETAMINOPHEN 325 MG/1
650 TABLET ORAL EVERY 6 HOURS PRN
Status: DISCONTINUED | OUTPATIENT
Start: 2024-11-25 | End: 2024-12-04 | Stop reason: HOSPADM

## 2024-11-25 RX ORDER — POTASSIUM CHLORIDE 7.45 MG/ML
10 INJECTION INTRAVENOUS PRN
Status: DISCONTINUED | OUTPATIENT
Start: 2024-11-25 | End: 2024-12-04 | Stop reason: HOSPADM

## 2024-11-25 RX ORDER — MAGNESIUM SULFATE IN WATER 40 MG/ML
2000 INJECTION, SOLUTION INTRAVENOUS PRN
Status: DISCONTINUED | OUTPATIENT
Start: 2024-11-25 | End: 2024-12-04 | Stop reason: HOSPADM

## 2024-11-25 RX ORDER — BUDESONIDE 0.5 MG/2ML
0.5 INHALANT ORAL
Status: DISCONTINUED | OUTPATIENT
Start: 2024-11-25 | End: 2024-12-04 | Stop reason: HOSPADM

## 2024-11-25 RX ORDER — BUDESONIDE AND FORMOTEROL FUMARATE DIHYDRATE 160; 4.5 UG/1; UG/1
2 AEROSOL RESPIRATORY (INHALATION)
Status: DISCONTINUED | OUTPATIENT
Start: 2024-11-25 | End: 2024-11-25 | Stop reason: SDUPTHER

## 2024-11-25 RX ORDER — DEXTROSE MONOHYDRATE 25 G/50ML
12.5 INJECTION, SOLUTION INTRAVENOUS PRN
Status: DISCONTINUED | OUTPATIENT
Start: 2024-11-25 | End: 2024-11-29 | Stop reason: SDUPTHER

## 2024-11-25 RX ORDER — IBUPROFEN 600 MG/1
1 TABLET ORAL PRN
Status: DISCONTINUED | OUTPATIENT
Start: 2024-11-25 | End: 2024-12-04 | Stop reason: HOSPADM

## 2024-11-25 RX ORDER — SPIRONOLACTONE 25 MG/1
12.5 TABLET ORAL DAILY
Status: DISCONTINUED | OUTPATIENT
Start: 2024-11-25 | End: 2024-12-02

## 2024-11-25 RX ORDER — FAMOTIDINE 20 MG/1
10 TABLET, FILM COATED ORAL DAILY PRN
Status: DISCONTINUED | OUTPATIENT
Start: 2024-11-25 | End: 2024-12-04 | Stop reason: HOSPADM

## 2024-11-25 RX ORDER — BUMETANIDE 1 MG/1
2 TABLET ORAL DAILY
Status: DISCONTINUED | OUTPATIENT
Start: 2024-11-25 | End: 2024-11-28

## 2024-11-25 RX ORDER — ACETAMINOPHEN 650 MG/1
650 SUPPOSITORY RECTAL EVERY 6 HOURS PRN
Status: DISCONTINUED | OUTPATIENT
Start: 2024-11-25 | End: 2024-12-04 | Stop reason: HOSPADM

## 2024-11-25 RX ORDER — GABAPENTIN 300 MG/1
300 CAPSULE ORAL 2 TIMES DAILY
Status: DISCONTINUED | OUTPATIENT
Start: 2024-11-25 | End: 2024-12-04

## 2024-11-25 RX ORDER — DILTIAZEM HYDROCHLORIDE 5 MG/ML
20 INJECTION INTRAVENOUS ONCE
Status: COMPLETED | OUTPATIENT
Start: 2024-11-25 | End: 2024-11-25

## 2024-11-25 RX ORDER — ALBUTEROL SULFATE 0.83 MG/ML
2.5 SOLUTION RESPIRATORY (INHALATION) EVERY 6 HOURS PRN
Status: DISCONTINUED | OUTPATIENT
Start: 2024-11-25 | End: 2024-11-25 | Stop reason: SDUPTHER

## 2024-11-25 RX ORDER — ASPIRIN 81 MG/1
81 TABLET, CHEWABLE ORAL DAILY
Status: DISCONTINUED | OUTPATIENT
Start: 2024-11-25 | End: 2024-12-04 | Stop reason: HOSPADM

## 2024-11-25 RX ORDER — NITROGLYCERIN 0.4 MG/1
0.4 TABLET SUBLINGUAL EVERY 5 MIN PRN
Status: DISCONTINUED | OUTPATIENT
Start: 2024-11-25 | End: 2024-12-04 | Stop reason: HOSPADM

## 2024-11-25 RX ORDER — METOPROLOL TARTRATE 1 MG/ML
5 INJECTION, SOLUTION INTRAVENOUS EVERY 5 MIN PRN
Status: COMPLETED | OUTPATIENT
Start: 2024-11-25 | End: 2024-11-26

## 2024-11-25 RX ORDER — ALBUTEROL SULFATE 0.83 MG/ML
2.5 SOLUTION RESPIRATORY (INHALATION) EVERY 6 HOURS PRN
Status: DISCONTINUED | OUTPATIENT
Start: 2024-11-25 | End: 2024-11-25

## 2024-11-25 RX ORDER — DEXTROSE MONOHYDRATE 100 MG/ML
INJECTION, SOLUTION INTRAVENOUS CONTINUOUS PRN
Status: DISCONTINUED | OUTPATIENT
Start: 2024-11-25 | End: 2024-12-04 | Stop reason: HOSPADM

## 2024-11-25 RX ADMIN — ONDANSETRON 4 MG: 2 INJECTION INTRAMUSCULAR; INTRAVENOUS at 23:10

## 2024-11-25 RX ADMIN — SODIUM CHLORIDE: 9 INJECTION, SOLUTION INTRAVENOUS at 03:47

## 2024-11-25 RX ADMIN — POTASSIUM CHLORIDE 40 MEQ: 1500 TABLET, EXTENDED RELEASE ORAL at 18:34

## 2024-11-25 RX ADMIN — LINEZOLID 600 MG: 600 INJECTION, SOLUTION INTRAVENOUS at 14:41

## 2024-11-25 RX ADMIN — GABAPENTIN 300 MG: 300 CAPSULE ORAL at 01:31

## 2024-11-25 RX ADMIN — BUDESONIDE INHALATION 500 MCG: 0.5 SUSPENSION RESPIRATORY (INHALATION) at 07:18

## 2024-11-25 RX ADMIN — CEFAZOLIN 1000 MG: 1 INJECTION, POWDER, FOR SOLUTION INTRAMUSCULAR; INTRAVENOUS at 22:17

## 2024-11-25 RX ADMIN — IPRATROPIUM BROMIDE 0.5 MG: 0.5 SOLUTION RESPIRATORY (INHALATION) at 07:18

## 2024-11-25 RX ADMIN — ASPIRIN 81 MG: 81 TABLET, CHEWABLE ORAL at 10:16

## 2024-11-25 RX ADMIN — APIXABAN 5 MG: 5 TABLET, FILM COATED ORAL at 20:56

## 2024-11-25 RX ADMIN — CEFAZOLIN 1000 MG: 1 INJECTION, POWDER, FOR SOLUTION INTRAMUSCULAR; INTRAVENOUS at 05:38

## 2024-11-25 RX ADMIN — METOPROLOL TARTRATE 5 MG: 1 INJECTION, SOLUTION INTRAVENOUS at 16:45

## 2024-11-25 RX ADMIN — IBUPROFEN 600 MG: 400 TABLET, FILM COATED ORAL at 03:39

## 2024-11-25 RX ADMIN — GABAPENTIN 300 MG: 300 CAPSULE ORAL at 10:16

## 2024-11-25 RX ADMIN — ARFORMOTEROL TARTRATE 15 MCG: 15 SOLUTION RESPIRATORY (INHALATION) at 19:48

## 2024-11-25 RX ADMIN — SODIUM CHLORIDE, PRESERVATIVE FREE 10 ML: 5 INJECTION INTRAVENOUS at 22:23

## 2024-11-25 RX ADMIN — ACETAMINOPHEN 650 MG: 325 TABLET ORAL at 10:19

## 2024-11-25 RX ADMIN — DILTIAZEM HYDROCHLORIDE 20 MG: 5 INJECTION, SOLUTION INTRAVENOUS at 21:27

## 2024-11-25 RX ADMIN — BUDESONIDE INHALATION 500 MCG: 0.5 SUSPENSION RESPIRATORY (INHALATION) at 19:45

## 2024-11-25 RX ADMIN — BUMETANIDE 2 MG: 1 TABLET ORAL at 10:16

## 2024-11-25 RX ADMIN — GABAPENTIN 300 MG: 300 CAPSULE ORAL at 20:56

## 2024-11-25 RX ADMIN — CEFAZOLIN 1000 MG: 1 INJECTION, POWDER, FOR SOLUTION INTRAMUSCULAR; INTRAVENOUS at 13:50

## 2024-11-25 RX ADMIN — LINEZOLID 600 MG: 600 INJECTION, SOLUTION INTRAVENOUS at 03:17

## 2024-11-25 RX ADMIN — EZETIMIBE 10 MG: 10 TABLET ORAL at 20:56

## 2024-11-25 RX ADMIN — TRAMADOL HYDROCHLORIDE 50 MG: 50 TABLET, COATED ORAL at 13:47

## 2024-11-25 RX ADMIN — SODIUM CHLORIDE, PRESERVATIVE FREE 10 ML: 5 INJECTION INTRAVENOUS at 09:00

## 2024-11-25 RX ADMIN — METOPROLOL TARTRATE 5 MG: 1 INJECTION, SOLUTION INTRAVENOUS at 15:51

## 2024-11-25 RX ADMIN — SODIUM CHLORIDE 5 MG/HR: 900 INJECTION, SOLUTION INTRAVENOUS at 21:42

## 2024-11-25 RX ADMIN — ACETAMINOPHEN 650 MG: 325 TABLET ORAL at 01:31

## 2024-11-25 RX ADMIN — INSULIN LISPRO 3 UNITS: 100 INJECTION, SOLUTION INTRAVENOUS; SUBCUTANEOUS at 20:56

## 2024-11-25 RX ADMIN — ARFORMOTEROL TARTRATE 15 MCG: 15 SOLUTION RESPIRATORY (INHALATION) at 07:18

## 2024-11-25 RX ADMIN — IPRATROPIUM BROMIDE 0.5 MG: 0.5 SOLUTION RESPIRATORY (INHALATION) at 19:47

## 2024-11-25 RX ADMIN — MAGNESIUM SULFATE HEPTAHYDRATE 2000 MG: 40 INJECTION, SOLUTION INTRAVENOUS at 10:29

## 2024-11-25 RX ADMIN — EZETIMIBE 10 MG: 10 TABLET ORAL at 01:31

## 2024-11-25 RX ADMIN — METOPROLOL TARTRATE 50 MG: 50 TABLET, FILM COATED ORAL at 01:31

## 2024-11-25 RX ADMIN — APIXABAN 5 MG: 5 TABLET, FILM COATED ORAL at 10:16

## 2024-11-25 RX ADMIN — METOPROLOL TARTRATE 50 MG: 50 TABLET, FILM COATED ORAL at 20:56

## 2024-11-25 RX ADMIN — ACETAMINOPHEN 650 MG: 325 TABLET ORAL at 15:51

## 2024-11-25 RX ADMIN — METOPROLOL TARTRATE 5 MG: 1 INJECTION, SOLUTION INTRAVENOUS at 16:22

## 2024-11-25 RX ADMIN — METOPROLOL TARTRATE 50 MG: 50 TABLET, FILM COATED ORAL at 10:16

## 2024-11-25 ASSESSMENT — PAIN SCALES - GENERAL
PAINLEVEL_OUTOF10: 9
PAINLEVEL_OUTOF10: 3
PAINLEVEL_OUTOF10: 10

## 2024-11-25 ASSESSMENT — PAIN DESCRIPTION - ORIENTATION
ORIENTATION: RIGHT;LEFT
ORIENTATION: LEFT

## 2024-11-25 ASSESSMENT — PAIN DESCRIPTION - LOCATION
LOCATION: LEG
LOCATION: CHEST;ARM;LEG

## 2024-11-25 ASSESSMENT — PAIN DESCRIPTION - DESCRIPTORS
DESCRIPTORS: BURNING;ACHING
DESCRIPTORS: BURNING

## 2024-11-25 NOTE — WOUND CARE
BLE with scars and hemosiderin staining, erythema and edema worse on left.  Both legs cleansed with CHG, zinc barrier applied kumar wounds and on all redness on left leg.    Right scabs and scars.        Left    Left leg has open areas, scabs and blisters with rash upper medial area.  Rash type is common with strep or staph scalding. Patient did say he recently soaked in epsom salts.

## 2024-11-25 NOTE — ED TRIAGE NOTES
Patient arrives via ems from home. Patient states he has had chest pressure that radiated to his back for the last 3 days. Patient denies taking Cardizem. Patient is in a-fib with a heat rate between 100-120 per EMS. 18G IV in the left antecubital.

## 2024-11-25 NOTE — CONSULTS
[facility] CARDIOLOGY   Initial Cardiac Evaluation                  Primary Cardiologist: Dr. Pride    Primary Care Physician: Meliza Martino PA-C    Admitting Physician: Hospitalist    Evaluating Physician: Dr. Pride    Subjective:     Patient is a 69 y.o. male with PMHx of CAD s/p CABG/AVR/left atrial appendage clipping/MAZE 3/15/2023, likely HFpEF, aortic stenosis s/p AVR, paroxysmal AF, HTN, HLD, GERD, COPD, PVD with non-healing leg ulcers, Bipolar disorder and DM with peripheral neuropathy who presented to the ED with c/o CP x 2 days. He denies fevers but has felt \"sweaty\" and reports a cough. Reports overall malaise for the past week. States he thought he had \"the flu.\" +N/V/D as well for the past week. Noted increased swelling in his legs and took some of his \"as needed Lasix.\" Reports he was urinating a lot but no improvement in the swelling. He also reports he has missed most of his meds for the past week. States he \"forgets\" to take his meds a lot of days. Remembers his gabapentin and metformin pretty well but states he forgets the others.    EMS reportedly found Pt in Afib RVR with -120s. He was brought to the ED.     In the ED: Pt ultimately found to have lower extremity cellulitis and sepsis with LA 3.1 and PCT 3.37. , Cr 1.45, Trop 59->49, proBNP 7568. He was given IV Abx and IVF resuscitation with 3L IVF. DDimer 3.72. CTA negative for large PE. He was admitted by the Hospitalist and continued on Abx. He was noted on tele to have some runs of SVT overnight. Noted Tmax 101.3F. Cardiology is asked to evaluate SVT.       Cardiovascular Testing:  -Echo 3/12/2024: LVEF 60-65%, diastolic function not assessed.  RV mildly dilated, normal systolic function.  No pericardial effusion.  -Cath 11/16/2023: Severe native CAD, patent LIMA to LAD, patent SVG to OM1, PDA stenosis, sub-2 mm vessel left medical therapy.  - Echo 11/16/23: LVEF 55-60 %, RV normal, Valves: Bioprosthetic AVR peak 14 mmHg, Ao 42 mm.

## 2024-11-25 NOTE — ED PROVIDER NOTES
Emergency Department Provider Note       PCP: Meliza Martino PA-C   Age: 69 y.o.   Sex: male     DISPOSITION Decision To Admit 11/24/2024 11:20:09 PM            ICD-10-CM    1. Septicemia (HCC)  A41.9       2. Cellulitis of left lower extremity  L03.116       3. Hyponatremia  E87.1           Medical Decision Making     Patient appears likely septic.  Although blood pressure remained stable, he is mildly tachycardic at 116 bpm fluid bolus is continuing.  Temperature 99.  White blood cell count is elevated at 22,000, procalcitonin 3.37.  Moderate hyponatremia, with a sodium of 127.  Carbon dioxide is 19 anion gap is normal.  Troponin is 59.  D-dimer was also elevated at 3.72.  Chest CT was obtained which had suboptimal contrast but no large PE is noted.  There is some vascular congestion noted in the lower lobes.  Will refer to hospitalist for admission with likely cutaneous source given the cellulitis of the left lower extremity.     1 acute complicated illness or injury.  Shared medical decision making was utilized in creating the patients health plan today.    I independently ordered and reviewed each unique test.       I interpreted the EKG rhythm strip performed at 2122 shows a sinus tachycardia with frequent PACs.  Probable left atrial enlargement.  Left-ventricular perjury with repolarization abnormality.  No acute ischemic changes..    The management of this patient was discussed with an external consultant.      SEP-1 CORE MEASURE    Fluid Resuscitation Rational: at least 30mL/kg based on entered actual weight at time of evaluation    Repeat Lactate Level: ordered and pending at this time    Reassessment Exam: I completed the sepsis fluid reassessment on 11/24/24 11:19 PM EST.     Critical Care Procedure Note: 90 minutes of critical care time was performed in the emergency department.  This time was separate from any other procedures listed during the patients emergency department course. The failure to

## 2024-11-25 NOTE — H&P
pulmonary embolism in the main pulmonary artery. The distal branches are inadequately opacified and small PEs cannot be excluded. 2.  Mild pulmonary congestion. No focal consolidation. Electronically signed by ARCADIO BORRERO    XR CHEST PORTABLE    Result Date: 11/24/2024  XR CHEST PORTABLE INDICATION: cough, fever. REFERENCE: AUGUST 18, 2024 FINDINGS: A single AP of the chest demonstrates enlarged heart. Bilateral infiltrates has improved. There is no evidence of pneumothorax or pleural effusion.     Diminished bilateral infiltrates. Electronically signed by ARCADIO BORRERO        Signed:  Rayray Neff MD    Part of this note may have been written by using a voice dictation software.  The note has been proof read but may still contain some grammatical/other typographical errors.

## 2024-11-26 ENCOUNTER — APPOINTMENT (OUTPATIENT)
Dept: CT IMAGING | Age: 69
DRG: 871 | End: 2024-11-26
Payer: MEDICAID

## 2024-11-26 LAB
ANION GAP SERPL CALC-SCNC: 15 MMOL/L (ref 7–16)
BASOPHILS # BLD: 0.2 K/UL (ref 0–0.2)
BASOPHILS NFR BLD: 0 % (ref 0–2)
BUN SERPL-MCNC: 35 MG/DL (ref 8–23)
CALCIUM SERPL-MCNC: 8.3 MG/DL (ref 8.8–10.2)
CHLORIDE SERPL-SCNC: 95 MMOL/L (ref 98–107)
CO2 SERPL-SCNC: 18 MMOL/L (ref 20–29)
CREAT SERPL-MCNC: 1.19 MG/DL (ref 0.8–1.3)
DIFFERENTIAL METHOD BLD: ABNORMAL
EOSINOPHIL # BLD: 0 K/UL (ref 0–0.8)
EOSINOPHIL NFR BLD: 0 % (ref 0.5–7.8)
ERYTHROCYTE [DISTWIDTH] IN BLOOD BY AUTOMATED COUNT: 15.2 % (ref 11.9–14.6)
GLUCOSE BLD STRIP.AUTO-MCNC: 140 MG/DL (ref 65–100)
GLUCOSE BLD STRIP.AUTO-MCNC: 160 MG/DL (ref 65–100)
GLUCOSE BLD STRIP.AUTO-MCNC: 182 MG/DL (ref 65–100)
GLUCOSE BLD STRIP.AUTO-MCNC: 213 MG/DL (ref 65–100)
GLUCOSE SERPL-MCNC: 203 MG/DL (ref 70–99)
HCT VFR BLD AUTO: 48 % (ref 41.1–50.3)
HGB BLD-MCNC: 16 G/DL (ref 13.6–17.2)
IMM GRANULOCYTES # BLD AUTO: 0.4 K/UL (ref 0–0.5)
IMM GRANULOCYTES NFR BLD AUTO: 1 % (ref 0–5)
LACTATE SERPL-SCNC: 1.9 MMOL/L (ref 0.5–2)
LYMPHOCYTES # BLD: 1 K/UL (ref 0.5–4.6)
LYMPHOCYTES NFR BLD: 3 % (ref 13–44)
MAGNESIUM SERPL-MCNC: 2.1 MG/DL (ref 1.8–2.4)
MCH RBC QN AUTO: 28.7 PG (ref 26.1–32.9)
MCHC RBC AUTO-ENTMCNC: 33.3 G/DL (ref 31.4–35)
MCV RBC AUTO: 86 FL (ref 82–102)
MONOCYTES # BLD: 0.9 K/UL (ref 0.1–1.3)
MONOCYTES NFR BLD: 3 % (ref 4–12)
NEUTS SEG # BLD: 33.9 K/UL (ref 1.7–8.2)
NEUTS SEG NFR BLD: 93 % (ref 43–78)
NRBC # BLD: 0 K/UL (ref 0–0.2)
PLATELET # BLD AUTO: 125 K/UL (ref 150–450)
PMV BLD AUTO: 11.3 FL (ref 9.4–12.3)
POTASSIUM SERPL-SCNC: 4.5 MMOL/L (ref 3.5–5.1)
PROCALCITONIN SERPL-MCNC: 4.59 NG/ML (ref 0–0.1)
RBC # BLD AUTO: 5.58 M/UL (ref 4.23–5.6)
SERVICE CMNT-IMP: ABNORMAL
SODIUM SERPL-SCNC: 128 MMOL/L (ref 136–145)
WBC # BLD AUTO: 36.4 K/UL (ref 4.3–11.1)

## 2024-11-26 PROCEDURE — 73700 CT LOWER EXTREMITY W/O DYE: CPT

## 2024-11-26 PROCEDURE — 2500000003 HC RX 250 WO HCPCS: Performed by: NURSE PRACTITIONER

## 2024-11-26 PROCEDURE — 6360000002 HC RX W HCPCS: Performed by: INTERNAL MEDICINE

## 2024-11-26 PROCEDURE — 6370000000 HC RX 637 (ALT 250 FOR IP): Performed by: INTERNAL MEDICINE

## 2024-11-26 PROCEDURE — 83735 ASSAY OF MAGNESIUM: CPT

## 2024-11-26 PROCEDURE — 1100000003 HC PRIVATE W/ TELEMETRY

## 2024-11-26 PROCEDURE — 82962 GLUCOSE BLOOD TEST: CPT

## 2024-11-26 PROCEDURE — 80048 BASIC METABOLIC PNL TOTAL CA: CPT

## 2024-11-26 PROCEDURE — 94761 N-INVAS EAR/PLS OXIMETRY MLT: CPT

## 2024-11-26 PROCEDURE — 83605 ASSAY OF LACTIC ACID: CPT

## 2024-11-26 PROCEDURE — 99232 SBSQ HOSP IP/OBS MODERATE 35: CPT | Performed by: INTERNAL MEDICINE

## 2024-11-26 PROCEDURE — 2580000003 HC RX 258: Performed by: INTERNAL MEDICINE

## 2024-11-26 PROCEDURE — 6360000002 HC RX W HCPCS

## 2024-11-26 PROCEDURE — 84145 PROCALCITONIN (PCT): CPT

## 2024-11-26 PROCEDURE — 85025 COMPLETE CBC W/AUTO DIFF WBC: CPT

## 2024-11-26 PROCEDURE — 36415 COLL VENOUS BLD VENIPUNCTURE: CPT

## 2024-11-26 PROCEDURE — 6370000000 HC RX 637 (ALT 250 FOR IP)

## 2024-11-26 PROCEDURE — 94640 AIRWAY INHALATION TREATMENT: CPT

## 2024-11-26 PROCEDURE — 2580000003 HC RX 258: Performed by: NURSE PRACTITIONER

## 2024-11-26 PROCEDURE — 2700000000 HC OXYGEN THERAPY PER DAY

## 2024-11-26 PROCEDURE — 2580000003 HC RX 258

## 2024-11-26 RX ORDER — METOPROLOL TARTRATE 50 MG
50 TABLET ORAL EVERY 6 HOURS
Status: DISCONTINUED | OUTPATIENT
Start: 2024-11-26 | End: 2024-11-28

## 2024-11-26 RX ORDER — LINEZOLID 2 MG/ML
600 INJECTION, SOLUTION INTRAVENOUS EVERY 12 HOURS
Status: DISCONTINUED | OUTPATIENT
Start: 2024-11-26 | End: 2024-12-02

## 2024-11-26 RX ORDER — CLINDAMYCIN PHOSPHATE 900 MG/50ML
900 INJECTION, SOLUTION INTRAVENOUS EVERY 8 HOURS
Status: DISCONTINUED | OUTPATIENT
Start: 2024-11-26 | End: 2024-11-26

## 2024-11-26 RX ADMIN — INSULIN LISPRO 3 UNITS: 100 INJECTION, SOLUTION INTRAVENOUS; SUBCUTANEOUS at 12:25

## 2024-11-26 RX ADMIN — IPRATROPIUM BROMIDE 0.5 MG: 0.5 SOLUTION RESPIRATORY (INHALATION) at 07:16

## 2024-11-26 RX ADMIN — GABAPENTIN 300 MG: 300 CAPSULE ORAL at 20:14

## 2024-11-26 RX ADMIN — BUDESONIDE INHALATION 500 MCG: 0.5 SUSPENSION RESPIRATORY (INHALATION) at 07:16

## 2024-11-26 RX ADMIN — EZETIMIBE 10 MG: 10 TABLET ORAL at 20:13

## 2024-11-26 RX ADMIN — LINEZOLID 600 MG: 600 INJECTION, SOLUTION INTRAVENOUS at 03:14

## 2024-11-26 RX ADMIN — ACETAMINOPHEN 650 MG: 325 TABLET ORAL at 09:00

## 2024-11-26 RX ADMIN — EMPAGLIFLOZIN 10 MG: 10 TABLET, FILM COATED ORAL at 09:00

## 2024-11-26 RX ADMIN — BUMETANIDE 2 MG: 1 TABLET ORAL at 09:00

## 2024-11-26 RX ADMIN — SODIUM CHLORIDE 12.5 MG/HR: 900 INJECTION, SOLUTION INTRAVENOUS at 20:10

## 2024-11-26 RX ADMIN — LINEZOLID 600 MG: 600 INJECTION, SOLUTION INTRAVENOUS at 14:33

## 2024-11-26 RX ADMIN — SODIUM CHLORIDE 15 MG/HR: 900 INJECTION, SOLUTION INTRAVENOUS at 03:20

## 2024-11-26 RX ADMIN — METOPROLOL TARTRATE 50 MG: 50 TABLET, FILM COATED ORAL at 09:00

## 2024-11-26 RX ADMIN — CEFAZOLIN 1000 MG: 1 INJECTION, POWDER, FOR SOLUTION INTRAMUSCULAR; INTRAVENOUS at 14:29

## 2024-11-26 RX ADMIN — CEFAZOLIN 1000 MG: 1 INJECTION, POWDER, FOR SOLUTION INTRAMUSCULAR; INTRAVENOUS at 05:49

## 2024-11-26 RX ADMIN — PIPERACILLIN AND TAZOBACTAM 4500 MG: 4; .5 INJECTION, POWDER, FOR SOLUTION INTRAVENOUS at 16:39

## 2024-11-26 RX ADMIN — METOPROLOL TARTRATE 50 MG: 50 TABLET, FILM COATED ORAL at 20:13

## 2024-11-26 RX ADMIN — METOPROLOL TARTRATE 5 MG: 1 INJECTION, SOLUTION INTRAVENOUS at 05:59

## 2024-11-26 RX ADMIN — PIPERACILLIN AND TAZOBACTAM 3375 MG: 3; .375 INJECTION, POWDER, LYOPHILIZED, FOR SOLUTION INTRAVENOUS at 20:57

## 2024-11-26 RX ADMIN — ARFORMOTEROL TARTRATE 15 MCG: 15 SOLUTION RESPIRATORY (INHALATION) at 07:16

## 2024-11-26 RX ADMIN — SODIUM CHLORIDE, PRESERVATIVE FREE 10 ML: 5 INJECTION INTRAVENOUS at 20:12

## 2024-11-26 RX ADMIN — ASPIRIN 81 MG: 81 TABLET, CHEWABLE ORAL at 09:00

## 2024-11-26 RX ADMIN — APIXABAN 5 MG: 5 TABLET, FILM COATED ORAL at 09:00

## 2024-11-26 RX ADMIN — GABAPENTIN 300 MG: 300 CAPSULE ORAL at 09:00

## 2024-11-26 RX ADMIN — METOPROLOL TARTRATE 50 MG: 50 TABLET, FILM COATED ORAL at 14:33

## 2024-11-26 RX ADMIN — CLINDAMYCIN PHOSPHATE 900 MG: 900 INJECTION, SOLUTION INTRAVENOUS at 16:38

## 2024-11-26 NOTE — CONSULTS
H&P/Consult Note/Progress Note/Office Note:   Marc Horton Sr.  MRN: [id number]  :1955  Age:69 y.o.    HPI: Marc Horton Sr. is a 69 y.o. male who we are asked by Internal Medicine to see for LLE concern for necrotizing fasciitis. The patient has a complicated PMHx of Diabetes mellitus type 2, s/p AVR, afib s/p atrial appendage ligation and MAZE on eliquis, PAD, cad s/p cabg x 2,HFpEF, COPD, HTN and MDD with psychotic features and behavioral disturbance on Remeron per psych, previous smoker.  He has chronic venous stasis disease involving both legs.  This is documented in care everywhere in Epic with photos.  Last photos seen at [facility] from a H&P 24 shows chronic venous skin changes equal in both legs below the knees, no open lesions or extreme edema or erythema.      He presented with a 2 day history of chest pain. He was admitted on 2024 for Chest pain and cellulitis to the lower extremity. Cardiology is following, no longer with on going angina. Afib RVR.     The patient is a poor historian.  Most information is gained from [facility] and Wound Care RN here at [facility].  The patient has been soaking both lower extremities at home in Epsom Salts.  Since admission the LLE rash appearing lesions are migrating up his inner thigh.  Left lateral ankle is now more painful with touch with development of new purple blisters.  Wound care has been applying local care with zinc, coban, kerlix to RLE and LLE using Xeroform, abd, kerlix.      Significant labs since admission:  LA 2.7 (23:32 )-->1.4 ( 05:25), today 1.9 at 09:13  WBC: 22.3 -->36.4  BC 2 days -->no growth  Blood glucose: 232   LFT's WNL  BNP 7568, D dimer 3.72    Vital signs: admission T 99.0, RR 23, , /62  T max since admission: 102.8F today, RR 22, , /73, MAP 89           Past Medical History:   Diagnosis Date    Bipolar disorder (HCC)     CAD (coronary artery disease)     CHF (congestive heart

## 2024-11-26 NOTE — WOUND CARE
Right leg improved,  zinc, abd kerlex and coban applied will plan to change again tomorrow then 3 times per week.     Left leg worsened, increased redness, edema an new purple blisters left lateral ankle area.  Extreme pain in this area when palpitated.  Concerning for severe infection ? Necrotizing cellulitis/ fascitis? Dr. Sánchez notified. Today acticoat and kerlex applied. Rash going up medial leg is hs similar to yesterday. Over night can use xeroform abd's and kerlex as needed. Nurse updated.

## 2024-11-26 NOTE — ICUWATCH
RRT Clinical Rounding Nurse Progress Report      SUBJECTIVE: Patient assessed secondary to RN/provider concern - Sustained Afib RVR.      Vitals:    11/25/24 2147 11/25/24 2151 11/25/24 2203 11/25/24 2223   BP: 127/64 122/66 120/73 132/75   Pulse: (!) 117 (!) 112 (!) 113 (!) 135   Resp:       Temp:       TempSrc:       SpO2: 97% 97% 96% 98%   Weight:       Height:            DETERIORATION INDEX SCORE: 49    ASSESSMENT:  Called to assess pt d/t HR sustaining 160s Afib RVR, jumping to 190s.. per remote tele, HR has been elevated since ~1500.    Pt symptomatic of HR- c/o shortness of breath, dyspnea with exertion, weakness, and chest pain. Chest pain described at pain/burning over left chest radiating to arm/legs.     Pt placed on LifePak monitor with  afib RVR, O2 sat 89%. 2L NC applied with O2 sat improving to 97%.     Pt discussed with hospitalist NP. Orders received for IV dilt bolus and gtt. Dilt bolus given and gtt started by this RN.     PLAN:   Pt to transfer to telemetry unit.     Celestina Vyas RN  [facility]: [phone]  [facility]: [phone]

## 2024-11-27 LAB
ANION GAP SERPL CALC-SCNC: 11 MMOL/L (ref 7–16)
BUN SERPL-MCNC: 42 MG/DL (ref 8–23)
CALCIUM SERPL-MCNC: 8.5 MG/DL (ref 8.8–10.2)
CHLORIDE SERPL-SCNC: 96 MMOL/L (ref 98–107)
CO2 SERPL-SCNC: 21 MMOL/L (ref 20–29)
CREAT SERPL-MCNC: 1.14 MG/DL (ref 0.8–1.3)
EKG DIAGNOSIS: NORMAL
EKG Q-T INTERVAL: 344 MS
EKG QRS DURATION: 90 MS
EKG QTC CALCULATION (BAZETT): 413 MS
EKG R AXIS: 38 DEGREES
EKG T AXIS: 70 DEGREES
EKG VENTRICULAR RATE: 87 BPM
ERYTHROCYTE [DISTWIDTH] IN BLOOD BY AUTOMATED COUNT: 15.6 % (ref 11.9–14.6)
GLUCOSE BLD STRIP.AUTO-MCNC: 118 MG/DL (ref 65–100)
GLUCOSE BLD STRIP.AUTO-MCNC: 125 MG/DL (ref 65–100)
GLUCOSE BLD STRIP.AUTO-MCNC: 144 MG/DL (ref 65–100)
GLUCOSE SERPL-MCNC: 159 MG/DL (ref 70–99)
HCT VFR BLD AUTO: 44.5 % (ref 41.1–50.3)
HEMOCCULT STL QL: POSITIVE
HGB BLD-MCNC: 14.9 G/DL (ref 13.6–17.2)
MAGNESIUM SERPL-MCNC: 2.3 MG/DL (ref 1.8–2.4)
MCH RBC QN AUTO: 29.1 PG (ref 26.1–32.9)
MCHC RBC AUTO-ENTMCNC: 33.5 G/DL (ref 31.4–35)
MCV RBC AUTO: 86.9 FL (ref 82–102)
NRBC # BLD: 0 K/UL (ref 0–0.2)
PLATELET # BLD AUTO: 131 K/UL (ref 150–450)
PMV BLD AUTO: 11 FL (ref 9.4–12.3)
POTASSIUM SERPL-SCNC: 4.1 MMOL/L (ref 3.5–5.1)
RBC # BLD AUTO: 5.12 M/UL (ref 4.23–5.6)
SERVICE CMNT-IMP: ABNORMAL
SODIUM SERPL-SCNC: 128 MMOL/L (ref 136–145)
WBC # BLD AUTO: 39.1 K/UL (ref 4.3–11.1)

## 2024-11-27 PROCEDURE — 94640 AIRWAY INHALATION TREATMENT: CPT

## 2024-11-27 PROCEDURE — 2500000003 HC RX 250 WO HCPCS: Performed by: NURSE PRACTITIONER

## 2024-11-27 PROCEDURE — 6360000002 HC RX W HCPCS: Performed by: INTERNAL MEDICINE

## 2024-11-27 PROCEDURE — 6360000002 HC RX W HCPCS: Performed by: NURSE PRACTITIONER

## 2024-11-27 PROCEDURE — 29581 APPL MULTLAYER CMPRN SYS LEG: CPT

## 2024-11-27 PROCEDURE — 82272 OCCULT BLD FECES 1-3 TESTS: CPT

## 2024-11-27 PROCEDURE — 6370000000 HC RX 637 (ALT 250 FOR IP): Performed by: NURSE PRACTITIONER

## 2024-11-27 PROCEDURE — 6360000002 HC RX W HCPCS

## 2024-11-27 PROCEDURE — 2580000003 HC RX 258: Performed by: INTERNAL MEDICINE

## 2024-11-27 PROCEDURE — 85027 COMPLETE CBC AUTOMATED: CPT

## 2024-11-27 PROCEDURE — 1100000003 HC PRIVATE W/ TELEMETRY

## 2024-11-27 PROCEDURE — 94761 N-INVAS EAR/PLS OXIMETRY MLT: CPT

## 2024-11-27 PROCEDURE — 93005 ELECTROCARDIOGRAM TRACING: CPT | Performed by: INTERNAL MEDICINE

## 2024-11-27 PROCEDURE — 80048 BASIC METABOLIC PNL TOTAL CA: CPT

## 2024-11-27 PROCEDURE — 6370000000 HC RX 637 (ALT 250 FOR IP): Performed by: INTERNAL MEDICINE

## 2024-11-27 PROCEDURE — 83735 ASSAY OF MAGNESIUM: CPT

## 2024-11-27 PROCEDURE — 94664 DEMO&/EVAL PT USE INHALER: CPT

## 2024-11-27 PROCEDURE — 2700000000 HC OXYGEN THERAPY PER DAY

## 2024-11-27 PROCEDURE — 99222 1ST HOSP IP/OBS MODERATE 55: CPT | Performed by: SURGERY

## 2024-11-27 PROCEDURE — 2580000003 HC RX 258

## 2024-11-27 PROCEDURE — 99232 SBSQ HOSP IP/OBS MODERATE 35: CPT | Performed by: INTERNAL MEDICINE

## 2024-11-27 PROCEDURE — 6370000000 HC RX 637 (ALT 250 FOR IP)

## 2024-11-27 PROCEDURE — 2580000003 HC RX 258: Performed by: NURSE PRACTITIONER

## 2024-11-27 PROCEDURE — 93010 ELECTROCARDIOGRAM REPORT: CPT | Performed by: INTERNAL MEDICINE

## 2024-11-27 PROCEDURE — 36415 COLL VENOUS BLD VENIPUNCTURE: CPT

## 2024-11-27 PROCEDURE — 82962 GLUCOSE BLOOD TEST: CPT

## 2024-11-27 RX ADMIN — SODIUM CHLORIDE, PRESERVATIVE FREE 10 ML: 5 INJECTION INTRAVENOUS at 09:43

## 2024-11-27 RX ADMIN — EMPAGLIFLOZIN 10 MG: 10 TABLET, FILM COATED ORAL at 09:43

## 2024-11-27 RX ADMIN — TRAMADOL HYDROCHLORIDE 50 MG: 50 TABLET, COATED ORAL at 17:48

## 2024-11-27 RX ADMIN — SODIUM CHLORIDE 7.5 MG/HR: 900 INJECTION, SOLUTION INTRAVENOUS at 14:03

## 2024-11-27 RX ADMIN — ACETAMINOPHEN 650 MG: 325 TABLET ORAL at 01:01

## 2024-11-27 RX ADMIN — METOPROLOL TARTRATE 50 MG: 50 TABLET, FILM COATED ORAL at 04:06

## 2024-11-27 RX ADMIN — LINEZOLID 600 MG: 600 INJECTION, SOLUTION INTRAVENOUS at 04:13

## 2024-11-27 RX ADMIN — EZETIMIBE 10 MG: 10 TABLET ORAL at 20:12

## 2024-11-27 RX ADMIN — SODIUM CHLORIDE 12.5 MG/HR: 900 INJECTION, SOLUTION INTRAVENOUS at 04:20

## 2024-11-27 RX ADMIN — METOPROLOL TARTRATE 50 MG: 50 TABLET, FILM COATED ORAL at 09:43

## 2024-11-27 RX ADMIN — GABAPENTIN 300 MG: 300 CAPSULE ORAL at 09:43

## 2024-11-27 RX ADMIN — ACETAMINOPHEN 650 MG: 325 TABLET ORAL at 20:36

## 2024-11-27 RX ADMIN — IPRATROPIUM BROMIDE 0.5 MG: 0.5 SOLUTION RESPIRATORY (INHALATION) at 08:07

## 2024-11-27 RX ADMIN — METOPROLOL TARTRATE 50 MG: 50 TABLET, FILM COATED ORAL at 17:48

## 2024-11-27 RX ADMIN — GABAPENTIN 300 MG: 300 CAPSULE ORAL at 20:17

## 2024-11-27 RX ADMIN — BUMETANIDE 2 MG: 1 TABLET ORAL at 09:43

## 2024-11-27 RX ADMIN — ACETAMINOPHEN 650 MG: 325 TABLET ORAL at 12:43

## 2024-11-27 RX ADMIN — ARFORMOTEROL TARTRATE 15 MCG: 15 SOLUTION RESPIRATORY (INHALATION) at 19:14

## 2024-11-27 RX ADMIN — TRAMADOL HYDROCHLORIDE 50 MG: 50 TABLET, COATED ORAL at 09:43

## 2024-11-27 RX ADMIN — PIPERACILLIN AND TAZOBACTAM 3375 MG: 3; .375 INJECTION, POWDER, LYOPHILIZED, FOR SOLUTION INTRAVENOUS at 14:03

## 2024-11-27 RX ADMIN — APIXABAN 5 MG: 5 TABLET, FILM COATED ORAL at 13:04

## 2024-11-27 RX ADMIN — METOPROLOL TARTRATE 50 MG: 50 TABLET, FILM COATED ORAL at 20:12

## 2024-11-27 RX ADMIN — SODIUM CHLORIDE, PRESERVATIVE FREE 10 ML: 5 INJECTION INTRAVENOUS at 20:15

## 2024-11-27 RX ADMIN — BUDESONIDE INHALATION 500 MCG: 0.5 SUSPENSION RESPIRATORY (INHALATION) at 08:07

## 2024-11-27 RX ADMIN — ARFORMOTEROL TARTRATE 15 MCG: 15 SOLUTION RESPIRATORY (INHALATION) at 08:07

## 2024-11-27 RX ADMIN — BUDESONIDE INHALATION 500 MCG: 0.5 SUSPENSION RESPIRATORY (INHALATION) at 19:14

## 2024-11-27 RX ADMIN — PIPERACILLIN AND TAZOBACTAM 3375 MG: 3; .375 INJECTION, POWDER, LYOPHILIZED, FOR SOLUTION INTRAVENOUS at 05:32

## 2024-11-27 RX ADMIN — PIPERACILLIN AND TAZOBACTAM 3375 MG: 3; .375 INJECTION, POWDER, LYOPHILIZED, FOR SOLUTION INTRAVENOUS at 21:38

## 2024-11-27 RX ADMIN — ASPIRIN 81 MG: 81 TABLET, CHEWABLE ORAL at 09:43

## 2024-11-27 RX ADMIN — APIXABAN 5 MG: 5 TABLET, FILM COATED ORAL at 20:17

## 2024-11-27 RX ADMIN — LINEZOLID 600 MG: 600 INJECTION, SOLUTION INTRAVENOUS at 17:46

## 2024-11-27 RX ADMIN — IPRATROPIUM BROMIDE 0.5 MG: 0.5 SOLUTION RESPIRATORY (INHALATION) at 19:15

## 2024-11-27 ASSESSMENT — PAIN DESCRIPTION - LOCATION
LOCATION: LEG
LOCATION: HEAD;LEG
LOCATION: LEG
LOCATION: HEAD;LEG

## 2024-11-27 ASSESSMENT — PAIN SCALES - GENERAL
PAINLEVEL_OUTOF10: 0
PAINLEVEL_OUTOF10: 8
PAINLEVEL_OUTOF10: 8
PAINLEVEL_OUTOF10: 0
PAINLEVEL_OUTOF10: 8
PAINLEVEL_OUTOF10: 7

## 2024-11-27 ASSESSMENT — PAIN DESCRIPTION - DESCRIPTORS
DESCRIPTORS: ACHING;DISCOMFORT;TENDER
DESCRIPTORS: ACHING;DISCOMFORT
DESCRIPTORS: ACHING;DISCOMFORT;THROBBING

## 2024-11-27 ASSESSMENT — PAIN DESCRIPTION - ORIENTATION
ORIENTATION: LEFT;RIGHT
ORIENTATION: RIGHT;LEFT

## 2024-11-27 ASSESSMENT — PAIN DESCRIPTION - FREQUENCY: FREQUENCY: INTERMITTENT

## 2024-11-27 ASSESSMENT — PAIN DESCRIPTION - ONSET: ONSET: ON-GOING

## 2024-11-27 ASSESSMENT — PAIN DESCRIPTION - PAIN TYPE
TYPE: ACUTE PAIN
TYPE: ACUTE PAIN

## 2024-11-27 NOTE — ICUWATCH
RRT Clinical Rounding Nurse Progress Report      SUBJECTIVE: Patient assessed secondary to RN/provider concern - lethargy, febrile, Afib RVR.      Vitals:    11/27/24 0406 11/27/24 0408 11/27/24 0757 11/27/24 0813   BP: 124/75 (!) 128/96 119/81    Pulse: (!) 108 56 91 87   Resp:  18  15   Temp:  97.5 °F (36.4 °C) 98.4 °F (36.9 °C)    TempSrc:  Oral     SpO2:  98% 99% 99%   Weight:  103.2 kg (227 lb 8 oz)     Height:  1.829 m (6')          ASSESSMENT:  Pt lying quietly in bed, finishing up breakfast.  A&Ox4.  Follows commands.  On 1L NC, O2 Sat 98%.  Lung sounds clear/diminished.  Afib, HR 70-80s, rate controlled on dilt gtt @ 12.5 mg/hr.  BP 120s/90s.  Afebrile during the night. Pt has BLE wounds-- RLE less edematous with compression wrap cdi;   LLE with 3+ edema, sinai/dusky in appearance, blistering, wrapped with gauze dressing/cdi, pedal pulse weak but palpable.  Able to wiggle toes.  LLE painful with movement and to touch.  Does c/o chronic back and leg pain-- requesting tylenol-- Primary RN notified.      PLAN:  Will follow per RRT Clinical Rounding Program protocol.  CT LLE done yesterday and results pending. WBC 39.1 (up from 36.4).  Na 128. Plt 131.  Awaiting gen sx input.    Jadyn Shaffer RN  [facility]: [phone]  [facility]: [phone]

## 2024-11-27 NOTE — CONSULTS
Infectious Disease Consult      Today's Date: 11/27/2024   Admit Date: 11/24/2024  YOB: 1955    Impression:   Sepsis 2/2 LLE cellulitis  LLE cellulitis in setting of venous stasis/chronic wounds  PAD contributing to above  History of CAD  H/o afib, usually on eliquis, currently on hold  Mood disorder with psychotic features    No current evidence of necr fasc or necr cellulitis he just has a bad cellulitis in the setting of chronic venous stasis and edema. The leg needs to be aggressively elevated and he needs ongoing wound care. With the appearance of the wound I'm most concerned for strep. Linezolid has toxin binding. Recommend continuing linezolid to complete 7 days. As there are no cultures to guide therapy I do not know that he needs pseudomonal coverage but ok to continue pip/tazo through today. Would recommend transitioning this to ceftriaxone 2g if he is continuing to improve after 48 hours, and recommend 14 total days of therapy with keflex 500mg QID or duricef 500mg BID.      Plan:   Recommend elevation of the leg and aggressive wound care  Linezolid x 7 days then stop - this can be PO as IV/PO are equivocal  Pip/tazo x 48 more hours then if continuing to improve, transition to ceftriaxone 2g q24h  When ready for discharge, ceftriaxone can be keflex 500mg QID or duricef 500mg BID to complete 14 total days    Thank you for allowing us to participate in the care of this patient, ID will sign off at this time. Please don't hesitate to contact us with further questions or concerns.      Anti-infectives:   Linezolid 11/24-present  Pip/tazo 11/26-present    Subjective:   Date of Consultation:  November 27, 2024  Date of Admission: 11/24/2024   Referring Provider: Gonzalo  Reason for consult: necr fasc    Patient is a 69 y.o. male with PMH of CAD, PAD with chronic LE ulcers and mood disorder who presented initially for chest pain per EMS but on arrival reported issues with his leg. Developed fever

## 2024-11-27 NOTE — ICUWATCH
RRT Clinical Rounding Nurse Update    Vitals:    11/27/24 0300 11/27/24 0400 11/27/24 0406 11/27/24 0408   BP: 107/70 124/75 124/75 (!) 128/96   Pulse: 99 97 (!) 108 56   Resp:    18   Temp:    97.5 °F (36.4 °C)   TempSrc:    Oral   SpO2: 94% 97%  98%   Weight:    103.2 kg (227 lb 8 oz)   Height:    1.829 m (6')        DETERIORATION INDEX SCORE: 54    ASSESSMENT:  Previous outreach assessment was reviewed. There have been no significant changes since previous assessment.    PLAN:  Will follow per RRT Clinical Rounding Program protocol.    Celestina Vyas RN  [facility]: [phone]  [facility]: [phone]

## 2024-11-27 NOTE — ICUWATCH
RRT Clinical Rounding Nurse Progress Report      SUBJECTIVE: Patient assessed secondary to elevated Deterioration Index Score.      Vitals:    11/26/24 2120 11/26/24 2200 11/26/24 2300 11/27/24 0017   BP:  110/75 116/73 124/66   Pulse: 91 77 79 71   Resp: 18   18   Temp:    98.7 °F (37.1 °C)   TempSrc:    Tympanic   SpO2: 97% 98% 96% 97%   Weight:       Height:            DETERIORATION INDEX SCORE: 54    ASSESSMENT:  Upon arrival to the room, pt in bed resting quietly. Pt wakes to voice, drowsy but answers questions appropriately. HR controlled at 84 on 12.5mg/h Diltiazem gtt. Respirations even and unlabored, strong productive cough.     CT L leg completed with results pending- general surgery following for concern for necrotizing fasciitis/necrotizing cellulitis.      Recent labs/notes/vitals reviewed and pt discussed with primary RN.         PLAN:  Will follow per RRT Clinical Rounding Program protocol.    Celestina Vyas RN  [facility]: [phone]  [facility]: [phone]

## 2024-11-27 NOTE — WOUND CARE
BLE follow up.  Left leg still needs daily wound care. Right leg is ok for 3 times per week.  Left leg weeping increased serous to tan colored with clear pink tinged jelly like drainage from purple ankle blisters. Legs are elevated on pillows and up in bed.      Left leg increased blisters on thigh and areas near knee, erythema and majority of top layers now beginning to slough on entire leg from ankle to thigh. Will add zinc barrier to try to preserve some skin, likely will loose top layer on 1/2 to 3/4 of left leg. Cleansed with wound , zinc barrier xeroform ABD's gauze kerlex and ace applied. ( Light compression).                        Right leg improved a great deal, healing some areas have resolved, no erythema and minimal edema. Will continue zinc barrier, abd's and kerelx coban.

## 2024-11-28 LAB
ANION GAP SERPL CALC-SCNC: 12 MMOL/L (ref 7–16)
BUN SERPL-MCNC: 63 MG/DL (ref 8–23)
CALCIUM SERPL-MCNC: 8.2 MG/DL (ref 8.8–10.2)
CHLORIDE SERPL-SCNC: 95 MMOL/L (ref 98–107)
CO2 SERPL-SCNC: 21 MMOL/L (ref 20–29)
CREAT SERPL-MCNC: 0.91 MG/DL (ref 0.8–1.3)
ERYTHROCYTE [DISTWIDTH] IN BLOOD BY AUTOMATED COUNT: 15.5 % (ref 11.9–14.6)
GLUCOSE BLD STRIP.AUTO-MCNC: 150 MG/DL (ref 65–100)
GLUCOSE BLD STRIP.AUTO-MCNC: 174 MG/DL (ref 65–100)
GLUCOSE BLD STRIP.AUTO-MCNC: 177 MG/DL (ref 65–100)
GLUCOSE BLD STRIP.AUTO-MCNC: 276 MG/DL (ref 65–100)
GLUCOSE BLD STRIP.AUTO-MCNC: 543 MG/DL (ref 65–100)
GLUCOSE SERPL-MCNC: 149 MG/DL (ref 70–99)
HCT VFR BLD AUTO: 28.6 % (ref 41.1–50.3)
HCT VFR BLD AUTO: 36.7 % (ref 41.1–50.3)
HGB BLD-MCNC: 12 G/DL (ref 13.6–17.2)
HGB BLD-MCNC: 9.4 G/DL (ref 13.6–17.2)
MAGNESIUM SERPL-MCNC: 2.3 MG/DL (ref 1.8–2.4)
MCH RBC QN AUTO: 28.4 PG (ref 26.1–32.9)
MCHC RBC AUTO-ENTMCNC: 32.7 G/DL (ref 31.4–35)
MCV RBC AUTO: 87 FL (ref 82–102)
NRBC # BLD: 0 K/UL (ref 0–0.2)
PLATELET # BLD AUTO: 138 K/UL (ref 150–450)
PMV BLD AUTO: 11.3 FL (ref 9.4–12.3)
POTASSIUM SERPL-SCNC: 4.2 MMOL/L (ref 3.5–5.1)
RBC # BLD AUTO: 4.22 M/UL (ref 4.23–5.6)
SERVICE CMNT-IMP: ABNORMAL
SODIUM SERPL-SCNC: 129 MMOL/L (ref 136–145)
WBC # BLD AUTO: 26.3 K/UL (ref 4.3–11.1)

## 2024-11-28 PROCEDURE — 99222 1ST HOSP IP/OBS MODERATE 55: CPT | Performed by: INTERNAL MEDICINE

## 2024-11-28 PROCEDURE — 6360000002 HC RX W HCPCS: Performed by: INTERNAL MEDICINE

## 2024-11-28 PROCEDURE — 2580000003 HC RX 258: Performed by: NURSE PRACTITIONER

## 2024-11-28 PROCEDURE — 2709999900 HC NON-CHARGEABLE SUPPLY

## 2024-11-28 PROCEDURE — 85018 HEMOGLOBIN: CPT

## 2024-11-28 PROCEDURE — 6370000000 HC RX 637 (ALT 250 FOR IP): Performed by: NURSE PRACTITIONER

## 2024-11-28 PROCEDURE — 6360000002 HC RX W HCPCS: Performed by: NURSE PRACTITIONER

## 2024-11-28 PROCEDURE — 2500000003 HC RX 250 WO HCPCS: Performed by: NURSE PRACTITIONER

## 2024-11-28 PROCEDURE — 94640 AIRWAY INHALATION TREATMENT: CPT

## 2024-11-28 PROCEDURE — 94760 N-INVAS EAR/PLS OXIMETRY 1: CPT

## 2024-11-28 PROCEDURE — 2580000003 HC RX 258

## 2024-11-28 PROCEDURE — 6370000000 HC RX 637 (ALT 250 FOR IP): Performed by: INTERNAL MEDICINE

## 2024-11-28 PROCEDURE — 82962 GLUCOSE BLOOD TEST: CPT

## 2024-11-28 PROCEDURE — 99232 SBSQ HOSP IP/OBS MODERATE 35: CPT | Performed by: INTERNAL MEDICINE

## 2024-11-28 PROCEDURE — 83735 ASSAY OF MAGNESIUM: CPT

## 2024-11-28 PROCEDURE — 6370000000 HC RX 637 (ALT 250 FOR IP)

## 2024-11-28 PROCEDURE — 6360000002 HC RX W HCPCS

## 2024-11-28 PROCEDURE — 2580000003 HC RX 258: Performed by: INTERNAL MEDICINE

## 2024-11-28 PROCEDURE — 76937 US GUIDE VASCULAR ACCESS: CPT

## 2024-11-28 PROCEDURE — 85014 HEMATOCRIT: CPT

## 2024-11-28 PROCEDURE — 1100000003 HC PRIVATE W/ TELEMETRY

## 2024-11-28 PROCEDURE — 80048 BASIC METABOLIC PNL TOTAL CA: CPT

## 2024-11-28 PROCEDURE — 85027 COMPLETE CBC AUTOMATED: CPT

## 2024-11-28 PROCEDURE — 36415 COLL VENOUS BLD VENIPUNCTURE: CPT

## 2024-11-28 RX ORDER — DIPHENHYDRAMINE HCL 12.5 MG/5ML
12.5 SOLUTION ORAL ONCE
Status: DISCONTINUED | OUTPATIENT
Start: 2024-11-28 | End: 2024-11-28

## 2024-11-28 RX ORDER — LIDOCAINE HYDROCHLORIDE 20 MG/ML
5 SOLUTION OROPHARYNGEAL ONCE
Status: DISCONTINUED | OUTPATIENT
Start: 2024-11-28 | End: 2024-11-28

## 2024-11-28 RX ORDER — BUMETANIDE 1 MG/1
1 TABLET ORAL DAILY
Status: DISCONTINUED | OUTPATIENT
Start: 2024-11-29 | End: 2024-12-02

## 2024-11-28 RX ORDER — MAGNESIUM HYDROXIDE/ALUMINUM HYDROXICE/SIMETHICONE 120; 1200; 1200 MG/30ML; MG/30ML; MG/30ML
30 SUSPENSION ORAL ONCE
Status: DISCONTINUED | OUTPATIENT
Start: 2024-11-28 | End: 2024-11-28

## 2024-11-28 RX ORDER — METOPROLOL TARTRATE 50 MG
100 TABLET ORAL 2 TIMES DAILY
Status: DISCONTINUED | OUTPATIENT
Start: 2024-11-28 | End: 2024-12-04 | Stop reason: HOSPADM

## 2024-11-28 RX ADMIN — TRAMADOL HYDROCHLORIDE 50 MG: 50 TABLET, COATED ORAL at 09:05

## 2024-11-28 RX ADMIN — ARFORMOTEROL TARTRATE 15 MCG: 15 SOLUTION RESPIRATORY (INHALATION) at 08:16

## 2024-11-28 RX ADMIN — BUMETANIDE 2 MG: 1 TABLET ORAL at 09:05

## 2024-11-28 RX ADMIN — METOPROLOL TARTRATE 50 MG: 50 TABLET, FILM COATED ORAL at 09:05

## 2024-11-28 RX ADMIN — PANTOPRAZOLE SODIUM 40 MG: 40 INJECTION, POWDER, FOR SOLUTION INTRAVENOUS at 17:51

## 2024-11-28 RX ADMIN — PIPERACILLIN AND TAZOBACTAM 3375 MG: 3; .375 INJECTION, POWDER, LYOPHILIZED, FOR SOLUTION INTRAVENOUS at 13:29

## 2024-11-28 RX ADMIN — ACETAMINOPHEN 650 MG: 325 TABLET ORAL at 20:29

## 2024-11-28 RX ADMIN — Medication 15 G: at 14:25

## 2024-11-28 RX ADMIN — IPRATROPIUM BROMIDE 0.5 MG: 0.5 SOLUTION RESPIRATORY (INHALATION) at 08:16

## 2024-11-28 RX ADMIN — ACETAMINOPHEN 650 MG: 325 TABLET ORAL at 13:26

## 2024-11-28 RX ADMIN — ARFORMOTEROL TARTRATE 15 MCG: 15 SOLUTION RESPIRATORY (INHALATION) at 19:31

## 2024-11-28 RX ADMIN — SODIUM CHLORIDE 10 MG/HR: 900 INJECTION, SOLUTION INTRAVENOUS at 23:50

## 2024-11-28 RX ADMIN — SODIUM CHLORIDE, PRESERVATIVE FREE 10 ML: 5 INJECTION INTRAVENOUS at 09:05

## 2024-11-28 RX ADMIN — ASPIRIN 81 MG: 81 TABLET, CHEWABLE ORAL at 09:05

## 2024-11-28 RX ADMIN — SODIUM CHLORIDE, PRESERVATIVE FREE 10 ML: 5 INJECTION INTRAVENOUS at 19:46

## 2024-11-28 RX ADMIN — GABAPENTIN 300 MG: 300 CAPSULE ORAL at 20:29

## 2024-11-28 RX ADMIN — BUDESONIDE INHALATION 500 MCG: 0.5 SUSPENSION RESPIRATORY (INHALATION) at 08:16

## 2024-11-28 RX ADMIN — METOPROLOL TARTRATE 50 MG: 50 TABLET, FILM COATED ORAL at 05:04

## 2024-11-28 RX ADMIN — METOPROLOL TARTRATE 100 MG: 100 TABLET, FILM COATED ORAL at 17:51

## 2024-11-28 RX ADMIN — PIPERACILLIN AND TAZOBACTAM 3375 MG: 3; .375 INJECTION, POWDER, LYOPHILIZED, FOR SOLUTION INTRAVENOUS at 09:03

## 2024-11-28 RX ADMIN — BUDESONIDE INHALATION 500 MCG: 0.5 SUSPENSION RESPIRATORY (INHALATION) at 19:31

## 2024-11-28 RX ADMIN — SODIUM CHLORIDE 7.5 MG/HR: 900 INJECTION, SOLUTION INTRAVENOUS at 05:55

## 2024-11-28 RX ADMIN — INSULIN LISPRO 6 UNITS: 100 INJECTION, SOLUTION INTRAVENOUS; SUBCUTANEOUS at 13:26

## 2024-11-28 RX ADMIN — IPRATROPIUM BROMIDE 0.5 MG: 0.5 SOLUTION RESPIRATORY (INHALATION) at 19:31

## 2024-11-28 RX ADMIN — LINEZOLID 600 MG: 600 INJECTION, SOLUTION INTRAVENOUS at 17:58

## 2024-11-28 RX ADMIN — EZETIMIBE 10 MG: 10 TABLET ORAL at 20:29

## 2024-11-28 RX ADMIN — EMPAGLIFLOZIN 10 MG: 10 TABLET, FILM COATED ORAL at 09:05

## 2024-11-28 RX ADMIN — PIPERACILLIN AND TAZOBACTAM 3375 MG: 3; .375 INJECTION, POWDER, LYOPHILIZED, FOR SOLUTION INTRAVENOUS at 23:05

## 2024-11-28 RX ADMIN — LINEZOLID 600 MG: 600 INJECTION, SOLUTION INTRAVENOUS at 05:26

## 2024-11-28 RX ADMIN — ONDANSETRON 4 MG: 2 INJECTION INTRAMUSCULAR; INTRAVENOUS at 22:29

## 2024-11-28 RX ADMIN — GABAPENTIN 300 MG: 300 CAPSULE ORAL at 09:05

## 2024-11-28 ASSESSMENT — PAIN SCALES - GENERAL
PAINLEVEL_OUTOF10: 9
PAINLEVEL_OUTOF10: 0
PAINLEVEL_OUTOF10: 0
PAINLEVEL_OUTOF10: 7
PAINLEVEL_OUTOF10: 9

## 2024-11-28 ASSESSMENT — PAIN DESCRIPTION - PAIN TYPE
TYPE: ACUTE PAIN
TYPE: ACUTE PAIN

## 2024-11-28 ASSESSMENT — PAIN DESCRIPTION - ORIENTATION
ORIENTATION: LEFT;RIGHT
ORIENTATION: RIGHT;LEFT
ORIENTATION: RIGHT;LEFT;ANTERIOR

## 2024-11-28 ASSESSMENT — PAIN DESCRIPTION - LOCATION
LOCATION: LEG
LOCATION: HEAD;LEG
LOCATION: LEG

## 2024-11-28 ASSESSMENT — PAIN DESCRIPTION - FREQUENCY: FREQUENCY: INTERMITTENT

## 2024-11-28 ASSESSMENT — PAIN DESCRIPTION - DESCRIPTORS: DESCRIPTORS: ACHING;DISCOMFORT;SORE

## 2024-11-28 ASSESSMENT — PAIN DESCRIPTION - ONSET: ONSET: ON-GOING

## 2024-11-28 NOTE — CONSULTS
GASTROENTEROLOGY CONSULT NOTE     CC: Melena     HPI:   Marc Horton Sr. is 69 y.o. y/o male presenting with concern for melena. Per nursing staff he started having black stools 1 day ago. He has had a mild drop in hgb since admission, hgb 12 today. He denies prior GI bleed. On Eliquis. He denies hematemesis, N/V, or abdominal pain.      Per patient he had EGD/colonoscopy about 5 years ago. EGD was normal. Colonoscopy might have had a few polyps removed.     PMH/PSH:   CAD   CABG   HFrEF   PAD   COPD     PE:   Vitals:    11/28/24 1245   BP: 128/67   Pulse: 52   Resp: 18   Temp: 97.5 °F (36.4 °C)   SpO2: 100%      General:  The patient  in no acute distress.    Respiratory: Respiratory effort is normal. Expansion maintained bilaterally and symmetrically.    Cardiovascular:  Regular rate and rhythm.     Abdomen:  Soft, non tender to palpation. No distention.    Extremities: Lower extremities wrapped   Neurologic:  Alert and oriented x3.         Labs:  Lab Results   Component Value Date    HGB 12.0 (L) 11/28/2024    WBC 26.3 (H) 11/28/2024     (L) 11/28/2024    MCV 87.0 11/28/2024    IRON 23 (L) 03/13/2024    FERRITIN 14 01/10/2024    TIBC 342 01/10/2024    CREATININE 0.91 11/28/2024    ALT 19 11/24/2024    AST 35 11/24/2024       Assessment/Plan:   Melena     - Tentatively plan for EGD tomorrow   - Clear liquid diet today   - NPO midnight   - IV PPI BID         Alissa Stark MD  [facility]

## 2024-11-28 NOTE — CONSULTS
US Guided PIV access-    Ultrasound was used to find the vein which was compressible and without any ultrasound features of an artery or nerve bundle. Skin was cleaned and disinfected prior to IV puncture.  Under real-time ultrasound guidance peripheral access was obtained in the left cephalic using 20 G 2.25\" Peripheral IV catheter after 1 attempt(s). Blood return was present and IV flushed without difficulty with no clinical signs of infiltration. IV dressing applied and no immediate complications noted. Patient tolerated the procedure well.

## 2024-11-28 NOTE — ICUWATCH
[facility] OUTREACH NURSE PROGRESS REPORT      SUBJECTIVE: Called to assess patient secondary to outreach protocol.         Vitals:    11/27/24 1430 11/27/24 1445 11/27/24 2012 11/27/24 2040   BP:  124/70 109/71 107/67   Pulse:   95 88   Resp:  22  17   Temp:  97.5 °F (36.4 °C)  98.2 °F (36.8 °C)   TempSrc:  Oral     SpO2: 96%   96%   Weight:       Height:            LAB DATA:    Recent Labs     11/24/24 2137 11/25/24 0525 11/26/24 0511 11/27/24  0613   * 128* 128* 128*   K 3.6 3.6 4.5 4.1   CL 93* 97* 95* 96*   CO2 19* 19* 18* 21   BUN 26* 25* 35* 42*   MG  --  1.8 2.1 2.3   GLOB 3.9*  --   --   --    ALT 19  --   --   --         Recent Labs     11/24/24 2137 11/26/24 0511 11/27/24  0613   WBC 22.3* 36.4* 39.1*   HGB 16.2 16.0 14.9   HCT 47.3 48.0 44.5   * 125* 131*          OBJECTIVE: On arrival to room, I found patient to be resting in bed with primary RN at bedside.      Pain Assessment                                             ASSESSMENT:  Pt is axox4. No fevers during the day. VSS. On RA.     PLAN:  Pt has completed the outreach protocol.

## 2024-11-29 ENCOUNTER — ANESTHESIA EVENT (OUTPATIENT)
Dept: ENDOSCOPY | Age: 69
End: 2024-11-29
Payer: MEDICARE

## 2024-11-29 ENCOUNTER — APPOINTMENT (OUTPATIENT)
Dept: CT IMAGING | Age: 69
DRG: 871 | End: 2024-11-29
Payer: MEDICAID

## 2024-11-29 ENCOUNTER — ANESTHESIA EVENT (OUTPATIENT)
Dept: ENDOSCOPY | Age: 69
DRG: 871 | End: 2024-11-29
Payer: MEDICAID

## 2024-11-29 ENCOUNTER — ANESTHESIA (OUTPATIENT)
Dept: ENDOSCOPY | Age: 69
DRG: 871 | End: 2024-11-29
Payer: MEDICAID

## 2024-11-29 PROBLEM — K92.1 MELENA: Status: ACTIVE | Noted: 2024-11-24

## 2024-11-29 PROBLEM — R79.89 ELEVATED LACTIC ACID LEVEL: Status: ACTIVE | Noted: 2024-11-29

## 2024-11-29 LAB
ANION GAP SERPL CALC-SCNC: 24 MMOL/L (ref 7–16)
BACTERIA SPEC CULT: NORMAL
BACTERIA SPEC CULT: NORMAL
BUN SERPL-MCNC: 83 MG/DL (ref 8–23)
CALCIUM SERPL-MCNC: 8.3 MG/DL (ref 8.8–10.2)
CHLORIDE SERPL-SCNC: 96 MMOL/L (ref 98–107)
CO2 SERPL-SCNC: 14 MMOL/L (ref 20–29)
CREAT SERPL-MCNC: 1.19 MG/DL (ref 0.8–1.3)
EKG ATRIAL RATE: 314 BPM
EKG DIAGNOSIS: NORMAL
EKG P AXIS: 246 DEGREES
EKG Q-T INTERVAL: 296 MS
EKG QRS DURATION: 88 MS
EKG QTC CALCULATION (BAZETT): 478 MS
EKG R AXIS: 53 DEGREES
EKG T AXIS: 106 DEGREES
EKG VENTRICULAR RATE: 157 BPM
ERYTHROCYTE [DISTWIDTH] IN BLOOD BY AUTOMATED COUNT: 15.7 % (ref 11.9–14.6)
GLUCOSE BLD STRIP.AUTO-MCNC: 188 MG/DL (ref 65–100)
GLUCOSE BLD STRIP.AUTO-MCNC: 204 MG/DL (ref 65–100)
GLUCOSE BLD STRIP.AUTO-MCNC: 210 MG/DL (ref 65–100)
GLUCOSE BLD STRIP.AUTO-MCNC: 273 MG/DL (ref 65–100)
GLUCOSE SERPL-MCNC: 237 MG/DL (ref 70–99)
HCT VFR BLD AUTO: 24.5 % (ref 41.1–50.3)
HCT VFR BLD AUTO: 27.2 % (ref 41.1–50.3)
HGB BLD-MCNC: 7.7 G/DL (ref 13.6–17.2)
HGB BLD-MCNC: 8.7 G/DL (ref 13.6–17.2)
LACTATE SERPL-SCNC: 10.3 MMOL/L (ref 0.5–2)
LACTATE SERPL-SCNC: 7.6 MMOL/L (ref 0.5–2)
LACTATE SERPL-SCNC: 9.2 MMOL/L (ref 0.5–2)
MCH RBC QN AUTO: 28.7 PG (ref 26.1–32.9)
MCHC RBC AUTO-ENTMCNC: 32 G/DL (ref 31.4–35)
MCV RBC AUTO: 89.8 FL (ref 82–102)
NRBC # BLD: 0.08 K/UL (ref 0–0.2)
PLATELET # BLD AUTO: 231 K/UL (ref 150–450)
PMV BLD AUTO: 11.6 FL (ref 9.4–12.3)
POTASSIUM SERPL-SCNC: 5 MMOL/L (ref 3.5–5.1)
RBC # BLD AUTO: 3.03 M/UL (ref 4.23–5.6)
SERVICE CMNT-IMP: ABNORMAL
SERVICE CMNT-IMP: NORMAL
SERVICE CMNT-IMP: NORMAL
SODIUM SERPL-SCNC: 134 MMOL/L (ref 136–145)
TSH W FREE THYROID IF ABNORMAL: 1.71 UIU/ML (ref 0.27–4.2)
WBC # BLD AUTO: 36.8 K/UL (ref 4.3–11.1)

## 2024-11-29 PROCEDURE — 99232 SBSQ HOSP IP/OBS MODERATE 35: CPT | Performed by: SURGERY

## 2024-11-29 PROCEDURE — 2500000003 HC RX 250 WO HCPCS: Performed by: NURSE PRACTITIONER

## 2024-11-29 PROCEDURE — 6360000002 HC RX W HCPCS: Performed by: INTERNAL MEDICINE

## 2024-11-29 PROCEDURE — 6360000002 HC RX W HCPCS

## 2024-11-29 PROCEDURE — 82962 GLUCOSE BLOOD TEST: CPT

## 2024-11-29 PROCEDURE — 93010 ELECTROCARDIOGRAM REPORT: CPT | Performed by: INTERNAL MEDICINE

## 2024-11-29 PROCEDURE — 6370000000 HC RX 637 (ALT 250 FOR IP): Performed by: INTERNAL MEDICINE

## 2024-11-29 PROCEDURE — 6360000002 HC RX W HCPCS: Performed by: NURSE PRACTITIONER

## 2024-11-29 PROCEDURE — 2580000003 HC RX 258

## 2024-11-29 PROCEDURE — 93005 ELECTROCARDIOGRAM TRACING: CPT

## 2024-11-29 PROCEDURE — 2580000003 HC RX 258: Performed by: INTERNAL MEDICINE

## 2024-11-29 PROCEDURE — 6370000000 HC RX 637 (ALT 250 FOR IP)

## 2024-11-29 PROCEDURE — 6370000000 HC RX 637 (ALT 250 FOR IP): Performed by: NURSE PRACTITIONER

## 2024-11-29 PROCEDURE — 85027 COMPLETE CBC AUTOMATED: CPT

## 2024-11-29 PROCEDURE — 99232 SBSQ HOSP IP/OBS MODERATE 35: CPT | Performed by: INTERNAL MEDICINE

## 2024-11-29 PROCEDURE — 2580000003 HC RX 258: Performed by: NURSE PRACTITIONER

## 2024-11-29 PROCEDURE — 85014 HEMATOCRIT: CPT

## 2024-11-29 PROCEDURE — 74177 CT ABD & PELVIS W/CONTRAST: CPT

## 2024-11-29 PROCEDURE — 83605 ASSAY OF LACTIC ACID: CPT

## 2024-11-29 PROCEDURE — 99233 SBSQ HOSP IP/OBS HIGH 50: CPT | Performed by: INTERNAL MEDICINE

## 2024-11-29 PROCEDURE — 6360000004 HC RX CONTRAST MEDICATION: Performed by: INTERNAL MEDICINE

## 2024-11-29 PROCEDURE — 2000000000 HC ICU R&B

## 2024-11-29 PROCEDURE — 36415 COLL VENOUS BLD VENIPUNCTURE: CPT

## 2024-11-29 PROCEDURE — 85018 HEMOGLOBIN: CPT

## 2024-11-29 PROCEDURE — 94761 N-INVAS EAR/PLS OXIMETRY MLT: CPT

## 2024-11-29 PROCEDURE — 84443 ASSAY THYROID STIM HORMONE: CPT

## 2024-11-29 PROCEDURE — 80048 BASIC METABOLIC PNL TOTAL CA: CPT

## 2024-11-29 PROCEDURE — 94640 AIRWAY INHALATION TREATMENT: CPT

## 2024-11-29 RX ORDER — DIPHENHYDRAMINE HYDROCHLORIDE 50 MG/ML
25 INJECTION INTRAMUSCULAR; INTRAVENOUS EVERY 6 HOURS PRN
Status: DISCONTINUED | OUTPATIENT
Start: 2024-11-29 | End: 2024-12-04 | Stop reason: HOSPADM

## 2024-11-29 RX ORDER — INSULIN GLARGINE 100 [IU]/ML
0.05 INJECTION, SOLUTION SUBCUTANEOUS NIGHTLY
Status: DISCONTINUED | OUTPATIENT
Start: 2024-11-29 | End: 2024-12-04 | Stop reason: HOSPADM

## 2024-11-29 RX ORDER — IOPAMIDOL 755 MG/ML
100 INJECTION, SOLUTION INTRAVASCULAR
Status: COMPLETED | OUTPATIENT
Start: 2024-11-29 | End: 2024-11-29

## 2024-11-29 RX ORDER — AMIODARONE HYDROCHLORIDE 100 MG/1
200 TABLET ORAL 2 TIMES DAILY
Status: DISCONTINUED | OUTPATIENT
Start: 2024-11-29 | End: 2024-12-04 | Stop reason: HOSPADM

## 2024-11-29 RX ORDER — SODIUM CHLORIDE, SODIUM LACTATE, POTASSIUM CHLORIDE, CALCIUM CHLORIDE 600; 310; 30; 20 MG/100ML; MG/100ML; MG/100ML; MG/100ML
INJECTION, SOLUTION INTRAVENOUS CONTINUOUS
Status: DISCONTINUED | OUTPATIENT
Start: 2024-11-29 | End: 2024-11-29

## 2024-11-29 RX ORDER — SODIUM CHLORIDE, SODIUM LACTATE, POTASSIUM CHLORIDE, CALCIUM CHLORIDE 600; 310; 30; 20 MG/100ML; MG/100ML; MG/100ML; MG/100ML
INJECTION, SOLUTION INTRAVENOUS CONTINUOUS
Status: DISCONTINUED | OUTPATIENT
Start: 2024-11-29 | End: 2024-11-30

## 2024-11-29 RX ORDER — INSULIN LISPRO 100 [IU]/ML
0-4 INJECTION, SOLUTION INTRAVENOUS; SUBCUTANEOUS
Status: DISCONTINUED | OUTPATIENT
Start: 2024-11-29 | End: 2024-11-29

## 2024-11-29 RX ADMIN — AMIODARONE HYDROCHLORIDE 200 MG: 100 TABLET ORAL at 11:14

## 2024-11-29 RX ADMIN — SODIUM CHLORIDE, PRESERVATIVE FREE 5 ML: 5 INJECTION INTRAVENOUS at 08:18

## 2024-11-29 RX ADMIN — INSULIN GLARGINE 5 UNITS: 100 INJECTION, SOLUTION SUBCUTANEOUS at 21:50

## 2024-11-29 RX ADMIN — SODIUM CHLORIDE 5 MG/HR: 900 INJECTION, SOLUTION INTRAVENOUS at 12:43

## 2024-11-29 RX ADMIN — EMPAGLIFLOZIN 10 MG: 10 TABLET, FILM COATED ORAL at 08:17

## 2024-11-29 RX ADMIN — LINEZOLID 600 MG: 600 INJECTION, SOLUTION INTRAVENOUS at 04:07

## 2024-11-29 RX ADMIN — PANTOPRAZOLE SODIUM 40 MG: 40 INJECTION, POWDER, FOR SOLUTION INTRAVENOUS at 04:03

## 2024-11-29 RX ADMIN — BUDESONIDE INHALATION 500 MCG: 0.5 SUSPENSION RESPIRATORY (INHALATION) at 19:46

## 2024-11-29 RX ADMIN — PANTOPRAZOLE SODIUM 40 MG: 40 INJECTION, POWDER, FOR SOLUTION INTRAVENOUS at 17:30

## 2024-11-29 RX ADMIN — TRAMADOL HYDROCHLORIDE 50 MG: 50 TABLET, COATED ORAL at 08:22

## 2024-11-29 RX ADMIN — ARFORMOTEROL TARTRATE 15 MCG: 15 SOLUTION RESPIRATORY (INHALATION) at 07:31

## 2024-11-29 RX ADMIN — IPRATROPIUM BROMIDE 0.5 MG: 0.5 SOLUTION RESPIRATORY (INHALATION) at 19:46

## 2024-11-29 RX ADMIN — ASPIRIN 81 MG: 81 TABLET, CHEWABLE ORAL at 08:17

## 2024-11-29 RX ADMIN — METOPROLOL TARTRATE 100 MG: 100 TABLET, FILM COATED ORAL at 08:18

## 2024-11-29 RX ADMIN — BUDESONIDE INHALATION 500 MCG: 0.5 SUSPENSION RESPIRATORY (INHALATION) at 07:31

## 2024-11-29 RX ADMIN — IPRATROPIUM BROMIDE 0.5 MG: 0.5 SOLUTION RESPIRATORY (INHALATION) at 07:31

## 2024-11-29 RX ADMIN — SODIUM CHLORIDE, POTASSIUM CHLORIDE, SODIUM LACTATE AND CALCIUM CHLORIDE: 600; 310; 30; 20 INJECTION, SOLUTION INTRAVENOUS at 21:38

## 2024-11-29 RX ADMIN — SODIUM CHLORIDE, POTASSIUM CHLORIDE, SODIUM LACTATE AND CALCIUM CHLORIDE: 600; 310; 30; 20 INJECTION, SOLUTION INTRAVENOUS at 07:38

## 2024-11-29 RX ADMIN — ARFORMOTEROL TARTRATE 15 MCG: 15 SOLUTION RESPIRATORY (INHALATION) at 19:46

## 2024-11-29 RX ADMIN — IOPAMIDOL 100 ML: 755 INJECTION, SOLUTION INTRAVENOUS at 08:42

## 2024-11-29 RX ADMIN — PIPERACILLIN AND TAZOBACTAM 3375 MG: 3; .375 INJECTION, POWDER, LYOPHILIZED, FOR SOLUTION INTRAVENOUS at 13:36

## 2024-11-29 RX ADMIN — SODIUM CHLORIDE, PRESERVATIVE FREE 10 ML: 5 INJECTION INTRAVENOUS at 21:56

## 2024-11-29 RX ADMIN — INSULIN LISPRO 3 UNITS: 100 INJECTION, SOLUTION INTRAVENOUS; SUBCUTANEOUS at 11:59

## 2024-11-29 RX ADMIN — INSULIN LISPRO 6 UNITS: 100 INJECTION, SOLUTION INTRAVENOUS; SUBCUTANEOUS at 08:18

## 2024-11-29 RX ADMIN — PIPERACILLIN AND TAZOBACTAM 3375 MG: 3; .375 INJECTION, POWDER, LYOPHILIZED, FOR SOLUTION INTRAVENOUS at 21:54

## 2024-11-29 RX ADMIN — PIPERACILLIN AND TAZOBACTAM 3375 MG: 3; .375 INJECTION, POWDER, LYOPHILIZED, FOR SOLUTION INTRAVENOUS at 05:53

## 2024-11-29 RX ADMIN — GABAPENTIN 300 MG: 300 CAPSULE ORAL at 08:18

## 2024-11-29 ASSESSMENT — PAIN SCALES - GENERAL
PAINLEVEL_OUTOF10: 0
PAINLEVEL_OUTOF10: 0
PAINLEVEL_OUTOF10: 9
PAINLEVEL_OUTOF10: 0
PAINLEVEL_OUTOF10: 0

## 2024-11-29 ASSESSMENT — PAIN DESCRIPTION - LOCATION: LOCATION: ABDOMEN;HEAD;LEG

## 2024-11-29 NOTE — ANESTHESIA PRE PROCEDURE
Department of Anesthesiology  Preprocedure Note       Name:  Marc Horton Sr.   Age:  69 y.o.  :  1955                                          MRN:  [id number]         Date:  2024      Surgeon: Surgeon(s):  Alissa Stark MD    Procedure: Procedure(s):  ESOPHAGOGASTRODUODENOSCOPY    Medications prior to admission:   Prior to Admission medications    Medication Sig Start Date End Date Taking? Authorizing Provider   lisinopril (PRINIVIL;ZESTRIL) 20 MG tablet Take 1 tablet by mouth in the morning and at bedtime 24   Meliza Martino PA-C   metFORMIN (GLUCOPHAGE) 850 MG tablet Take 1 tablet by mouth daily (with breakfast) 24   Meliza Martino PA-C   spironolactone (ALDACTONE) 25 MG tablet Take 0.5 tablets by mouth daily 3/21/24   Kirill Pride, DO   albuterol sulfate HFA (PROVENTIL HFA) 108 (90 Base) MCG/ACT inhaler Inhale 2 puffs into the lungs every 6 hours as needed for Wheezing 3/21/24   Kirill Pride,    aspirin 81 MG chewable tablet Take 1 tablet by mouth daily 3/16/24 3/11/25  Bowen Cooper MD   bumetanide (BUMEX) 2 MG tablet Take 1 tablet by mouth daily 3/15/24 6/13/24  Bowen Cooper MD   apixaban (ELIQUIS) 5 MG TABS tablet Take 1 tablet by mouth 2 times daily 3/7/24   Kay Thu, DO   ezetimibe (ZETIA) 10 MG tablet Take 1 tablet by mouth nightly 3/7/24   Kay Thu, DO   fluticasone-umeclidin-vilant (TRELEGY ELLIPTA) 100-62.5-25 MCG/ACT AEPB inhaler Inhale 1 puff into the lungs daily 3/4/24   Meliza Martino PA-C   albuterol sulfate HFA (PROVENTIL;VENTOLIN;PROAIR) 108 (90 Base) MCG/ACT inhaler Inhale 2 puffs into the lungs every 6 hours as needed for Wheezing 3/4/24   Meliza Martino PA-C   metoprolol tartrate (LOPRESSOR) 50 MG tablet Take 1 tablet by mouth 2 times daily 3/4/24   Meliza Martino PA-C   gabapentin (NEURONTIN) 300 MG capsule Take 1 capsule in morning, and lunch and 2 capusles at night Intended supply: 30 days  Patient taking differently: Take

## 2024-11-29 NOTE — ICUWATCH
RRT Clinical Rounding Nurse Progress Report      SUBJECTIVE: Patient assessed secondary to RN/provider concern - lactic acidosis, GIB.      Vitals:    11/29/24 1400 11/29/24 1415 11/29/24 1500 11/29/24 1545   BP: (!) 129/56 (!) 108/59 (!) 95/59 (!) 135/56   Pulse: 70 72 67 70   Resp:    18   Temp:    (!) 96.4 °F (35.8 °C)   TempSrc:    Rectal   SpO2: (!) 79% 92% 100% 99%   Weight:       Height:           ASSESSMENT:  Patient wakes to voice. Oriented x2-3. Skin pale/jaundiced. Respirations unlabored. Denies abdominal pain/tenderness. Has had several melanotic stools today. SR on bedside monitor with SBP 120s. VS, labs, and progress notes reviewed. Lactic acid from 10.3 this morning to 7.6 this afternoon. Hgb 12?8.7 from yesterday to today. Repeat pending for this afternoon. Discussed with primary RN and attending provider.    PLAN:   Patient transferring to critical care for closer monitoring.     Durga Willingham RN  [facility]: [phone]  [facility]: [phone]

## 2024-11-29 NOTE — CONSULTS
Consult Note:  Marc Horton Sr.  MRN: [id number]  :1955  Age:69 y.o.    HPI: Marc Horton Sr. is a 69 y.o. male who general surgery is asked in consultation Dr. Stark (GI) for ischemic bowel.    The patient has a PMHx of Diabetes mellitus type 2, s/p AVR, afib s/p atrial appendage ligation and MAZE on eliquis, PAD, cad s/p cabg x 2,HFpEF, COPD, HTN and MDD with psychotic features and behavioral disturbance on Remeron per psych, previous smoker.  He has chronic venous stasis disease involving both legs. .     Of note, general surgery was following and signed off 24 for LLE concern for necrotizing fasciitis.   24 CT LLE  Extensive soft tissue swelling could indicate cellulitis versus volume  overload/anasarca. However, no soft tissue gas to suggest necrotizing fasciitis.  No acute osseous findings.  He presented 24 with a 2 day history of chest pain. He was admitted on 2024 for Chest pain and cellulitis to the lower extremity. Cardiology is following, no longer with on going angina continues with intermittent Afib RVR.   -Wound care has been applying local care with zinc, coban, kerlix to RLE and LLE using Xeroform, abd, kerlix.     24 HPI Pt with planned EGD 24 for 2 day onset of melena. Pt became confused over night - with AFIB RVR.   Dr. Stark (GI) ordered lactic acid=10.3 this AM 24, bicarb 14. Bm x 4 past 24h=melena  Dr. Stark (GI) ordered CT ABD/PELV 24    CT ABD/PELV 24 IV and oral contrast  IMPRESSION:  Questionable pneumatosis involving small bowel in the central aspect of the  abdomen. No definite evidence of portal venous gas. In the setting of elevated  lactate, this is concerning for bowel ischemia. No definite free air is  identified.     Additional findings as detailed above  - BOWEL: There is questionable pneumatosis involving small bowel within the  central aspect of the abdomen just left of midline. This is

## 2024-11-29 NOTE — WOUND CARE
Follow up for leg wounds, purple areas and blisters are now resolving.  Skin is sloughing at the blister areas, improving.  Can stop xeroform, still need daily dressing changes on left over weekend. Left recommend zinc barrier abd's kerlex and ace, daily. Right recommend zinc barrier abd's kerlex and coban, 3 times per week.  Likely will start unna boot on both legs Monday if improving continues. Right leg dressing can stay until Monday.

## 2024-11-30 ENCOUNTER — ANESTHESIA (OUTPATIENT)
Dept: ENDOSCOPY | Age: 69
End: 2024-11-30
Payer: MEDICARE

## 2024-11-30 PROBLEM — D62 ACUTE BLOOD LOSS ANEMIA: Status: ACTIVE | Noted: 2024-11-30

## 2024-11-30 LAB
ANION GAP SERPL CALC-SCNC: 16 MMOL/L (ref 7–16)
BUN SERPL-MCNC: 98 MG/DL (ref 8–23)
CALCIUM SERPL-MCNC: 7.8 MG/DL (ref 8.8–10.2)
CHLORIDE SERPL-SCNC: 102 MMOL/L (ref 98–107)
CO2 SERPL-SCNC: 21 MMOL/L (ref 20–29)
CREAT SERPL-MCNC: 1.21 MG/DL (ref 0.8–1.3)
GLUCOSE BLD STRIP.AUTO-MCNC: 149 MG/DL (ref 65–100)
GLUCOSE BLD STRIP.AUTO-MCNC: 170 MG/DL (ref 65–100)
GLUCOSE BLD STRIP.AUTO-MCNC: 260 MG/DL (ref 65–100)
GLUCOSE BLD STRIP.AUTO-MCNC: 271 MG/DL (ref 65–100)
GLUCOSE SERPL-MCNC: 189 MG/DL (ref 70–99)
HCT VFR BLD AUTO: 16.6 % (ref 41.1–50.3)
HCT VFR BLD AUTO: 18.2 % (ref 41.1–50.3)
HCT VFR BLD AUTO: 22.9 % (ref 41.1–50.3)
HCT VFR BLD AUTO: 25.6 % (ref 41.1–50.3)
HGB BLD-MCNC: 5.5 G/DL (ref 13.6–17.2)
HGB BLD-MCNC: 6 G/DL (ref 13.6–17.2)
HGB BLD-MCNC: 7.6 G/DL (ref 13.6–17.2)
HGB BLD-MCNC: 8.4 G/DL (ref 13.6–17.2)
HISTORY CHECK: NORMAL
HISTORY CHECK: NORMAL
LACTATE SERPL-SCNC: 2.3 MMOL/L (ref 0.5–2)
LACTATE SERPL-SCNC: 2.6 MMOL/L (ref 0.5–2)
LACTATE SERPL-SCNC: 6.2 MMOL/L (ref 0.5–2)
LACTATE SERPL-SCNC: 6.3 MMOL/L (ref 0.5–2)
POTASSIUM SERPL-SCNC: 4.6 MMOL/L (ref 3.5–5.1)
SERVICE CMNT-IMP: ABNORMAL
SODIUM SERPL-SCNC: 139 MMOL/L (ref 136–145)

## 2024-11-30 PROCEDURE — 94640 AIRWAY INHALATION TREATMENT: CPT

## 2024-11-30 PROCEDURE — 2580000003 HC RX 258

## 2024-11-30 PROCEDURE — 6360000002 HC RX W HCPCS

## 2024-11-30 PROCEDURE — 86920 COMPATIBILITY TEST SPIN: CPT

## 2024-11-30 PROCEDURE — 3700000000 HC ANESTHESIA ATTENDED CARE: Performed by: INTERNAL MEDICINE

## 2024-11-30 PROCEDURE — 6360000002 HC RX W HCPCS: Performed by: NURSE PRACTITIONER

## 2024-11-30 PROCEDURE — 3609017100 HC EGD: Performed by: INTERNAL MEDICINE

## 2024-11-30 PROCEDURE — 86921 COMPATIBILITY TEST INCUBATE: CPT

## 2024-11-30 PROCEDURE — 2709999900 HC NON-CHARGEABLE SUPPLY: Performed by: INTERNAL MEDICINE

## 2024-11-30 PROCEDURE — 6370000000 HC RX 637 (ALT 250 FOR IP): Performed by: INTERNAL MEDICINE

## 2024-11-30 PROCEDURE — 2580000003 HC RX 258: Performed by: INTERNAL MEDICINE

## 2024-11-30 PROCEDURE — 6360000002 HC RX W HCPCS: Performed by: INTERNAL MEDICINE

## 2024-11-30 PROCEDURE — 76937 US GUIDE VASCULAR ACCESS: CPT

## 2024-11-30 PROCEDURE — P9016 RBC LEUKOCYTES REDUCED: HCPCS

## 2024-11-30 PROCEDURE — 86870 RBC ANTIBODY IDENTIFICATION: CPT

## 2024-11-30 PROCEDURE — 94760 N-INVAS EAR/PLS OXIMETRY 1: CPT

## 2024-11-30 PROCEDURE — 6370000000 HC RX 637 (ALT 250 FOR IP)

## 2024-11-30 PROCEDURE — 36430 TRANSFUSION BLD/BLD COMPNT: CPT

## 2024-11-30 PROCEDURE — 99232 SBSQ HOSP IP/OBS MODERATE 35: CPT | Performed by: INTERNAL MEDICINE

## 2024-11-30 PROCEDURE — 30233N1 TRANSFUSION OF NONAUTOLOGOUS RED BLOOD CELLS INTO PERIPHERAL VEIN, PERCUTANEOUS APPROACH: ICD-10-PCS | Performed by: STUDENT IN AN ORGANIZED HEALTH CARE EDUCATION/TRAINING PROGRAM

## 2024-11-30 PROCEDURE — 1100000000 HC RM PRIVATE

## 2024-11-30 PROCEDURE — 85018 HEMOGLOBIN: CPT

## 2024-11-30 PROCEDURE — 0DJ08ZZ INSPECTION OF UPPER INTESTINAL TRACT, VIA NATURAL OR ARTIFICIAL OPENING ENDOSCOPIC: ICD-10-PCS | Performed by: INTERNAL MEDICINE

## 2024-11-30 PROCEDURE — 86850 RBC ANTIBODY SCREEN: CPT

## 2024-11-30 PROCEDURE — 2500000003 HC RX 250 WO HCPCS: Performed by: PHYSICIAN ASSISTANT

## 2024-11-30 PROCEDURE — 86902 BLOOD TYPE ANTIGEN DONOR EA: CPT

## 2024-11-30 PROCEDURE — 80048 BASIC METABOLIC PNL TOTAL CA: CPT

## 2024-11-30 PROCEDURE — 43235 EGD DIAGNOSTIC BRUSH WASH: CPT | Performed by: INTERNAL MEDICINE

## 2024-11-30 PROCEDURE — 85027 COMPLETE CBC AUTOMATED: CPT

## 2024-11-30 PROCEDURE — 83605 ASSAY OF LACTIC ACID: CPT

## 2024-11-30 PROCEDURE — 99232 SBSQ HOSP IP/OBS MODERATE 35: CPT | Performed by: SURGERY

## 2024-11-30 PROCEDURE — 86901 BLOOD TYPING SEROLOGIC RH(D): CPT

## 2024-11-30 PROCEDURE — 82962 GLUCOSE BLOOD TEST: CPT

## 2024-11-30 PROCEDURE — 86900 BLOOD TYPING SEROLOGIC ABO: CPT

## 2024-11-30 PROCEDURE — 3700000001 HC ADD 15 MINUTES (ANESTHESIA): Performed by: INTERNAL MEDICINE

## 2024-11-30 PROCEDURE — 85014 HEMATOCRIT: CPT

## 2024-11-30 PROCEDURE — 86922 COMPATIBILITY TEST ANTIGLOB: CPT

## 2024-11-30 PROCEDURE — 36415 COLL VENOUS BLD VENIPUNCTURE: CPT

## 2024-11-30 RX ORDER — SODIUM CHLORIDE 9 MG/ML
INJECTION, SOLUTION INTRAVENOUS PRN
Status: DISCONTINUED | OUTPATIENT
Start: 2024-11-30 | End: 2024-12-04 | Stop reason: HOSPADM

## 2024-11-30 RX ORDER — DIPHENHYDRAMINE HCL 25 MG
25 CAPSULE ORAL SEE ADMIN INSTRUCTIONS
Status: CANCELLED | OUTPATIENT
Start: 2024-11-30

## 2024-11-30 RX ORDER — SODIUM CHLORIDE 0.9 % (FLUSH) 0.9 %
SYRINGE (ML) INJECTION
Status: DISCONTINUED | OUTPATIENT
Start: 2024-11-30 | End: 2024-11-30 | Stop reason: SDUPTHER

## 2024-11-30 RX ORDER — SODIUM CHLORIDE 9 MG/ML
INJECTION, SOLUTION INTRAVENOUS PRN
Status: CANCELLED | OUTPATIENT
Start: 2024-11-30

## 2024-11-30 RX ORDER — ACETAMINOPHEN 325 MG/1
650 TABLET ORAL SEE ADMIN INSTRUCTIONS
Status: CANCELLED | OUTPATIENT
Start: 2024-11-30

## 2024-11-30 RX ORDER — SUCRALFATE 1 G/1
1 TABLET ORAL EVERY 8 HOURS SCHEDULED
Status: DISCONTINUED | OUTPATIENT
Start: 2024-11-30 | End: 2024-12-04 | Stop reason: HOSPADM

## 2024-11-30 RX ORDER — PROPOFOL 10 MG/ML
INJECTION, EMULSION INTRAVENOUS
Status: DISCONTINUED | OUTPATIENT
Start: 2024-11-30 | End: 2024-11-30 | Stop reason: SDUPTHER

## 2024-11-30 RX ORDER — LIDOCAINE HYDROCHLORIDE 20 MG/ML
INJECTION, SOLUTION EPIDURAL; INFILTRATION; INTRACAUDAL; PERINEURAL
Status: DISCONTINUED | OUTPATIENT
Start: 2024-11-30 | End: 2024-11-30 | Stop reason: SDUPTHER

## 2024-11-30 RX ADMIN — PROPOFOL 30 MG: 10 INJECTION, EMULSION INTRAVENOUS at 09:48

## 2024-11-30 RX ADMIN — LINEZOLID 600 MG: 600 INJECTION, SOLUTION INTRAVENOUS at 04:11

## 2024-11-30 RX ADMIN — PROPOFOL 20 MG: 10 INJECTION, EMULSION INTRAVENOUS at 09:46

## 2024-11-30 RX ADMIN — AMIODARONE HYDROCHLORIDE 200 MG: 100 TABLET ORAL at 14:09

## 2024-11-30 RX ADMIN — ASPIRIN 81 MG: 81 TABLET, CHEWABLE ORAL at 14:09

## 2024-11-30 RX ADMIN — ACETAMINOPHEN 650 MG: 325 TABLET ORAL at 20:50

## 2024-11-30 RX ADMIN — BUDESONIDE INHALATION 500 MCG: 0.5 SUSPENSION RESPIRATORY (INHALATION) at 07:23

## 2024-11-30 RX ADMIN — PANTOPRAZOLE SODIUM 40 MG: 40 INJECTION, POWDER, FOR SOLUTION INTRAVENOUS at 04:11

## 2024-11-30 RX ADMIN — PANTOPRAZOLE SODIUM 40 MG: 40 INJECTION, POWDER, FOR SOLUTION INTRAVENOUS at 16:59

## 2024-11-30 RX ADMIN — IPRATROPIUM BROMIDE 0.5 MG: 0.5 SOLUTION RESPIRATORY (INHALATION) at 20:04

## 2024-11-30 RX ADMIN — SODIUM BICARBONATE 50 MEQ: 84 INJECTION, SOLUTION INTRAVENOUS at 00:30

## 2024-11-30 RX ADMIN — PIPERACILLIN AND TAZOBACTAM 3375 MG: 3; .375 INJECTION, POWDER, LYOPHILIZED, FOR SOLUTION INTRAVENOUS at 05:56

## 2024-11-30 RX ADMIN — ARFORMOTEROL TARTRATE 15 MCG: 15 SOLUTION RESPIRATORY (INHALATION) at 07:23

## 2024-11-30 RX ADMIN — BUDESONIDE INHALATION 500 MCG: 0.5 SUSPENSION RESPIRATORY (INHALATION) at 20:04

## 2024-11-30 RX ADMIN — SODIUM CHLORIDE, PRESERVATIVE FREE 20 ML: 5 INJECTION INTRAVENOUS at 09:51

## 2024-11-30 RX ADMIN — IPRATROPIUM BROMIDE 0.5 MG: 0.5 SOLUTION RESPIRATORY (INHALATION) at 07:23

## 2024-11-30 RX ADMIN — SUCRALFATE 1 G: 1 TABLET ORAL at 14:09

## 2024-11-30 RX ADMIN — PROPOFOL 20 MG: 10 INJECTION, EMULSION INTRAVENOUS at 09:49

## 2024-11-30 RX ADMIN — METOPROLOL TARTRATE 100 MG: 100 TABLET, FILM COATED ORAL at 20:47

## 2024-11-30 RX ADMIN — INSULIN LISPRO 6 UNITS: 100 INJECTION, SOLUTION INTRAVENOUS; SUBCUTANEOUS at 20:47

## 2024-11-30 RX ADMIN — PROPOFOL 30 MG: 10 INJECTION, EMULSION INTRAVENOUS at 09:47

## 2024-11-30 RX ADMIN — INSULIN LISPRO 6 UNITS: 100 INJECTION, SOLUTION INTRAVENOUS; SUBCUTANEOUS at 16:59

## 2024-11-30 RX ADMIN — AMIODARONE HYDROCHLORIDE 200 MG: 100 TABLET ORAL at 20:49

## 2024-11-30 RX ADMIN — PROPOFOL 30 MG: 10 INJECTION, EMULSION INTRAVENOUS at 09:45

## 2024-11-30 RX ADMIN — METOPROLOL TARTRATE 100 MG: 100 TABLET, FILM COATED ORAL at 14:09

## 2024-11-30 RX ADMIN — GABAPENTIN 300 MG: 300 CAPSULE ORAL at 14:09

## 2024-11-30 RX ADMIN — PIPERACILLIN AND TAZOBACTAM 3375 MG: 3; .375 INJECTION, POWDER, LYOPHILIZED, FOR SOLUTION INTRAVENOUS at 20:55

## 2024-11-30 RX ADMIN — GABAPENTIN 300 MG: 300 CAPSULE ORAL at 20:47

## 2024-11-30 RX ADMIN — LIDOCAINE HYDROCHLORIDE 50 MG: 20 INJECTION, SOLUTION EPIDURAL; INFILTRATION; INTRACAUDAL; PERINEURAL at 09:45

## 2024-11-30 RX ADMIN — SUCRALFATE 1 G: 1 TABLET ORAL at 20:47

## 2024-11-30 RX ADMIN — LINEZOLID 600 MG: 600 INJECTION, SOLUTION INTRAVENOUS at 17:10

## 2024-11-30 RX ADMIN — INSULIN GLARGINE 5 UNITS: 100 INJECTION, SOLUTION SUBCUTANEOUS at 20:47

## 2024-11-30 RX ADMIN — SODIUM CHLORIDE, PRESERVATIVE FREE 10 ML: 5 INJECTION INTRAVENOUS at 20:51

## 2024-11-30 ASSESSMENT — PAIN DESCRIPTION - LOCATION: LOCATION: LEG

## 2024-11-30 ASSESSMENT — PAIN SCALES - GENERAL
PAINLEVEL_OUTOF10: 0
PAINLEVEL_OUTOF10: 3
PAINLEVEL_OUTOF10: 1
PAINLEVEL_OUTOF10: 0

## 2024-11-30 ASSESSMENT — PAIN DESCRIPTION - ORIENTATION: ORIENTATION: RIGHT;LEFT

## 2024-11-30 ASSESSMENT — PAIN DESCRIPTION - DESCRIPTORS: DESCRIPTORS: DISCOMFORT

## 2024-11-30 NOTE — ANESTHESIA POSTPROCEDURE EVALUATION
Department of Anesthesiology  Postprocedure Note    Patient: Marc Horton Sr.  MRN: [id number]  YOB: 1955  Date of evaluation: 11/30/2024    Procedure Summary       Date: 11/30/24 Room / Location: [facility] ENDO FLOURO 1 / [facility] ENDOSCOPY    Anesthesia Start: 0943 Anesthesia Stop: 0955    Procedure: ESOPHAGOGASTRODUODENOSCOPY (Upper GI Region) Diagnosis:       Melena      (Melena [K92.1])    Surgeons: Alissa Stark MD Responsible Provider: Tariq Rdz IV, MD    Anesthesia Type: general ASA Status: 4            Anesthesia Type: No value filed.    Sweta Phase I:      Sweta Phase II:      Anesthesia Post Evaluation    Patient location during evaluation: ICU  Patient participation: complete - patient participated  Level of consciousness: awake and confused  Airway patency: patent  Nausea & Vomiting: no nausea and no vomiting  Cardiovascular status: hemodynamically stable  Respiratory status: acceptable, nonlabored ventilation and spontaneous ventilation  Hydration status: euvolemic  Comments: BP (!) 104/56   Pulse 84   Temp 97.2 °F (36.2 °C) (Tympanic)   Resp 22   Ht 1.829 m (6')   Wt 103.2 kg (227 lb 8 oz)   SpO2 97%   BMI 30.85 kg/m²     Pain management: adequate    No notable events documented.

## 2024-11-30 NOTE — CONSENT
Informed Consent for Blood Component Transfusion Note    I have discussed with the son, Marc Michael, the rationale for blood component transfusion; its benefits in treating or preventing fatigue, organ damage, or death; and its risk which includes mild transfusion reactions, rare risk of blood borne infection, or more serious but rare reactions. I have discussed the alternatives to transfusion, including the risk and consequences of not receiving transfusion. The son had an opportunity to ask questions and had agreed to proceed with transfusion of blood components.    Electronically signed by Lalit Perrin MD on 11/30/24 at 1:50 AM EST

## 2024-11-30 NOTE — CONSENT
Informed Consent for Blood Component Transfusion Note    I have attempted to contact the patient's son, Marc Horton Jr, who is his HCPOA per his daughter José Miguel Milan. I have been unable to reach him by phone to discuss the risks and benefits of transfusion. The patient himself is unable to consent due to confusion at this time. Given the patient's history and concern for active rapid GI bleed, it is appropriate to proceed with transfusion at this time.    Electronically signed by Lalit Perrin MD on 11/30/24 at 1:25 AM EST

## 2024-12-01 LAB
ANION GAP SERPL CALC-SCNC: 9 MMOL/L (ref 7–16)
BUN SERPL-MCNC: 57 MG/DL (ref 8–23)
CALCIUM SERPL-MCNC: 7.9 MG/DL (ref 8.8–10.2)
CHLORIDE SERPL-SCNC: 108 MMOL/L (ref 98–107)
CO2 SERPL-SCNC: 26 MMOL/L (ref 20–29)
CREAT SERPL-MCNC: 0.91 MG/DL (ref 0.8–1.3)
ERYTHROCYTE [DISTWIDTH] IN BLOOD BY AUTOMATED COUNT: 15.9 % (ref 11.9–14.6)
GLUCOSE BLD STRIP.AUTO-MCNC: 129 MG/DL (ref 65–100)
GLUCOSE BLD STRIP.AUTO-MCNC: 242 MG/DL (ref 65–100)
GLUCOSE BLD STRIP.AUTO-MCNC: 249 MG/DL (ref 65–100)
GLUCOSE BLD STRIP.AUTO-MCNC: 273 MG/DL (ref 65–100)
GLUCOSE SERPL-MCNC: 104 MG/DL (ref 70–99)
HCT VFR BLD AUTO: 25.4 % (ref 41.1–50.3)
HGB BLD-MCNC: 8.2 G/DL (ref 13.6–17.2)
LACTATE SERPL-SCNC: 2.8 MMOL/L (ref 0.5–2)
LACTATE SERPL-SCNC: 2.9 MMOL/L (ref 0.5–2)
LACTATE SERPL-SCNC: 3.1 MMOL/L (ref 0.5–2)
LACTATE SERPL-SCNC: 3.2 MMOL/L (ref 0.5–2)
LACTATE SERPL-SCNC: 6 MMOL/L (ref 0.5–2)
LACTATE SERPL-SCNC: 6.9 MMOL/L (ref 0.5–2)
MCH RBC QN AUTO: 29.8 PG (ref 26.1–32.9)
MCHC RBC AUTO-ENTMCNC: 32.3 G/DL (ref 31.4–35)
MCV RBC AUTO: 92.4 FL (ref 82–102)
NRBC # BLD: 0.81 K/UL (ref 0–0.2)
PLATELET # BLD AUTO: 221 K/UL (ref 150–450)
PMV BLD AUTO: 10.3 FL (ref 9.4–12.3)
POTASSIUM SERPL-SCNC: ABNORMAL MMOL/L (ref 3.5–5.1)
RBC # BLD AUTO: 2.75 M/UL (ref 4.23–5.6)
SERVICE CMNT-IMP: ABNORMAL
SODIUM SERPL-SCNC: 143 MMOL/L (ref 136–145)
WBC # BLD AUTO: 21.1 K/UL (ref 4.3–11.1)

## 2024-12-01 PROCEDURE — 99232 SBSQ HOSP IP/OBS MODERATE 35: CPT | Performed by: INTERNAL MEDICINE

## 2024-12-01 PROCEDURE — 2580000003 HC RX 258: Performed by: INTERNAL MEDICINE

## 2024-12-01 PROCEDURE — 6360000002 HC RX W HCPCS: Performed by: INTERNAL MEDICINE

## 2024-12-01 PROCEDURE — 6370000000 HC RX 637 (ALT 250 FOR IP): Performed by: INTERNAL MEDICINE

## 2024-12-01 PROCEDURE — 80048 BASIC METABOLIC PNL TOTAL CA: CPT

## 2024-12-01 PROCEDURE — 2580000003 HC RX 258: Performed by: STUDENT IN AN ORGANIZED HEALTH CARE EDUCATION/TRAINING PROGRAM

## 2024-12-01 PROCEDURE — 6370000000 HC RX 637 (ALT 250 FOR IP)

## 2024-12-01 PROCEDURE — 2580000003 HC RX 258

## 2024-12-01 PROCEDURE — 6360000002 HC RX W HCPCS

## 2024-12-01 PROCEDURE — 82962 GLUCOSE BLOOD TEST: CPT

## 2024-12-01 PROCEDURE — 83605 ASSAY OF LACTIC ACID: CPT

## 2024-12-01 PROCEDURE — 6360000002 HC RX W HCPCS: Performed by: NURSE PRACTITIONER

## 2024-12-01 PROCEDURE — 6370000000 HC RX 637 (ALT 250 FOR IP): Performed by: NURSE PRACTITIONER

## 2024-12-01 PROCEDURE — 94640 AIRWAY INHALATION TREATMENT: CPT

## 2024-12-01 PROCEDURE — 2580000003 HC RX 258: Performed by: FAMILY MEDICINE

## 2024-12-01 PROCEDURE — 94761 N-INVAS EAR/PLS OXIMETRY MLT: CPT

## 2024-12-01 PROCEDURE — 94760 N-INVAS EAR/PLS OXIMETRY 1: CPT

## 2024-12-01 PROCEDURE — 1100000003 HC PRIVATE W/ TELEMETRY

## 2024-12-01 PROCEDURE — 36415 COLL VENOUS BLD VENIPUNCTURE: CPT

## 2024-12-01 PROCEDURE — 85027 COMPLETE CBC AUTOMATED: CPT

## 2024-12-01 RX ORDER — SODIUM CHLORIDE 9 MG/ML
INJECTION, SOLUTION INTRAVENOUS CONTINUOUS
Status: ACTIVE | OUTPATIENT
Start: 2024-12-01 | End: 2024-12-04

## 2024-12-01 RX ORDER — 0.9 % SODIUM CHLORIDE 0.9 %
500 INTRAVENOUS SOLUTION INTRAVENOUS ONCE
Status: COMPLETED | OUTPATIENT
Start: 2024-12-01 | End: 2024-12-01

## 2024-12-01 RX ADMIN — METOPROLOL TARTRATE 100 MG: 100 TABLET, FILM COATED ORAL at 09:40

## 2024-12-01 RX ADMIN — INSULIN GLARGINE 5 UNITS: 100 INJECTION, SOLUTION SUBCUTANEOUS at 21:04

## 2024-12-01 RX ADMIN — LINEZOLID 600 MG: 600 INJECTION, SOLUTION INTRAVENOUS at 04:20

## 2024-12-01 RX ADMIN — SODIUM CHLORIDE: 9 INJECTION, SOLUTION INTRAVENOUS at 23:47

## 2024-12-01 RX ADMIN — SUCRALFATE 1 G: 1 TABLET ORAL at 21:05

## 2024-12-01 RX ADMIN — PIPERACILLIN AND TAZOBACTAM 3375 MG: 3; .375 INJECTION, POWDER, LYOPHILIZED, FOR SOLUTION INTRAVENOUS at 21:08

## 2024-12-01 RX ADMIN — SUCRALFATE 1 G: 1 TABLET ORAL at 04:21

## 2024-12-01 RX ADMIN — PANTOPRAZOLE SODIUM 40 MG: 40 INJECTION, POWDER, FOR SOLUTION INTRAVENOUS at 17:24

## 2024-12-01 RX ADMIN — GABAPENTIN 300 MG: 300 CAPSULE ORAL at 09:40

## 2024-12-01 RX ADMIN — BUDESONIDE INHALATION 500 MCG: 0.5 SUSPENSION RESPIRATORY (INHALATION) at 19:32

## 2024-12-01 RX ADMIN — PANTOPRAZOLE SODIUM 40 MG: 40 INJECTION, POWDER, FOR SOLUTION INTRAVENOUS at 04:21

## 2024-12-01 RX ADMIN — ARFORMOTEROL TARTRATE 15 MCG: 15 SOLUTION RESPIRATORY (INHALATION) at 07:58

## 2024-12-01 RX ADMIN — TRAMADOL HYDROCHLORIDE 50 MG: 50 TABLET, COATED ORAL at 04:25

## 2024-12-01 RX ADMIN — IPRATROPIUM BROMIDE 0.5 MG: 0.5 SOLUTION RESPIRATORY (INHALATION) at 07:58

## 2024-12-01 RX ADMIN — AMIODARONE HYDROCHLORIDE 200 MG: 100 TABLET ORAL at 09:40

## 2024-12-01 RX ADMIN — SODIUM CHLORIDE 500 ML: 9 INJECTION, SOLUTION INTRAVENOUS at 01:49

## 2024-12-01 RX ADMIN — ASPIRIN 81 MG: 81 TABLET, CHEWABLE ORAL at 09:41

## 2024-12-01 RX ADMIN — PIPERACILLIN AND TAZOBACTAM 3375 MG: 3; .375 INJECTION, POWDER, LYOPHILIZED, FOR SOLUTION INTRAVENOUS at 13:47

## 2024-12-01 RX ADMIN — SUCRALFATE 1 G: 1 TABLET ORAL at 14:17

## 2024-12-01 RX ADMIN — METOPROLOL TARTRATE 100 MG: 100 TABLET, FILM COATED ORAL at 21:05

## 2024-12-01 RX ADMIN — ACETAMINOPHEN 650 MG: 325 TABLET ORAL at 10:33

## 2024-12-01 RX ADMIN — SODIUM CHLORIDE: 9 INJECTION, SOLUTION INTRAVENOUS at 14:21

## 2024-12-01 RX ADMIN — GABAPENTIN 300 MG: 300 CAPSULE ORAL at 21:04

## 2024-12-01 RX ADMIN — BUDESONIDE INHALATION 500 MCG: 0.5 SUSPENSION RESPIRATORY (INHALATION) at 07:58

## 2024-12-01 RX ADMIN — INSULIN LISPRO 3 UNITS: 100 INJECTION, SOLUTION INTRAVENOUS; SUBCUTANEOUS at 17:17

## 2024-12-01 RX ADMIN — PIPERACILLIN AND TAZOBACTAM 3375 MG: 3; .375 INJECTION, POWDER, LYOPHILIZED, FOR SOLUTION INTRAVENOUS at 05:29

## 2024-12-01 RX ADMIN — TRAMADOL HYDROCHLORIDE 50 MG: 50 TABLET, COATED ORAL at 10:31

## 2024-12-01 RX ADMIN — SODIUM CHLORIDE, PRESERVATIVE FREE 10 ML: 5 INJECTION INTRAVENOUS at 09:41

## 2024-12-01 RX ADMIN — AMIODARONE HYDROCHLORIDE 200 MG: 100 TABLET ORAL at 21:26

## 2024-12-01 RX ADMIN — LINEZOLID 600 MG: 600 INJECTION, SOLUTION INTRAVENOUS at 17:21

## 2024-12-01 RX ADMIN — INSULIN LISPRO 3 UNITS: 100 INJECTION, SOLUTION INTRAVENOUS; SUBCUTANEOUS at 21:04

## 2024-12-01 RX ADMIN — IPRATROPIUM BROMIDE 0.5 MG: 0.5 SOLUTION RESPIRATORY (INHALATION) at 19:32

## 2024-12-01 ASSESSMENT — PAIN SCALES - GENERAL
PAINLEVEL_OUTOF10: 7
PAINLEVEL_OUTOF10: 9

## 2024-12-01 ASSESSMENT — PAIN SCALES - WONG BAKER: WONGBAKER_NUMERICALRESPONSE: NO HURT

## 2024-12-01 ASSESSMENT — PAIN DESCRIPTION - LOCATION
LOCATION: GENERALIZED
LOCATION: GENERALIZED

## 2024-12-01 ASSESSMENT — PAIN DESCRIPTION - DESCRIPTORS
DESCRIPTORS: ACHING;DISCOMFORT
DESCRIPTORS: ACHING

## 2024-12-01 ASSESSMENT — PAIN DESCRIPTION - ORIENTATION: ORIENTATION: RIGHT;LEFT

## 2024-12-01 NOTE — ICUWATCH
RRT Clinical Rounding Nurse Update    Vitals:    12/01/24 0727 12/01/24 0729 12/01/24 0759 12/01/24 0940   BP: 119/67   119/67   Pulse: 67 76 68 68   Resp: 18      Temp: 97.5 °F (36.4 °C)      TempSrc: Oral      SpO2: 97%  96%    Weight:       Height:          ASSESSMENT:  Previous outreach assessment was reviewed. There have been no significant changes since previous assessment. Patient alert and oriented x2-3. Respirations unlabored. Just drank 1.5L bottle of apple juice (previously drank entire bottle on night shift). When asked where he kept getting the large bottles of juice, he replied \"I know people in high places.\" VS, labs, and progress notes reviewed. Discussed with primary RN.     PLAN:  Will follow per RRT Clinical Rounding Program protocol.    Durga Willingham RN  [facility]: [phone]  [facility]: [phone]

## 2024-12-01 NOTE — ICUWATCH
RRT Clinical Rounding Nurse Progress Report      SUBJECTIVE: Patient assessed secondary to transfer from critical care.      Vitals:    11/30/24 1346 11/30/24 1425 11/30/24 1545 11/30/24 1922   BP:  (!) 141/71 120/61 127/70   Pulse: 91 84 76 81   Resp: 30 20 16 19   Temp:  (!) 96.6 °F (35.9 °C) 97.9 °F (36.6 °C) 97.7 °F (36.5 °C)   TempSrc:   Oral Oral   SpO2: 95% 90% 98% 100%   Weight:       Height:            DETERIORATION INDEX SCORE: 47    ASSESSMENT:  Pt is resting in bed, drowsy but oriented and following commands appropriately. Unlabored, regular breathing on RA. Medium/large bloody melena-like stool right before shift change. Repeat Hgb s/p 2u PRBC's up to 8.4. Otherwise, pt has no major complaints and endorses feeling better.    Hyperglycemic with sliding scale and lantus. Upon initial encounter, pt had ingested a 48oz bottle of apple juice. Brief education provided for not over-consuming large sugar-rich drinks, for which patient responded with \"I plead the 5th.\" Could potentially benefit from diabetes education IP vs. OP.     PLAN:  Will follow per RRT Clinical Rounding Program protocol.    Rangel Carrillo RN  [facility]: [phone]  [facility]: [phone]

## 2024-12-02 ENCOUNTER — APPOINTMENT (OUTPATIENT)
Dept: CT IMAGING | Age: 69
DRG: 871 | End: 2024-12-02
Payer: MEDICAID

## 2024-12-02 ENCOUNTER — TELEPHONE (OUTPATIENT)
Dept: FAMILY MEDICINE CLINIC | Facility: CLINIC | Age: 69
End: 2024-12-02

## 2024-12-02 LAB
ABO + RH BLD: NORMAL
ANION GAP SERPL CALC-SCNC: 9 MMOL/L (ref 7–16)
ANTIGENS PRESENT RBC DONR: NORMAL
ANTIGENS PRESENT RBC DONR: NORMAL
BLD PROD TYP BPU: NORMAL
BLD PROD TYP BPU: NORMAL
BLOOD BANK BLOOD PRODUCT EXPIRATION DATE: NORMAL
BLOOD BANK BLOOD PRODUCT EXPIRATION DATE: NORMAL
BLOOD BANK CMNT PATIENT-IMP: NORMAL
BLOOD BANK DISPENSE STATUS: NORMAL
BLOOD BANK DISPENSE STATUS: NORMAL
BLOOD BANK ISBT PRODUCT BLOOD TYPE: 6200
BLOOD BANK ISBT PRODUCT BLOOD TYPE: 6200
BLOOD BANK PRODUCT CODE: NORMAL
BLOOD BANK PRODUCT CODE: NORMAL
BLOOD BANK UNIT TYPE AND RH: NORMAL
BLOOD BANK UNIT TYPE AND RH: NORMAL
BLOOD GROUP ANTIBODIES SERPL: NORMAL
BLOOD GROUP ANTIBODIES SERPL: NORMAL
BPU ID: NORMAL
BPU ID: NORMAL
BUN SERPL-MCNC: 33 MG/DL (ref 8–23)
CALCIUM SERPL-MCNC: 7.7 MG/DL (ref 8.8–10.2)
CHLORIDE SERPL-SCNC: 102 MMOL/L (ref 98–107)
CO2 SERPL-SCNC: 25 MMOL/L (ref 20–29)
CREAT SERPL-MCNC: 0.9 MG/DL (ref 0.8–1.3)
CROSSMATCH RESULT: NORMAL
CROSSMATCH RESULT: NORMAL
ERYTHROCYTE [DISTWIDTH] IN BLOOD BY AUTOMATED COUNT: 16 % (ref 11.9–14.6)
GLUCOSE BLD STRIP.AUTO-MCNC: 154 MG/DL (ref 65–100)
GLUCOSE BLD STRIP.AUTO-MCNC: 177 MG/DL (ref 65–100)
GLUCOSE BLD STRIP.AUTO-MCNC: 206 MG/DL (ref 65–100)
GLUCOSE BLD STRIP.AUTO-MCNC: 229 MG/DL (ref 65–100)
GLUCOSE SERPL-MCNC: 181 MG/DL (ref 70–99)
HCT VFR BLD AUTO: 22.7 % (ref 41.1–50.3)
HGB BLD-MCNC: 7 G/DL (ref 13.6–17.2)
LACTATE SERPL-SCNC: 3.1 MMOL/L (ref 0.5–2)
LACTATE SERPL-SCNC: 3.2 MMOL/L (ref 0.5–2)
LACTATE SERPL-SCNC: 3.2 MMOL/L (ref 0.5–2)
LACTATE SERPL-SCNC: 3.7 MMOL/L (ref 0.5–2)
MCH RBC QN AUTO: 28.6 PG (ref 26.1–32.9)
MCHC RBC AUTO-ENTMCNC: 30.8 G/DL (ref 31.4–35)
MCV RBC AUTO: 92.7 FL (ref 82–102)
NRBC # BLD: 0.42 K/UL (ref 0–0.2)
PLATELET # BLD AUTO: 212 K/UL (ref 150–450)
PMV BLD AUTO: 9.5 FL (ref 9.4–12.3)
POTASSIUM SERPL-SCNC: 4.3 MMOL/L (ref 3.5–5.1)
RBC # BLD AUTO: 2.45 M/UL (ref 4.23–5.6)
SERVICE CMNT-IMP: ABNORMAL
SODIUM SERPL-SCNC: 136 MMOL/L (ref 136–145)
SPECIMEN EXP DATE BLD: NORMAL
UNIT DIVISION: 0
UNIT DIVISION: 0
UNIT ISSUE DATE/TIME: NORMAL
UNIT ISSUE DATE/TIME: NORMAL
WBC # BLD AUTO: 17.3 K/UL (ref 4.3–11.1)

## 2024-12-02 PROCEDURE — 74174 CTA ABD&PLVS W/CONTRAST: CPT | Performed by: RADIOLOGY

## 2024-12-02 PROCEDURE — 6360000002 HC RX W HCPCS

## 2024-12-02 PROCEDURE — 83605 ASSAY OF LACTIC ACID: CPT

## 2024-12-02 PROCEDURE — 6360000002 HC RX W HCPCS: Performed by: STUDENT IN AN ORGANIZED HEALTH CARE EDUCATION/TRAINING PROGRAM

## 2024-12-02 PROCEDURE — 94640 AIRWAY INHALATION TREATMENT: CPT

## 2024-12-02 PROCEDURE — 29580 STRAPPING UNNA BOOT: CPT

## 2024-12-02 PROCEDURE — 82962 GLUCOSE BLOOD TEST: CPT

## 2024-12-02 PROCEDURE — 6360000002 HC RX W HCPCS: Performed by: INTERNAL MEDICINE

## 2024-12-02 PROCEDURE — 36415 COLL VENOUS BLD VENIPUNCTURE: CPT

## 2024-12-02 PROCEDURE — 97165 OT EVAL LOW COMPLEX 30 MIN: CPT

## 2024-12-02 PROCEDURE — 6360000004 HC RX CONTRAST MEDICATION: Performed by: STUDENT IN AN ORGANIZED HEALTH CARE EDUCATION/TRAINING PROGRAM

## 2024-12-02 PROCEDURE — 6370000000 HC RX 637 (ALT 250 FOR IP): Performed by: NURSE PRACTITIONER

## 2024-12-02 PROCEDURE — 6370000000 HC RX 637 (ALT 250 FOR IP)

## 2024-12-02 PROCEDURE — 74174 CTA ABD&PLVS W/CONTRAST: CPT

## 2024-12-02 PROCEDURE — 2580000003 HC RX 258: Performed by: INTERNAL MEDICINE

## 2024-12-02 PROCEDURE — 97535 SELF CARE MNGMENT TRAINING: CPT

## 2024-12-02 PROCEDURE — 2580000003 HC RX 258: Performed by: STUDENT IN AN ORGANIZED HEALTH CARE EDUCATION/TRAINING PROGRAM

## 2024-12-02 PROCEDURE — 6360000002 HC RX W HCPCS: Performed by: NURSE PRACTITIONER

## 2024-12-02 PROCEDURE — 97161 PT EVAL LOW COMPLEX 20 MIN: CPT

## 2024-12-02 PROCEDURE — 6370000000 HC RX 637 (ALT 250 FOR IP): Performed by: INTERNAL MEDICINE

## 2024-12-02 PROCEDURE — 2580000003 HC RX 258

## 2024-12-02 PROCEDURE — 6370000000 HC RX 637 (ALT 250 FOR IP): Performed by: STUDENT IN AN ORGANIZED HEALTH CARE EDUCATION/TRAINING PROGRAM

## 2024-12-02 PROCEDURE — 85027 COMPLETE CBC AUTOMATED: CPT

## 2024-12-02 PROCEDURE — 97530 THERAPEUTIC ACTIVITIES: CPT

## 2024-12-02 PROCEDURE — 99231 SBSQ HOSP IP/OBS SF/LOW 25: CPT | Performed by: INTERNAL MEDICINE

## 2024-12-02 PROCEDURE — 94761 N-INVAS EAR/PLS OXIMETRY MLT: CPT

## 2024-12-02 PROCEDURE — 1100000003 HC PRIVATE W/ TELEMETRY

## 2024-12-02 PROCEDURE — 80048 BASIC METABOLIC PNL TOTAL CA: CPT

## 2024-12-02 RX ORDER — PANTOPRAZOLE SODIUM 40 MG/1
40 TABLET, DELAYED RELEASE ORAL
Status: DISCONTINUED | OUTPATIENT
Start: 2024-12-02 | End: 2024-12-04 | Stop reason: HOSPADM

## 2024-12-02 RX ORDER — IOPAMIDOL 755 MG/ML
100 INJECTION, SOLUTION INTRAVASCULAR
Status: COMPLETED | OUTPATIENT
Start: 2024-12-02 | End: 2024-12-02

## 2024-12-02 RX ORDER — DIPHENHYDRAMINE HCL 25 MG
50 CAPSULE ORAL ONCE
Status: DISCONTINUED | OUTPATIENT
Start: 2024-12-02 | End: 2024-12-02

## 2024-12-02 RX ORDER — LINEZOLID 600 MG/1
600 TABLET, FILM COATED ORAL EVERY 12 HOURS
Status: COMPLETED | OUTPATIENT
Start: 2024-12-02 | End: 2024-12-03

## 2024-12-02 RX ADMIN — GABAPENTIN 300 MG: 300 CAPSULE ORAL at 09:11

## 2024-12-02 RX ADMIN — AMIODARONE HYDROCHLORIDE 200 MG: 100 TABLET ORAL at 20:53

## 2024-12-02 RX ADMIN — TRAMADOL HYDROCHLORIDE 50 MG: 50 TABLET, COATED ORAL at 09:19

## 2024-12-02 RX ADMIN — SODIUM CHLORIDE, PRESERVATIVE FREE 10 ML: 5 INJECTION INTRAVENOUS at 20:55

## 2024-12-02 RX ADMIN — TRAMADOL HYDROCHLORIDE 50 MG: 50 TABLET, COATED ORAL at 15:52

## 2024-12-02 RX ADMIN — PANTOPRAZOLE SODIUM 40 MG: 40 TABLET, DELAYED RELEASE ORAL at 17:37

## 2024-12-02 RX ADMIN — SODIUM CHLORIDE, PRESERVATIVE FREE 10 ML: 5 INJECTION INTRAVENOUS at 09:11

## 2024-12-02 RX ADMIN — PIPERACILLIN AND TAZOBACTAM 3375 MG: 3; .375 INJECTION, POWDER, LYOPHILIZED, FOR SOLUTION INTRAVENOUS at 05:16

## 2024-12-02 RX ADMIN — IPRATROPIUM BROMIDE 0.5 MG: 0.5 SOLUTION RESPIRATORY (INHALATION) at 07:30

## 2024-12-02 RX ADMIN — GABAPENTIN 300 MG: 300 CAPSULE ORAL at 20:54

## 2024-12-02 RX ADMIN — IOPAMIDOL 100 ML: 755 INJECTION, SOLUTION INTRAVENOUS at 11:07

## 2024-12-02 RX ADMIN — SUCRALFATE 1 G: 1 TABLET ORAL at 20:53

## 2024-12-02 RX ADMIN — SUCRALFATE 1 G: 1 TABLET ORAL at 13:53

## 2024-12-02 RX ADMIN — LINEZOLID 600 MG: 600 TABLET, FILM COATED ORAL at 17:37

## 2024-12-02 RX ADMIN — IPRATROPIUM BROMIDE 0.5 MG: 0.5 SOLUTION RESPIRATORY (INHALATION) at 19:21

## 2024-12-02 RX ADMIN — METOPROLOL TARTRATE 100 MG: 100 TABLET, FILM COATED ORAL at 09:11

## 2024-12-02 RX ADMIN — METOPROLOL TARTRATE 100 MG: 100 TABLET, FILM COATED ORAL at 20:53

## 2024-12-02 RX ADMIN — PANTOPRAZOLE SODIUM 40 MG: 40 INJECTION, POWDER, FOR SOLUTION INTRAVENOUS at 04:08

## 2024-12-02 RX ADMIN — BUDESONIDE INHALATION 500 MCG: 0.5 SUSPENSION RESPIRATORY (INHALATION) at 19:21

## 2024-12-02 RX ADMIN — INSULIN GLARGINE 5 UNITS: 100 INJECTION, SOLUTION SUBCUTANEOUS at 20:54

## 2024-12-02 RX ADMIN — INSULIN LISPRO 3 UNITS: 100 INJECTION, SOLUTION INTRAVENOUS; SUBCUTANEOUS at 12:13

## 2024-12-02 RX ADMIN — AMIODARONE HYDROCHLORIDE 200 MG: 100 TABLET ORAL at 09:11

## 2024-12-02 RX ADMIN — SODIUM CHLORIDE: 9 INJECTION, SOLUTION INTRAVENOUS at 20:58

## 2024-12-02 RX ADMIN — BUDESONIDE INHALATION 500 MCG: 0.5 SUSPENSION RESPIRATORY (INHALATION) at 07:30

## 2024-12-02 RX ADMIN — ASPIRIN 81 MG: 81 TABLET, CHEWABLE ORAL at 09:11

## 2024-12-02 RX ADMIN — INSULIN LISPRO 3 UNITS: 100 INJECTION, SOLUTION INTRAVENOUS; SUBCUTANEOUS at 20:54

## 2024-12-02 RX ADMIN — LINEZOLID 600 MG: 600 INJECTION, SOLUTION INTRAVENOUS at 04:11

## 2024-12-02 RX ADMIN — SUCRALFATE 1 G: 1 TABLET ORAL at 04:08

## 2024-12-02 RX ADMIN — CEFTRIAXONE SODIUM 2000 MG: 2 INJECTION, POWDER, FOR SOLUTION INTRAMUSCULAR; INTRAVENOUS at 13:55

## 2024-12-02 ASSESSMENT — PAIN SCALES - GENERAL
PAINLEVEL_OUTOF10: 8
PAINLEVEL_OUTOF10: 7

## 2024-12-02 ASSESSMENT — PAIN - FUNCTIONAL ASSESSMENT: PAIN_FUNCTIONAL_ASSESSMENT: PREVENTS OR INTERFERES WITH ALL ACTIVE AND SOME PASSIVE ACTIVITIES

## 2024-12-02 ASSESSMENT — PAIN DESCRIPTION - LOCATION
LOCATION: LEG
LOCATION: LEG

## 2024-12-02 ASSESSMENT — PAIN DESCRIPTION - DESCRIPTORS
DESCRIPTORS: ACHING;SORE
DESCRIPTORS: ACHING;SORE

## 2024-12-02 ASSESSMENT — PAIN DESCRIPTION - ORIENTATION
ORIENTATION: RIGHT;LEFT
ORIENTATION: RIGHT;LEFT

## 2024-12-02 NOTE — THERAPY EVALUATION
ACUTE OCCUPATIONAL THERAPY GOALS:   (Developed with and agreed upon by patient and/or caregiver.)  1. Patient will complete lower body bathing and dressing with MINIMAL ASSIST and adaptive equipment as needed.     2. Patient will complete toileting with MINIMAL ASSIST.  3. Patient will complete grooming ADL standing edge of sink with MINIMAL ASSIST.  4. Patient will tolerate 25 minutes of OT treatment with 1-2 rest breaks to increase activity tolerance for ADLs.   5. Patient will complete functional transfers with MINIMAL ASSIST and adaptive equipment as needed.   6. Patient will tolerate 10 minutes BUE exercises to increase strength for safe, functional transfers.     Timeframe: 7 visits      OCCUPATIONAL THERAPY Initial Assessment, Daily Note, and AM       OT Visit Days: 1  Acknowledge Orders  Time  OT Charge Capture  Rehab Caseload Tracker      Marc Horton Sr. is a 69 y.o. male   PRIMARY DIAGNOSIS: Cellulitis of multiple sites of lower extremity  Hyponatremia [E87.1]  Septicemia (HCC) [A41.9]  Cellulitis of left lower extremity [L03.116]  Cellulitis of multiple sites of lower extremity [L03.119]  Procedure(s) (LRB):  ESOPHAGOGASTRODUODENOSCOPY (N/A)  2 Days Post-Op  Reason for Referral: Generalized Muscle Weakness (M62.81)  Other lack of cordination (R27.8)  Difficulty in walking, Not elsewhere classified (R26.2)  Inpatient: Payor: [facility] MEDICARE / Plan: [employer] DUAL COMPLETE / Product Type: *No Product type* /     ASSESSMENT:     REHAB RECOMMENDATIONS:   Recommendation to date pending progress:  Setting:  Short-term Rehab    Equipment:    To Be Determined     ASSESSMENT:  Mr. Horton is a 68 y/o male presents with cellulitis of BLE. Today, pt demonstrates impairments in strength, balance and activity tolerance limiting ADLs and functional mobility. Pt overall CGA for bed mobility, Mod A x 2 for sit to stand and Min A x 2 for stand pivot transfer to chair. Pt completed sponge bath and dressing

## 2024-12-02 NOTE — TELEPHONE ENCOUNTER
Dot from [facility] called regarding the order that was sent in for pt's Home Health referral. She asked if Meliza would be willing to sign off on those orders.     Please call Dot for a verbal sign off    P: [phone]    Thank you

## 2024-12-02 NOTE — PLAN OF CARE
Problem: Chronic Conditions and Co-morbidities  Goal: Patient's chronic conditions and co-morbidity symptoms are monitored and maintained or improved  11/28/2024 0955 by Momo Gonsalez RN  Outcome: Progressing  11/28/2024 0245 by Jono Gonzales RN  Outcome: Progressing     Problem: Discharge Planning  Goal: Discharge to home or other facility with appropriate resources  11/28/2024 0955 by Momo Gonsalez RN  Outcome: Progressing  11/28/2024 0245 by Jono Gonzales RN  Outcome: Progressing     Problem: Pain  Goal: Verbalizes/displays adequate comfort level or baseline comfort level  11/28/2024 0955 by Momo Gonsalez RN  Outcome: Progressing  11/28/2024 0245 by Jono Gonzales RN  Outcome: Progressing     Problem: Safety - Adult  Goal: Free from fall injury  11/28/2024 0955 by Momo Gonsalez RN  Outcome: Progressing  11/28/2024 0245 by Jono Gonzales RN  Outcome: Progressing     Problem: Skin/Tissue Integrity  Goal: Absence of new skin breakdown  Description: 1.  Monitor for areas of redness and/or skin breakdown  2.  Assess vascular access sites hourly  3.  Every 4-6 hours minimum:  Change oxygen saturation probe site  4.  Every 4-6 hours:  If on nasal continuous positive airway pressure, respiratory therapy assess nares and determine need for appliance change or resting period.  11/28/2024 0955 by Momo Gonsalez RN  Outcome: Progressing  11/28/2024 0245 by Jono Gonzales RN  Outcome: Progressing     Problem: Respiratory - Adult  Goal: Achieves optimal ventilation and oxygenation  11/28/2024 0955 by Momo Gonsalez RN  Outcome: Progressing  11/28/2024 0245 by Jono Gonzales RN  Outcome: Progressing     Problem: Risk for Elopement  Goal: Patient will not exit the unit/facility without proper excort  Outcome: Progressing  Flowsheets (Taken 11/28/2024 0300 by Jono Gonzales, RN)  Nursing Interventions for Elopement Risk:   Assist with personal care needs such as toileting, eating, dressing, as needed to reduce the 
  Problem: Chronic Conditions and Co-morbidities  Goal: Patient's chronic conditions and co-morbidity symptoms are monitored and maintained or improved  11/29/2024 1521 by Vitor Sullivan RN  Outcome: Progressing  11/29/2024 0251 by Becky Iverson RN  Outcome: Progressing     Problem: Discharge Planning  Goal: Discharge to home or other facility with appropriate resources  11/29/2024 1521 by Vitor Sullivan RN  Outcome: Progressing  11/29/2024 0251 by Becky Iverson RN  Outcome: Progressing     Problem: Pain  Goal: Verbalizes/displays adequate comfort level or baseline comfort level  11/29/2024 1521 by Vitor Sullivan RN  Outcome: Progressing  11/29/2024 0251 by Becky Iverson RN  Outcome: Progressing     Problem: Safety - Adult  Goal: Free from fall injury  11/29/2024 1521 by Vitor Sullivan RN  Outcome: Progressing  11/29/2024 0251 by Becky Iverson RN  Outcome: Progressing     Problem: Skin/Tissue Integrity  Goal: Absence of new skin breakdown  Description: 1.  Monitor for areas of redness and/or skin breakdown  2.  Assess vascular access sites hourly  3.  Every 4-6 hours minimum:  Change oxygen saturation probe site  4.  Every 4-6 hours:  If on nasal continuous positive airway pressure, respiratory therapy assess nares and determine need for appliance change or resting period.  11/29/2024 1521 by Vitor Sullivan RN  Outcome: Progressing  11/29/2024 0251 by Becky Iverson RN  Outcome: Progressing     Problem: Respiratory - Adult  Goal: Achieves optimal ventilation and oxygenation  11/29/2024 1521 by Vitor Sullivan RN  Outcome: Progressing  11/29/2024 0734 by Daija Gonzalez RCP  Outcome: Progressing  Flowsheets (Taken 11/29/2024 0734)  Achieves optimal ventilation and oxygenation:   Assess for changes in respiratory status   Assess for changes in mentation and behavior   Position to facilitate oxygenation and minimize respiratory effort   Oxygen supplementation based on 
  Problem: Chronic Conditions and Co-morbidities  Goal: Patient's chronic conditions and co-morbidity symptoms are monitored and maintained or improved  Outcome: Progressing     Problem: Discharge Planning  Goal: Discharge to home or other facility with appropriate resources  Outcome: Progressing     Problem: Pain  Goal: Verbalizes/displays adequate comfort level or baseline comfort level  Outcome: Progressing     Problem: Safety - Adult  Goal: Free from fall injury  Outcome: Progressing     Problem: Skin/Tissue Integrity  Goal: Absence of new skin breakdown  Description: 1.  Monitor for areas of redness and/or skin breakdown  2.  Assess vascular access sites hourly  3.  Every 4-6 hours minimum:  Change oxygen saturation probe site  4.  Every 4-6 hours:  If on nasal continuous positive airway pressure, respiratory therapy assess nares and determine need for appliance change or resting period.  Outcome: Progressing     
  Problem: Chronic Conditions and Co-morbidities  Goal: Patient's chronic conditions and co-morbidity symptoms are monitored and maintained or improved  Outcome: Progressing     Problem: Discharge Planning  Goal: Discharge to home or other facility with appropriate resources  Outcome: Progressing     Problem: Pain  Goal: Verbalizes/displays adequate comfort level or baseline comfort level  Outcome: Progressing     Problem: Safety - Adult  Goal: Free from fall injury  Outcome: Progressing     Problem: Skin/Tissue Integrity  Goal: Absence of new skin breakdown  Description: 1.  Monitor for areas of redness and/or skin breakdown  2.  Assess vascular access sites hourly  3.  Every 4-6 hours minimum:  Change oxygen saturation probe site  4.  Every 4-6 hours:  If on nasal continuous positive airway pressure, respiratory therapy assess nares and determine need for appliance change or resting period.  Outcome: Progressing     
  Problem: Chronic Conditions and Co-morbidities  Goal: Patient's chronic conditions and co-morbidity symptoms are monitored and maintained or improved  Outcome: Progressing     Problem: Discharge Planning  Goal: Discharge to home or other facility with appropriate resources  Outcome: Progressing     Problem: Pain  Goal: Verbalizes/displays adequate comfort level or baseline comfort level  Outcome: Progressing     Problem: Safety - Adult  Goal: Free from fall injury  Outcome: Progressing     Problem: Skin/Tissue Integrity  Goal: Absence of new skin breakdown  Description: 1.  Monitor for areas of redness and/or skin breakdown  2.  Assess vascular access sites hourly  3.  Every 4-6 hours minimum:  Change oxygen saturation probe site  4.  Every 4-6 hours:  If on nasal continuous positive airway pressure, respiratory therapy assess nares and determine need for appliance change or resting period.  Outcome: Progressing     Problem: Respiratory - Adult  Goal: Achieves optimal ventilation and oxygenation  12/1/2024 1924 by Marcos Stapleton RN  Outcome: Progressing  12/1/2024 0801 by Edelmira Guido RCP  Outcome: Progressing     Problem: Risk for Elopement  Goal: Patient will not exit the unit/facility without proper excort  Outcome: Progressing     Problem: Cardiovascular - Adult  Goal: Maintains optimal cardiac output and hemodynamic stability  Outcome: Progressing  Goal: Absence of cardiac dysrhythmias or at baseline  Outcome: Progressing     Problem: Metabolic/Fluid and Electrolytes - Adult  Goal: Electrolytes maintained within normal limits  Outcome: Progressing  Goal: Hemodynamic stability and optimal renal function maintained  Outcome: Progressing  Goal: Glucose maintained within prescribed range  Outcome: Progressing     Problem: Hematologic - Adult  Goal: Maintains hematologic stability  Outcome: Progressing     Problem: Gastrointestinal - Adult  Goal: Minimal or absence of nausea and vomiting  Outcome: 
  Problem: Chronic Conditions and Co-morbidities  Goal: Patient's chronic conditions and co-morbidity symptoms are monitored and maintained or improved  Outcome: Progressing     Problem: Discharge Planning  Goal: Discharge to home or other facility with appropriate resources  Outcome: Progressing     Problem: Pain  Goal: Verbalizes/displays adequate comfort level or baseline comfort level  Outcome: Progressing     Problem: Safety - Adult  Goal: Free from fall injury  Outcome: Progressing     Problem: Skin/Tissue Integrity  Goal: Absence of new skin breakdown  Description: 1.  Monitor for areas of redness and/or skin breakdown  2.  Assess vascular access sites hourly  3.  Every 4-6 hours minimum:  Change oxygen saturation probe site  4.  Every 4-6 hours:  If on nasal continuous positive airway pressure, respiratory therapy assess nares and determine need for appliance change or resting period.  Outcome: Progressing     Problem: Respiratory - Adult  Goal: Achieves optimal ventilation and oxygenation  Outcome: Progressing     
  Problem: Chronic Conditions and Co-morbidities  Goal: Patient's chronic conditions and co-morbidity symptoms are monitored and maintained or improved  Outcome: Progressing     Problem: Discharge Planning  Goal: Discharge to home or other facility with appropriate resources  Outcome: Progressing     Problem: Pain  Goal: Verbalizes/displays adequate comfort level or baseline comfort level  Outcome: Progressing     Problem: Safety - Adult  Goal: Free from fall injury  Outcome: Progressing     Problem: Skin/Tissue Integrity  Goal: Absence of new skin breakdown  Description: 1.  Monitor for areas of redness and/or skin breakdown  2.  Assess vascular access sites hourly  3.  Every 4-6 hours minimum:  Change oxygen saturation probe site  4.  Every 4-6 hours:  If on nasal continuous positive airway pressure, respiratory therapy assess nares and determine need for appliance change or resting period.  Outcome: Progressing     Problem: Respiratory - Adult  Goal: Achieves optimal ventilation and oxygenation  Outcome: Progressing     Problem: Risk for Elopement  Goal: Patient will not exit the unit/facility without proper excort  Outcome: Progressing     
  Problem: Discharge Planning  Goal: Discharge to home or other facility with appropriate resources  11/25/2024 2324 by Barber Ruth, RN  Outcome: Not Progressing  11/25/2024 1839 by Sugar Olivas, RN  Outcome: Progressing     Problem: Discharge Planning  Goal: Discharge to home or other facility with appropriate resources  11/25/2024 2324 by Barber Ruth, RN  Outcome: Not Progressing  11/25/2024 1839 by Sugar Olivas, RN  Outcome: Progressing     
  Problem: Discharge Planning  Goal: Discharge to home or other facility with appropriate resources  Outcome: Progressing     Problem: Pain  Goal: Verbalizes/displays adequate comfort level or baseline comfort level  Outcome: Progressing     Problem: Safety - Adult  Goal: Free from fall injury  Outcome: Progressing     Problem: Respiratory - Adult  Goal: Achieves optimal ventilation and oxygenation  Outcome: Progressing     Problem: Cardiovascular - Adult  Goal: Maintains optimal cardiac output and hemodynamic stability  Outcome: Progressing  Goal: Absence of cardiac dysrhythmias or at baseline  Outcome: Progressing     Problem: Metabolic/Fluid and Electrolytes - Adult  Goal: Electrolytes maintained within normal limits  Outcome: Progressing  Goal: Hemodynamic stability and optimal renal function maintained  Outcome: Progressing  Goal: Glucose maintained within prescribed range  Outcome: Progressing     Problem: Hematologic - Adult  Goal: Maintains hematologic stability  Outcome: Progressing     Problem: Gastrointestinal - Adult  Goal: Minimal or absence of nausea and vomiting  Outcome: Progressing  Goal: Maintains or returns to baseline bowel function  Outcome: Progressing     Problem: Confusion  Goal: Confusion, delirium, dementia, or psychosis is improved or at baseline  Description: INTERVENTIONS:  1. Assess for possible contributors to thought disturbance, including medications, impaired vision or hearing, underlying metabolic abnormalities, dehydration, psychiatric diagnoses, and notify attending LIP  2. [place] high risk fall precautions, as indicated  3. Provide frequent short contacts to provide reality reorientation, refocusing and direction  4. Decrease environmental stimuli, including noise as appropriate  5. Monitor and intervene to maintain adequate nutrition, hydration, elimination, sleep and activity  6. If unable to ensure safety without constant attention obtain sitter and review sitter 
  Problem: Respiratory - Adult  Goal: Achieves optimal ventilation and oxygenation  11/29/2024 0734 by Daija Gonzalez RCP  Outcome: Progressing  Flowsheets (Taken 11/29/2024 0734)  Achieves optimal ventilation and oxygenation:   Assess for changes in respiratory status   Assess for changes in mentation and behavior   Position to facilitate oxygenation and minimize respiratory effort   Oxygen supplementation based on oxygen saturation or arterial blood gases   Encourage broncho-pulmonary hygiene including cough, deep breathe, incentive spirometry   Assess and instruct to report shortness of breath or any respiratory difficulty   Respiratory therapy support as indicated     
  Problem: Respiratory - Adult  Goal: Achieves optimal ventilation and oxygenation  12/1/2024 0801 by Edelmira Guido RCP  Outcome: Progressing  11/30/2024 1945 by Marcos Stapleton RN  Outcome: Progressing     
  Problem: Respiratory - Adult  Goal: Achieves optimal ventilation and oxygenation  Outcome: Progressing  Flowsheets (Taken 11/26/2024 0721)  Achieves optimal ventilation and oxygenation:   Assess for changes in respiratory status   Respiratory therapy support as indicated   Position to facilitate oxygenation and minimize respiratory effort   Assess and instruct to report shortness of breath or any respiratory difficulty   Encourage broncho-pulmonary hygiene including cough, deep breathe, incentive spirometry   Assess for changes in mentation and behavior     Problem: Discharge Planning  Goal: Discharge to home or other facility with appropriate resources  11/25/2024 2324 by Barber Ruth, RN  Outcome: Not Progressing  11/25/2024 1839 by Sugar Olivas, RN  Outcome: Progressing     
  Problem: Respiratory - Adult  Goal: Achieves optimal ventilation and oxygenation  Outcome: Progressing  Flowsheets (Taken 11/27/2024 0813)  Achieves optimal ventilation and oxygenation:   Assess for changes in respiratory status   Assess for changes in mentation and behavior     
EGD complete, see procedure note for full report. Significant for clean base cratered ulcers in the antrum and duodenal bulb. No active bleeding.     - Continue IV PPI BID   - Carafate 1 g TID   - Transfuse two units PRBC now   - Continue to hold Eliquis until hgb stabilizes       Alissa Stark MD  [facility]    
Overnight night patient became confused. Elevated lactate to 10.3 this morning. Patient seen at bedside. He is lethargic. He denies abdominal pain but does have tenderness to palpation on abdominal exam. Abdomen is non distended and soft.    - Obtain stat CT abdomen pelvis to evaluate for bowel perforation in setting of elevated lactate and worsening anemia   -  If unrevealing will plan for EGD today   - NPO       Alissa Stark MD  [facility]    
Per Jono RN during night shift pt pulled out one of two IVs, leaving with only one access which has continuous cardizem infusing. Pt is hard stick, Jono tried several times to regain additional access with no success, she placed consult for vascular access team, she was unable to admin 0545 zosyn as it is incompatible with cardizem gtt. Pharmacy has been contacted regarding 0545 zosyn dose, per pharmacy, admin zosyn once IV access has been regained via VAT team or other whenever that may be, and consult pharmacy regarding future due times of zosyn.   
hemodynamic stability: Monitor blood pressure and heart rate  Goal: Absence of cardiac dysrhythmias or at baseline  Outcome: Progressing  Flowsheets (Taken 11/30/2024 0701)  Absence of cardiac dysrhythmias or at baseline: Monitor cardiac rate and rhythm     Problem: Metabolic/Fluid and Electrolytes - Adult  Goal: Electrolytes maintained within normal limits  Outcome: Progressing  Flowsheets (Taken 11/30/2024 0701)  Electrolytes maintained within normal limits: Monitor labs and assess patient for signs and symptoms of electrolyte imbalances  Goal: Hemodynamic stability and optimal renal function maintained  Outcome: Progressing  Flowsheets (Taken 11/30/2024 0701)  Hemodynamic stability and optimal renal function maintained: Monitor labs and assess for signs and symptoms of volume excess or deficit  Goal: Glucose maintained within prescribed range  Outcome: Progressing  Flowsheets (Taken 11/30/2024 0701)  Glucose maintained within prescribed range: Monitor blood glucose as ordered     Problem: Hematologic - Adult  Goal: Maintains hematologic stability  Outcome: Progressing  Flowsheets (Taken 11/30/2024 0701)  Maintains hematologic stability: Assess for signs and symptoms of bleeding or hemorrhage     Problem: Gastrointestinal - Adult  Goal: Minimal or absence of nausea and vomiting  Outcome: Progressing  Flowsheets (Taken 11/30/2024 0701)  Minimal or absence of nausea and vomiting: Administer IV fluids as ordered to ensure adequate hydration  Goal: Maintains or returns to baseline bowel function  Outcome: Progressing  Flowsheets (Taken 11/30/2024 0701)  Maintains or returns to baseline bowel function: Assess bowel function     Problem: Confusion  Goal: Confusion, delirium, dementia, or psychosis is improved or at baseline  Description: INTERVENTIONS:  1. Assess for possible contributors to thought disturbance, including medications, impaired vision or hearing, underlying metabolic abnormalities, dehydration, 
within prescribed range  11/30/2024 1945 by Marcos Stapleton RN  Outcome: Progressing  11/30/2024 1527 by Princess Barcenas RN  Outcome: Progressing  Flowsheets (Taken 11/30/2024 0701)  Glucose maintained within prescribed range: Monitor blood glucose as ordered     Problem: Hematologic - Adult  Goal: Maintains hematologic stability  11/30/2024 1945 by Marcos Stapleton RN  Outcome: Progressing  11/30/2024 1527 by Princess Barcenas RN  Outcome: Progressing  Flowsheets (Taken 11/30/2024 0701)  Maintains hematologic stability: Assess for signs and symptoms of bleeding or hemorrhage     Problem: Gastrointestinal - Adult  Goal: Minimal or absence of nausea and vomiting  11/30/2024 1945 by Marcos Stapleton RN  Outcome: Progressing  11/30/2024 1527 by Princess Barcenas RN  Outcome: Progressing  Flowsheets (Taken 11/30/2024 0701)  Minimal or absence of nausea and vomiting: Administer IV fluids as ordered to ensure adequate hydration  Goal: Maintains or returns to baseline bowel function  11/30/2024 1945 by Marcos Stapleton RN  Outcome: Progressing  11/30/2024 1527 by Princess Barcenas RN  Outcome: Progressing  Flowsheets (Taken 11/30/2024 0701)  Maintains or returns to baseline bowel function: Assess bowel function     Problem: Confusion  Goal: Confusion, delirium, dementia, or psychosis is improved or at baseline  Description: INTERVENTIONS:  1. Assess for possible contributors to thought disturbance, including medications, impaired vision or hearing, underlying metabolic abnormalities, dehydration, psychiatric diagnoses, and notify attending LIP  2. [place] high risk fall precautions, as indicated  3. Provide frequent short contacts to provide reality reorientation, refocusing and direction  4. Decrease environmental stimuli, including noise as appropriate  5. Monitor and intervene to maintain adequate nutrition, hydration, elimination, sleep and activity  6. If unable to ensure safety without constant attention obtain

## 2024-12-02 NOTE — ICUWATCH
RRT Clinical Rounding Nurse Update    Vitals:    12/01/24 1932 12/01/24 1950 12/01/24 2115 12/02/24 0309   BP:  110/76  (!) 133/57   Pulse: 66 59  76   Resp: 18 17  16   Temp:  98.1 °F (36.7 °C)  97.7 °F (36.5 °C)   TempSrc:  Oral  Oral   SpO2: 93% 92%  95%   Weight:       Height:       PF:   (!) 0 L/min         ASSESSMENT:  Previous outreach assessment was reviewed. There have been no significant clinical changes since the completion of the last dated Outreach assessment. Hgb 7.0 this morning. Patient with black tarry BM on exam.    PLAN:  Will follow per RRT Clinical Rounding Program protocol.    Ricardo Niño RN  [place]: [phone]

## 2024-12-02 NOTE — ICUWATCH
RRT Clinical Rounding Nurse Progress Report      SUBJECTIVE: Patient assessed secondary to transfer from critical care.      Vitals:    12/02/24 0309 12/02/24 0601 12/02/24 0730 12/02/24 0814   BP: (!) 133/57 (!) 133/58  133/64   Pulse: 76 72 73 70   Resp: 16  17 18   Temp: 97.7 °F (36.5 °C)   98.1 °F (36.7 °C)   TempSrc: Oral   Oral   SpO2: 95% 95% (!) 89% 94%   Weight:       Height:       PF:            ASSESSMENT:  Pt lying quietly in bed, in NAD.  Alert and oriented x3.  Lung sounds clear.  On room air.  NSR, HR 63 on remote telemetry.  BP stable.  Appetite good.  UOP ok.  Spoke with SEJAL Moses wound care on floor.  She just redressed BLE drsgs.  LLE swelling and wounds improving slowly.  RLE improving as well.  Pt c/o pain 6/10-- receiving meds prn per MAR.            PLAN:  Will follow per RRT Clinical Rounding Program protocol.  WBC improving- 17.3 (21.1 yest)  Hgb 7.0 (8.2 yest)-- trending. Repeat CT A/P- no acute process.   LA trending back up- 3.7<<3.2<<2.9-- monitoring  No fevers, VSS.      Jadyn Shaffer RN  [place]: [phone]  [place]: [phone]

## 2024-12-02 NOTE — ICUWATCH
RRT Clinical Rounding Nurse Update    Vitals:    12/01/24 1531 12/01/24 1932 12/01/24 1950 12/01/24 2115   BP: 116/70  110/76    Pulse: 72 66 59    Resp:  18 17    Temp: 97.6 °F (36.4 °C)  98.1 °F (36.7 °C)    TempSrc: Oral  Oral    SpO2: 97% 93% 92%    Weight:       Height:       PF:    (!) 0 L/min        ASSESSMENT:  Previous outreach assessment was reviewed. There have been no significant clinical changes since the completion of the last dated Outreach assessment. No concerns per primary RN. VS, labs, and progress notes reviewed.    PLAN:  Will follow per RRT Clinical Rounding Program protocol.    Ricardo Niño RN  [place]: [phone]

## 2024-12-02 NOTE — WOUND CARE
Bilateral lower leg dressings changed.  Plan next change Wednesday.     Right leg continues to improve, heal unna boot, kerlex and coban applied.    .       Left leg improving slowly, edema reduced, erythema reduced, skin has sloughed, tissue at left ankle is still very purple and painful. Opticel ag over purple areas at ankle, unnaboot, abd and kerlex and coban applied.

## 2024-12-03 LAB
ANION GAP SERPL CALC-SCNC: 9 MMOL/L (ref 7–16)
BUN SERPL-MCNC: 24 MG/DL (ref 8–23)
CALCIUM SERPL-MCNC: 7.6 MG/DL (ref 8.8–10.2)
CHLORIDE SERPL-SCNC: 101 MMOL/L (ref 98–107)
CO2 SERPL-SCNC: 22 MMOL/L (ref 20–29)
CREAT SERPL-MCNC: 0.88 MG/DL (ref 0.8–1.3)
ERYTHROCYTE [DISTWIDTH] IN BLOOD BY AUTOMATED COUNT: 16 % (ref 11.9–14.6)
GLUCOSE BLD STRIP.AUTO-MCNC: 143 MG/DL (ref 65–100)
GLUCOSE BLD STRIP.AUTO-MCNC: 172 MG/DL (ref 65–100)
GLUCOSE BLD STRIP.AUTO-MCNC: 202 MG/DL (ref 65–100)
GLUCOSE BLD STRIP.AUTO-MCNC: 230 MG/DL (ref 65–100)
GLUCOSE SERPL-MCNC: 136 MG/DL (ref 70–99)
HCT VFR BLD AUTO: 22.2 % (ref 41.1–50.3)
HGB BLD-MCNC: 7 G/DL (ref 13.6–17.2)
LACTATE SERPL-SCNC: 2.5 MMOL/L (ref 0.5–2)
LACTATE SERPL-SCNC: 3.1 MMOL/L (ref 0.5–2)
MCH RBC QN AUTO: 29.8 PG (ref 26.1–32.9)
MCHC RBC AUTO-ENTMCNC: 31.5 G/DL (ref 31.4–35)
MCV RBC AUTO: 94.5 FL (ref 82–102)
NRBC # BLD: 0.26 K/UL (ref 0–0.2)
PLATELET # BLD AUTO: 190 K/UL (ref 150–450)
PMV BLD AUTO: 9.6 FL (ref 9.4–12.3)
POTASSIUM SERPL-SCNC: 4.4 MMOL/L (ref 3.5–5.1)
RBC # BLD AUTO: 2.35 M/UL (ref 4.23–5.6)
SERVICE CMNT-IMP: ABNORMAL
SODIUM SERPL-SCNC: 131 MMOL/L (ref 136–145)
WBC # BLD AUTO: 12.8 K/UL (ref 4.3–11.1)

## 2024-12-03 PROCEDURE — 6370000000 HC RX 637 (ALT 250 FOR IP): Performed by: NURSE PRACTITIONER

## 2024-12-03 PROCEDURE — 2580000003 HC RX 258: Performed by: STUDENT IN AN ORGANIZED HEALTH CARE EDUCATION/TRAINING PROGRAM

## 2024-12-03 PROCEDURE — 6360000002 HC RX W HCPCS: Performed by: INTERNAL MEDICINE

## 2024-12-03 PROCEDURE — 6370000000 HC RX 637 (ALT 250 FOR IP): Performed by: INTERNAL MEDICINE

## 2024-12-03 PROCEDURE — 1100000003 HC PRIVATE W/ TELEMETRY

## 2024-12-03 PROCEDURE — 36415 COLL VENOUS BLD VENIPUNCTURE: CPT

## 2024-12-03 PROCEDURE — 80048 BASIC METABOLIC PNL TOTAL CA: CPT

## 2024-12-03 PROCEDURE — 99232 SBSQ HOSP IP/OBS MODERATE 35: CPT | Performed by: INTERNAL MEDICINE

## 2024-12-03 PROCEDURE — 94761 N-INVAS EAR/PLS OXIMETRY MLT: CPT

## 2024-12-03 PROCEDURE — 6360000002 HC RX W HCPCS: Performed by: STUDENT IN AN ORGANIZED HEALTH CARE EDUCATION/TRAINING PROGRAM

## 2024-12-03 PROCEDURE — 83605 ASSAY OF LACTIC ACID: CPT

## 2024-12-03 PROCEDURE — 85027 COMPLETE CBC AUTOMATED: CPT

## 2024-12-03 PROCEDURE — 2580000003 HC RX 258: Performed by: INTERNAL MEDICINE

## 2024-12-03 PROCEDURE — 6370000000 HC RX 637 (ALT 250 FOR IP)

## 2024-12-03 PROCEDURE — 94640 AIRWAY INHALATION TREATMENT: CPT

## 2024-12-03 PROCEDURE — 6370000000 HC RX 637 (ALT 250 FOR IP): Performed by: STUDENT IN AN ORGANIZED HEALTH CARE EDUCATION/TRAINING PROGRAM

## 2024-12-03 PROCEDURE — 82962 GLUCOSE BLOOD TEST: CPT

## 2024-12-03 RX ADMIN — SUCRALFATE 1 G: 1 TABLET ORAL at 15:53

## 2024-12-03 RX ADMIN — SUCRALFATE 1 G: 1 TABLET ORAL at 20:57

## 2024-12-03 RX ADMIN — METOPROLOL TARTRATE 100 MG: 100 TABLET, FILM COATED ORAL at 09:43

## 2024-12-03 RX ADMIN — SODIUM CHLORIDE, PRESERVATIVE FREE 10 ML: 5 INJECTION INTRAVENOUS at 19:38

## 2024-12-03 RX ADMIN — BUDESONIDE INHALATION 500 MCG: 0.5 SUSPENSION RESPIRATORY (INHALATION) at 07:54

## 2024-12-03 RX ADMIN — IPRATROPIUM BROMIDE 0.5 MG: 0.5 SOLUTION RESPIRATORY (INHALATION) at 07:54

## 2024-12-03 RX ADMIN — SUCRALFATE 1 G: 1 TABLET ORAL at 04:20

## 2024-12-03 RX ADMIN — ARFORMOTEROL TARTRATE 15 MCG: 15 SOLUTION RESPIRATORY (INHALATION) at 20:05

## 2024-12-03 RX ADMIN — CEFTRIAXONE SODIUM 2000 MG: 2 INJECTION, POWDER, FOR SOLUTION INTRAMUSCULAR; INTRAVENOUS at 12:51

## 2024-12-03 RX ADMIN — INSULIN GLARGINE 5 UNITS: 100 INJECTION, SOLUTION SUBCUTANEOUS at 20:58

## 2024-12-03 RX ADMIN — ASPIRIN 81 MG: 81 TABLET, CHEWABLE ORAL at 09:43

## 2024-12-03 RX ADMIN — GABAPENTIN 300 MG: 300 CAPSULE ORAL at 09:41

## 2024-12-03 RX ADMIN — GABAPENTIN 300 MG: 300 CAPSULE ORAL at 20:57

## 2024-12-03 RX ADMIN — TRAMADOL HYDROCHLORIDE 50 MG: 50 TABLET, COATED ORAL at 15:54

## 2024-12-03 RX ADMIN — TRAMADOL HYDROCHLORIDE 50 MG: 50 TABLET, COATED ORAL at 09:55

## 2024-12-03 RX ADMIN — INSULIN LISPRO 3 UNITS: 100 INJECTION, SOLUTION INTRAVENOUS; SUBCUTANEOUS at 18:21

## 2024-12-03 RX ADMIN — TRAMADOL HYDROCHLORIDE 50 MG: 50 TABLET, COATED ORAL at 02:48

## 2024-12-03 RX ADMIN — BUDESONIDE INHALATION 500 MCG: 0.5 SUSPENSION RESPIRATORY (INHALATION) at 20:05

## 2024-12-03 RX ADMIN — AMIODARONE HYDROCHLORIDE 200 MG: 100 TABLET ORAL at 20:57

## 2024-12-03 RX ADMIN — LINEZOLID 600 MG: 600 TABLET, FILM COATED ORAL at 04:20

## 2024-12-03 RX ADMIN — PANTOPRAZOLE SODIUM 40 MG: 40 TABLET, DELAYED RELEASE ORAL at 15:54

## 2024-12-03 RX ADMIN — AMIODARONE HYDROCHLORIDE 200 MG: 100 TABLET ORAL at 09:42

## 2024-12-03 RX ADMIN — SODIUM CHLORIDE, PRESERVATIVE FREE 10 ML: 5 INJECTION INTRAVENOUS at 09:43

## 2024-12-03 RX ADMIN — SODIUM CHLORIDE: 9 INJECTION, SOLUTION INTRAVENOUS at 06:54

## 2024-12-03 RX ADMIN — IPRATROPIUM BROMIDE 0.5 MG: 0.5 SOLUTION RESPIRATORY (INHALATION) at 20:05

## 2024-12-03 RX ADMIN — ARFORMOTEROL TARTRATE 15 MCG: 15 SOLUTION RESPIRATORY (INHALATION) at 07:54

## 2024-12-03 RX ADMIN — INSULIN LISPRO 3 UNITS: 100 INJECTION, SOLUTION INTRAVENOUS; SUBCUTANEOUS at 12:41

## 2024-12-03 RX ADMIN — METOPROLOL TARTRATE 100 MG: 100 TABLET, FILM COATED ORAL at 20:58

## 2024-12-03 RX ADMIN — LINEZOLID 600 MG: 600 TABLET, FILM COATED ORAL at 17:47

## 2024-12-03 RX ADMIN — PANTOPRAZOLE SODIUM 40 MG: 40 TABLET, DELAYED RELEASE ORAL at 04:20

## 2024-12-03 ASSESSMENT — PAIN DESCRIPTION - LOCATION: LOCATION: GENERALIZED

## 2024-12-03 ASSESSMENT — PAIN DESCRIPTION - DESCRIPTORS
DESCRIPTORS: ACHING
DESCRIPTORS: ACHING

## 2024-12-03 ASSESSMENT — PAIN SCALES - GENERAL
PAINLEVEL_OUTOF10: 9
PAINLEVEL_OUTOF10: 4
PAINLEVEL_OUTOF10: 8

## 2024-12-03 ASSESSMENT — PAIN DESCRIPTION - ORIENTATION
ORIENTATION: RIGHT;LEFT
ORIENTATION: RIGHT;LEFT

## 2024-12-04 VITALS
HEIGHT: 72 IN | OXYGEN SATURATION: 92 % | DIASTOLIC BLOOD PRESSURE: 60 MMHG | BODY MASS INDEX: 30.81 KG/M2 | HEART RATE: 72 BPM | RESPIRATION RATE: 16 BRPM | WEIGHT: 227.5 LBS | SYSTOLIC BLOOD PRESSURE: 125 MMHG | TEMPERATURE: 98.8 F

## 2024-12-04 LAB
ANION GAP SERPL CALC-SCNC: 8 MMOL/L (ref 7–16)
BUN SERPL-MCNC: 21 MG/DL (ref 8–23)
CALCIUM SERPL-MCNC: 7.8 MG/DL (ref 8.8–10.2)
CHLORIDE SERPL-SCNC: 100 MMOL/L (ref 98–107)
CO2 SERPL-SCNC: 22 MMOL/L (ref 20–29)
CREAT SERPL-MCNC: 0.92 MG/DL (ref 0.8–1.3)
ERYTHROCYTE [DISTWIDTH] IN BLOOD BY AUTOMATED COUNT: 15.5 % (ref 11.9–14.6)
ERYTHROCYTE [DISTWIDTH] IN BLOOD BY AUTOMATED COUNT: 17.4 % (ref 11.9–14.6)
GLUCOSE BLD STRIP.AUTO-MCNC: 164 MG/DL (ref 65–100)
GLUCOSE BLD STRIP.AUTO-MCNC: 185 MG/DL (ref 65–100)
GLUCOSE SERPL-MCNC: 171 MG/DL (ref 70–99)
HCT VFR BLD AUTO: 18.5 % (ref 41.1–50.3)
HCT VFR BLD AUTO: 22.5 % (ref 41.1–50.3)
HGB BLD-MCNC: 6 G/DL (ref 13.6–17.2)
HGB BLD-MCNC: 7.1 G/DL (ref 13.6–17.2)
LACTATE SERPL-SCNC: 2.6 MMOL/L (ref 0.5–2)
LACTATE SERPL-SCNC: 3.5 MMOL/L (ref 0.5–2)
MCH RBC QN AUTO: 29.1 PG (ref 26.1–32.9)
MCH RBC QN AUTO: 29.8 PG (ref 26.1–32.9)
MCHC RBC AUTO-ENTMCNC: 31.6 G/DL (ref 31.4–35)
MCHC RBC AUTO-ENTMCNC: 32.4 G/DL (ref 31.4–35)
MCV RBC AUTO: 89.8 FL (ref 82–102)
MCV RBC AUTO: 94.5 FL (ref 82–102)
NRBC # BLD: 0.09 K/UL (ref 0–0.2)
NRBC # BLD: 0.34 K/UL (ref 0–0.2)
PLATELET # BLD AUTO: 186 K/UL (ref 150–450)
PLATELET # BLD AUTO: 307 K/UL (ref 150–450)
PMV BLD AUTO: 11 FL (ref 9.4–12.3)
PMV BLD AUTO: 9.6 FL (ref 9.4–12.3)
POTASSIUM SERPL-SCNC: 5.1 MMOL/L (ref 3.5–5.1)
RBC # BLD AUTO: 2.06 M/UL (ref 4.23–5.6)
RBC # BLD AUTO: 2.38 M/UL (ref 4.23–5.6)
SERVICE CMNT-IMP: ABNORMAL
SERVICE CMNT-IMP: ABNORMAL
SODIUM SERPL-SCNC: 130 MMOL/L (ref 136–145)
WBC # BLD AUTO: 12.7 K/UL (ref 4.3–11.1)
WBC # BLD AUTO: 34.1 K/UL (ref 4.3–11.1)

## 2024-12-04 PROCEDURE — 83605 ASSAY OF LACTIC ACID: CPT

## 2024-12-04 PROCEDURE — 82962 GLUCOSE BLOOD TEST: CPT

## 2024-12-04 PROCEDURE — 6370000000 HC RX 637 (ALT 250 FOR IP): Performed by: NURSE PRACTITIONER

## 2024-12-04 PROCEDURE — 6370000000 HC RX 637 (ALT 250 FOR IP): Performed by: INTERNAL MEDICINE

## 2024-12-04 PROCEDURE — 2580000003 HC RX 258: Performed by: STUDENT IN AN ORGANIZED HEALTH CARE EDUCATION/TRAINING PROGRAM

## 2024-12-04 PROCEDURE — 97112 NEUROMUSCULAR REEDUCATION: CPT

## 2024-12-04 PROCEDURE — 6360000002 HC RX W HCPCS: Performed by: STUDENT IN AN ORGANIZED HEALTH CARE EDUCATION/TRAINING PROGRAM

## 2024-12-04 PROCEDURE — 6360000002 HC RX W HCPCS: Performed by: INTERNAL MEDICINE

## 2024-12-04 PROCEDURE — 80048 BASIC METABOLIC PNL TOTAL CA: CPT

## 2024-12-04 PROCEDURE — 29580 STRAPPING UNNA BOOT: CPT

## 2024-12-04 PROCEDURE — 36415 COLL VENOUS BLD VENIPUNCTURE: CPT

## 2024-12-04 PROCEDURE — 94640 AIRWAY INHALATION TREATMENT: CPT

## 2024-12-04 PROCEDURE — 85027 COMPLETE CBC AUTOMATED: CPT

## 2024-12-04 PROCEDURE — 97530 THERAPEUTIC ACTIVITIES: CPT

## 2024-12-04 PROCEDURE — 94761 N-INVAS EAR/PLS OXIMETRY MLT: CPT

## 2024-12-04 PROCEDURE — 6370000000 HC RX 637 (ALT 250 FOR IP): Performed by: STUDENT IN AN ORGANIZED HEALTH CARE EDUCATION/TRAINING PROGRAM

## 2024-12-04 PROCEDURE — 97535 SELF CARE MNGMENT TRAINING: CPT

## 2024-12-04 PROCEDURE — 99231 SBSQ HOSP IP/OBS SF/LOW 25: CPT | Performed by: INTERNAL MEDICINE

## 2024-12-04 PROCEDURE — 2580000003 HC RX 258: Performed by: INTERNAL MEDICINE

## 2024-12-04 RX ORDER — PANTOPRAZOLE SODIUM 40 MG/1
40 TABLET, DELAYED RELEASE ORAL
Qty: 180 TABLET | Refills: 1 | Status: SHIPPED | OUTPATIENT
Start: 2024-12-04

## 2024-12-04 RX ORDER — CEPHALEXIN 500 MG/1
500 CAPSULE ORAL 4 TIMES DAILY
Qty: 20 CAPSULE | Refills: 0 | Status: SHIPPED | OUTPATIENT
Start: 2024-12-04 | End: 2024-12-09

## 2024-12-04 RX ORDER — AMIODARONE HYDROCHLORIDE 200 MG/1
200 TABLET ORAL 2 TIMES DAILY
Qty: 60 TABLET | Refills: 1 | Status: SHIPPED | OUTPATIENT
Start: 2024-12-04

## 2024-12-04 RX ORDER — OXYCODONE HYDROCHLORIDE 5 MG/1
5 TABLET ORAL EVERY 4 HOURS PRN
Status: DISCONTINUED | OUTPATIENT
Start: 2024-12-04 | End: 2024-12-04 | Stop reason: HOSPADM

## 2024-12-04 RX ORDER — GABAPENTIN 300 MG/1
300 CAPSULE ORAL 3 TIMES DAILY
Status: DISCONTINUED | OUTPATIENT
Start: 2024-12-04 | End: 2024-12-04 | Stop reason: HOSPADM

## 2024-12-04 RX ADMIN — ARFORMOTEROL TARTRATE 15 MCG: 15 SOLUTION RESPIRATORY (INHALATION) at 07:59

## 2024-12-04 RX ADMIN — OXYCODONE HYDROCHLORIDE 5 MG: 5 TABLET ORAL at 12:19

## 2024-12-04 RX ADMIN — SUCRALFATE 1 G: 1 TABLET ORAL at 05:16

## 2024-12-04 RX ADMIN — TRAMADOL HYDROCHLORIDE 50 MG: 50 TABLET, COATED ORAL at 09:23

## 2024-12-04 RX ADMIN — SODIUM CHLORIDE, PRESERVATIVE FREE 10 ML: 5 INJECTION INTRAVENOUS at 09:57

## 2024-12-04 RX ADMIN — CEFTRIAXONE SODIUM 2000 MG: 2 INJECTION, POWDER, FOR SOLUTION INTRAMUSCULAR; INTRAVENOUS at 15:19

## 2024-12-04 RX ADMIN — AMIODARONE HYDROCHLORIDE 200 MG: 100 TABLET ORAL at 09:56

## 2024-12-04 RX ADMIN — ASPIRIN 81 MG: 81 TABLET, CHEWABLE ORAL at 09:56

## 2024-12-04 RX ADMIN — BUDESONIDE INHALATION 500 MCG: 0.5 SUSPENSION RESPIRATORY (INHALATION) at 07:59

## 2024-12-04 RX ADMIN — METOPROLOL TARTRATE 100 MG: 100 TABLET, FILM COATED ORAL at 09:56

## 2024-12-04 RX ADMIN — IPRATROPIUM BROMIDE 0.5 MG: 0.5 SOLUTION RESPIRATORY (INHALATION) at 07:59

## 2024-12-04 RX ADMIN — PANTOPRAZOLE SODIUM 40 MG: 40 TABLET, DELAYED RELEASE ORAL at 05:16

## 2024-12-04 RX ADMIN — GABAPENTIN 300 MG: 300 CAPSULE ORAL at 15:15

## 2024-12-04 RX ADMIN — GABAPENTIN 300 MG: 300 CAPSULE ORAL at 09:56

## 2024-12-04 RX ADMIN — SUCRALFATE 1 G: 1 TABLET ORAL at 15:16

## 2024-12-04 ASSESSMENT — PAIN DESCRIPTION - LOCATION: LOCATION: BACK;LEG

## 2024-12-04 ASSESSMENT — PAIN SCALES - GENERAL
PAINLEVEL_OUTOF10: 7
PAINLEVEL_OUTOF10: 9
PAINLEVEL_OUTOF10: 4
PAINLEVEL_OUTOF10: 6

## 2024-12-04 ASSESSMENT — PAIN DESCRIPTION - ORIENTATION: ORIENTATION: RIGHT;LEFT

## 2024-12-04 ASSESSMENT — PAIN DESCRIPTION - DESCRIPTORS: DESCRIPTORS: ACHING
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
## 2024-12-04 NOTE — WOUND CARE
Bandages on BLE changed, no interval change in right leg.      Left leg is improving, overall edema and erythema is receeding, however new petechia on anterior leg near knee and on lateral upper also near knee. Concerns that infection is not fully responding to treatment  and increase latic voiced by perfect serve to Dr. Miner ID, primary nurse also notified Dr. More that I had voiced concerns.  Noted WBC's still trending down, however noted Latic had trended up slightly in the last 2 days.  Zosyn was stopped 12/2/24 and replaced with Rocephin.

## 2024-12-04 NOTE — PROGRESS NOTES
[facility] CARDIOLOGY PROGRESS NOTE        11/26/2024 9:54 AM    Admit Date: 11/24/2024    Subjective:   Patient febrile overnight into this morning.  Reports some increased heart rates.  Denies chest pain, abdominal pain, nausea, vomiting.  Leg swelling and redness continues.  No other complaints this morning.    ROS:  Cardiovascular:  As noted above    Objective:      Vitals:    11/26/24 0600 11/26/24 0700 11/26/24 0719 11/26/24 0735   BP: (!) 148/80 (!) 146/67  122/73   Pulse: (!) 143 (!) 107 (!) 112 (!) 131   Resp:   22 22   Temp:    (!) 102.8 °F (39.3 °C)   TempSrc:    Axillary   SpO2: 94% 95% 94% 93%   Weight:       Height:         Physical Exam:  General - No Acute Distress, interactive  Neck - supple, no JVD  CV - tachycardic irregularly irregular rate and rhythm no MRG  Lung - clear bilaterally  Abd - soft, nontender, nondistended  Ext - 1+ edema bilaterally.  Skin - erythema left thigh and leg.  Leg wraps in place bilaterally.    Data Review:   Recent Labs     11/24/24 2137 11/25/24  0525 11/26/24  0511   * 128* 128*   K 3.6 3.6 4.5   MG  --  1.8 2.1   BUN 26* 25* 35*   WBC 22.3*  --  36.4*   HGB 16.2  --  16.0   HCT 47.3  --  48.0   *  --  125*     Assessment/Plan:     Left lower extremity cellulitis: Antibiotics per primary team.  Febrile this morning.  CAD with prior CABG: No ongoing angina.  Continue medical therapy for CAD aspirin 81 mg, metoprolol, Zetia.   Paroxysmal atrial fibrillation: Patient with atrial fibrillation with RVR this morning.  Likely driven by sepsis/fever/infection.  Continue telemetry monitoring. Continue metoprolol, continue Eliquis for stroke prophylaxis.  Correct K and Mg to normalize levels. Increase Metoprolol 50 q6 hours in an effort to wean diltiazem gtt. Can consider amiodarone drip if needed.   Mixed hyperlipidemia: Unable to tolerate multiple statins, on Zetia.  Likely HFpEF/lower extremity edema: Reasonable to continue Bumex, 
                        [facility] CARDIOLOGY PROGRESS NOTE           12/4/2024 9:34 AM    Admit Date: 11/24/2024         Subjective: Denies CP or SOB    ROS:  Cardiovascular:  As noted above    Objective:      Vitals:    12/04/24 0100 12/04/24 0736 12/04/24 0759 12/04/24 0923   BP: 103/67 125/60     Pulse: 62 66 72    Resp: 16 18 18 16   Temp: 97.7 °F (36.5 °C) 98.8 °F (37.1 °C)     TempSrc: Oral      SpO2: 94% 96% 92%    Weight:       Height:       PF:               Physical Exam:  General: Well Developed, Well Nourished, No Acute Distress, Alert & Oriented x 3, Appropriate mood  Neck: supple, no JVD  Heart: S1S2 with RRR without murmurs or gallops  Lungs: Clear throughout auscultation bilaterally without adventitious sounds  Abd: soft, nontender, nondistended, with good bowel sounds  Ext: no edema bilaterally  Skin: warm and dry      Data Review:   Recent Labs     12/03/24  0354 12/04/24  0414   * 130*   K 4.4 5.1   BUN 24* 21   WBC 12.8* 12.7*   HGB 7.0* 7.1*   HCT 22.2* 22.5*    186       No results for input(s): \"TNIPOC\" in the last 72 hours.    Invalid input(s): \"TROIQ\"    Assessment/Plan:     Principal Problem:    Cellulitis of multiple sites of lower extremity  Active Problems:    Elevated lactic acid level    Bipolar disorder (HCC)    Chronic diastolic congestive heart failure (HCC)    Chronic venous hypertension (idiopathic) with other complications of bilateral lower extremity    Paroxysmal atrial fibrillation (HCC)    Coronary artery disease of native artery of native heart with stable angina pectoris (HCC)    Bipolar I disorder with vandana (HCC)    Anxiety    Dyslipidemia    COPD (chronic obstructive pulmonary disease) ([facility])    Cellulitis of anterior lower leg    CVA (cerebrovascular accident due to intracerebral hemorrhage) ([facility])    Acute diastolic (congestive) heart failure (HCC)    Septicemia (HCC)    PAF (paroxysmal atrial fibrillation) ([facility])    Cellulitis of left lower extremity    H/O 
                        [facility] CARDIOLOGY PROGRESS NOTE           12/3/2024 9:14 AM    Admit Date: 11/24/2024         Subjective: Denies CP or SOB. HR controlled.    ROS:  Cardiovascular:  As noted above    Objective:      Vitals:    12/02/24 1511 12/02/24 2015 12/03/24 0723 12/03/24 0754   BP: 118/72 137/66 (!) 147/62    Pulse: 71 78 66 68   Resp: 18 15 18 18   Temp: 97.9 °F (36.6 °C) 97.3 °F (36.3 °C) 98.2 °F (36.8 °C)    TempSrc:  Oral Oral    SpO2: 97% 98% 100% 90%   Weight:       Height:       PF:             Physical Exam:  General: Well Developed, Well Nourished, No Acute Distress, Alert & Oriented x 3, Appropriate mood  Neck: supple, no JVD  Heart: S1S2 with RRR without murmurs or gallops  Lungs: Clear throughout auscultation bilaterally without adventitious sounds  Abd: soft, nontender, nondistended, with good bowel sounds  Ext: no edema bilaterally  Skin: warm and dry      Data Review:   Recent Labs     12/02/24  0406 12/03/24  0354    131*   K 4.3 4.4   BUN 33* 24*   WBC 17.3* 12.8*   HGB 7.0* 7.0*   HCT 22.7* 22.2*    190       No results for input(s): \"TNIPOC\" in the last 72 hours.    Invalid input(s): \"TROIQ\"        Assessment/Plan:     Principal Problem:    Cellulitis of multiple sites of lower extremity  Active Problems:    Elevated lactic acid level    Bipolar disorder (HCC)    Chronic diastolic congestive heart failure (HCC)    Chronic venous hypertension (idiopathic) with other complications of bilateral lower extremity    Paroxysmal atrial fibrillation (HCC)    Coronary artery disease of native artery of native heart with stable angina pectoris (HCC)    Bipolar I disorder with vandana (HCC)    Anxiety    Dyslipidemia    COPD (chronic obstructive pulmonary disease) (HCC)    Cellulitis of anterior lower leg    CVA (cerebrovascular accident due to intracerebral hemorrhage) (HCC)    Acute diastolic (congestive) heart failure (HCC)    Septicemia (HCC)    PAF (paroxysmal atrial 
              [facility] CARDIOLOGY PROGRESS NOTE           11/29/2024 10:08 AM    Admit Date: 11/24/2024      Subjective:     Some intermittent RVR overnight but currently back in sinus rhythm.  States no recurrent tarry stools; previously held anticoagulation with melanotic stools.  Evaluated by GI; CT scan of the abdomen from earlier today concerning for possible bowel ischemia.  Elevated lactate levels noted.  Presented with febrile episodes; last noted of 102.8F from 11/26/2024; cardiology asked to evaluate for elevated heart rates.  Noted with atrial fibrillation  No significant diuresis charted    ROS:  Cardiovascular:  As noted above    Objective:      Vitals:    11/29/24 0731 11/29/24 0756 11/29/24 1100 11/29/24 1130   BP:  128/61 (!) 128/59    Pulse: 75 75 83 73   Resp: 18 18     Temp:  (!) 96.3 °F (35.7 °C)  96.8 °F (36 °C)   TempSrc:  Rectal  Axillary   SpO2: 99% 98%  100%   Weight:       Height:           Physical Exam:  General-No Acute Distress  Neck- supple, no JVD  CV- irregular rate and rhythm no MRG  Lung- clear bilaterally  Abd- soft, nontender, nondistended  Ext- 1+ edema bilaterally; left lower extremity wound noted.  Skin- warm and dry    Data Review:   Recent Labs     11/27/24  0613 11/28/24  0433 11/28/24  2308 11/29/24  0338   * 129*  --  134*   K 4.1 4.2  --  5.0   MG 2.3 2.3  --   --    BUN 42* 63*  --  83*   WBC 39.1* 26.3*  --  36.8*   HGB 14.9 12.0* 9.4* 8.7*   HCT 44.5 36.7* 28.6* 27.2*   * 138*  --  231       Assessment/Plan:     Principal Problem:    Cellulitis of lower extremity  -Defer management to primary team/hospital medicine.    Abnormal abdominal CT scan  -Possible pneumatosis with concern for bowel ischemia; noted elevated lactic acid level at 10.3.  -Currently being evaluated by GI/general surgery.      Chronic venous hypertension (idiopathic) with other complications of bilateral lower extremity  -Noted chronic stasis changes.  Previously with worsening 
              [facility] CARDIOLOGY PROGRESS NOTE           11/30/2024 10:18 AM    Admit Date: 11/24/2024      Subjective:     Transferred to the ICU overnight; hemoglobin noted down to 5.5 and underwent transfusion.  Per records, increased confusion.  Appears more alert currently.  EGD with clean-based ulcer in the antrum/duodenal bulb with no active bleeding.  States no recurrent tarry stools; previously held anticoagulation with melanotic stools.  Evaluated by GI; CT scan of the abdomen from earlier today concerning for possible bowel ischemia.  Elevated lactate levels noted.  Presented with febrile episodes; last noted of 102.8F from 11/26/2024; cardiology asked to evaluate for elevated heart rates.  Noted with atrial fibrillation  No significant diuresis charted    ROS:  Cardiovascular:  As noted above    Objective:      Vitals:    11/30/24 0950 11/30/24 0953 11/30/24 0955 11/30/24 1141   BP: (!) 112/59 (!) 104/56 (!) 104/56 136/63   Pulse: 87 85 84 88   Resp: 16 18 22 26   Temp:   97.2 °F (36.2 °C) 97.8 °F (36.6 °C)   TempSrc:   Tympanic    SpO2: 96% 97% 97% 100%   Weight:       Height:           Physical Exam:  General-No Acute Distress  Neck- supple, no JVD  CV- irregular rate and rhythm no MRG  Lung- clear bilaterally  Abd- soft, nontender, nondistended  Ext- 1+ edema bilaterally; left lower extremity wound noted.  Skin- warm and dry    Data Review:   Recent Labs     11/28/24  0433 11/28/24  2308 11/29/24  0338 11/29/24  1654 11/30/24  0213 11/30/24  0928   *  --  134*  --  139  --    K 4.2  --  5.0  --  4.6  --    MG 2.3  --   --   --   --   --    BUN 63*  --  83*  --  98*  --    WBC 26.3*  --  36.8*  --  34.1*  --    HGB 12.0*   < > 8.7*   < > 6.0* 5.5*   HCT 36.7*   < > 27.2*   < > 18.5* 16.6*   *  --  231  --  307  --     < > = values in this interval not displayed.       Assessment/Plan:     Principal Problem:    Cellulitis of lower extremity  -Defer management to primary team/hospital 
              [facility] CARDIOLOGY PROGRESS NOTE           11/28/2024 10:08 AM    Admit Date: 11/24/2024      Subjective:     Overnight issues with tarry stools; anticoagulation held.  Otherwise overall, feels better  Presented with febrile episodes; last noted of 102.8F from 11/26/2024; cardiology asked to evaluate for elevated heart rates.  Currently in atrial fibrillation with controlled ventricular rates.  Reports some atypical reproducible left-sided chest discomfort.  No significant diuresis charted    ROS:  Cardiovascular:  As noted above    Objective:      Vitals:    11/28/24 0504 11/28/24 0621 11/28/24 0858 11/28/24 0905   BP: (!) 140/50 135/67 114/79 133/81   Pulse: 99 91 71 90   Resp:   20    Temp:   97 °F (36.1 °C)    TempSrc:   Axillary    SpO2:  93%     Weight:       Height:           Physical Exam:  General-No Acute Distress  Neck- supple, no JVD  CV- irregular rate and rhythm no MRG  Lung- clear bilaterally  Abd- soft, nontender, nondistended  Ext- 1+ edema bilaterally; left lower extremity wound noted.  Skin- warm and dry    Data Review:   Recent Labs     11/27/24  0613 11/28/24  0433   * 129*   K 4.1 4.2   MG 2.3 2.3   BUN 42* 63*   WBC 39.1* 26.3*   HGB 14.9 12.0*   HCT 44.5 36.7*   * 138*       Assessment/Plan:     Principal Problem:    Cellulitis of lower extremity  -Defer management to primary team/hospital medicine.      Chronic venous hypertension (idiopathic) with other complications of bilateral lower extremity  -Noted chronic stasis changes.  Previously with worsening hyponatremia on diuretics.  Edema likely secondary to chronic stasis.  -Previously with worsening hyponatremia with diuretics; Aldactone has been held.  Currently on Bumex 2 mg daily and closely monitor hyponatremia; decrease dosing.  May need to consider holding.  Consideration for sodium studies  -Echo from 3/2024 with preserved EF      Paroxysmal atrial fibrillation (HCC)  -Currently in atrial fibrillation; has 
              [facility] CARDIOLOGY PROGRESS NOTE           12/2/2024 7:24 AM    Admit Date: 11/24/2024      Subjective:   No cp or sob.    bjective:      Vitals:    12/01/24 1950 12/01/24 2115 12/02/24 0309 12/02/24 0601   BP: 110/76  (!) 133/57 (!) 133/58   Pulse: 59  76 72   Resp: 17  16    Temp: 98.1 °F (36.7 °C)  97.7 °F (36.5 °C)    TempSrc: Oral  Oral    SpO2: 92%  95% 95%   Weight:       Height:       PF:  (!) 0 L/min         Physical Exam:  General-No Acute Distress  Neck- supple, no JVD  CV- regular rate and rhythm no MRG  Lung- clear bilaterally    Data Review:   Recent Labs     12/01/24  0410 12/02/24  0406    136   K HEMOLYZED SPECIMEN 4.3   BUN 57* 33*   WBC 21.1* 17.3*   HGB 8.2* 7.0*   HCT 25.4* 22.7*    212       Assessment/Plan:   Sepsis due to venous stasis, chronic ulcers and cellulitis  Severe Gib   Pud on egd 11/29  ? Bowel ischemia?  Copd  Bipolar  Hx of bAVR, cabg and DARELL clip  Paf  HFpEF  ////  No need to resume OAC anytime soon w/ gib and presence of a DARELL clip.  On asa but also carafate and iv Protonix which is reassuring.  ? Prophylactic heparin/Lovenox since high risk for VTE.               LAURYN BASILIO MD  12/2/2024 7:24 AM   
              [facility] CARDIOLOGY PROGRESS NOTE           11/27/2024 8:48 AM    Admit Date: 11/24/2024      Subjective:     Presented with febrile episodes; last noted of 102.8F from 11/26/2024; cardiology asked to evaluate for elevated heart rates.  Currently in atrial fibrillation with controlled ventricular rates.  Reports some atypical reproducible left-sided chest discomfort.  No significant diuresis charted    ROS:  Cardiovascular:  As noted above    Objective:      Vitals:    11/27/24 0406 11/27/24 0408 11/27/24 0757 11/27/24 0813   BP: 124/75 (!) 128/96 119/81    Pulse: (!) 108 56 91 87   Resp:  18  15   Temp:  97.5 °F (36.4 °C) 98.4 °F (36.9 °C)    TempSrc:  Oral     SpO2:  98% 99% 99%   Weight:  103.2 kg (227 lb 8 oz)     Height:  1.829 m (6')         Physical Exam:  General-No Acute Distress  Neck- supple, no JVD  CV- irregular rate and rhythm no MRG  Lung- clear bilaterally  Abd- soft, nontender, nondistended  Ext- 1+ edema bilaterally; left lower extremity wound noted.  Skin- warm and dry    Data Review:   Recent Labs     11/26/24  0511 11/27/24  0613   * 128*   K 4.5 4.1   MG 2.1 2.3   BUN 35* 42*   WBC 36.4* 39.1*   HGB 16.0 14.9   HCT 48.0 44.5   * 131*       Assessment/Plan:     Principal Problem:    Cellulitis of lower extremity  -Defer management to primary team/hospital medicine.      Chronic venous hypertension (idiopathic) with other complications of bilateral lower extremity  -Noted chronic stasis changes.  Previously with worsening hyponatremia on diuretics.  Edema likely secondary to chronic stasis.  -Previously with worsening hyponatremia with diuretics; Aldactone has been held.  Currently on Bumex 2 mg daily and closely monitor hyponatremia.  May need to consider holding.  Consideration for sodium studies  -Echo from 3/2024 with preserved EF      Paroxysmal atrial fibrillation (HCC)  -Currently in atrial fibrillation; has been having paroxysmal episodes.  Continue current 
       Hospitalist Progress Note   Admit Date:  2024  9:14 PM   Name:  Marc Horton Sr.   Age:  69 y.o.  Sex:  male  :  1955   MRN:  [id number]   Room:  [facility]/    Presenting/Chief Complaint: Chest Pain     Reason(s) for Admission: Hyponatremia [E87.1]  Septicemia (HCC) [A41.9]  Cellulitis of left lower extremity [L03.116]  Cellulitis of multiple sites of lower extremity [L03.119]     Hospital Course:   Marc Horton Sr. is a 69 y.o. male with medical history of CAD s/p CABG x 2 vessels, HFpEF, A-fib s/p atrial appendage ligation and maze procedure on Eliquis, peripheral artery disease with nonhealing left lower extremity ulcers, COPD, hypertension major depressive disorder with psychotic features and behavioral disturbance on Remeron who presented with chest pain.  He been having retrosternal chest pain for prior 2 days.  Pain radiates to neck.  Patient denied any alcohol or tobacco use.  At the time of admission however, patient denied any chest pain.  He said that his neuropathy and multiple leg ulcers felt worse.    Patient was tachypneic (38), tachycardic (127) and hypertensive (142/62) on arrival.    Labs on admission:  Hyponatremic at 127, CO2 at 19, creatinine 1.45 baseline appears to be 1.19, lactic acid 3.1, procalcitonin 3.37, proBNP 7568  Hemoglobin A1c 7.1  WBC 22.3 with neutrophil shift, platelets of 143,000      Subjective & 24hr Events:   Patient states that he is feeling much better however patient wasFebrile overnight to 102.8.  Patient is still on diltiazem drip.  He has been weaned down to 1 L nasal cannula saturating appropriately.    As dressing changes were done, it appears that patient has new petechia, new weeping boils and a rash that tracks along the venous pathways.    WBCs trended up to 36 today    Assessment & Plan:     Cellulitis of multiple sites of lower extremity  Concern for necrotizing fasciitis or necrotizing cellulitis  - LRINEC score of 5, possibility 
       Hospitalist Progress Note   Admit Date:  2024  9:14 PM   Name:  Marc Horton Sr.   Age:  69 y.o.  Sex:  male  :  1955   MRN:  [id number]   Room:  [facility]/    Presenting/Chief Complaint: Chest Pain     Reason(s) for Admission: Hyponatremia [E87.1]  Septicemia (HCC) [A41.9]  Cellulitis of left lower extremity [L03.116]  Cellulitis of multiple sites of lower extremity [L03.119]     Hospital Course:   Marc Horton Sr. is a 69 y.o. male with medical history of CAD s/p CABG x 2 vessels, HFpEF, A-fib s/p atrial appendage ligation and maze procedure on Eliquis, peripheral artery disease with nonhealing left lower extremity ulcers, COPD, hypertension major depressive disorder with psychotic features and behavioral disturbance on Remeron who presented with chest pain.  He been having retrosternal chest pain for prior 2 days.  Pain radiates to neck.  Patient denied any alcohol or tobacco use.  At the time of admission however, patient denied any chest pain.  He said that his neuropathy and multiple leg ulcers felt worse.    Patient was tachypneic (38), tachycardic (127) and hypertensive (142/62) on arrival.    Labs on admission:  Hyponatremic at 127, CO2 at 19, creatinine 1.45 baseline appears to be 1.19, lactic acid 3.1, procalcitonin 3.37, proBNP 7568  Hemoglobin A1c 7.1  WBC 22.3 with neutrophil shift, platelets of 143,000      Subjective & 24hr Events:   Patient stated he is feeling much better today, did not elaborate further.  He did have melanotic stool overnight last night and then again this morning.  Hemoglobin has decreased from 14.9-12.0.    Assessment & Plan:   Melena  - Hemoglobin decreased from 14.9-12.0 overnight  - GI consult placed  - Eliquis held last dose Eliquis 2024    Cellulitis of multiple sites of lower extremity  Concern for necrotizing fasciitis or necrotizing cellulitis  - LRINEC score of 5, possibility of necrotizing fasciitis, necrotizing cellulitis  - 
       Hospitalist Progress Note   Admit Date:  2024  9:14 PM   Name:  Marc Horton Sr.   Age:  69 y.o.  Sex:  male  :  1955   MRN:  [id number]   Room:  [facility]/    Presenting/Chief Complaint: Chest Pain     Reason(s) for Admission: Hyponatremia [E87.1]  Septicemia (HCC) [A41.9]  Cellulitis of left lower extremity [L03.116]  Cellulitis of multiple sites of lower extremity [L03.119]     Hospital Course:   Marc Horton Sr. is a 69 y.o. male with medical history of CAD s/p CABG x 2 vessels, HFpEF, A-fib s/p atrial appendage ligation and maze procedure on Eliquis, peripheral artery disease with nonhealing left lower extremity ulcers, COPD, hypertension major depressive disorder with psychotic features and behavioral disturbance on Remeron who presented with chest pain.  He been having retrosternal chest pain for prior 2 days.  Pain radiates to neck. At the time of admission however, patient denied any chest pain.  He said that his neuropathy and multiple leg ulcers felt worse.  On presentation was tachypneic, tachycardic and hypertensive.  Labs showed sodium 127, creatinine 1.45 [baseline 1.19], lactic acid 3.1, BNP 7568, WBC 22.3.  He was admitted for further management.  He became more acidotic with lactic acid increasing to 10.  Imaging showed possible pneumatosis.  Surgery was consulted on no acute surgical intervention recommended.  He was transferred to ICU due to lactic acidosis.  Hemoglobin dropped to 5.5 from 12.  GI was consulted and EGD was done which showed clean-based cratered ulcers in the antrum and duodenal bulb with no active bleeding    Subjective & 24hr Events:   Seen and examined at bedside this morning.  No acute events overnight.  Patient denies any complaints.  PT OT recommending discharge to short-term rehab.  Pending facilities approval with insurance authorization per .  Hopefully discharge in 24 hours.    Assessment & Plan:   Melena  Lactic acidosis  Acute 
       Hospitalist Progress Note   Admit Date:  2024  9:14 PM   Name:  Marc Horton Sr.   Age:  69 y.o.  Sex:  male  :  1955   MRN:  [id number]   Room:      Presenting/Chief Complaint: Chest Pain     Reason(s) for Admission: Hyponatremia [E87.1]  Septicemia (HCC) [A41.9]  Cellulitis of left lower extremity [L03.116]  Cellulitis of multiple sites of lower extremity [L03.119]     Hospital Course:   Marc Horton Sr. is a 69 y.o. male with medical history of CAD s/p CABG x 2 vessels, HFpEF, A-fib s/p atrial appendage ligation and maze procedure on Eliquis, peripheral artery disease with nonhealing left lower extremity ulcers, COPD, hypertension major depressive disorder with psychotic features and behavioral disturbance on Remeron who presented with chest pain.  He been having retrosternal chest pain for prior 2 days.  Pain radiates to neck.  Patient denied any alcohol or tobacco use.  At the time of admission however, patient denied any chest pain.  He said that his neuropathy and multiple leg ulcers felt worse.     Patient was tachypneic (38), tachycardic (127) and hypertensive (142/62) on arrival.     Labs on admission:  Hyponatremic at 127, CO2 at 19, creatinine 1.45 baseline appears to be 1.19, lactic acid 3.1, procalcitonin 3.37, proBNP 7568  Hemoglobin A1c 7.1  WBC 22.3 with neutrophil shift, platelets of 143,000      , he became acidotic and lactic acid increased to 10. ACT ordered by GI and showed possible pneumatosis. General surgery then consulted and no acute surgical intervention, but given his lactic acidosis, he was transferred to the ICU. Hemoglobin also rapidly decreased from 12.0-->5.5. GI saw him this morning, , and taken for EGD. Found to have clean base cratered ulcers in the antrum and duodenal bulb with no active bleeding.      Subjective & 24hr Events:   Mr. Horton is seen this morning awake and alert. His vital signs are stable and lactic acid is down 
     GASTROENTEROLOGY PROGRESS NOTE     CC: GI bleed     Interval Events:  No acute events overnight. Per patient he had dark stool this morning.     PE:   Vitals:    12/01/24 0940   BP: 119/67   Pulse: 68   Resp:    Temp:    SpO2:       General:  The patient is in no acute distress.    Abdomen:  Soft, non tender to palpation. No distention.      Labs:  Lab Results   Component Value Date    HGB 8.2 (L) 12/01/2024    WBC 21.1 (H) 12/01/2024     12/01/2024    MCV 92.4 12/01/2024    IRON 23 (L) 03/13/2024    FERRITIN 14 01/10/2024    TIBC 342 01/10/2024    CREATININE 0.91 12/01/2024    ALT 19 11/24/2024    AST 35 11/24/2024       Endoscopy:   EGD 11/24/2024  LA Grade A (one or more mucosal breaks less than 5 mm, not extending between tops of 2 mucosal folds) esophagitis with no bleeding was found in the lower third of the esophagus. One non-bleeding cratered gastric ulcer with a clean ulcer base (Alex Class III) was found in the gastric antrum. Two non-bleeding cratered duodenal ulcers with a clean ulcer base (Alex Class III) were found in the duodenal bulb.     Assessment/Plan:   Melena and anemia. EGD showing clean based ulcers in the antrum and duodenum . HGB stable this morning. He will likely pass a few more BM with old blood.     - Continue IV PPI BID, then oral at discharge for at least 8 weeks   - Continue Carafate 1 gm TID for two weeks   - Advance diet as tolerated   - GI team will sing off at this time, please call with questions or concerns.       Alissa Stark MD  [facility]   
     GASTROENTEROLOGY PROGRESS NOTE     CC: Melena, anemia     Interval Events:    Overnight patient became confused and altered. Nursing staff ordered lactate, elevated to 10 this morning.   He denies abdominal pain. Abdominal exam significant for tenderness but no peritoneal signs. Vitals stable.     CT scan this morning concerning for possible pneumatosis in the small bowel concerning for ischemic bowel. Further imaging such as angiography would be low yield.     Assessment/Plan:   Melena, anemia, possible ischemic bowel     - Discussed with general surgery and consult order placed   - Serial abdominal exams   - Transfuse for hgb < 7   - Trend lactate   - Hold off on endoscopy at this time, EGD canceled       Alissa Stark MD  [facility]   
4 Eyes Skin Assessment     NAME:  Marc Horton .  YOB: 1955  MEDICAL RECORD NUMBER:  [id number]    The patient is being assessed for  Admission    I agree that at least one RN has performed a thorough Head to Toe Skin Assessment on the patient. ALL assessment sites listed below have been assessed.      Areas assessed by both nurses:    Head, Face, Ears, Shoulders, Back, Chest, Arms, Elbows, Hands, Sacrum. Buttock, Coccyx, Ischium, and Legs. Feet and Heels        Does the Patient have a Wound? Yes wound(s) were present on assessment. LDA wound assessment was Initiated and completed by RN       Arnulfo Prevention initiated by RN: Yes  Wound Care Orders initiated by RN: No - Already ordered prior to transfer/admission    Pressure Injury (Stage 3,4, Unstageable, DTI, NWPT, and Complex wounds) if present, place Wound referral order by RN under : No    New Ostomies, if present place, Ostomy referral order under : No     Nurse 1 eSignature: Electronically signed by Jono Gonzales RN on 11/26/24 at 2:51 AM EST    **SHARE this note so that the co-signing nurse can place an eSignature**    Nurse 2 eSignature: Electronically signed by Edilson Baker RN on 11/26/24 at 2:55 AM EST   
4 Eyes Skin Assessment     NAME:  Marc Horton .  YOB: 1955  MEDICAL RECORD NUMBER:  [id number]    The patient is being assessed for  Transfer to New Unit    I agree that at least one RN has performed a thorough Head to Toe Skin Assessment on the patient. ALL assessment sites listed below have been assessed.      Areas assessed by both nurses:    Head, Face, Ears, Shoulders, Back, Chest, Arms, Elbows, Hands, Sacrum. Buttock, Coccyx, Ischium, Legs. Feet and Heels, Under Medical Devices , and Other under sacrum allevyn        Does the Patient have a Wound? Yes wound(s) were present on assessment. LDA wound assessment was Initiated and completed by RN       Arnulfo Prevention initiated by RN: Yes  Wound Care Orders initiated by RN: No Already started prior to transfer    Pressure Injury (Stage 3,4, Unstageable, DTI, NWPT, and Complex wounds) if present, place Wound referral order by RN under : Yes  Sacrum red and blanchable. Open area on back of left upper thigh.    New Ostomies, if present place, Ostomy referral order under : No     Nurse 1 eSignature: Electronically signed by Princess Narinder RN on 11/29/24 at 6:25 PM EST    **SHARE this note so that the co-signing nurse can place an eSignature**    Nurse 2 eSignature: Electronically signed by Marion Oswald RN on 11/29/24 at 6:27 PM EST   
4 Eyes Skin Assessment     NAME:  Marc Horton .  YOB: 1955  MEDICAL RECORD NUMBER:  [id number]    The patient is being assessed for  Transfer to New Unit    I agree that at least one RN has performed a thorough Head to Toe Skin Assessment on the patient. ALL assessment sites listed below have been assessed.      Areas assessed by both nurses:    Head, Face, Ears, Shoulders, Back, Chest, Arms, Elbows, Hands, Sacrum. Buttock, Coccyx, Ischium, Legs. Feet and Heels, and Under Medical Devices         Does the Patient have a Wound? No noted wound(s)       Arnulfo Prevention initiated by RN: No  Wound Care Orders initiated by RN: No    Pressure Injury (Stage 3,4, Unstageable, DTI, NWPT, and Complex wounds) if present, place Wound referral order by RN under : No    New Ostomies, if present place, Ostomy referral order under : No     Nurse 1 eSignature: Electronically signed by Nyasia Quintanilla RN on 11/30/24 at 5:15 PM EST  (R) flank bruising BLE wounds wrapped, abdominal surgical scar, chest surgical scar, pink sacral area, blanchable, allevyn applied.    **SHARE this note so that the co-signing nurse can place an eSignature**    Nurse 2 eSignature: Electronically signed by Phuong Chand RN on 11/30/24 at 4:20 PM EST   
ACUTE OCCUPATIONAL THERAPY GOALS:   (Developed with and agreed upon by patient and/or caregiver.)  1. Patient will complete lower body bathing and dressing with MINIMAL ASSIST and adaptive equipment as needed.     2. Patient will complete toileting with MINIMAL ASSIST.  3. Patient will complete grooming ADL standing edge of sink with MINIMAL ASSIST.  4. Patient will tolerate 25 minutes of OT treatment with 1-2 rest breaks to increase activity tolerance for ADLs.   5. Patient will complete functional transfers with MINIMAL ASSIST and adaptive equipment as needed.   6. Patient will tolerate 10 minutes BUE exercises to increase strength for safe, functional transfers.      Timeframe: 7 visits    OCCUPATIONAL THERAPY: Daily Note AM   OT Visit Days: 2   Time In/Out  OT Charge Capture  Rehab Caseload Tracker  OT Orders    Marc Horton Sr. is a 69 y.o. male   PRIMARY DIAGNOSIS: Cellulitis of multiple sites of lower extremity  Hyponatremia [E87.1]  Septicemia (HCC) [A41.9]  Cellulitis of left lower extremity [L03.116]  Cellulitis of multiple sites of lower extremity [L03.119]  Procedure(s) (LRB):  ESOPHAGOGASTRODUODENOSCOPY (N/A)  4 Days Post-Op  Inpatient: Payor: [employer] / Plan: [employer] DUAL COMP PPO SNP / Product Type: *No Product type* /     ASSESSMENT:     REHAB RECOMMENDATIONS:   Recommendation to date pending progress:  Setting:  Short-term Rehab    Equipment:    To Be Determined     ASSESSMENT:  Mr. Horton continues to be limited by impairments in strength, balance and activity tolerance limiting ADLs and functional mobility. He performs bed mobility with Mod A x 2, sit to stand transfers with Min-Mod A x 2, and short distance mobility using rolling walker with Min A x 2 and chair follow. He is limited by fatigue and BLE pain, pt reported he felt the wrapping on his legs was too tight- RN alerted. He performed grooming tasks in supported sitting with some assistance as pt's hair is 
Assisted patient back to bed from the chair. Pt breathing became more labored oxygen saturation was check and found to be 93-94%.   
Date of Outreach Update:  Marc Horton Sr. was seen and assessed.     Vitals:    11/26/24 0719 11/26/24 0735 11/26/24 1100 11/26/24 1142   BP:  122/73 99/68 102/72   Pulse: (!) 112 (!) 131 86 86   Resp: 22 22 20   Temp:  (!) 102.8 °F (39.3 °C)  98.8 °F (37.1 °C)   TempSrc:  Axillary  Oral   SpO2: 94% 93% 96% 97%   Weight:       Height:          Pt seen this morning out of RN concern for possible code sepsis. Pt admitted to [facility] over last night for A Fib RVR now on dilt gtt (now rate controlled in the 80s) pt very lethargic. Pt awakens to voice however quickly falls back asleep. Bps are holding strong. Pro dewayne and lactic acid both are ordered. BC on 11/24 NEG. Pt with BLE leg wounds that could be a contributing factor. Fever noted last night of 102.5. pt currently requiring 2L NC SAT 98%. Will ask MD if they would like a VBG to further assess respiratory status due to AMS and mouth breathing.      Latest Reference Range & Units 11/26/24 09:13   Lactic Acid 0.5 - 2.0 mmol/L 1.9      Latest Reference Range & Units 11/26/24 05:11   Procalcitonin 0.00 - 0.10 ng/mL 4.59 (H)   (H): Data is abnormally high    Previous Outreach assessment has been reviewed.  There have been no significant clinical changes since the completion of the last dated Outreach assessment.    Will continue to follow up per outreach protocol.    Signed By:   Keshawn Renee RN    November 26, 2024 12:22 PM     
General surgery signing off.  Please call back for acute surgical needs should they arise.   YORDAN MICHELLE, APRN - CNP    
Hospitalist cross cover -     Notified of increasing LA level despite IV abx and fluids. Cont IVF. Cont to trend. ?euglycemic ketoacidosis from Jardiance contributing (in addition to sepsis). His LA did increase after being started on this after 11/26. Will also give amp bicarb. Cont to monitor.     Jessica Murillo PA-C    
Hourly rounding completed during shift. Medicated per MAR for pain and was effective. All needs met at this time. Bed L/L with call bell in reach.  Report given to oncoming RN.   
Notified Melissa Navarro NP of pt's heart rate sustaining 120-150 after 3 doses of Lopressor given by Sugar POST with last dose given at 1645. Orders received.  
Notified of Critical Lab- Lactic 10.3. Notified MD Sánchez. Started on LR. No other orders at the time.   
Nutrition Assessment  Assessment Type: Initial, Positive nutrition screen  Reason for visit:  Length of Stay  Malnutrition Screening Tool Score: 1    Nutrition Intervention:   Food and/or Nutrient Delivery:   Meals and Snacks:  Diet: Downgrade add easy to chew  Medical Food Supplements:   Medical food supplement therapy:  None  anticipate po intake to improve with chewing modifications     Malnutrition Assessment:  Malnutrition Status: No malnutrition  Nutrition Focused Physical Exam: Unremarkable   Nutrition Assessment:  Food/Nutrition Related History:   Pt reports he eats what he wants.  He doesn't provide any specific recall prior to admission but he denies any changes in intake.  Reported poor intake on admission per RN screen.   He is vague historian at best.          Weight History:   Varied wt hx per EMR review:  Standing scale: 268# 3/13/2024, 244# 3/18/24  Bed scale: 234# 8/18/24, 228# 11/26/24, 227# 11/27/24.    Pt states he thinks he weighs around the same, unable to provide any specific hx.    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    Admitted with melena, lactic acidosis, acute on chronic normocytic anemia, cellulitis of multiple sites of lower extremity.    WC following.    EGD 11/30: grade a esophagitis with no bleeding, non bleeding gastric ulcer, non bleeding duodenal ulcers.     Nutrition Monitoring/Evaluation:  Pt reclined in bed, watching television.  Alert and oriented, reports he has been eating most of the foods he can chew.  He c/o poor dentition and mouth pain, states he is unable to chew the meats and fruits.  Melon at bedside untouched.   States he has difficulties with the jorgensen in the am but can eat sausage.      Current Nutrition Therapies:  ADULT DIET; Regular; 3 carb choices (45 
Patient found by RN in bed with blood saturating gown during reassessment. Patient found to have removed 18g IV in LAC, in which cardizem was previously running. Cardizem drip paused. RN cleaned patient and obtained new linens. RN resumed cardizem drip in 18g IV in RAC. IV linezolid piggybacked into the cardizem line, compatible as per Lexicomp. After 2 unsuccessful attempts, this RN requested assistance from an additional RN to obtain new IV access. The second RN also had 2 unsuccessful attempts. This RN placed a vascular access team consult so that another line can be obtained.  
Patient sitting up in chair, rates pain 9/10 lower extremities. Patient reports, multiple  loose bowel movement. Voiding via urinal, michaela color urine. .   1542  Received a call from monitor tech patient converted into Afib heart rate 170's now 120.s. attending was messaged, waiting on response. Metoprolol per PRN order   1610  Post 15 minute metoprolol per tele tech patient is still in Afib 136.   1623  Patients heart rate still maintaining in the 140's, 2 dose of lopressor per prn orders.  1631  2nd dose heart rate continues to be elevated 130-146.  1645  3 of 3 doses heart rate 120-139  1836  Patient to be tranfers to tele bed, heart rate ranging from 140-160 afib. Patient continues to be A-symptomatic. Waiting on bed assignment. Voiding via urinal, urine michaela color, clear.   
Placed 20g 1.75in PIV in left cephalic vein with ultrasound assistance.      
Plans for EGD tomorrow with Dr. Stark. NPO at midnight for procedure tomorrow.     Ingrid Ortiz, SONY - CNP    
Please see admitting H&P for details of presentation.  In brief, the patient is a 69-year-old CM with a PMH of chronic peripheral edema, venous stasis ulcers of lower extremity, CAD s/p PCI and CABG, aortic valve replacement, COPD, chronic diastolic heart failure, paroxysmal atrial fibrillation s/p atrial appendage ligation and MAZE on Eliquis, DM2, history of CVA, MDD with psychotic features and behavioral disturbance who presented w/ chest pain and erythema/edema to BLE.    In the ER: RR 38, , temp 38.5.  Lactic acid 3.1, WBC 22.3, Na+ 127, sCr 1.45 (was 0.79 on Aug/18), PCT 3.37, flat troponins, BNP 7.5k.  Noted BLE cellulitis.  Blood cultures collected and abx started.  Admitted to the Hospitalist service on Nov/25 w/ severe sepsis.    Cardiology consulted on Nov/25 due to Afib w/ RVR.  Patient has notable cardiac history and is non-compliant w/ medications.  Follow up on their recommendations.    This morning we note some improvement in renal fx.  LA has normalized.  Trend labs in the morning.  Follow cultures.  WOCN to evaluate later today.    Patient was in NAD during morning encounter.  VS have stabilized.  He was updated on the plan and questions answered.        
Primary nurse sitting at bedside to ensure safety and that patient does not continue to pull off wires. Patient attempts to get out of bed without assistance multiple times, and gets very SOB and tachypneic when irritable/angry because he cannot get out of bed or have water. Patient stating he \"needs to go to [facility] before he dies\". Patient asked if he was hurting, nauseous again, or what is wrong. Patient states nothing, but he needs to go because he has some kind of disease. Patient reoriented; made aware cannot have anything to eat or drink due to EGD procedure tomorrow to check for source of bleeding.    Patient resting at this time.  
Pt febrile on arrival to floor, medicated per MAR.  Temp now normal.  Pt had burst of SVT per monitor room, quickly returned to NS rhythm with PAC's, pt asymptomatic.  Dr. Neff notified via secure message, see orders.  Pt resting in bed at this time with no complaints.  VS stable.  Bed low and locked, call light in reach, side rails x2, bed alarm on.  Pt educated to call for assistance and verbalizes understanding.  
Pt had a bag of his belongings at bedside. When this RN walked into the room pt had opened the bag and drank 64oz bottle of apple juice that was in the bag. Pt teaching was reinforced that the pt is NPO and diabetic. Pt stated he understood.  
Pt had a large black and tarry BM this morning. Hgb noted to be 7.0 this morning along with pt's lactic acid trending back up despite being on fluids. MD was notified, no new orders at this time.  
Pt had a large tarry bowel movement. MD was notified and a stat H/H was ordered along with a lactic acid. Pt's hemoglobin came back at 7.6 down from 8.4 and pt's lactic was critical at 6.2. MD and ICU rover were notified and orders for rechecks were received from provider.    Update 0202: Lactic came back at 6.9 MD notified and order for remote tele, a 500mL bolus and a recheck were obtained.  
Pt prepped for his EGD in 3110. Report received from SEJAL Nina. Telephone consent obtained from his son. Pt alert and confused, pulled out IV this morning, John POST started 20G in R arm with ultrasound.     Update 1000: EGD performed at bedside with Dr. Stark. Pt tolerated well. No interventions. Peptic ulcers seen no active bleeding. /56, HR 84, SPO2 97% on 6LNC. No drips. Report given to SEJAL Nina.     
Pt resting in bed. Completed hourly rounds. Pain medicine given per MAR. Pt's bed low and locked. Call light and personal items within pt's reach. Pt denies needs at this time. Bedside shift report given to night shift RN.  
Pt resting, denies needs at this time.  PRN pain med given 2x.  Wound care completed BLE this shift by wound nurse.  IVF infusing as ordered.  Hourly rounds completed.  Bed locked and lowered into lowest position.  Call light and personal items within reach.  Report given to night nurse.  
Spiritual Health History and Assessment/Progress Note  [facility]    (P) Initial Encounter,  ,  ,      Name: Marc Horton Sr. MRN: [id number]    Age: 69 y.o.     Sex: male   Language: English   Christian: Quaker   Cellulitis of multiple sites of lower extremity     Date: 11/26/2024            Total Time Calculated: (P) 10 min              Spiritual Assessment began in [facility] 4 TELEMETRY        Referral/Consult From: (P) Rounding   Encounter Overview/Reason: (P) Initial Encounter  Service Provided For: (P) Patient    Shasha, Belief, Meaning:   Patient identifies as spiritual, is connected with a shasha tradition or spiritual practice, and has beliefs or practices that help with coping during difficult times  Family/Friends No family/friends present      Importance and Influence:  Patient has no beliefs influential to healthcare decision-making identified during this visit  Family/Friends No family/friends present    Community:  Patient feels well-supported. Support system includes: Children  Family/Friends No family/friends present    Assessment and Plan of Care:     Patient Interventions include: Facilitated expression of thoughts and feelings, Explored spiritual coping/struggle/distress, Affirmed coping skills/support systems, and Provided sacramental/Rastafari ritual  Family/Friends Interventions include: No family/friends present    Patient Plan of Care: Spiritual Care available upon further referral  Family/Friends Plan of Care: No family/friends present    Electronically signed by OCTAVIANO BLACK on 11/26/2024 at 10:28 AM    
TRANSFER - IN REPORT:    Verbal report received from Barber daniels Marc Marley [facility]. being received from 604 for routine progression of care.     Report consisted of patient’s Situation, Background, Assessment and Recommendations(SBAR).     Information from the following report(s) SBAR, MAR, and Cardiac Rhythm Afib RVR  was reviewed. Opportunity for questions and clarification was provided.      Assessment completed upon patient’s arrival to unit and care assumed.     Patient received to room 411. Patient connected to monitor and assessment completed. Plan of care reviewed. Patient oriented to room and call light. Patient aware to use call light to communicate any chest pain or needs.  
TRANSFER - IN REPORT:    Verbal report received from Lisha Adler on Marc Marley Horton Sr.  being received from 411 for routine progression of patient care      Report consisted of patient's Situation, Background, Assessment and   Recommendations(SBAR).     Information from the following report(s) Nurse Handoff Report, Index, ED Encounter Summary, ED SBAR, MAR, Recent Results, Cardiac Rhythm Normal Sinus, Procedure Verification, and Quality Measures was reviewed with the receiving nurse.    Opportunity for questions and clarification was provided.      Assessment completed upon patient's arrival to unit and care assumed.    
TRANSFER - OUT REPORT:    Verbal report given to 6th floor nurse on Marc Horton .  being transferred to [facility] for routine progression of patient care       Report consisted of patient's Situation, Background, Assessment and   Recommendations(SBAR).     Information from the following report(s) Nurse Handoff Report, Index, ED Encounter Summary, Cardiac Rhythm Nornal Sinus, Alarm Parameters, Procedure Verification, Quality Measures, and Neuro Assessment was reviewed with the receiving nurse.           Lines:   Peripheral IV 11/30/24 Distal;Left Cephalic (Active)   Site Assessment Clean, dry & intact 11/30/24 1445   Line Status Flushed 11/30/24 1445   Line Care Connections checked and tightened 11/30/24 1445   Phlebitis Assessment No symptoms 11/30/24 1445   Infiltration Assessment 0 11/30/24 1445   Alcohol Cap Used Yes 11/30/24 1445   Dressing Status Clean, dry & intact 11/30/24 1445   Dressing Type Transparent 11/30/24 1445        Opportunity for questions and clarification was provided.      Patient transported with:  Registered Nurse       
TRANSFER - OUT REPORT:    Verbal report given to Jono RN on Marc Horton Sr.  being transferred to Third Floor Telemetry for routine progression of patient care       Report consisted of patient's Situation, Background, Assessment and   Recommendations(SBAR).     Information from the following report(s) Nurse Handoff Report was reviewed with the receiving nurse.           Lines:   Peripheral IV 11/24/24 Left Antecubital (Active)   Site Assessment Clean, dry & intact 11/25/24 1900   Line Status Flushed;Capped;Normal saline locked 11/25/24 1900   Line Care Ports disinfected 11/25/24 1900   Phlebitis Assessment No symptoms 11/25/24 1900   Infiltration Assessment 0 11/25/24 1900   Alcohol Cap Used Yes 11/25/24 1900   Dressing Status Clean, dry & intact 11/25/24 1900   Dressing Type Transparent 11/25/24 1900       Peripheral IV 11/24/24 Right Antecubital (Active)   Site Assessment Clean, dry & intact 11/25/24 1900   Line Status Flushed;Infusing 11/25/24 1900   Line Care Ports disinfected 11/25/24 1900   Phlebitis Assessment No symptoms 11/25/24 1900   Infiltration Assessment 0 11/25/24 1900   Alcohol Cap Used No 11/25/24 1900   Dressing Status Clean, dry & intact 11/25/24 1900   Dressing Type Transparent 11/25/24 1900   Dressing Intervention New 11/24/24 2139        Opportunity for questions and clarification was provided.      Patient transported with:  Monitor, O2 @ 2lpm, Patient-specific medications from Pharmacy, and Registered Nurse       
While replacing oxygen sensor on finger this RN assessed patients extremities to be colder than previous encounter with patient.  Patient also more confused than previous encounter. Primary RN notified.  Charge RN notified.  This RN, primary RN and charge RN at bedside.   
At end of session pt up in chair with all needs within reach, alarm activated for safety, RN notified. Overall, she presents as functioning below his baseline, with deficits in mobility including transfers, gait, balance, and activity tolerance. Pt will continue to benefit from skilled therapy services to address stated deficits to promote return to highest level of function, independence, and safety. Will continue to follow.       SUBJECTIVE:   Mr. Horton states, \"I don't think that's fair\"     Social/Functional Lives With: Friend(s)  Receives Help From: Friend(s), Family  Active : Yes  Mode of Transportation: Car  OBJECTIVE:     PAIN: VITALS / O2: PRECAUTION / LINES / DRAINS:   Pre Treatment: 0         Post Treatment: 0 Vitals        Oxygen    IV    RESTRICTIONS/PRECAUTIONS:        MOBILITY: I Mod I S SBA CGA Min Mod Max Total  NT x2 Comments:   Bed Mobility    Rolling [] [] [] [] [] [] [x] [] [] [] [x]    Supine to Sit [] [] [] [] [] [] [x] [] [] [] [x]    Scooting [] [] [] [] [] [] [x] [] [] [] [x]    Sit to Supine [] [] [] [] [] [] [] [] [] [x] []    Transfers    Sit to Stand [] [] [] [] [] [x] [x] [] [] [] [x]    Bed to Chair [] [] [] [] [] [x] [x] [] [] [] [x]    Stand to Sit [] [] [] [] [] [x] [x] [] [] [] [x]     [] [] [] [] [] [] [] [] [] [] []    I=Independent, Mod I=Modified Independent, S=Supervision, SBA=Standby Assistance, CGA=Contact Guard Assistance,   Min=Minimal Assistance, Mod=Moderate Assistance, Max=Maximal Assistance, Total=Total Assistance, NT=Not Tested    BALANCE: Good Fair+ Fair Fair- Poor NT Comments   Sitting Static [] [x] [] [] [] []    Sitting Dynamic [] [x] [] [] [] []              Standing Static [] [] [x] [] [] []    Standing Dynamic [] [] [x] [] [] []      GAIT: I Mod I S SBA CGA Min Mod Max Total  NT x2 Comments:   Level of Assistance [] [] [] [] [] [x] [] [] [] [] [x]    Distance 6, 2 x 8  feet    DME Gait Belt and Rolling Walker    Gait Quality Decreased felicita , 
evaluated by GI/general surgery.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

## 2024-12-04 NOTE — DISCHARGE SUMMARY
minute  Pulse Oximeter Device Mode: Intermittent  Pulse Oximeter Device Location: Finger  O2 Device: None (Room air)  Oximetry Probe Site Changed: Yes  Skin Assessment: Clean, dry, & intact  Skin Protection for O2 Device: N/A  O2 Flow Rate (L/min): 6 L/min  Oxygen Therapy: Supplemental oxygen    Estimated body mass index is 30.85 kg/m² as calculated from the following:    Height as of this encounter: 1.829 m (6' 0.01\").    Weight as of this encounter: 103.2 kg (227 lb 8 oz).    Intake/Output Summary (Last 24 hours) at 12/4/2024 1328  Last data filed at 12/4/2024 1159  Gross per 24 hour   Intake --   Output 400 ml   Net -400 ml         Physical Exam:  General:    In no acute distress  Head:  Normocephalic, atraumatic  Eyes:  Sclerae appear normal.  Pupils equally round.    HENT:  Nares appear normal, no drainage.  Moist mucous membranes  Neck:  No restricted ROM.  Trachea midline  CV:   RRR.  No m/r/g.  No JVD  Lungs:   CTAB.  No wheezing, rhonchi, or rales.  Respirations even, unlabored  Abdomen:   Soft, nontender, nondistended.    Extremities: Warm and dry.   No edema.  Bilateral lower extremity wrapped in clean dry and intact dressing  Skin:     No rashes.  Normal coloration  Neuro:  CN II-XII grossly intact.  Psych:  Normal mood and affect.      Signed:  Francisco More MD    Part of this note may have been written by using a voice dictation software.  The note has been proof read but may still contain some grammatical/other typographical errors.

## 2024-12-05 ENCOUNTER — CARE COORDINATION (OUTPATIENT)
Dept: CARE COORDINATION | Facility: CLINIC | Age: 69
End: 2024-12-05

## 2024-12-05 NOTE — CARE COORDINATION
Transition of care outreach postponed for 7 days due to patient's discharge to [facility].

## 2024-12-11 ENCOUNTER — HOSPITAL ENCOUNTER (OUTPATIENT)
Age: 69
Setting detail: OBSERVATION
Discharge: HOME | End: 2024-12-13
Attending: EMERGENCY MEDICINE | Admitting: STUDENT IN AN ORGANIZED HEALTH CARE EDUCATION/TRAINING PROGRAM
Payer: MEDICAID

## 2024-12-11 DIAGNOSIS — L03.119 CELLULITIS OF MULTIPLE SITES OF LOWER EXTREMITY: ICD-10-CM

## 2024-12-11 DIAGNOSIS — D64.9 ANEMIA, UNSPECIFIED TYPE: Primary | ICD-10-CM

## 2024-12-11 PROBLEM — K26.9 DUODENAL ULCER: Status: ACTIVE | Noted: 2024-12-11

## 2024-12-11 PROBLEM — E66.811 CLASS 1 OBESITY: Status: ACTIVE | Noted: 2024-12-11

## 2024-12-11 PROBLEM — K25.9 GASTRIC ULCER: Status: ACTIVE | Noted: 2024-12-11

## 2024-12-11 PROBLEM — D50.9 IRON DEFICIENCY ANEMIA: Status: ACTIVE | Noted: 2024-12-11

## 2024-12-11 PROBLEM — K20.90 ESOPHAGITIS: Status: ACTIVE | Noted: 2024-12-11

## 2024-12-11 LAB
ALBUMIN SERPL-MCNC: 2.2 G/DL (ref 3.2–4.6)
ALBUMIN/GLOB SERPL: 0.5 (ref 1–1.9)
ALP SERPL-CCNC: 60 U/L (ref 40–129)
ALT SERPL-CCNC: 22 U/L (ref 8–55)
ANION GAP SERPL CALC-SCNC: 8 MMOL/L (ref 7–16)
AST SERPL-CCNC: 19 U/L (ref 15–37)
BASOPHILS # BLD: 0 K/UL (ref 0–0.2)
BASOPHILS NFR BLD: 1 % (ref 0–2)
BILIRUB SERPL-MCNC: 0.4 MG/DL (ref 0–1.2)
BUN SERPL-MCNC: 8 MG/DL (ref 8–23)
CALCIUM SERPL-MCNC: 8.5 MG/DL (ref 8.8–10.2)
CHLORIDE SERPL-SCNC: 104 MMOL/L (ref 98–107)
CO2 SERPL-SCNC: 24 MMOL/L (ref 20–29)
CREAT SERPL-MCNC: 0.76 MG/DL (ref 0.8–1.3)
DIFFERENTIAL METHOD BLD: ABNORMAL
EOSINOPHIL # BLD: 0.1 K/UL (ref 0–0.8)
EOSINOPHIL NFR BLD: 1 % (ref 0.5–7.8)
ERYTHROCYTE [DISTWIDTH] IN BLOOD BY AUTOMATED COUNT: 19 % (ref 11.9–14.6)
FERRITIN SERPL-MCNC: 46 NG/ML (ref 8–388)
GLOBULIN SER CALC-MCNC: 4.7 G/DL (ref 2.3–3.5)
GLUCOSE BLD STRIP.AUTO-MCNC: 105 MG/DL (ref 65–100)
GLUCOSE BLD STRIP.AUTO-MCNC: 73 MG/DL (ref 65–100)
GLUCOSE SERPL-MCNC: 87 MG/DL (ref 70–99)
HCT VFR BLD AUTO: 21.7 % (ref 41.1–50.3)
HGB BLD-MCNC: 6.6 G/DL (ref 13.6–17.2)
HISTORY CHECK: NORMAL
IMM GRANULOCYTES # BLD AUTO: 0 K/UL (ref 0–0.5)
IMM GRANULOCYTES NFR BLD AUTO: 0 % (ref 0–5)
INR PPP: 1.3
IRON SATN MFR SERPL: 5 % (ref 20–50)
IRON SERPL-MCNC: 14 UG/DL (ref 35–100)
LYMPHOCYTES # BLD: 1.3 K/UL (ref 0.5–4.6)
LYMPHOCYTES NFR BLD: 20 % (ref 13–44)
MCH RBC QN AUTO: 29.1 PG (ref 26.1–32.9)
MCHC RBC AUTO-ENTMCNC: 30.4 G/DL (ref 31.4–35)
MCV RBC AUTO: 95.6 FL (ref 82–102)
MONOCYTES # BLD: 0.5 K/UL (ref 0.1–1.3)
MONOCYTES NFR BLD: 8 % (ref 4–12)
NEUTS SEG # BLD: 4.6 K/UL (ref 1.7–8.2)
NEUTS SEG NFR BLD: 71 % (ref 43–78)
NRBC # BLD: 0 K/UL (ref 0–0.2)
PLATELET # BLD AUTO: 181 K/UL (ref 150–450)
PMV BLD AUTO: 9.5 FL (ref 9.4–12.3)
POTASSIUM SERPL-SCNC: 4.3 MMOL/L (ref 3.5–5.1)
PROT SERPL-MCNC: 7 G/DL (ref 6.3–8.2)
PROTHROMBIN TIME: 15.7 SEC (ref 11.3–14.9)
RBC # BLD AUTO: 2.27 M/UL (ref 4.23–5.6)
SERVICE CMNT-IMP: ABNORMAL
SERVICE CMNT-IMP: NORMAL
SODIUM SERPL-SCNC: 136 MMOL/L (ref 136–145)
TIBC SERPL-MCNC: 257 UG/DL (ref 240–450)
UIBC SERPL-MCNC: 243 UG/DL (ref 112–347)
WBC # BLD AUTO: 6.5 K/UL (ref 4.3–11.1)

## 2024-12-11 PROCEDURE — 2580000003 HC RX 258: Performed by: EMERGENCY MEDICINE

## 2024-12-11 PROCEDURE — 83550 IRON BINDING TEST: CPT

## 2024-12-11 PROCEDURE — 99285 EMERGENCY DEPT VISIT HI MDM: CPT

## 2024-12-11 PROCEDURE — G0378 HOSPITAL OBSERVATION PER HR: HCPCS

## 2024-12-11 PROCEDURE — 86920 COMPATIBILITY TEST SPIN: CPT

## 2024-12-11 PROCEDURE — 6370000000 HC RX 637 (ALT 250 FOR IP): Performed by: STUDENT IN AN ORGANIZED HEALTH CARE EDUCATION/TRAINING PROGRAM

## 2024-12-11 PROCEDURE — 6360000002 HC RX W HCPCS: Performed by: EMERGENCY MEDICINE

## 2024-12-11 PROCEDURE — 36430 TRANSFUSION BLD/BLD COMPNT: CPT

## 2024-12-11 PROCEDURE — 94761 N-INVAS EAR/PLS OXIMETRY MLT: CPT

## 2024-12-11 PROCEDURE — 86921 COMPATIBILITY TEST INCUBATE: CPT

## 2024-12-11 PROCEDURE — 83540 ASSAY OF IRON: CPT

## 2024-12-11 PROCEDURE — 80053 COMPREHEN METABOLIC PANEL: CPT

## 2024-12-11 PROCEDURE — 6360000002 HC RX W HCPCS: Performed by: STUDENT IN AN ORGANIZED HEALTH CARE EDUCATION/TRAINING PROGRAM

## 2024-12-11 PROCEDURE — P9016 RBC LEUKOCYTES REDUCED: HCPCS

## 2024-12-11 PROCEDURE — 86850 RBC ANTIBODY SCREEN: CPT

## 2024-12-11 PROCEDURE — 82962 GLUCOSE BLOOD TEST: CPT

## 2024-12-11 PROCEDURE — 85610 PROTHROMBIN TIME: CPT

## 2024-12-11 PROCEDURE — 96375 TX/PRO/DX INJ NEW DRUG ADDON: CPT

## 2024-12-11 PROCEDURE — 82728 ASSAY OF FERRITIN: CPT

## 2024-12-11 PROCEDURE — 85025 COMPLETE CBC W/AUTO DIFF WBC: CPT

## 2024-12-11 PROCEDURE — 86922 COMPATIBILITY TEST ANTIGLOB: CPT

## 2024-12-11 PROCEDURE — 96374 THER/PROPH/DIAG INJ IV PUSH: CPT

## 2024-12-11 PROCEDURE — 86870 RBC ANTIBODY IDENTIFICATION: CPT

## 2024-12-11 PROCEDURE — 86901 BLOOD TYPING SEROLOGIC RH(D): CPT

## 2024-12-11 PROCEDURE — 2580000003 HC RX 258: Performed by: STUDENT IN AN ORGANIZED HEALTH CARE EDUCATION/TRAINING PROGRAM

## 2024-12-11 PROCEDURE — 94640 AIRWAY INHALATION TREATMENT: CPT

## 2024-12-11 PROCEDURE — 86902 BLOOD TYPE ANTIGEN DONOR EA: CPT

## 2024-12-11 PROCEDURE — 86900 BLOOD TYPING SEROLOGIC ABO: CPT

## 2024-12-11 RX ORDER — MAGNESIUM SULFATE IN WATER 40 MG/ML
2000 INJECTION, SOLUTION INTRAVENOUS PRN
Status: DISCONTINUED | OUTPATIENT
Start: 2024-12-11 | End: 2024-12-13 | Stop reason: HOSPADM

## 2024-12-11 RX ORDER — SODIUM FERRIC GLUCONATE COMPLEX IN SUCROSE 12.5 MG/ML
125 INJECTION INTRAVENOUS DAILY
Status: DISCONTINUED | OUTPATIENT
Start: 2024-12-11 | End: 2024-12-13 | Stop reason: HOSPADM

## 2024-12-11 RX ORDER — BUDESONIDE AND FORMOTEROL FUMARATE DIHYDRATE 80; 4.5 UG/1; UG/1
2 AEROSOL RESPIRATORY (INHALATION)
Status: DISCONTINUED | OUTPATIENT
Start: 2024-12-11 | End: 2024-12-11

## 2024-12-11 RX ORDER — GABAPENTIN 300 MG/1
300 CAPSULE ORAL 2 TIMES DAILY
Status: DISCONTINUED | OUTPATIENT
Start: 2024-12-11 | End: 2024-12-13 | Stop reason: HOSPADM

## 2024-12-11 RX ORDER — DEXTROSE MONOHYDRATE 100 MG/ML
INJECTION, SOLUTION INTRAVENOUS CONTINUOUS PRN
Status: DISCONTINUED | OUTPATIENT
Start: 2024-12-11 | End: 2024-12-13 | Stop reason: HOSPADM

## 2024-12-11 RX ORDER — ACETAMINOPHEN 650 MG/1
650 SUPPOSITORY RECTAL EVERY 6 HOURS PRN
Status: DISCONTINUED | OUTPATIENT
Start: 2024-12-11 | End: 2024-12-13 | Stop reason: HOSPADM

## 2024-12-11 RX ORDER — SPIRONOLACTONE 25 MG/1
12.5 TABLET ORAL DAILY
Status: DISCONTINUED | OUTPATIENT
Start: 2024-12-11 | End: 2024-12-13 | Stop reason: HOSPADM

## 2024-12-11 RX ORDER — INSULIN LISPRO 100 [IU]/ML
0-4 INJECTION, SOLUTION INTRAVENOUS; SUBCUTANEOUS
Status: DISCONTINUED | OUTPATIENT
Start: 2024-12-11 | End: 2024-12-13 | Stop reason: HOSPADM

## 2024-12-11 RX ORDER — PANTOPRAZOLE SODIUM 40 MG/1
40 TABLET, DELAYED RELEASE ORAL
Status: DISCONTINUED | OUTPATIENT
Start: 2024-12-11 | End: 2024-12-13 | Stop reason: HOSPADM

## 2024-12-11 RX ORDER — METOPROLOL TARTRATE 50 MG
50 TABLET ORAL 2 TIMES DAILY
Status: DISCONTINUED | OUTPATIENT
Start: 2024-12-11 | End: 2024-12-13 | Stop reason: HOSPADM

## 2024-12-11 RX ORDER — EZETIMIBE 10 MG/1
10 TABLET ORAL NIGHTLY
Status: DISCONTINUED | OUTPATIENT
Start: 2024-12-11 | End: 2024-12-13 | Stop reason: HOSPADM

## 2024-12-11 RX ORDER — SODIUM CHLORIDE 0.9 % (FLUSH) 0.9 %
5-40 SYRINGE (ML) INJECTION PRN
Status: DISCONTINUED | OUTPATIENT
Start: 2024-12-11 | End: 2024-12-13 | Stop reason: HOSPADM

## 2024-12-11 RX ORDER — ASPIRIN 81 MG/1
81 TABLET, CHEWABLE ORAL DAILY
Status: DISCONTINUED | OUTPATIENT
Start: 2024-12-11 | End: 2024-12-13 | Stop reason: HOSPADM

## 2024-12-11 RX ORDER — BUMETANIDE 1 MG/1
2 TABLET ORAL DAILY
Status: DISCONTINUED | OUTPATIENT
Start: 2024-12-11 | End: 2024-12-13 | Stop reason: HOSPADM

## 2024-12-11 RX ORDER — ARFORMOTEROL TARTRATE 15 UG/2ML
15 SOLUTION RESPIRATORY (INHALATION)
Status: DISCONTINUED | OUTPATIENT
Start: 2024-12-11 | End: 2024-12-13 | Stop reason: HOSPADM

## 2024-12-11 RX ORDER — SODIUM CHLORIDE 9 MG/ML
INJECTION, SOLUTION INTRAVENOUS PRN
Status: DISCONTINUED | OUTPATIENT
Start: 2024-12-11 | End: 2024-12-11

## 2024-12-11 RX ORDER — LISINOPRIL 20 MG/1
20 TABLET ORAL 2 TIMES DAILY
Status: DISCONTINUED | OUTPATIENT
Start: 2024-12-11 | End: 2024-12-13 | Stop reason: HOSPADM

## 2024-12-11 RX ORDER — ONDANSETRON 2 MG/ML
4 INJECTION INTRAMUSCULAR; INTRAVENOUS
Status: COMPLETED | OUTPATIENT
Start: 2024-12-11 | End: 2024-12-11

## 2024-12-11 RX ORDER — ALBUTEROL SULFATE 0.83 MG/ML
2.5 SOLUTION RESPIRATORY (INHALATION) EVERY 4 HOURS PRN
Status: DISCONTINUED | OUTPATIENT
Start: 2024-12-11 | End: 2024-12-13 | Stop reason: HOSPADM

## 2024-12-11 RX ORDER — SODIUM CHLORIDE 9 MG/ML
INJECTION, SOLUTION INTRAVENOUS PRN
Status: DISCONTINUED | OUTPATIENT
Start: 2024-12-11 | End: 2024-12-13 | Stop reason: HOSPADM

## 2024-12-11 RX ORDER — IBUPROFEN 600 MG/1
1 TABLET ORAL PRN
Status: DISCONTINUED | OUTPATIENT
Start: 2024-12-11 | End: 2024-12-13 | Stop reason: HOSPADM

## 2024-12-11 RX ORDER — POLYETHYLENE GLYCOL 3350 17 G/17G
17 POWDER, FOR SOLUTION ORAL DAILY PRN
Status: DISCONTINUED | OUTPATIENT
Start: 2024-12-11 | End: 2024-12-13 | Stop reason: HOSPADM

## 2024-12-11 RX ORDER — BUDESONIDE 0.25 MG/2ML
0.25 INHALANT ORAL
Status: DISCONTINUED | OUTPATIENT
Start: 2024-12-11 | End: 2024-12-13 | Stop reason: HOSPADM

## 2024-12-11 RX ORDER — SODIUM CHLORIDE 0.9 % (FLUSH) 0.9 %
5-40 SYRINGE (ML) INJECTION EVERY 12 HOURS SCHEDULED
Status: DISCONTINUED | OUTPATIENT
Start: 2024-12-11 | End: 2024-12-13 | Stop reason: HOSPADM

## 2024-12-11 RX ORDER — POTASSIUM CHLORIDE 1500 MG/1
40 TABLET, EXTENDED RELEASE ORAL PRN
Status: DISCONTINUED | OUTPATIENT
Start: 2024-12-11 | End: 2024-12-13 | Stop reason: HOSPADM

## 2024-12-11 RX ORDER — ACETAMINOPHEN 325 MG/1
650 TABLET ORAL EVERY 6 HOURS PRN
Status: DISCONTINUED | OUTPATIENT
Start: 2024-12-11 | End: 2024-12-13 | Stop reason: HOSPADM

## 2024-12-11 RX ORDER — AMIODARONE HYDROCHLORIDE 200 MG/1
200 TABLET ORAL 2 TIMES DAILY
Status: DISCONTINUED | OUTPATIENT
Start: 2024-12-11 | End: 2024-12-13 | Stop reason: HOSPADM

## 2024-12-11 RX ORDER — POTASSIUM CHLORIDE 7.45 MG/ML
10 INJECTION INTRAVENOUS PRN
Status: DISCONTINUED | OUTPATIENT
Start: 2024-12-11 | End: 2024-12-13 | Stop reason: HOSPADM

## 2024-12-11 RX ADMIN — LISINOPRIL 20 MG: 20 TABLET ORAL at 20:56

## 2024-12-11 RX ADMIN — EZETIMIBE 10 MG: 10 TABLET ORAL at 20:56

## 2024-12-11 RX ADMIN — ACETAMINOPHEN 650 MG: 325 TABLET ORAL at 18:52

## 2024-12-11 RX ADMIN — METOPROLOL TARTRATE 50 MG: 50 TABLET, FILM COATED ORAL at 20:56

## 2024-12-11 RX ADMIN — IPRATROPIUM BROMIDE 0.5 MG: 0.5 SOLUTION RESPIRATORY (INHALATION) at 20:00

## 2024-12-11 RX ADMIN — SODIUM FERRIC GLUCONATE COMPLEX 125 MG: 12.5 INJECTION INTRAVENOUS at 20:56

## 2024-12-11 RX ADMIN — GABAPENTIN 300 MG: 300 CAPSULE ORAL at 20:56

## 2024-12-11 RX ADMIN — BUDESONIDE 250 MCG: 0.25 SUSPENSION RESPIRATORY (INHALATION) at 20:00

## 2024-12-11 RX ADMIN — ONDANSETRON 4 MG: 2 INJECTION, SOLUTION INTRAMUSCULAR; INTRAVENOUS at 12:38

## 2024-12-11 RX ADMIN — SODIUM CHLORIDE, PRESERVATIVE FREE 10 ML: 5 INJECTION INTRAVENOUS at 20:56

## 2024-12-11 RX ADMIN — ARFORMOTEROL TARTRATE 15 MCG: 15 SOLUTION RESPIRATORY (INHALATION) at 20:00

## 2024-12-11 RX ADMIN — AMIODARONE HYDROCHLORIDE 200 MG: 200 TABLET ORAL at 20:56

## 2024-12-11 RX ADMIN — SODIUM CHLORIDE 40 MG: 9 INJECTION INTRAMUSCULAR; INTRAVENOUS; SUBCUTANEOUS at 12:38

## 2024-12-11 ASSESSMENT — ENCOUNTER SYMPTOMS
VOMITING: 0
BLOOD IN STOOL: 0
NAUSEA: 1
ABDOMINAL PAIN: 0
DIARRHEA: 0

## 2024-12-11 ASSESSMENT — PAIN SCALES - GENERAL
PAINLEVEL_OUTOF10: 0

## 2024-12-11 ASSESSMENT — PAIN - FUNCTIONAL ASSESSMENT: PAIN_FUNCTIONAL_ASSESSMENT: NONE - DENIES PAIN

## 2024-12-11 NOTE — CONSENT
Informed Consent for Blood Component Transfusion Note    I have discussed with the patient the rationale for blood component transfusion; its benefits in treating or preventing fatigue, organ damage, or death; and its risk which includes mild transfusion reactions, rare risk of blood borne infection, or more serious but rare reactions. I have discussed the alternatives to transfusion, including the risk and consequences of not receiving transfusion. The patient had an opportunity to ask questions and had agreed to proceed with transfusion of blood components.    Electronically signed by Desmond Soto MD on 12/11/24 at 11:21 AM EST

## 2024-12-11 NOTE — ED TRIAGE NOTES
Per ems called to [facility] for rehab after recent discharge x7 days prior to arrival. States hgb dropped from 7 to 6 in the past day. Denies abd pain. Denies dark stools. Denies n/v. States of generalized fatigue.

## 2024-12-11 NOTE — ED NOTES
TRANSFER - OUT REPORT:    Verbal report given to Becca POST on Marc Horton Sr.  being transferred to [facility] for routine progression of patient care       Report consisted of patient's Situation, Background, Assessment and   Recommendations(SBAR).     Information from the following report(s) Nurse Handoff Report, ED Encounter Summary, and ED SBAR was reviewed with the receiving nurse.    Emerson Fall Assessment:    Presents to emergency department  because of falls (Syncope, seizure, or loss of consciousness): No  Age > 70: No  Altered Mental Status, Intoxication with alcohol or substance confusion (Disorientation, impaired judgment, poor safety awaremess, or inability to follow instructions): No  Impaired Mobility: Ambulates or transfers with assistive devices or assistance; Unable to ambulate or transer.: No  Nursing Judgement: No          Lines:   Peripheral IV 12/11/24 Distal;Right Cephalic (Active)        Opportunity for questions and clarification was provided.      Patient transported with:  Marcos Allen RN  12/11/24 1562

## 2024-12-11 NOTE — ED PROVIDER NOTES
Emergency Department Provider Note       PCP: Meliza Martino PA-C   Age: 69 y.o.   Sex: male     DISPOSITION  12/11/2024 01:15:16 PM    ICD-10-CM    1. Anemia, unspecified type  D64.9           Medical Decision Making     69-year-old  male with history of CAD, atrial fibrillation, CHF, COPD, PAD, HTN, depression, and recent hospital admission for cellulitis/sepsis, discharged approximately 1 week ago to rehab.  Patient apparently needed to be transfused while in the hospital, had an EGD which showed ulcers but no active bleeding.  Was taken off his Eliquis and remains off at this time.  Patient denies any abdominal pain but does describe nausea since last night, denies change in bowel habits, fever or chills.  He denies any change in the color of his stool.  Patient does describe some mild shortness of breath this morning.  On exam, patient appears in no distress, abdominal exam is benign, and rectal exam reveals a faintly guaiac positive stool that is brown.  Lab work reveals a hemoglobin of 6.6 with a hematocrit of 21.7, normal MCH and a mildly decreased MCHC at 30.4.  CMP unremarkable except for a albumin of 2.2.  Patient was typed and crossed for unit of blood, case will be discussed with the hospitalist.     1 or more acute illnesses that pose a threat to life or bodily function.   Chronic medical problems impacting care include PUD.  I independently ordered and reviewed each unique test.    I reviewed external records: ED visit note from an outside group.  I reviewed external records: provider visit note from outside specialist.  I reviewed external records: previous lab results from outside ED.           The patient was admitted and I have discussed patient management with the admitting provider.    Exclusion criteria - the patient is NOT to be included for SEP-1 Core Measure due to: Infection is not suspected       History     69-year-old  male with history of CAD, atrial fibrillation,     PREPARE RBC (CROSSMATCH), 1 Units   Result Value Ref Range    History Check Historical check performed          No orders to display                No results for input(s): \"COVID19\" in the last 72 hours.     Voice dictation software was used during the making of this note.  This software is not perfect and grammatical and other typographical errors may be present.  This note has not been completely proofread for errors.     Desmond Soto MD  12/11/24 1575

## 2024-12-11 NOTE — CONSULTS
Consult Note            Date:12/11/2024        Patient Name:Marc Horton Sr.     YOB: 1955     Age:69 y.o.    Inpatient consult to GI  Consult performed by: Ingrid Ortiz APRN - CNP  Consult ordered by: Nohemi Billingsley MD          Chief Complaint     Chief Complaint   Patient presents with    Abnormal Lab        History Obtained From   patient    History of Present Illness   Marc Horton is a 68 yo male who presented to the ED from the nursing home with low Hgb. PMH includes CAD, a. Fib not currently on anticoagulation, CHF, COPD, PAD, HTN, depression, HLD, recent discharge from the hospital 1 week ago. GI was consulted for anemia and Heme positive stool. Patient was recently here and ended up needing a blood transfusion while inpatient. EGD was done on 11/30 which showed esophagitis, and non bleeding gastric and duodenal ulcers. Patient was then discharged to the nursing home. The nursing home was doing his labswhen they found his hgb low this morning and sent him to the ED. Hgb this morning was 6.6 down from 7.1. Looking back on his Hgb his normal seems to be between 10-11, however this last hospitalization his Hgb trended down to between 7-8. He was on Eliquis for his a. Fib but they stopped it last admission due to his anemia. He denies any current nausea or vomiting but was having some earlier. He denies any diarrhea or abdominal pain. He denies seeing any bloody or black tarry stools. He is currently not taking any blood thinners and denies NSAID use. He denies alcohol or tobacco use. His last BM was this morning and it was normal.     Labs: Hgb 6.6 (7.1), Plt 181, BUN 8, Creatinine 0.76    Medications: Aspirin, PPI BID    Past Medical History     Past Medical History:   Diagnosis Date    Bipolar disorder (HCC)     CAD (coronary artery disease)     CHF (congestive heart failure) (HCC)     Chronic back pain     Hyperlipidemia     Hypertension     Neuropathy     Subdural hematoma  12/30/2022    Type 2 diabetes mellitus without complication (HCC)         Past Surgical History     Past Surgical History:   Procedure Laterality Date    CABG WITH AORTIC VALVE REPLACEMENT N/A 3/15/2023    CORONARY ARTERY BYPASS GRAFT (CABG X 2, LIMA; ENDOSCOPIC VEIN HARVEST,LEFT GREATER SAPHENOUS VEIN; LEFT ATRIAL APPENDAGE CLIPPING; AORTIC VALVE REPLACEMENT; MAZE performed by Jesse Lares MD at [facility] MAIN OR    CARDIAC PROCEDURE N/A 3/13/2023    LEFT HEART CATH / CORONARY ANGIOGRAPHY performed by Keshawn Connors MD at [facility] CARDIAC CATH LAB    HERNIA REPAIR      TRANSESOPHAGEAL ECHOCARDIOGRAM N/A 3/15/2023    TRANSESOPHAGEAL ECHOCARDIOGRAM performed by Jesse Lares MD at [facility] MAIN OR    UPPER GASTROINTESTINAL ENDOSCOPY N/A 11/30/2024    ESOPHAGOGASTRODUODENOSCOPY performed by Alissa Stark MD at [facility] ENDOSCOPY        Medications     Prior to Admission medications    Medication Sig Start Date End Date Taking? Authorizing Provider   amiodarone (CORDARONE) 200 MG tablet Take 1 tablet by mouth 2 times daily 12/4/24   Francisco More MD   pantoprazole (PROTONIX) 40 MG tablet Take 1 tablet by mouth 2 times daily (before meals) 12/4/24   Francisco More MD   lisinopril (PRINIVIL;ZESTRIL) 20 MG tablet Take 1 tablet by mouth in the morning and at bedtime 6/17/24   Mleiza Martino PA-C   metFORMIN (GLUCOPHAGE) 850 MG tablet Take 1 tablet by mouth daily (with breakfast) 6/17/24   Meliza Martino PA-C   spironolactone (ALDACTONE) 25 MG tablet Take 0.5 tablets by mouth daily 3/21/24   Kirill Pride DO   albuterol sulfate HFA (PROVENTIL HFA) 108 (90 Base) MCG/ACT inhaler Inhale 2 puffs into the lungs every 6 hours as needed for Wheezing 3/21/24   Kirill Pride DO   aspirin 81 MG chewable tablet Take 1 tablet by mouth daily 3/16/24 3/11/25  Bowen Cooper MD   bumetanide (BUMEX) 2 MG tablet Take 1 tablet by mouth daily 3/15/24 6/13/24  Bowen Cooper MD   ezetimibe (ZETIA) 10 MG tablet Take 1

## 2024-12-12 LAB
ABO + RH BLD: NORMAL
ALBUMIN SERPL-MCNC: 2.1 G/DL (ref 3.2–4.6)
ALBUMIN/GLOB SERPL: 0.5 (ref 1–1.9)
ALP SERPL-CCNC: 53 U/L (ref 40–129)
ALT SERPL-CCNC: 19 U/L (ref 8–55)
ANION GAP SERPL CALC-SCNC: 8 MMOL/L (ref 7–16)
ANTIGENS PRESENT RBC DONR: NORMAL
AST SERPL-CCNC: 18 U/L (ref 15–37)
BASOPHILS # BLD: 0 K/UL (ref 0–0.2)
BASOPHILS NFR BLD: 1 % (ref 0–2)
BILIRUB SERPL-MCNC: 0.4 MG/DL (ref 0–1.2)
BLD PROD TYP BPU: NORMAL
BLOOD BANK BLOOD PRODUCT EXPIRATION DATE: NORMAL
BLOOD BANK DISPENSE STATUS: NORMAL
BLOOD BANK ISBT PRODUCT BLOOD TYPE: 6200
BLOOD BANK PRODUCT CODE: NORMAL
BLOOD BANK UNIT TYPE AND RH: NORMAL
BLOOD GROUP ANTIBODIES SERPL: NORMAL
BLOOD GROUP ANTIBODIES SERPL: NORMAL
BPU ID: NORMAL
BUN SERPL-MCNC: 8 MG/DL (ref 8–23)
CALCIUM SERPL-MCNC: 8.2 MG/DL (ref 8.8–10.2)
CHLORIDE SERPL-SCNC: 104 MMOL/L (ref 98–107)
CO2 SERPL-SCNC: 24 MMOL/L (ref 20–29)
CREAT SERPL-MCNC: 0.76 MG/DL (ref 0.8–1.3)
CROSSMATCH RESULT: NORMAL
DIFFERENTIAL METHOD BLD: ABNORMAL
EOSINOPHIL # BLD: 0.1 K/UL (ref 0–0.8)
EOSINOPHIL NFR BLD: 1 % (ref 0.5–7.8)
ERYTHROCYTE [DISTWIDTH] IN BLOOD BY AUTOMATED COUNT: 18.6 % (ref 11.9–14.6)
GLOBULIN SER CALC-MCNC: 4.3 G/DL (ref 2.3–3.5)
GLUCOSE BLD STRIP.AUTO-MCNC: 100 MG/DL (ref 65–100)
GLUCOSE BLD STRIP.AUTO-MCNC: 202 MG/DL (ref 65–100)
GLUCOSE BLD STRIP.AUTO-MCNC: 91 MG/DL (ref 65–100)
GLUCOSE BLD STRIP.AUTO-MCNC: 98 MG/DL (ref 65–100)
GLUCOSE SERPL-MCNC: 78 MG/DL (ref 70–99)
HCT VFR BLD AUTO: 23.4 % (ref 41.1–50.3)
HCT VFR BLD AUTO: 25.9 % (ref 41.1–50.3)
HGB BLD-MCNC: 7.3 G/DL (ref 13.6–17.2)
HGB BLD-MCNC: 7.8 G/DL (ref 13.6–17.2)
IMM GRANULOCYTES # BLD AUTO: 0 K/UL (ref 0–0.5)
IMM GRANULOCYTES NFR BLD AUTO: 0 % (ref 0–5)
LYMPHOCYTES # BLD: 1.4 K/UL (ref 0.5–4.6)
LYMPHOCYTES NFR BLD: 27 % (ref 13–44)
MCH RBC QN AUTO: 28.9 PG (ref 26.1–32.9)
MCHC RBC AUTO-ENTMCNC: 31.2 G/DL (ref 31.4–35)
MCV RBC AUTO: 92.5 FL (ref 82–102)
MONOCYTES # BLD: 0.6 K/UL (ref 0.1–1.3)
MONOCYTES NFR BLD: 11 % (ref 4–12)
NEUTS SEG # BLD: 3 K/UL (ref 1.7–8.2)
NEUTS SEG NFR BLD: 60 % (ref 43–78)
NRBC # BLD: 0 K/UL (ref 0–0.2)
PLATELET # BLD AUTO: 175 K/UL (ref 150–450)
PMV BLD AUTO: 9.5 FL (ref 9.4–12.3)
POTASSIUM SERPL-SCNC: 4.4 MMOL/L (ref 3.5–5.1)
PROT SERPL-MCNC: 6.4 G/DL (ref 6.3–8.2)
RBC # BLD AUTO: 2.53 M/UL (ref 4.23–5.6)
SERVICE CMNT-IMP: ABNORMAL
SERVICE CMNT-IMP: NORMAL
SODIUM SERPL-SCNC: 136 MMOL/L (ref 136–145)
SPECIMEN EXP DATE BLD: NORMAL
UNIT DIVISION: 0
UNIT ISSUE DATE/TIME: NORMAL
WBC # BLD AUTO: 5.1 K/UL (ref 4.3–11.1)

## 2024-12-12 PROCEDURE — 97535 SELF CARE MNGMENT TRAINING: CPT

## 2024-12-12 PROCEDURE — 82962 GLUCOSE BLOOD TEST: CPT

## 2024-12-12 PROCEDURE — 36415 COLL VENOUS BLD VENIPUNCTURE: CPT

## 2024-12-12 PROCEDURE — 6370000000 HC RX 637 (ALT 250 FOR IP): Performed by: PHYSICIAN ASSISTANT

## 2024-12-12 PROCEDURE — 97161 PT EVAL LOW COMPLEX 20 MIN: CPT

## 2024-12-12 PROCEDURE — 97165 OT EVAL LOW COMPLEX 30 MIN: CPT

## 2024-12-12 PROCEDURE — 96376 TX/PRO/DX INJ SAME DRUG ADON: CPT

## 2024-12-12 PROCEDURE — 6370000000 HC RX 637 (ALT 250 FOR IP): Performed by: STUDENT IN AN ORGANIZED HEALTH CARE EDUCATION/TRAINING PROGRAM

## 2024-12-12 PROCEDURE — 97530 THERAPEUTIC ACTIVITIES: CPT

## 2024-12-12 PROCEDURE — 2580000003 HC RX 258: Performed by: STUDENT IN AN ORGANIZED HEALTH CARE EDUCATION/TRAINING PROGRAM

## 2024-12-12 PROCEDURE — 94760 N-INVAS EAR/PLS OXIMETRY 1: CPT

## 2024-12-12 PROCEDURE — 6360000002 HC RX W HCPCS: Performed by: STUDENT IN AN ORGANIZED HEALTH CARE EDUCATION/TRAINING PROGRAM

## 2024-12-12 PROCEDURE — 85018 HEMOGLOBIN: CPT

## 2024-12-12 PROCEDURE — 6370000000 HC RX 637 (ALT 250 FOR IP): Performed by: FAMILY MEDICINE

## 2024-12-12 PROCEDURE — G0378 HOSPITAL OBSERVATION PER HR: HCPCS

## 2024-12-12 PROCEDURE — 94640 AIRWAY INHALATION TREATMENT: CPT

## 2024-12-12 PROCEDURE — 85025 COMPLETE CBC W/AUTO DIFF WBC: CPT

## 2024-12-12 PROCEDURE — 6370000000 HC RX 637 (ALT 250 FOR IP)

## 2024-12-12 PROCEDURE — 80053 COMPREHEN METABOLIC PANEL: CPT

## 2024-12-12 PROCEDURE — 85014 HEMATOCRIT: CPT

## 2024-12-12 RX ORDER — HYDROCODONE BITARTRATE AND ACETAMINOPHEN 5; 325 MG/1; MG/1
1 TABLET ORAL ONCE
Status: COMPLETED | OUTPATIENT
Start: 2024-12-12 | End: 2024-12-12

## 2024-12-12 RX ORDER — HYDROCODONE BITARTRATE AND ACETAMINOPHEN 5; 325 MG/1; MG/1
1 TABLET ORAL EVERY 6 HOURS PRN
Status: DISCONTINUED | OUTPATIENT
Start: 2024-12-12 | End: 2024-12-13 | Stop reason: HOSPADM

## 2024-12-12 RX ORDER — SUCRALFATE 1 G/1
1 TABLET ORAL
Status: DISCONTINUED | OUTPATIENT
Start: 2024-12-12 | End: 2024-12-13 | Stop reason: HOSPADM

## 2024-12-12 RX ORDER — FERROUS SULFATE 325(65) MG
325 TABLET ORAL
Qty: 90 TABLET | Refills: 1 | Status: SHIPPED | OUTPATIENT
Start: 2024-12-12

## 2024-12-12 RX ADMIN — SPIRONOLACTONE 12.5 MG: 25 TABLET ORAL at 07:51

## 2024-12-12 RX ADMIN — IPRATROPIUM BROMIDE 0.5 MG: 0.5 SOLUTION RESPIRATORY (INHALATION) at 08:24

## 2024-12-12 RX ADMIN — EZETIMIBE 10 MG: 10 TABLET ORAL at 22:00

## 2024-12-12 RX ADMIN — HYDROCODONE BITARTRATE AND ACETAMINOPHEN 1 TABLET: 5; 325 TABLET ORAL at 04:57

## 2024-12-12 RX ADMIN — ASPIRIN 81 MG: 81 TABLET, CHEWABLE ORAL at 07:51

## 2024-12-12 RX ADMIN — METOPROLOL TARTRATE 50 MG: 50 TABLET, FILM COATED ORAL at 07:51

## 2024-12-12 RX ADMIN — PANTOPRAZOLE SODIUM 40 MG: 40 TABLET, DELAYED RELEASE ORAL at 14:50

## 2024-12-12 RX ADMIN — SODIUM CHLORIDE, PRESERVATIVE FREE 10 ML: 5 INJECTION INTRAVENOUS at 22:00

## 2024-12-12 RX ADMIN — ACETAMINOPHEN 650 MG: 325 TABLET ORAL at 02:27

## 2024-12-12 RX ADMIN — SODIUM CHLORIDE, PRESERVATIVE FREE 10 ML: 5 INJECTION INTRAVENOUS at 07:51

## 2024-12-12 RX ADMIN — AMIODARONE HYDROCHLORIDE 200 MG: 200 TABLET ORAL at 07:51

## 2024-12-12 RX ADMIN — METOPROLOL TARTRATE 50 MG: 50 TABLET, FILM COATED ORAL at 21:59

## 2024-12-12 RX ADMIN — ARFORMOTEROL TARTRATE 15 MCG: 15 SOLUTION RESPIRATORY (INHALATION) at 08:24

## 2024-12-12 RX ADMIN — GABAPENTIN 300 MG: 300 CAPSULE ORAL at 07:51

## 2024-12-12 RX ADMIN — GABAPENTIN 300 MG: 300 CAPSULE ORAL at 22:00

## 2024-12-12 RX ADMIN — BUDESONIDE 250 MCG: 0.25 SUSPENSION RESPIRATORY (INHALATION) at 08:24

## 2024-12-12 RX ADMIN — AMIODARONE HYDROCHLORIDE 200 MG: 200 TABLET ORAL at 22:00

## 2024-12-12 RX ADMIN — PANTOPRAZOLE SODIUM 40 MG: 40 TABLET, DELAYED RELEASE ORAL at 04:58

## 2024-12-12 RX ADMIN — SUCRALFATE 1 G: 1 TABLET ORAL at 19:08

## 2024-12-12 RX ADMIN — INSULIN LISPRO 2 UNITS: 100 INJECTION, SOLUTION INTRAVENOUS; SUBCUTANEOUS at 20:09

## 2024-12-12 RX ADMIN — BUMETANIDE 2 MG: 1 TABLET ORAL at 07:51

## 2024-12-12 RX ADMIN — HYDROCODONE BITARTRATE AND ACETAMINOPHEN 1 TABLET: 5; 325 TABLET ORAL at 19:50

## 2024-12-12 RX ADMIN — SODIUM FERRIC GLUCONATE COMPLEX 125 MG: 12.5 INJECTION INTRAVENOUS at 09:55

## 2024-12-12 RX ADMIN — LISINOPRIL 20 MG: 20 TABLET ORAL at 07:51

## 2024-12-12 RX ADMIN — LISINOPRIL 20 MG: 20 TABLET ORAL at 21:59

## 2024-12-12 RX ADMIN — ACETAMINOPHEN 650 MG: 325 TABLET ORAL at 14:50

## 2024-12-12 ASSESSMENT — PAIN DESCRIPTION - DESCRIPTORS
DESCRIPTORS: ACHING;BURNING
DESCRIPTORS: THROBBING
DESCRIPTORS: SORE

## 2024-12-12 ASSESSMENT — PAIN SCALES - GENERAL
PAINLEVEL_OUTOF10: 0
PAINLEVEL_OUTOF10: 3
PAINLEVEL_OUTOF10: 0
PAINLEVEL_OUTOF10: 3
PAINLEVEL_OUTOF10: 10
PAINLEVEL_OUTOF10: 0
PAINLEVEL_OUTOF10: 10
PAINLEVEL_OUTOF10: 0
PAINLEVEL_OUTOF10: 4
PAINLEVEL_OUTOF10: 0

## 2024-12-12 ASSESSMENT — PAIN DESCRIPTION - ORIENTATION
ORIENTATION: LEFT
ORIENTATION: RIGHT;LEFT
ORIENTATION: LEFT;RIGHT;LOWER

## 2024-12-12 ASSESSMENT — PAIN DESCRIPTION - FREQUENCY
FREQUENCY: CONTINUOUS
FREQUENCY: CONTINUOUS

## 2024-12-12 ASSESSMENT — PAIN DESCRIPTION - LOCATION
LOCATION: LEG

## 2024-12-12 ASSESSMENT — PAIN DESCRIPTION - ONSET
ONSET: GRADUAL
ONSET: GRADUAL

## 2024-12-12 ASSESSMENT — PAIN - FUNCTIONAL ASSESSMENT
PAIN_FUNCTIONAL_ASSESSMENT: ACTIVITIES ARE NOT PREVENTED
PAIN_FUNCTIONAL_ASSESSMENT: PREVENTS OR INTERFERES SOME ACTIVE ACTIVITIES AND ADLS
PAIN_FUNCTIONAL_ASSESSMENT: ACTIVITIES ARE NOT PREVENTED

## 2024-12-12 ASSESSMENT — PAIN DESCRIPTION - PAIN TYPE
TYPE: ACUTE PAIN
TYPE: ACUTE PAIN

## 2024-12-12 NOTE — DISCHARGE SUMMARY
Hospitalist Discharge Summary   Admit Date:  2024 10:14 AM   DC Note date: 2024  Name:  Marc Horton Sr.   Age:  69 y.o.  Sex:  male  :  1955   MRN:  [id number]   Room:  [facility]  PCP:  Meliza Martino PA-C    Presenting Complaint: Abnormal Lab     Initial Admission Diagnosis: Anemia [D64.9]  Anemia, unspecified type [D64.9]     Problem List for this Hospitalization (present on admission):    Principal Problem:    Iron deficiency anemia  Active Problems:    Ulcers of both lower legs (HCC)    Chronic diastolic congestive heart failure (HCC)    Paroxysmal atrial fibrillation (HCC)    S/P AVR (aortic valve replacement)    Type 2 diabetes mellitus (HCC)    COPD (chronic obstructive pulmonary disease) (HCC)    Hx of CABG    Esophagitis    Gastric ulcer    Duodenal ulcer    Class 1 obesity  Resolved Problems:    * No resolved hospital problems. *      Hospital Course:  Marc Horton Sr. is a 69 y.o. male with medical history of CAD, HFpEF, A-fib not on anticoagulation due to recent GI bleed, COPD, and recent hospitalization for sepsis with cellulitis, who presented with nausea.  Recently discharged to [facility] post acute rehab on 2024.  Noticed mild lightheadedness.  In ER, hemoglobin 6.6.  Ordered blood transfusion.      Iron deficiency anemia  LA grade A esophagitis  Gastric ulcer  Duodenal ulcer  -Hemoglobin 6.6 on admission.  -Iron sat 5%, although ferritin within normal limits.  -Recent EGD on 2024 showed esophagitis, gastric and duodenal ulcer, without any evidence of active bleeding  -Colonoscopy approximately 5 years ago showed a few polyps.  -GI was consulted; recommended no more scopes at this time as long as post transfusion hgb is stable  -S/p 1 PRBC in ER  -Provided Protonix  -Provided iron IV x2  -Hemoglobin 7.3 on the following day.  Repeat hemoglobin 7.8 in the afternoon.  -Discharged with oral iron pills.        CAD s/p CABG  -Continued aspirin, lisinopril,

## 2024-12-12 NOTE — THERAPY EVALUATION
ACUTE OCCUPATIONAL THERAPY GOALS:   (Developed with and agreed upon by patient and/or caregiver.)  (1.) Marc Horton Sr.  will complete lower body bathing and dressing with INDEPENDENCE and adaptive equipment as needed.    (2.) Marc Horton Sr. will complete toileting with INDEPENDENCE.  (3.) Marc Horton Sr. will tolerate 25 minutes of OT treatment with 1-2 rest breaks to increase activity tolerance for ADLs.   (4.) Marc Horton Sr. will complete functional transfers with INDEPENDENCE and adaptive equipment as needed.   (5.) Marc Horton Sr. will complete functional ADL task standing at sink INDEPENDENCE and adaptive equipment as needed.     Timeframe: 7 visits       OCCUPATIONAL THERAPY Initial Assessment and Daily Note       OT Visit Days: 1  Acknowledge Orders  Time  OT Charge Capture  Rehab Caseload Tracker      Marc Horton Sr. is a 69 y.o. male   PRIMARY DIAGNOSIS: Iron deficiency anemia  Anemia [D64.9]  Anemia, unspecified type [D64.9]       Reason for Referral: Generalized Muscle Weakness (M62.81)  Observation: Payor: [employer] MEDICARE COMMUNITY PL / Plan: [employer] DUAL COMP PPO SNP / Product Type: *No Product type* /     ASSESSMENT:     REHAB RECOMMENDATIONS:   Recommendation to date pending progress:  Setting:  Home Health Therapy    Equipment:     Rollator     ASSESSMENT:  Mr. Horton is a 68 y/o male who presents with anemia. At baseline pt lives with friends, is independent with ADLs, and does not use any AD mobility. Pt has been at rehab for about a week since his last admission here and has made great progress while being there.    This date, pt presented sitting up in bedside chair and agreeable to session. Pt overall CGA-SBA for functional transfers and mobility of household distance with rolling walker and seated rest breaks as needed. Pt also completed toileting and simple grooming task with SBA. Pt left seated in bedside chair with all needs met.  Today pt presents with slightly decreased activity tolerance, functional mobility, strength, and balance impacting ADLs. Pt is currently functioning below baseline and would benefit from skilled OT services to address OT goals and plan of care. Edwin DAVALOS and lizette at d/c.   .     [facility] AM-PAC™ “6 Clicks” Daily Activity Inpatient Short Form:    AM-PAC Daily Activity - Inpatient   How much help is needed for putting on and taking off regular lower body clothing?: A Little  How much help is needed for bathing (which includes washing, rinsing, drying)?: A Little  How much help is needed for toileting (which includes using toilet, bedpan, or urinal)?: None  How much help is needed for putting on and taking off regular upper body clothing?: None  How much help is needed for taking care of personal grooming?: None  How much help for eating meals?: None  AM-[facility] Inpatient Daily Activity Raw Score: 22  AM-PAC Inpatient ADL T-Scale Score : 47.1  ADL Inpatient CMS 0-100% Score: 25.8  ADL Inpatient CMS G-Code Modifier : CJ           SUBJECTIVE:     Mr. Horton states, \"I miss being home\"     Social/Functional Lives With: Friend(s)  Type of Home: House  Home Layout: One level  Bathroom Toilet: Standard  Bathroom Equipment: Commode  Bathroom Accessibility: Accessible  Home Equipment: Cane, Walker - Rolling  Receives Help From: Friend(s), Family  Prior Level of Assist for ADLs: Independent  Prior Level of Assist for Homemaking: Independent  Prior Level of Assist for Transfers: Independent  Active : Yes  Mode of Transportation: Car  Occupation: Retired  Additional Comments: Lives with friends in trailer with 4 PATRICE    OBJECTIVE:     LINES / DRAINS / AIRWAY: None    RESTRICTIONS/PRECAUTIONS:  Restrictions/Precautions: Fall Risk    PAIN: VITALS / O2:   Pre Treatment:    Did not c/o pain      Post Treatment: same       Vitals          Oxygen   RA         GROSS EVALUATION: INTACT IMPAIRED   (See Comments)   UE AROM [x]

## 2024-12-12 NOTE — PLAN OF CARE
Problem: Chronic Conditions and Co-morbidities  Goal: Patient's chronic conditions and co-morbidity symptoms are monitored and maintained or improved  12/12/2024 1101 by Della Kelley, RN  Outcome: Progressing  12/12/2024 0037 by Max Vazquez RN  Outcome: Progressing  12/12/2024 0037 by Max Vazquez, RN  Outcome: Progressing     Problem: Safety - Adult  Goal: Free from fall injury  12/12/2024 1101 by Della Kelley, RN  Outcome: Progressing  12/12/2024 0037 by Max Vazquez, RN  Outcome: Progressing  12/12/2024 0037 by Max Vazquez, RN  Outcome: Progressing     Problem: Pain  Goal: Verbalizes/displays adequate comfort level or baseline comfort level  Outcome: Progressing

## 2024-12-12 NOTE — PROGRESS NOTES
ACUTE PHYSICAL THERAPY GOALS:   (Developed with and agreed upon by patient and/or caregiver.)  LTG:  (1.)Mr. Horton will move from supine to sit and sit to supine , scoot up and down, and roll side to side in bed with INDEPENDENCE within 7 treatment day(s).    (2.)Mr. Horton will transfer from bed to chair and chair to bed with MODIFIED INDEPENDENCE using the least restrictive device within 7 treatment day(s).    (3.)Mr. Horton will ambulate with MODIFIED INDEPENDENCE for 250 feet with the least restrictive device within 7 treatment day(s).  (4.)Mr. Horton will participate in therapeutic activity/exercises x 25 minutes for increased strength within 7 treatment days.  (5.)Mr. Horton will ascend and descend 4 stairs using B hand rail(s) with SUPERVISION to improve functional mobility and safety within 7 day(s).    ________________________________________________________________________________________________        PHYSICAL THERAPY Initial Assessment and AM  (Link to Caseload Tracking: PT Visit Days : 1  Acknowledge Orders  Time In/Out  PT Charge Capture  Rehab Caseload Tracker    Marc Horton Sr. is a 69 y.o. male   PRIMARY DIAGNOSIS: Iron deficiency anemia  Anemia [D64.9]  Anemia, unspecified type [D64.9]       Reason for Referral: Generalized Muscle Weakness (M62.81)  Observation: Payor: [employer] MEDICARE COMMUNITY PL / Plan: [employer] COMM DUAL COMP PPO SNP / Product Type: *No Product type* /     ASSESSMENT:     REHAB RECOMMENDATIONS:   Recommendation to date pending progress:  Setting:  Home Health Therapy    Equipment:     rollator     ASSESSMENT:  Mr. Horton was brought to the hospital from [facility] due to drop in HgB.  Pt was recently discharged from acute care after being treated for GI bleed.  He has a history of COPD and a-fib as well.  Pt presents to PT with WFL AROM and decreased strength in B LEs.  He also appears to have pitting edema in B Les today.  Pt performed STS transfers today 
Marc Horton Sr. is 69 y.o. y/o male     Marc Horton is a 68 yo male who presented to the ED from the nursing home with low Hgb. PMH includes CAD, a. Fib not currently on anticoagulation, CHF, COPD, PAD, HTN, depression, HLD, recent discharge from the hospital 1 week ago. GI was consulted for anemia and Heme positive stool. Has not had any overt bleeding.     Today patient resting in chair stated he felt good. Tolerating clear liquid diet well. No signs of overt bleeding. Had a BM this morning that was normal. Denies any abdominal pain or nausea, vomiting.     Labs: Hgb 7.3 (6.6)    PE:   Vitals:    12/12/24 1130   BP: 133/74   Pulse: 54   Resp: 22   Temp: 98.3 °F (36.8 °C)   SpO2: 98%      General:  The patient appears well-nourished, and is in no acute distress.    HEENT:  Normocephalic, atraumatic. No sclerae icterus.   Cardiovascular:  Regular rate and rhythm.     Abdomen:  Soft, non tender to palpation. No distention. Normoactive bowel sounds present.    Neurologic:  Alert and oriented x3.  Psychiatric: Appropriate mood and affect.    Assessment and Plan:   #Anemia: Still no signs of overt bleeding. Hgb up to 7.3 after 1 unit of PRBC's yesterday. Carafate while inpatient. Continue PPI. Recommending IV iron infusions as well. Can advance diet as tolerated. Please contact us if you have any questions, notice overt bleeding, or have any concerns. Thank you.    Ingrid Ortiz, APRN - CNP    
2,000 mg IntraVENous PRN    polyethylene glycol (GLYCOLAX) packet 17 g  17 g Oral Daily PRN    acetaminophen (TYLENOL) tablet 650 mg  650 mg Oral Q6H PRN    Or    acetaminophen (TYLENOL) suppository 650 mg  650 mg Rectal Q6H PRN    budesonide (PULMICORT) nebulizer suspension 250 mcg  0.25 mg Nebulization BID RT    arformoterol tartrate (BROVANA) nebulizer solution 15 mcg  15 mcg Nebulization BID RT    ferric gluconate (FERRLECIT) injection 125 mg  125 mg IntraVENous Daily    pantoprazole (PROTONIX) tablet 40 mg  40 mg Oral BID AC    insulin lispro (HUMALOG,ADMELOG) injection vial 0-4 Units  0-4 Units SubCUTAneous 4x Daily AC & HS    glucose chewable tablet 16 g  4 tablet Oral PRN    dextrose bolus 10% 125 mL  125 mL IntraVENous PRN    Or    dextrose bolus 10% 250 mL  250 mL IntraVENous PRN    Glucagon Emergency KIT 1 mg  1 mg SubCUTAneous PRN    dextrose 10 % infusion   IntraVENous Continuous PRN       Signed:  Nohemi Billingsley MD    Part of this note may have been written by using a voice dictation software.  The note has been proof read but may still contain some grammatical/other typographical errors.

## 2024-12-13 VITALS
BODY MASS INDEX: 31.63 KG/M2 | RESPIRATION RATE: 18 BRPM | HEIGHT: 72 IN | HEART RATE: 66 BPM | TEMPERATURE: 98.4 F | OXYGEN SATURATION: 99 % | WEIGHT: 233.5 LBS | SYSTOLIC BLOOD PRESSURE: 143 MMHG | DIASTOLIC BLOOD PRESSURE: 60 MMHG

## 2024-12-13 PROBLEM — I87.8 VENOUS STASIS: Status: ACTIVE | Noted: 2024-12-13

## 2024-12-13 LAB
GLUCOSE BLD STRIP.AUTO-MCNC: 106 MG/DL (ref 65–100)
GLUCOSE BLD STRIP.AUTO-MCNC: 79 MG/DL (ref 65–100)
HCT VFR BLD AUTO: 24 % (ref 41.1–50.3)
HGB BLD-MCNC: 7.6 G/DL (ref 13.6–17.2)
SERVICE CMNT-IMP: ABNORMAL
SERVICE CMNT-IMP: NORMAL

## 2024-12-13 PROCEDURE — 96376 TX/PRO/DX INJ SAME DRUG ADON: CPT

## 2024-12-13 PROCEDURE — 82962 GLUCOSE BLOOD TEST: CPT

## 2024-12-13 PROCEDURE — 6370000000 HC RX 637 (ALT 250 FOR IP): Performed by: STUDENT IN AN ORGANIZED HEALTH CARE EDUCATION/TRAINING PROGRAM

## 2024-12-13 PROCEDURE — 6360000002 HC RX W HCPCS: Performed by: STUDENT IN AN ORGANIZED HEALTH CARE EDUCATION/TRAINING PROGRAM

## 2024-12-13 PROCEDURE — 85018 HEMOGLOBIN: CPT

## 2024-12-13 PROCEDURE — G0378 HOSPITAL OBSERVATION PER HR: HCPCS

## 2024-12-13 PROCEDURE — 85014 HEMATOCRIT: CPT

## 2024-12-13 PROCEDURE — 36415 COLL VENOUS BLD VENIPUNCTURE: CPT

## 2024-12-13 PROCEDURE — 6370000000 HC RX 637 (ALT 250 FOR IP): Performed by: PHYSICIAN ASSISTANT

## 2024-12-13 PROCEDURE — 6370000000 HC RX 637 (ALT 250 FOR IP)

## 2024-12-13 PROCEDURE — 94761 N-INVAS EAR/PLS OXIMETRY MLT: CPT

## 2024-12-13 PROCEDURE — 2580000003 HC RX 258: Performed by: STUDENT IN AN ORGANIZED HEALTH CARE EDUCATION/TRAINING PROGRAM

## 2024-12-13 PROCEDURE — 94640 AIRWAY INHALATION TREATMENT: CPT

## 2024-12-13 RX ORDER — HYDROCODONE BITARTRATE AND ACETAMINOPHEN 5; 325 MG/1; MG/1
1 TABLET ORAL EVERY 8 HOURS PRN
Qty: 9 TABLET | Refills: 0 | Status: SHIPPED | OUTPATIENT
Start: 2024-12-13 | End: 2024-12-16

## 2024-12-13 RX ORDER — CEFADROXIL 500 MG/1
500 CAPSULE ORAL 2 TIMES DAILY
Qty: 10 CAPSULE | Refills: 0 | Status: SHIPPED | OUTPATIENT
Start: 2024-12-13 | End: 2024-12-18

## 2024-12-13 RX ADMIN — SUCRALFATE 1 G: 1 TABLET ORAL at 13:37

## 2024-12-13 RX ADMIN — AMIODARONE HYDROCHLORIDE 200 MG: 200 TABLET ORAL at 07:33

## 2024-12-13 RX ADMIN — LISINOPRIL 20 MG: 20 TABLET ORAL at 07:32

## 2024-12-13 RX ADMIN — BUMETANIDE 2 MG: 1 TABLET ORAL at 07:33

## 2024-12-13 RX ADMIN — SODIUM FERRIC GLUCONATE COMPLEX 125 MG: 12.5 INJECTION INTRAVENOUS at 07:32

## 2024-12-13 RX ADMIN — HYDROCODONE BITARTRATE AND ACETAMINOPHEN 1 TABLET: 5; 325 TABLET ORAL at 07:41

## 2024-12-13 RX ADMIN — PANTOPRAZOLE SODIUM 40 MG: 40 TABLET, DELAYED RELEASE ORAL at 06:12

## 2024-12-13 RX ADMIN — IPRATROPIUM BROMIDE 0.5 MG: 0.5 SOLUTION RESPIRATORY (INHALATION) at 08:49

## 2024-12-13 RX ADMIN — SODIUM CHLORIDE, PRESERVATIVE FREE 10 ML: 5 INJECTION INTRAVENOUS at 07:33

## 2024-12-13 RX ADMIN — ASPIRIN 81 MG: 81 TABLET, CHEWABLE ORAL at 07:33

## 2024-12-13 RX ADMIN — METOPROLOL TARTRATE 50 MG: 50 TABLET, FILM COATED ORAL at 07:33

## 2024-12-13 RX ADMIN — BUDESONIDE 250 MCG: 0.25 SUSPENSION RESPIRATORY (INHALATION) at 08:49

## 2024-12-13 RX ADMIN — HYDROCODONE BITARTRATE AND ACETAMINOPHEN 1 TABLET: 5; 325 TABLET ORAL at 13:36

## 2024-12-13 RX ADMIN — GABAPENTIN 300 MG: 300 CAPSULE ORAL at 07:32

## 2024-12-13 RX ADMIN — HYDROCODONE BITARTRATE AND ACETAMINOPHEN 1 TABLET: 5; 325 TABLET ORAL at 02:09

## 2024-12-13 RX ADMIN — ARFORMOTEROL TARTRATE 15 MCG: 15 SOLUTION RESPIRATORY (INHALATION) at 08:49

## 2024-12-13 RX ADMIN — SUCRALFATE 1 G: 1 TABLET ORAL at 06:13

## 2024-12-13 RX ADMIN — SPIRONOLACTONE 12.5 MG: 25 TABLET ORAL at 07:32

## 2024-12-13 ASSESSMENT — PAIN SCALES - GENERAL
PAINLEVEL_OUTOF10: 5
PAINLEVEL_OUTOF10: 7
PAINLEVEL_OUTOF10: 9

## 2024-12-13 ASSESSMENT — PAIN DESCRIPTION - ORIENTATION
ORIENTATION: RIGHT;LEFT
ORIENTATION: RIGHT;LEFT

## 2024-12-13 ASSESSMENT — PAIN - FUNCTIONAL ASSESSMENT: PAIN_FUNCTIONAL_ASSESSMENT: PREVENTS OR INTERFERES SOME ACTIVE ACTIVITIES AND ADLS

## 2024-12-13 ASSESSMENT — PAIN DESCRIPTION - DESCRIPTORS
DESCRIPTORS: ACHING;SHARP;THROBBING;TIGHTNESS
DESCRIPTORS: BURNING;ACHING

## 2024-12-13 ASSESSMENT — PAIN DESCRIPTION - PAIN TYPE: TYPE: ACUTE PAIN

## 2024-12-13 ASSESSMENT — PAIN DESCRIPTION - LOCATION
LOCATION: LEG
LOCATION: LEG

## 2024-12-13 NOTE — CARE COORDINATION
Pt to d/c home today.  Family will provide transportation.  PT/OT recommend HH at d/c.  [facility] is unable to staff this case.  Referral sent to [facility]: SN, PT, OT and has been accepted.  Wound care orders included.  RW ordered through [employer] and delivered to pt's room prior to d/c.  Cane ordered from materials.  No other supportive care needs identified.  Pt agrees with d/c plan.  Milestones met.  LOS = 2 days       12/12/24 0906   Service Assessment   Patient Orientation Alert and Oriented;Person;Place;Self   Cognition Alert   History Provided By Patient;Medical Record   Primary Caregiver Self   Support Systems Children;Family Members   Patient's Healthcare Decision Maker is: Legal Next of Kin   PCP Verified by CM Yes  (HERBIE Gamboa)   Last Visit to PCP Within last year   Prior Functional Level Assistance with the following:;Mobility   Current Functional Level Assistance with the following:;Mobility   Can patient return to prior living arrangement Yes   Ability to make needs known: Good   Family able to assist with home care needs: Yes   Would you like for me to discuss the discharge plan with any other family members/significant others, and if so, who? No   Financial Resources Medicaid;Medicare  ([facility] Medicare Community Dual)   Community Resources None   CM/SW Referral Other (see comment)  (None)   Social/Functional History   Lives With Alone;Friend(s)   Type of Home House   Home Layout One level   Bathroom Toilet Standard   Bathroom Equipment Commode   Bathroom Accessibility Accessible   Home Equipment None   Receives Help From Friend(s);Family   Prior Level of Assist for ADLs Independent   Prior Level of Assist for Homemaking Independent   Ambulation Assistance Needs assistance   Prior Level of Assist for Transfers Independent   Active  No   Patient's  Info Family   Mode of Transportation Family   Occupation Retired   Discharge Planning   Type of Residence House 
   Homemaking Assistance Independent   Meal Prep     Laundry     Vacuuming     Cleaning     Gardening     Yard Work     Driving     Shopping          Other (Comment)     Homemaking Responsibilities     Meal Prep Responsibility     Laundry Responsibility     Cleaning Responsibility     Bill Paying/Finance Responsibility     Shopping Responsibility     Dependent Care Responsibility     Health Care Management     Other (Comment)     Ambuation Assistance Needs assistance   Transfer Assisstance Independent   Active  Yes   Patient's  Info     Mode of Transportation Car   Education     Occupation Retired   Type of Occupation       Discharge Planning   Type of Residence House   Living Arrangements Friends   Support Systems Children, Family Members   Current Services Prior To Admission Durable Medical Equipment   Potential Assistance Needed Home Care   DME Walker, Cane   DME     DME Ordered?     Potential Assistance Purchasing Medications No   Meds-to-Beds: Does the patient want to have any new prescriptions delivered to bedside prior to discharge?     Type of Home Care Services None   Patient expects to be discharged to: House   Follow Up Appointment: Best Day/Time     One/Two Story Residence: One story   # of Interior Steps     Height of Each Step (in)     Interior Rails     Lift Chair Available     History of Falls? No     Services At/After Discharge  Transition of Care Consult (CM Consult): Discharge Planning   Internal Home Health     Internal Hospice     Reason Outside Agency Chosen     Partner SNF     Reason Why Partner SNF Not Chosen     Internal Comfort Care     Reason Outside Comfort Care Chosen     Services At/After Discharge      Resource Information Provided? No   Mode of Transport at Discharge Other (see comment) (TBD based on clinical course)   Hospital Transport Time of Discharge     Confirm Follow Up Transport Other (see comment) (TBD based on clinical course)     Condition of

## 2024-12-13 NOTE — DISCHARGE SUMMARY
Hospitalist Discharge Summary   Admit Date:  2024 10:14 AM   DC Note date: 2024  Name:  Marc Horton Sr.   Age:  69 y.o.  Sex:  male  :  1955   MRN:  [id number]   Room:  [facility]  PCP:  Meliza Martino PA-C    Presenting Complaint: Abnormal Lab     Initial Admission Diagnosis: Anemia [D64.9]  Anemia, unspecified type [D64.9]     Problem List for this Hospitalization (present on admission):    Principal Problem:    Iron deficiency anemia  Active Problems:    Ulcers of both lower legs (HCC)    Chronic diastolic congestive heart failure (HCC)    Paroxysmal atrial fibrillation (HCC)    S/P AVR (aortic valve replacement)    Type 2 diabetes mellitus (HCC)    COPD (chronic obstructive pulmonary disease) (HCC)    Hx of CABG    Esophagitis    Gastric ulcer    Duodenal ulcer    Class 1 obesity    Venous stasis  Resolved Problems:    * No resolved hospital problems. *      Hospital Course:  Mrac Horton Sr. is a 69 y.o. male with medical history of CAD, HFpEF, A-fib not on anticoagulation due to recent GI bleed, COPD, and recent hospitalization for sepsis with cellulitis, who presented with nausea.  Recently discharged to [facility] post acute rehab on 2024.  Noticed mild lightheadedness.  In ER, hemoglobin 6.6.  Ordered blood transfusion.       Iron deficiency anemia  LA grade A esophagitis  Gastric ulcer  Duodenal ulcer  -Hemoglobin 6.6 on admission.  -Iron sat 5%, although ferritin within normal limits.  -Recent EGD on 2024 showed esophagitis, gastric and duodenal ulcer, without any evidence of active bleeding  -Colonoscopy approximately 5 years ago showed a few polyps.  -GI was consulted.  Appreciate recommendations. GI recommended no more scopes at this time.  -S/p 1 PRBC in ER  -Continued Protonix and Carafate  -He received iron IV daily  -Hemoglobin 7.6 on the day of discharge        CAD s/p CABG  -Continues aspirin, lisinopril, Lopressor        Atrial fibrillation  -Not on

## 2024-12-13 NOTE — PLAN OF CARE
Problem: Chronic Conditions and Co-morbidities  Goal: Patient's chronic conditions and co-morbidity symptoms are monitored and maintained or improved  12/13/2024 1059 by Della Kelley RN  Outcome: Progressing  12/12/2024 2326 by Eleonora Francisco RN  Outcome: Progressing     Problem: Safety - Adult  Goal: Free from fall injury  12/13/2024 1059 by Della Kelley RN  Outcome: Progressing  12/12/2024 2326 by Eleonora Francisco RN  Outcome: Progressing     Problem: Pain  Goal: Verbalizes/displays adequate comfort level or baseline comfort level  12/13/2024 1059 by Della Kelley RN  Outcome: Progressing  12/12/2024 2326 by Eleonora Francisco RN  Outcome: Progressing

## 2024-12-13 NOTE — WOUND CARE
Right leg has healed patient prefers to elevate and use his diurectic to prevent swelling. OK for no bandage. Compression sock would be ideal. He has some at home however can't get them on.     Left leg improved since last admission, area over ankle still has extreme pain and large amounts of exudate, crusts Left leg soaked with NS and wound , was able to wash away most of the crusts. Recommend opticel ag or similar to open areas, with abd and mulitlayer compression of choice.  Applied unna boot and coban today as this is what the hospital has. Discussed with Dr. Billingsley.

## 2024-12-15 ENCOUNTER — APPOINTMENT (OUTPATIENT)
Dept: CT IMAGING | Age: 69
End: 2024-12-15
Payer: MEDICAID

## 2024-12-15 ENCOUNTER — APPOINTMENT (OUTPATIENT)
Dept: GENERAL RADIOLOGY | Age: 69
End: 2024-12-15
Payer: MEDICAID

## 2024-12-15 ENCOUNTER — APPOINTMENT (OUTPATIENT)
Dept: ULTRASOUND IMAGING | Age: 69
End: 2024-12-15
Payer: MEDICAID

## 2024-12-15 ENCOUNTER — HOSPITAL ENCOUNTER (EMERGENCY)
Age: 69
Discharge: HOME | End: 2024-12-15
Attending: STUDENT IN AN ORGANIZED HEALTH CARE EDUCATION/TRAINING PROGRAM
Payer: MEDICAID

## 2024-12-15 VITALS
HEART RATE: 71 BPM | OXYGEN SATURATION: 97 % | HEIGHT: 72 IN | BODY MASS INDEX: 31.69 KG/M2 | TEMPERATURE: 98.9 F | RESPIRATION RATE: 20 BRPM | SYSTOLIC BLOOD PRESSURE: 138 MMHG | WEIGHT: 234 LBS | DIASTOLIC BLOOD PRESSURE: 60 MMHG

## 2024-12-15 DIAGNOSIS — L03.116 LEFT LEG CELLULITIS: ICD-10-CM

## 2024-12-15 DIAGNOSIS — I50.9 ACUTE ON CHRONIC CONGESTIVE HEART FAILURE, UNSPECIFIED HEART FAILURE TYPE (HCC): Primary | ICD-10-CM

## 2024-12-15 LAB
ALBUMIN SERPL-MCNC: 2.3 G/DL (ref 3.2–4.6)
ALBUMIN/GLOB SERPL: 0.5 (ref 1–1.9)
ALP SERPL-CCNC: 58 U/L (ref 40–129)
ALT SERPL-CCNC: 15 U/L (ref 8–55)
ANION GAP SERPL CALC-SCNC: 9 MMOL/L (ref 7–16)
AST SERPL-CCNC: 15 U/L (ref 15–37)
BASOPHILS # BLD: 0.1 K/UL (ref 0–0.2)
BASOPHILS NFR BLD: 1 % (ref 0–2)
BILIRUB SERPL-MCNC: 0.3 MG/DL (ref 0–1.2)
BILIRUB UR QL: NEGATIVE
BUN SERPL-MCNC: 8 MG/DL (ref 8–23)
CALCIUM SERPL-MCNC: 8.2 MG/DL (ref 8.8–10.2)
CHLORIDE SERPL-SCNC: 103 MMOL/L (ref 98–107)
CO2 SERPL-SCNC: 23 MMOL/L (ref 20–29)
CREAT SERPL-MCNC: 0.65 MG/DL (ref 0.8–1.3)
D DIMER PPP FEU-MCNC: 1.67 UG/ML(FEU)
DIFFERENTIAL METHOD BLD: ABNORMAL
EKG ATRIAL RATE: 75 BPM
EKG DIAGNOSIS: NORMAL
EKG P AXIS: 80 DEGREES
EKG P-R INTERVAL: 135 MS
EKG Q-T INTERVAL: 418 MS
EKG QRS DURATION: 97 MS
EKG QTC CALCULATION (BAZETT): 458 MS
EKG R AXIS: 48 DEGREES
EKG T AXIS: 16 DEGREES
EKG VENTRICULAR RATE: 72 BPM
EOSINOPHIL # BLD: 0.1 K/UL (ref 0–0.8)
EOSINOPHIL NFR BLD: 1 % (ref 0.5–7.8)
ERYTHROCYTE [DISTWIDTH] IN BLOOD BY AUTOMATED COUNT: 19.7 % (ref 11.9–14.6)
GLOBULIN SER CALC-MCNC: 4.7 G/DL (ref 2.3–3.5)
GLUCOSE SERPL-MCNC: 86 MG/DL (ref 70–99)
GLUCOSE UR QL STRIP.AUTO: NEGATIVE MG/DL
HCT VFR BLD AUTO: 24.9 % (ref 41.1–50.3)
HGB BLD-MCNC: 7.8 G/DL (ref 13.6–17.2)
IMM GRANULOCYTES # BLD AUTO: 0 K/UL (ref 0–0.5)
IMM GRANULOCYTES NFR BLD AUTO: 0 % (ref 0–5)
KETONES UR-MCNC: NEGATIVE MG/DL
LACTATE SERPL-SCNC: 1.1 MMOL/L (ref 0.5–2)
LEUKOCYTE ESTERASE UR QL STRIP: NEGATIVE
LYMPHOCYTES # BLD: 1.9 K/UL (ref 0.5–4.6)
LYMPHOCYTES NFR BLD: 21 % (ref 13–44)
MAGNESIUM SERPL-MCNC: 1.8 MG/DL (ref 1.8–2.4)
MCH RBC QN AUTO: 28.6 PG (ref 26.1–32.9)
MCHC RBC AUTO-ENTMCNC: 31.3 G/DL (ref 31.4–35)
MCV RBC AUTO: 91.2 FL (ref 82–102)
MONOCYTES # BLD: 0.6 K/UL (ref 0.1–1.3)
MONOCYTES NFR BLD: 7 % (ref 4–12)
NEUTS SEG # BLD: 6.4 K/UL (ref 1.7–8.2)
NEUTS SEG NFR BLD: 70 % (ref 43–78)
NITRITE UR QL: NEGATIVE
NRBC # BLD: 0 K/UL (ref 0–0.2)
NT PRO BNP: 5691 PG/ML (ref 0–125)
PH UR: 8 (ref 5–9)
PLATELET # BLD AUTO: 205 K/UL (ref 150–450)
PMV BLD AUTO: 9 FL (ref 9.4–12.3)
POTASSIUM SERPL-SCNC: 4.1 MMOL/L (ref 3.5–5.1)
PROCALCITONIN SERPL-MCNC: 0.06 NG/ML (ref 0–0.1)
PROT SERPL-MCNC: 7 G/DL (ref 6.3–8.2)
PROT UR QL: 30 MG/DL
RBC # BLD AUTO: 2.73 M/UL (ref 4.23–5.6)
RBC # UR STRIP: NEGATIVE
SERVICE CMNT-IMP: ABNORMAL
SODIUM SERPL-SCNC: 134 MMOL/L (ref 136–145)
SP GR UR: 1.02 (ref 1–1.02)
TROPONIN T SERPL HS-MCNC: 22 NG/L (ref 0–22)
TROPONIN T SERPL HS-MCNC: 26 NG/L (ref 0–22)
UROBILINOGEN UR QL: 0.2 EU/DL (ref 0.2–1)
WBC # BLD AUTO: 9.1 K/UL (ref 4.3–11.1)

## 2024-12-15 PROCEDURE — 71260 CT THORAX DX C+: CPT

## 2024-12-15 PROCEDURE — 85379 FIBRIN DEGRADATION QUANT: CPT

## 2024-12-15 PROCEDURE — 84145 PROCALCITONIN (PCT): CPT

## 2024-12-15 PROCEDURE — 83735 ASSAY OF MAGNESIUM: CPT

## 2024-12-15 PROCEDURE — 93010 ELECTROCARDIOGRAM REPORT: CPT | Performed by: INTERNAL MEDICINE

## 2024-12-15 PROCEDURE — 83605 ASSAY OF LACTIC ACID: CPT

## 2024-12-15 PROCEDURE — 83880 ASSAY OF NATRIURETIC PEPTIDE: CPT

## 2024-12-15 PROCEDURE — 99285 EMERGENCY DEPT VISIT HI MDM: CPT

## 2024-12-15 PROCEDURE — 96375 TX/PRO/DX INJ NEW DRUG ADDON: CPT

## 2024-12-15 PROCEDURE — 96374 THER/PROPH/DIAG INJ IV PUSH: CPT

## 2024-12-15 PROCEDURE — 80053 COMPREHEN METABOLIC PANEL: CPT

## 2024-12-15 PROCEDURE — 93971 EXTREMITY STUDY: CPT

## 2024-12-15 PROCEDURE — 71046 X-RAY EXAM CHEST 2 VIEWS: CPT

## 2024-12-15 PROCEDURE — 84484 ASSAY OF TROPONIN QUANT: CPT

## 2024-12-15 PROCEDURE — 85025 COMPLETE CBC W/AUTO DIFF WBC: CPT

## 2024-12-15 PROCEDURE — 6360000002 HC RX W HCPCS: Performed by: STUDENT IN AN ORGANIZED HEALTH CARE EDUCATION/TRAINING PROGRAM

## 2024-12-15 PROCEDURE — 81003 URINALYSIS AUTO W/O SCOPE: CPT

## 2024-12-15 PROCEDURE — 6360000004 HC RX CONTRAST MEDICATION: Performed by: STUDENT IN AN ORGANIZED HEALTH CARE EDUCATION/TRAINING PROGRAM

## 2024-12-15 PROCEDURE — 93005 ELECTROCARDIOGRAM TRACING: CPT | Performed by: STUDENT IN AN ORGANIZED HEALTH CARE EDUCATION/TRAINING PROGRAM

## 2024-12-15 RX ORDER — IOPAMIDOL 755 MG/ML
100 INJECTION, SOLUTION INTRAVASCULAR
Status: COMPLETED | OUTPATIENT
Start: 2024-12-15 | End: 2024-12-15

## 2024-12-15 RX ORDER — FUROSEMIDE 10 MG/ML
60 INJECTION INTRAMUSCULAR; INTRAVENOUS ONCE
Status: COMPLETED | OUTPATIENT
Start: 2024-12-15 | End: 2024-12-15

## 2024-12-15 RX ORDER — ONDANSETRON 2 MG/ML
4 INJECTION INTRAMUSCULAR; INTRAVENOUS
Status: COMPLETED | OUTPATIENT
Start: 2024-12-15 | End: 2024-12-15

## 2024-12-15 RX ORDER — MORPHINE SULFATE 4 MG/ML
4 INJECTION, SOLUTION INTRAMUSCULAR; INTRAVENOUS
Status: COMPLETED | OUTPATIENT
Start: 2024-12-15 | End: 2024-12-15

## 2024-12-15 RX ADMIN — MORPHINE SULFATE 4 MG: 4 INJECTION INTRAVENOUS at 18:48

## 2024-12-15 RX ADMIN — FUROSEMIDE 60 MG: 10 INJECTION, SOLUTION INTRAMUSCULAR; INTRAVENOUS at 18:49

## 2024-12-15 RX ADMIN — ONDANSETRON 4 MG: 2 INJECTION INTRAMUSCULAR; INTRAVENOUS at 18:45

## 2024-12-15 RX ADMIN — IOPAMIDOL 100 ML: 755 INJECTION, SOLUTION INTRAVENOUS at 18:22

## 2024-12-15 ASSESSMENT — PAIN SCALES - GENERAL
PAINLEVEL_OUTOF10: 7
PAINLEVEL_OUTOF10: 5
PAINLEVEL_OUTOF10: 3

## 2024-12-15 ASSESSMENT — PAIN DESCRIPTION - PAIN TYPE: TYPE: ACUTE PAIN

## 2024-12-15 ASSESSMENT — PAIN - FUNCTIONAL ASSESSMENT
PAIN_FUNCTIONAL_ASSESSMENT: 0-10
PAIN_FUNCTIONAL_ASSESSMENT: NONE - DENIES PAIN
PAIN_FUNCTIONAL_ASSESSMENT: ACTIVITIES ARE NOT PREVENTED

## 2024-12-15 ASSESSMENT — PAIN DESCRIPTION - ORIENTATION
ORIENTATION: LEFT
ORIENTATION: LEFT;RIGHT

## 2024-12-15 ASSESSMENT — PAIN DESCRIPTION - DESCRIPTORS
DESCRIPTORS: BURNING
DESCRIPTORS: ACHING

## 2024-12-15 ASSESSMENT — PAIN DESCRIPTION - LOCATION
LOCATION: SHOULDER;LEG
LOCATION: CHEST

## 2024-12-15 ASSESSMENT — PAIN DESCRIPTION - ONSET: ONSET: ON-GOING

## 2024-12-15 ASSESSMENT — PAIN DESCRIPTION - FREQUENCY: FREQUENCY: INTERMITTENT

## 2024-12-15 NOTE — ED TRIAGE NOTES
Patient arrives via EMS from home c/o CP and SOB since last night. Reports productive cough.   Hx COPD and CHF      Was 97% on RA. EMS gave 2L NC en route for comfort. Also gave 324mg aspirin en route.  VSS en route. +fever    A+Ox4

## 2024-12-16 ENCOUNTER — CARE COORDINATION (OUTPATIENT)
Dept: CARE COORDINATION | Facility: CLINIC | Age: 69
End: 2024-12-16

## 2024-12-16 NOTE — CARE COORDINATION
Care Transitions Note    Initial Call - Call within 2 business days of discharge: Yes    Attempted to reach patient for transitions of care follow up. Unable to reach patient.    Outreach Attempts:   Multiple attempts to contact patient at phone numbers on file.   Unable to leave message. Vm not set up    Patient: Marc Horton Sr.    Patient : 1955   MRN: [id number]    Reason for Admission: OBS/Iron [id number]/11/2024-2024  ED-12/15/2024 Acute on Chronic Heart failure  Discharge Date: 12/15/24  RURS: Readmission Risk Score: 18.2    Last Discharge Facility       Date Complaint Diagnosis Description Type Department Provider    12/15/24 Chest Pain; Shortness of Breath Acute on chronic congestive heart failure, unspecified heart failure type (HCC) ... ED (DISCHARGE) SFJoseluis Monique, DO            Was this an external facility discharge? No    Follow Up Appointment:   Patient does not have a follow up appointment scheduled at time of call. UTR for JEFFERY to schedule follow up appointments. Follow up appointment not made at time of discharge.      Plan for follow-up on next business day.      Kelley Moody RN

## 2024-12-16 NOTE — DISCHARGE INSTRUCTIONS
Your lab work and imaging studies appear stable tonight.  He received Lasix in this department and should continue diuretics as previously prescribed at home continue your medications as prescribed including the Duricef previously prescribed during your hospital admission.  Arrange follow-up with your primary care provider and wound care as previously instructed.  Return to this facility for worsening symptoms, concerns or questions.

## 2024-12-16 NOTE — ED NOTES
Patient's son Marc Camp called and updated per patient request.   [phone]    Per Marc Camp he will come and pick patient up at discharge.     Dara Crenshaw RN  12/15/24 4622

## 2024-12-16 NOTE — DISCHARGE SUMMARY
I have reviewed discharge instructions with the patient.  The patient verbalized understanding.    Patient left ED via Discharge Method: ambulatory to Home with son    Opportunity for questions and clarification provided.       Patient given 0 scripts.         To continue your aftercare when you leave the hospital, you may receive an automated call from our care team to check in on how you are doing.  This is a free service and part of our promise to provide the best care and service to meet your aftercare needs.” If you have questions, or wish to unsubscribe from this service please call [phone].  Thank you for Choosing our [facility] Emergency Department.

## 2024-12-16 NOTE — ED PROVIDER NOTES
pneumonitis.    Small to moderate bilateral pleural effusions likely on the basis of CHF/volume  overload.    Additional chronic and postsurgical findings as detailed above      Electronically signed by Juan Francisco Mayorga   CBC with Auto Differential   Result Value Ref Range    WBC 9.1 4.3 - 11.1 K/uL    RBC 2.73 (L) 4.23 - 5.6 M/uL    Hemoglobin 7.8 (L) 13.6 - 17.2 g/dL    Hematocrit 24.9 (L) 41.1 - 50.3 %    MCV 91.2 82 - 102 FL    MCH 28.6 26.1 - 32.9 PG    MCHC 31.3 (L) 31.4 - 35.0 g/dL    RDW 19.7 (H) 11.9 - 14.6 %    Platelets 205 150 - 450 K/uL    MPV 9.0 (L) 9.4 - 12.3 FL    nRBC 0.00 0.0 - 0.2 K/uL    Differential Type AUTOMATED      Neutrophils % 70 43 - 78 %    Lymphocytes % 21 13 - 44 %    Monocytes % 7 4.0 - 12.0 %    Eosinophils % 1 0.5 - 7.8 %    Basophils % 1 0.0 - 2.0 %    Immature Granulocytes % 0 0.0 - 5.0 %    Neutrophils Absolute 6.4 1.7 - 8.2 K/UL    Lymphocytes Absolute 1.9 0.5 - 4.6 K/UL    Monocytes Absolute 0.6 0.1 - 1.3 K/UL    Eosinophils Absolute 0.1 0.0 - 0.8 K/UL    Basophils Absolute 0.1 0.0 - 0.2 K/UL    Immature Granulocytes Absolute 0.0 0.0 - 0.5 K/UL   Comprehensive Metabolic Panel   Result Value Ref Range    Sodium 134 (L) 136 - 145 mmol/L    Potassium 4.1 3.5 - 5.1 mmol/L    Chloride 103 98 - 107 mmol/L    CO2 23 20 - 29 mmol/L    Anion Gap 9 7 - 16 mmol/L    Glucose 86 70 - 99 mg/dL    BUN 8 8 - 23 MG/DL    Creatinine 0.65 (L) 0.80 - 1.30 MG/DL    Est, Glom Filt Rate >90 >60 ml/min/1.73m2    Calcium 8.2 (L) 8.8 - 10.2 MG/DL    Total Bilirubin 0.3 0.0 - 1.2 MG/DL    ALT 15 8 - 55 U/L    AST 15 15 - 37 U/L    Alk Phosphatase 58 40 - 129 U/L    Total Protein 7.0 6.3 - 8.2 g/dL    Albumin 2.3 (L) 3.2 - 4.6 g/dL    Globulin 4.7 (H) 2.3 - 3.5 g/dL    Albumin/Globulin Ratio 0.5 (L) 1.0 - 1.9     Lactic Acid   Result Value Ref Range    Lactic Acid 1.1 0.5 - 2.0 mmol/L   Magnesium   Result Value Ref Range    Magnesium 1.8 1.8 - 2.4 mg/dL   Procalcitonin   Result Value Ref Range

## 2024-12-17 ENCOUNTER — CARE COORDINATION (OUTPATIENT)
Dept: CARE COORDINATION | Facility: CLINIC | Age: 69
End: 2024-12-17

## 2024-12-17 NOTE — PROGRESS NOTES
15 TRANSFER - IN REPORT:    Verbal report received from Lacey POST on Marc Horton Sr.  being received from ED for routine progression of patient care      Report consisted of patient's Situation, Background, Assessment and   Recommendations(SBAR).     Information from the following report(s) Nurse Handoff Report, ED Encounter Summary, ED SBAR, MAR, Recent Results, Cardiac Rhythm SR, and Neuro Assessment was reviewed with the receiving nurse.    Opportunity for questions and clarification was provided.      Assessment completed upon patient's arrival to unit and care assumed.      Patient received to room 324. Patient connected to monitor and assessment completed. Plan of care reviewed. Patient oriented to room and call light. Patient aware to use call light to communicate any chest pain or needs.     4 Eyes Skin Assessment     NAME:  Marc Horton Sr.  YOB: 1955  MEDICAL RECORD NUMBER:  [id number]    The patient is being assessed for  Admission    I agree that at least one RN has performed a thorough Head to Toe Skin Assessment on the patient. ALL assessment sites listed below have been assessed.      Areas assessed by both nurses:    Head, Face, Ears, Shoulders, Back, Chest, Arms, Elbows, Hands, Sacrum. Buttock, Coccyx, Ischium, and Legs. Feet and Heels    Red/sinai/edematous BLE. Scrotal edema present. Bronze/leathery skin. Sacrum and heels intact and free from redness. Large mid abdominal surgical scar.         Does the Patient have a Wound? No noted wound(s)       Arnulfo Prevention initiated by RN: Yes  Wound Care Orders initiated by RN: No    Pressure Injury (Stage 3,4, Unstageable, DTI, NWPT, and Complex wounds) if present, place Wound referral order by RN under : No    New Ostomies, if present place, Ostomy referral order under : No     Nurse 1 eSignature: Electronically signed by Jenny Lafleur RN on 3/13/24 at 2:30 AM EDT    **SHARE this note so that the

## 2024-12-17 NOTE — CARE COORDINATION
Care Transitions Note    Initial Call - Call within 2 business days of discharge: Yes    Attempted to reach patient for transitions of care follow up. Unable to reach patient.    Outreach Attempts:   Multiple attempts to contact patient at phone numbers on file.   Heroichart message sent.   Unable to leave message.   Spoke with daughter at mobile contact number. She advised to call patient at [phone]. Multiple attempts to outreach patient at the number. No answer and MB not set up. Will forward a message via my chart.  Patient: Marc Horton Sr.    Patient : 1955   MRN: [id number]    Reason for Admission:  OBS/Iron [id number]/11/2024-2024  ED-12/15/2024 Acute on Chronic Heart failure  Discharge Date: 12/15/24  RURS: Readmission Risk Score: 18.2    Last Discharge Facility       Date Complaint Diagnosis Description Type Department Provider    12/15/24 Chest Pain; Shortness of Breath Acute on chronic congestive heart failure, unspecified heart failure type (HCC) ... ED (DISCHARGE) [facility] Joseluis Palacios, DO            Was this an external facility discharge? No    Follow Up Appointment:   Patient does not have a follow up appointment scheduled at time of call. UTR for JEFFERY to schedule follow up appointments. Follow up appointment not made at time of discharge.       No further follow-up call indicated     Kelley Moody RN

## 2024-12-18 ENCOUNTER — TELEPHONE (OUTPATIENT)
Dept: FAMILY MEDICINE CLINIC | Facility: CLINIC | Age: 69
End: 2024-12-18

## 2024-12-18 ENCOUNTER — HOSPITAL ENCOUNTER (EMERGENCY)
Age: 69
Discharge: HOME | End: 2024-12-18
Payer: MEDICAID

## 2024-12-18 VITALS
OXYGEN SATURATION: 100 % | DIASTOLIC BLOOD PRESSURE: 83 MMHG | RESPIRATION RATE: 18 BRPM | SYSTOLIC BLOOD PRESSURE: 179 MMHG | BODY MASS INDEX: 31.69 KG/M2 | HEIGHT: 72 IN | HEART RATE: 69 BPM | WEIGHT: 234 LBS | TEMPERATURE: 98.4 F

## 2024-12-18 DIAGNOSIS — R52 PAIN MANAGEMENT: Primary | ICD-10-CM

## 2024-12-18 LAB
ALBUMIN SERPL-MCNC: 2.8 G/DL (ref 3.2–4.6)
ALBUMIN/GLOB SERPL: 0.5 (ref 1–1.9)
ALP SERPL-CCNC: 65 U/L (ref 40–129)
ALT SERPL-CCNC: 16 U/L (ref 8–55)
ANION GAP SERPL CALC-SCNC: 10 MMOL/L (ref 7–16)
AST SERPL-CCNC: 22 U/L (ref 15–37)
BASOPHILS # BLD: 0.1 K/UL (ref 0–0.2)
BASOPHILS NFR BLD: 1 % (ref 0–2)
BILIRUB SERPL-MCNC: 0.4 MG/DL (ref 0–1.2)
BUN SERPL-MCNC: 9 MG/DL (ref 8–23)
CALCIUM SERPL-MCNC: 9.1 MG/DL (ref 8.8–10.2)
CHLORIDE SERPL-SCNC: 103 MMOL/L (ref 98–107)
CO2 SERPL-SCNC: 25 MMOL/L (ref 20–29)
CREAT SERPL-MCNC: 0.68 MG/DL (ref 0.8–1.3)
DIFFERENTIAL METHOD BLD: ABNORMAL
EOSINOPHIL # BLD: 0.4 K/UL (ref 0–0.8)
EOSINOPHIL NFR BLD: 5 % (ref 0.5–7.8)
ERYTHROCYTE [DISTWIDTH] IN BLOOD BY AUTOMATED COUNT: 18.9 % (ref 11.9–14.6)
GLOBULIN SER CALC-MCNC: 5.5 G/DL (ref 2.3–3.5)
GLUCOSE SERPL-MCNC: 95 MG/DL (ref 70–99)
HCT VFR BLD AUTO: 29 % (ref 41.1–50.3)
HGB BLD-MCNC: 8.8 G/DL (ref 13.6–17.2)
IMM GRANULOCYTES # BLD AUTO: 0 K/UL (ref 0–0.5)
IMM GRANULOCYTES NFR BLD AUTO: 0 % (ref 0–5)
LACTATE SERPL-SCNC: 1.2 MMOL/L (ref 0.5–2)
LYMPHOCYTES # BLD: 1.7 K/UL (ref 0.5–4.6)
LYMPHOCYTES NFR BLD: 24 % (ref 13–44)
MCH RBC QN AUTO: 28.3 PG (ref 26.1–32.9)
MCHC RBC AUTO-ENTMCNC: 30.3 G/DL (ref 31.4–35)
MCV RBC AUTO: 93.2 FL (ref 82–102)
MONOCYTES # BLD: 0.4 K/UL (ref 0.1–1.3)
MONOCYTES NFR BLD: 6 % (ref 4–12)
NEUTS SEG # BLD: 4.5 K/UL (ref 1.7–8.2)
NEUTS SEG NFR BLD: 64 % (ref 43–78)
NRBC # BLD: 0 K/UL (ref 0–0.2)
PLATELET # BLD AUTO: 225 K/UL (ref 150–450)
PMV BLD AUTO: 8.9 FL (ref 9.4–12.3)
POTASSIUM SERPL-SCNC: 4.5 MMOL/L (ref 3.5–5.1)
PROT SERPL-MCNC: 8.2 G/DL (ref 6.3–8.2)
RBC # BLD AUTO: 3.11 M/UL (ref 4.23–5.6)
SODIUM SERPL-SCNC: 138 MMOL/L (ref 136–145)
WBC # BLD AUTO: 7.1 K/UL (ref 4.3–11.1)

## 2024-12-18 PROCEDURE — 36415 COLL VENOUS BLD VENIPUNCTURE: CPT

## 2024-12-18 PROCEDURE — 80053 COMPREHEN METABOLIC PANEL: CPT

## 2024-12-18 PROCEDURE — 6370000000 HC RX 637 (ALT 250 FOR IP)

## 2024-12-18 PROCEDURE — 99283 EMERGENCY DEPT VISIT LOW MDM: CPT

## 2024-12-18 PROCEDURE — 83605 ASSAY OF LACTIC ACID: CPT

## 2024-12-18 PROCEDURE — 85025 COMPLETE CBC W/AUTO DIFF WBC: CPT

## 2024-12-18 RX ORDER — LISINOPRIL AND HYDROCHLOROTHIAZIDE 20; 25 MG/1; MG/1
1 TABLET ORAL DAILY
Status: DISCONTINUED | OUTPATIENT
Start: 2024-12-18 | End: 2024-12-18 | Stop reason: SDUPTHER

## 2024-12-18 RX ORDER — LISINOPRIL 20 MG/1
20 TABLET ORAL ONCE
Status: COMPLETED | OUTPATIENT
Start: 2024-12-18 | End: 2024-12-18

## 2024-12-18 RX ORDER — HYDROCHLOROTHIAZIDE 25 MG/1
25 TABLET ORAL ONCE
Status: COMPLETED | OUTPATIENT
Start: 2024-12-18 | End: 2024-12-18

## 2024-12-18 RX ORDER — LISINOPRIL 10 MG/1
20 TABLET ORAL 2 TIMES DAILY
Qty: 30 TABLET | Refills: 5 | Status: SHIPPED | OUTPATIENT
Start: 2024-12-18

## 2024-12-18 RX ORDER — HYDROCODONE BITARTRATE AND ACETAMINOPHEN 5; 325 MG/1; MG/1
1 TABLET ORAL
Status: COMPLETED | OUTPATIENT
Start: 2024-12-18 | End: 2024-12-18

## 2024-12-18 RX ADMIN — HYDROCODONE BITARTRATE AND ACETAMINOPHEN 1 TABLET: 5; 325 TABLET ORAL at 14:15

## 2024-12-18 RX ADMIN — LISINOPRIL 20 MG: 20 TABLET ORAL at 14:15

## 2024-12-18 RX ADMIN — HYDROCHLOROTHIAZIDE 25 MG: 25 TABLET ORAL at 14:13

## 2024-12-18 ASSESSMENT — PAIN - FUNCTIONAL ASSESSMENT: PAIN_FUNCTIONAL_ASSESSMENT: 0-10

## 2024-12-18 ASSESSMENT — PAIN SCALES - GENERAL
PAINLEVEL_OUTOF10: 10
PAINLEVEL_OUTOF10: 10

## 2024-12-18 ASSESSMENT — PAIN DESCRIPTION - LOCATION
LOCATION: HIP;LEG
LOCATION: LEG

## 2024-12-18 ASSESSMENT — PAIN DESCRIPTION - ORIENTATION: ORIENTATION: RIGHT;LEFT

## 2024-12-18 ASSESSMENT — ENCOUNTER SYMPTOMS
CHEST TIGHTNESS: 0
STRIDOR: 0
SHORTNESS OF BREATH: 0

## 2024-12-18 NOTE — ED PROVIDER NOTES
Emergency Department Provider Note       PCP: Unknown, Provider   Age: 69 y.o.   Sex: male     DISPOSITION Decision To Discharge 12/18/2024 02:52:14 PM    ICD-10-CM    1. Pain management  R52           Medical Decision Making     Patient is a 69-year-old male presenting for refill of pain medication. Patient was recently discharged from hospital on 12/4 for cellulitis. Patient returned to ED on 12/11 for anemia and was then discharged on 12/13. Patient returned to ED on 12/15 for leg swelling and chest pain, no abnormalities found and patient was instructed to continue antibiotic for cellulitis and follow up with PCP. Patient's prescription for pain medication ran out today prompting him to come to ED. Will obtain basic lab work to ensure that patient's cellulitis has not worsened. Will provide a dose of hydrocodone while in ER. Blood pressure measured at 174/78 in the ED, he denies taking dose of lisinopril today, will provide med. [facility] note from [facility], patient has been non-compliant with blood pressure medication. She asked that patient be referred to pain management.     All labs without acute abnormalities. There are no signs of worsening infection or anemia. Patient denies any increased leg swelling, chest pain or shortness of breath. Have instructed patient to follow up with PCP, have given patient a new referral to a PCP closer to him. Have referred patient to pain management. Patient's blood pressure remains elevated, he is asymptomatic with no abnormalities on exam. He states that he has ran out of his Lisinopril will provide new prescription. Patient has at home wound care. Discussed plan with patient, he verbalized understanding. Patient provided with return precautions.      1 acute, uncomplicated illness or injury.  Prescription drug management performed.  Shared medical decision making was utilized in creating the patients health plan today.  I independently ordered and reviewed each

## 2024-12-18 NOTE — TELEPHONE ENCOUNTER
Preeti from [facility] ([phone]) called regarding a few things about pt.    Pt. Has not been taking his B.P medicine and they had to call EMS yesterday 12/17/2024 because his B.P was 190/90    Pt. Has only been taking Pantoprazole and Cefadroxil.    Preeti also asked if Meliza can please sign start of care orders for pt.    She also asked if pt. Can be referred to pain management.     Stating he has back pain and pain in his lower extremities. Pt. Has wounds on left leg due to cellulitis. He ran out of his pain medicine and said he can't make it in office for a appointment because of his pain.    They have been wrapping wounds in gauze everyday because pt. Can't have any compression.    Discharge paperwork has been found and social work, occupational and physical therapy are in process.     Thank you

## 2024-12-18 NOTE — ED TRIAGE NOTES
Patient arrives to ED via EMS. Patient was discharged from the hospital on 12/15. Patient was here for cellulitis in both of his legs. Patient was discharged home with 3 days worth of pain medication and now he is out and does not have any more pain medication. Patient reports his doctor won't give him anymore pain medications so he came to ED.

## 2024-12-18 NOTE — ED NOTES
I have reviewed discharge instructions with the patient.  The patient verbalized understanding.    Patient left ED via Discharge Method: wheelchair to Home with family.    Opportunity for questions and clarification provided.       Patient given 1 scripts.         To continue your aftercare when you leave the hospital, you may receive an automated call from our care team to check in on how you are doing.  This is a free service and part of our promise to provide the best care and service to meet your aftercare needs.” If you have questions, or wish to unsubscribe from this service please call [phone].  Thank you for Choosing our [facility] Emergency Department.        Maria E Mancera LPN  12/18/24 8466

## 2024-12-18 NOTE — DISCHARGE INSTRUCTIONS
We would love to help you get a primary care doctor for follow-up after your emergency department visit.    Please call [phone] between 7AM - 6PM Monday to Friday.  A care navigator will be able to assist you with setting up a doctor close to your home.

## 2024-12-20 ENCOUNTER — HOSPITAL ENCOUNTER (EMERGENCY)
Age: 69
Discharge: HOME | End: 2024-12-20
Attending: EMERGENCY MEDICINE
Payer: MEDICARE

## 2024-12-20 ENCOUNTER — TELEPHONE (OUTPATIENT)
Dept: FAMILY MEDICINE CLINIC | Facility: CLINIC | Age: 69
End: 2024-12-20

## 2024-12-20 ENCOUNTER — APPOINTMENT (OUTPATIENT)
Dept: GENERAL RADIOLOGY | Age: 69
End: 2024-12-20
Payer: MEDICARE

## 2024-12-20 VITALS
TEMPERATURE: 98.2 F | SYSTOLIC BLOOD PRESSURE: 176 MMHG | DIASTOLIC BLOOD PRESSURE: 90 MMHG | BODY MASS INDEX: 31.69 KG/M2 | HEIGHT: 72 IN | HEART RATE: 76 BPM | OXYGEN SATURATION: 99 % | WEIGHT: 234 LBS | RESPIRATION RATE: 22 BRPM

## 2024-12-20 DIAGNOSIS — R53.83 OTHER FATIGUE: Primary | ICD-10-CM

## 2024-12-20 LAB
ALBUMIN SERPL-MCNC: 2.7 G/DL (ref 3.2–4.6)
ALBUMIN/GLOB SERPL: 0.5 (ref 1–1.9)
ALP SERPL-CCNC: 58 U/L (ref 40–129)
ALT SERPL-CCNC: 13 U/L (ref 8–55)
ANION GAP SERPL CALC-SCNC: 9 MMOL/L (ref 7–16)
AST SERPL-CCNC: 22 U/L (ref 15–37)
BASOPHILS # BLD: 0.1 K/UL (ref 0–0.2)
BASOPHILS NFR BLD: 1 % (ref 0–2)
BILIRUB SERPL-MCNC: 0.3 MG/DL (ref 0–1.2)
BUN SERPL-MCNC: 11 MG/DL (ref 8–23)
CALCIUM SERPL-MCNC: 8.8 MG/DL (ref 8.8–10.2)
CHLORIDE SERPL-SCNC: 102 MMOL/L (ref 98–107)
CO2 SERPL-SCNC: 23 MMOL/L (ref 20–29)
CREAT SERPL-MCNC: 0.75 MG/DL (ref 0.8–1.3)
DIFFERENTIAL METHOD BLD: ABNORMAL
EOSINOPHIL # BLD: 0.6 K/UL (ref 0–0.8)
EOSINOPHIL NFR BLD: 8 % (ref 0.5–7.8)
ERYTHROCYTE [DISTWIDTH] IN BLOOD BY AUTOMATED COUNT: 18.5 % (ref 11.9–14.6)
FLUAV RNA SPEC QL NAA+PROBE: NOT DETECTED
FLUBV RNA SPEC QL NAA+PROBE: NOT DETECTED
GLOBULIN SER CALC-MCNC: 5.3 G/DL (ref 2.3–3.5)
GLUCOSE SERPL-MCNC: 117 MG/DL (ref 70–99)
HCT VFR BLD AUTO: 28.9 % (ref 41.1–50.3)
HGB BLD-MCNC: 8.7 G/DL (ref 13.6–17.2)
IMM GRANULOCYTES # BLD AUTO: 0 K/UL (ref 0–0.5)
IMM GRANULOCYTES NFR BLD AUTO: 0 % (ref 0–5)
LACTATE SERPL-SCNC: 1.2 MMOL/L (ref 0.5–2)
LYMPHOCYTES # BLD: 1.9 K/UL (ref 0.5–4.6)
LYMPHOCYTES NFR BLD: 25 % (ref 13–44)
MCH RBC QN AUTO: 27.8 PG (ref 26.1–32.9)
MCHC RBC AUTO-ENTMCNC: 30.1 G/DL (ref 31.4–35)
MCV RBC AUTO: 92.3 FL (ref 82–102)
MONOCYTES # BLD: 0.4 K/UL (ref 0.1–1.3)
MONOCYTES NFR BLD: 6 % (ref 4–12)
NEUTS SEG # BLD: 4.5 K/UL (ref 1.7–8.2)
NEUTS SEG NFR BLD: 60 % (ref 43–78)
NRBC # BLD: 0 K/UL (ref 0–0.2)
PLATELET # BLD AUTO: 231 K/UL (ref 150–450)
PMV BLD AUTO: 9.3 FL (ref 9.4–12.3)
POTASSIUM SERPL-SCNC: 4.1 MMOL/L (ref 3.5–5.1)
PROCALCITONIN SERPL-MCNC: 0.04 NG/ML (ref 0–0.1)
PROT SERPL-MCNC: 8 G/DL (ref 6.3–8.2)
RBC # BLD AUTO: 3.13 M/UL (ref 4.23–5.6)
SARS-COV-2 RDRP RESP QL NAA+PROBE: NOT DETECTED
SODIUM SERPL-SCNC: 134 MMOL/L (ref 136–145)
SOURCE: NORMAL
WBC # BLD AUTO: 7.4 K/UL (ref 4.3–11.1)

## 2024-12-20 PROCEDURE — 83605 ASSAY OF LACTIC ACID: CPT

## 2024-12-20 PROCEDURE — 85025 COMPLETE CBC W/AUTO DIFF WBC: CPT

## 2024-12-20 PROCEDURE — 80053 COMPREHEN METABOLIC PANEL: CPT

## 2024-12-20 PROCEDURE — 71046 X-RAY EXAM CHEST 2 VIEWS: CPT

## 2024-12-20 PROCEDURE — 87502 INFLUENZA DNA AMP PROBE: CPT

## 2024-12-20 PROCEDURE — 87635 SARS-COV-2 COVID-19 AMP PRB: CPT

## 2024-12-20 PROCEDURE — 6370000000 HC RX 637 (ALT 250 FOR IP): Performed by: EMERGENCY MEDICINE

## 2024-12-20 PROCEDURE — 84145 PROCALCITONIN (PCT): CPT

## 2024-12-20 PROCEDURE — 99284 EMERGENCY DEPT VISIT MOD MDM: CPT

## 2024-12-20 RX ORDER — TRAMADOL HYDROCHLORIDE 50 MG/1
50 TABLET ORAL
Status: COMPLETED | OUTPATIENT
Start: 2024-12-20 | End: 2024-12-20

## 2024-12-20 RX ADMIN — TRAMADOL HYDROCHLORIDE 50 MG: 50 TABLET ORAL at 15:38

## 2024-12-20 ASSESSMENT — ENCOUNTER SYMPTOMS
COLOR CHANGE: 1
RHINORRHEA: 0
SHORTNESS OF BREATH: 0
COUGH: 0
DIARRHEA: 0
VOMITING: 0
NAUSEA: 0

## 2024-12-20 NOTE — TELEPHONE ENCOUNTER
Angie from [facility] called to let Meliza know that pt. Missed social work visit. There was  no answer.    Order to follow up next week.    Thank you

## 2024-12-20 NOTE — DISCHARGE INSTRUCTIONS
Please return with any fevers, vomiting, difficulty breathing, worsening symptoms, or additional concerns.    As we discussed, I encourage you to wear some compression stockings to help with your leg swelling.    Please follow-up with your primary care doctor for reevaluation.   N/A

## 2024-12-20 NOTE — ED NOTES
Attempted to call daughter cell phone voicemail is not setup will attempt at a later time.      Marcos Chase, RN  12/20/24 6758

## 2024-12-20 NOTE — ED PROVIDER NOTES
Emergency Department Provider Note       PCP: Meliza Martino PA-C   Age: 69 y.o.   Sex: male     DISPOSITION                No diagnosis found.    Medical Decision Making     Given his recent history I will do a sepsis evaluation.  I will check flu and COVID swabs.  I will get a chest x-ray.  ED Course as of 12/20/24 1623   Fri Dec 20, 2024   1621 Patient's blood work and x-ray are unremarkable.  I do not find any reason to admit him to the hospital at this point.  He has been ambulatory in her lobby.  I will discharge him home [AC]      ED Course User Index  [AC] Terry Welch MD     1 acute illness with systemic symptoms.  Shared medical decision making was utilized in creating the patients health plan today.    I independently ordered and reviewed each unique test.       I interpreted the X-rays no obvious infiltrate or effusion.              History     69-year-old gentleman presents with concerns about feeling weak and fatigued as well as having a headache and a mild cough.  He was discharged from our facility on December 13 after admission for a hemoglobin of only 6.6 with concerns about possible sepsis.  He notes prior to that he had been discharged from our facility on December 4 after an evaluation for sepsis related to leg edema and probable cellulitis.    He says since being discharged he felt good for the first couple of days but then began to feel weak and fatigued and then yesterday developed a headache with some congestion and cough today.    He says he is now so weak that he can even get across his house to get to the bathroom.    No other associated symptoms.    Elements of this note were created using speech recognition software.  As such, errors of speech recognition may be present.        ROS     Review of Systems   Constitutional:  Positive for activity change and fatigue. Negative for chills.   HENT:  Positive for congestion. Negative for rhinorrhea.    Respiratory:  Negative for cough

## 2024-12-20 NOTE — TELEPHONE ENCOUNTER
Alfredo Benitez physical therapist from [facility] called about pt today.    Pt's B.P was high     Sittin/80    Leg position: 220/120    They had to call 911    Thank you

## 2024-12-23 ENCOUNTER — TELEPHONE (OUTPATIENT)
Dept: FAMILY MEDICINE CLINIC | Facility: CLINIC | Age: 69
End: 2024-12-23

## 2024-12-23 NOTE — TELEPHONE ENCOUNTER
Nurse called and said that patient blood pressure was 182. He denied experiencing any symptoms typically associated with high pressure, however he is not taking his medication as prescribed. Instead of the full dose of Lisinopril, he is only taking half.

## 2024-12-27 ENCOUNTER — TELEPHONE (OUTPATIENT)
Dept: FAMILY MEDICINE CLINIC | Facility: CLINIC | Age: 69
End: 2024-12-27

## 2024-12-27 NOTE — TELEPHONE ENCOUNTER
I spoke with ART Fuentes, who stated he went over his meds, as well as did the  nurse.  I told him we can not ok the PT orders until he is seen here in office.

## 2024-12-27 NOTE — TELEPHONE ENCOUNTER
Home health care \"Alfredo\"  called and said that saw patient today and want to let you know that his blood pressure was higher than usual 172/80.    Also, they want to do more visits once a week for 8 weeks, and need a verbal approval.

## 2024-12-27 NOTE — TELEPHONE ENCOUNTER
Let them know the patient hasn't been seen since 3/4/2024 and has apt scheduled for 1/8/2024.  He needs to make sure he is taking his medications lisinopril 20mg, amiodarone 200mg , spironolactone 25m 1/2 tablet daily, metoprolol tartrate 50mg bid .    Please review meds and make sure they are taking their meds

## 2025-01-08 ENCOUNTER — TELEMEDICINE (OUTPATIENT)
Dept: FAMILY MEDICINE CLINIC | Facility: CLINIC | Age: 70
End: 2025-01-08

## 2025-01-08 DIAGNOSIS — I10 PRIMARY HYPERTENSION: ICD-10-CM

## 2025-01-08 DIAGNOSIS — L97.929 ULCERS OF BOTH LOWER LEGS (HCC): ICD-10-CM

## 2025-01-08 DIAGNOSIS — D50.9 IRON DEFICIENCY ANEMIA, UNSPECIFIED IRON DEFICIENCY ANEMIA TYPE: ICD-10-CM

## 2025-01-08 DIAGNOSIS — I50.32 CHRONIC HEART FAILURE WITH PRESERVED EJECTION FRACTION (HCC): ICD-10-CM

## 2025-01-08 DIAGNOSIS — I69.354 HEMIPLEGIA OF LEFT NONDOMINANT SIDE AS LATE EFFECT OF CEREBRAL INFARCTION, UNSPECIFIED HEMIPLEGIA TYPE (HCC): ICD-10-CM

## 2025-01-08 DIAGNOSIS — I50.33 ACUTE ON CHRONIC DIASTOLIC CONGESTIVE HEART FAILURE (HCC): ICD-10-CM

## 2025-01-08 DIAGNOSIS — E11.59 TYPE 2 DIABETES MELLITUS WITH OTHER CIRCULATORY COMPLICATION, WITHOUT LONG-TERM CURRENT USE OF INSULIN (HCC): ICD-10-CM

## 2025-01-08 DIAGNOSIS — Z86.73 HISTORY OF STROKE: ICD-10-CM

## 2025-01-08 DIAGNOSIS — I10 ESSENTIAL HYPERTENSION: ICD-10-CM

## 2025-01-08 DIAGNOSIS — F31.9 BIPOLAR AFFECTIVE DISORDER, REMISSION STATUS UNSPECIFIED (HCC): ICD-10-CM

## 2025-01-08 DIAGNOSIS — E11.40 PAINFUL DIABETIC NEUROPATHY (HCC): ICD-10-CM

## 2025-01-08 DIAGNOSIS — J44.9 CHRONIC OBSTRUCTIVE PULMONARY DISEASE, UNSPECIFIED COPD TYPE (HCC): Primary | Chronic | ICD-10-CM

## 2025-01-08 DIAGNOSIS — L97.919 ULCERS OF BOTH LOWER LEGS (HCC): ICD-10-CM

## 2025-01-08 DIAGNOSIS — I48.0 PAROXYSMAL ATRIAL FIBRILLATION (HCC): ICD-10-CM

## 2025-01-08 DIAGNOSIS — D64.9 ANEMIA, UNSPECIFIED TYPE: ICD-10-CM

## 2025-01-08 DIAGNOSIS — E78.5 DYSLIPIDEMIA: Chronic | ICD-10-CM

## 2025-01-08 PROBLEM — S02.32XA CLOSED FRACTURE OF LEFT ORBITAL FLOOR, INITIAL ENCOUNTER: Status: ACTIVE | Noted: 2025-01-08

## 2025-01-08 RX ORDER — ALBUTEROL SULFATE 90 UG/1
2 INHALANT RESPIRATORY (INHALATION) EVERY 6 HOURS PRN
Qty: 18 G | Refills: 11 | Status: SHIPPED | OUTPATIENT
Start: 2025-01-08

## 2025-01-08 RX ORDER — PANTOPRAZOLE SODIUM 40 MG/1
40 TABLET, DELAYED RELEASE ORAL
Qty: 180 TABLET | Refills: 1 | Status: SHIPPED | OUTPATIENT
Start: 2025-01-08

## 2025-01-08 RX ORDER — FLUTICASONE FUROATE, UMECLIDINIUM BROMIDE AND VILANTEROL TRIFENATATE 100; 62.5; 25 UG/1; UG/1; UG/1
1 POWDER RESPIRATORY (INHALATION) DAILY
Qty: 1 EACH | Refills: 11 | Status: SHIPPED | OUTPATIENT
Start: 2025-01-08

## 2025-01-08 RX ORDER — GABAPENTIN 300 MG/1
CAPSULE ORAL
Qty: 360 CAPSULE | Refills: 1 | Status: SHIPPED | OUTPATIENT
Start: 2025-01-08 | End: 2025-07-07

## 2025-01-08 RX ORDER — METOPROLOL TARTRATE 50 MG
50 TABLET ORAL 2 TIMES DAILY
Qty: 60 TABLET | Refills: 5 | Status: SHIPPED | OUTPATIENT
Start: 2025-01-08

## 2025-01-08 RX ORDER — SPIRONOLACTONE 25 MG/1
25 TABLET ORAL DAILY
Qty: 90 TABLET | Refills: 1 | Status: SHIPPED | OUTPATIENT
Start: 2025-01-08

## 2025-01-08 RX ORDER — BUMETANIDE 2 MG/1
2 TABLET ORAL DAILY
Qty: 90 TABLET | Refills: 2 | Status: SHIPPED | OUTPATIENT
Start: 2025-01-08

## 2025-01-08 RX ORDER — ALBUTEROL SULFATE 90 UG/1
2 INHALANT RESPIRATORY (INHALATION) EVERY 6 HOURS PRN
Qty: 18 G | Refills: 0 | Status: SHIPPED | OUTPATIENT
Start: 2025-01-08

## 2025-01-08 RX ORDER — FERROUS SULFATE 325(65) MG
325 TABLET ORAL
Qty: 90 TABLET | Refills: 1 | Status: SHIPPED | OUTPATIENT
Start: 2025-01-08

## 2025-01-08 RX ORDER — LISINOPRIL 20 MG/1
20 TABLET ORAL 2 TIMES DAILY
Qty: 200 TABLET | Refills: 1 | Status: SHIPPED | OUTPATIENT
Start: 2025-01-08

## 2025-01-08 RX ORDER — EZETIMIBE 10 MG/1
10 TABLET ORAL NIGHTLY
Qty: 30 TABLET | Refills: 1 | Status: SHIPPED | OUTPATIENT
Start: 2025-01-08

## 2025-01-08 RX ORDER — PRAVASTATIN SODIUM 40 MG
40 TABLET ORAL
COMMUNITY
End: 2025-01-08 | Stop reason: SDUPTHER

## 2025-01-08 RX ORDER — PRAVASTATIN SODIUM 40 MG
40 TABLET ORAL
Qty: 90 TABLET | Refills: 1 | Status: SHIPPED | OUTPATIENT
Start: 2025-01-08

## 2025-01-08 NOTE — PROGRESS NOTES
Topical PRN Frommel, Kimberly, SONY - CNP             Current Problem List:   Patient Active Problem List   Diagnosis    Bipolar I disorder with vandana (HCC)    Type 2 diabetes mellitus (HCC)    Anxiety    Dyslipidemia    Hypertensive cardiovascular disease    Migraine    Left leg weakness    MRI contraindicated due to metal implant    Weakness of left arm    HTN (hypertension)    Transient ischemic attack    S/P PTCA (percutaneous transluminal coronary angioplasty)    Unintentional weight loss    GERD (gastroesophageal reflux disease)    Painful diabetic neuropathy (HCC)    COPD (chronic obstructive pulmonary disease) (HCC)    Coronary atherosclerosis    Ulcers of both lower legs (HCC)    Adenomatous polyp of colon    Bipolar disorder (HCC)    Acute on chronic diastolic congestive heart failure (HCC)    Chronic lumbar pain    Folate deficiency    Disease of basal ganglia    Spondylosis of lumbar region without myelopathy or radiculopathy    Mild cognitive impairment    Presbyesophagus    Primary insomnia    Chronic venous hypertension (idiopathic) with other complications of bilateral lower extremity    Sebaceous cyst    Pain and swelling of right lower extremity    Paroxysmal atrial fibrillation (HCC)    Accelerating angina (HCC)    New onset atrial fibrillation (HCC)    Atelectasis    Status post coronary artery bypass graft    S/P AVR (aortic valve replacement)    Coronary artery disease of native artery of native heart with stable angina pectoris (HCC)    Hyponatremia    SBO (small bowel obstruction) (HCC)    Cellulitis of anterior lower leg    Suspected cerebrovascular accident (CVA)    CVA (cerebral vascular accident) (HCC)    CVA (cerebrovascular accident due to intracerebral hemorrhage) (HCC)    Peripheral vascular disease (HCC)    Hemiplegia of left nondominant side as late effect of cerebral infarction, unspecified hemiplegia type (HCC)    Peripheral edema    Lactic acidosis    Diverticulitis    Bilateral

## 2025-01-12 ENCOUNTER — HOSPITAL ENCOUNTER (INPATIENT)
Age: 70
LOS: 5 days | Discharge: HOME HEALTH | End: 2025-01-18
Attending: EMERGENCY MEDICINE | Admitting: SURGERY
Payer: MEDICAID

## 2025-01-12 ENCOUNTER — APPOINTMENT (OUTPATIENT)
Dept: CT IMAGING | Age: 70
End: 2025-01-12
Payer: MEDICAID

## 2025-01-12 DIAGNOSIS — R11.2 NAUSEA AND VOMITING, UNSPECIFIED VOMITING TYPE: ICD-10-CM

## 2025-01-12 DIAGNOSIS — K56.609 SMALL BOWEL OBSTRUCTION (HCC): ICD-10-CM

## 2025-01-12 DIAGNOSIS — R10.9 ACUTE ABDOMINAL PAIN: Primary | ICD-10-CM

## 2025-01-12 LAB
ALBUMIN SERPL-MCNC: 2.5 G/DL (ref 3.2–4.6)
ALBUMIN/GLOB SERPL: 0.8 (ref 1–1.9)
ALP SERPL-CCNC: 33 U/L (ref 40–129)
ALT SERPL-CCNC: 10 U/L (ref 8–55)
ANION GAP SERPL CALC-SCNC: 10 MMOL/L (ref 7–16)
AST SERPL-CCNC: 28 U/L (ref 15–37)
BASOPHILS # BLD: 0.04 K/UL (ref 0–0.2)
BASOPHILS NFR BLD: 0.2 % (ref 0–2)
BILIRUB SERPL-MCNC: 0.2 MG/DL (ref 0–1.2)
BILIRUB UR QL: NEGATIVE
BUN SERPL-MCNC: 25 MG/DL (ref 8–23)
CALCIUM SERPL-MCNC: 6.5 MG/DL (ref 8.8–10.2)
CHLORIDE SERPL-SCNC: 108 MMOL/L (ref 98–107)
CO2 SERPL-SCNC: 20 MMOL/L (ref 20–29)
CREAT SERPL-MCNC: 0.8 MG/DL (ref 0.8–1.3)
DIFFERENTIAL METHOD BLD: ABNORMAL
EOSINOPHIL # BLD: 0.02 K/UL (ref 0–0.8)
EOSINOPHIL NFR BLD: 0.1 % (ref 0.5–7.8)
ERYTHROCYTE [DISTWIDTH] IN BLOOD BY AUTOMATED COUNT: 19.2 % (ref 11.9–14.6)
GLOBULIN SER CALC-MCNC: 3.2 G/DL (ref 2.3–3.5)
GLUCOSE SERPL-MCNC: 128 MG/DL (ref 70–99)
GLUCOSE UR QL STRIP.AUTO: NEGATIVE MG/DL
HCT VFR BLD AUTO: 39.8 % (ref 41.1–50.3)
HGB BLD-MCNC: 12.1 G/DL (ref 13.6–17.2)
IMM GRANULOCYTES # BLD AUTO: 0.1 K/UL (ref 0–0.5)
IMM GRANULOCYTES NFR BLD AUTO: 0.5 % (ref 0–5)
KETONES UR-MCNC: NEGATIVE MG/DL
LEUKOCYTE ESTERASE UR QL STRIP: ABNORMAL
LIPASE SERPL-CCNC: 61 U/L (ref 13–60)
LYMPHOCYTES # BLD: 1.73 K/UL (ref 0.5–4.6)
LYMPHOCYTES NFR BLD: 9.3 % (ref 13–44)
MCH RBC QN AUTO: 25.4 PG (ref 26.1–32.9)
MCHC RBC AUTO-ENTMCNC: 30.4 G/DL (ref 31.4–35)
MCV RBC AUTO: 83.4 FL (ref 82–102)
MONOCYTES # BLD: 0.9 K/UL (ref 0.1–1.3)
MONOCYTES NFR BLD: 4.8 % (ref 4–12)
NEUTS SEG # BLD: 15.8 K/UL (ref 1.7–8.2)
NEUTS SEG NFR BLD: 85.1 % (ref 43–78)
NITRITE UR QL: NEGATIVE
NRBC # BLD: 0 K/UL (ref 0–0.2)
PH UR: 5 (ref 5–9)
PLATELET # BLD AUTO: 201 K/UL (ref 150–450)
PMV BLD AUTO: 11 FL (ref 9.4–12.3)
POTASSIUM SERPL-SCNC: 4.1 MMOL/L (ref 3.5–5.1)
PROT SERPL-MCNC: 5.7 G/DL (ref 6.3–8.2)
PROT UR QL: NEGATIVE MG/DL
RBC # BLD AUTO: 4.77 M/UL (ref 4.23–5.6)
RBC # UR STRIP: NEGATIVE
SERVICE CMNT-IMP: ABNORMAL
SODIUM SERPL-SCNC: 138 MMOL/L (ref 136–145)
SP GR UR: 1.01 (ref 1–1.02)
UROBILINOGEN UR QL: 0.2 EU/DL (ref 0.2–1)
WBC # BLD AUTO: 18.6 K/UL (ref 4.3–11.1)

## 2025-01-12 PROCEDURE — 99285 EMERGENCY DEPT VISIT HI MDM: CPT

## 2025-01-12 PROCEDURE — 6360000004 HC RX CONTRAST MEDICATION: Performed by: EMERGENCY MEDICINE

## 2025-01-12 PROCEDURE — 6360000002 HC RX W HCPCS: Performed by: EMERGENCY MEDICINE

## 2025-01-12 PROCEDURE — 83690 ASSAY OF LIPASE: CPT

## 2025-01-12 PROCEDURE — 96375 TX/PRO/DX INJ NEW DRUG ADDON: CPT

## 2025-01-12 PROCEDURE — 96374 THER/PROPH/DIAG INJ IV PUSH: CPT

## 2025-01-12 PROCEDURE — 85025 COMPLETE CBC W/AUTO DIFF WBC: CPT

## 2025-01-12 PROCEDURE — 80053 COMPREHEN METABOLIC PANEL: CPT

## 2025-01-12 PROCEDURE — 81003 URINALYSIS AUTO W/O SCOPE: CPT

## 2025-01-12 PROCEDURE — 2500000003 HC RX 250 WO HCPCS: Performed by: EMERGENCY MEDICINE

## 2025-01-12 PROCEDURE — 74177 CT ABD & PELVIS W/CONTRAST: CPT

## 2025-01-12 PROCEDURE — 2580000003 HC RX 258: Performed by: EMERGENCY MEDICINE

## 2025-01-12 RX ORDER — IOPAMIDOL 755 MG/ML
100 INJECTION, SOLUTION INTRAVASCULAR
Status: COMPLETED | OUTPATIENT
Start: 2025-01-12 | End: 2025-01-12

## 2025-01-12 RX ORDER — 0.9 % SODIUM CHLORIDE 0.9 %
500 INTRAVENOUS SOLUTION INTRAVENOUS ONCE
Status: COMPLETED | OUTPATIENT
Start: 2025-01-12 | End: 2025-01-12

## 2025-01-12 RX ORDER — ONDANSETRON 2 MG/ML
4 INJECTION INTRAMUSCULAR; INTRAVENOUS
Status: COMPLETED | OUTPATIENT
Start: 2025-01-12 | End: 2025-01-12

## 2025-01-12 RX ORDER — HYDROMORPHONE HYDROCHLORIDE 1 MG/ML
0.5 INJECTION, SOLUTION INTRAMUSCULAR; INTRAVENOUS; SUBCUTANEOUS
Status: COMPLETED | OUTPATIENT
Start: 2025-01-12 | End: 2025-01-12

## 2025-01-12 RX ADMIN — IOPAMIDOL 100 ML: 755 INJECTION, SOLUTION INTRAVENOUS at 22:45

## 2025-01-12 RX ADMIN — SODIUM CHLORIDE 500 ML: 9 INJECTION, SOLUTION INTRAVENOUS at 21:58

## 2025-01-12 RX ADMIN — ONDANSETRON 4 MG: 2 INJECTION INTRAMUSCULAR; INTRAVENOUS at 22:13

## 2025-01-12 RX ADMIN — HYDROMORPHONE HYDROCHLORIDE 0.5 MG: 1 INJECTION, SOLUTION INTRAMUSCULAR; INTRAVENOUS; SUBCUTANEOUS at 22:13

## 2025-01-12 ASSESSMENT — PAIN DESCRIPTION - DESCRIPTORS
DESCRIPTORS: ACHING
DESCRIPTORS: ACHING

## 2025-01-12 ASSESSMENT — PAIN SCALES - GENERAL
PAINLEVEL_OUTOF10: 6
PAINLEVEL_OUTOF10: 6

## 2025-01-12 ASSESSMENT — PAIN DESCRIPTION - LOCATION
LOCATION: ABDOMEN
LOCATION: ABDOMEN

## 2025-01-12 ASSESSMENT — PAIN - FUNCTIONAL ASSESSMENT: PAIN_FUNCTIONAL_ASSESSMENT: 0-10

## 2025-01-13 PROBLEM — S02.32XA CLOSED FRACTURE OF LEFT ORBITAL FLOOR, INITIAL ENCOUNTER: Status: RESOLVED | Noted: 2025-01-08 | Resolved: 2025-01-13

## 2025-01-13 LAB
GLUCOSE BLD STRIP.AUTO-MCNC: 107 MG/DL (ref 65–100)
GLUCOSE BLD STRIP.AUTO-MCNC: 138 MG/DL (ref 65–100)
LACTATE SERPL-SCNC: 1.9 MMOL/L (ref 0.5–2)
LACTATE SERPL-SCNC: 2.5 MMOL/L (ref 0.5–2)
SERVICE CMNT-IMP: ABNORMAL
SERVICE CMNT-IMP: ABNORMAL

## 2025-01-13 PROCEDURE — 2580000003 HC RX 258: Performed by: NURSE PRACTITIONER

## 2025-01-13 PROCEDURE — 82962 GLUCOSE BLOOD TEST: CPT

## 2025-01-13 PROCEDURE — 2500000003 HC RX 250 WO HCPCS: Performed by: NURSE PRACTITIONER

## 2025-01-13 PROCEDURE — 6360000002 HC RX W HCPCS: Performed by: NURSE PRACTITIONER

## 2025-01-13 PROCEDURE — 1100000000 HC RM PRIVATE

## 2025-01-13 PROCEDURE — 36415 COLL VENOUS BLD VENIPUNCTURE: CPT

## 2025-01-13 PROCEDURE — 83605 ASSAY OF LACTIC ACID: CPT

## 2025-01-13 RX ORDER — HYDROMORPHONE HYDROCHLORIDE 1 MG/ML
1 INJECTION, SOLUTION INTRAMUSCULAR; INTRAVENOUS; SUBCUTANEOUS EVERY 4 HOURS PRN
Status: DISCONTINUED | OUTPATIENT
Start: 2025-01-13 | End: 2025-01-18 | Stop reason: HOSPADM

## 2025-01-13 RX ORDER — ACETAMINOPHEN 650 MG/1
650 SUPPOSITORY RECTAL EVERY 6 HOURS PRN
Status: DISCONTINUED | OUTPATIENT
Start: 2025-01-13 | End: 2025-01-18 | Stop reason: HOSPADM

## 2025-01-13 RX ORDER — SODIUM CHLORIDE 9 MG/ML
INJECTION, SOLUTION INTRAVENOUS CONTINUOUS
Status: DISCONTINUED | OUTPATIENT
Start: 2025-01-13 | End: 2025-01-17

## 2025-01-13 RX ORDER — ACETAMINOPHEN 325 MG/1
650 TABLET ORAL EVERY 6 HOURS PRN
Status: DISCONTINUED | OUTPATIENT
Start: 2025-01-13 | End: 2025-01-18 | Stop reason: HOSPADM

## 2025-01-13 RX ORDER — ONDANSETRON 2 MG/ML
4 INJECTION INTRAMUSCULAR; INTRAVENOUS EVERY 6 HOURS PRN
Status: DISCONTINUED | OUTPATIENT
Start: 2025-01-13 | End: 2025-01-18 | Stop reason: HOSPADM

## 2025-01-13 RX ORDER — MAGNESIUM SULFATE IN WATER 40 MG/ML
2000 INJECTION, SOLUTION INTRAVENOUS PRN
Status: DISCONTINUED | OUTPATIENT
Start: 2025-01-13 | End: 2025-01-18 | Stop reason: HOSPADM

## 2025-01-13 RX ORDER — SODIUM CHLORIDE 0.9 % (FLUSH) 0.9 %
5-40 SYRINGE (ML) INJECTION PRN
Status: DISCONTINUED | OUTPATIENT
Start: 2025-01-13 | End: 2025-01-18 | Stop reason: HOSPADM

## 2025-01-13 RX ORDER — HYDROMORPHONE HYDROCHLORIDE 1 MG/ML
0.5 INJECTION, SOLUTION INTRAMUSCULAR; INTRAVENOUS; SUBCUTANEOUS
Status: ACTIVE | OUTPATIENT
Start: 2025-01-13 | End: 2025-01-13

## 2025-01-13 RX ORDER — HYDROMORPHONE HYDROCHLORIDE 1 MG/ML
0.5 INJECTION, SOLUTION INTRAMUSCULAR; INTRAVENOUS; SUBCUTANEOUS EVERY 4 HOURS PRN
Status: DISCONTINUED | OUTPATIENT
Start: 2025-01-13 | End: 2025-01-18 | Stop reason: HOSPADM

## 2025-01-13 RX ORDER — POTASSIUM CHLORIDE 7.45 MG/ML
10 INJECTION INTRAVENOUS PRN
Status: DISCONTINUED | OUTPATIENT
Start: 2025-01-13 | End: 2025-01-18 | Stop reason: HOSPADM

## 2025-01-13 RX ORDER — LIDOCAINE HYDROCHLORIDE 20 MG/ML
JELLY TOPICAL PRN
Status: DISCONTINUED | OUTPATIENT
Start: 2025-01-13 | End: 2025-01-18 | Stop reason: HOSPADM

## 2025-01-13 RX ORDER — ENOXAPARIN SODIUM 100 MG/ML
30 INJECTION SUBCUTANEOUS 2 TIMES DAILY
Status: DISCONTINUED | OUTPATIENT
Start: 2025-01-13 | End: 2025-01-18 | Stop reason: HOSPADM

## 2025-01-13 RX ORDER — SODIUM CHLORIDE 9 MG/ML
INJECTION, SOLUTION INTRAVENOUS PRN
Status: DISCONTINUED | OUTPATIENT
Start: 2025-01-13 | End: 2025-01-18 | Stop reason: HOSPADM

## 2025-01-13 RX ORDER — SODIUM CHLORIDE 0.9 % (FLUSH) 0.9 %
5-40 SYRINGE (ML) INJECTION EVERY 12 HOURS SCHEDULED
Status: DISCONTINUED | OUTPATIENT
Start: 2025-01-13 | End: 2025-01-18 | Stop reason: HOSPADM

## 2025-01-13 RX ORDER — ONDANSETRON 4 MG/1
4 TABLET, ORALLY DISINTEGRATING ORAL EVERY 8 HOURS PRN
Status: DISCONTINUED | OUTPATIENT
Start: 2025-01-13 | End: 2025-01-18 | Stop reason: HOSPADM

## 2025-01-13 RX ORDER — POTASSIUM CHLORIDE 1500 MG/1
40 TABLET, EXTENDED RELEASE ORAL PRN
Status: DISCONTINUED | OUTPATIENT
Start: 2025-01-13 | End: 2025-01-18 | Stop reason: HOSPADM

## 2025-01-13 RX ADMIN — ENOXAPARIN SODIUM 30 MG: 100 INJECTION SUBCUTANEOUS at 04:29

## 2025-01-13 RX ADMIN — ENOXAPARIN SODIUM 30 MG: 100 INJECTION SUBCUTANEOUS at 21:50

## 2025-01-13 RX ADMIN — ONDANSETRON 4 MG: 2 INJECTION INTRAMUSCULAR; INTRAVENOUS at 10:33

## 2025-01-13 RX ADMIN — HYDROMORPHONE HYDROCHLORIDE 1 MG: 1 INJECTION, SOLUTION INTRAMUSCULAR; INTRAVENOUS; SUBCUTANEOUS at 15:32

## 2025-01-13 RX ADMIN — HYDROMORPHONE HYDROCHLORIDE 1 MG: 1 INJECTION, SOLUTION INTRAMUSCULAR; INTRAVENOUS; SUBCUTANEOUS at 10:32

## 2025-01-13 RX ADMIN — HYDROMORPHONE HYDROCHLORIDE 1 MG: 1 INJECTION, SOLUTION INTRAMUSCULAR; INTRAVENOUS; SUBCUTANEOUS at 21:59

## 2025-01-13 RX ADMIN — HYDROMORPHONE HYDROCHLORIDE 1 MG: 1 INJECTION, SOLUTION INTRAMUSCULAR; INTRAVENOUS; SUBCUTANEOUS at 02:48

## 2025-01-13 RX ADMIN — FAMOTIDINE 20 MG: 10 INJECTION, SOLUTION INTRAVENOUS at 21:50

## 2025-01-13 RX ADMIN — FAMOTIDINE 20 MG: 10 INJECTION, SOLUTION INTRAVENOUS at 04:18

## 2025-01-13 RX ADMIN — SODIUM CHLORIDE: 9 INJECTION, SOLUTION INTRAVENOUS at 04:29

## 2025-01-13 RX ADMIN — SODIUM CHLORIDE, PRESERVATIVE FREE 10 ML: 5 INJECTION INTRAVENOUS at 10:35

## 2025-01-13 ASSESSMENT — PAIN SCALES - GENERAL
PAINLEVEL_OUTOF10: 0
PAINLEVEL_OUTOF10: 4
PAINLEVEL_OUTOF10: 8
PAINLEVEL_OUTOF10: 9
PAINLEVEL_OUTOF10: 7
PAINLEVEL_OUTOF10: 8

## 2025-01-13 ASSESSMENT — PAIN DESCRIPTION - PAIN TYPE: TYPE: ACUTE PAIN

## 2025-01-13 ASSESSMENT — PAIN DESCRIPTION - LOCATION
LOCATION: ABDOMEN

## 2025-01-13 ASSESSMENT — PAIN - FUNCTIONAL ASSESSMENT
PAIN_FUNCTIONAL_ASSESSMENT: ACTIVITIES ARE NOT PREVENTED

## 2025-01-13 ASSESSMENT — PAIN DESCRIPTION - FREQUENCY: FREQUENCY: INTERMITTENT

## 2025-01-13 ASSESSMENT — PAIN DESCRIPTION - DESCRIPTORS
DESCRIPTORS: STABBING
DESCRIPTORS: ACHING
DESCRIPTORS: SHARP
DESCRIPTORS: ACHING
DESCRIPTORS: ACHING

## 2025-01-13 ASSESSMENT — PAIN DESCRIPTION - ORIENTATION
ORIENTATION: ANTERIOR
ORIENTATION: MID

## 2025-01-13 ASSESSMENT — PAIN DESCRIPTION - ONSET: ONSET: GRADUAL

## 2025-01-13 NOTE — ED NOTES
Called lab to check on status of Latic acid. Tech states they have not received specimen.      Edelmira Arnett, RN  01/13/25 0446

## 2025-01-13 NOTE — ED TRIAGE NOTES
Arrived via [facility], coming from home, C/o Abd pain, N/V, H/o diverticulitis.     EMS: Morphine 4 mg IV, Zofran 4 MG IV  20 ga LAC

## 2025-01-13 NOTE — ED NOTES
Attempted to place NG tube and patient pulled away once tube was inserted in nose and stated \"I can't do this\" patient refusing to allow another attempt but did agree to try again if uro-jet is used for numbing     Edelmira Arnett, RN  01/13/25 8403

## 2025-01-13 NOTE — ED NOTES
Sent a message through icomply to Frommel, NP asking for an order for urojet for the NG tube. Alondra POST attempted to place the NG tube but pt said to stop because it was hurting.      Concha Condon, RN  01/13/25 0327

## 2025-01-13 NOTE — CARE COORDINATION
01/13/25 1024   Service Assessment   Patient Orientation Alert and Oriented   Cognition Alert   History Provided By Patient   Primary Caregiver Self   Support Systems Children;Spouse/Significant Other;Family Members;Friends/Neighbors   PCP Verified by CM Yes   Prior Functional Level Assistance with the following:   Current Functional Level Assistance with the following:   Can patient return to prior living arrangement Unknown at present   Ability to make needs known: Good   Family able to assist with home care needs: Yes   Would you like for me to discuss the discharge plan with any other family members/significant others, and if so, who? Yes   Financial Resources Medicaid;Medicare   Community Resources None   Social/Functional History   Lives With Friend(s);Significant other  (He lives with [facility] and [facility])   Type of Home House   Home Equipment Cane;Walker - Rolling   Receives Help From Friend(s)   Prior Level of Assist for ADLs Needs assistance   Mode of Transportation Family   Discharge Planning   Type of Residence House   Living Arrangements Friends   Current Services Prior To Admission Durable Medical Equipment;Home Care  ([facility])   Potential Assistance Needed Home Care   Potential Assistance Purchasing Medications No   Patient expects to be discharged to: House   Services At/After Discharge   Transition of Care Consult (CM Consult) Discharge Planning   Internal Home Health No   Reason Outside Agency Chosen Patient already serviced by other home care/hospice agency   Services At/After Discharge Home Health   Confirm Follow Up Transport Family   Condition of Participation: Discharge Planning   The Patient and/or Patient Representative was provided with a Choice of Provider? Patient   The Patient and/Or Patient Representative agree with the Discharge Plan? Yes   Freedom of Choice list was provided with basic dialogue that supports the patient's individualized plan of care/goals,

## 2025-01-13 NOTE — ED PROVIDER NOTES
days    LISINOPRIL (PRINIVIL;ZESTRIL) 20 MG TABLET    Take 1 tablet by mouth in the morning and at bedtime    METFORMIN (GLUCOPHAGE) 850 MG TABLET    Take 1 tablet by mouth 2 times daily (with meals)    METOPROLOL TARTRATE (LOPRESSOR) 50 MG TABLET    Take 1 tablet by mouth 2 times daily    PANTOPRAZOLE (PROTONIX) 40 MG TABLET    Take 1 tablet by mouth 2 times daily (before meals)    PRAVASTATIN (PRAVACHOL) 40 MG TABLET    Take 1 tablet by mouth nightly    SPIRONOLACTONE (ALDACTONE) 25 MG TABLET    Take 1 tablet by mouth daily        Results from this emergency department visit:      Results for orders placed or performed during the hospital encounter of 01/12/25   CT ABDOMEN PELVIS W IV CONTRAST Additional Contrast? None    Narrative    EXAM: CT SCAN ABDOMEN AND PELVIS WITH IV CONTRAST    DATE:   1/12/2025 10:54 PM    HISTORY:   Abdominal pain and vomiting.  Concern for bowel obstruction.  History of  diverticulitis    COMPARISON:   2 December 2024 CTA abdomen/pelvis.    TECHNIQUE:   CT examination of the abdomen and pelvis was performed following the intravenous  administration of IV contrast.  CT dose lowering techniques were used, to  include: automated exposure control, adjustment for patient size, and/or use of  iterative reconstruction.   Contrast: 100  ml Isovue 370      FINDINGS:    / Lines and Tubes:    demonstrates air-filled prominent small bowel which measures up to 4 cm.    Lower Chest:   The lung bases demonstrate prominent interstitial lung markings.  Heart measures upper limits of normal.    Free Air:    None.     Liver:    Normal    Gallbladder:   Normal    Common Bile Duct:    Normal    Pancreas:   Normal    Spleen:   Normal    Adrenal Glands:   Right: Normal  Left: Normal    Kidneys:   Right Kidney: Normal.    Right Ureter:  Normal.   Left Kidney: Normal.    Left Ureter: Normal.     GI Tract:  Distal esophagus in FOV: Normal.  Stomach: Fluid-filled stomach. There is a hyperdense suture  at   11:58   pm   EST       Patient: Marc Horton               Electronically signed by Rebekah Joshi                   No results for input(s): \"COVID19\" in the last 72 hours.     Voice dictation software was used during the making of this note.  This software is not perfect and grammatical and other typographical errors may be present.  This note has not been completely proofread for errors.     Jevon Jones MD  01/13/25 0154

## 2025-01-13 NOTE — ED NOTES
TRANSFER - OUT REPORT:    Verbal report given to RN on Marc Horton Sr.  being transferred to  for routine progression of patient care       Report consisted of patient's Situation, Background, Assessment and   Recommendations(SBAR).     Information from the following report(s) Nurse Handoff Report, ED Encounter Summary, ED SBAR, Adult Overview, Intake/Output, MAR, and Recent Results was reviewed with the receiving nurse.    Natalia Fall Assessment:    Presents to emergency department  because of falls (Syncope, seizure, or loss of consciousness): No  Age > 70: Yes  Altered Mental Status, Intoxication with alcohol or substance confusion (Disorientation, impaired judgment, poor safety awaremess, or inability to follow instructions): No  Impaired Mobility: Ambulates or transfers with assistive devices or assistance; Unable to ambulate or transer.: No  Nursing Judgement: No          Lines:   Peripheral IV 01/12/25 Left Antecubital (Active)        Opportunity for questions and clarification was provided.      Patient transported with:  Kelle Deleon RN  01/13/25 0921

## 2025-01-13 NOTE — H&P
Last Year: Never true     Ran Out of Food in the Last Year: Never true   Transportation Needs: No Transportation Needs (2024)    PRAPARE - Transportation     Lack of Transportation (Medical): No     Lack of Transportation (Non-Medical): No   Physical Activity: Inactive (2024)    Received from [employer]    Physical Activity     Days of Exercise per Week: 0     Minutes of Exercise per Session: 0     Total Minutes of Exercise per Week: 0   Stress: No Stress Concern Present (2024)    Received from [employer]    Stress     Feeling of Stress : Not at all   Social Connections: Socially Integrated (2024)    Received from [employer]    Social Connections     Frequency of Communication with Friends and Family: More than three times a week     Frequency of Social Gatherings with Friends and Family: More than three times a week   Intimate Partner Violence: Not At Risk (2024)    Received from [employer]    Intimate Partner Violence     Fear of Current or Ex-Partner: No     Emotionally Abused: No     Physically Abused: No     Sexually Abused: No   Housing Stability: Low Risk  (2024)    Housing Stability Vital Sign     Unable to Pay for Housing in the Last Year: No     Number of Times Moved in the Last Year: 1     Homeless in the Last Year: No     Social History     Tobacco Use   Smoking Status Some Days    Current packs/day: 0.00    Types: Cigars, Cigarettes    Last attempt to quit: 1998    Years since quittin.0   Smokeless Tobacco Never   Tobacco Comments    Quit smoking cigarettes smokes some cigars     No family history on file.  ROS: The patient has no difficulty with chest pain or shortness of breath.  No fever or chills.  Comprehensive review of systems was otherwise unremarkable except as noted above.    Physical Exam:   /68   Pulse 71   Temp 97.6 °F (36.4 °C) (Oral)   Resp 16   Ht 1.829 m (6')   Wt  normal.    Free Air:  None.    Liver:  Normal    Gallbladder:  Normal    Common Bile Duct:  Normal    Pancreas:  Normal    Spleen:  Normal    Adrenal Glands:  Right: Normal  Left: Normal    Kidneys:  Right Kidney: Normal.  Right Ureter:  Normal.  Left Kidney: Normal.  Left Ureter: Normal.    GI Tract:  Distal esophagus in FOV: Normal.  Stomach: Fluid-filled stomach. There is a hyperdense suture material  demonstrated along the greater curvature  Small Bowel: Fluid-filled small bowel measures outside limits of normal. Within  the central abdomen  Appendix: Normal on axial image 62 of series 2, coronal image 55 of series 601  Large Bowel: Moderate retention of stool.    Mesentery/Peritoneum:  Minimal edema is seen within some centralized fluid-filled small bowel.    Vasculature:  Aorta:  Normal.  IVC:  Normal.  Ladan Vein:  Normal.    Retroperitoneum:  Normal    Abdominal/Pelvic Wall:  Bilateral fat filled inguinal hernias  Clips are seen in the right lower quadrant causing streak artifact.    Bladder:  Within normal limits  for the amount of distention.    Reproductive:  Prostate measures upper limits of normal    Musculoskeletal:  Multilevel degenerative disc disease with flowing syndesmophytes.    Free Fluid:  None.    Impression  1. Fluid-filled small bowel measures outside limits of normal within the central  abdomen with some mild edema of the developing closed-loop hernia not entirely  excluded. General surgery consultation recommended.    2. Normal appendix.    3. Scattered diverticula      CRITICAL findings: Perfect Serve  with  Dr. Jevon Jones   in the ED  at   11:58  pm   EST    Patient: Marc Horton          Electronically signed by Rebekah Joshi          Admission date (for inpatients): 1/12/2025   * No surgery found *  * No surgery found *    ASSESSMENT/PLAN:  69 yr old male with abdominal pain for 2 days with nausea/vomiting and CT evidence of SBO vs developing closed loop hernia    Principal

## 2025-01-14 LAB
ANION GAP SERPL CALC-SCNC: 10 MMOL/L (ref 7–16)
BASOPHILS # BLD: 0.04 K/UL (ref 0–0.2)
BASOPHILS NFR BLD: 0.5 % (ref 0–2)
BUN SERPL-MCNC: 30 MG/DL (ref 8–23)
CALCIUM SERPL-MCNC: 8.3 MG/DL (ref 8.8–10.2)
CHLORIDE SERPL-SCNC: 105 MMOL/L (ref 98–107)
CO2 SERPL-SCNC: 24 MMOL/L (ref 20–29)
CREAT SERPL-MCNC: 1.01 MG/DL (ref 0.8–1.3)
DIFFERENTIAL METHOD BLD: ABNORMAL
EOSINOPHIL # BLD: 0.5 K/UL (ref 0–0.8)
EOSINOPHIL NFR BLD: 5.9 % (ref 0.5–7.8)
ERYTHROCYTE [DISTWIDTH] IN BLOOD BY AUTOMATED COUNT: 19.2 % (ref 11.9–14.6)
GLUCOSE SERPL-MCNC: 95 MG/DL (ref 70–99)
HCT VFR BLD AUTO: 32.4 % (ref 41.1–50.3)
HGB BLD-MCNC: 9.9 G/DL (ref 13.6–17.2)
IMM GRANULOCYTES # BLD AUTO: 0.02 K/UL (ref 0–0.5)
IMM GRANULOCYTES NFR BLD AUTO: 0.2 % (ref 0–5)
LYMPHOCYTES # BLD: 2.27 K/UL (ref 0.5–4.6)
LYMPHOCYTES NFR BLD: 26.8 % (ref 13–44)
MCH RBC QN AUTO: 25.7 PG (ref 26.1–32.9)
MCHC RBC AUTO-ENTMCNC: 30.6 G/DL (ref 31.4–35)
MCV RBC AUTO: 84.2 FL (ref 82–102)
MONOCYTES # BLD: 0.57 K/UL (ref 0.1–1.3)
MONOCYTES NFR BLD: 6.7 % (ref 4–12)
NEUTS SEG # BLD: 5.08 K/UL (ref 1.7–8.2)
NEUTS SEG NFR BLD: 59.9 % (ref 43–78)
NRBC # BLD: 0 K/UL (ref 0–0.2)
PLATELET # BLD AUTO: 183 K/UL (ref 150–450)
PMV BLD AUTO: 11.3 FL (ref 9.4–12.3)
POTASSIUM SERPL-SCNC: 5.1 MMOL/L (ref 3.5–5.1)
RBC # BLD AUTO: 3.85 M/UL (ref 4.23–5.6)
SODIUM SERPL-SCNC: 139 MMOL/L (ref 136–145)
WBC # BLD AUTO: 8.5 K/UL (ref 4.3–11.1)

## 2025-01-14 PROCEDURE — 36415 COLL VENOUS BLD VENIPUNCTURE: CPT

## 2025-01-14 PROCEDURE — 6360000002 HC RX W HCPCS: Performed by: NURSE PRACTITIONER

## 2025-01-14 PROCEDURE — 2500000003 HC RX 250 WO HCPCS: Performed by: NURSE PRACTITIONER

## 2025-01-14 PROCEDURE — 1100000000 HC RM PRIVATE

## 2025-01-14 PROCEDURE — 85025 COMPLETE CBC W/AUTO DIFF WBC: CPT

## 2025-01-14 PROCEDURE — 2580000003 HC RX 258: Performed by: NURSE PRACTITIONER

## 2025-01-14 PROCEDURE — 80048 BASIC METABOLIC PNL TOTAL CA: CPT

## 2025-01-14 RX ADMIN — HYDROMORPHONE HYDROCHLORIDE 1 MG: 1 INJECTION, SOLUTION INTRAMUSCULAR; INTRAVENOUS; SUBCUTANEOUS at 17:25

## 2025-01-14 RX ADMIN — SODIUM CHLORIDE, PRESERVATIVE FREE 10 ML: 5 INJECTION INTRAVENOUS at 21:19

## 2025-01-14 RX ADMIN — HYDROMORPHONE HYDROCHLORIDE 1 MG: 1 INJECTION, SOLUTION INTRAMUSCULAR; INTRAVENOUS; SUBCUTANEOUS at 06:56

## 2025-01-14 RX ADMIN — ENOXAPARIN SODIUM 30 MG: 100 INJECTION SUBCUTANEOUS at 10:52

## 2025-01-14 RX ADMIN — ENOXAPARIN SODIUM 30 MG: 100 INJECTION SUBCUTANEOUS at 21:19

## 2025-01-14 RX ADMIN — SODIUM CHLORIDE: 9 INJECTION, SOLUTION INTRAVENOUS at 00:54

## 2025-01-14 RX ADMIN — FAMOTIDINE 20 MG: 10 INJECTION, SOLUTION INTRAVENOUS at 21:18

## 2025-01-14 RX ADMIN — HYDROMORPHONE HYDROCHLORIDE 1 MG: 1 INJECTION, SOLUTION INTRAMUSCULAR; INTRAVENOUS; SUBCUTANEOUS at 10:57

## 2025-01-14 RX ADMIN — SODIUM CHLORIDE, PRESERVATIVE FREE 10 ML: 5 INJECTION INTRAVENOUS at 10:55

## 2025-01-14 RX ADMIN — HYDROMORPHONE HYDROCHLORIDE 1 MG: 1 INJECTION, SOLUTION INTRAMUSCULAR; INTRAVENOUS; SUBCUTANEOUS at 21:19

## 2025-01-14 RX ADMIN — SODIUM CHLORIDE: 9 INJECTION, SOLUTION INTRAVENOUS at 21:26

## 2025-01-14 RX ADMIN — SODIUM CHLORIDE: 9 INJECTION, SOLUTION INTRAVENOUS at 10:51

## 2025-01-14 RX ADMIN — FAMOTIDINE 20 MG: 10 INJECTION, SOLUTION INTRAVENOUS at 10:52

## 2025-01-14 ASSESSMENT — PAIN DESCRIPTION - ORIENTATION
ORIENTATION: MID;LOWER
ORIENTATION: MID;LOWER
ORIENTATION: MID;ANTERIOR
ORIENTATION: LEFT

## 2025-01-14 ASSESSMENT — PAIN DESCRIPTION - DESCRIPTORS
DESCRIPTORS: SHARP
DESCRIPTORS: ACHING
DESCRIPTORS: ACHING;CRAMPING;DISCOMFORT
DESCRIPTORS: ACHING

## 2025-01-14 ASSESSMENT — PAIN SCALES - GENERAL
PAINLEVEL_OUTOF10: 7
PAINLEVEL_OUTOF10: 8
PAINLEVEL_OUTOF10: 0
PAINLEVEL_OUTOF10: 7
PAINLEVEL_OUTOF10: 7
PAINLEVEL_OUTOF10: 0
PAINLEVEL_OUTOF10: 3

## 2025-01-14 ASSESSMENT — PAIN DESCRIPTION - LOCATION
LOCATION: ABDOMEN
LOCATION: ABDOMEN
LOCATION: ABDOMEN;GROIN
LOCATION: ABDOMEN
LOCATION: ABDOMEN

## 2025-01-14 ASSESSMENT — PAIN DESCRIPTION - FREQUENCY: FREQUENCY: INTERMITTENT

## 2025-01-14 ASSESSMENT — PAIN DESCRIPTION - PAIN TYPE: TYPE: ACUTE PAIN

## 2025-01-14 ASSESSMENT — PAIN DESCRIPTION - ONSET: ONSET: GRADUAL
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
## 2025-01-15 LAB
ANION GAP SERPL CALC-SCNC: 9 MMOL/L (ref 7–16)
BASOPHILS # BLD: 0.03 K/UL (ref 0–0.2)
BASOPHILS NFR BLD: 0.5 % (ref 0–2)
BUN SERPL-MCNC: 16 MG/DL (ref 8–23)
CALCIUM SERPL-MCNC: 8.9 MG/DL (ref 8.8–10.2)
CHLORIDE SERPL-SCNC: 107 MMOL/L (ref 98–107)
CO2 SERPL-SCNC: 23 MMOL/L (ref 20–29)
CREAT SERPL-MCNC: 0.79 MG/DL (ref 0.8–1.3)
DIFFERENTIAL METHOD BLD: ABNORMAL
EOSINOPHIL # BLD: 0.33 K/UL (ref 0–0.8)
EOSINOPHIL NFR BLD: 5.1 % (ref 0.5–7.8)
ERYTHROCYTE [DISTWIDTH] IN BLOOD BY AUTOMATED COUNT: 18.5 % (ref 11.9–14.6)
GLUCOSE BLD STRIP.AUTO-MCNC: 85 MG/DL (ref 65–100)
GLUCOSE SERPL-MCNC: 78 MG/DL (ref 70–99)
HCT VFR BLD AUTO: 33.3 % (ref 41.1–50.3)
HGB BLD-MCNC: 10 G/DL (ref 13.6–17.2)
IMM GRANULOCYTES # BLD AUTO: 0.03 K/UL (ref 0–0.5)
IMM GRANULOCYTES NFR BLD AUTO: 0.5 % (ref 0–5)
LYMPHOCYTES # BLD: 2.08 K/UL (ref 0.5–4.6)
LYMPHOCYTES NFR BLD: 32.3 % (ref 13–44)
MCH RBC QN AUTO: 25.4 PG (ref 26.1–32.9)
MCHC RBC AUTO-ENTMCNC: 30 G/DL (ref 31.4–35)
MCV RBC AUTO: 84.7 FL (ref 82–102)
MONOCYTES # BLD: 0.48 K/UL (ref 0.1–1.3)
MONOCYTES NFR BLD: 7.5 % (ref 4–12)
NEUTS SEG # BLD: 3.49 K/UL (ref 1.7–8.2)
NEUTS SEG NFR BLD: 54.1 % (ref 43–78)
NRBC # BLD: 0 K/UL (ref 0–0.2)
PLATELET # BLD AUTO: 157 K/UL (ref 150–450)
PMV BLD AUTO: 10.9 FL (ref 9.4–12.3)
POTASSIUM SERPL-SCNC: 4.5 MMOL/L (ref 3.5–5.1)
RBC # BLD AUTO: 3.93 M/UL (ref 4.23–5.6)
SERVICE CMNT-IMP: NORMAL
SODIUM SERPL-SCNC: 139 MMOL/L (ref 136–145)
WBC # BLD AUTO: 6.4 K/UL (ref 4.3–11.1)

## 2025-01-15 PROCEDURE — 6360000002 HC RX W HCPCS: Performed by: NURSE PRACTITIONER

## 2025-01-15 PROCEDURE — 6370000000 HC RX 637 (ALT 250 FOR IP): Performed by: NURSE PRACTITIONER

## 2025-01-15 PROCEDURE — 82962 GLUCOSE BLOOD TEST: CPT

## 2025-01-15 PROCEDURE — 2580000003 HC RX 258: Performed by: NURSE PRACTITIONER

## 2025-01-15 PROCEDURE — 80048 BASIC METABOLIC PNL TOTAL CA: CPT

## 2025-01-15 PROCEDURE — 2500000003 HC RX 250 WO HCPCS: Performed by: NURSE PRACTITIONER

## 2025-01-15 PROCEDURE — 1100000000 HC RM PRIVATE

## 2025-01-15 PROCEDURE — 85025 COMPLETE CBC W/AUTO DIFF WBC: CPT

## 2025-01-15 PROCEDURE — 36415 COLL VENOUS BLD VENIPUNCTURE: CPT

## 2025-01-15 RX ORDER — AMIODARONE HYDROCHLORIDE 200 MG/1
200 TABLET ORAL 2 TIMES DAILY
Status: DISCONTINUED | OUTPATIENT
Start: 2025-01-15 | End: 2025-01-18 | Stop reason: HOSPADM

## 2025-01-15 RX ORDER — LISINOPRIL 20 MG/1
20 TABLET ORAL 2 TIMES DAILY
Status: DISCONTINUED | OUTPATIENT
Start: 2025-01-15 | End: 2025-01-18 | Stop reason: HOSPADM

## 2025-01-15 RX ADMIN — HYDROMORPHONE HYDROCHLORIDE 1 MG: 1 INJECTION, SOLUTION INTRAMUSCULAR; INTRAVENOUS; SUBCUTANEOUS at 03:16

## 2025-01-15 RX ADMIN — ENOXAPARIN SODIUM 30 MG: 100 INJECTION SUBCUTANEOUS at 10:46

## 2025-01-15 RX ADMIN — HYDROMORPHONE HYDROCHLORIDE 1 MG: 1 INJECTION, SOLUTION INTRAMUSCULAR; INTRAVENOUS; SUBCUTANEOUS at 19:48

## 2025-01-15 RX ADMIN — ONDANSETRON 4 MG: 2 INJECTION INTRAMUSCULAR; INTRAVENOUS at 03:21

## 2025-01-15 RX ADMIN — HYDROMORPHONE HYDROCHLORIDE 1 MG: 1 INJECTION, SOLUTION INTRAMUSCULAR; INTRAVENOUS; SUBCUTANEOUS at 07:11

## 2025-01-15 RX ADMIN — ENOXAPARIN SODIUM 30 MG: 100 INJECTION SUBCUTANEOUS at 19:50

## 2025-01-15 RX ADMIN — ONDANSETRON 4 MG: 2 INJECTION INTRAMUSCULAR; INTRAVENOUS at 15:51

## 2025-01-15 RX ADMIN — AMIODARONE HYDROCHLORIDE 200 MG: 200 TABLET ORAL at 19:50

## 2025-01-15 RX ADMIN — HYDROMORPHONE HYDROCHLORIDE 1 MG: 1 INJECTION, SOLUTION INTRAMUSCULAR; INTRAVENOUS; SUBCUTANEOUS at 13:02

## 2025-01-15 RX ADMIN — AMIODARONE HYDROCHLORIDE 200 MG: 200 TABLET ORAL at 10:47

## 2025-01-15 RX ADMIN — FAMOTIDINE 20 MG: 10 INJECTION, SOLUTION INTRAVENOUS at 19:48

## 2025-01-15 RX ADMIN — LISINOPRIL 20 MG: 20 TABLET ORAL at 19:50

## 2025-01-15 RX ADMIN — SODIUM CHLORIDE: 9 INJECTION, SOLUTION INTRAVENOUS at 07:15

## 2025-01-15 RX ADMIN — SODIUM CHLORIDE, PRESERVATIVE FREE 5 ML: 5 INJECTION INTRAVENOUS at 10:47

## 2025-01-15 RX ADMIN — FAMOTIDINE 20 MG: 10 INJECTION, SOLUTION INTRAVENOUS at 10:46

## 2025-01-15 RX ADMIN — LISINOPRIL 20 MG: 20 TABLET ORAL at 10:48

## 2025-01-15 RX ADMIN — SODIUM CHLORIDE, PRESERVATIVE FREE 10 ML: 5 INJECTION INTRAVENOUS at 19:48

## 2025-01-15 ASSESSMENT — PAIN DESCRIPTION - DESCRIPTORS
DESCRIPTORS: ACHING
DESCRIPTORS: DISCOMFORT

## 2025-01-15 ASSESSMENT — PAIN SCALES - GENERAL
PAINLEVEL_OUTOF10: 7
PAINLEVEL_OUTOF10: 3
PAINLEVEL_OUTOF10: 2
PAINLEVEL_OUTOF10: 0
PAINLEVEL_OUTOF10: 0
PAINLEVEL_OUTOF10: 7

## 2025-01-15 ASSESSMENT — PAIN DESCRIPTION - ORIENTATION
ORIENTATION: MID

## 2025-01-15 ASSESSMENT — PAIN DESCRIPTION - LOCATION
LOCATION: ABDOMEN

## 2025-01-15 ASSESSMENT — PAIN - FUNCTIONAL ASSESSMENT
PAIN_FUNCTIONAL_ASSESSMENT: PREVENTS OR INTERFERES SOME ACTIVE ACTIVITIES AND ADLS

## 2025-01-15 ASSESSMENT — PAIN DESCRIPTION - FREQUENCY
FREQUENCY: INTERMITTENT

## 2025-01-15 ASSESSMENT — PAIN DESCRIPTION - ONSET
ONSET: GRADUAL

## 2025-01-15 ASSESSMENT — PAIN DESCRIPTION - PAIN TYPE
TYPE: ACUTE PAIN

## 2025-01-15 NOTE — PLAN OF CARE
Problem: Chronic Conditions and Co-morbidities  Goal: Patient's chronic conditions and co-morbidity symptoms are monitored and maintained or improved  Outcome: Progressing     Problem: Discharge Planning  Goal: Discharge to home or other facility with appropriate resources  Outcome: Progressing     Problem: Pain  Goal: Verbalizes/displays adequate comfort level or baseline comfort level  Outcome: Progressing     Problem: Safety - Adult  Goal: Free from fall injury  Outcome: Progressing     Problem: Skin/Tissue Integrity - Adult  Goal: Skin integrity remains intact  Outcome: Progressing  Goal: Oral mucous membranes remain intact  Outcome: Progressing     Problem: Musculoskeletal - Adult  Goal: Return mobility to safest level of function  Outcome: Progressing  Goal: Return ADL status to a safe level of function  Outcome: Progressing     Problem: Gastrointestinal - Adult  Goal: Minimal or absence of nausea and vomiting  Outcome: Progressing  Goal: Maintains or returns to baseline bowel function  Outcome: Progressing  Goal: Maintains adequate nutritional intake  Outcome: Progressing

## 2025-01-16 ENCOUNTER — APPOINTMENT (OUTPATIENT)
Dept: CT IMAGING | Age: 70
End: 2025-01-16
Payer: MEDICAID

## 2025-01-16 LAB
ANION GAP SERPL CALC-SCNC: 11 MMOL/L (ref 7–16)
BASOPHILS # BLD: 0.03 K/UL (ref 0–0.2)
BASOPHILS NFR BLD: 0.4 % (ref 0–2)
BUN SERPL-MCNC: 10 MG/DL (ref 8–23)
CALCIUM SERPL-MCNC: 8.9 MG/DL (ref 8.8–10.2)
CHLORIDE SERPL-SCNC: 105 MMOL/L (ref 98–107)
CO2 SERPL-SCNC: 21 MMOL/L (ref 20–29)
CREAT SERPL-MCNC: 0.67 MG/DL (ref 0.8–1.3)
DIFFERENTIAL METHOD BLD: ABNORMAL
EOSINOPHIL # BLD: 0.25 K/UL (ref 0–0.8)
EOSINOPHIL NFR BLD: 3.6 % (ref 0.5–7.8)
ERYTHROCYTE [DISTWIDTH] IN BLOOD BY AUTOMATED COUNT: 17.8 % (ref 11.9–14.6)
GLUCOSE SERPL-MCNC: 80 MG/DL (ref 70–99)
HCT VFR BLD AUTO: 33.3 % (ref 41.1–50.3)
HGB BLD-MCNC: 10.3 G/DL (ref 13.6–17.2)
IMM GRANULOCYTES # BLD AUTO: 0.02 K/UL (ref 0–0.5)
IMM GRANULOCYTES NFR BLD AUTO: 0.3 % (ref 0–5)
LYMPHOCYTES # BLD: 2 K/UL (ref 0.5–4.6)
LYMPHOCYTES NFR BLD: 28.5 % (ref 13–44)
MCH RBC QN AUTO: 25.7 PG (ref 26.1–32.9)
MCHC RBC AUTO-ENTMCNC: 30.9 G/DL (ref 31.4–35)
MCV RBC AUTO: 83 FL (ref 82–102)
MONOCYTES # BLD: 0.6 K/UL (ref 0.1–1.3)
MONOCYTES NFR BLD: 8.6 % (ref 4–12)
NEUTS SEG # BLD: 4.11 K/UL (ref 1.7–8.2)
NEUTS SEG NFR BLD: 58.6 % (ref 43–78)
NRBC # BLD: 0 K/UL (ref 0–0.2)
PLATELET # BLD AUTO: 126 K/UL (ref 150–450)
PMV BLD AUTO: 10.1 FL (ref 9.4–12.3)
POTASSIUM SERPL-SCNC: 4.4 MMOL/L (ref 3.5–5.1)
RBC # BLD AUTO: 4.01 M/UL (ref 4.23–5.6)
SODIUM SERPL-SCNC: 137 MMOL/L (ref 136–145)
WBC # BLD AUTO: 7 K/UL (ref 4.3–11.1)

## 2025-01-16 PROCEDURE — 2580000003 HC RX 258: Performed by: NURSE PRACTITIONER

## 2025-01-16 PROCEDURE — 6360000004 HC RX CONTRAST MEDICATION: Performed by: NURSE PRACTITIONER

## 2025-01-16 PROCEDURE — 2500000003 HC RX 250 WO HCPCS: Performed by: NURSE PRACTITIONER

## 2025-01-16 PROCEDURE — 80048 BASIC METABOLIC PNL TOTAL CA: CPT

## 2025-01-16 PROCEDURE — 85025 COMPLETE CBC W/AUTO DIFF WBC: CPT

## 2025-01-16 PROCEDURE — 6360000002 HC RX W HCPCS: Performed by: NURSE PRACTITIONER

## 2025-01-16 PROCEDURE — 36415 COLL VENOUS BLD VENIPUNCTURE: CPT

## 2025-01-16 PROCEDURE — 6370000000 HC RX 637 (ALT 250 FOR IP): Performed by: NURSE PRACTITIONER

## 2025-01-16 PROCEDURE — 74177 CT ABD & PELVIS W/CONTRAST: CPT

## 2025-01-16 PROCEDURE — 1100000000 HC RM PRIVATE

## 2025-01-16 RX ORDER — HYDRALAZINE HYDROCHLORIDE 20 MG/ML
5 INJECTION INTRAMUSCULAR; INTRAVENOUS EVERY 4 HOURS PRN
Status: DISCONTINUED | OUTPATIENT
Start: 2025-01-16 | End: 2025-01-18 | Stop reason: HOSPADM

## 2025-01-16 RX ORDER — DIATRIZOATE MEGLUMINE AND DIATRIZOATE SODIUM 660; 100 MG/ML; MG/ML
15 SOLUTION ORAL; RECTAL
Status: DISCONTINUED | OUTPATIENT
Start: 2025-01-16 | End: 2025-01-18 | Stop reason: HOSPADM

## 2025-01-16 RX ORDER — METOPROLOL TARTRATE 25 MG/1
25 TABLET, FILM COATED ORAL 2 TIMES DAILY
Status: DISCONTINUED | OUTPATIENT
Start: 2025-01-16 | End: 2025-01-18 | Stop reason: HOSPADM

## 2025-01-16 RX ORDER — IOPAMIDOL 755 MG/ML
100 INJECTION, SOLUTION INTRAVASCULAR
Status: COMPLETED | OUTPATIENT
Start: 2025-01-16 | End: 2025-01-16

## 2025-01-16 RX ORDER — SPIRONOLACTONE 25 MG/1
25 TABLET ORAL DAILY
Status: DISCONTINUED | OUTPATIENT
Start: 2025-01-16 | End: 2025-01-18 | Stop reason: HOSPADM

## 2025-01-16 RX ADMIN — SODIUM CHLORIDE, PRESERVATIVE FREE 10 ML: 5 INJECTION INTRAVENOUS at 08:30

## 2025-01-16 RX ADMIN — LISINOPRIL 20 MG: 20 TABLET ORAL at 20:56

## 2025-01-16 RX ADMIN — AMIODARONE HYDROCHLORIDE 200 MG: 200 TABLET ORAL at 20:56

## 2025-01-16 RX ADMIN — HYDROMORPHONE HYDROCHLORIDE 1 MG: 1 INJECTION, SOLUTION INTRAMUSCULAR; INTRAVENOUS; SUBCUTANEOUS at 20:57

## 2025-01-16 RX ADMIN — HYDROMORPHONE HYDROCHLORIDE 1 MG: 1 INJECTION, SOLUTION INTRAMUSCULAR; INTRAVENOUS; SUBCUTANEOUS at 01:05

## 2025-01-16 RX ADMIN — HYDRALAZINE HYDROCHLORIDE 5 MG: 20 INJECTION INTRAMUSCULAR; INTRAVENOUS at 16:16

## 2025-01-16 RX ADMIN — SPIRONOLACTONE 25 MG: 25 TABLET ORAL at 08:29

## 2025-01-16 RX ADMIN — AMIODARONE HYDROCHLORIDE 200 MG: 200 TABLET ORAL at 08:29

## 2025-01-16 RX ADMIN — DIATRIZOATE MEGLUMINE AND DIATRIZOATE SODIUM 15 ML: 660; 100 LIQUID ORAL; RECTAL at 17:41

## 2025-01-16 RX ADMIN — SODIUM CHLORIDE: 9 INJECTION, SOLUTION INTRAVENOUS at 20:59

## 2025-01-16 RX ADMIN — ONDANSETRON 4 MG: 2 INJECTION INTRAMUSCULAR; INTRAVENOUS at 16:16

## 2025-01-16 RX ADMIN — IOPAMIDOL 100 ML: 755 INJECTION, SOLUTION INTRAVENOUS at 17:41

## 2025-01-16 RX ADMIN — METOPROLOL TARTRATE 25 MG: 25 TABLET, FILM COATED ORAL at 08:29

## 2025-01-16 RX ADMIN — FAMOTIDINE 20 MG: 10 INJECTION, SOLUTION INTRAVENOUS at 08:30

## 2025-01-16 RX ADMIN — FAMOTIDINE 20 MG: 10 INJECTION, SOLUTION INTRAVENOUS at 20:57

## 2025-01-16 RX ADMIN — ONDANSETRON 4 MG: 4 TABLET, ORALLY DISINTEGRATING ORAL at 20:56

## 2025-01-16 RX ADMIN — SODIUM CHLORIDE, PRESERVATIVE FREE 10 ML: 5 INJECTION INTRAVENOUS at 20:58

## 2025-01-16 RX ADMIN — HYDROMORPHONE HYDROCHLORIDE 1 MG: 1 INJECTION, SOLUTION INTRAMUSCULAR; INTRAVENOUS; SUBCUTANEOUS at 08:30

## 2025-01-16 RX ADMIN — HYDROMORPHONE HYDROCHLORIDE 1 MG: 1 INJECTION, SOLUTION INTRAMUSCULAR; INTRAVENOUS; SUBCUTANEOUS at 16:16

## 2025-01-16 RX ADMIN — LISINOPRIL 20 MG: 20 TABLET ORAL at 08:29

## 2025-01-16 RX ADMIN — SODIUM CHLORIDE: 9 INJECTION, SOLUTION INTRAVENOUS at 00:17

## 2025-01-16 RX ADMIN — METOPROLOL TARTRATE 25 MG: 25 TABLET, FILM COATED ORAL at 20:56

## 2025-01-16 RX ADMIN — ONDANSETRON 4 MG: 2 INJECTION INTRAMUSCULAR; INTRAVENOUS at 01:04

## 2025-01-16 RX ADMIN — ENOXAPARIN SODIUM 30 MG: 100 INJECTION SUBCUTANEOUS at 08:29

## 2025-01-16 RX ADMIN — ONDANSETRON 4 MG: 2 INJECTION INTRAMUSCULAR; INTRAVENOUS at 08:30

## 2025-01-16 RX ADMIN — ENOXAPARIN SODIUM 30 MG: 100 INJECTION SUBCUTANEOUS at 20:58

## 2025-01-16 ASSESSMENT — PAIN SCALES - GENERAL
PAINLEVEL_OUTOF10: 0
PAINLEVEL_OUTOF10: 3
PAINLEVEL_OUTOF10: 8
PAINLEVEL_OUTOF10: 0
PAINLEVEL_OUTOF10: 7
PAINLEVEL_OUTOF10: 8
PAINLEVEL_OUTOF10: 0
PAINLEVEL_OUTOF10: 3
PAINLEVEL_OUTOF10: 8

## 2025-01-16 ASSESSMENT — PAIN DESCRIPTION - ORIENTATION
ORIENTATION: LOWER
ORIENTATION: LOWER
ORIENTATION: MID
ORIENTATION: LEFT;RIGHT;UPPER;LOWER

## 2025-01-16 ASSESSMENT — PAIN DESCRIPTION - LOCATION
LOCATION: ABDOMEN
LOCATION: ABDOMEN
LOCATION: ABDOMEN;BACK
LOCATION: ABDOMEN

## 2025-01-16 ASSESSMENT — PAIN DESCRIPTION - PAIN TYPE
TYPE: ACUTE PAIN

## 2025-01-16 ASSESSMENT — PAIN DESCRIPTION - DESCRIPTORS
DESCRIPTORS: BURNING;SHARP
DESCRIPTORS: ACHING
DESCRIPTORS: ACHING
DESCRIPTORS: BURNING

## 2025-01-16 ASSESSMENT — PAIN DESCRIPTION - FREQUENCY
FREQUENCY: INTERMITTENT

## 2025-01-16 ASSESSMENT — PAIN DESCRIPTION - ONSET
ONSET: GRADUAL

## 2025-01-16 ASSESSMENT — PAIN - FUNCTIONAL ASSESSMENT
PAIN_FUNCTIONAL_ASSESSMENT: PREVENTS OR INTERFERES WITH MANY ACTIVE NOT PASSIVE ACTIVITIES
PAIN_FUNCTIONAL_ASSESSMENT: PREVENTS OR INTERFERES SOME ACTIVE ACTIVITIES AND ADLS
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
## 2025-01-16 NOTE — FLOWSHEET NOTE
01/16/25 0801   Vital Signs   Temp 97.9 °F (36.6 °C)   Temp Source Oral   Pulse 58   Heart Rate Source Monitor   Respirations 17   BP (!) (S)  201/85  (RN Notified)   MAP (Calculated) 124   MAP (mmHg) 117   BP Location Right upper arm   BP Method Automatic   Patient Position Supine   Oxygen Therapy   SpO2 95 %   O2 Device None (Room air)     Gen Surg service aware of BP. Home BP meds resumed. Watching BP. Pt resting in bed, no needs stated, call light within reach.

## 2025-01-16 NOTE — FLOWSHEET NOTE
01/16/25 1548   Vital Signs   Temp 97.5 °F (36.4 °C)   Temp Source Oral   Pulse 55   Heart Rate Source Monitor   Respirations 19   BP (!) (S)  207/79  (NP aware. IV hydralazine given.)   MAP (Calculated) 122   MAP (mmHg) 115   BP Location Right upper arm   Patient Position Supine   Oxygen Therapy   SpO2 98 %   O2 Device None (Room air)     Gen Surg service notified. PRN IV Hydralazine ordered and abd CT ordered. Pt resting in bed, respirations present, no needs stated, call light within reach.

## 2025-01-16 NOTE — PLAN OF CARE
Problem: Chronic Conditions and Co-morbidities  Goal: Patient's chronic conditions and co-morbidity symptoms are monitored and maintained or improved  1/15/2025 2243 by Vicky Irby RN  Outcome: Progressing  1/15/2025 1420 by Apolonia Ernandez RN  Outcome: Progressing     Problem: Discharge Planning  Goal: Discharge to home or other facility with appropriate resources  1/15/2025 2243 by Vicky Irby RN  Outcome: Progressing  1/15/2025 1420 by Apolonia Ernandez RN  Outcome: Progressing     Problem: Pain  Goal: Verbalizes/displays adequate comfort level or baseline comfort level  1/15/2025 2243 by Vicky Irby RN  Outcome: Progressing  1/15/2025 1420 by Apolonia Ernandez RN  Outcome: Progressing     Problem: Safety - Adult  Goal: Free from fall injury  1/15/2025 2243 by Vicky Irby RN  Outcome: Progressing  1/15/2025 1420 by Apolonia Ernandez RN  Outcome: Progressing     Problem: Skin/Tissue Integrity - Adult  Goal: Skin integrity remains intact  1/15/2025 2243 by Vicky Irby RN  Outcome: Progressing  1/15/2025 1420 by Apolonia Ernandez RN  Outcome: Progressing  Goal: Oral mucous membranes remain intact  1/15/2025 2243 by Vicky Irby RN  Outcome: Progressing  1/15/2025 1420 by Apolonia Ernandez RN  Outcome: Progressing     Problem: Musculoskeletal - Adult  Goal: Return mobility to safest level of function  1/15/2025 2243 by Vicky Irby RN  Outcome: Progressing  1/15/2025 1420 by Apolonia Ernandez RN  Outcome: Progressing  Goal: Return ADL status to a safe level of function  1/15/2025 2243 by Vicky Irby RN  Outcome: Progressing  1/15/2025 1420 by Apolonia Ernandez RN  Outcome: Progressing     Problem: Gastrointestinal - Adult  Goal: Minimal or absence of nausea and vomiting  1/15/2025 2243 by Vicky Irby RN  Outcome: Progressing  1/15/2025 1420 by Apolonia Ernandez RN  Outcome: Progressing  Goal: Maintains or returns to baseline bowel function  1/15/2025 2243 by Vicky Irby RN  Outcome: Progressing  1/15/2025 1420 by Oma,

## 2025-01-17 LAB
ANION GAP SERPL CALC-SCNC: 10 MMOL/L (ref 7–16)
BASOPHILS # BLD: 0.03 K/UL (ref 0–0.2)
BASOPHILS NFR BLD: 0.5 % (ref 0–2)
BUN SERPL-MCNC: 8 MG/DL (ref 8–23)
CALCIUM SERPL-MCNC: 9 MG/DL (ref 8.8–10.2)
CHLORIDE SERPL-SCNC: 104 MMOL/L (ref 98–107)
CO2 SERPL-SCNC: 22 MMOL/L (ref 20–29)
CREAT SERPL-MCNC: 0.76 MG/DL (ref 0.8–1.3)
DIFFERENTIAL METHOD BLD: ABNORMAL
EOSINOPHIL # BLD: 0.19 K/UL (ref 0–0.8)
EOSINOPHIL NFR BLD: 3.4 % (ref 0.5–7.8)
ERYTHROCYTE [DISTWIDTH] IN BLOOD BY AUTOMATED COUNT: 18.5 % (ref 11.9–14.6)
GLUCOSE SERPL-MCNC: 102 MG/DL (ref 70–99)
HCT VFR BLD AUTO: 35.2 % (ref 41.1–50.3)
HGB BLD-MCNC: 10.9 G/DL (ref 13.6–17.2)
IMM GRANULOCYTES # BLD AUTO: 0.01 K/UL (ref 0–0.5)
IMM GRANULOCYTES NFR BLD AUTO: 0.2 % (ref 0–5)
LYMPHOCYTES # BLD: 1.74 K/UL (ref 0.5–4.6)
LYMPHOCYTES NFR BLD: 30.7 % (ref 13–44)
MCH RBC QN AUTO: 25.6 PG (ref 26.1–32.9)
MCHC RBC AUTO-ENTMCNC: 31 G/DL (ref 31.4–35)
MCV RBC AUTO: 82.6 FL (ref 82–102)
MONOCYTES # BLD: 0.5 K/UL (ref 0.1–1.3)
MONOCYTES NFR BLD: 8.8 % (ref 4–12)
NEUTS SEG # BLD: 3.2 K/UL (ref 1.7–8.2)
NEUTS SEG NFR BLD: 56.4 % (ref 43–78)
NRBC # BLD: 0 K/UL (ref 0–0.2)
PLATELET # BLD AUTO: 147 K/UL (ref 150–450)
PMV BLD AUTO: 11 FL (ref 9.4–12.3)
POTASSIUM SERPL-SCNC: 4 MMOL/L (ref 3.5–5.1)
RBC # BLD AUTO: 4.26 M/UL (ref 4.23–5.6)
SODIUM SERPL-SCNC: 136 MMOL/L (ref 136–145)
WBC # BLD AUTO: 5.7 K/UL (ref 4.3–11.1)

## 2025-01-17 PROCEDURE — 36415 COLL VENOUS BLD VENIPUNCTURE: CPT

## 2025-01-17 PROCEDURE — 85025 COMPLETE CBC W/AUTO DIFF WBC: CPT

## 2025-01-17 PROCEDURE — 6360000002 HC RX W HCPCS: Performed by: NURSE PRACTITIONER

## 2025-01-17 PROCEDURE — 6370000000 HC RX 637 (ALT 250 FOR IP): Performed by: SURGERY

## 2025-01-17 PROCEDURE — 2500000003 HC RX 250 WO HCPCS: Performed by: NURSE PRACTITIONER

## 2025-01-17 PROCEDURE — 80048 BASIC METABOLIC PNL TOTAL CA: CPT

## 2025-01-17 PROCEDURE — 6370000000 HC RX 637 (ALT 250 FOR IP): Performed by: NURSE PRACTITIONER

## 2025-01-17 PROCEDURE — 1100000000 HC RM PRIVATE

## 2025-01-17 PROCEDURE — 2580000003 HC RX 258: Performed by: NURSE PRACTITIONER

## 2025-01-17 RX ORDER — FAMOTIDINE 20 MG/1
20 TABLET, FILM COATED ORAL 2 TIMES DAILY
Status: DISCONTINUED | OUTPATIENT
Start: 2025-01-17 | End: 2025-01-18 | Stop reason: HOSPADM

## 2025-01-17 RX ORDER — POLYETHYLENE GLYCOL 3350 17 G/17G
17 POWDER, FOR SOLUTION ORAL 2 TIMES DAILY
Status: DISCONTINUED | OUTPATIENT
Start: 2025-01-17 | End: 2025-01-18 | Stop reason: HOSPADM

## 2025-01-17 RX ORDER — OXYCODONE AND ACETAMINOPHEN 5; 325 MG/1; MG/1
1 TABLET ORAL EVERY 4 HOURS PRN
Status: DISCONTINUED | OUTPATIENT
Start: 2025-01-17 | End: 2025-01-18 | Stop reason: HOSPADM

## 2025-01-17 RX ADMIN — HYDROMORPHONE HYDROCHLORIDE 1 MG: 1 INJECTION, SOLUTION INTRAMUSCULAR; INTRAVENOUS; SUBCUTANEOUS at 23:24

## 2025-01-17 RX ADMIN — AMIODARONE HYDROCHLORIDE 200 MG: 200 TABLET ORAL at 08:30

## 2025-01-17 RX ADMIN — HYDROMORPHONE HYDROCHLORIDE 1 MG: 1 INJECTION, SOLUTION INTRAMUSCULAR; INTRAVENOUS; SUBCUTANEOUS at 06:55

## 2025-01-17 RX ADMIN — HYDROMORPHONE HYDROCHLORIDE 1 MG: 1 INJECTION, SOLUTION INTRAMUSCULAR; INTRAVENOUS; SUBCUTANEOUS at 17:20

## 2025-01-17 RX ADMIN — METOPROLOL TARTRATE 25 MG: 25 TABLET, FILM COATED ORAL at 20:45

## 2025-01-17 RX ADMIN — OXYCODONE HYDROCHLORIDE AND ACETAMINOPHEN 1 TABLET: 5; 325 TABLET ORAL at 20:56

## 2025-01-17 RX ADMIN — SPIRONOLACTONE 25 MG: 25 TABLET ORAL at 08:30

## 2025-01-17 RX ADMIN — ENOXAPARIN SODIUM 30 MG: 100 INJECTION SUBCUTANEOUS at 09:00

## 2025-01-17 RX ADMIN — FAMOTIDINE 20 MG: 20 TABLET, FILM COATED ORAL at 20:46

## 2025-01-17 RX ADMIN — SODIUM CHLORIDE, PRESERVATIVE FREE 10 ML: 5 INJECTION INTRAVENOUS at 20:48

## 2025-01-17 RX ADMIN — AMIODARONE HYDROCHLORIDE 200 MG: 200 TABLET ORAL at 20:45

## 2025-01-17 RX ADMIN — FAMOTIDINE 20 MG: 20 TABLET, FILM COATED ORAL at 10:41

## 2025-01-17 RX ADMIN — POLYETHYLENE GLYCOL 3350 17 G: 17 POWDER, FOR SOLUTION ORAL at 10:52

## 2025-01-17 RX ADMIN — HYDROMORPHONE HYDROCHLORIDE 1 MG: 1 INJECTION, SOLUTION INTRAMUSCULAR; INTRAVENOUS; SUBCUTANEOUS at 10:52

## 2025-01-17 RX ADMIN — LISINOPRIL 20 MG: 20 TABLET ORAL at 20:46

## 2025-01-17 RX ADMIN — METOPROLOL TARTRATE 25 MG: 25 TABLET, FILM COATED ORAL at 08:30

## 2025-01-17 RX ADMIN — ENOXAPARIN SODIUM 30 MG: 100 INJECTION SUBCUTANEOUS at 20:46

## 2025-01-17 RX ADMIN — ONDANSETRON 4 MG: 2 INJECTION INTRAMUSCULAR; INTRAVENOUS at 20:46

## 2025-01-17 RX ADMIN — LISINOPRIL 20 MG: 20 TABLET ORAL at 08:30

## 2025-01-17 RX ADMIN — ONDANSETRON 4 MG: 4 TABLET, ORALLY DISINTEGRATING ORAL at 06:54

## 2025-01-17 RX ADMIN — SODIUM CHLORIDE, PRESERVATIVE FREE 10 ML: 5 INJECTION INTRAVENOUS at 08:30

## 2025-01-17 ASSESSMENT — PAIN DESCRIPTION - ONSET
ONSET: GRADUAL
ONSET: ON-GOING
ONSET: GRADUAL
ONSET: ON-GOING
ONSET: ON-GOING
ONSET: PROGRESSIVE

## 2025-01-17 ASSESSMENT — PAIN SCALES - GENERAL
PAINLEVEL_OUTOF10: 8
PAINLEVEL_OUTOF10: 6
PAINLEVEL_OUTOF10: 0
PAINLEVEL_OUTOF10: 9
PAINLEVEL_OUTOF10: 7
PAINLEVEL_OUTOF10: 2
PAINLEVEL_OUTOF10: 0
PAINLEVEL_OUTOF10: 8
PAINLEVEL_OUTOF10: 7

## 2025-01-17 ASSESSMENT — PAIN DESCRIPTION - PAIN TYPE
TYPE: ACUTE PAIN;CHRONIC PAIN
TYPE: ACUTE PAIN;CHRONIC PAIN
TYPE: CHRONIC PAIN
TYPE: ACUTE PAIN;CHRONIC PAIN
TYPE: ACUTE PAIN;CHRONIC PAIN
TYPE: CHRONIC PAIN

## 2025-01-17 ASSESSMENT — PAIN DESCRIPTION - ORIENTATION
ORIENTATION: MID
ORIENTATION: RIGHT;LEFT;LOWER;MID
ORIENTATION: UPPER;LEFT;RIGHT
ORIENTATION: RIGHT;LEFT;MID;LOWER
ORIENTATION: MID
ORIENTATION: RIGHT;LEFT;LOWER;MID
ORIENTATION: RIGHT;LEFT;MID

## 2025-01-17 ASSESSMENT — PAIN DESCRIPTION - FREQUENCY
FREQUENCY: INTERMITTENT
FREQUENCY: CONTINUOUS
FREQUENCY: INTERMITTENT
FREQUENCY: CONTINUOUS

## 2025-01-17 ASSESSMENT — PAIN DESCRIPTION - DESCRIPTORS
DESCRIPTORS: ACHING
DESCRIPTORS: ACHING;CRAMPING
DESCRIPTORS: ACHING;CRAMPING;DISCOMFORT
DESCRIPTORS: ACHING;CRAMPING;DISCOMFORT
DESCRIPTORS: CRAMPING;DISCOMFORT;DULL

## 2025-01-17 ASSESSMENT — PAIN - FUNCTIONAL ASSESSMENT
PAIN_FUNCTIONAL_ASSESSMENT: PREVENTS OR INTERFERES SOME ACTIVE ACTIVITIES AND ADLS

## 2025-01-17 ASSESSMENT — PAIN DESCRIPTION - LOCATION
LOCATION: ABDOMEN;LEG
LOCATION: ABDOMEN;GROIN;LEG
LOCATION: ABDOMEN
LOCATION: ABDOMEN;GROIN;LEG
LOCATION: ABDOMEN
LOCATION: ABDOMEN;GROIN;LEG

## 2025-01-17 ASSESSMENT — PAIN DESCRIPTION - DIRECTION
RADIATING_TOWARDS: DOWN LEGS
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
## 2025-01-17 NOTE — PLAN OF CARE
Problem: Chronic Conditions and Co-morbidities  Goal: Patient's chronic conditions and co-morbidity symptoms are monitored and maintained or improved  1/16/2025 1955 by Ofelia Kaba RN  Outcome: Progressing  1/16/2025 1024 by Alma Rowland RN  Outcome: Progressing  Flowsheets (Taken 1/16/2025 0815)  Care Plan - Patient's Chronic Conditions and Co-Morbidity Symptoms are Monitored and Maintained or Improved: Monitor and assess patient's chronic conditions and comorbid symptoms for stability, deterioration, or improvement     Problem: Discharge Planning  Goal: Discharge to home or other facility with appropriate resources  1/16/2025 1955 by Ofelia Kaba RN  Outcome: Progressing  1/16/2025 1024 by Alma Rowland RN  Outcome: Progressing  Flowsheets (Taken 1/16/2025 0815)  Discharge to home or other facility with appropriate resources: Identify barriers to discharge with patient and caregiver     Problem: Pain  Goal: Verbalizes/displays adequate comfort level or baseline comfort level  1/16/2025 1955 by Ofelia Kaba RN  Outcome: Progressing  1/16/2025 1024 by Alma Rowland RN  Outcome: Progressing     Problem: Safety - Adult  Goal: Free from fall injury  1/16/2025 1955 by Ofelia Kaba RN  Outcome: Progressing  1/16/2025 1024 by Alma Rowland RN  Outcome: Progressing     Problem: Skin/Tissue Integrity - Adult  Goal: Skin integrity remains intact  1/16/2025 1955 by Ofelia Kaba RN  Outcome: Progressing  1/16/2025 1024 by Alma Rowland RN  Outcome: Progressing  Flowsheets (Taken 1/16/2025 0815)  Skin Integrity Remains Intact: Monitor for areas of redness and/or skin breakdown  Goal: Oral mucous membranes remain intact  1/16/2025 1955 by Ofelia Kaba RN  Outcome: Progressing  1/16/2025 1024 by Alma Rowland RN  Outcome: Progressing  Flowsheets (Taken 1/16/2025 0815)  Oral Mucous Membranes Remain

## 2025-01-17 NOTE — CARE COORDINATION
CM reviewed pt chart for continued stay.  Plan remains for pt to return home with resumed [facility] when medically stable.  Will continue to follow pt plan of care and assist further with any supportive care planning as appropriate.

## 2025-01-17 NOTE — PLAN OF CARE
Problem: Chronic Conditions and Co-morbidities  Goal: Patient's chronic conditions and co-morbidity symptoms are monitored and maintained or improved  Outcome: Progressing  Flowsheets (Taken 1/17/2025 0745)  Care Plan - Patient's Chronic Conditions and Co-Morbidity Symptoms are Monitored and Maintained or Improved:   Monitor and assess patient's chronic conditions and comorbid symptoms for stability, deterioration, or improvement   Update acute care plan with appropriate goals if chronic or comorbid symptoms are exacerbated and prevent overall improvement and discharge   Collaborate with multidisciplinary team to address chronic and comorbid conditions and prevent exacerbation or deterioration     Problem: Discharge Planning  Goal: Discharge to home or other facility with appropriate resources  Outcome: Progressing  Flowsheets (Taken 1/17/2025 0745)  Discharge to home or other facility with appropriate resources:   Identify barriers to discharge with patient and caregiver   Arrange for needed discharge resources and transportation as appropriate   Identify discharge learning needs (meds, wound care, etc)     Problem: Pain  Goal: Verbalizes/displays adequate comfort level or baseline comfort level  Outcome: Progressing     Problem: Safety - Adult  Goal: Free from fall injury  Outcome: Progressing  Flowsheets (Taken 1/17/2025 0745)  Free From Fall Injury: Instruct family/caregiver on patient safety     Problem: Skin/Tissue Integrity - Adult  Goal: Skin integrity remains intact  Outcome: Progressing  Flowsheets (Taken 1/17/2025 0745)  Skin Integrity Remains Intact:   Monitor for areas of redness and/or skin breakdown   Assess vascular access sites hourly  Goal: Oral mucous membranes remain intact  Outcome: Progressing  Flowsheets (Taken 1/17/2025 0745)  Oral Mucous Membranes Remain Intact: Assess oral mucosa and hygiene practices     Problem: Musculoskeletal - Adult  Goal: Return mobility to safest level of

## 2025-01-18 VITALS
BODY MASS INDEX: 31.42 KG/M2 | DIASTOLIC BLOOD PRESSURE: 48 MMHG | RESPIRATION RATE: 17 BRPM | TEMPERATURE: 97.7 F | SYSTOLIC BLOOD PRESSURE: 130 MMHG | OXYGEN SATURATION: 96 % | WEIGHT: 232 LBS | HEIGHT: 72 IN | HEART RATE: 60 BPM

## 2025-01-18 LAB
ANION GAP SERPL CALC-SCNC: 10 MMOL/L (ref 7–16)
BASOPHILS # BLD: 0.03 K/UL (ref 0–0.2)
BASOPHILS NFR BLD: 0.5 % (ref 0–2)
BUN SERPL-MCNC: 6 MG/DL (ref 8–23)
CALCIUM SERPL-MCNC: 9 MG/DL (ref 8.8–10.2)
CHLORIDE SERPL-SCNC: 106 MMOL/L (ref 98–107)
CO2 SERPL-SCNC: 22 MMOL/L (ref 20–29)
CREAT SERPL-MCNC: 0.74 MG/DL (ref 0.8–1.3)
DIFFERENTIAL METHOD BLD: ABNORMAL
EOSINOPHIL # BLD: 0.23 K/UL (ref 0–0.8)
EOSINOPHIL NFR BLD: 3.8 % (ref 0.5–7.8)
ERYTHROCYTE [DISTWIDTH] IN BLOOD BY AUTOMATED COUNT: 18.6 % (ref 11.9–14.6)
GLUCOSE SERPL-MCNC: 94 MG/DL (ref 70–99)
HCT VFR BLD AUTO: 34.5 % (ref 41.1–50.3)
HGB BLD-MCNC: 10.5 G/DL (ref 13.6–17.2)
IMM GRANULOCYTES # BLD AUTO: 0.02 K/UL (ref 0–0.5)
IMM GRANULOCYTES NFR BLD AUTO: 0.3 % (ref 0–5)
LYMPHOCYTES # BLD: 2.52 K/UL (ref 0.5–4.6)
LYMPHOCYTES NFR BLD: 41.2 % (ref 13–44)
MCH RBC QN AUTO: 25.1 PG (ref 26.1–32.9)
MCHC RBC AUTO-ENTMCNC: 30.4 G/DL (ref 31.4–35)
MCV RBC AUTO: 82.3 FL (ref 82–102)
MONOCYTES # BLD: 0.61 K/UL (ref 0.1–1.3)
MONOCYTES NFR BLD: 10 % (ref 4–12)
NEUTS SEG # BLD: 2.7 K/UL (ref 1.7–8.2)
NEUTS SEG NFR BLD: 44.2 % (ref 43–78)
NRBC # BLD: 0 K/UL (ref 0–0.2)
PLATELET # BLD AUTO: 141 K/UL (ref 150–450)
PMV BLD AUTO: 10.1 FL (ref 9.4–12.3)
POTASSIUM SERPL-SCNC: 4.2 MMOL/L (ref 3.5–5.1)
RBC # BLD AUTO: 4.19 M/UL (ref 4.23–5.6)
SODIUM SERPL-SCNC: 138 MMOL/L (ref 136–145)
WBC # BLD AUTO: 6.1 K/UL (ref 4.3–11.1)

## 2025-01-18 PROCEDURE — 6370000000 HC RX 637 (ALT 250 FOR IP): Performed by: NURSE PRACTITIONER

## 2025-01-18 PROCEDURE — 36415 COLL VENOUS BLD VENIPUNCTURE: CPT

## 2025-01-18 PROCEDURE — 85025 COMPLETE CBC W/AUTO DIFF WBC: CPT

## 2025-01-18 PROCEDURE — 6370000000 HC RX 637 (ALT 250 FOR IP): Performed by: SURGERY

## 2025-01-18 PROCEDURE — 80048 BASIC METABOLIC PNL TOTAL CA: CPT

## 2025-01-18 PROCEDURE — 6360000002 HC RX W HCPCS: Performed by: NURSE PRACTITIONER

## 2025-01-18 PROCEDURE — 2500000003 HC RX 250 WO HCPCS: Performed by: NURSE PRACTITIONER

## 2025-01-18 RX ORDER — CEFADROXIL 500 MG/1
500 CAPSULE ORAL 2 TIMES DAILY
Qty: 10 CAPSULE | Refills: 0 | Status: SHIPPED | OUTPATIENT
Start: 2025-01-18 | End: 2025-01-18

## 2025-01-18 RX ORDER — POLYETHYLENE GLYCOL 3350 17 G/17G
17 POWDER, FOR SOLUTION ORAL 2 TIMES DAILY
COMMUNITY
Start: 2025-01-18 | End: 2025-02-17

## 2025-01-18 RX ORDER — POLYETHYLENE GLYCOL 3350 17 G/17G
17 POWDER, FOR SOLUTION ORAL 2 TIMES DAILY
COMMUNITY
Start: 2025-01-18 | End: 2025-01-18

## 2025-01-18 RX ORDER — CEFADROXIL 500 MG/1
500 CAPSULE ORAL 2 TIMES DAILY
Qty: 10 CAPSULE | Refills: 0 | Status: SHIPPED | OUTPATIENT
Start: 2025-01-18 | End: 2025-01-23

## 2025-01-18 RX ADMIN — OXYCODONE HYDROCHLORIDE AND ACETAMINOPHEN 1 TABLET: 5; 325 TABLET ORAL at 10:38

## 2025-01-18 RX ADMIN — AMIODARONE HYDROCHLORIDE 200 MG: 200 TABLET ORAL at 10:31

## 2025-01-18 RX ADMIN — HYDROMORPHONE HYDROCHLORIDE 1 MG: 1 INJECTION, SOLUTION INTRAMUSCULAR; INTRAVENOUS; SUBCUTANEOUS at 04:52

## 2025-01-18 RX ADMIN — SODIUM CHLORIDE, PRESERVATIVE FREE 10 ML: 5 INJECTION INTRAVENOUS at 10:30

## 2025-01-18 RX ADMIN — FAMOTIDINE 20 MG: 20 TABLET, FILM COATED ORAL at 10:31

## 2025-01-18 RX ADMIN — SPIRONOLACTONE 25 MG: 25 TABLET ORAL at 10:31

## 2025-01-18 RX ADMIN — METOPROLOL TARTRATE 25 MG: 25 TABLET, FILM COATED ORAL at 10:31

## 2025-01-18 RX ADMIN — LISINOPRIL 20 MG: 20 TABLET ORAL at 10:31

## 2025-01-18 RX ADMIN — ENOXAPARIN SODIUM 30 MG: 100 INJECTION SUBCUTANEOUS at 10:31

## 2025-01-18 RX ADMIN — ONDANSETRON 4 MG: 4 TABLET, ORALLY DISINTEGRATING ORAL at 13:14

## 2025-01-18 ASSESSMENT — PAIN DESCRIPTION - PAIN TYPE
TYPE: CHRONIC PAIN

## 2025-01-18 ASSESSMENT — PAIN DESCRIPTION - DIRECTION
RADIATING_TOWARDS: DOWN LEGS
RADIATING_TOWARDS: DOWN LEGS

## 2025-01-18 ASSESSMENT — PAIN DESCRIPTION - DESCRIPTORS
DESCRIPTORS: ACHING;DISCOMFORT
DESCRIPTORS: ACHING;DISCOMFORT
DESCRIPTORS: ACHING

## 2025-01-18 ASSESSMENT — PAIN - FUNCTIONAL ASSESSMENT
PAIN_FUNCTIONAL_ASSESSMENT: PREVENTS OR INTERFERES SOME ACTIVE ACTIVITIES AND ADLS

## 2025-01-18 ASSESSMENT — PAIN SCALES - GENERAL
PAINLEVEL_OUTOF10: 7
PAINLEVEL_OUTOF10: 2
PAINLEVEL_OUTOF10: 0
PAINLEVEL_OUTOF10: 7

## 2025-01-18 ASSESSMENT — PAIN DESCRIPTION - ORIENTATION
ORIENTATION: RIGHT;LEFT;MID;LOWER
ORIENTATION: RIGHT;LEFT;MID
ORIENTATION: MID

## 2025-01-18 ASSESSMENT — PAIN DESCRIPTION - ONSET
ONSET: GRADUAL

## 2025-01-18 ASSESSMENT — PAIN DESCRIPTION - FREQUENCY
FREQUENCY: INTERMITTENT

## 2025-01-18 ASSESSMENT — PAIN DESCRIPTION - LOCATION
LOCATION: ABDOMEN;LEG;GROIN
LOCATION: ABDOMEN
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
## 2025-01-18 NOTE — PLAN OF CARE
Problem: Chronic Conditions and Co-morbidities  Goal: Patient's chronic conditions and co-morbidity symptoms are monitored and maintained or improved  Outcome: Progressing  Flowsheets (Taken 1/18/2025 0826)  Care Plan - Patient's Chronic Conditions and Co-Morbidity Symptoms are Monitored and Maintained or Improved:   Monitor and assess patient's chronic conditions and comorbid symptoms for stability, deterioration, or improvement   Collaborate with multidisciplinary team to address chronic and comorbid conditions and prevent exacerbation or deterioration   Update acute care plan with appropriate goals if chronic or comorbid symptoms are exacerbated and prevent overall improvement and discharge     Problem: Discharge Planning  Goal: Discharge to home or other facility with appropriate resources  Outcome: Progressing  Flowsheets (Taken 1/18/2025 0826)  Discharge to home or other facility with appropriate resources:   Identify barriers to discharge with patient and caregiver   Arrange for needed discharge resources and transportation as appropriate   Identify discharge learning needs (meds, wound care, etc)     Problem: Pain  Goal: Verbalizes/displays adequate comfort level or baseline comfort level  Outcome: Progressing     Problem: Skin/Tissue Integrity - Adult  Goal: Skin integrity remains intact  Outcome: Progressing  Flowsheets (Taken 1/18/2025 0826)  Skin Integrity Remains Intact:   Monitor for areas of redness and/or skin breakdown   Assess vascular access sites hourly  Goal: Oral mucous membranes remain intact  Outcome: Progressing  Flowsheets (Taken 1/18/2025 0826)  Oral Mucous Membranes Remain Intact: Assess oral mucosa and hygiene practices     Problem: Musculoskeletal - Adult  Goal: Return mobility to safest level of function  Outcome: Progressing  Flowsheets (Taken 1/18/2025 0826)  Return Mobility to Safest Level of Function: Assess patient stability and activity tolerance for standing, transferring and

## 2025-01-18 NOTE — PROGRESS NOTES
Reviewed discharge instructions with pt. And his daughter Ria. Allowed time for all questions and concerns to be addressed. IV removed and assisted pt with dressing. Pt. Left hospital via wheelchair to daughters vehicle.

## 2025-01-18 NOTE — PLAN OF CARE
Problem: Chronic Conditions and Co-morbidities  Goal: Patient's chronic conditions and co-morbidity symptoms are monitored and maintained or improved  1/17/2025 2223 by Vicky Irby RN  Outcome: Progressing  1/17/2025 1419 by Elaina Santana RN  Outcome: Progressing  Flowsheets (Taken 1/17/2025 0745)  Care Plan - Patient's Chronic Conditions and Co-Morbidity Symptoms are Monitored and Maintained or Improved:   Monitor and assess patient's chronic conditions and comorbid symptoms for stability, deterioration, or improvement   Update acute care plan with appropriate goals if chronic or comorbid symptoms are exacerbated and prevent overall improvement and discharge   Collaborate with multidisciplinary team to address chronic and comorbid conditions and prevent exacerbation or deterioration     Problem: Discharge Planning  Goal: Discharge to home or other facility with appropriate resources  1/17/2025 2223 by Vicky Irby RN  Outcome: Progressing  1/17/2025 1419 by Elaina Santana RN  Outcome: Progressing  Flowsheets (Taken 1/17/2025 0745)  Discharge to home or other facility with appropriate resources:   Identify barriers to discharge with patient and caregiver   Arrange for needed discharge resources and transportation as appropriate   Identify discharge learning needs (meds, wound care, etc)     Problem: Pain  Goal: Verbalizes/displays adequate comfort level or baseline comfort level  1/17/2025 2223 by Vicky Irby RN  Outcome: Progressing  1/17/2025 1419 by Elaina Santana RN  Outcome: Progressing     Problem: Safety - Adult  Goal: Free from fall injury  1/17/2025 2223 by Vicky Irby RN  Outcome: Progressing  1/17/2025 1419 by Elaina Santana RN  Outcome: Progressing  Flowsheets (Taken 1/17/2025 0745)  Free From Fall Injury: Instruct family/caregiver on patient safety     Problem: Skin/Tissue Integrity - Adult  Goal: Skin integrity remains intact  1/17/2025 2223 by Vicky Irby RN  Outcome: Progressing  1/17/2025 1419 by

## 2025-01-18 NOTE — PLAN OF CARE
Problem: Chronic Conditions and Co-morbidities  Goal: Patient's chronic conditions and co-morbidity symptoms are monitored and maintained or improved  1/18/2025 1418 by Elaina Santana RN  Outcome: Adequate for Discharge  1/18/2025 1237 by Elaina Santana RN  Outcome: Progressing  Flowsheets (Taken 1/18/2025 0826)  Care Plan - Patient's Chronic Conditions and Co-Morbidity Symptoms are Monitored and Maintained or Improved:   Monitor and assess patient's chronic conditions and comorbid symptoms for stability, deterioration, or improvement   Collaborate with multidisciplinary team to address chronic and comorbid conditions and prevent exacerbation or deterioration   Update acute care plan with appropriate goals if chronic or comorbid symptoms are exacerbated and prevent overall improvement and discharge     Problem: Discharge Planning  Goal: Discharge to home or other facility with appropriate resources  1/18/2025 1418 by Elaina Santana RN  Outcome: Adequate for Discharge  1/18/2025 1237 by Elaina Santana RN  Outcome: Progressing  Flowsheets (Taken 1/18/2025 0826)  Discharge to home or other facility with appropriate resources:   Identify barriers to discharge with patient and caregiver   Arrange for needed discharge resources and transportation as appropriate   Identify discharge learning needs (meds, wound care, etc)     Problem: Pain  Goal: Verbalizes/displays adequate comfort level or baseline comfort level  1/18/2025 1418 by Elaina Santana RN  Outcome: Adequate for Discharge  1/18/2025 1237 by Ealina Santana RN  Outcome: Progressing     Problem: Safety - Adult  Goal: Free from fall injury  1/18/2025 1418 by Elaina Santana RN  Outcome: Adequate for Discharge  1/18/2025 1237 by Elaina Santana RN  Outcome: Progressing  Flowsheets (Taken 1/18/2025 0826)  Free From Fall Injury: Instruct family/caregiver on patient safety     Problem: Skin/Tissue Integrity - Adult  Goal: Skin integrity remains  intact  1/18/2025 1418 by Elaina Santana RN  Outcome: Adequate for Discharge  1/18/2025 1237 by Elaina Santana RN  Outcome: Progressing  Flowsheets (Taken 1/18/2025 0826)  Skin Integrity Remains Intact:   Monitor for areas of redness and/or skin breakdown   Assess vascular access sites hourly  Goal: Oral mucous membranes remain intact  1/18/2025 1418 by Elaina Santana RN  Outcome: Adequate for Discharge  1/18/2025 1237 by Elaina Santana RN  Outcome: Progressing  Flowsheets (Taken 1/18/2025 0826)  Oral Mucous Membranes Remain Intact: Assess oral mucosa and hygiene practices     Problem: Musculoskeletal - Adult  Goal: Return mobility to safest level of function  1/18/2025 1418 by Elaina Santana RN  Outcome: Adequate for Discharge  1/18/2025 1237 by Elaina Santana RN  Outcome: Progressing  Flowsheets (Taken 1/18/2025 0826)  Return Mobility to Safest Level of Function: Assess patient stability and activity tolerance for standing, transferring and ambulating with or without assistive devices  Goal: Return ADL status to a safe level of function  1/18/2025 1418 by Elaina Santana RN  Outcome: Adequate for Discharge  1/18/2025 1237 by Elaina Santana RN  Outcome: Progressing  Flowsheets (Taken 1/18/2025 0826)  Return ADL Status to a Safe Level of Function: Administer medication as ordered     Problem: Gastrointestinal - Adult  Goal: Minimal or absence of nausea and vomiting  1/18/2025 1418 by Elaina Santana RN  Outcome: Adequate for Discharge  1/18/2025 1237 by Elaina Santana RN  Outcome: Progressing  Flowsheets (Taken 1/18/2025 0826)  Minimal or absence of nausea and vomiting: Administer IV fluids as ordered to ensure adequate hydration  Goal: Maintains or returns to baseline bowel function  1/18/2025 1418 by Elaina Santana RN  Outcome: Adequate for Discharge  1/18/2025 1237 by Elaina Santana RN  Outcome: Progressing  Flowsheets (Taken 1/18/2025 0826)  Maintains or returns to baseline bowel function:

## 2025-01-18 NOTE — DISCHARGE INSTRUCTIONS
Discharge Instructions/Follow-up Plans:   MD Instructions:     Follow-up with PCP in 7-10 days    Diet - as tolerated - Soft foods diet.  Activity - ambulate - as tolerated      Rx- Left Lower Extremity cellulitis  Resume other home medications.      If problems or questions arise, please call our office at [phone]

## 2025-01-18 NOTE — DISCHARGE SUMMARY
[place] [address]  [phone]   Discharge Summary     Macr Horton Sr.  MRN: [id number]     : 1955     Age: 69 y.o.                          Admit date: 2025     Discharge date: 2025  Attending Physician: Melo Mcdowell MD  Primary Discharge Diagnosis:   Principal Problem:    SBO (small bowel obstruction) (HCC)  Resolved Problems:    * No resolved hospital problems. *    Primary Operations or Procedures Performed :  * No surgery found *     Brief History and Reason for Admission: HPI: Marc Horton Sr. is a 69 y.o. male who presented to the ED with a 2 day history of worsening abdominal pain with nausea and vomiting. No Fever or chills.  No changes in bowel habit or hematochezia or hematemesis. The patient currently vomiting gastric fluid contents while in ED.  Patient refusing placement of NGT.      The patient has a PMHx of CAD s/p multiple stents, HTN, DM x 2, CHF, Bipolar disorder, \"diverticulitis.\"  He was admitted on 2025 for abdominal pain with CT evidence of Fluid-filled small bowel measures outside limits of normal within the central abdomen with some mild edema of the developing closed-loop hernia not entirely excluded.       Hospital Course:    The patient pt's SBO resolved without surgical intervention.       The patient progressed satisfactorily meeting milestones necessary for successful discharge including tolerating a diet, adequate mobility, adequate pain control, and active bowel function. Patient was deemed a good candidate for discharge at the time of morning rounding. They are to follow up as indicated in their provided discharge paperwork. The patient helped develop and voices understanding with the plan of care. They are amenable without reservations at this time to moving forward with discharge.     Condition at Discharge: Good    Discharge Medications:   Current Discharge Medication List

## 2025-01-18 NOTE — CARE COORDINATION
Pt is for discharge home today with family.  Referral has been called/sent to [facility]  to resume follow up home care as ordered.  No additional CM orders received or supportive care needs expressed at this time.     01/18/25 1456   Social/Functional History   Lives With Friend(s);Significant other  (He lives with [place] and [facility])   Type of Home House   Home Equipment Cane;Walker - Rolling   Receives Help From Friend(s)   Prior Level of Assist for ADLs Needs assistance   Mode of Transportation Family   Services At/After Discharge   Transition of Care Consult (CM Consult) Discharge Planning;Home Health   Internal Home Health No   Reason Outside Agency Chosen Patient already serviced by other home care/hospice agency  (resume [facility])   Services At/After Discharge Home Health;PT;OT;Nursing services    Resource Information Provided? No   Mode of Transport at Discharge Other (see comment)  (family)   Confirm Follow Up Transport Family   Condition of Participation: Discharge Planning   The Plan for Transition of Care is related to the following treatment goals: Pt requires home health to return to functional baseline in the community.   The Patient and/or Patient Representative was provided with a Choice of Provider? Patient   The Patient and/Or Patient Representative agree with the Discharge Plan? Yes   Freedom of Choice list was provided with basic dialogue that supports the patient's individualized plan of care/goals, treatment preferences, and shares the quality data associated with the providers?  Yes

## 2025-01-20 ENCOUNTER — CARE COORDINATION (OUTPATIENT)
Dept: CARE COORDINATION | Facility: CLINIC | Age: 70
End: 2025-01-20

## 2025-01-20 NOTE — CARE COORDINATION
Care Transitions Note    Initial Call - Call within 2 business days of discharge: Yes    Attempted to reach patient for transitions of care follow up. Unable to reach patient.    Outreach Attempts:   Unable to leave message.     Patient: Marc Horton Sr. Patient : 1955   MRN: [id number]    Reason for Admission: Acute abdominal pain   Discharge Date: 25  RURS: Readmission Risk Score: 28.8    Last Discharge Facility       Date Complaint Diagnosis Description Type Department Provider    25 Abdominal Pain Acute abdominal pain ... ED to Hosp-Admission (Discharged) (ADMITTED) Melo Adams MD; ANN Jones..            Was this an external facility discharge? No    Follow Up Appointment:   Patient has hospital follow up appointment scheduled   Future Appointments         Provider Specialty Dept Phone    2/3/2025 3:15 PM Faiza Olmos MD Gastroenterology [phone]            Plan for follow-up on next business day.      Shawna Agustin RN

## 2025-01-21 ENCOUNTER — CARE COORDINATION (OUTPATIENT)
Dept: CARE COORDINATION | Facility: CLINIC | Age: 70
End: 2025-01-21

## 2025-01-21 DIAGNOSIS — D50.9 IRON DEFICIENCY ANEMIA, UNSPECIFIED IRON DEFICIENCY ANEMIA TYPE: Primary | ICD-10-CM

## 2025-01-21 NOTE — CARE COORDINATION
Care Transitions Note    Initial Call - Call within 2 business days of discharge: Yes    Attempted to reach patient for transitions of care follow up. Unable to reach patient.    Outreach Attempts:   Multiple attempts to contact patient at phone numbers on file.   [email or web address] message sent.     Patient: Marc Horton Sr. Patient : 1955   MRN: [id number]    Reason for Admission: Acute abdominal pain .  Discharge Date: 25  RURS: Readmission Risk Score: 28.8    Last Discharge Facility       Date Complaint Diagnosis Description Type Department Provider    25 Abdominal Pain Acute abdominal pain ... ED to Hosp-Admission (Discharged) (ADMITTED) Melo Adams MD; SAMEER Jones.            Was this an external facility discharge? No    Follow Up Appointment:   Patient has hospital follow up appointment scheduled   Future Appointments         Provider Specialty Dept Phone    2/3/2025 3:15 PM Faiza Olmos MD Gastroenterology [phone]        Shawna Agustin RN

## 2025-01-22 ENCOUNTER — TELEPHONE (OUTPATIENT)
Dept: FAMILY MEDICINE CLINIC | Facility: CLINIC | Age: 70
End: 2025-01-22

## 2025-01-22 NOTE — PROGRESS NOTES
Physician Progress Note      PATIENT:               BRITTANY VAZQUEZ  CSN #:                  [id number]  :                       1955  ADMIT DATE:       2025 9:01 PM  DISCH DATE:        2025 3:30 PM  RESPONDING  PROVIDER #:        Melo Mcdowell MD          QUERY TEXT:    Pt admitted with SBO. Pt noted to have possible developing closed-loop hernia   . If possible, please document in progress notes and discharge summary the   cause of the SBO.    The medical record reflects the following:  Risk Factors: 69 yr old, HTN, DM  Clinical Indicators: documented-CT evidence of Fluid-filled small bowel   measures outside limits of normal within the central abdomen with some mild   edema of the developing closed-loop hernia not entirely excluded.  Treatment: GI consult, lab  monitoring, imaging  Options provided:  -- SBO due to developing closed-loop hernia  -- SBO due to, Please Specify  -- Other - I will add my own diagnosis  -- Disagree - Not applicable / Not valid  -- Disagree - Clinically unable to determine / Unknown  -- Refer to Clinical Documentation Reviewer    PROVIDER RESPONSE TEXT:    This patient has SBO due to simple adhesions. It was not a closed loop    Query created by: Michell Torres on 2025 10:14 AM      Electronically signed by:  Melo Mcdowell MD 2025 11:31 AM

## 2025-01-22 NOTE — TELEPHONE ENCOUNTER
PT with [facility] called today in regards to this patient and stated that request verbal orders to continue seeing this patient 1x weekly for the next 4 weeks.    Please advise.    Thank you

## 2025-02-03 ENCOUNTER — OFFICE VISIT (OUTPATIENT)
Dept: GASTROENTEROLOGY | Age: 70
End: 2025-02-03
Payer: MEDICAID

## 2025-02-03 VITALS
WEIGHT: 228 LBS | DIASTOLIC BLOOD PRESSURE: 67 MMHG | BODY MASS INDEX: 30.88 KG/M2 | SYSTOLIC BLOOD PRESSURE: 137 MMHG | OXYGEN SATURATION: 98 % | HEART RATE: 76 BPM | RESPIRATION RATE: 18 BRPM | HEIGHT: 72 IN | TEMPERATURE: 97.9 F

## 2025-02-03 DIAGNOSIS — K59.00 CONSTIPATION, UNSPECIFIED CONSTIPATION TYPE: ICD-10-CM

## 2025-02-03 DIAGNOSIS — K27.9 PUD (PEPTIC ULCER DISEASE): Primary | ICD-10-CM

## 2025-02-03 PROCEDURE — 1159F MED LIST DOCD IN RCRD: CPT | Performed by: STUDENT IN AN ORGANIZED HEALTH CARE EDUCATION/TRAINING PROGRAM

## 2025-02-03 PROCEDURE — 1160F RVW MEDS BY RX/DR IN RCRD: CPT | Performed by: STUDENT IN AN ORGANIZED HEALTH CARE EDUCATION/TRAINING PROGRAM

## 2025-02-03 PROCEDURE — 1123F ACP DISCUSS/DSCN MKR DOCD: CPT | Performed by: STUDENT IN AN ORGANIZED HEALTH CARE EDUCATION/TRAINING PROGRAM

## 2025-02-03 PROCEDURE — 3078F DIAST BP <80 MM HG: CPT | Performed by: STUDENT IN AN ORGANIZED HEALTH CARE EDUCATION/TRAINING PROGRAM

## 2025-02-03 PROCEDURE — 3075F SYST BP GE 130 - 139MM HG: CPT | Performed by: STUDENT IN AN ORGANIZED HEALTH CARE EDUCATION/TRAINING PROGRAM

## 2025-02-03 PROCEDURE — 99203 OFFICE O/P NEW LOW 30 MIN: CPT | Performed by: STUDENT IN AN ORGANIZED HEALTH CARE EDUCATION/TRAINING PROGRAM

## 2025-02-03 NOTE — PROGRESS NOTES
Marc Horton . is 69 y.o. y/o male here for initial evaluation.     History of Present Illness  The patient presents for evaluation of gastrointestinal bleeding.    He reports persistent discomfort, rating it as a 5 on a scale of 10. He does not experience daily bowel movements, instead having one every 2 to 3 days. He has been using MiraLAX but has not taken it in the past 2 to 3 days due to its delayed effect. He is not experiencing any melena. He is currently on iron supplements and pantoprazole. He has not undergone a routine colonoscopy or Cologuard test recently, but recalls having one during his hospital stay in 11/2024. He also mentions that he was unconscious for 9 days and required a blood transfusion. He has an upcoming appointment with his primary care physician and is receiving home care nursing services. He reports difficulty with ambulation.    MEDICATIONS  MiraLAX, iron supplements, pantoprazole       EGD 1/2024 Alissa Stark MD     Impression:   -LA Grade A esophagitis with no bleeding.   -Non-bleeding gastric ulcer with a clean ulcer base (Alex Class III). -Non-bleeding duodenal ulcers with a clean ulcer base (Alex Class III).    CT ABDOMEN PELVIS W IV CONTRAST 1/2025  IMPRESSION:  1. Fluid-filled small bowel measures outside limits of normal within the central  abdomen with some mild edema of the developing closed-loop hernia not entirely  excluded. General surgery consultation recommended.     2. Normal appendix.     3. Scattered diverticula      CT ABDOMEN PELVIS W IV CONTRAS 1/2025  IMPRESSION:  No bowel obstruction.     Interval development in trace bilateral pleural effusions with adjacent  compressive atelectasis.    Lab Results   Component Value Date    HGB 10.5 (L) 01/18/2025    WBC 6.1 01/18/2025     (L) 01/18/2025    MCV 82.3 01/18/2025    IRON 14 (L) 12/11/2024    FERRITIN 46 12/11/2024    TIBC 257 12/11/2024    CREATININE 0.74 (L) 01/18/2025    ALT 10

## 2025-02-14 ENCOUNTER — TELEPHONE (OUTPATIENT)
Dept: FAMILY MEDICINE CLINIC | Facility: CLINIC | Age: 70
End: 2025-02-14

## 2025-02-14 NOTE — TELEPHONE ENCOUNTER
Alfredo Physical Therapist from [facility] called asking for verbal orders for pt:    PHYSICAL THERAPY 1 TIME A WEEK FOR 8 WEEKS    P: [phone]     Thank you

## 2025-02-28 ENCOUNTER — TELEPHONE (OUTPATIENT)
Dept: FAMILY MEDICINE CLINIC | Facility: CLINIC | Age: 70
End: 2025-02-28

## 2025-02-28 NOTE — TELEPHONE ENCOUNTER
Nurse from  [facility] called stating that she wasn't able to see the pt because pt wasn't home.     Nurse stated that pt was evicted from home and now he is sleeping on his car.      Please advised, thank you.

## 2025-03-13 ENCOUNTER — CARE COORDINATION (OUTPATIENT)
Dept: CARE COORDINATION | Facility: CLINIC | Age: 70
End: 2025-03-13

## 2025-03-13 NOTE — CARE COORDINATION
Ambulatory Care Coordination Note     3/13/2025 2:10 PM     Patient Current Location:  Home: [address] [address]     This patient was received as a referral from Provider.    ACM contacted the patient by telephone. Verified name and  with patient as identifiers. Provided introduction to self, and explanation of the ACM role.   Patient accepted care management services at this time.          ACM: Gale Rivera RN     Challenges to be reviewed by the provider   Additional needs identified to be addressed with provider No  none               Method of communication with provider: none.    Utilization: Initial Call - N/A    Care Summary Note:     Pt reports he was kicked out of his home by his brother recently.  He is sleeping in his car, unable to prop up feet and not sleeping well. Pt has reached out to housing assistance but not call backs. Hope, his friend stays w/ him and makes sure pt is taking his meds.  Pt plans to talk w/  tomorrow. Not sure if will take case.     Pt reports swelling worse than normal, SOB \"not bad\". ACM offered to get pt appt w/ PCP, pt declined. Pt is open to [facility] referral for housing assistance. Will continue to follow.         Assessments Completed:   General Assessment    Do you have any symptoms that are causing concern?: Yes  Progression since Onset: Intermittent - Waxing/Waning  Reported Symptoms: Other (Comment: swelling)          Medications Reviewed:   Not completed during this call:      Advance Care Planning:   Not reviewed during this call       Follow Up:   Plan for next ACM outreach in approximately 1 week to complete:  - goal progression  - education .   Patient  is agreeable to this plan.

## 2025-03-14 ENCOUNTER — TELEPHONE (OUTPATIENT)
Dept: FAMILY MEDICINE CLINIC | Facility: CLINIC | Age: 70
End: 2025-03-14

## 2025-03-14 NOTE — TELEPHONE ENCOUNTER
Nurse from [facility] home health care called stating that pt missed his visit this week, they called pt  to reschedule visit and they couldn't  talk to him, pt don't answered phone. They will try next week.

## 2025-03-17 ENCOUNTER — CARE COORDINATION (OUTPATIENT)
Dept: CARE COORDINATION | Facility: CLINIC | Age: 70
End: 2025-03-17

## 2025-03-17 NOTE — CARE COORDINATION
GREG LAWLER received referral from RN ADRIANNE regarding possible housing issues.  GREG LAWLER reached out to pt this day but no answer at preferred number.  Pt does not have VM box set up so GREG LAWLER unable to leave message.  Will await return call and/or try again within 24 business hours.

## 2025-03-18 ENCOUNTER — TELEPHONE (OUTPATIENT)
Dept: FAMILY MEDICINE CLINIC | Facility: CLINIC | Age: 70
End: 2025-03-18

## 2025-03-18 ENCOUNTER — CARE COORDINATION (OUTPATIENT)
Dept: CARE COORDINATION | Facility: CLINIC | Age: 70
End: 2025-03-18

## 2025-03-18 NOTE — CARE COORDINATION
GREG LAWLER continues to follow for SW Program.  GREG LAWLER reached out to pt this day without success.  Pt's VM box is not set up, unable to leave VM.  Will await return call and/or reach out in 3-5 business days.

## 2025-03-20 ENCOUNTER — CARE COORDINATION (OUTPATIENT)
Dept: CARE COORDINATION | Facility: CLINIC | Age: 70
End: 2025-03-20

## 2025-03-20 NOTE — CARE COORDINATION
Ambulatory Care Coordination Note     3/20/2025 12:09 PM     Patient outreach attempt by this ACM today to perform care management follow up . ACM was unable to reach the patient by telephone today;   Vm not setup     ACM: Gale Rivera RN     PCP/Specialist follow up:       Follow Up:   Plan for next ACM outreach in approximately 1 week to complete:  - goal progression.

## 2025-03-21 ENCOUNTER — HOSPITAL ENCOUNTER (EMERGENCY)
Age: 70
Discharge: HOME | End: 2025-03-21
Attending: EMERGENCY MEDICINE
Payer: MEDICARE

## 2025-03-21 ENCOUNTER — CARE COORDINATION (OUTPATIENT)
Dept: CARE COORDINATION | Facility: CLINIC | Age: 70
End: 2025-03-21

## 2025-03-21 ENCOUNTER — APPOINTMENT (OUTPATIENT)
Dept: GENERAL RADIOLOGY | Age: 70
End: 2025-03-21
Payer: MEDICARE

## 2025-03-21 VITALS
WEIGHT: 237.6 LBS | TEMPERATURE: 97.9 F | SYSTOLIC BLOOD PRESSURE: 144 MMHG | BODY MASS INDEX: 32.18 KG/M2 | HEIGHT: 72 IN | DIASTOLIC BLOOD PRESSURE: 67 MMHG | HEART RATE: 57 BPM | OXYGEN SATURATION: 100 % | RESPIRATION RATE: 14 BRPM

## 2025-03-21 DIAGNOSIS — J20.9 ACUTE BRONCHITIS, UNSPECIFIED ORGANISM: ICD-10-CM

## 2025-03-21 DIAGNOSIS — R07.9 CHEST PAIN, UNSPECIFIED TYPE: ICD-10-CM

## 2025-03-21 DIAGNOSIS — R60.0 PERIPHERAL EDEMA: Primary | ICD-10-CM

## 2025-03-21 DIAGNOSIS — R07.89 ATYPICAL CHEST PAIN: ICD-10-CM

## 2025-03-21 LAB
ALBUMIN SERPL-MCNC: 3.8 G/DL (ref 3.2–4.6)
ALBUMIN/GLOB SERPL: 1 (ref 1–1.9)
ALP SERPL-CCNC: 46 U/L (ref 40–129)
ALT SERPL-CCNC: 20 U/L (ref 8–55)
ANION GAP SERPL CALC-SCNC: 9 MMOL/L (ref 7–16)
AST SERPL-CCNC: 20 U/L (ref 15–37)
BASOPHILS # BLD: 0.03 K/UL (ref 0–0.2)
BASOPHILS NFR BLD: 0.4 % (ref 0–2)
BILIRUB SERPL-MCNC: 0.2 MG/DL (ref 0–1.2)
BUN SERPL-MCNC: 23 MG/DL (ref 8–23)
CALCIUM SERPL-MCNC: 9.5 MG/DL (ref 8.8–10.2)
CHLORIDE SERPL-SCNC: 104 MMOL/L (ref 98–107)
CO2 SERPL-SCNC: 29 MMOL/L (ref 20–29)
CREAT SERPL-MCNC: 1.4 MG/DL (ref 0.8–1.3)
DIFFERENTIAL METHOD BLD: ABNORMAL
EKG ATRIAL RATE: 62 BPM
EKG DIAGNOSIS: NORMAL
EKG P AXIS: 52 DEGREES
EKG P-R INTERVAL: 138 MS
EKG Q-T INTERVAL: 446 MS
EKG QRS DURATION: 90 MS
EKG QTC CALCULATION (BAZETT): 452 MS
EKG R AXIS: 46 DEGREES
EKG T AXIS: 31 DEGREES
EKG VENTRICULAR RATE: 62 BPM
EOSINOPHIL # BLD: 0.21 K/UL (ref 0–0.8)
EOSINOPHIL NFR BLD: 2.8 % (ref 0.5–7.8)
ERYTHROCYTE [DISTWIDTH] IN BLOOD BY AUTOMATED COUNT: 21.8 % (ref 11.9–14.6)
GLOBULIN SER CALC-MCNC: 3.8 G/DL (ref 2.3–3.5)
GLUCOSE SERPL-MCNC: 133 MG/DL (ref 70–99)
HCT VFR BLD AUTO: 37.6 % (ref 41.1–50.3)
HGB BLD-MCNC: 12 G/DL (ref 13.6–17.2)
IMM GRANULOCYTES # BLD AUTO: 0.02 K/UL (ref 0–0.5)
IMM GRANULOCYTES NFR BLD AUTO: 0.3 % (ref 0–5)
LYMPHOCYTES # BLD: 3.01 K/UL (ref 0.5–4.6)
LYMPHOCYTES NFR BLD: 40.5 % (ref 13–44)
MAGNESIUM SERPL-MCNC: 2 MG/DL (ref 1.8–2.4)
MCH RBC QN AUTO: 26.4 PG (ref 26.1–32.9)
MCHC RBC AUTO-ENTMCNC: 31.9 G/DL (ref 31.4–35)
MCV RBC AUTO: 82.8 FL (ref 82–102)
MONOCYTES # BLD: 0.51 K/UL (ref 0.1–1.3)
MONOCYTES NFR BLD: 6.9 % (ref 4–12)
NEUTS SEG # BLD: 3.65 K/UL (ref 1.7–8.2)
NEUTS SEG NFR BLD: 49.1 % (ref 43–78)
NRBC # BLD: 0 K/UL (ref 0–0.2)
NT PRO BNP: 411 PG/ML (ref 0–125)
PLATELET # BLD AUTO: 129 K/UL (ref 150–450)
PMV BLD AUTO: 9.8 FL (ref 9.4–12.3)
POTASSIUM SERPL-SCNC: 4.3 MMOL/L (ref 3.5–5.1)
PROT SERPL-MCNC: 7.6 G/DL (ref 6.3–8.2)
RBC # BLD AUTO: 4.54 M/UL (ref 4.23–5.6)
SODIUM SERPL-SCNC: 142 MMOL/L (ref 136–145)
TROPONIN T SERPL HS-MCNC: 17 NG/L (ref 0–22)
TROPONIN T SERPL HS-MCNC: 19 NG/L (ref 0–22)
WBC # BLD AUTO: 7.4 K/UL (ref 4.3–11.1)

## 2025-03-21 PROCEDURE — 84484 ASSAY OF TROPONIN QUANT: CPT

## 2025-03-21 PROCEDURE — 83735 ASSAY OF MAGNESIUM: CPT

## 2025-03-21 PROCEDURE — 93005 ELECTROCARDIOGRAM TRACING: CPT | Performed by: EMERGENCY MEDICINE

## 2025-03-21 PROCEDURE — 85025 COMPLETE CBC W/AUTO DIFF WBC: CPT

## 2025-03-21 PROCEDURE — 83880 ASSAY OF NATRIURETIC PEPTIDE: CPT

## 2025-03-21 PROCEDURE — 71046 X-RAY EXAM CHEST 2 VIEWS: CPT

## 2025-03-21 PROCEDURE — 6370000000 HC RX 637 (ALT 250 FOR IP): Performed by: EMERGENCY MEDICINE

## 2025-03-21 PROCEDURE — 80053 COMPREHEN METABOLIC PANEL: CPT

## 2025-03-21 PROCEDURE — 99285 EMERGENCY DEPT VISIT HI MDM: CPT

## 2025-03-21 RX ORDER — BENZONATATE 200 MG/1
200 CAPSULE ORAL 3 TIMES DAILY PRN
Qty: 21 CAPSULE | Refills: 0 | Status: ON HOLD | OUTPATIENT
Start: 2025-03-21

## 2025-03-21 RX ORDER — ONDANSETRON 4 MG/1
8 TABLET, ORALLY DISINTEGRATING ORAL
Status: COMPLETED | OUTPATIENT
Start: 2025-03-21 | End: 2025-03-21

## 2025-03-21 RX ORDER — MAGNESIUM HYDROXIDE/ALUMINUM HYDROXICE/SIMETHICONE 120; 1200; 1200 MG/30ML; MG/30ML; MG/30ML
30 SUSPENSION ORAL
Status: COMPLETED | OUTPATIENT
Start: 2025-03-21 | End: 2025-03-21

## 2025-03-21 RX ORDER — HYOSCYAMINE SULFATE 0.12 MG/1
0.25 TABLET SUBLINGUAL
Status: COMPLETED | OUTPATIENT
Start: 2025-03-21 | End: 2025-03-21

## 2025-03-21 RX ADMIN — ONDANSETRON 8 MG: 8 TABLET, ORALLY DISINTEGRATING ORAL at 19:55

## 2025-03-21 RX ADMIN — HYOSCYAMINE SULFATE 0.25 MG: 0.12 TABLET ORAL; SUBLINGUAL at 19:55

## 2025-03-21 RX ADMIN — ALUMINUM HYDROXIDE, MAGNESIUM HYDROXIDE, AND SIMETHICONE 30 ML: 200; 200; 20 SUSPENSION ORAL at 19:55

## 2025-03-21 ASSESSMENT — PAIN DESCRIPTION - ORIENTATION: ORIENTATION: MID

## 2025-03-21 ASSESSMENT — PAIN DESCRIPTION - PAIN TYPE: TYPE: ACUTE PAIN

## 2025-03-21 ASSESSMENT — PAIN DESCRIPTION - LOCATION: LOCATION: BACK;CHEST

## 2025-03-21 ASSESSMENT — PAIN SCALES - GENERAL
PAINLEVEL_OUTOF10: 8
PAINLEVEL_OUTOF10: 8

## 2025-03-21 ASSESSMENT — PAIN - FUNCTIONAL ASSESSMENT: PAIN_FUNCTIONAL_ASSESSMENT: 0-10

## 2025-03-21 ASSESSMENT — PAIN DESCRIPTION - FREQUENCY: FREQUENCY: INTERMITTENT

## 2025-03-21 ASSESSMENT — PAIN DESCRIPTION - DESCRIPTORS: DESCRIPTORS: SHARP;BURNING

## 2025-03-21 NOTE — CARE COORDINATION
GREG CM to close program at this time.  Multiple attempts at outreach to pt without success - no answer or returned call.  Will reopen with new referral or pt returns call.

## 2025-03-21 NOTE — ED TRIAGE NOTES
Pt arrived to the ED accompanied by a friend. He is ambulatory to triage with a slight limp c/o  I been hurt chest midsternum that radiates to the back- it's burning and hurting, tingling in bilateral fingers and bilateral leg swelling. Been going on for while been getting worse for 2 days. Last Cardiologist was during a hospitalization. Hx CHF, Diabetes and HTN. Compliant with meds. Pt does not weigh himself

## 2025-03-22 LAB
EKG ATRIAL RATE: 62 BPM
EKG DIAGNOSIS: NORMAL
EKG P AXIS: 52 DEGREES
EKG P-R INTERVAL: 138 MS
EKG Q-T INTERVAL: 446 MS
EKG QRS DURATION: 90 MS
EKG QTC CALCULATION (BAZETT): 452 MS
EKG R AXIS: 46 DEGREES
EKG T AXIS: 31 DEGREES
EKG VENTRICULAR RATE: 62 BPM

## 2025-03-22 PROCEDURE — 93010 ELECTROCARDIOGRAM REPORT: CPT | Performed by: INTERNAL MEDICINE

## 2025-03-22 NOTE — DISCHARGE INSTRUCTIONS
Use inhlaer 2 puffs every 6 hours, be sure to use your spacer tube/AeroChamber with the inhaler  1,200mg mucinex DM every 12 hours for a week.    Double up your Bumex to 2 pills Saturday morning, and then 2 pills Sunday morning.    Monday morning go back to your regular dosing of 1 pill each morning

## 2025-03-27 ENCOUNTER — CARE COORDINATION (OUTPATIENT)
Dept: CARE COORDINATION | Facility: CLINIC | Age: 70
End: 2025-03-27

## 2025-03-27 NOTE — CARE COORDINATION
Ambulatory Care Coordination Note     3/27/2025 9:54 AM     Patient Current Location:  Home: [address] [address]     ACM contacted the patient by telephone. Verified name and  with patient as identifiers.         ACM: Gale Rivera RN     Challenges to be reviewed by the provider   Additional needs identified to be addressed with provider No  none               Method of communication with provider: none.    Utilization: Has the patient been seen in the ED since your last call? Yes,   Discharge Date: 3/21/25   Discharge Facility: [facility]  Reason for ED Visit: edema  Visit Diagnosis:      Number of ED visits in the last 6 months: 6      Do you have any ongoing symptoms? Yes, there has been no change in my symptoms.   Current symptoms:  swelling.    Did you call your PCP prior to going to the ED? No, did not call the PCP office.     Review of Discharge Instructions:   [x] AVS discharge instructions  [x] Right Care, Right Place, Right Time document  [x] Medication changes  [x] Follow up appointments - ACM scheduled f/u appt for 25 11a as pt is currently out of town and this is first available.      Care Summary Note:     Discussed importance of f/u w/ PCP, agreeable to ACM scheduling appt. [place], has seen some improvement. Will notify ACM if no change prior to PCP appt. ACM will follow in 1wk.     Assessments Completed:   Congestive Heart Failure Assessment    Are you currently restricting fluids?: No Restriction  Do you understand a low sodium diet?: Yes  Do you understand how to read food labels?: Yes  How many restaurant meals do you eat per week?: 1-2  Do you salt your food before tasting it?: No     Swelling (worse than baseline) in hands, feet/legs or around abdomen      Symptoms:  CHF associated leg swelling: Pos            ,   General Assessment    Do you have any symptoms that are causing

## 2025-03-31 ENCOUNTER — APPOINTMENT (OUTPATIENT)
Dept: GENERAL RADIOLOGY | Age: 70
End: 2025-03-31
Payer: MEDICARE

## 2025-03-31 ENCOUNTER — HOSPITAL ENCOUNTER (EMERGENCY)
Age: 70
Discharge: HOME | End: 2025-03-31
Attending: EMERGENCY MEDICINE
Payer: MEDICARE

## 2025-03-31 VITALS
OXYGEN SATURATION: 100 % | RESPIRATION RATE: 18 BRPM | HEIGHT: 72 IN | HEART RATE: 68 BPM | SYSTOLIC BLOOD PRESSURE: 138 MMHG | DIASTOLIC BLOOD PRESSURE: 71 MMHG | WEIGHT: 237 LBS | BODY MASS INDEX: 32.1 KG/M2 | TEMPERATURE: 98.4 F

## 2025-03-31 DIAGNOSIS — L03.119 CELLULITIS OF LOWER EXTREMITY, UNSPECIFIED LATERALITY: Primary | ICD-10-CM

## 2025-03-31 LAB
ALBUMIN SERPL-MCNC: 3.8 G/DL (ref 3.2–4.6)
ALBUMIN/GLOB SERPL: 0.9 (ref 1–1.9)
ALP SERPL-CCNC: 48 U/L (ref 40–129)
ALT SERPL-CCNC: 18 U/L (ref 8–55)
ANION GAP SERPL CALC-SCNC: 14 MMOL/L (ref 7–16)
AST SERPL-CCNC: 19 U/L (ref 15–37)
BASOPHILS # BLD: 0.03 K/UL (ref 0–0.2)
BASOPHILS NFR BLD: 0.4 % (ref 0–2)
BILIRUB SERPL-MCNC: 0.3 MG/DL (ref 0–1.2)
BUN SERPL-MCNC: 34 MG/DL (ref 8–23)
CALCIUM SERPL-MCNC: 9.6 MG/DL (ref 8.8–10.2)
CHLORIDE SERPL-SCNC: 100 MMOL/L (ref 98–107)
CO2 SERPL-SCNC: 27 MMOL/L (ref 20–29)
CREAT SERPL-MCNC: 1.26 MG/DL (ref 0.8–1.3)
DIFFERENTIAL METHOD BLD: ABNORMAL
EOSINOPHIL # BLD: 0.21 K/UL (ref 0–0.8)
EOSINOPHIL NFR BLD: 2.5 % (ref 0.5–7.8)
ERYTHROCYTE [DISTWIDTH] IN BLOOD BY AUTOMATED COUNT: 21.3 % (ref 11.9–14.6)
GLOBULIN SER CALC-MCNC: 4.2 G/DL (ref 2.3–3.5)
GLUCOSE SERPL-MCNC: 137 MG/DL (ref 70–99)
HCT VFR BLD AUTO: 38.9 % (ref 41.1–50.3)
HGB BLD-MCNC: 12.1 G/DL (ref 13.6–17.2)
IMM GRANULOCYTES # BLD AUTO: 0.03 K/UL (ref 0–0.5)
IMM GRANULOCYTES NFR BLD AUTO: 0.4 % (ref 0–5)
LACTATE SERPL-SCNC: 1.7 MMOL/L (ref 0.5–2)
LYMPHOCYTES # BLD: 2.81 K/UL (ref 0.5–4.6)
LYMPHOCYTES NFR BLD: 33.3 % (ref 13–44)
MCH RBC QN AUTO: 26.2 PG (ref 26.1–32.9)
MCHC RBC AUTO-ENTMCNC: 31.1 G/DL (ref 31.4–35)
MCV RBC AUTO: 84.2 FL (ref 82–102)
MONOCYTES # BLD: 0.84 K/UL (ref 0.1–1.3)
MONOCYTES NFR BLD: 10 % (ref 4–12)
NEUTS SEG # BLD: 4.52 K/UL (ref 1.7–8.2)
NEUTS SEG NFR BLD: 53.4 % (ref 43–78)
NRBC # BLD: 0 K/UL (ref 0–0.2)
NT PRO BNP: 541 PG/ML (ref 0–125)
PLATELET # BLD AUTO: 147 K/UL (ref 150–450)
PMV BLD AUTO: 9.7 FL (ref 9.4–12.3)
POTASSIUM SERPL-SCNC: 4.1 MMOL/L (ref 3.5–5.1)
PROCALCITONIN SERPL-MCNC: 0.08 NG/ML (ref 0–0.1)
PROT SERPL-MCNC: 8 G/DL (ref 6.3–8.2)
RBC # BLD AUTO: 4.62 M/UL (ref 4.23–5.6)
SODIUM SERPL-SCNC: 140 MMOL/L (ref 136–145)
WBC # BLD AUTO: 8.4 K/UL (ref 4.3–11.1)

## 2025-03-31 PROCEDURE — 83880 ASSAY OF NATRIURETIC PEPTIDE: CPT

## 2025-03-31 PROCEDURE — 87040 BLOOD CULTURE FOR BACTERIA: CPT

## 2025-03-31 PROCEDURE — 71046 X-RAY EXAM CHEST 2 VIEWS: CPT

## 2025-03-31 PROCEDURE — 85025 COMPLETE CBC W/AUTO DIFF WBC: CPT

## 2025-03-31 PROCEDURE — 99284 EMERGENCY DEPT VISIT MOD MDM: CPT

## 2025-03-31 PROCEDURE — 80053 COMPREHEN METABOLIC PANEL: CPT

## 2025-03-31 PROCEDURE — 84145 PROCALCITONIN (PCT): CPT

## 2025-03-31 PROCEDURE — 83605 ASSAY OF LACTIC ACID: CPT

## 2025-03-31 RX ORDER — CLINDAMYCIN HYDROCHLORIDE 150 MG/1
300 CAPSULE ORAL 3 TIMES DAILY
Qty: 60 CAPSULE | Refills: 0 | Status: ON HOLD | OUTPATIENT
Start: 2025-03-31 | End: 2025-04-10

## 2025-03-31 ASSESSMENT — PAIN DESCRIPTION - LOCATION: LOCATION: LEG

## 2025-03-31 ASSESSMENT — PAIN SCALES - GENERAL: PAINLEVEL_OUTOF10: 8

## 2025-03-31 ASSESSMENT — ENCOUNTER SYMPTOMS
ABDOMINAL PAIN: 0
BACK PAIN: 0
CONSTIPATION: 0
VOMITING: 0
SHORTNESS OF BREATH: 0
DIARRHEA: 0
NAUSEA: 0
COLOR CHANGE: 1
COUGH: 0

## 2025-03-31 ASSESSMENT — PAIN - FUNCTIONAL ASSESSMENT: PAIN_FUNCTIONAL_ASSESSMENT: 0-10

## 2025-03-31 ASSESSMENT — PAIN DESCRIPTION - FREQUENCY: FREQUENCY: CONTINUOUS

## 2025-03-31 ASSESSMENT — PAIN DESCRIPTION - DESCRIPTORS: DESCRIPTORS: BURNING;THROBBING

## 2025-03-31 ASSESSMENT — PAIN DESCRIPTION - ORIENTATION: ORIENTATION: LEFT;RIGHT

## 2025-03-31 ASSESSMENT — PAIN DESCRIPTION - PAIN TYPE: TYPE: ACUTE PAIN;CHRONIC PAIN

## 2025-03-31 NOTE — ED PROVIDER NOTES
Emergency Department Provider Note       PCP: Meliza Martino PA-C   Age: 69 y.o.   Sex: male     DISPOSITION Decision To Discharge 03/31/2025 08:50:55 PM    ICD-10-CM    1. Cellulitis of lower extremity, unspecified laterality  L03.119 [facility], Wound Care          Medical Decision Making     Patient with bilateral lower extremity swelling and blister formation with erythema.  Diagnosed with cellulitis.  No elevated white blood cell count fever or lactic acid.  Will treat with antibiotics and discharged with wound care follow-up.     1 or more acute illnesses that pose a threat to life or bodily function.   Prescription drug management performed.  Patient was discharged risks and benefits of hospitalization were considered.  Shared medical decision making was utilized in creating the patients health plan today.  I independently ordered and reviewed each unique test.         ED cardiac monitoring rhythm strip was ordered and interpreted:  sinus rhythm, no evidence of an arrhythmia  ST Segments:Nonspecific ST segments - NO STEMI   Rate: 73  I interpreted the X-rays no infiltrate.              History     Patient seen in the ER 10 days ago with bilateral lower extremity edema and some chest pain.  Told to double up on his Lasix.  [place].  Had some blister formation yesterday and worsening redness in both lower legs.  Swelling has not improved with doubling up on his Lasix.  Mild shortness of breath.  Here for evaluation.    The history is provided by the patient. No  was used.   Ankle Problem  Location:  Leg, ankle and foot  Time since incident:  1 day  Injury: no    Leg location:  L lower leg and R lower leg  Ankle location:  L ankle and R ankle  Foot location:  L foot and R foot  Pain details:     Quality:  Aching    Radiates to:  Does not radiate    Severity:  Mild    Timing:  Constant    Progression:

## 2025-03-31 NOTE — ED TRIAGE NOTES
Pt arrived to the ED via POV. He is ambulatory to triage. He was seen for peripheral edema bilaterally. States he was in the sun fishing Friday for 1.5hrs. He noticed blistering yesterday. The blisters have ruptured. Hx of CHF. Decrease sensation to BLE due to neuropathy. He is diabetic. He has swelling and redness and blistering bilaterally

## 2025-04-03 ENCOUNTER — CARE COORDINATION (OUTPATIENT)
Dept: CARE COORDINATION | Facility: CLINIC | Age: 70
End: 2025-04-03

## 2025-04-03 NOTE — CARE COORDINATION
Ambulatory Care Coordination Note     4/3/2025 12:32 PM     Patient outreach attempt by this ACM today to perform care management follow up . ACM was unable to reach the patient by telephone today;   left voice message requesting a return phone call to this ACM.     ACM: Gale Rivera RN     PCP/Specialist follow up:   Future Appointments         Provider Specialty Dept Phone    4/8/2025 11:00 AM Meliza Martino PA-C Family Medicine [phone]    4/8/2025 2:00 PM Ny Ceron RN; Rangel Gongora, DO Wound Ostomy [phone]            Follow Up:   Plan for next AC outreach in approximately 1 week to complete:  - goal progression.

## 2025-04-05 ENCOUNTER — HOSPITAL ENCOUNTER (INPATIENT)
Age: 70
LOS: 3 days | Discharge: HOME | DRG: 638 | End: 2025-04-08
Attending: EMERGENCY MEDICINE | Admitting: FAMILY MEDICINE
Payer: MEDICARE

## 2025-04-05 DIAGNOSIS — L03.116 BILATERAL LOWER LEG CELLULITIS: Primary | ICD-10-CM

## 2025-04-05 DIAGNOSIS — L03.115 BILATERAL LOWER LEG CELLULITIS: Primary | ICD-10-CM

## 2025-04-05 DIAGNOSIS — I87.2 STASIS DERMATITIS OF BOTH LEGS: ICD-10-CM

## 2025-04-05 DIAGNOSIS — R60.0 BILATERAL LOWER EXTREMITY EDEMA: ICD-10-CM

## 2025-04-05 LAB
ALBUMIN SERPL-MCNC: 3.9 G/DL (ref 3.2–4.6)
ALBUMIN/GLOB SERPL: 1 (ref 1–1.9)
ALP SERPL-CCNC: 47 U/L (ref 40–129)
ALT SERPL-CCNC: 15 U/L (ref 8–55)
ANION GAP SERPL CALC-SCNC: 12 MMOL/L (ref 7–16)
AST SERPL-CCNC: 21 U/L (ref 15–37)
BACTERIA SPEC CULT: NORMAL
BASOPHILS # BLD: 0.04 K/UL (ref 0–0.2)
BASOPHILS NFR BLD: 0.4 % (ref 0–2)
BILIRUB SERPL-MCNC: 0.4 MG/DL (ref 0–1.2)
BUN SERPL-MCNC: 25 MG/DL (ref 8–23)
CALCIUM SERPL-MCNC: 9.5 MG/DL (ref 8.8–10.2)
CHLORIDE SERPL-SCNC: 100 MMOL/L (ref 98–107)
CO2 SERPL-SCNC: 25 MMOL/L (ref 20–29)
CREAT SERPL-MCNC: 1.17 MG/DL (ref 0.8–1.3)
CRP SERPL HS-MCNC: 7.8 MG/L (ref 0–3)
DIFFERENTIAL METHOD BLD: ABNORMAL
EOSINOPHIL # BLD: 0.19 K/UL (ref 0–0.8)
EOSINOPHIL NFR BLD: 1.9 % (ref 0.5–7.8)
ERYTHROCYTE [DISTWIDTH] IN BLOOD BY AUTOMATED COUNT: 21.1 % (ref 11.9–14.6)
GLOBULIN SER CALC-MCNC: 3.8 G/DL (ref 2.3–3.5)
GLUCOSE BLD STRIP.AUTO-MCNC: 87 MG/DL (ref 65–100)
GLUCOSE SERPL-MCNC: 168 MG/DL (ref 70–99)
HCT VFR BLD AUTO: 34.6 % (ref 41.1–50.3)
HGB BLD-MCNC: 11.4 G/DL (ref 13.6–17.2)
IMM GRANULOCYTES # BLD AUTO: 0.04 K/UL (ref 0–0.5)
IMM GRANULOCYTES NFR BLD AUTO: 0.4 % (ref 0–5)
LYMPHOCYTES # BLD: 2.89 K/UL (ref 0.5–4.6)
LYMPHOCYTES NFR BLD: 28.8 % (ref 13–44)
MCH RBC QN AUTO: 26.8 PG (ref 26.1–32.9)
MCHC RBC AUTO-ENTMCNC: 32.9 G/DL (ref 31.4–35)
MCV RBC AUTO: 81.4 FL (ref 82–102)
MONOCYTES # BLD: 0.84 K/UL (ref 0.1–1.3)
MONOCYTES NFR BLD: 8.4 % (ref 4–12)
NEUTS SEG # BLD: 6.04 K/UL (ref 1.7–8.2)
NEUTS SEG NFR BLD: 60.1 % (ref 43–78)
NRBC # BLD: 0 K/UL (ref 0–0.2)
NT PRO BNP: 349 PG/ML (ref 0–125)
PLATELET # BLD AUTO: 152 K/UL (ref 150–450)
PMV BLD AUTO: 10.2 FL (ref 9.4–12.3)
POTASSIUM SERPL-SCNC: 4.1 MMOL/L (ref 3.5–5.1)
PROT SERPL-MCNC: 7.7 G/DL (ref 6.3–8.2)
RBC # BLD AUTO: 4.25 M/UL (ref 4.23–5.6)
SERVICE CMNT-IMP: NORMAL
SERVICE CMNT-IMP: NORMAL
SODIUM SERPL-SCNC: 137 MMOL/L (ref 136–145)
WBC # BLD AUTO: 10 K/UL (ref 4.3–11.1)

## 2025-04-05 PROCEDURE — 87205 SMEAR GRAM STAIN: CPT

## 2025-04-05 PROCEDURE — 1100000000 HC RM PRIVATE

## 2025-04-05 PROCEDURE — 96374 THER/PROPH/DIAG INJ IV PUSH: CPT

## 2025-04-05 PROCEDURE — 87075 CULTR BACTERIA EXCEPT BLOOD: CPT

## 2025-04-05 PROCEDURE — 94640 AIRWAY INHALATION TREATMENT: CPT

## 2025-04-05 PROCEDURE — 6360000002 HC RX W HCPCS: Performed by: FAMILY MEDICINE

## 2025-04-05 PROCEDURE — 96375 TX/PRO/DX INJ NEW DRUG ADDON: CPT

## 2025-04-05 PROCEDURE — 6370000000 HC RX 637 (ALT 250 FOR IP): Performed by: FAMILY MEDICINE

## 2025-04-05 PROCEDURE — 82962 GLUCOSE BLOOD TEST: CPT

## 2025-04-05 PROCEDURE — 94760 N-INVAS EAR/PLS OXIMETRY 1: CPT

## 2025-04-05 PROCEDURE — 2500000003 HC RX 250 WO HCPCS: Performed by: FAMILY MEDICINE

## 2025-04-05 PROCEDURE — 85025 COMPLETE CBC W/AUTO DIFF WBC: CPT

## 2025-04-05 PROCEDURE — 99285 EMERGENCY DEPT VISIT HI MDM: CPT

## 2025-04-05 PROCEDURE — 86141 C-REACTIVE PROTEIN HS: CPT

## 2025-04-05 PROCEDURE — 83880 ASSAY OF NATRIURETIC PEPTIDE: CPT

## 2025-04-05 PROCEDURE — 80053 COMPREHEN METABOLIC PANEL: CPT

## 2025-04-05 PROCEDURE — 87070 CULTURE OTHR SPECIMN AEROBIC: CPT

## 2025-04-05 PROCEDURE — 2500000003 HC RX 250 WO HCPCS: Performed by: EMERGENCY MEDICINE

## 2025-04-05 PROCEDURE — 6360000002 HC RX W HCPCS: Performed by: EMERGENCY MEDICINE

## 2025-04-05 RX ORDER — PRAVASTATIN SODIUM 20 MG
40 TABLET ORAL
Status: DISCONTINUED | OUTPATIENT
Start: 2025-04-05 | End: 2025-04-08 | Stop reason: HOSPADM

## 2025-04-05 RX ORDER — MORPHINE SULFATE 4 MG/ML
4 INJECTION, SOLUTION INTRAMUSCULAR; INTRAVENOUS
Status: DISCONTINUED | OUTPATIENT
Start: 2025-04-05 | End: 2025-04-06

## 2025-04-05 RX ORDER — SODIUM CHLORIDE 0.9 % (FLUSH) 0.9 %
5-40 SYRINGE (ML) INJECTION PRN
Status: DISCONTINUED | OUTPATIENT
Start: 2025-04-05 | End: 2025-04-08 | Stop reason: HOSPADM

## 2025-04-05 RX ORDER — SPIRONOLACTONE 25 MG/1
25 TABLET ORAL DAILY
Status: DISCONTINUED | OUTPATIENT
Start: 2025-04-06 | End: 2025-04-08 | Stop reason: HOSPADM

## 2025-04-05 RX ORDER — IBUPROFEN 600 MG/1
1 TABLET ORAL PRN
Status: DISCONTINUED | OUTPATIENT
Start: 2025-04-05 | End: 2025-04-08 | Stop reason: HOSPADM

## 2025-04-05 RX ORDER — ARFORMOTEROL TARTRATE 15 UG/2ML
15 SOLUTION RESPIRATORY (INHALATION)
Status: DISCONTINUED | OUTPATIENT
Start: 2025-04-05 | End: 2025-04-08 | Stop reason: HOSPADM

## 2025-04-05 RX ORDER — ACETAMINOPHEN 650 MG/1
650 SUPPOSITORY RECTAL EVERY 6 HOURS PRN
Status: DISCONTINUED | OUTPATIENT
Start: 2025-04-05 | End: 2025-04-08 | Stop reason: HOSPADM

## 2025-04-05 RX ORDER — SODIUM CHLORIDE 0.9 % (FLUSH) 0.9 %
5-40 SYRINGE (ML) INJECTION EVERY 12 HOURS SCHEDULED
Status: DISCONTINUED | OUTPATIENT
Start: 2025-04-05 | End: 2025-04-08 | Stop reason: HOSPADM

## 2025-04-05 RX ORDER — INSULIN LISPRO 100 [IU]/ML
0-8 INJECTION, SOLUTION INTRAVENOUS; SUBCUTANEOUS
Status: DISCONTINUED | OUTPATIENT
Start: 2025-04-05 | End: 2025-04-08 | Stop reason: HOSPADM

## 2025-04-05 RX ORDER — METOPROLOL TARTRATE 50 MG
50 TABLET ORAL 2 TIMES DAILY
Status: DISCONTINUED | OUTPATIENT
Start: 2025-04-05 | End: 2025-04-08 | Stop reason: HOSPADM

## 2025-04-05 RX ORDER — SODIUM CHLORIDE 9 MG/ML
INJECTION, SOLUTION INTRAVENOUS PRN
Status: DISCONTINUED | OUTPATIENT
Start: 2025-04-05 | End: 2025-04-08 | Stop reason: HOSPADM

## 2025-04-05 RX ORDER — POTASSIUM CHLORIDE 1500 MG/1
40 TABLET, EXTENDED RELEASE ORAL PRN
Status: DISCONTINUED | OUTPATIENT
Start: 2025-04-05 | End: 2025-04-08 | Stop reason: HOSPADM

## 2025-04-05 RX ORDER — PANTOPRAZOLE SODIUM 40 MG/1
40 TABLET, DELAYED RELEASE ORAL
Status: DISCONTINUED | OUTPATIENT
Start: 2025-04-06 | End: 2025-04-08 | Stop reason: HOSPADM

## 2025-04-05 RX ORDER — BISACODYL 10 MG
10 SUPPOSITORY, RECTAL RECTAL DAILY PRN
Status: DISCONTINUED | OUTPATIENT
Start: 2025-04-05 | End: 2025-04-08 | Stop reason: HOSPADM

## 2025-04-05 RX ORDER — ACETAMINOPHEN 325 MG/1
650 TABLET ORAL EVERY 6 HOURS PRN
Status: DISCONTINUED | OUTPATIENT
Start: 2025-04-05 | End: 2025-04-08 | Stop reason: HOSPADM

## 2025-04-05 RX ORDER — BUDESONIDE 0.5 MG/2ML
1 INHALANT ORAL
Status: DISCONTINUED | OUTPATIENT
Start: 2025-04-05 | End: 2025-04-08 | Stop reason: HOSPADM

## 2025-04-05 RX ORDER — AMIODARONE HYDROCHLORIDE 200 MG/1
200 TABLET ORAL 2 TIMES DAILY
Status: DISCONTINUED | OUTPATIENT
Start: 2025-04-05 | End: 2025-04-08 | Stop reason: HOSPADM

## 2025-04-05 RX ORDER — EZETIMIBE 10 MG/1
10 TABLET ORAL NIGHTLY
Status: DISCONTINUED | OUTPATIENT
Start: 2025-04-05 | End: 2025-04-08 | Stop reason: HOSPADM

## 2025-04-05 RX ORDER — BUMETANIDE 0.25 MG/ML
0.5 INJECTION, SOLUTION INTRAMUSCULAR; INTRAVENOUS ONCE
Status: COMPLETED | OUTPATIENT
Start: 2025-04-05 | End: 2025-04-05

## 2025-04-05 RX ORDER — ALBUTEROL SULFATE 0.83 MG/ML
2.5 SOLUTION RESPIRATORY (INHALATION) EVERY 6 HOURS PRN
Status: DISCONTINUED | OUTPATIENT
Start: 2025-04-05 | End: 2025-04-08 | Stop reason: HOSPADM

## 2025-04-05 RX ORDER — DEXTROSE MONOHYDRATE 100 MG/ML
INJECTION, SOLUTION INTRAVENOUS CONTINUOUS PRN
Status: DISCONTINUED | OUTPATIENT
Start: 2025-04-05 | End: 2025-04-08 | Stop reason: HOSPADM

## 2025-04-05 RX ORDER — POLYETHYLENE GLYCOL 3350 17 G/17G
17 POWDER, FOR SOLUTION ORAL DAILY PRN
Status: DISCONTINUED | OUTPATIENT
Start: 2025-04-05 | End: 2025-04-08 | Stop reason: HOSPADM

## 2025-04-05 RX ORDER — ENOXAPARIN SODIUM 100 MG/ML
30 INJECTION SUBCUTANEOUS 2 TIMES DAILY
Status: DISCONTINUED | OUTPATIENT
Start: 2025-04-05 | End: 2025-04-08 | Stop reason: HOSPADM

## 2025-04-05 RX ORDER — MAGNESIUM SULFATE IN WATER 40 MG/ML
2000 INJECTION, SOLUTION INTRAVENOUS PRN
Status: DISCONTINUED | OUTPATIENT
Start: 2025-04-05 | End: 2025-04-08 | Stop reason: HOSPADM

## 2025-04-05 RX ORDER — BUMETANIDE 1 MG/1
2 TABLET ORAL DAILY
Status: DISCONTINUED | OUTPATIENT
Start: 2025-04-06 | End: 2025-04-08 | Stop reason: HOSPADM

## 2025-04-05 RX ORDER — POTASSIUM CHLORIDE 7.45 MG/ML
10 INJECTION INTRAVENOUS PRN
Status: DISCONTINUED | OUTPATIENT
Start: 2025-04-05 | End: 2025-04-08 | Stop reason: HOSPADM

## 2025-04-05 RX ORDER — MAGNESIUM HYDROXIDE/ALUMINUM HYDROXICE/SIMETHICONE 120; 1200; 1200 MG/30ML; MG/30ML; MG/30ML
30 SUSPENSION ORAL EVERY 6 HOURS PRN
Status: DISCONTINUED | OUTPATIENT
Start: 2025-04-05 | End: 2025-04-08 | Stop reason: HOSPADM

## 2025-04-05 RX ORDER — FAMOTIDINE 20 MG/1
10 TABLET, FILM COATED ORAL DAILY PRN
Status: DISCONTINUED | OUTPATIENT
Start: 2025-04-05 | End: 2025-04-08 | Stop reason: HOSPADM

## 2025-04-05 RX ORDER — GABAPENTIN 300 MG/1
300 CAPSULE ORAL 3 TIMES DAILY
Status: DISCONTINUED | OUTPATIENT
Start: 2025-04-05 | End: 2025-04-08 | Stop reason: HOSPADM

## 2025-04-05 RX ORDER — OXYCODONE HYDROCHLORIDE 5 MG/1
5 TABLET ORAL EVERY 6 HOURS PRN
Refills: 0 | Status: DISCONTINUED | OUTPATIENT
Start: 2025-04-05 | End: 2025-04-08 | Stop reason: HOSPADM

## 2025-04-05 RX ORDER — NALOXONE HYDROCHLORIDE 0.4 MG/ML
0.4 INJECTION, SOLUTION INTRAMUSCULAR; INTRAVENOUS; SUBCUTANEOUS PRN
Status: DISCONTINUED | OUTPATIENT
Start: 2025-04-05 | End: 2025-04-08 | Stop reason: HOSPADM

## 2025-04-05 RX ORDER — MORPHINE SULFATE 10 MG/ML
6 INJECTION, SOLUTION INTRAMUSCULAR; INTRAVENOUS ONCE
Status: COMPLETED | OUTPATIENT
Start: 2025-04-05 | End: 2025-04-05

## 2025-04-05 RX ORDER — LISINOPRIL 20 MG/1
20 TABLET ORAL 2 TIMES DAILY
Status: DISCONTINUED | OUTPATIENT
Start: 2025-04-05 | End: 2025-04-07

## 2025-04-05 RX ADMIN — SODIUM CHLORIDE, PRESERVATIVE FREE 10 ML: 5 INJECTION INTRAVENOUS at 19:51

## 2025-04-05 RX ADMIN — BUMETANIDE 0.5 MG: 0.25 INJECTION INTRAMUSCULAR; INTRAVENOUS at 16:05

## 2025-04-05 RX ADMIN — ARFORMOTEROL TARTRATE 15 MCG: 15 SOLUTION RESPIRATORY (INHALATION) at 19:29

## 2025-04-05 RX ADMIN — Medication 2500 MG: at 18:49

## 2025-04-05 RX ADMIN — MORPHINE SULFATE 6 MG: 10 INJECTION INTRAVENOUS at 16:05

## 2025-04-05 RX ADMIN — GABAPENTIN 300 MG: 300 CAPSULE ORAL at 19:50

## 2025-04-05 RX ADMIN — WATER 1000 MG: 1 INJECTION INTRAMUSCULAR; INTRAVENOUS; SUBCUTANEOUS at 16:08

## 2025-04-05 RX ADMIN — EZETIMIBE 10 MG: 10 TABLET ORAL at 19:51

## 2025-04-05 RX ADMIN — OXYCODONE HYDROCHLORIDE 5 MG: 5 TABLET ORAL at 18:53

## 2025-04-05 RX ADMIN — ENOXAPARIN SODIUM 30 MG: 100 INJECTION SUBCUTANEOUS at 19:51

## 2025-04-05 RX ADMIN — BUDESONIDE INHALATION 1000 MCG: 0.5 SUSPENSION RESPIRATORY (INHALATION) at 19:29

## 2025-04-05 RX ADMIN — IPRATROPIUM BROMIDE 0.5 MG: 0.5 SOLUTION RESPIRATORY (INHALATION) at 19:29

## 2025-04-05 ASSESSMENT — PAIN SCALES - GENERAL
PAINLEVEL_OUTOF10: 8
PAINLEVEL_OUTOF10: 10

## 2025-04-05 ASSESSMENT — PAIN DESCRIPTION - LOCATION
LOCATION: FOOT;LEG
LOCATION: LEG

## 2025-04-05 ASSESSMENT — PAIN DESCRIPTION - DESCRIPTORS
DESCRIPTORS: BURNING
DESCRIPTORS: BURNING;THROBBING

## 2025-04-05 ASSESSMENT — PAIN DESCRIPTION - ORIENTATION: ORIENTATION: RIGHT;LEFT

## 2025-04-05 ASSESSMENT — PAIN - FUNCTIONAL ASSESSMENT
PAIN_FUNCTIONAL_ASSESSMENT: 0-10
PAIN_FUNCTIONAL_ASSESSMENT: PREVENTS OR INTERFERES SOME ACTIVE ACTIVITIES AND ADLS

## 2025-04-05 NOTE — ED TRIAGE NOTES
Patient arrived with a complaint of bilateral leg pain. Patient arrived non resolving cellulitis issue to legs[place].

## 2025-04-05 NOTE — H&P
Hospitalist History and Physical   Admit Date:  2025  2:54 PM   Name:  Marc Horton Sr.   Age:  69 y.o.  Sex:  male  :  1955   MRN:  [id number]   Room:  ER14/14    Presenting/Chief Complaint: Leg Swelling     Reason(s) for Admission: Bilateral lower leg cellulitis [L03.116, L03.115]     History of Present Illness:   Marc Horton Sr. is a 69 y.o. male who presented to the ED for cc worsening bilateral lower extremity erythema and edema despite starting Clindamycin 5 days ago when seen in the ER after long exposure from the sun to legs. No noted trauma.      Hx of PUD, CAD s/p PCIs and CABG, DM type II, CHF EF 60%, bipolar, HLD, chronic venous HTN, bilateral lower extremity edema, COPD, paroxysmal a fib no longer on anticoagulation, s/p aortic valve replacement. CVA, SBO  Assessment & Plan:     Active Problems:    Bilateral LE cellulitis, failing outpatient treatment - Vancomycin since I suspect cellulitis is staph aureus related. Ordering wound cultures. Consult wound care.     CHF - BB, ACE, bumex 2mg daily. Spironolactone.     P a fib - Amiodarone     DM type II - SS    COPD - PRN albuterol, trelegy    HLD - statin     PUD - PPI      PT/OT evals ordered?  Therapy evals ordered  Diet: ADULT DIET; Regular; 4 carb choices (60 gm/meal); Low Fat/Low Chol/High Fiber/2 gm Na; 2000 ml  VTE prophylaxis: Lovenox  Code status: Full Code  Code status discussed: No  Blood consent obtained: No      Non-peripheral Lines and Tubes (if present):             Hospital Problems:  Principal Problem:    Bilateral lower leg cellulitis  Active Problems:    Bipolar disorder    Chronic venous hypertension (idiopathic) with other complications of bilateral lower extremity    Paroxysmal atrial fibrillation (HCC)    S/P AVR (aortic valve replacement)    Type 2 diabetes mellitus    HTN (hypertension)    COPD (chronic obstructive pulmonary disease) (HCC)    Hx of CABG  Resolved Problems:    * No resolved hospital

## 2025-04-05 NOTE — ED NOTES
TRANSFER - OUT REPORT:    Verbal report given to SEJAL Merrill on Marc Horton .  being transferred to [facility] for routine progression of patient care       Report consisted of patient's Situation, Background, Assessment and   Recommendations(SBAR).     Information from the following report(s) ED Encounter Summary and ED SBAR was reviewed with the receiving nurse.    Kinder Fall Assessment:                           Lines:   Peripheral IV 04/05/25 Left Antecubital (Active)   Site Assessment Clean, dry & intact 04/05/25 1514   Line Status Blood return noted;Normal saline locked;Flushed 04/05/25 1514   Phlebitis Assessment No symptoms 04/05/25 1514   Infiltration Assessment 0 04/05/25 1514   Dressing Status Clean, dry & intact 04/05/25 1514   Dressing Type Transparent 04/05/25 1514        Opportunity for questions and clarification was provided.      Patient transported with:  Mechelle Alba RN  04/05/25 [id number]

## 2025-04-05 NOTE — ED PROVIDER NOTES
REPAIR      TRANSESOPHAGEAL ECHOCARDIOGRAM N/A 3/15/2023    TRANSESOPHAGEAL ECHOCARDIOGRAM performed by Jesse Lares MD at [facility] MAIN OR    UPPER GASTROINTESTINAL ENDOSCOPY N/A 2024    ESOPHAGOGASTRODUODENOSCOPY performed by Alissa Stark MD at [facility] ENDOSCOPY     Family history: No family history on file.  Social history:   Social History     Socioeconomic History    Marital status:    Tobacco Use    Smoking status: Some Days     Current packs/day: 0.00     Types: Cigars, Cigarettes     Last attempt to quit: 1998     Years since quittin.2    Smokeless tobacco: Never    Tobacco comments:     Quit smoking cigarettes smokes some cigars   Vaping Use    Vaping status: Never Used   Substance and Sexual Activity    Alcohol use: Not Currently    Drug use: Never    Sexual activity: Not Currently     Partners: Female     Social Drivers of Health     Financial Resource Strain: Low Risk  (2024)    Overall Financial Resource Strain (CARDIA)     Difficulty of Paying Living Expenses: Not very hard   Food Insecurity: No Food Insecurity (2025)    Hunger Vital Sign     Worried About Running Out of Food in the Last Year: Never true     Ran Out of Food in the Last Year: Never true   Transportation Needs: No Transportation Needs (2025)    PRAPARE - Transportation     Lack of Transportation (Medical): No     Lack of Transportation (Non-Medical): No   Physical Activity: Inactive (2024)    Received from [email or web address]    Physical Activity     Days of Exercise per Week: 0 days     Minutes of Exercise per Session: 0     Total Minutes of Exercise per Week: 0   Stress: No Stress Concern Present (2024)    Received from [email or web address]    Stress     Feeling of Stress : Not at all   Social Connections: Socially Integrated (2024)    Received from [facility]    Social Connections     Frequency of Communication with Friends and Family: More than

## 2025-04-06 PROBLEM — D68.69 HYPERCOAGULABILITY DUE TO ATRIAL FIBRILLATION (HCC): Status: ACTIVE | Noted: 2025-04-06

## 2025-04-06 PROBLEM — I50.9 CHRONIC CHF (CONGESTIVE HEART FAILURE) (HCC): Status: ACTIVE | Noted: 2024-03-13

## 2025-04-06 PROBLEM — I48.91 HYPERCOAGULABILITY DUE TO ATRIAL FIBRILLATION (HCC): Status: ACTIVE | Noted: 2025-04-06

## 2025-04-06 LAB
ALBUMIN SERPL-MCNC: 3.4 G/DL (ref 3.2–4.6)
ALBUMIN/GLOB SERPL: 1 (ref 1–1.9)
ALP SERPL-CCNC: 44 U/L (ref 40–129)
ALT SERPL-CCNC: 13 U/L (ref 8–55)
ANION GAP SERPL CALC-SCNC: 9 MMOL/L (ref 7–16)
AST SERPL-CCNC: 18 U/L (ref 15–37)
BASOPHILS # BLD: 0.02 K/UL (ref 0–0.2)
BASOPHILS NFR BLD: 0.3 % (ref 0–2)
BILIRUB DIRECT SERPL-MCNC: 0.1 MG/DL (ref 0–0.3)
BILIRUB SERPL-MCNC: 0.3 MG/DL (ref 0–1.2)
BUN SERPL-MCNC: 24 MG/DL (ref 8–23)
CALCIUM SERPL-MCNC: 9 MG/DL (ref 8.8–10.2)
CHLORIDE SERPL-SCNC: 103 MMOL/L (ref 98–107)
CO2 SERPL-SCNC: 30 MMOL/L (ref 20–29)
CREAT SERPL-MCNC: 1.26 MG/DL (ref 0.8–1.3)
DIFFERENTIAL METHOD BLD: ABNORMAL
EOSINOPHIL # BLD: 0.21 K/UL (ref 0–0.8)
EOSINOPHIL NFR BLD: 2.9 % (ref 0.5–7.8)
ERYTHROCYTE [DISTWIDTH] IN BLOOD BY AUTOMATED COUNT: 21.2 % (ref 11.9–14.6)
EST. AVERAGE GLUCOSE BLD GHB EST-MCNC: 154 MG/DL
GLOBULIN SER CALC-MCNC: 3.5 G/DL (ref 2.3–3.5)
GLUCOSE BLD STRIP.AUTO-MCNC: 114 MG/DL (ref 65–100)
GLUCOSE BLD STRIP.AUTO-MCNC: 115 MG/DL (ref 65–100)
GLUCOSE BLD STRIP.AUTO-MCNC: 148 MG/DL (ref 65–100)
GLUCOSE BLD STRIP.AUTO-MCNC: 167 MG/DL (ref 65–100)
GLUCOSE BLD STRIP.AUTO-MCNC: 199 MG/DL (ref 65–100)
GLUCOSE SERPL-MCNC: 116 MG/DL (ref 70–99)
HBA1C MFR BLD: 7 % (ref 0–5.6)
HCT VFR BLD AUTO: 34.8 % (ref 41.1–50.3)
HGB BLD-MCNC: 11.3 G/DL (ref 13.6–17.2)
IMM GRANULOCYTES # BLD AUTO: 0.01 K/UL (ref 0–0.5)
IMM GRANULOCYTES NFR BLD AUTO: 0.1 % (ref 0–5)
LYMPHOCYTES # BLD: 2.92 K/UL (ref 0.5–4.6)
LYMPHOCYTES NFR BLD: 40.4 % (ref 13–44)
MCH RBC QN AUTO: 27 PG (ref 26.1–32.9)
MCHC RBC AUTO-ENTMCNC: 32.5 G/DL (ref 31.4–35)
MCV RBC AUTO: 83.3 FL (ref 82–102)
MONOCYTES # BLD: 0.84 K/UL (ref 0.1–1.3)
MONOCYTES NFR BLD: 11.6 % (ref 4–12)
NEUTS SEG # BLD: 3.22 K/UL (ref 1.7–8.2)
NEUTS SEG NFR BLD: 44.7 % (ref 43–78)
NRBC # BLD: 0 K/UL (ref 0–0.2)
PLATELET # BLD AUTO: 139 K/UL (ref 150–450)
PMV BLD AUTO: 10.3 FL (ref 9.4–12.3)
POTASSIUM SERPL-SCNC: 4.3 MMOL/L (ref 3.5–5.1)
PROT SERPL-MCNC: 6.9 G/DL (ref 6.3–8.2)
RBC # BLD AUTO: 4.18 M/UL (ref 4.23–5.6)
SERVICE CMNT-IMP: ABNORMAL
SODIUM SERPL-SCNC: 142 MMOL/L (ref 136–145)
WBC # BLD AUTO: 7.2 K/UL (ref 4.3–11.1)

## 2025-04-06 PROCEDURE — 97112 NEUROMUSCULAR REEDUCATION: CPT

## 2025-04-06 PROCEDURE — 6360000002 HC RX W HCPCS: Performed by: FAMILY MEDICINE

## 2025-04-06 PROCEDURE — 94760 N-INVAS EAR/PLS OXIMETRY 1: CPT

## 2025-04-06 PROCEDURE — 94664 DEMO&/EVAL PT USE INHALER: CPT

## 2025-04-06 PROCEDURE — 36415 COLL VENOUS BLD VENIPUNCTURE: CPT

## 2025-04-06 PROCEDURE — 97535 SELF CARE MNGMENT TRAINING: CPT

## 2025-04-06 PROCEDURE — 2580000003 HC RX 258: Performed by: FAMILY MEDICINE

## 2025-04-06 PROCEDURE — 1100000000 HC RM PRIVATE

## 2025-04-06 PROCEDURE — 2500000003 HC RX 250 WO HCPCS: Performed by: FAMILY MEDICINE

## 2025-04-06 PROCEDURE — 6370000000 HC RX 637 (ALT 250 FOR IP): Performed by: FAMILY MEDICINE

## 2025-04-06 PROCEDURE — 97530 THERAPEUTIC ACTIVITIES: CPT

## 2025-04-06 PROCEDURE — 6370000000 HC RX 637 (ALT 250 FOR IP): Performed by: NURSE PRACTITIONER

## 2025-04-06 PROCEDURE — 87641 MR-STAPH DNA AMP PROBE: CPT

## 2025-04-06 PROCEDURE — 83036 HEMOGLOBIN GLYCOSYLATED A1C: CPT

## 2025-04-06 PROCEDURE — 94761 N-INVAS EAR/PLS OXIMETRY MLT: CPT

## 2025-04-06 PROCEDURE — 97165 OT EVAL LOW COMPLEX 30 MIN: CPT

## 2025-04-06 PROCEDURE — 94640 AIRWAY INHALATION TREATMENT: CPT

## 2025-04-06 PROCEDURE — 80048 BASIC METABOLIC PNL TOTAL CA: CPT

## 2025-04-06 PROCEDURE — 85025 COMPLETE CBC W/AUTO DIFF WBC: CPT

## 2025-04-06 PROCEDURE — 97161 PT EVAL LOW COMPLEX 20 MIN: CPT

## 2025-04-06 PROCEDURE — 80076 HEPATIC FUNCTION PANEL: CPT

## 2025-04-06 PROCEDURE — 82962 GLUCOSE BLOOD TEST: CPT

## 2025-04-06 RX ORDER — LACTOBACILLUS RHAMNOSUS GG 10B CELL
1 CAPSULE ORAL
Status: DISCONTINUED | OUTPATIENT
Start: 2025-04-06 | End: 2025-04-08 | Stop reason: HOSPADM

## 2025-04-06 RX ORDER — MORPHINE SULFATE 2 MG/ML
2 INJECTION, SOLUTION INTRAMUSCULAR; INTRAVENOUS EVERY 4 HOURS PRN
Refills: 0 | Status: DISCONTINUED | OUTPATIENT
Start: 2025-04-06 | End: 2025-04-08 | Stop reason: HOSPADM

## 2025-04-06 RX ORDER — IBUPROFEN 400 MG/1
600 TABLET, FILM COATED ORAL ONCE
Status: COMPLETED | OUTPATIENT
Start: 2025-04-06 | End: 2025-04-06

## 2025-04-06 RX ORDER — ACETAMINOPHEN 500 MG
1000 TABLET ORAL ONCE
Status: DISCONTINUED | OUTPATIENT
Start: 2025-04-06 | End: 2025-04-08 | Stop reason: HOSPADM

## 2025-04-06 RX ORDER — MORPHINE SULFATE 2 MG/ML
2 INJECTION, SOLUTION INTRAMUSCULAR; INTRAVENOUS ONCE
Refills: 0 | Status: COMPLETED | OUTPATIENT
Start: 2025-04-06 | End: 2025-04-06

## 2025-04-06 RX ADMIN — SODIUM CHLORIDE, PRESERVATIVE FREE 10 ML: 5 INJECTION INTRAVENOUS at 08:08

## 2025-04-06 RX ADMIN — IPRATROPIUM BROMIDE 0.5 MG: 0.5 SOLUTION RESPIRATORY (INHALATION) at 07:18

## 2025-04-06 RX ADMIN — MORPHINE SULFATE 2 MG: 2 INJECTION, SOLUTION INTRAMUSCULAR; INTRAVENOUS at 05:46

## 2025-04-06 RX ADMIN — IPRATROPIUM BROMIDE 0.5 MG: 0.5 SOLUTION RESPIRATORY (INHALATION) at 02:11

## 2025-04-06 RX ADMIN — SODIUM CHLORIDE, PRESERVATIVE FREE 10 ML: 5 INJECTION INTRAVENOUS at 20:11

## 2025-04-06 RX ADMIN — IPRATROPIUM BROMIDE 0.5 MG: 0.5 SOLUTION RESPIRATORY (INHALATION) at 19:07

## 2025-04-06 RX ADMIN — IBUPROFEN 600 MG: 400 TABLET, FILM COATED ORAL at 20:01

## 2025-04-06 RX ADMIN — GABAPENTIN 300 MG: 300 CAPSULE ORAL at 14:42

## 2025-04-06 RX ADMIN — OXYCODONE HYDROCHLORIDE 5 MG: 5 TABLET ORAL at 23:01

## 2025-04-06 RX ADMIN — Medication 1 CAPSULE: at 10:50

## 2025-04-06 RX ADMIN — OXYCODONE HYDROCHLORIDE 5 MG: 5 TABLET ORAL at 17:18

## 2025-04-06 RX ADMIN — OXYCODONE HYDROCHLORIDE 5 MG: 5 TABLET ORAL at 02:17

## 2025-04-06 RX ADMIN — PRAVASTATIN SODIUM 40 MG: 20 TABLET ORAL at 20:01

## 2025-04-06 RX ADMIN — INSULIN LISPRO 2 UNITS: 100 INJECTION, SOLUTION INTRAVENOUS; SUBCUTANEOUS at 11:28

## 2025-04-06 RX ADMIN — METOPROLOL TARTRATE 50 MG: 50 TABLET, FILM COATED ORAL at 08:08

## 2025-04-06 RX ADMIN — BUMETANIDE 2 MG: 1 TABLET ORAL at 08:07

## 2025-04-06 RX ADMIN — MORPHINE SULFATE 2 MG: 2 INJECTION, SOLUTION INTRAMUSCULAR; INTRAVENOUS at 14:42

## 2025-04-06 RX ADMIN — GABAPENTIN 300 MG: 300 CAPSULE ORAL at 08:07

## 2025-04-06 RX ADMIN — AMIODARONE HYDROCHLORIDE 200 MG: 200 TABLET ORAL at 08:07

## 2025-04-06 RX ADMIN — BUDESONIDE INHALATION 1000 MCG: 0.5 SUSPENSION RESPIRATORY (INHALATION) at 19:07

## 2025-04-06 RX ADMIN — SPIRONOLACTONE 25 MG: 25 TABLET ORAL at 08:08

## 2025-04-06 RX ADMIN — LISINOPRIL 20 MG: 20 TABLET ORAL at 08:08

## 2025-04-06 RX ADMIN — PANTOPRAZOLE SODIUM 40 MG: 40 TABLET, DELAYED RELEASE ORAL at 17:18

## 2025-04-06 RX ADMIN — EZETIMIBE 10 MG: 10 TABLET ORAL at 20:01

## 2025-04-06 RX ADMIN — ARFORMOTEROL TARTRATE 15 MCG: 15 SOLUTION RESPIRATORY (INHALATION) at 07:17

## 2025-04-06 RX ADMIN — ENOXAPARIN SODIUM 30 MG: 100 INJECTION SUBCUTANEOUS at 20:01

## 2025-04-06 RX ADMIN — PANTOPRAZOLE SODIUM 40 MG: 40 TABLET, DELAYED RELEASE ORAL at 05:43

## 2025-04-06 RX ADMIN — GABAPENTIN 300 MG: 300 CAPSULE ORAL at 20:08

## 2025-04-06 RX ADMIN — VANCOMYCIN HYDROCHLORIDE 1000 MG: 1 INJECTION, POWDER, LYOPHILIZED, FOR SOLUTION INTRAVENOUS at 05:42

## 2025-04-06 RX ADMIN — OXYCODONE HYDROCHLORIDE 5 MG: 5 TABLET ORAL at 11:00

## 2025-04-06 RX ADMIN — ENOXAPARIN SODIUM 30 MG: 100 INJECTION SUBCUTANEOUS at 08:08

## 2025-04-06 RX ADMIN — BUDESONIDE INHALATION 500 MCG: 0.5 SUSPENSION RESPIRATORY (INHALATION) at 07:18

## 2025-04-06 RX ADMIN — VANCOMYCIN HYDROCHLORIDE 1750 MG: 10 INJECTION, POWDER, LYOPHILIZED, FOR SOLUTION INTRAVENOUS at 17:20

## 2025-04-06 ASSESSMENT — PAIN DESCRIPTION - ORIENTATION
ORIENTATION: RIGHT
ORIENTATION: RIGHT;LEFT
ORIENTATION: LEFT;RIGHT
ORIENTATION: RIGHT;LEFT
ORIENTATION: RIGHT
ORIENTATION: RIGHT;LEFT
ORIENTATION: RIGHT;LEFT

## 2025-04-06 ASSESSMENT — PAIN DESCRIPTION - LOCATION
LOCATION: LEG
LOCATION: FOOT;LEG
LOCATION: LEG

## 2025-04-06 ASSESSMENT — PAIN SCALES - GENERAL
PAINLEVEL_OUTOF10: 10
PAINLEVEL_OUTOF10: 6
PAINLEVEL_OUTOF10: 6
PAINLEVEL_OUTOF10: 9
PAINLEVEL_OUTOF10: 7
PAINLEVEL_OUTOF10: 8
PAINLEVEL_OUTOF10: 8
PAINLEVEL_OUTOF10: 0
PAINLEVEL_OUTOF10: 9
PAINLEVEL_OUTOF10: 9

## 2025-04-06 ASSESSMENT — PAIN DESCRIPTION - DESCRIPTORS
DESCRIPTORS: BURNING;DISCOMFORT
DESCRIPTORS: BURNING;ACHING
DESCRIPTORS: BURNING
DESCRIPTORS: ACHING
DESCRIPTORS: BURNING
DESCRIPTORS: ACHING
DESCRIPTORS: ACHING
DESCRIPTORS: ACHING;BURNING;SORE

## 2025-04-06 ASSESSMENT — PAIN DESCRIPTION - PAIN TYPE
TYPE: ACUTE PAIN

## 2025-04-06 ASSESSMENT — PAIN - FUNCTIONAL ASSESSMENT
PAIN_FUNCTIONAL_ASSESSMENT: PREVENTS OR INTERFERES SOME ACTIVE ACTIVITIES AND ADLS

## 2025-04-06 ASSESSMENT — PAIN DESCRIPTION - FREQUENCY: FREQUENCY: CONTINUOUS

## 2025-04-06 ASSESSMENT — PAIN DESCRIPTION - ONSET: ONSET: ON-GOING
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
## 2025-04-07 ENCOUNTER — APPOINTMENT (OUTPATIENT)
Dept: ULTRASOUND IMAGING | Age: 70
DRG: 638 | End: 2025-04-07
Payer: MEDICARE

## 2025-04-07 LAB
ANION GAP SERPL CALC-SCNC: 9 MMOL/L (ref 7–16)
BASOPHILS # BLD: 0.04 K/UL (ref 0–0.2)
BASOPHILS NFR BLD: 0.6 % (ref 0–2)
BUN SERPL-MCNC: 23 MG/DL (ref 8–23)
CALCIUM SERPL-MCNC: 9.1 MG/DL (ref 8.8–10.2)
CHLORIDE SERPL-SCNC: 102 MMOL/L (ref 98–107)
CO2 SERPL-SCNC: 26 MMOL/L (ref 20–29)
CREAT SERPL-MCNC: 1.1 MG/DL (ref 0.8–1.3)
DIFFERENTIAL METHOD BLD: ABNORMAL
EOSINOPHIL # BLD: 0.18 K/UL (ref 0–0.8)
EOSINOPHIL NFR BLD: 2.9 % (ref 0.5–7.8)
ERYTHROCYTE [DISTWIDTH] IN BLOOD BY AUTOMATED COUNT: 20.9 % (ref 11.9–14.6)
GLUCOSE BLD STRIP.AUTO-MCNC: 120 MG/DL (ref 65–100)
GLUCOSE BLD STRIP.AUTO-MCNC: 137 MG/DL (ref 65–100)
GLUCOSE BLD STRIP.AUTO-MCNC: 148 MG/DL (ref 65–100)
GLUCOSE BLD STRIP.AUTO-MCNC: 187 MG/DL (ref 65–100)
GLUCOSE SERPL-MCNC: 119 MG/DL (ref 70–99)
HCT VFR BLD AUTO: 33.7 % (ref 41.1–50.3)
HGB BLD-MCNC: 10.8 G/DL (ref 13.6–17.2)
IMM GRANULOCYTES # BLD AUTO: 0.02 K/UL (ref 0–0.5)
IMM GRANULOCYTES NFR BLD AUTO: 0.3 % (ref 0–5)
LYMPHOCYTES # BLD: 2.41 K/UL (ref 0.5–4.6)
LYMPHOCYTES NFR BLD: 38.3 % (ref 13–44)
MCH RBC QN AUTO: 26.5 PG (ref 26.1–32.9)
MCHC RBC AUTO-ENTMCNC: 32 G/DL (ref 31.4–35)
MCV RBC AUTO: 82.8 FL (ref 82–102)
MONOCYTES # BLD: 0.57 K/UL (ref 0.1–1.3)
MONOCYTES NFR BLD: 9 % (ref 4–12)
MRSA DNA SPEC QL NAA+PROBE: NOT DETECTED
NEUTS SEG # BLD: 3.08 K/UL (ref 1.7–8.2)
NEUTS SEG NFR BLD: 48.9 % (ref 43–78)
NRBC # BLD: 0 K/UL (ref 0–0.2)
PLATELET # BLD AUTO: 135 K/UL (ref 150–450)
PLATELET COMMENT: ADEQUATE
PMV BLD AUTO: 10.3 FL (ref 9.4–12.3)
POTASSIUM SERPL-SCNC: 4.3 MMOL/L (ref 3.5–5.1)
RBC # BLD AUTO: 4.07 M/UL (ref 4.23–5.6)
RBC MORPH BLD: ABNORMAL
S AUREUS CPE NOSE QL NAA+PROBE: NOT DETECTED
SERVICE CMNT-IMP: ABNORMAL
SODIUM SERPL-SCNC: 137 MMOL/L (ref 136–145)
VANCOMYCIN SERPL-MCNC: 17.7 UG/ML
WBC # BLD AUTO: 6.3 K/UL (ref 4.3–11.1)
WBC MORPH BLD: ABNORMAL

## 2025-04-07 PROCEDURE — 6360000002 HC RX W HCPCS: Performed by: FAMILY MEDICINE

## 2025-04-07 PROCEDURE — 80048 BASIC METABOLIC PNL TOTAL CA: CPT

## 2025-04-07 PROCEDURE — 85025 COMPLETE CBC W/AUTO DIFF WBC: CPT

## 2025-04-07 PROCEDURE — 2500000003 HC RX 250 WO HCPCS: Performed by: FAMILY MEDICINE

## 2025-04-07 PROCEDURE — 94760 N-INVAS EAR/PLS OXIMETRY 1: CPT

## 2025-04-07 PROCEDURE — 36415 COLL VENOUS BLD VENIPUNCTURE: CPT

## 2025-04-07 PROCEDURE — 94640 AIRWAY INHALATION TREATMENT: CPT

## 2025-04-07 PROCEDURE — 82962 GLUCOSE BLOOD TEST: CPT

## 2025-04-07 PROCEDURE — 6370000000 HC RX 637 (ALT 250 FOR IP): Performed by: FAMILY MEDICINE

## 2025-04-07 PROCEDURE — 80202 ASSAY OF VANCOMYCIN: CPT

## 2025-04-07 PROCEDURE — 1100000000 HC RM PRIVATE

## 2025-04-07 RX ORDER — LISINOPRIL 20 MG/1
20 TABLET ORAL DAILY
Status: DISCONTINUED | OUTPATIENT
Start: 2025-04-08 | End: 2025-04-08 | Stop reason: HOSPADM

## 2025-04-07 RX ADMIN — MORPHINE SULFATE 2 MG: 2 INJECTION, SOLUTION INTRAMUSCULAR; INTRAVENOUS at 08:04

## 2025-04-07 RX ADMIN — SODIUM CHLORIDE, PRESERVATIVE FREE 10 ML: 5 INJECTION INTRAVENOUS at 20:13

## 2025-04-07 RX ADMIN — SPIRONOLACTONE 25 MG: 25 TABLET ORAL at 07:57

## 2025-04-07 RX ADMIN — MORPHINE SULFATE 2 MG: 2 INJECTION, SOLUTION INTRAMUSCULAR; INTRAVENOUS at 18:16

## 2025-04-07 RX ADMIN — ENOXAPARIN SODIUM 30 MG: 100 INJECTION SUBCUTANEOUS at 07:57

## 2025-04-07 RX ADMIN — SODIUM CHLORIDE, PRESERVATIVE FREE 10 ML: 5 INJECTION INTRAVENOUS at 07:58

## 2025-04-07 RX ADMIN — PANTOPRAZOLE SODIUM 40 MG: 40 TABLET, DELAYED RELEASE ORAL at 16:42

## 2025-04-07 RX ADMIN — INSULIN LISPRO 2 UNITS: 100 INJECTION, SOLUTION INTRAVENOUS; SUBCUTANEOUS at 12:03

## 2025-04-07 RX ADMIN — BUMETANIDE 2 MG: 1 TABLET ORAL at 07:57

## 2025-04-07 RX ADMIN — AMIODARONE HYDROCHLORIDE 200 MG: 200 TABLET ORAL at 20:14

## 2025-04-07 RX ADMIN — BUDESONIDE INHALATION 1000 MCG: 0.5 SUSPENSION RESPIRATORY (INHALATION) at 07:39

## 2025-04-07 RX ADMIN — METOPROLOL TARTRATE 50 MG: 50 TABLET, FILM COATED ORAL at 20:14

## 2025-04-07 RX ADMIN — PRAVASTATIN SODIUM 40 MG: 20 TABLET ORAL at 20:14

## 2025-04-07 RX ADMIN — WATER 1000 MG: 1 INJECTION INTRAMUSCULAR; INTRAVENOUS; SUBCUTANEOUS at 16:42

## 2025-04-07 RX ADMIN — PANTOPRAZOLE SODIUM 40 MG: 40 TABLET, DELAYED RELEASE ORAL at 06:18

## 2025-04-07 RX ADMIN — MORPHINE SULFATE 2 MG: 2 INJECTION, SOLUTION INTRAMUSCULAR; INTRAVENOUS at 14:15

## 2025-04-07 RX ADMIN — AMIODARONE HYDROCHLORIDE 200 MG: 200 TABLET ORAL at 07:57

## 2025-04-07 RX ADMIN — GABAPENTIN 300 MG: 300 CAPSULE ORAL at 07:57

## 2025-04-07 RX ADMIN — GABAPENTIN 300 MG: 300 CAPSULE ORAL at 14:15

## 2025-04-07 RX ADMIN — BUDESONIDE INHALATION 1000 MCG: 0.5 SUSPENSION RESPIRATORY (INHALATION) at 20:23

## 2025-04-07 RX ADMIN — IPRATROPIUM BROMIDE 0.5 MG: 0.5 SOLUTION RESPIRATORY (INHALATION) at 20:23

## 2025-04-07 RX ADMIN — GABAPENTIN 300 MG: 300 CAPSULE ORAL at 20:14

## 2025-04-07 RX ADMIN — ENOXAPARIN SODIUM 30 MG: 100 INJECTION SUBCUTANEOUS at 20:14

## 2025-04-07 RX ADMIN — MORPHINE SULFATE 2 MG: 2 INJECTION, SOLUTION INTRAMUSCULAR; INTRAVENOUS at 23:39

## 2025-04-07 RX ADMIN — OXYCODONE HYDROCHLORIDE 5 MG: 5 TABLET ORAL at 12:08

## 2025-04-07 RX ADMIN — Medication 1 CAPSULE: at 07:57

## 2025-04-07 RX ADMIN — EZETIMIBE 10 MG: 10 TABLET ORAL at 20:14

## 2025-04-07 RX ADMIN — ARFORMOTEROL TARTRATE 15 MCG: 15 SOLUTION RESPIRATORY (INHALATION) at 20:23

## 2025-04-07 RX ADMIN — OXYCODONE HYDROCHLORIDE 5 MG: 5 TABLET ORAL at 20:17

## 2025-04-07 RX ADMIN — IPRATROPIUM BROMIDE 0.5 MG: 0.5 SOLUTION RESPIRATORY (INHALATION) at 07:39

## 2025-04-07 RX ADMIN — MORPHINE SULFATE 2 MG: 2 INJECTION, SOLUTION INTRAMUSCULAR; INTRAVENOUS at 03:44

## 2025-04-07 RX ADMIN — IPRATROPIUM BROMIDE 0.5 MG: 0.5 SOLUTION RESPIRATORY (INHALATION) at 01:13

## 2025-04-07 RX ADMIN — LISINOPRIL 20 MG: 20 TABLET ORAL at 07:57

## 2025-04-07 RX ADMIN — METOPROLOL TARTRATE 50 MG: 50 TABLET, FILM COATED ORAL at 07:57

## 2025-04-07 ASSESSMENT — PAIN DESCRIPTION - DESCRIPTORS
DESCRIPTORS: BURNING;STABBING
DESCRIPTORS: BURNING;ACHING;THROBBING
DESCRIPTORS: BURNING;STABBING;ACHING
DESCRIPTORS: BURNING;ACHING
DESCRIPTORS: BURNING
DESCRIPTORS: BURNING;ACHING
DESCRIPTORS: BURNING;SHARP

## 2025-04-07 ASSESSMENT — PAIN DESCRIPTION - LOCATION
LOCATION: FOOT;LEG
LOCATION: LEG
LOCATION: LEG;FOOT
LOCATION: FOOT;LEG
LOCATION: LEG
LOCATION: FOOT;LEG

## 2025-04-07 ASSESSMENT — PAIN SCALES - GENERAL
PAINLEVEL_OUTOF10: 7
PAINLEVEL_OUTOF10: 9
PAINLEVEL_OUTOF10: 2
PAINLEVEL_OUTOF10: 3
PAINLEVEL_OUTOF10: 10
PAINLEVEL_OUTOF10: 9
PAINLEVEL_OUTOF10: 8
PAINLEVEL_OUTOF10: 3
PAINLEVEL_OUTOF10: 9
PAINLEVEL_OUTOF10: 9

## 2025-04-07 ASSESSMENT — PAIN DESCRIPTION - ORIENTATION
ORIENTATION: RIGHT;LEFT
ORIENTATION: LEFT;RIGHT
ORIENTATION: LEFT;RIGHT
ORIENTATION: RIGHT;LEFT
ORIENTATION: RIGHT;LEFT

## 2025-04-07 NOTE — DIABETES MGMT
Patient seen for diabetes education.    Patient seen for assessment regarding diabetes management by diabetes educator. Admitting blood glucose 168. A1c 7.0%. Glucose 120 this AM, reviewed with patient current regimen Humalog correctional insulin. Patient states they have been living with diabetes for over 15 years and voices a positive family history of diabetes. Patient states they do not have a working glucometer with supplies at home. Patient is familiar with how to check blood sugars but states currently his PCP monitors this. Patient states they are currently taking Metformin 850mg BID at home for management of diabetes. Patient denies missing doses. Patient states PCP is Meliza Martino. Reviewed proper diabetic foot care as patient states his legs/feet got sunburned while wearing flip flops. Reviewed relationship between infection and hyperglycemia and importance of good glycemic control of wound healing. Reviewed target blood glucose goals and importance of compliance with diabetic diet. Patient denies drinking sweetened beverages. Encouraged compliance with discharge regimen. Encouraged patient to continue to work on lifestyle modifications and to follow up with primary care provider for further titration of regimen. Patient verbalized understanding and voices no further questions regarding diabetes management at this time.

## 2025-04-07 NOTE — PLAN OF CARE
Problem: Chronic Conditions and Co-morbidities  Goal: Patient's chronic conditions and co-morbidity symptoms are monitored and maintained or improved  Outcome: Progressing     Problem: Pain  Goal: Verbalizes/displays adequate comfort level or baseline comfort level  Outcome: Progressing     Problem: Skin/Tissue Integrity  Goal: Skin integrity remains intact  Description: 1.  Monitor for areas of redness and/or skin breakdown  2.  Assess vascular access sites hourly  3.  Every 4-6 hours minimum:  Change oxygen saturation probe site  4.  Every 4-6 hours:  If on nasal continuous positive airway pressure, respiratory therapy assess nares and determine need for appliance change or resting period  Outcome: Progressing     Problem: Safety - Adult  Goal: Free from fall injury  4/7/2025 0848 by Michell Rae, RN  Outcome: Progressing  4/6/2025 2122 by Terese Cameron, RN  Outcome: Progressing     Problem: Respiratory - Adult  Goal: Achieves optimal ventilation and oxygenation  Outcome: Progressing

## 2025-04-08 ENCOUNTER — APPOINTMENT (OUTPATIENT)
Dept: ULTRASOUND IMAGING | Age: 70
DRG: 638 | End: 2025-04-08
Payer: MEDICARE

## 2025-04-08 VITALS
HEIGHT: 72 IN | OXYGEN SATURATION: 96 % | BODY MASS INDEX: 33.32 KG/M2 | DIASTOLIC BLOOD PRESSURE: 67 MMHG | RESPIRATION RATE: 19 BRPM | TEMPERATURE: 98.1 F | SYSTOLIC BLOOD PRESSURE: 123 MMHG | HEART RATE: 58 BPM | WEIGHT: 246 LBS

## 2025-04-08 LAB
ANION GAP SERPL CALC-SCNC: 10 MMOL/L (ref 7–16)
BACTERIA SPEC CULT: NORMAL
BASOPHILS # BLD: 0.03 K/UL (ref 0–0.2)
BASOPHILS NFR BLD: 0.4 % (ref 0–2)
BUN SERPL-MCNC: 25 MG/DL (ref 8–23)
CALCIUM SERPL-MCNC: 9 MG/DL (ref 8.8–10.2)
CHLORIDE SERPL-SCNC: 103 MMOL/L (ref 98–107)
CO2 SERPL-SCNC: 26 MMOL/L (ref 20–29)
CREAT SERPL-MCNC: 1.27 MG/DL (ref 0.8–1.3)
DIFFERENTIAL METHOD BLD: ABNORMAL
EOSINOPHIL # BLD: 0.17 K/UL (ref 0–0.8)
EOSINOPHIL NFR BLD: 2.5 % (ref 0.5–7.8)
ERYTHROCYTE [DISTWIDTH] IN BLOOD BY AUTOMATED COUNT: 20.9 % (ref 11.9–14.6)
GLUCOSE BLD STRIP.AUTO-MCNC: 135 MG/DL (ref 65–100)
GLUCOSE BLD STRIP.AUTO-MCNC: 197 MG/DL (ref 65–100)
GLUCOSE SERPL-MCNC: 130 MG/DL (ref 70–99)
GRAM STN SPEC: NORMAL
GRAM STN SPEC: NORMAL
HCT VFR BLD AUTO: 33.6 % (ref 41.1–50.3)
HGB BLD-MCNC: 10.9 G/DL (ref 13.6–17.2)
IMM GRANULOCYTES # BLD AUTO: 0.03 K/UL (ref 0–0.5)
IMM GRANULOCYTES NFR BLD AUTO: 0.4 % (ref 0–5)
LYMPHOCYTES # BLD: 2.61 K/UL (ref 0.5–4.6)
LYMPHOCYTES NFR BLD: 38.6 % (ref 13–44)
MCH RBC QN AUTO: 26.7 PG (ref 26.1–32.9)
MCHC RBC AUTO-ENTMCNC: 32.4 G/DL (ref 31.4–35)
MCV RBC AUTO: 82.2 FL (ref 82–102)
MONOCYTES # BLD: 0.64 K/UL (ref 0.1–1.3)
MONOCYTES NFR BLD: 9.5 % (ref 4–12)
NEUTS SEG # BLD: 3.29 K/UL (ref 1.7–8.2)
NEUTS SEG NFR BLD: 48.6 % (ref 43–78)
NRBC # BLD: 0 K/UL (ref 0–0.2)
PLATELET # BLD AUTO: 132 K/UL (ref 150–450)
PMV BLD AUTO: 10.2 FL (ref 9.4–12.3)
POTASSIUM SERPL-SCNC: 4.1 MMOL/L (ref 3.5–5.1)
RBC # BLD AUTO: 4.09 M/UL (ref 4.23–5.6)
SERVICE CMNT-IMP: ABNORMAL
SERVICE CMNT-IMP: ABNORMAL
SERVICE CMNT-IMP: NORMAL
SODIUM SERPL-SCNC: 139 MMOL/L (ref 136–145)
WBC # BLD AUTO: 6.8 K/UL (ref 4.3–11.1)

## 2025-04-08 PROCEDURE — 6370000000 HC RX 637 (ALT 250 FOR IP): Performed by: FAMILY MEDICINE

## 2025-04-08 PROCEDURE — 80048 BASIC METABOLIC PNL TOTAL CA: CPT

## 2025-04-08 PROCEDURE — 6360000002 HC RX W HCPCS: Performed by: FAMILY MEDICINE

## 2025-04-08 PROCEDURE — 97530 THERAPEUTIC ACTIVITIES: CPT

## 2025-04-08 PROCEDURE — 94761 N-INVAS EAR/PLS OXIMETRY MLT: CPT

## 2025-04-08 PROCEDURE — 82962 GLUCOSE BLOOD TEST: CPT

## 2025-04-08 PROCEDURE — 36415 COLL VENOUS BLD VENIPUNCTURE: CPT

## 2025-04-08 PROCEDURE — 85025 COMPLETE CBC W/AUTO DIFF WBC: CPT

## 2025-04-08 PROCEDURE — 93970 EXTREMITY STUDY: CPT

## 2025-04-08 PROCEDURE — 2500000003 HC RX 250 WO HCPCS: Performed by: FAMILY MEDICINE

## 2025-04-08 PROCEDURE — 93970 EXTREMITY STUDY: CPT | Performed by: RADIOLOGY

## 2025-04-08 PROCEDURE — 94640 AIRWAY INHALATION TREATMENT: CPT

## 2025-04-08 RX ORDER — LISINOPRIL 20 MG/1
20 TABLET ORAL DAILY
Qty: 30 TABLET | Refills: 3 | Status: SHIPPED | OUTPATIENT
Start: 2025-04-09

## 2025-04-08 RX ORDER — CEPHALEXIN 500 MG/1
500 CAPSULE ORAL 2 TIMES DAILY
Qty: 10 CAPSULE | Refills: 0 | Status: SHIPPED | OUTPATIENT
Start: 2025-04-08 | End: 2025-04-13

## 2025-04-08 RX ADMIN — ENOXAPARIN SODIUM 30 MG: 100 INJECTION SUBCUTANEOUS at 07:27

## 2025-04-08 RX ADMIN — AMIODARONE HYDROCHLORIDE 200 MG: 200 TABLET ORAL at 07:25

## 2025-04-08 RX ADMIN — MORPHINE SULFATE 2 MG: 2 INJECTION, SOLUTION INTRAMUSCULAR; INTRAVENOUS at 06:42

## 2025-04-08 RX ADMIN — PANTOPRAZOLE SODIUM 40 MG: 40 TABLET, DELAYED RELEASE ORAL at 06:35

## 2025-04-08 RX ADMIN — BUDESONIDE INHALATION 1000 MCG: 0.5 SUSPENSION RESPIRATORY (INHALATION) at 07:10

## 2025-04-08 RX ADMIN — OXYCODONE HYDROCHLORIDE 5 MG: 5 TABLET ORAL at 02:42

## 2025-04-08 RX ADMIN — OXYCODONE HYDROCHLORIDE 5 MG: 5 TABLET ORAL at 10:06

## 2025-04-08 RX ADMIN — GABAPENTIN 300 MG: 300 CAPSULE ORAL at 07:25

## 2025-04-08 RX ADMIN — BUMETANIDE 2 MG: 1 TABLET ORAL at 07:25

## 2025-04-08 RX ADMIN — LISINOPRIL 20 MG: 20 TABLET ORAL at 07:25

## 2025-04-08 RX ADMIN — Medication 1 CAPSULE: at 07:25

## 2025-04-08 RX ADMIN — INSULIN LISPRO 2 UNITS: 100 INJECTION, SOLUTION INTRAVENOUS; SUBCUTANEOUS at 11:26

## 2025-04-08 RX ADMIN — IPRATROPIUM BROMIDE 0.5 MG: 0.5 SOLUTION RESPIRATORY (INHALATION) at 07:10

## 2025-04-08 RX ADMIN — ARFORMOTEROL TARTRATE 15 MCG: 15 SOLUTION RESPIRATORY (INHALATION) at 07:10

## 2025-04-08 RX ADMIN — SODIUM CHLORIDE, PRESERVATIVE FREE 10 ML: 5 INJECTION INTRAVENOUS at 07:27

## 2025-04-08 RX ADMIN — SPIRONOLACTONE 25 MG: 25 TABLET ORAL at 07:25

## 2025-04-08 ASSESSMENT — PAIN SCALES - GENERAL
PAINLEVEL_OUTOF10: 8
PAINLEVEL_OUTOF10: 0
PAINLEVEL_OUTOF10: 9
PAINLEVEL_OUTOF10: 6
PAINLEVEL_OUTOF10: 0

## 2025-04-08 ASSESSMENT — PAIN DESCRIPTION - LOCATION
LOCATION: LEG

## 2025-04-08 ASSESSMENT — PAIN DESCRIPTION - ORIENTATION
ORIENTATION: RIGHT;LEFT

## 2025-04-08 ASSESSMENT — PAIN DESCRIPTION - DESCRIPTORS
DESCRIPTORS: ACHING;BURNING
DESCRIPTORS: ACHING;BURNING
DESCRIPTORS: BURNING

## 2025-04-08 NOTE — PROGRESS NOTES
Hospitalist Progress Note   Admit Date:  2025  2:54 PM   Name:  Marc Horton Sr.   Age:  69 y.o.  Sex:  male  :  1955   MRN:  [id number]   Room:      Presenting/Chief Complaint: Leg Swelling     Reason(s) for Admission: Bilateral lower extremity edema [R60.0]  Bilateral lower leg cellulitis [L03.116, L03.115]  Stasis dermatitis of both legs [I87.2]     Hospital Course:   Marc Horton Sr. is a 69 y.o. male with medical history of CAD s/p PCI and CABG, HFpEF, bipolar, COPD, PAF no longer on anticoagulation, s/p aortic valve replacement, CVA who presented with worsening bilateral lower extremity edema and erythema despite taking clindamycin for 5 days when she was seen in the ER 3/31 after long exposure from the sun to legs.    Patient was admitted with bilateral lower extremity cellulitis.  Failed outpatient therapy.  He was started on broad-spectrum IV antibiotics.  MRSA culture and wound cultures obtained.      Subjective & 24hr Events:     Pt alert and orient x 3.  Sitting up in recliner.  He stated that he thinks his lower legs are getting better.  Pain is controlled with current pain medications.  No SOB or chest pain.      Assessment & Plan:     Bilateral lower leg cellulitis  Chronic venous hypertension  Failed outpatient therapy with Clindamycin  Analgesics as needed  Wound culture: Pending  Abx: Vanc (-...); Add Rocephin (-...)  Add probiotics  Obtain MRSA culture  Wound team consulted    Paroxysmal atrial fibrillation  Not a candidate for OAC with history of PUD/GI bleed  Continue home amiodarone, metoprolol    S/P AVR (aortic valve replacement)  Noted    CAD  Hx of CABG  HLD  Continue Zetia, statin    HFpEF  HTN   proBNP 349 on admission.  TTE 3/2024 with EF 60 to 65%  Monitor I's and O's, daily weights, low-salt diet  Continue Bumex, lisinopril, metoprolol, Aldactone    Hx of CVA   Residual L sided weakness  Continue Zetia/pravastatin    Bipolar disorder  Not on 
       Hospitalist Progress Note   Admit Date:  2025  2:54 PM   Name:  Marc Horton Sr.   Age:  69 y.o.  Sex:  male  :  1955   MRN:  [id number]   Room:  [facility]    Presenting/Chief Complaint: Leg Swelling     Reason(s) for Admission: Bilateral lower extremity edema [R60.0]  Bilateral lower leg cellulitis [L03.116, L03.115]  Stasis dermatitis of both legs [I87.2]     Hospital Course:   Marc Horton Sr. is a 69 y.o. male with medical history of CAD s/p PCI and CABG, HFpEF, bipolar, COPD, PAF no longer on anticoagulation, s/p aortic valve replacement, CVA who presented with worsening bilateral lower extremity edema and erythema despite taking clindamycin for 5 days when she was seen in the ER 3/31 after long exposure from the sun to legs.    Patient was admitted with bilateral lower extremity cellulitis.  Failed outpatient therapy.  He was started on broad-spectrum IV antibiotics.  MRSA culture and wound cultures obtained.      Subjective & 24hr Events:     Pt alert and orient x 3.  Sitting up in recliner.  Stated he feels better. Still has some pain on b/l Le's. Stated he thinks the cellulitis continues to improve. No SOB or chest pain      Assessment & Plan:     Bilateral lower leg cellulitis  Chronic venous hypertension  Failed outpatient therapy with Clindamycin  Analgesics as needed  Abx: Vanc/Rocephin (-...)  Cont probiotics  Obtain MRSA culture--pending  Wound culture: Pending  Wound team consulted  Obtain venous duplex b/l LE's to r/o DVT    Paroxysmal atrial fibrillation  Not a candidate for OAC with history of PUD/GI bleed  Continue home amiodarone, metoprolol    S/P AVR (aortic valve replacement)  Noted    CAD  Hx of CABG  HLD  Continue Zetia, statin    HFpEF  HTN   proBNP 349 on admission.  TTE 3/2024 with EF 60 to 65%  Monitor I's and O's, daily weights, low-salt diet  Continue Bumex, metoprolol, Aldactone  Decrease dose of Lisinopril from 20mg BID to daily d/t marginal BP    Hx 
  Physician Progress Note      PATIENT:               BRITTANY VAZQUEZ  CSN #:                  [id number]  :                       1955  ADMIT DATE:       2025 2:54 PM  DISCH DATE:  RESPONDING  PROVIDER #:        Cyndie Villanueva DO          QUERY TEXT:    Please document the relationship, if any, between the cellulitis and diabetes:  -hx  Chronic venous hypertension (idiopathic) with other complications of   bilateral lower extremities  -2+ edema to LE bilaterally with obvious erythema. Weeping noted with   superficial skin tears.  -CRP 7.8, , WBC 10 -7.2 - 6.3, HA1C 7.0 on     The clinical indicators include:  69 y.o. male with medical history of CAD s/p PCI and CABG, HFpEF, bipolar,   COPD, PAF no longer on anticoagulation, s/p aortic valve replacement, CVA  Treatment: IV Rocephin, IV Bumex x1, IV Vanco, diabetic education, labs, and   imagining  Options provided:  -- Bilateral lower extremity cellulitis related to Diabetes  -- Bilateral lower extremity cellulitis unrelated to Diabetes  -- Other - I will add my own diagnosis  -- Disagree - Not applicable / Not valid  -- Disagree - Clinically unable to determine / Unknown  -- Refer to Clinical Documentation Reviewer    PROVIDER RESPONSE TEXT:    Bilateral lower extremity cellulitis related to Diabetes.    Query created by: Anali Gilmore on 2025 2:11 PM      Electronically signed by:  Cyndie Villanueva DO 2025 2:16 PM          
0131: Patient requesting pain meds, bp checked and 107/41 and 96/48. Hospitalist notified. Per MD continue to monitor for now  
4 Eyes Skin Assessment     NAME:  Marc Horton .  YOB: 1955  MEDICAL RECORD NUMBER:  [id number]    The patient is being assessed for  Admission    I agree that at least one RN has performed a thorough Head to Toe Skin Assessment on the patient. ALL assessment sites listed below have been assessed.      Areas assessed by both nurses:    Head, Face, Ears, Shoulders, Back, Chest, Arms, Elbows, Hands, Sacrum. Buttock, Coccyx, Ischium, and Legs. Feet and Heels        Does the Patient have a Wound? No noted wound(s)       Arnulfo Prevention initiated by RN: Yes  Wound Care Orders initiated by RN: No    Pressure Injury (Stage 3,4, Unstageable, DTI, NWPT, and Complex wounds) if present, place Wound referral order by RN under : No    New Ostomies, if present place, Ostomy referral order under : No     Nurse 1 eSignature: Electronically signed by Terese Cameron RN on 4/5/25 at 11:40 PM EDT    **SHARE this note so that the co-signing nurse can place an eSignature**    Nurse 2 eSignature: {Esignature:[id number]}   
ACUTE PHYSICAL THERAPY GOALS:   (Developed with and agreed upon by patient and/or caregiver.)  (1.) Marc Horton Sr.  will move from supine to sit and sit to supine  with INDEPENDENT within 7 treatment day(s).    (2.) Marc Horton Sr. will transfer from bed to chair and chair to bed with MODIFIED INDEPENDENCE using the least restrictive device within 7 treatment day(s).    (3.) Marc Horton Sr. will ambulate with MODIFIED INDEPENDENCE for 250 feet with the least restrictive device within 7 treatment day(s).   (4.) Marc Horton Sr. will perform standing static and dynamic balance activities x 8 minutes with MODIFIED INDEPENDENCE to improve safety within 7 treatment day(s).  (5.) Marc Horton Sr. will ascend and descend 6 stairs using 1 hand rail(s) with SUPERVISION to improve functional mobility and safety within 7 treatment day(s).  (6.) Marc Horton Sr. will perform therapeutic exercises x 10 min for HEP with SUPERVISION to improve strength, endurance, and functional mobility within 7 treatment day(s    PHYSICAL THERAPY: Daily Note AM   (Link to Caseload Tracking: PT Visit Days : 2  Time In/Out PT Charge Capture  Rehab Caseload Tracker  Orders    Marc Horton Sr. is a 69 y.o. male   PRIMARY DIAGNOSIS: Bilateral lower leg cellulitis  Bilateral lower extremity edema [R60.0]  Bilateral lower leg cellulitis [L03.116, L03.115]  Stasis dermatitis of both legs [I87.2]       Inpatient: Payor: [facility] MEDICARE / Plan: [employer] DUAL COMPLETE / Product Type: *No Product type* /     ASSESSMENT:     REHAB RECOMMENDATIONS:   Recommendation to date pending progress:  Setting:  Home Health Therapy    Equipment:    Rolling Walker     ASSESSMENT:  Mr. Horton presents sitting up in chair, reports he may d/c today. CM presents in room to provide pt with RW. RW adjusted to correct height for pt. Pt able to perform STS and amb in hallway 200' with RW with SBA for safety,  limited 
MRSA negative. Will stop Vanc.Wound cultures grew mixed skin carol thus far. Anticipate discharge tomorrow if wound continues to improve.    Thu Villanueva, DO    
Received pt from ER in stable condition. Assisted pt into bed. Alert & oriented. Resp even & unlabored on room air. Oriented to room & call light.   
TRANSFER - IN REPORT:    Verbal report received from SEJAL Min on Marc Horton .  being received from ER for routine progression of patient care      Report consisted of patient's Situation, Background, Assessment and   Recommendations(SBAR).     Information from the following report(s) Nurse Handoff Report, Intake/Output, MAR, and Recent Results was reviewed with the receiving nurse.    Opportunity for questions and clarification was provided.      Assessment completed upon patient's arrival to unit and care assumed.    
VANCO DAILY NOTE  [facility]   Pharmacy Pharmacokinetic Monitoring Service - Vancomycin    Consulting Provider: Dr. Diaz   Indication: SSTI  Target Concentration: Goal AUC/PATRICK 400-600 mg*hr/L  Day of Therapy: 2  Additional Antimicrobials: None    Pertinent Laboratory Values:   Wt Readings from Last 1 Encounters:   04/05/25 110.6 kg (243 lb 12.8 oz)     Temp Readings from Last 1 Encounters:   04/06/25 97.5 °F (36.4 °C) (Oral)     Recent Labs     04/05/25  1514 04/06/25  0512   BUN 25* 24*   CREATININE 1.17 1.26   WBC 10.0 7.2     Estimated Creatinine Clearance: 69 mL/min (based on SCr of 1.26 mg/dL).    No results found for: \"VANCOTROUGH\", \"VANCORANDOM\"    MRSA Nasal Swab: N/A. Non-respiratory infection    Assessment:  Date/Time Dose Concentration AUC         Note: Serum concentrations collected for AUC dosing may appear elevated if collected in close proximity to the dose administered, this is not necessarily an indication of toxicity    Plan:  Dosing recommendations based on Bayesian software  Will adjust the dose to 1750 mg IV every 24 hours, anticipated AUC/Tr of 494/13.1  Renal labs as indicated   Vancomycin concentrations will be ordered as clinically appropriate  Pharmacy will continue to monitor patient and adjust therapy as indicated    Thank you for the consult,  Wilian Saunders RPH      
VANCO DAILY NOTE  [facility]   Pharmacy Pharmacokinetic Monitoring Service - Vancomycin    Consulting Provider: Dr. Diaz   Indication: SSTI  Target Concentration: Goal AUC/PATRICK 400-600 mg*hr/L  Day of Therapy: 3  Additional Antimicrobials: ceftriaxone    Pertinent Laboratory Values:   Wt Readings from Last 1 Encounters:   04/06/25 111.6 kg (246 lb)     Temp Readings from Last 1 Encounters:   04/07/25 97.9 °F (36.6 °C)     Recent Labs     04/05/25  1514 04/06/25  0512 04/07/25  0534   BUN 25* 24* 23   CREATININE 1.17 1.26 1.10   WBC 10.0 7.2 6.3     Estimated Creatinine Clearance: 79 mL/min (based on SCr of 1.1 mg/dL).    Lab Results   Component Value Date/Time    VANCORANDOM 17.7 04/07/2025 05:34 AM       MRSA Nasal Swab: Ordered 4/6    Assessment:  Date/Time Dose Concentration AUC   4/7 0534 1750 mg q24h 17.7 448   Note: Serum concentrations collected for AUC dosing may appear elevated if collected in close proximity to the dose administered, this is not necessarily an indication of toxicity    Plan:  Dosing recommendations based on Bayesian software  Based on level obtained today, the AUC is currently therapeutic. Will continue vancomycin IV 1750 mg q24h  Renal labs as indicated   Vancomycin concentrations will be ordered as clinically appropriate  Pharmacy will continue to monitor patient and adjust therapy as indicated    Thank you for the consult,  SHORTY ROMERO RPH        
VANCO DAILY NOTE  [facility]   Pharmacy Pharmacokinetic Monitoring Service - Vancomycin    Consulting Provider: Dr. Diaz   Indication: SSTI  Target Concentration: Goal AUC/PATRICK 400-600 mg*hr/L  Day of Therapy: 1  Additional Antimicrobials: None    Pertinent Laboratory Values:   Wt Readings from Last 1 Encounters:   04/05/25 107.5 kg (237 lb)     Temp Readings from Last 1 Encounters:   04/05/25 98.1 °F (36.7 °C)     Recent Labs     04/05/25  1514   BUN 25*   CREATININE 1.17   WBC 10.0     Estimated Creatinine Clearance: 73 mL/min (based on SCr of 1.17 mg/dL).    No results found for: \"VANCOTROUGH\", \"VANCORANDOM\"    MRSA Nasal Swab: N/A. Non-respiratory infection    Assessment:  Date/Time Dose Concentration AUC         Note: Serum concentrations collected for AUC dosing may appear elevated if collected in close proximity to the dose administered, this is not necessarily an indication of toxicity    Plan:  Dosing recommendations based on Bayesian software  Start vancomycin 2500 mg loading dose tonight, then initiate 1000mg q12 h tomorrow  Anticipated AUC of 542 and trough concentration of 17.3 at steady state  Renal labs as indicated   Vancomycin concentrations will be ordered as clinically appropriate  Pharmacy will continue to monitor patient and adjust therapy as indicated    Thank you for the consult,  Trupti Lilly RPH    
Independent, S=Supervision, SBA=Standby Assistance, CGA=Contact Guard Assistance,   Min=Minimal Assistance, Mod=Moderate Assistance, Max=Maximal Assistance, Total=Total Assistance, NT=Not Tested    PLAN:   FREQUENCY AND DURATION: 3 times/week for duration of hospital stay or until stated goals are met, whichever comes first.    THERAPY PROGNOSIS: Good    PROBLEM LIST:   (Skilled intervention is medically necessary to address:)  Decreased ADL/Functional Activities  Decreased Activity Tolerance  Decreased Balance  Decreased Gait Ability  Decreased Strength  Decreased Transfer Abilities  Increased Pain INTERVENTIONS PLANNED:   (Benefits and precautions of physical therapy have been discussed with the patient.)  Therapeutic Activity  Therapeutic Exercise/HEP  Gait Training  Education       TREATMENT:   EVALUATION: LOW COMPLEXITY: (Untimed Charge)  The initial evaluation charge encompasses clinical chart review, objective assessment, interpretation of assessment, and skilled monitoring of the patient's response to treatment in order to develop a plan of care.     TREATMENT:   Co-Treatment PT/OT necessary due to patient's decreased overall endurance/tolerance levels, as well as need for high level skilled assistance to complete functional transfers/mobility and functional tasks  Therapeutic Activity (16 Minutes): Therapeutic activity included Supine to Sit, Scooting, Transfer Training, Ambulation on level ground, Sitting balance , and Standing balance to improve functional Activity tolerance, Balance, Mobility, and Strength.    TREATMENT GRID:  N/A    AFTER TREATMENT PRECAUTIONS: Bed/Chair Locked, Call light within reach, Chair, Needs within reach, and RN at bedside    INTERDISCIPLINARY COLLABORATION:  RN/ PCT and OT/ MASSEY    EDUCATION: Education Given To: Patient  Education Provided: Role of Therapy;Plan of Care;Mobility Training;Transfer Training;Equipment;Fall Prevention Strategies  Education Method: Verbal  Barriers

## 2025-04-08 NOTE — CARE COORDINATION
providers?  Yes     
  Prior Functional Level Independent in ADLs/IADLs   Current Functional Level Independent in ADLs/IADLs   Can patient return to prior living arrangement Yes   Ability to make needs known: Good   Family able to assist with home care needs: Yes   Would you like for me to discuss the discharge plan with any other family members/significant others, and if so, who? No   Social/Functional History   Lives With Friend(s)   Type of Home Homeless  (car)   Home Equipment Cane;Walker - Standard   Receives Help From Friend(s);Family   Prior Level of Assist for ADLs Independent   Prior Level of Assist for Homemaking Independent   Ambulation Assistance Needs assistance  (cane)   Prior Level of Assist for Transfers Independent   Active  Yes   Mode of Transportation Car   Discharge Planning   Living Arrangements Other (Comment)   Potential Assistance Purchasing Medications No   Type of Home Care Services None   Services At/After Discharge   Transition of Care Consult (CM Consult) Discharge Planning   Condition of Participation: Discharge Planning   The Plan for Transition of Care is related to the following treatment goals: Pt will discharge back to his car.

## 2025-04-08 NOTE — DISCHARGE SUMMARY
is normal. Normal wall thickness. Normal wall motion. Diastolic function was not assessed.    Right Ventricle: Right ventricle is mildly dilated. Normal systolic function.    Signed by: Rangel Akers on 3/13/2024 10:01 AM      Diagnostic Imaging/Tests:   Vascular duplex lower extremity venous bilateral  Result Date: 4/8/2025  1.  No evidence of deep venous thrombosis in the right lower extremity. 2.  No evidence of deep venous thrombosis in the left lower extremity. Electronically signed by MARLYS DE LOS SANTOS    XR CHEST (2 VW)  Result Date: 3/31/2025  No acute findings in the chest Electronically signed by Elias Ferguson    XR CHEST (2 VW)  Result Date: 3/21/2025  No acute findings in the chest Electronically signed by Elias Ferguson       Labs: Results:       BMP, Mg, Phos Recent Labs     04/06/25  0512 04/07/25  0534 04/08/25  0558    137 139   K 4.3 4.3 4.1    102 103   CO2 30* 26 26   ANIONGAP 9 9 10   BUN 24* 23 25*   CREATININE 1.26 1.10 1.27   LABGLOM 61 72 60*   CALCIUM 9.0 9.1 9.0   GLUCOSE 116* 119* 130*      CBC Recent Labs     04/06/25  0512 04/07/25  0534 04/08/25  0558   WBC 7.2 6.3 6.8   RBC 4.18* 4.07* 4.09*   HGB 11.3* 10.8* 10.9*   HCT 34.8* 33.7* 33.6*   MCV 83.3 82.8 82.2   MCH 27.0 26.5 26.7   MCHC 32.5 32.0 32.4   RDW 21.2* 20.9* 20.9*   * 135* 132*   MPV 10.3 10.3 10.2   NRBC 0.00 0.00 0.00   LYMPHOPCT 40.4 38.3 38.6   MONOPCT 11.6 9.0 9.5   BASOPCT 0.3 0.6 0.4   IMMGRAN 0.1 0.3 0.4   LYMPHSABS 2.92 2.41 2.61   EOSABS 0.21 0.18 0.17   MONOSABS 0.84 0.57 0.64   BASOSABS 0.02 0.04 0.03   ABSIMMGRAN 0.01 0.02 0.03      LFT Recent Labs     04/05/25  1514 04/06/25  0512   BILITOT 0.4 0.3   BILIDIR  --  0.1   ALKPHOS 47 44   AST 21 18   ALT 15 13   PROTEIN 7.7 6.9   ALBUMIN 3.9 3.4   GLOB 3.8* 3.5      Cardiac  Lab Results   Component Value Date/Time    TROPHS 23.9 03/13/2024 03:41 AM    TROPHS 21.0 03/13/2024 01:06 AM    TROPHS 18.0 03/12/2024 10:57 PM      [facility]

## 2025-04-08 NOTE — PLAN OF CARE
Problem: Chronic Conditions and Co-morbidities  Goal: Patient's chronic conditions and co-morbidity symptoms are monitored and maintained or improved  4/7/2025 2225 by Yoselin Campos RN  Outcome: Progressing  4/7/2025 0848 by Michell Rae RN  Outcome: Progressing     Problem: Pain  Goal: Verbalizes/displays adequate comfort level or baseline comfort level  4/7/2025 2225 by Yoselin Campos RN  Outcome: Progressing  4/7/2025 0848 by Michell Rae RN  Outcome: Progressing     Problem: Skin/Tissue Integrity  Goal: Skin integrity remains intact  Description: 1.  Monitor for areas of redness and/or skin breakdown  2.  Assess vascular access sites hourly  3.  Every 4-6 hours minimum:  Change oxygen saturation probe site  4.  Every 4-6 hours:  If on nasal continuous positive airway pressure, respiratory therapy assess nares and determine need for appliance change or resting period  4/7/2025 2225 by Yoselin Campos RN  Outcome: Progressing  4/7/2025 0848 by Michell Rae RN  Outcome: Progressing     Problem: Safety - Adult  Goal: Free from fall injury  4/7/2025 2225 by Yoselin Campos RN  Outcome: Progressing  4/7/2025 0848 by Michell Rae RN  Outcome: Progressing     Problem: Respiratory - Adult  Goal: Achieves optimal ventilation and oxygenation  4/7/2025 2225 by Yoselin Campos RN  Outcome: Progressing  4/7/2025 0848 by Michell Rae RN  Outcome: Progressing

## 2025-04-09 ENCOUNTER — CARE COORDINATION (OUTPATIENT)
Dept: CARE COORDINATION | Facility: CLINIC | Age: 70
End: 2025-04-09

## 2025-04-09 NOTE — CARE COORDINATION
Ambulatory Care Coordination Note     4/9/2025 9:17 AM     Patient outreach attempt by this ACM today to perform care management follow up . ACM was unable to reach the patient by telephone today;   left voice message requesting a return phone call to this ACM.     ACM: Gale Rivera RN    PCP/Specialist follow up:       Follow Up:   Plan for next ACM outreach in approximately 1-2 days  to complete:  - goal progression  - education .

## 2025-04-10 ENCOUNTER — CARE COORDINATION (OUTPATIENT)
Dept: CARE COORDINATION | Facility: CLINIC | Age: 70
End: 2025-04-10

## 2025-04-10 NOTE — CARE COORDINATION
Ambulatory Care Coordination Note     4/10/2025 2:58 PM     Patient Current Location:  Home: [address] [place] [id number]     ACM contacted the patient by telephone. Verified name and  with patient as identifiers.         ACM: Gale Rivera RN  Utilization: Has the patient been seen in the ED since your last call? Yes,   Discharge Date: 25   Discharge Facility: [facility]  Reason for ED Visit: cellulitis  Visit Diagnosis: same    Number of ED visits in the last 6 months: 9      Do you have any ongoing symptoms? Yes, my symptoms have improved.   Current symptoms: swelling.    Did you call your PCP prior to going to the ED? No, did not call the PCP office.     Care Summary Note:   Pt states cellulitis is improving. ACM attempted to schedule f/u for pt he requests call back tomorrow.     PCP/Specialist follow up:       Follow Up:   Plan for next ACM outreach in approximately 1-2 days  to complete:  - follow up appointment with PCP .   Patient  is agreeable to this plan.       
>4

## 2025-04-11 ENCOUNTER — CARE COORDINATION (OUTPATIENT)
Dept: CARE COORDINATION | Facility: CLINIC | Age: 70
End: 2025-04-11

## 2025-04-11 LAB
BACTERIA SPEC CULT: NORMAL
SERVICE CMNT-IMP: NORMAL

## 2025-04-11 NOTE — CARE COORDINATION
Ambulatory Care Coordination Note     4/11/2025 11:31 AM     Patient outreach attempt by this ACM today to perform care management follow up . ACM was unable to reach the patient by telephone today;   left voice message requesting a return phone call to this ACM.     ACM: Gale Rivera RN       PCP/Specialist follow up:       Follow Up:   Plan for next ACM outreach in approximately 1-2 days  to complete:  - goal progression.

## 2025-04-14 ENCOUNTER — CARE COORDINATION (OUTPATIENT)
Dept: CARE COORDINATION | Facility: CLINIC | Age: 70
End: 2025-04-14

## 2025-04-14 NOTE — CARE COORDINATION
Ambulatory Care Coordination Note     2025 2:06 PM     Patient Current Location:  Home: [address] [facility] [id number]     ACM contacted the patient by telephone. Verified name and  with patient as identifiers.         ACM: Gale Rivera RN     Challenges to be reviewed by the provider   Additional needs identified to be addressed with provider Yes  ER F/U               Method of communication with provider: phone.    Utilization: Patient has not had any utilization since our last call.     Care Summary Note:     Pt has continued swelling, living out of car.  Pt informed that SW was unable to reach him. Pt provided new contact information, and requests IP f/u visit w/ PCP, ACM called PCP office and scheduled  9am.  Pt states he will drive self to appt. No other questions or concerns at this time.     Assessments Completed:   General Assessment    Do you have any symptoms that are causing concern?: No          Medications Reviewed:   Completed during a previous call     Advance Care Planning:   Not reviewed during this call     Care Planning:   Education Documentation  No documentation found.  Education Comments  No comments found.     ,    Goals Addressed                   This Visit's Progress     Conditions and Symptoms   No change     I will schedule office visits, as directed by my provider.  I will keep my appointment or reschedule if I have to cancel.  I will notify my provider of any barriers to my plan of care.  I will notify my provider of any symptoms that indicate a worsening of my condition.    Barriers: none  Plan for overcoming my barriers: N/A  Confidence: 10  Anticipated Goal Completion Date: 24                 PCP/Specialist follow up:   Future Appointments         Provider Specialty Dept Phone    2025 9:00 AM Meliza Martino PA-C Family Medicine [phone]            Follow Up:   Plan for next ACM outreach in approximately 1 week to complete:  - goal

## 2025-04-21 ENCOUNTER — OFFICE VISIT (OUTPATIENT)
Dept: FAMILY MEDICINE CLINIC | Facility: CLINIC | Age: 70
End: 2025-04-21

## 2025-04-21 VITALS
BODY MASS INDEX: 33.81 KG/M2 | HEART RATE: 62 BPM | SYSTOLIC BLOOD PRESSURE: 138 MMHG | OXYGEN SATURATION: 98 % | WEIGHT: 249.6 LBS | HEIGHT: 72 IN | TEMPERATURE: 98 F | DIASTOLIC BLOOD PRESSURE: 82 MMHG

## 2025-04-21 DIAGNOSIS — Z91.81 AT HIGH RISK FOR FALLS: ICD-10-CM

## 2025-04-21 DIAGNOSIS — I50.32 CHRONIC HEART FAILURE WITH PRESERVED EJECTION FRACTION (HCC): ICD-10-CM

## 2025-04-21 DIAGNOSIS — I10 PRIMARY HYPERTENSION: ICD-10-CM

## 2025-04-21 DIAGNOSIS — I87.8 VENOUS STASIS: Primary | ICD-10-CM

## 2025-04-21 DIAGNOSIS — E11.59 TYPE 2 DIABETES MELLITUS WITH OTHER CIRCULATORY COMPLICATION, WITHOUT LONG-TERM CURRENT USE OF INSULIN (HCC): ICD-10-CM

## 2025-04-21 DIAGNOSIS — I10 ESSENTIAL HYPERTENSION: ICD-10-CM

## 2025-04-21 DIAGNOSIS — J44.9 CHRONIC OBSTRUCTIVE PULMONARY DISEASE, UNSPECIFIED COPD TYPE (HCC): Chronic | ICD-10-CM

## 2025-04-21 DIAGNOSIS — L03.90 CHRONIC CELLULITIS: ICD-10-CM

## 2025-04-21 DIAGNOSIS — G62.9 PERIPHERAL POLYNEUROPATHY: ICD-10-CM

## 2025-04-21 LAB
ALBUMIN SERPL-MCNC: 3.6 G/DL (ref 3.2–4.6)
ALBUMIN/GLOB SERPL: 0.9 (ref 1–1.9)
ALP SERPL-CCNC: 45 U/L (ref 40–129)
ALT SERPL-CCNC: 15 U/L (ref 8–55)
ANION GAP SERPL CALC-SCNC: 9 MMOL/L (ref 7–16)
AST SERPL-CCNC: 19 U/L (ref 15–37)
BASOPHILS # BLD: 0.04 K/UL (ref 0–0.2)
BASOPHILS NFR BLD: 0.6 % (ref 0–2)
BILIRUB SERPL-MCNC: 0.4 MG/DL (ref 0–1.2)
BUN SERPL-MCNC: 16 MG/DL (ref 8–23)
CALCIUM SERPL-MCNC: 9.7 MG/DL (ref 8.8–10.2)
CHLORIDE SERPL-SCNC: 106 MMOL/L (ref 98–107)
CO2 SERPL-SCNC: 29 MMOL/L (ref 20–29)
CREAT SERPL-MCNC: 1.03 MG/DL (ref 0.8–1.3)
DIFFERENTIAL METHOD BLD: ABNORMAL
EOSINOPHIL # BLD: 0.15 K/UL (ref 0–0.8)
EOSINOPHIL NFR BLD: 2.1 % (ref 0.5–7.8)
ERYTHROCYTE [DISTWIDTH] IN BLOOD BY AUTOMATED COUNT: 19.9 % (ref 11.9–14.6)
GLOBULIN SER CALC-MCNC: 3.8 G/DL (ref 2.3–3.5)
GLUCOSE SERPL-MCNC: 138 MG/DL (ref 70–99)
HCT VFR BLD AUTO: 37.2 % (ref 41.1–50.3)
HGB BLD-MCNC: 11.9 G/DL (ref 13.6–17.2)
IMM GRANULOCYTES # BLD AUTO: 0.02 K/UL (ref 0–0.5)
IMM GRANULOCYTES NFR BLD AUTO: 0.3 % (ref 0–5)
LYMPHOCYTES # BLD: 2.33 K/UL (ref 0.5–4.6)
LYMPHOCYTES NFR BLD: 33 % (ref 13–44)
MCH RBC QN AUTO: 27.3 PG (ref 26.1–32.9)
MCHC RBC AUTO-ENTMCNC: 32 G/DL (ref 31.4–35)
MCV RBC AUTO: 85.3 FL (ref 82–102)
MONOCYTES # BLD: 0.5 K/UL (ref 0.1–1.3)
MONOCYTES NFR BLD: 7.1 % (ref 4–12)
NEUTS SEG # BLD: 4.02 K/UL (ref 1.7–8.2)
NEUTS SEG NFR BLD: 56.9 % (ref 43–78)
NRBC # BLD: 0 K/UL (ref 0–0.2)
PLATELET # BLD AUTO: 153 K/UL (ref 150–450)
PMV BLD AUTO: 10.9 FL (ref 9.4–12.3)
POTASSIUM SERPL-SCNC: 4.5 MMOL/L (ref 3.5–5.1)
PROT SERPL-MCNC: 7.5 G/DL (ref 6.3–8.2)
RBC # BLD AUTO: 4.36 M/UL (ref 4.23–5.6)
SODIUM SERPL-SCNC: 144 MMOL/L (ref 136–145)
TSH, 3RD GENERATION: 1.1 UIU/ML (ref 0.27–4.2)
WBC # BLD AUTO: 7.1 K/UL (ref 4.3–11.1)

## 2025-04-21 RX ORDER — BUMETANIDE 2 MG/1
2 TABLET ORAL DAILY
Qty: 90 TABLET | Refills: 2 | Status: SHIPPED | OUTPATIENT
Start: 2025-04-21

## 2025-04-21 RX ORDER — LISINOPRIL 20 MG/1
20 TABLET ORAL DAILY
Qty: 90 TABLET | Refills: 1 | Status: SHIPPED | OUTPATIENT
Start: 2025-04-21

## 2025-04-21 RX ORDER — METOPROLOL TARTRATE 50 MG
50 TABLET ORAL 2 TIMES DAILY
Qty: 180 TABLET | Refills: 1 | Status: SHIPPED | OUTPATIENT
Start: 2025-04-21

## 2025-04-21 RX ORDER — SPIRONOLACTONE 25 MG/1
25 TABLET ORAL DAILY
Qty: 90 TABLET | Refills: 1 | Status: SHIPPED | OUTPATIENT
Start: 2025-04-21

## 2025-04-21 RX ORDER — GABAPENTIN 300 MG/1
CAPSULE ORAL
Qty: 540 CAPSULE | Refills: 1 | Status: SHIPPED | OUTPATIENT
Start: 2025-04-21 | End: 2025-10-18

## 2025-04-21 RX ORDER — PANTOPRAZOLE SODIUM 40 MG/1
40 TABLET, DELAYED RELEASE ORAL
Qty: 180 TABLET | Refills: 1 | Status: SHIPPED | OUTPATIENT
Start: 2025-04-21

## 2025-04-21 RX ORDER — CEPHALEXIN 500 MG/1
500 CAPSULE ORAL 3 TIMES DAILY
Qty: 30 CAPSULE | Refills: 0 | Status: SHIPPED | OUTPATIENT
Start: 2025-04-21

## 2025-04-21 RX ORDER — PRAVASTATIN SODIUM 40 MG
40 TABLET ORAL
Qty: 90 TABLET | Refills: 1 | Status: SHIPPED | OUTPATIENT
Start: 2025-04-21

## 2025-04-21 RX ORDER — ALBUTEROL SULFATE 90 UG/1
2 INHALANT RESPIRATORY (INHALATION) EVERY 6 HOURS PRN
Qty: 18 G | Refills: 11 | Status: SHIPPED | OUTPATIENT
Start: 2025-04-21

## 2025-04-21 RX ORDER — AMIODARONE HYDROCHLORIDE 200 MG/1
200 TABLET ORAL 2 TIMES DAILY
Qty: 60 TABLET | Refills: 1 | Status: SHIPPED | OUTPATIENT
Start: 2025-04-21

## 2025-04-21 RX ORDER — EZETIMIBE 10 MG/1
10 TABLET ORAL NIGHTLY
Qty: 90 TABLET | Refills: 1 | Status: SHIPPED | OUTPATIENT
Start: 2025-04-21

## 2025-04-21 ASSESSMENT — PATIENT HEALTH QUESTIONNAIRE - PHQ9
SUM OF ALL RESPONSES TO PHQ QUESTIONS 1-9: 0
1. LITTLE INTEREST OR PLEASURE IN DOING THINGS: NOT AT ALL
2. FEELING DOWN, DEPRESSED OR HOPELESS: NOT AT ALL
SUM OF ALL RESPONSES TO PHQ QUESTIONS 1-9: 0

## 2025-04-21 ASSESSMENT — ENCOUNTER SYMPTOMS
COUGH: 0
SHORTNESS OF BREATH: 1
BLOOD IN STOOL: 0

## 2025-04-21 NOTE — PROGRESS NOTES
[facility]  [address]   [place] [address]  Phone: [phone]  Fax [phone]  Provider : Meliza Martino PA-C      Patient: Marc Horton  YOB: 1955  Patient Age 69 y.o.  Patient sex: male  Medical Record:  [id number]  Visit Date:4/21/2025   Author:  Meliza Martino PA-C    Family Practice Clinic Note     Chief complaint  Marc Horton  is a 69 y.o. male who was seen on 04/25/25  9:46 AM  for the following reasons:    Chief Complaint   Patient presents with    Follow-Up from Hospital     Bilateral LE edema       Current Medical problems addressed  Marc is 70 yo male who was living on property that his brother is  on the land he was staying on so now he is staying in his car.  He notes his legs were swollen and he went to the ER and was at the nursing home at [facility] as he was in wheelchair now walking on a cane.   He notes his lower legs and his hands are tingling and burning and wants referred to pain management.  He has stasis dermatitis and lower leg cellulitis   He had vascular duplex lower extremities vannessa done and normal on 4/8/2025      A1c was 7.0 on 4/2025 during his recent ER/hospitalization     ASSESSMENT AND PLAN    ICD-10-CM    1. Venous stasis  I87.8       2. Primary hypertension  I10 metFORMIN (GLUCOPHAGE) 850 MG tablet     metoprolol tartrate (LOPRESSOR) 50 MG tablet     TSH     CBC with Auto Differential     Comprehensive Metabolic Panel      3. Type 2 diabetes mellitus with other circulatory complication, without long-term current use of insulin (HCC)  E11.59 metFORMIN (GLUCOPHAGE) 850 MG tablet     metoprolol tartrate (LOPRESSOR) 50 MG tablet     Comprehensive Metabolic Panel      4. Essential hypertension  I10 metFORMIN (GLUCOPHAGE) 850 MG tablet     metoprolol tartrate (LOPRESSOR) 50 MG tablet      5. At high risk for falls  Z91.81       6. Chronic heart failure with preserved ejection fraction (HCC)  I50.32 spironolactone (ALDACTONE) 25 MG

## 2025-04-22 ENCOUNTER — CARE COORDINATION (OUTPATIENT)
Dept: CARE COORDINATION | Facility: CLINIC | Age: 70
End: 2025-04-22

## 2025-04-22 ENCOUNTER — RESULTS FOLLOW-UP (OUTPATIENT)
Dept: FAMILY MEDICINE CLINIC | Facility: CLINIC | Age: 70
End: 2025-04-22

## 2025-04-22 NOTE — CARE COORDINATION
Ambulatory Care Coordination Note     2025 10:47 AM     Patient Current Location:  Home: [address] [place] [id number]     ACM contacted the patient by telephone. Verified name and  with patient as identifiers.         ACM: Gale Rivera RN     Challenges to be reviewed by the provider   Additional needs identified to be addressed with provider No  none               Method of communication with provider: none.    Utilization: Patient has not had any utilization since our last call.     Care Summary Note:     Discussed PCP appt yesterday, pt has picked up all meds and confirms taking as prescribed. Discussed HF educ, taking of diuretics and med compliance. Pt verbalized understanding. He is still looking for a permanent home as he has been living out of his car. Denies questions or concerns at this time. Will monitor for sxs of cellulitis. Pt is not checking BS.     Assessments Completed:   Congestive Heart Failure Assessment    Are you currently restricting fluids?: No Restriction  Do you understand a low sodium diet?: Yes  Do you understand how to read food labels?: Yes  How many restaurant meals do you eat per week?: 1-2  Do you salt your food before tasting it?: No         Symptoms:           ,   General Assessment    Do you have any symptoms that are causing concern?: No          Medications Reviewed:   Completed during a previous call     Advance Care Planning:   Not reviewed during this call     Care Planning:   Education Documentation  Identify Meds, Dose, and Schedule, taught by Gale Rivera RN at 2025 10:44 AM.  Learner: Patient  Readiness: Acceptance  Method: Explanation  Response: Verbalizes Understanding    General medication information, taught by Gale Rivera RN at 2025 10:44 AM.  Learner: Patient  Readiness: Acceptance  Method: Explanation  Response: Verbalizes Understanding    Diuretics, taught by Gale Rivera RN at 2025 10:44 AM.  Learner:

## 2025-04-28 ENCOUNTER — OFFICE VISIT (OUTPATIENT)
Dept: FAMILY MEDICINE CLINIC | Facility: CLINIC | Age: 70
End: 2025-04-28
Payer: MEDICARE

## 2025-04-28 VITALS
SYSTOLIC BLOOD PRESSURE: 122 MMHG | BODY MASS INDEX: 33.78 KG/M2 | WEIGHT: 249.4 LBS | HEART RATE: 59 BPM | OXYGEN SATURATION: 97 % | DIASTOLIC BLOOD PRESSURE: 60 MMHG | HEIGHT: 72 IN | TEMPERATURE: 98.2 F

## 2025-04-28 DIAGNOSIS — Z00.00 MEDICARE ANNUAL WELLNESS VISIT, SUBSEQUENT: ICD-10-CM

## 2025-04-28 DIAGNOSIS — E11.59 TYPE 2 DIABETES MELLITUS WITH OTHER CIRCULATORY COMPLICATION, WITHOUT LONG-TERM CURRENT USE OF INSULIN (HCC): ICD-10-CM

## 2025-04-28 DIAGNOSIS — E78.5 DYSLIPIDEMIA: Chronic | ICD-10-CM

## 2025-04-28 DIAGNOSIS — H26.9 CATARACT OF BOTH EYES, UNSPECIFIED CATARACT TYPE: ICD-10-CM

## 2025-04-28 DIAGNOSIS — D50.9 IRON DEFICIENCY ANEMIA, UNSPECIFIED IRON DEFICIENCY ANEMIA TYPE: Primary | ICD-10-CM

## 2025-04-28 DIAGNOSIS — I87.8 VENOUS STASIS: ICD-10-CM

## 2025-04-28 DIAGNOSIS — L98.491 SKIN ULCER, LIMITED TO BREAKDOWN OF SKIN (HCC): ICD-10-CM

## 2025-04-28 DIAGNOSIS — L03.90 CHRONIC CELLULITIS: ICD-10-CM

## 2025-04-28 DIAGNOSIS — G62.9 PERIPHERAL POLYNEUROPATHY: ICD-10-CM

## 2025-04-28 PROBLEM — E11.65 UNCONTROLLED TYPE 2 DIABETES MELLITUS WITH HYPERGLYCEMIA (HCC): Status: ACTIVE | Noted: 2025-04-28

## 2025-04-28 PROBLEM — T50.905A ADVERSE REACTION TO DRUG: Status: ACTIVE | Noted: 2025-04-28

## 2025-04-28 LAB
CHOLEST SERPL-MCNC: 128 MG/DL (ref 0–200)
EST. AVERAGE GLUCOSE BLD GHB EST-MCNC: 155 MG/DL
HBA1C MFR BLD: 7 % (ref 0–5.6)
HDLC SERPL-MCNC: 48 MG/DL (ref 40–60)
HDLC SERPL: 2.6 (ref 0–5)
LDLC SERPL CALC-MCNC: 45 MG/DL (ref 0–100)
TRIGL SERPL-MCNC: 174 MG/DL (ref 0–150)
VLDLC SERPL CALC-MCNC: 35 MG/DL (ref 6–23)

## 2025-04-28 PROCEDURE — 1111F DSCHRG MED/CURRENT MED MERGE: CPT | Performed by: PHYSICIAN ASSISTANT

## 2025-04-28 PROCEDURE — 2022F DILAT RTA XM EVC RTNOPTHY: CPT | Performed by: PHYSICIAN ASSISTANT

## 2025-04-28 PROCEDURE — 1160F RVW MEDS BY RX/DR IN RCRD: CPT | Performed by: PHYSICIAN ASSISTANT

## 2025-04-28 PROCEDURE — 3078F DIAST BP <80 MM HG: CPT | Performed by: PHYSICIAN ASSISTANT

## 2025-04-28 PROCEDURE — 1159F MED LIST DOCD IN RCRD: CPT | Performed by: PHYSICIAN ASSISTANT

## 2025-04-28 PROCEDURE — G8427 DOCREV CUR MEDS BY ELIG CLIN: HCPCS | Performed by: PHYSICIAN ASSISTANT

## 2025-04-28 PROCEDURE — 1123F ACP DISCUSS/DSCN MKR DOCD: CPT | Performed by: PHYSICIAN ASSISTANT

## 2025-04-28 PROCEDURE — G2211 COMPLEX E/M VISIT ADD ON: HCPCS | Performed by: PHYSICIAN ASSISTANT

## 2025-04-28 PROCEDURE — 3017F COLORECTAL CA SCREEN DOC REV: CPT | Performed by: PHYSICIAN ASSISTANT

## 2025-04-28 PROCEDURE — 4004F PT TOBACCO SCREEN RCVD TLK: CPT | Performed by: PHYSICIAN ASSISTANT

## 2025-04-28 PROCEDURE — 3051F HG A1C>EQUAL 7.0%<8.0%: CPT | Performed by: PHYSICIAN ASSISTANT

## 2025-04-28 PROCEDURE — 3074F SYST BP LT 130 MM HG: CPT | Performed by: PHYSICIAN ASSISTANT

## 2025-04-28 PROCEDURE — G8417 CALC BMI ABV UP PARAM F/U: HCPCS | Performed by: PHYSICIAN ASSISTANT

## 2025-04-28 PROCEDURE — 99214 OFFICE O/P EST MOD 30 MIN: CPT | Performed by: PHYSICIAN ASSISTANT

## 2025-04-28 PROCEDURE — G0439 PPPS, SUBSEQ VISIT: HCPCS | Performed by: PHYSICIAN ASSISTANT

## 2025-04-28 RX ORDER — FERROUS SULFATE 325(65) MG
325 TABLET, DELAYED RELEASE (ENTERIC COATED) ORAL 2 TIMES DAILY
Qty: 90 TABLET | Refills: 3 | Status: SHIPPED | OUTPATIENT
Start: 2025-04-28

## 2025-04-28 RX ORDER — GABAPENTIN 600 MG/1
600 TABLET ORAL 3 TIMES DAILY
Qty: 90 TABLET | Refills: 0 | Status: SHIPPED | OUTPATIENT
Start: 2025-04-28 | End: 2025-05-28

## 2025-04-28 ASSESSMENT — PATIENT HEALTH QUESTIONNAIRE - PHQ9
2. FEELING DOWN, DEPRESSED OR HOPELESS: NOT AT ALL
SUM OF ALL RESPONSES TO PHQ QUESTIONS 1-9: 0
1. LITTLE INTEREST OR PLEASURE IN DOING THINGS: NOT AT ALL
SUM OF ALL RESPONSES TO PHQ QUESTIONS 1-9: 0

## 2025-04-28 NOTE — PROGRESS NOTES
Use      Smoking status: Some Days        Packs/day: 0.00        Types: Cigars, Cigarettes        Last attempt to quit: 1998        Years since quittin.3      Smokeless tobacco: Never      Tobacco comments: Quit smoking cigarettes smokes some cigars     Interventions:  Patient declined any further intervention or treatment            Objective   Vitals:    25 1121   BP: 122/60   Pulse: 59   Temp: 98.2 °F (36.8 °C)   SpO2: 97%   Weight: 113.1 kg (249 lb 6.4 oz)   Height: 1.829 m (6')      Body mass index is 33.82 kg/m².                 Allergies   Allergen Reactions    Atorvastatin     Other Shortness Of Breath     Oyster    Rosuvastatin Shortness Of Breath    Oyster Extract Other (See Comments) and Nausea And Vomiting     N & V, diarrhea, abdominal cramping      Iodine      Pt had contrast 24 with no reaction, also has had several CT with contrast in the past with no reaction KK     Prior to Visit Medications    Medication Sig Taking? Authorizing Provider   ferrous sulfate (FE TABS) 325 (65 Fe) MG EC tablet Take 1 tablet by mouth 2 times daily Yes Meliza Martino PA-C   gabapentin (NEURONTIN) 600 MG tablet Take 1 tablet by mouth 3 times daily for 30 days. Yes Meliza Martino PA-C   amiodarone (CORDARONE) 200 MG tablet Take 1 tablet by mouth 2 times daily Yes Meliza Martino PA-C   gabapentin (NEURONTIN) 300 MG capsule Take 2 capsule in morning, and 2 capsules at lunch and 2 capusles at night Intended supply: 30 days Yes Meliza Martino PA-C   metFORMIN (GLUCOPHAGE) 850 MG tablet Take 1 tablet by mouth 2 times daily (with meals) Yes Meliza Martino PA-C   spironolactone (ALDACTONE) 25 MG tablet Take 1 tablet by mouth daily Yes Meliza Martino PA-C   pantoprazole (PROTONIX) 40 MG tablet Take 1 tablet by mouth 2 times daily (before meals) Yes Meliza Martino PA-C   metoprolol tartrate (LOPRESSOR) 50 MG tablet Take 1 tablet by mouth 2 times daily Yes Meliza Martino PA-C   pravastatin

## 2025-04-28 NOTE — PATIENT INSTRUCTIONS
important things you can do to protect your heart. It is never too late to quit. Try to avoid secondhand smoke too.     Stay at a weight that's healthy for you. Talk to your doctor if you need help losing weight.     Try to get 7 to 9 hours of sleep each night.     Limit alcohol to 2 drinks a day for men and 1 drink a day for women. Too much alcohol can cause health problems.     Manage other health problems such as diabetes, high blood pressure, and high cholesterol. If you think you may have a problem with alcohol or drug use, talk to your doctor.   Medicines    Take your medicines exactly as prescribed. Call your doctor if you think you are having a problem with your medicine.     If your doctor recommends aspirin, take the amount directed each day. Make sure you take aspirin and not another kind of pain reliever, such as acetaminophen (Tylenol).   When should you call for help?   Call 911 if you have symptoms of a heart attack. These may include:    Chest pain or pressure, or a strange feeling in the chest.     Sweating.     Shortness of breath.     Pain, pressure, or a strange feeling in the back, neck, jaw, or upper belly or in one or both shoulders or arms.     Lightheadedness or sudden weakness.     A fast or irregular heartbeat.   After you call 911, the  may tell you to chew 1 adult-strength or 2 to 4 low-dose aspirin. Wait for an ambulance. Do not try to drive yourself.  Watch closely for changes in your health, and be sure to contact your doctor if you have any problems.  Where can you learn more?  Go to https://www.[employer].net/patientEd and enter F075 to learn more about \"A Healthy Heart: Care Instructions.\"  Current as of: July 31, 2024  Content Version: 14.4  © 7735-9536 [employer].   Care instructions adapted under license by [employer]. If you have questions about a medical condition or this instruction, always ask your healthcare professional. [employer],

## 2025-04-30 ASSESSMENT — ENCOUNTER SYMPTOMS
COUGH: 0
BLOOD IN STOOL: 0
SHORTNESS OF BREATH: 0

## 2025-05-06 ENCOUNTER — CARE COORDINATION (OUTPATIENT)
Dept: CARE COORDINATION | Facility: CLINIC | Age: 70
End: 2025-05-06

## 2025-05-06 NOTE — CARE COORDINATION
Ambulatory Care Coordination Note     2025 2:20 PM     Patient Current Location:  Home: [address] [facility] [phone]     ACM contacted the patient by telephone. Verified name and  with patient as identifiers.         ACM: Gale Rivera RN     Challenges to be reviewed by the provider   Additional needs identified to be addressed with provider No  none               Method of communication with provider: none.    Utilization: Patient has not had any utilization since our last call.     Care Summary Note:   Pt is staying in a motel at this time, a friend paid for 3days.  He will return to his car, stressed importance of elevation of legs and monitoring for cellulitis. Pt missed wound care appt due to being in the hospital, pt declined for ACM to reschedule. He states he has all of his meds and is being compliant. He denies questions or concerns at this time.       Heart Failure Education outreach Date/Time: 2025 2:20 PM    Ambulatory Care Manager (ACM) contacted the patient by telephone to perform Ambulatory Care Coordination. Verified name and  with patient as identifiers. Provided introduction to self, and explanation of the Ambulatory Care Manager's role.     ACM reviewed that a Heart Healthy tips for the Summer packet has been sent to [employer]. ACM reviewed CHF zones, daily weights, fluid restriction, the importance of low sodium diet, and healthy tips packet with the patient. Instructed patient to call their Cardiologist if they have a weight gain of 3 lbs in 2 days or 5 lbs in a week.     Patient reminded that there is a physician on call 24 hours a day / 7 days a week should the patient have questions or concerns. The patient verbalized understanding.        Assessments Completed:   Congestive Heart Failure Assessment    Are you currently restricting fluids?: No Restriction  Do you understand a low sodium diet?: Yes  Do you understand how to read food labels?: Yes  How many restaurant

## 2025-05-07 ENCOUNTER — APPOINTMENT (OUTPATIENT)
Dept: CT IMAGING | Age: 70
End: 2025-05-07
Payer: MEDICARE

## 2025-05-07 ENCOUNTER — HOSPITAL ENCOUNTER (INPATIENT)
Age: 70
LOS: 2 days | Discharge: NURSING, REHAB OR LONG-TERM CARE | End: 2025-05-10
Attending: EMERGENCY MEDICINE | Admitting: INTERNAL MEDICINE
Payer: MEDICARE

## 2025-05-07 DIAGNOSIS — R20.2 PARESTHESIAS: Primary | ICD-10-CM

## 2025-05-07 DIAGNOSIS — R07.9 CHEST PAIN, UNSPECIFIED TYPE: ICD-10-CM

## 2025-05-07 DIAGNOSIS — I87.8 VENOUS STASIS: ICD-10-CM

## 2025-05-07 DIAGNOSIS — I63.9 ACUTE CVA (CEREBROVASCULAR ACCIDENT) (HCC): ICD-10-CM

## 2025-05-07 DIAGNOSIS — M79.604 BILATERAL LEG PAIN: ICD-10-CM

## 2025-05-07 DIAGNOSIS — R29.90 STROKE-LIKE SYMPTOMS: ICD-10-CM

## 2025-05-07 DIAGNOSIS — Z59.00 HOMELESSNESS: ICD-10-CM

## 2025-05-07 DIAGNOSIS — M79.605 BILATERAL LEG PAIN: ICD-10-CM

## 2025-05-07 LAB
ALBUMIN SERPL-MCNC: 3.9 G/DL (ref 3.2–4.6)
ALBUMIN/GLOB SERPL: 1 (ref 1–1.9)
ALP SERPL-CCNC: 53 U/L (ref 40–129)
ALT SERPL-CCNC: 15 U/L (ref 8–55)
ANION GAP SERPL CALC-SCNC: 13 MMOL/L (ref 7–16)
AST SERPL-CCNC: 19 U/L (ref 15–37)
BASOPHILS # BLD: 0.03 K/UL (ref 0–0.2)
BASOPHILS NFR BLD: 0.4 % (ref 0–2)
BILIRUB SERPL-MCNC: 0.3 MG/DL (ref 0–1.2)
BUN SERPL-MCNC: 19 MG/DL (ref 8–23)
CALCIUM SERPL-MCNC: 9.5 MG/DL (ref 8.8–10.2)
CHLORIDE SERPL-SCNC: 105 MMOL/L (ref 98–107)
CO2 SERPL-SCNC: 26 MMOL/L (ref 20–29)
CREAT SERPL-MCNC: 1.46 MG/DL (ref 0.8–1.3)
DIFFERENTIAL METHOD BLD: ABNORMAL
EOSINOPHIL # BLD: 0.16 K/UL (ref 0–0.8)
EOSINOPHIL NFR BLD: 2.1 % (ref 0.5–7.8)
ERYTHROCYTE [DISTWIDTH] IN BLOOD BY AUTOMATED COUNT: 17.7 % (ref 11.9–14.6)
GLOBULIN SER CALC-MCNC: 3.7 G/DL (ref 2.3–3.5)
GLUCOSE SERPL-MCNC: 126 MG/DL (ref 70–99)
HCT VFR BLD AUTO: 38 % (ref 41.1–50.3)
HGB BLD-MCNC: 12 G/DL (ref 13.6–17.2)
IMM GRANULOCYTES # BLD AUTO: 0.02 K/UL (ref 0–0.5)
IMM GRANULOCYTES NFR BLD AUTO: 0.3 % (ref 0–5)
LACTATE SERPL-SCNC: 1.1 MMOL/L (ref 0.5–2)
LYMPHOCYTES # BLD: 2.91 K/UL (ref 0.5–4.6)
LYMPHOCYTES NFR BLD: 37.7 % (ref 13–44)
MCH RBC QN AUTO: 27.8 PG (ref 26.1–32.9)
MCHC RBC AUTO-ENTMCNC: 31.6 G/DL (ref 31.4–35)
MCV RBC AUTO: 88 FL (ref 82–102)
MONOCYTES # BLD: 0.73 K/UL (ref 0.1–1.3)
MONOCYTES NFR BLD: 9.5 % (ref 4–12)
NEUTS SEG # BLD: 3.86 K/UL (ref 1.7–8.2)
NEUTS SEG NFR BLD: 50 % (ref 43–78)
NRBC # BLD: 0 K/UL (ref 0–0.2)
NT PRO BNP: 997 PG/ML (ref 0–125)
PLATELET # BLD AUTO: 165 K/UL (ref 150–450)
PMV BLD AUTO: 9.9 FL (ref 9.4–12.3)
POTASSIUM SERPL-SCNC: 4 MMOL/L (ref 3.5–5.1)
PROCALCITONIN SERPL-MCNC: 0.08 NG/ML (ref 0–0.1)
PROT SERPL-MCNC: 7.6 G/DL (ref 6.3–8.2)
RBC # BLD AUTO: 4.32 M/UL (ref 4.23–5.6)
SODIUM SERPL-SCNC: 143 MMOL/L (ref 136–145)
TROPONIN T SERPL HS-MCNC: 15.7 NG/L (ref 0–22)
WBC # BLD AUTO: 7.7 K/UL (ref 4.3–11.1)

## 2025-05-07 PROCEDURE — 96376 TX/PRO/DX INJ SAME DRUG ADON: CPT

## 2025-05-07 PROCEDURE — 74174 CTA ABD&PLVS W/CONTRAST: CPT

## 2025-05-07 PROCEDURE — 6360000002 HC RX W HCPCS

## 2025-05-07 PROCEDURE — 83605 ASSAY OF LACTIC ACID: CPT

## 2025-05-07 PROCEDURE — 84145 PROCALCITONIN (PCT): CPT

## 2025-05-07 PROCEDURE — 83880 ASSAY OF NATRIURETIC PEPTIDE: CPT

## 2025-05-07 PROCEDURE — 85025 COMPLETE CBC W/AUTO DIFF WBC: CPT

## 2025-05-07 PROCEDURE — 96374 THER/PROPH/DIAG INJ IV PUSH: CPT

## 2025-05-07 PROCEDURE — 84484 ASSAY OF TROPONIN QUANT: CPT

## 2025-05-07 PROCEDURE — 80053 COMPREHEN METABOLIC PANEL: CPT

## 2025-05-07 PROCEDURE — 93005 ELECTROCARDIOGRAM TRACING: CPT | Performed by: EMERGENCY MEDICINE

## 2025-05-07 PROCEDURE — 70450 CT HEAD/BRAIN W/O DYE: CPT

## 2025-05-07 PROCEDURE — 6360000004 HC RX CONTRAST MEDICATION

## 2025-05-07 PROCEDURE — 99285 EMERGENCY DEPT VISIT HI MDM: CPT

## 2025-05-07 PROCEDURE — 96375 TX/PRO/DX INJ NEW DRUG ADDON: CPT

## 2025-05-07 RX ORDER — IOPAMIDOL 755 MG/ML
100 INJECTION, SOLUTION INTRAVASCULAR
Status: COMPLETED | OUTPATIENT
Start: 2025-05-07 | End: 2025-05-07

## 2025-05-07 RX ORDER — MORPHINE SULFATE 4 MG/ML
4 INJECTION, SOLUTION INTRAMUSCULAR; INTRAVENOUS ONCE
Refills: 0 | Status: COMPLETED | OUTPATIENT
Start: 2025-05-07 | End: 2025-05-07

## 2025-05-07 RX ORDER — ONDANSETRON 2 MG/ML
4 INJECTION INTRAMUSCULAR; INTRAVENOUS
Status: COMPLETED | OUTPATIENT
Start: 2025-05-07 | End: 2025-05-07

## 2025-05-07 RX ADMIN — MORPHINE SULFATE 4 MG: 4 INJECTION INTRAVENOUS at 22:17

## 2025-05-07 RX ADMIN — MORPHINE SULFATE 4 MG: 4 INJECTION INTRAVENOUS at 23:28

## 2025-05-07 RX ADMIN — ONDANSETRON 4 MG: 2 INJECTION, SOLUTION INTRAMUSCULAR; INTRAVENOUS at 22:17

## 2025-05-07 RX ADMIN — IOPAMIDOL 100 ML: 755 INJECTION, SOLUTION INTRAVENOUS at 22:16

## 2025-05-07 SDOH — ECONOMIC STABILITY - HOUSING INSECURITY: HOMELESSNESS UNSPECIFIED: Z59.00

## 2025-05-07 ASSESSMENT — PAIN SCALES - GENERAL
PAINLEVEL_OUTOF10: 9
PAINLEVEL_OUTOF10: 8
PAINLEVEL_OUTOF10: 10
PAINLEVEL_OUTOF10: 9

## 2025-05-07 ASSESSMENT — PAIN DESCRIPTION - LOCATION
LOCATION: CHEST;HEAD
LOCATION: CHEST
LOCATION: HEAD;LEG;CHEST

## 2025-05-07 ASSESSMENT — PAIN DESCRIPTION - DESCRIPTORS: DESCRIPTORS: BURNING

## 2025-05-07 ASSESSMENT — PAIN DESCRIPTION - ORIENTATION
ORIENTATION: LEFT
ORIENTATION: LEFT

## 2025-05-07 ASSESSMENT — PAIN - FUNCTIONAL ASSESSMENT: PAIN_FUNCTIONAL_ASSESSMENT: 0-10

## 2025-05-07 NOTE — ED TRIAGE NOTES
Pt presents to triage unassisted c/o chest pain that radiates down left arm. Also endorses bilateral leg pain and swelling. States he has blisters on BLE that are opening. Has been seen multiple times for BLE cellulitis. States he completed course of antibiotics.

## 2025-05-08 ENCOUNTER — APPOINTMENT (OUTPATIENT)
Dept: ULTRASOUND IMAGING | Age: 70
End: 2025-05-08
Payer: MEDICARE

## 2025-05-08 PROBLEM — I63.9 ACUTE CVA (CEREBROVASCULAR ACCIDENT) (HCC): Status: ACTIVE | Noted: 2025-05-08

## 2025-05-08 LAB
EKG ATRIAL RATE: 76 BPM
EKG DIAGNOSIS: NORMAL
EKG P AXIS: 40 DEGREES
EKG P-R INTERVAL: 144 MS
EKG Q-T INTERVAL: 420 MS
EKG QRS DURATION: 86 MS
EKG QTC CALCULATION (BAZETT): 472 MS
EKG R AXIS: 17 DEGREES
EKG T AXIS: 67 DEGREES
EKG VENTRICULAR RATE: 76 BPM
GLUCOSE BLD STRIP.AUTO-MCNC: 167 MG/DL (ref 65–100)
GLUCOSE BLD STRIP.AUTO-MCNC: 169 MG/DL (ref 65–100)
GLUCOSE BLD STRIP.AUTO-MCNC: 169 MG/DL (ref 65–100)
GLUCOSE BLD STRIP.AUTO-MCNC: 220 MG/DL (ref 65–100)
SARS-COV-2 RDRP RESP QL NAA+PROBE: NOT DETECTED
SERVICE CMNT-IMP: ABNORMAL
SOURCE: NORMAL
TROPONIN T SERPL HS-MCNC: 15.3 NG/L (ref 0–22)

## 2025-05-08 PROCEDURE — 97161 PT EVAL LOW COMPLEX 20 MIN: CPT

## 2025-05-08 PROCEDURE — 2W1RX6Z COMPRESSION OF LEFT LOWER LEG USING PRESSURE DRESSING: ICD-10-PCS | Performed by: FAMILY MEDICINE

## 2025-05-08 PROCEDURE — 93010 ELECTROCARDIOGRAM REPORT: CPT | Performed by: INTERNAL MEDICINE

## 2025-05-08 PROCEDURE — 99222 1ST HOSP IP/OBS MODERATE 55: CPT | Performed by: PSYCHIATRY & NEUROLOGY

## 2025-05-08 PROCEDURE — 97165 OT EVAL LOW COMPLEX 30 MIN: CPT

## 2025-05-08 PROCEDURE — 2W1QX6Z COMPRESSION OF RIGHT LOWER LEG USING PRESSURE DRESSING: ICD-10-PCS | Performed by: FAMILY MEDICINE

## 2025-05-08 PROCEDURE — 6360000002 HC RX W HCPCS: Performed by: INTERNAL MEDICINE

## 2025-05-08 PROCEDURE — 97530 THERAPEUTIC ACTIVITIES: CPT

## 2025-05-08 PROCEDURE — 29581 APPL MULTLAYER CMPRN SYS LEG: CPT

## 2025-05-08 PROCEDURE — 97535 SELF CARE MNGMENT TRAINING: CPT

## 2025-05-08 PROCEDURE — 92610 EVALUATE SWALLOWING FUNCTION: CPT

## 2025-05-08 PROCEDURE — 2500000003 HC RX 250 WO HCPCS: Performed by: INTERNAL MEDICINE

## 2025-05-08 PROCEDURE — 87635 SARS-COV-2 COVID-19 AMP PRB: CPT

## 2025-05-08 PROCEDURE — 1100000003 HC PRIVATE W/ TELEMETRY

## 2025-05-08 PROCEDURE — 93880 EXTRACRANIAL BILAT STUDY: CPT

## 2025-05-08 PROCEDURE — 82962 GLUCOSE BLOOD TEST: CPT

## 2025-05-08 PROCEDURE — 6370000000 HC RX 637 (ALT 250 FOR IP): Performed by: PHYSICIAN ASSISTANT

## 2025-05-08 PROCEDURE — 92523 SPEECH SOUND LANG COMPREHEN: CPT

## 2025-05-08 PROCEDURE — 6370000000 HC RX 637 (ALT 250 FOR IP): Performed by: INTERNAL MEDICINE

## 2025-05-08 PROCEDURE — 94640 AIRWAY INHALATION TREATMENT: CPT

## 2025-05-08 PROCEDURE — 94760 N-INVAS EAR/PLS OXIMETRY 1: CPT

## 2025-05-08 PROCEDURE — 97112 NEUROMUSCULAR REEDUCATION: CPT

## 2025-05-08 PROCEDURE — 93880 EXTRACRANIAL BILAT STUDY: CPT | Performed by: RADIOLOGY

## 2025-05-08 RX ORDER — POLYETHYLENE GLYCOL 3350 17 G/17G
17 POWDER, FOR SOLUTION ORAL DAILY PRN
Status: DISCONTINUED | OUTPATIENT
Start: 2025-05-08 | End: 2025-05-10 | Stop reason: HOSPADM

## 2025-05-08 RX ORDER — NICOTINE 21 MG/24HR
1 PATCH, TRANSDERMAL 24 HOURS TRANSDERMAL DAILY PRN
Status: DISCONTINUED | OUTPATIENT
Start: 2025-05-08 | End: 2025-05-10 | Stop reason: HOSPADM

## 2025-05-08 RX ORDER — HYDROMORPHONE HYDROCHLORIDE 1 MG/ML
1 INJECTION, SOLUTION INTRAMUSCULAR; INTRAVENOUS; SUBCUTANEOUS EVERY 4 HOURS PRN
Status: DISCONTINUED | OUTPATIENT
Start: 2025-05-08 | End: 2025-05-10 | Stop reason: HOSPADM

## 2025-05-08 RX ORDER — ASPIRIN 81 MG/1
81 TABLET ORAL DAILY
Status: DISCONTINUED | OUTPATIENT
Start: 2025-05-08 | End: 2025-05-10 | Stop reason: HOSPADM

## 2025-05-08 RX ORDER — HYDROCODONE BITARTRATE AND ACETAMINOPHEN 5; 325 MG/1; MG/1
1 TABLET ORAL EVERY 6 HOURS PRN
Status: DISCONTINUED | OUTPATIENT
Start: 2025-05-08 | End: 2025-05-10 | Stop reason: HOSPADM

## 2025-05-08 RX ORDER — PRAVASTATIN SODIUM 20 MG
40 TABLET ORAL
Status: DISCONTINUED | OUTPATIENT
Start: 2025-05-08 | End: 2025-05-09

## 2025-05-08 RX ORDER — ENOXAPARIN SODIUM 100 MG/ML
30 INJECTION SUBCUTANEOUS 2 TIMES DAILY
Status: DISCONTINUED | OUTPATIENT
Start: 2025-05-08 | End: 2025-05-10 | Stop reason: HOSPADM

## 2025-05-08 RX ORDER — DEXTROSE MONOHYDRATE 100 MG/ML
INJECTION, SOLUTION INTRAVENOUS CONTINUOUS PRN
Status: DISCONTINUED | OUTPATIENT
Start: 2025-05-08 | End: 2025-05-10 | Stop reason: HOSPADM

## 2025-05-08 RX ORDER — SODIUM CHLORIDE 9 MG/ML
INJECTION, SOLUTION INTRAVENOUS PRN
Status: DISCONTINUED | OUTPATIENT
Start: 2025-05-08 | End: 2025-05-10 | Stop reason: HOSPADM

## 2025-05-08 RX ORDER — ACETAMINOPHEN 650 MG/1
650 SUPPOSITORY RECTAL EVERY 4 HOURS PRN
Status: DISCONTINUED | OUTPATIENT
Start: 2025-05-08 | End: 2025-05-10 | Stop reason: HOSPADM

## 2025-05-08 RX ORDER — FERROUS SULFATE 325(65) MG
325 TABLET ORAL 2 TIMES DAILY WITH MEALS
Status: DISCONTINUED | OUTPATIENT
Start: 2025-05-08 | End: 2025-05-10 | Stop reason: HOSPADM

## 2025-05-08 RX ORDER — EZETIMIBE 10 MG/1
10 TABLET ORAL NIGHTLY
Status: DISCONTINUED | OUTPATIENT
Start: 2025-05-08 | End: 2025-05-10 | Stop reason: HOSPADM

## 2025-05-08 RX ORDER — AMIODARONE HYDROCHLORIDE 200 MG/1
200 TABLET ORAL 2 TIMES DAILY
Status: DISCONTINUED | OUTPATIENT
Start: 2025-05-08 | End: 2025-05-10 | Stop reason: HOSPADM

## 2025-05-08 RX ORDER — IBUPROFEN 600 MG/1
1 TABLET ORAL PRN
Status: DISCONTINUED | OUTPATIENT
Start: 2025-05-08 | End: 2025-05-10 | Stop reason: HOSPADM

## 2025-05-08 RX ORDER — CLOPIDOGREL BISULFATE 75 MG/1
75 TABLET ORAL DAILY
Status: DISCONTINUED | OUTPATIENT
Start: 2025-05-08 | End: 2025-05-10 | Stop reason: HOSPADM

## 2025-05-08 RX ORDER — ONDANSETRON 2 MG/ML
4 INJECTION INTRAMUSCULAR; INTRAVENOUS EVERY 6 HOURS PRN
Status: DISCONTINUED | OUTPATIENT
Start: 2025-05-08 | End: 2025-05-10 | Stop reason: HOSPADM

## 2025-05-08 RX ORDER — ARFORMOTEROL TARTRATE 15 UG/2ML
15 SOLUTION RESPIRATORY (INHALATION)
Status: DISCONTINUED | OUTPATIENT
Start: 2025-05-08 | End: 2025-05-10 | Stop reason: HOSPADM

## 2025-05-08 RX ORDER — ATORVASTATIN CALCIUM 80 MG/1
80 TABLET, FILM COATED ORAL NIGHTLY
Status: DISCONTINUED | OUTPATIENT
Start: 2025-05-08 | End: 2025-05-10 | Stop reason: HOSPADM

## 2025-05-08 RX ORDER — METOPROLOL TARTRATE 25 MG/1
50 TABLET, FILM COATED ORAL 2 TIMES DAILY
Status: DISCONTINUED | OUTPATIENT
Start: 2025-05-08 | End: 2025-05-10 | Stop reason: HOSPADM

## 2025-05-08 RX ORDER — ARFORMOTEROL TARTRATE 15 UG/2ML
15 SOLUTION RESPIRATORY (INHALATION)
Status: DISCONTINUED | OUTPATIENT
Start: 2025-05-08 | End: 2025-05-08

## 2025-05-08 RX ORDER — PANTOPRAZOLE SODIUM 40 MG/1
40 TABLET, DELAYED RELEASE ORAL
Status: DISCONTINUED | OUTPATIENT
Start: 2025-05-08 | End: 2025-05-10 | Stop reason: HOSPADM

## 2025-05-08 RX ORDER — INSULIN LISPRO 100 [IU]/ML
0-10 INJECTION, SOLUTION INTRAVENOUS; SUBCUTANEOUS
Status: DISCONTINUED | OUTPATIENT
Start: 2025-05-08 | End: 2025-05-10 | Stop reason: HOSPADM

## 2025-05-08 RX ORDER — LISINOPRIL 20 MG/1
20 TABLET ORAL DAILY
Status: DISCONTINUED | OUTPATIENT
Start: 2025-05-08 | End: 2025-05-10 | Stop reason: HOSPADM

## 2025-05-08 RX ORDER — GABAPENTIN 100 MG/1
200 CAPSULE ORAL 3 TIMES DAILY
Status: DISCONTINUED | OUTPATIENT
Start: 2025-05-08 | End: 2025-05-10 | Stop reason: HOSPADM

## 2025-05-08 RX ORDER — BUDESONIDE 0.25 MG/2ML
0.25 INHALANT ORAL
Status: DISCONTINUED | OUTPATIENT
Start: 2025-05-08 | End: 2025-05-10 | Stop reason: HOSPADM

## 2025-05-08 RX ORDER — BUMETANIDE 1 MG/1
2 TABLET ORAL DAILY
Status: DISCONTINUED | OUTPATIENT
Start: 2025-05-08 | End: 2025-05-10 | Stop reason: HOSPADM

## 2025-05-08 RX ORDER — SPIRONOLACTONE 25 MG/1
25 TABLET ORAL DAILY
Status: DISCONTINUED | OUTPATIENT
Start: 2025-05-08 | End: 2025-05-10 | Stop reason: HOSPADM

## 2025-05-08 RX ORDER — LABETALOL HYDROCHLORIDE 5 MG/ML
10 INJECTION, SOLUTION INTRAVENOUS
Status: DISCONTINUED | OUTPATIENT
Start: 2025-05-08 | End: 2025-05-10 | Stop reason: HOSPADM

## 2025-05-08 RX ORDER — ACETAMINOPHEN 325 MG/1
650 TABLET ORAL EVERY 4 HOURS PRN
Status: DISCONTINUED | OUTPATIENT
Start: 2025-05-08 | End: 2025-05-10 | Stop reason: HOSPADM

## 2025-05-08 RX ORDER — BISACODYL 10 MG
10 SUPPOSITORY, RECTAL RECTAL DAILY PRN
Status: DISCONTINUED | OUTPATIENT
Start: 2025-05-08 | End: 2025-05-10 | Stop reason: HOSPADM

## 2025-05-08 RX ORDER — ONDANSETRON 4 MG/1
4 TABLET, ORALLY DISINTEGRATING ORAL EVERY 8 HOURS PRN
Status: DISCONTINUED | OUTPATIENT
Start: 2025-05-08 | End: 2025-05-10 | Stop reason: HOSPADM

## 2025-05-08 RX ORDER — BUDESONIDE 0.25 MG/2ML
0.25 INHALANT ORAL
Status: DISCONTINUED | OUTPATIENT
Start: 2025-05-08 | End: 2025-05-08

## 2025-05-08 RX ORDER — SODIUM CHLORIDE 0.9 % (FLUSH) 0.9 %
5-40 SYRINGE (ML) INJECTION EVERY 12 HOURS SCHEDULED
Status: DISCONTINUED | OUTPATIENT
Start: 2025-05-08 | End: 2025-05-10 | Stop reason: HOSPADM

## 2025-05-08 RX ORDER — ALBUTEROL SULFATE 0.83 MG/ML
2.5 SOLUTION RESPIRATORY (INHALATION) EVERY 6 HOURS PRN
Status: DISCONTINUED | OUTPATIENT
Start: 2025-05-08 | End: 2025-05-10 | Stop reason: HOSPADM

## 2025-05-08 RX ORDER — SODIUM CHLORIDE 0.9 % (FLUSH) 0.9 %
5-40 SYRINGE (ML) INJECTION PRN
Status: DISCONTINUED | OUTPATIENT
Start: 2025-05-08 | End: 2025-05-10 | Stop reason: HOSPADM

## 2025-05-08 RX ADMIN — ASPIRIN 81 MG: 81 TABLET ORAL at 08:06

## 2025-05-08 RX ADMIN — FERROUS SULFATE TAB 325 MG (65 MG ELEMENTAL FE) 325 MG: 325 (65 FE) TAB at 17:47

## 2025-05-08 RX ADMIN — IPRATROPIUM BROMIDE 0.5 MG: 0.5 SOLUTION RESPIRATORY (INHALATION) at 19:56

## 2025-05-08 RX ADMIN — WATER 1000 MG: 1 INJECTION INTRAMUSCULAR; INTRAVENOUS; SUBCUTANEOUS at 12:33

## 2025-05-08 RX ADMIN — CLOPIDOGREL BISULFATE 75 MG: 75 TABLET, FILM COATED ORAL at 08:06

## 2025-05-08 RX ADMIN — HYDROCODONE BITARTRATE AND ACETAMINOPHEN 1 TABLET: 5; 325 TABLET ORAL at 11:54

## 2025-05-08 RX ADMIN — PANTOPRAZOLE SODIUM 40 MG: 40 TABLET, DELAYED RELEASE ORAL at 16:21

## 2025-05-08 RX ADMIN — GABAPENTIN 200 MG: 100 CAPSULE ORAL at 08:06

## 2025-05-08 RX ADMIN — SODIUM CHLORIDE, PRESERVATIVE FREE 10 ML: 5 INJECTION INTRAVENOUS at 20:42

## 2025-05-08 RX ADMIN — HYDROMORPHONE HYDROCHLORIDE 1 MG: 1 INJECTION, SOLUTION INTRAMUSCULAR; INTRAVENOUS; SUBCUTANEOUS at 16:27

## 2025-05-08 RX ADMIN — BUMETANIDE 2 MG: 1 TABLET ORAL at 08:06

## 2025-05-08 RX ADMIN — GABAPENTIN 200 MG: 100 CAPSULE ORAL at 14:25

## 2025-05-08 RX ADMIN — BUDESONIDE 250 MCG: 0.25 SUSPENSION RESPIRATORY (INHALATION) at 08:11

## 2025-05-08 RX ADMIN — GABAPENTIN 200 MG: 100 CAPSULE ORAL at 20:42

## 2025-05-08 RX ADMIN — SODIUM CHLORIDE, PRESERVATIVE FREE 10 ML: 5 INJECTION INTRAVENOUS at 08:07

## 2025-05-08 RX ADMIN — SPIRONOLACTONE 25 MG: 25 TABLET ORAL at 08:06

## 2025-05-08 RX ADMIN — ENOXAPARIN SODIUM 30 MG: 100 INJECTION SUBCUTANEOUS at 08:06

## 2025-05-08 RX ADMIN — INSULIN LISPRO 5 UNITS: 100 INJECTION, SOLUTION INTRAVENOUS; SUBCUTANEOUS at 20:42

## 2025-05-08 RX ADMIN — AMIODARONE HYDROCHLORIDE 200 MG: 200 TABLET ORAL at 20:42

## 2025-05-08 RX ADMIN — IPRATROPIUM BROMIDE 0.5 MG: 0.5 SOLUTION RESPIRATORY (INHALATION) at 08:11

## 2025-05-08 RX ADMIN — AMIODARONE HYDROCHLORIDE 200 MG: 200 TABLET ORAL at 08:06

## 2025-05-08 RX ADMIN — EZETIMIBE 10 MG: 10 TABLET ORAL at 20:41

## 2025-05-08 RX ADMIN — PANTOPRAZOLE SODIUM 40 MG: 40 TABLET, DELAYED RELEASE ORAL at 04:31

## 2025-05-08 RX ADMIN — ARFORMOTEROL TARTRATE 15 MCG: 15 SOLUTION RESPIRATORY (INHALATION) at 08:11

## 2025-05-08 RX ADMIN — WATER 1000 MG: 1 INJECTION INTRAMUSCULAR; INTRAVENOUS; SUBCUTANEOUS at 04:31

## 2025-05-08 RX ADMIN — FERROUS SULFATE TAB 325 MG (65 MG ELEMENTAL FE) 325 MG: 325 (65 FE) TAB at 08:06

## 2025-05-08 RX ADMIN — HYDROCODONE BITARTRATE AND ACETAMINOPHEN 1 TABLET: 5; 325 TABLET ORAL at 20:42

## 2025-05-08 RX ADMIN — ENOXAPARIN SODIUM 30 MG: 100 INJECTION SUBCUTANEOUS at 20:42

## 2025-05-08 RX ADMIN — HYDROMORPHONE HYDROCHLORIDE 1 MG: 1 INJECTION, SOLUTION INTRAMUSCULAR; INTRAVENOUS; SUBCUTANEOUS at 08:05

## 2025-05-08 RX ADMIN — HYDROMORPHONE HYDROCHLORIDE 1 MG: 1 INJECTION, SOLUTION INTRAMUSCULAR; INTRAVENOUS; SUBCUTANEOUS at 02:28

## 2025-05-08 RX ADMIN — WATER 1000 MG: 1 INJECTION INTRAMUSCULAR; INTRAVENOUS; SUBCUTANEOUS at 20:41

## 2025-05-08 ASSESSMENT — PAIN DESCRIPTION - ORIENTATION
ORIENTATION: LEFT
ORIENTATION: ANTERIOR;RIGHT;LEFT
ORIENTATION: RIGHT;LEFT
ORIENTATION: RIGHT;LEFT
ORIENTATION: ANTERIOR;RIGHT;LEFT
ORIENTATION: LEFT
ORIENTATION: LEFT

## 2025-05-08 ASSESSMENT — PAIN SCALES - GENERAL
PAINLEVEL_OUTOF10: 8
PAINLEVEL_OUTOF10: 8
PAINLEVEL_OUTOF10: 7
PAINLEVEL_OUTOF10: 6
PAINLEVEL_OUTOF10: 0
PAINLEVEL_OUTOF10: 0
PAINLEVEL_OUTOF10: 6
PAINLEVEL_OUTOF10: 7
PAINLEVEL_OUTOF10: 8

## 2025-05-08 ASSESSMENT — PAIN DESCRIPTION - DESCRIPTORS
DESCRIPTORS: BURNING;THROBBING;ACHING
DESCRIPTORS: BURNING;THROBBING
DESCRIPTORS: BURNING;THROBBING;ACHING
DESCRIPTORS: THROBBING
DESCRIPTORS: THROBBING;ACHING
DESCRIPTORS: THROBBING

## 2025-05-08 ASSESSMENT — PAIN DESCRIPTION - ONSET
ONSET: ON-GOING
ONSET: ON-GOING

## 2025-05-08 ASSESSMENT — PAIN DESCRIPTION - PAIN TYPE
TYPE: CHRONIC PAIN
TYPE: CHRONIC PAIN

## 2025-05-08 ASSESSMENT — PAIN DESCRIPTION - LOCATION
LOCATION: HEAD;LEG
LOCATION: LEG
LOCATION: HEAD;LEG
LOCATION: LEG

## 2025-05-08 ASSESSMENT — PAIN DESCRIPTION - FREQUENCY
FREQUENCY: CONTINUOUS
FREQUENCY: CONTINUOUS

## 2025-05-08 NOTE — PROGRESS NOTES
ACUTE OCCUPATIONAL THERAPY GOALS:   (Developed with and agreed upon by patient and/or caregiver.)  1. Patient will complete lower body bathing and dressing with MOD I and adaptive equipment as needed.   2.Patient will complete upper body bathing and dressing with MOD I and adaptive equipment as needed.  3. Patient will complete toileting with MOD I.   4. Patient will tolerate 30 minutes of OT treatment with 1-2 rest breaks to increase activity tolerance for ADLs.   5. Patient will complete functional transfers with MOD I and adaptive equipment as needed.   6. Patient will complete functional activity with MOD I and adaptive equipment as needed.  7. Patient will complete simple grooming task standing at the sink with MOD I.    Timeframe: 7 visits      OCCUPATIONAL THERAPY Initial Assessment and Daily Note       OT Visit Days: 1  Acknowledge Orders  Time  OT Charge Capture  Rehab Caseload Tracker      Marc Horton Sr. is a 69 y.o. male   PRIMARY DIAGNOSIS: Acute CVA (cerebrovascular accident) (HCC)  Paresthesias [R20.2]  Bilateral leg pain [M79.604, M79.605]  Stroke-like symptoms [R29.90]  Acute CVA (cerebrovascular accident) (HCC) [I63.9]  Chest pain, unspecified type [R07.9]       Reason for Referral: Generalized Muscle Weakness (M62.81)  Other lack of cordination (R27.8)  Difficulty in walking, Not elsewhere classified (R26.2)  Inpatient: Payor: [facility] MEDICARE / Plan: [employer] DUAL COMPLETE / Product Type: *No Product type* /     ASSESSMENT:     REHAB RECOMMENDATIONS:   Recommendation to date pending progress:  Setting:  Short-term Rehab    Equipment:    To Be Determined     ASSESSMENT:  Mr. Horton presented to the hospital with vertigo and feeling unbalanced. Pt also reported new onset of L sided numbness/tingling. Pt is undergoing CVA workup. At baseline, pt lives in his car with his two friends. Pt has been walking short distances with a SPC or rolling walker. Pt uses electric scooter in stores.

## 2025-05-08 NOTE — CONSULTS
Neurology Consult Note       History: This is a 69-year-old man with a history of type 2 diabetes, coronary artery disease, hypertension, and history of stroke.  He also has a history of CHF and peripheral vascular disease.  Patient presented with vertigo and left-sided paresthesias involving the left arm and left face.  He also reports pain in the bilateral lower extremities.  CT scan of the head did not show acute intracranial abnormalities.  CTA of the head neck was not done.      Exam: Pertinent positives and negatives include:    General - Well developed, well nourished, in no apparent distress. Pleasant and conversant.   HEENT - Normocephalic, atraumatic.  Neck -No masses   Lungs - Normal work of breathing   Abdomen - No masses   Extremities - No edema and no rashes.   Psychiatric - Mood and affect are normal    Neurological examination - Comprehension, attention , memory and reasoning are intact. Language and speech are normal. On cranial nerve examination pupils are equal round and reactive to light (cranial nerve II and III).  Visual acuity is adequate (cranial nerve II). Visual fields are full to finger confrontation (CN II). Extraocular motility is normal (CN III, IV, VI). Face is symmetric (CN VII). Hearing is grossly intact to verbal communication (CN VIII). Motor examination - There is normal bulk. Power is full throughout.  Muscle stretch reflexes are diminished throughout.  Loss of sensation on the left side of the face and left upper extremity.  Loss of sensation in the bilateral lower extremities cerebellar examination is normal with finger-no-nose testing.  No nystagmus.    CT scan of the head does not show acute intracranial abnormalities.  Personally reviewed    Assessment and Plan: 69-year-old man with vascular risk factors who presents with dizziness and left-sided sensory abnormalities, likely consistent with stroke.  Unfortunately, the patient cannot have an MRI.    Recommendations  Continue

## 2025-05-08 NOTE — H&P
(from the past 24 hours)   EKG 12 Lead    Collection Time: 05/07/25  7:33 PM   Result Value Ref Range    Ventricular Rate 76 BPM    Atrial Rate 76 BPM    P-R Interval 144 ms    QRS Duration 86 ms    Q-T Interval 420 ms    QTc Calculation (Bazett) 472 ms    P Axis 40 degrees    R Axis 17 degrees    T Axis 67 degrees    Diagnosis       Normal sinus rhythm  Cannot rule out Anterior infarct , age undetermined  Abnormal ECG  When compared with ECG of 21-MAR-2025 18:41,  Minimal criteria for Anterior infarct are now Present  Nonspecific T wave abnormality no longer evident in Inferior leads     CBC with Auto Differential    Collection Time: 05/07/25  9:15 PM   Result Value Ref Range    WBC 7.7 4.3 - 11.1 K/uL    RBC 4.32 4.23 - 5.6 M/uL    Hemoglobin 12.0 (L) 13.6 - 17.2 g/dL    Hematocrit 38.0 (L) 41.1 - 50.3 %    MCV 88.0 82 - 102 FL    MCH 27.8 26.1 - 32.9 PG    MCHC 31.6 31.4 - 35.0 g/dL    RDW 17.7 (H) 11.9 - 14.6 %    Platelets 165 150 - 450 K/uL    MPV 9.9 9.4 - 12.3 FL    nRBC 0.00 0.0 - 0.2 K/uL    Differential Type AUTOMATED      Neutrophils % 50.0 43.0 - 78.0 %    Lymphocytes % 37.7 13.0 - 44.0 %    Monocytes % 9.5 4.0 - 12.0 %    Eosinophils % 2.1 0.5 - 7.8 %    Basophils % 0.4 0.0 - 2.0 %    Immature Granulocytes % 0.3 0.0 - 5.0 %    Neutrophils Absolute 3.86 1.70 - 8.20 K/UL    Lymphocytes Absolute 2.91 0.50 - 4.60 K/UL    Monocytes Absolute 0.73 0.10 - 1.30 K/UL    Eosinophils Absolute 0.16 0.00 - 0.80 K/UL    Basophils Absolute 0.03 0.00 - 0.20 K/UL    Immature Granulocytes Absolute 0.02 0.0 - 0.5 K/UL   Comprehensive Metabolic Panel w/ Reflex to MG    Collection Time: 05/07/25  9:15 PM   Result Value Ref Range    Sodium 143 136 - 145 mmol/L    Potassium 4.0 3.5 - 5.1 mmol/L    Chloride 105 98 - 107 mmol/L    CO2 26 20 - 29 mmol/L    Anion Gap 13 7 - 16 mmol/L    Glucose 126 (H) 70 - 99 mg/dL    BUN 19 8 - 23 MG/DL    Creatinine 1.46 (H) 0.80 - 1.30 MG/DL    Est, Glom Filt Rate 51 (L) >60 ml/min/1.73m2     normal in appearance. PELVIS: Bladder: The urinary bladder is underdistended. Reproductive: Prostate gland is top normal in size. Peritoneum: No free fluid. No free air. Lymph nodes: No lymphadenopathy. Bones and soft tissues: Multilevel lower cervical spondylosis. Multiple surgical clips in the right greater than left groin. Subareolar density bilaterally reflects gynecomastia. The thyroid gland is unremarkable. Artifacts:  Patient arm positioning results in beam hardening artifact which degrades image quality.     1. No evidence of pulmonary embolus. No evidence of aortic dissection. 2. No acute inflammatory process identified in the chest, abdomen, or pelvis. 3. No evidence of aneurysm, thrombosis, dissection or high-grade stenosis involving the arterial vasculature of the chest, abdomen or pelvis. Electronically signed by Zheng Mazariegos    CT HEAD WO CONTRAST  Result Date: 5/7/2025  Head CT INDICATION: Left-sided numbness/weakness. TECHNIQUE: Multiple 2D axial images obtained through the brain without intravenous contrast.  Radiation dose reduction techniques were used for this study:  All CT scans performed at this facility use one or all of the following: Automated exposure control, adjustment of the mA and/or kVp according to patient's size, iterative reconstruction. COMPARISON: CT head from 8/18/2024. FINDINGS: There is prominence of the ventricles and sulci indicative of atrophy. Periventricular and deep white matter hypoattenuation likely relates to chronic small vessel ischemic disease. No midline shift, mass effect, or extra-axial fluid collections are identified. No acute intracranial hemorrhage, mass, or acute territorial infarction is present per CT criteria. Gray-white junction is preserved. Bilateral basal ganglia calcifications are present. Calvarium is intact.  Visualized paranasal sinuses are clear.     1. No acute intracranial process. If clinical concern for acute ischemia, MRI is a more sensitive

## 2025-05-08 NOTE — PROGRESS NOTES
GOALS:  LTG: Patient will maintain adequate hydration/nutrition with optimum safety and efficiency of swallowing function with PO intake without overt signs or symptoms of aspiration for the highest appropriate diet level.  STG:  Patient will consume easy to chew textures and thin liquids without overt signs or symptoms of airway compromise.  Patient will perform effortful swallow and small bites and sips to improve swallow safety with minimal cues 90% of the time.  Patient will safely ingest diet trials during therapeutic feedings with SLP without overt signs or symptoms of respiratory compromise in efforts to advance diet.    LTG: Patient will improve neuro-linguistic abilities to increase safety and awareness in functional living environment.   STG:  Patient will recall relevant verbal information with 80% accuracy with minimal cues.  Patient will utilize memory compensatory strategies to improve recall of functional information with 80% accuracy given minimal assistance.    SPEECH LANGUAGE PATHOLOGY: COMBINED Cognitive Communication and Dysphagia   Initial Assessment    Acknowledge Order  I  Therapy Time  I   Charges     I  Rehab Caseload Tracker    NAME: Marc Horton .  : 1955  MRN: [id number]    ADMISSION DATE: 2025  PRIMARY DIAGNOSIS: Acute CVA (cerebrovascular accident) ([facility])    ICD-10: Treatment Diagnosis:   R13.12 Dysphagia, Oropharyngeal Phase  R41.841 Cognitive-Communication Deficit    RECOMMENDATIONS   Diet:    Easy to Chew + breads  Thin Liquids    Medication: as tolerated   Compensatory Swallowing Strategies:   Effortful swallow  Small bites/sips   Therapeutic Intervention:   Patient/family education  Dysphagia treatment  Cognitive-linguistic treatment   Patient continues to require skilled intervention:  Yes. Recommend ongoing speech therapy services during this hospitalization.     Anticipated Discharge Needs: Do not anticipate ongoing speech therapy needs upon discharge.

## 2025-05-08 NOTE — ED NOTES
TRANSFER - OUT REPORT:    Verbal report given to Bisi POST on Marc Horton Sr.  being transferred to [facility] for routine progression of patient care       Report consisted of patient's Situation, Background, Assessment and   Recommendations(SBAR).     Information from the following report(s) ED SBAR was reviewed with the receiving nurse.    Natalia Fall Assessment:    Presents to emergency department  because of falls (Syncope, seizure, or loss of consciousness): No  Age > 70: Yes  Altered Mental Status, Intoxication with alcohol or substance confusion (Disorientation, impaired judgment, poor safety awaremess, or inability to follow instructions): No  Impaired Mobility: Ambulates or transfers with assistive devices or assistance; Unable to ambulate or transer.: Yes  Nursing Judgement: Yes          Lines:   Peripheral IV 05/07/25 Right Antecubital (Active)   Site Assessment Clean, dry & intact 05/07/25 2114   Line Status Blood return noted;Flushed 05/07/25 2114   Phlebitis Assessment No symptoms 05/07/25 2114   Infiltration Assessment 0 05/07/25 2114   Alcohol Cap Used No 05/07/25 2114   Dressing Status New dressing applied 05/07/25 2114   Dressing Type Transparent 05/07/25 2114   Dressing Intervention New 05/07/25 2114        Opportunity for questions and clarification was provided.      Patient transported with:  Registered Nurse          Kwai, Tiffany, RN  05/08/25 [id number]

## 2025-05-08 NOTE — WOUND CARE
Patient seen by wound care for bilateral lower leg wounds.    Patient sitting in recliner, legs elevated, alert and awake. Patient says he has had wounds to the BLE off and on over the years. 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 They are slightly edematous put not pitting, hyperpigmention/hemosiderin staining to the gator area including noted cobblestoning and sclerotic skin typical of classic lipodermatosclerosis and long term venous stasis. Patient says the wounds begin as blisters and eventually open. He also has some serous filled blisters to the anterior distal RLE. Pedal and tibial pulses palpable bilaterally and capillary refill less than 3 seconds. Feet warm. Last AHMET was done on 3/14/2024: Right 0.95 / Left 0.95. The wound to the RLE has a leading edge with new epithelialization and is covered with a dried serosanguinous crust. Surrounding skin without signs of infection. The wound to the LLE is a ruptured blister, dry and covered with a serosanguinous crust. No signs of infection noted to the surrounding skin. Patient has just finished a course of antibiotics for cellulitis.                     I recommend referral to wound care again (patient agreeable) and referral to home health for compression dressing changes as compression is the standard for both prevention and treatment of venous stasis ulcerations. For wound care I recommend zinc oxide ointment, Xeroform, and light compression with Coban cohesive bandages to be changed twice weekly and prn. I spoke to HERBIE Kennedy and received approval for this plan of care.    I applied the dressing and compression using clean technique and patient tolerated well. I discovered patient is homeless and therefore will not qualify for home. Patient says he may go to rehab, after which if the wounds are still present, outpatient wound care may be able to set up weekly visits. I instructed the patient to remove

## 2025-05-08 NOTE — PROGRESS NOTES
TRANSFER - IN REPORT:    Verbal report received from SEJAL Allen on Marc Horton .  being received from ED for routine progression of patient care      Report consisted of patient's Situation, Background, Assessment and   Recommendations(SBAR).     Information from the following report(s) Nurse Handoff Report, Intake/Output, MAR, and Recent Results was reviewed with the receiving nurse.    Opportunity for questions and clarification was provided.      Assessment to be completed upon patient's arrival to unit and care assumed.

## 2025-05-08 NOTE — ED PROVIDER NOTES
Emergency Department Provider Note       [facility] EMERGENCY DEPT   PCP: Meliza Martino PA-C   Age: 69 y.o.   Sex: male     DISPOSITION      ICD-10-CM    1. Paresthesias  R20.2       2. Chest pain, unspecified type  R07.9       3. Stroke-like symptoms  R29.90       4. Bilateral leg pain  M79.604     M79.605           Medical Decision Making     Patient is a 69-year-old male with past medical history of type 2 diabetes, CAD, hypertension, CVA, CHF, PVD presenting with multiple complaints.  Patient states over the past 4 to 5 days he has had worsening vertigo and has had an unbalanced gait requiring him to use a walker/cane at home.  He states today around 4:00 he began to feel some left-sided paresthesias in his left arm, side of his face, and leg.  He also noticed some associated chest pain.   Also complaining of worsening pain to bilateral lower extremities.     On presentation, patient complaining of paresthesias present to the left side of his face, arm, and foot.  Some slight weakness to the left upper extremity, 4 out of 5 compared to 5 out of 5 on the right, however patient is complaining of pain in the left chest and shoulder region and is palpable.  Difficult to assess if he is having weakness or just due to pain in this region.  Otherwise neurologically intact.    As symptoms have been ongoing for the past 4 to 5 days and patient is on Eliquis, he is not a candidate for tPA. Discussed with attending, Dr. Medina, who recommended obtaining a noncon head CT and CTA chest/ab/pelvis, does not believe need to call stroke alert, will consult neuro.    CBC revealed stable H&H no evidence of leukocytosis.  CMP with slightly elevated creatinine of 1.46, appears baseline is around 1-1.2.  Stable electrolytes and hepatic function.  Lactic and Pro-Raghavendra not elevated.    Troponins stable and negative x 2.    CT head unremarkable.    CTA chest abdomen pelvis unremarkable.    Spoke with teleneurology who recommended 81 mg

## 2025-05-08 NOTE — PROGRESS NOTES
ACUTE PHYSICAL THERAPY GOALS:   (Developed with and agreed upon by patient and/or caregiver.)    (1.) Marc Horton Sr.  will move from supine to sit and sit to supine  with SUPERVISION within 7 treatment day(s).    (2.) Marc Horton Sr. will transfer from bed to chair and chair to bed with SUPERVISION using the least restrictive device within 7 treatment day(s).    (3.) Marc Horton Sr. will ambulate with SUPERVISION for 250 feet with the least restrictive device within 7 treatment day(s).   (4.) Marc Horton Sr. will perform standing static and dynamic balance activities x 10 minutes with SUPERVISION to improve safety within 7 treatment day(s).  (5.) Marc Horton Sr. will ascend and descend 6 stairs using 1 hand rail(s) with SUPERVISION to improve functional mobility and safety within 7 treatment day(s).  (6.) Marc Horton Sr. will perform therapeutic exercises x 10 min for HEP with SUPERVISION to improve strength, endurance, and functional mobility within 7 treatment day(s).      PHYSICAL THERAPY Initial Assessment, Daily Note, and AM  (Link to Caseload Tracking: PT Visit Days : 1  Acknowledge Orders  Time In/Out  PT Charge Capture  Rehab Caseload Tracker    Marc Horton Sr. is a 69 y.o. male   PRIMARY DIAGNOSIS: Acute CVA (cerebrovascular accident) (HCC)  Paresthesias [R20.2]  Bilateral leg pain [M79.604, M79.605]  Stroke-like symptoms [R29.90]  Acute CVA (cerebrovascular accident) (HCC) [I63.9]  Chest pain, unspecified type [R07.9]       Reason for Referral: Generalized Muscle Weakness (M62.81)  Difficulty in walking, Not elsewhere classified (R26.2)  Inpatient: Payor: [facility] MEDICARE / Plan: [employer] DUAL COMPLETE / Product Type: *No Product type* /     ASSESSMENT:     REHAB RECOMMENDATIONS:   Recommendation to date pending progress:  Setting:  Short-term Rehab    Equipment:    To Be Determined     ASSESSMENT:  Mr. Horton is a 69 year old male who  73

## 2025-05-08 NOTE — PROGRESS NOTES
Hospitalist Progress Note (no bill)   Admit Date:  2025  7:58 PM   Name:  Marc Horton Sr.   Age:  69 y.o.  Sex:  male  :  1955   MRN:  [id number]   Room:  [facility]    Presenting/Chief Complaint: Chest Pain and Leg Pain     Reason(s) for Admission: Paresthesias [R20.2]  Bilateral leg pain [M79.604, M79.605]  Stroke-like symptoms [R29.90]  Acute CVA (cerebrovascular accident) (HCC) [I63.9]  Chest pain, unspecified type [R07.9]     Admitted earlier by Dr. Rayray Neff for acute CVA work up; please see his H&P for further details.  Patient admits to weakness x 4 days for his lower extremities and left-sided paraesthesias along with vertigo.  Also c/o worsening right LE wounds but did not follow up with outpatient wound center.  VS / labwork reviewed; mild SATINDER noted.          Hospital Problems:  Principal Problem:    Acute CVA (cerebrovascular accident) (HCC)  Resolved Problems:    * No resolved hospital problems. *      Objective:   Patient Vitals for the past 24 hrs:   Temp Pulse Resp BP SpO2   25 1157 97.5 °F (36.4 °C) 69 20 136/62 96 %   25 0749 97.7 °F (36.5 °C) 64 20 (!) 136/53 96 %   25 0158 97.5 °F (36.4 °C) 69 16 (!) 156/81 98 %   25 0130 -- 67 18 (!) 155/73 99 %   25 0115 -- 68 18 (!) 165/146 100 %   25 0045 -- 69 18 (!) 158/80 100 %   25 0030 -- -- -- (!) 158/76 100 %   25 0015 -- 62 18 (!) 165/68 97 %   25 0004 -- -- -- -- (!) 86 %   25 0000 -- -- -- (!) 150/77 94 %   25 2345 -- 66 18 (!) 162/74 96 %   255 -- 68 18 (!) 168/77 97 %   25 2245 -- -- -- (!) 161/84 93 %   25 -- 64 18 (!) 143/73 97 %   250 -- -- -- (!) 146/74 97 %   25 2145 -- -- -- (!) 142/77 97 %   25 2139 99.4 °F (37.4 °C) 71 18 (!) 151/76 96 %   25 1931 98.1 °F (36.7 °C) 80 18 (!) 163/91 97 %       Oxygen Therapy  SpO2: 96 %  Pulse via Oximetry: 67 beats per minute  O2 Device: None (Room air)  O2  SARS-CoV-2, Rapid Not detected NOTD     POCT Glucose    Collection Time: 05/08/25  7:51 AM   Result Value Ref Range    POC Glucose 169 (H) 65 - 100 mg/dL    Performed by: Jem    POCT Glucose    Collection Time: 05/08/25 11:58 AM   Result Value Ref Range    POC Glucose 169 (H) 65 - 100 mg/dL    Performed by: Jem        Recent Labs     05/08/25  0049   COVID19 Not detected       Current Meds:  Current Facility-Administered Medications   Medication Dose Route Frequency    albuterol (PROVENTIL) (2.5 MG/3ML) 0.083% nebulizer solution 2.5 mg  2.5 mg Nebulization Q6H PRN    amiodarone (CORDARONE) tablet 200 mg  200 mg Oral BID    bumetanide (BUMEX) tablet 2 mg  2 mg Oral Daily    ezetimibe (ZETIA) tablet 10 mg  10 mg Oral Nightly    ferrous sulfate (IRON 325) tablet 325 mg  325 mg Oral BID with meals    gabapentin (NEURONTIN) capsule 200 mg  200 mg Oral TID    [Held by provider] lisinopril (PRINIVIL;ZESTRIL) tablet 20 mg  20 mg Oral Daily    [Held by provider] metFORMIN (GLUCOPHAGE) tablet 850 mg  850 mg Oral BID WC    [Held by provider] metoprolol tartrate (LOPRESSOR) tablet 50 mg  50 mg Oral BID    pantoprazole (PROTONIX) tablet 40 mg  40 mg Oral BID AC    [Held by provider] pravastatin (PRAVACHOL) tablet 40 mg  40 mg Oral QHS    spironolactone (ALDACTONE) tablet 25 mg  25 mg Oral Daily    sodium chloride flush 0.9 % injection 5-40 mL  5-40 mL IntraVENous 2 times per day    sodium chloride flush 0.9 % injection 5-40 mL  5-40 mL IntraVENous PRN    0.9 % sodium chloride infusion   IntraVENous PRN    nicotine (NICODERM CQ) 14 MG/24HR 1 patch  1 patch TransDERmal Daily PRN    acetaminophen (TYLENOL) tablet 650 mg  650 mg Oral Q4H PRN    Or    acetaminophen (TYLENOL) suppository 650 mg  650 mg Rectal Q4H PRN    ondansetron (ZOFRAN-ODT) disintegrating tablet 4 mg  4 mg Oral Q8H PRN    Or    ondansetron (ZOFRAN) injection 4 mg  4 mg IntraVENous Q6H PRN    polyethylene glycol (GLYCOLAX) packet 17 g  17

## 2025-05-08 NOTE — CARE COORDINATION
68 y/o M admitted with Acute CVA.  Pt is known to the 5th floor. Was last here 4/5-4/8. Readmission assessment done.  Pt has been staying at a hotel or stays in his car. He is working with [employer] for section 8 housing and is on the waiting list.   He resides with his friends, Hope and April. They are able to help him if he needs anything.  Prior to admission pt was independent with [employer]  Is insured, has prescription coverage and is established with a PCP who he saw last week.  Is a current . He drove himself to the hospital and his car is in the parking lot.  DME: [facility], Rolling walker.  Pt has been to the [facility] wound clinic before.  If pt should need rehab he would like to go to [facility] or [facility]. He will go back to the hotel at discharge.    Dispo is pending clinical course. PT/OT evals pending.    PT/OT evals done: recommending STR. Pt asked for referrals to be sent to [facility] and [facility]. Referrals sent. Pt will need auth to go to STR.      Update 1630: [facility] has offered a bed and pt wishes to accept it. They will have a bed available tomorrow. Spoke with ELIJAH Rivera NP. Pt should be medically ready to DC tomorrow. Auth started,    Update 1700: Attempting to get the auth to go through x3. Home and community still have not received it. Will try again in the am.      ASSESSMENT NOTE    Attending Physician: Mariaelena Mccoy MD  Admit Problem: Paresthesias [R20.2]  Bilateral leg pain [M79.604, M79.605]  Stroke-like symptoms [R29.90]  Acute CVA (cerebrovascular accident) (HCC) [I63.9]  Chest pain, unspecified type [R07.9]  Date/Time of Admission: 5/7/2025  7:58 PM  Problem List:  Patient Active Problem List   Diagnosis    Bipolar I disorder with vandana (HCC)    Type 2 diabetes mellitus (HCC)    Anxiety    Dyslipidemia    Hypertensive cardiovascular disease    Migraine    Left leg weakness    MRI contraindicated due to metal implant    Weakness of left arm    HTN

## 2025-05-08 NOTE — PLAN OF CARE
Problem: Chronic Conditions and Co-morbidities  Goal: Patient's chronic conditions and co-morbidity symptoms are monitored and maintained or improved  Outcome: Progressing  Flowsheets (Taken 5/8/2025 0157)  Care Plan - Patient's Chronic Conditions and Co-Morbidity Symptoms are Monitored and Maintained or Improved: Monitor and assess patient's chronic conditions and comorbid symptoms for stability, deterioration, or improvement     Problem: Pain  Goal: Verbalizes/displays adequate comfort level or baseline comfort level  Outcome: Progressing     Problem: Safety - Adult  Goal: Free from fall injury  Outcome: Progressing

## 2025-05-09 ENCOUNTER — APPOINTMENT (OUTPATIENT)
Dept: CT IMAGING | Age: 70
End: 2025-05-09
Payer: MEDICARE

## 2025-05-09 PROBLEM — Z59.00 HOMELESSNESS: Status: ACTIVE | Noted: 2025-05-09

## 2025-05-09 LAB
ANION GAP SERPL CALC-SCNC: 10 MMOL/L (ref 7–16)
BUN SERPL-MCNC: 20 MG/DL (ref 8–23)
CALCIUM SERPL-MCNC: 8.6 MG/DL (ref 8.8–10.2)
CHLORIDE SERPL-SCNC: 101 MMOL/L (ref 98–107)
CHOLEST SERPL-MCNC: 139 MG/DL (ref 0–200)
CO2 SERPL-SCNC: 25 MMOL/L (ref 20–29)
CREAT SERPL-MCNC: 1.04 MG/DL (ref 0.8–1.3)
ERYTHROCYTE [DISTWIDTH] IN BLOOD BY AUTOMATED COUNT: 17.5 % (ref 11.9–14.6)
EST. AVERAGE GLUCOSE BLD GHB EST-MCNC: 155 MG/DL
GLUCOSE BLD STRIP.AUTO-MCNC: 126 MG/DL (ref 65–100)
GLUCOSE BLD STRIP.AUTO-MCNC: 188 MG/DL (ref 65–100)
GLUCOSE BLD STRIP.AUTO-MCNC: 199 MG/DL (ref 65–100)
GLUCOSE BLD STRIP.AUTO-MCNC: 249 MG/DL (ref 65–100)
GLUCOSE SERPL-MCNC: 123 MG/DL (ref 70–99)
HBA1C MFR BLD: 7 % (ref 0–5.6)
HCT VFR BLD AUTO: 36.3 % (ref 41.1–50.3)
HDLC SERPL-MCNC: 39 MG/DL (ref 40–60)
HDLC SERPL: 3.6 (ref 0–5)
HGB BLD-MCNC: 11.6 G/DL (ref 13.6–17.2)
LDLC SERPL CALC-MCNC: 75 MG/DL (ref 0–100)
MCH RBC QN AUTO: 28 PG (ref 26.1–32.9)
MCHC RBC AUTO-ENTMCNC: 32 G/DL (ref 31.4–35)
MCV RBC AUTO: 87.7 FL (ref 82–102)
NRBC # BLD: 0 K/UL (ref 0–0.2)
PLATELET # BLD AUTO: 137 K/UL (ref 150–450)
PMV BLD AUTO: 9.8 FL (ref 9.4–12.3)
POTASSIUM SERPL-SCNC: 4.4 MMOL/L (ref 3.5–5.1)
RBC # BLD AUTO: 4.14 M/UL (ref 4.23–5.6)
SERVICE CMNT-IMP: ABNORMAL
SODIUM SERPL-SCNC: 137 MMOL/L (ref 136–145)
TRIGL SERPL-MCNC: 126 MG/DL (ref 0–150)
VLDLC SERPL CALC-MCNC: 25 MG/DL (ref 6–23)
WBC # BLD AUTO: 6.3 K/UL (ref 4.3–11.1)

## 2025-05-09 PROCEDURE — 92526 ORAL FUNCTION THERAPY: CPT

## 2025-05-09 PROCEDURE — 6370000000 HC RX 637 (ALT 250 FOR IP): Performed by: PHYSICIAN ASSISTANT

## 2025-05-09 PROCEDURE — 80061 LIPID PANEL: CPT

## 2025-05-09 PROCEDURE — 94640 AIRWAY INHALATION TREATMENT: CPT

## 2025-05-09 PROCEDURE — 6370000000 HC RX 637 (ALT 250 FOR IP): Performed by: INTERNAL MEDICINE

## 2025-05-09 PROCEDURE — 70498 CT ANGIOGRAPHY NECK: CPT

## 2025-05-09 PROCEDURE — 80048 BASIC METABOLIC PNL TOTAL CA: CPT

## 2025-05-09 PROCEDURE — 92507 TX SP LANG VOICE COMM INDIV: CPT

## 2025-05-09 PROCEDURE — 1100000003 HC PRIVATE W/ TELEMETRY

## 2025-05-09 PROCEDURE — 94760 N-INVAS EAR/PLS OXIMETRY 1: CPT

## 2025-05-09 PROCEDURE — 6360000004 HC RX CONTRAST MEDICATION: Performed by: PHYSICIAN ASSISTANT

## 2025-05-09 PROCEDURE — 82962 GLUCOSE BLOOD TEST: CPT

## 2025-05-09 PROCEDURE — 85027 COMPLETE CBC AUTOMATED: CPT

## 2025-05-09 PROCEDURE — 2500000003 HC RX 250 WO HCPCS: Performed by: INTERNAL MEDICINE

## 2025-05-09 PROCEDURE — 83036 HEMOGLOBIN GLYCOSYLATED A1C: CPT

## 2025-05-09 PROCEDURE — 6360000002 HC RX W HCPCS: Performed by: INTERNAL MEDICINE

## 2025-05-09 PROCEDURE — 97530 THERAPEUTIC ACTIVITIES: CPT

## 2025-05-09 PROCEDURE — 99232 SBSQ HOSP IP/OBS MODERATE 35: CPT | Performed by: NURSE PRACTITIONER

## 2025-05-09 PROCEDURE — 36415 COLL VENOUS BLD VENIPUNCTURE: CPT

## 2025-05-09 RX ORDER — IOPAMIDOL 755 MG/ML
50 INJECTION, SOLUTION INTRAVASCULAR
Status: COMPLETED | OUTPATIENT
Start: 2025-05-09 | End: 2025-05-09

## 2025-05-09 RX ORDER — DIPHENHYDRAMINE HCL 25 MG
50 CAPSULE ORAL ONCE
Status: COMPLETED | OUTPATIENT
Start: 2025-05-09 | End: 2025-05-09

## 2025-05-09 RX ORDER — PREDNISONE 50 MG/1
50 TABLET ORAL EVERY 6 HOURS
Status: COMPLETED | OUTPATIENT
Start: 2025-05-09 | End: 2025-05-09

## 2025-05-09 RX ADMIN — GABAPENTIN 200 MG: 100 CAPSULE ORAL at 19:55

## 2025-05-09 RX ADMIN — WATER 1000 MG: 1 INJECTION INTRAMUSCULAR; INTRAVENOUS; SUBCUTANEOUS at 19:56

## 2025-05-09 RX ADMIN — SODIUM CHLORIDE, PRESERVATIVE FREE 10 ML: 5 INJECTION INTRAVENOUS at 19:56

## 2025-05-09 RX ADMIN — HYDROCODONE BITARTRATE AND ACETAMINOPHEN 1 TABLET: 5; 325 TABLET ORAL at 04:19

## 2025-05-09 RX ADMIN — IOPAMIDOL 50 ML: 755 INJECTION, SOLUTION INTRAVENOUS at 12:39

## 2025-05-09 RX ADMIN — ENOXAPARIN SODIUM 30 MG: 100 INJECTION SUBCUTANEOUS at 19:56

## 2025-05-09 RX ADMIN — SODIUM CHLORIDE, PRESERVATIVE FREE 10 ML: 5 INJECTION INTRAVENOUS at 08:24

## 2025-05-09 RX ADMIN — WATER 1000 MG: 1 INJECTION INTRAMUSCULAR; INTRAVENOUS; SUBCUTANEOUS at 04:15

## 2025-05-09 RX ADMIN — PANTOPRAZOLE SODIUM 40 MG: 40 TABLET, DELAYED RELEASE ORAL at 17:20

## 2025-05-09 RX ADMIN — PREDNISONE 50 MG: 50 TABLET ORAL at 17:20

## 2025-05-09 RX ADMIN — METOPROLOL TARTRATE 50 MG: 25 TABLET, FILM COATED ORAL at 19:55

## 2025-05-09 RX ADMIN — PREDNISONE 50 MG: 50 TABLET ORAL at 21:08

## 2025-05-09 RX ADMIN — IPRATROPIUM BROMIDE 0.5 MG: 0.5 SOLUTION RESPIRATORY (INHALATION) at 19:34

## 2025-05-09 RX ADMIN — FERROUS SULFATE TAB 325 MG (65 MG ELEMENTAL FE) 325 MG: 325 (65 FE) TAB at 08:24

## 2025-05-09 RX ADMIN — AMIODARONE HYDROCHLORIDE 200 MG: 200 TABLET ORAL at 19:55

## 2025-05-09 RX ADMIN — HYDROCODONE BITARTRATE AND ACETAMINOPHEN 1 TABLET: 5; 325 TABLET ORAL at 13:21

## 2025-05-09 RX ADMIN — PANTOPRAZOLE SODIUM 40 MG: 40 TABLET, DELAYED RELEASE ORAL at 04:15

## 2025-05-09 RX ADMIN — CLOPIDOGREL BISULFATE 75 MG: 75 TABLET, FILM COATED ORAL at 08:24

## 2025-05-09 RX ADMIN — INSULIN LISPRO 3 UNITS: 100 INJECTION, SOLUTION INTRAVENOUS; SUBCUTANEOUS at 17:20

## 2025-05-09 RX ADMIN — HYDROMORPHONE HYDROCHLORIDE 1 MG: 1 INJECTION, SOLUTION INTRAMUSCULAR; INTRAVENOUS; SUBCUTANEOUS at 08:23

## 2025-05-09 RX ADMIN — GABAPENTIN 200 MG: 100 CAPSULE ORAL at 08:24

## 2025-05-09 RX ADMIN — PREDNISONE 50 MG: 50 TABLET ORAL at 10:49

## 2025-05-09 RX ADMIN — BUMETANIDE 2 MG: 1 TABLET ORAL at 08:24

## 2025-05-09 RX ADMIN — INSULIN LISPRO 3 UNITS: 100 INJECTION, SOLUTION INTRAVENOUS; SUBCUTANEOUS at 13:13

## 2025-05-09 RX ADMIN — AMIODARONE HYDROCHLORIDE 200 MG: 200 TABLET ORAL at 08:25

## 2025-05-09 RX ADMIN — HYDROCODONE BITARTRATE AND ACETAMINOPHEN 1 TABLET: 5; 325 TABLET ORAL at 19:56

## 2025-05-09 RX ADMIN — SPIRONOLACTONE 25 MG: 25 TABLET ORAL at 08:25

## 2025-05-09 RX ADMIN — DIPHENHYDRAMINE HYDROCHLORIDE 50 MG: 25 CAPSULE ORAL at 10:49

## 2025-05-09 RX ADMIN — GABAPENTIN 200 MG: 100 CAPSULE ORAL at 13:13

## 2025-05-09 RX ADMIN — INSULIN LISPRO 5 UNITS: 100 INJECTION, SOLUTION INTRAVENOUS; SUBCUTANEOUS at 21:08

## 2025-05-09 RX ADMIN — ATORVASTATIN CALCIUM 80 MG: 80 TABLET, FILM COATED ORAL at 19:55

## 2025-05-09 RX ADMIN — EZETIMIBE 10 MG: 10 TABLET ORAL at 19:55

## 2025-05-09 RX ADMIN — HYDROMORPHONE HYDROCHLORIDE 1 MG: 1 INJECTION, SOLUTION INTRAMUSCULAR; INTRAVENOUS; SUBCUTANEOUS at 21:12

## 2025-05-09 RX ADMIN — ASPIRIN 81 MG: 81 TABLET ORAL at 08:24

## 2025-05-09 RX ADMIN — ENOXAPARIN SODIUM 30 MG: 100 INJECTION SUBCUTANEOUS at 08:24

## 2025-05-09 RX ADMIN — BUDESONIDE 250 MCG: 0.25 SUSPENSION RESPIRATORY (INHALATION) at 09:09

## 2025-05-09 RX ADMIN — WATER 1000 MG: 1 INJECTION INTRAMUSCULAR; INTRAVENOUS; SUBCUTANEOUS at 13:13

## 2025-05-09 RX ADMIN — FERROUS SULFATE TAB 325 MG (65 MG ELEMENTAL FE) 325 MG: 325 (65 FE) TAB at 17:20

## 2025-05-09 RX ADMIN — IPRATROPIUM BROMIDE 0.5 MG: 0.5 SOLUTION RESPIRATORY (INHALATION) at 09:09

## 2025-05-09 RX ADMIN — ARFORMOTEROL TARTRATE 15 MCG: 15 SOLUTION RESPIRATORY (INHALATION) at 09:09

## 2025-05-09 RX ADMIN — ALBUTEROL SULFATE 2.5 MG: 2.5 SOLUTION RESPIRATORY (INHALATION) at 19:34

## 2025-05-09 ASSESSMENT — PAIN DESCRIPTION - ORIENTATION
ORIENTATION: RIGHT;LEFT
ORIENTATION: LEFT
ORIENTATION: RIGHT;LEFT
ORIENTATION: LEFT
ORIENTATION: RIGHT;LEFT

## 2025-05-09 ASSESSMENT — PAIN DESCRIPTION - FREQUENCY: FREQUENCY: CONTINUOUS

## 2025-05-09 ASSESSMENT — PAIN DESCRIPTION - DESCRIPTORS
DESCRIPTORS: THROBBING
DESCRIPTORS: ACHING
DESCRIPTORS: THROBBING

## 2025-05-09 ASSESSMENT — PAIN DESCRIPTION - LOCATION
LOCATION: LEG
LOCATION: LEG
LOCATION: LEG;HEAD
LOCATION: LEG
LOCATION: HEAD;LEG

## 2025-05-09 ASSESSMENT — PAIN DESCRIPTION - PAIN TYPE: TYPE: CHRONIC PAIN

## 2025-05-09 ASSESSMENT — PAIN SCALES - GENERAL
PAINLEVEL_OUTOF10: 6
PAINLEVEL_OUTOF10: 7
PAINLEVEL_OUTOF10: 0
PAINLEVEL_OUTOF10: 7

## 2025-05-09 ASSESSMENT — PAIN DESCRIPTION - ONSET: ONSET: ON-GOING

## 2025-05-09 ASSESSMENT — PAIN - FUNCTIONAL ASSESSMENT: PAIN_FUNCTIONAL_ASSESSMENT: PREVENTS OR INTERFERES SOME ACTIVE ACTIVITIES AND ADLS

## 2025-05-09 NOTE — PROGRESS NOTES
Neurology Daily Progress Note     Assessment:      69-year-old man with vascular risk factors who presents with dizziness and left-sided sensory abnormalities, likely consistent with stroke.  Unfortunately, the patient cannot have an MRI.     Plan:     Continue dual antiplatelet therapy  High intensity statin  Long-term blood pressure goal is less than 140/90  Recent echocardiogram.  This does not need to be repeated.  Cardiac telemetry  CTA head/neck  He cannot have an MRI  Follow up with neurology after d/c.     Subjective:        Interval history:    Patient reports dizziness is a little better, but not resolved. He has numbness and paraesthesias of the left side, which is chronic    History:    Marc Horton Sr. is a 69-year-old man with a history of type 2 diabetes, coronary artery disease, hypertension, and history of stroke. He also has a history of CHF and peripheral vascular disease. Patient presented with vertigo and left-sided paresthesias involving the left arm and left face. He also reports pain in the bilateral lower extremities. CT scan of the head did not show acute intracranial abnormalities. CTA of the head neck was not done.     Review of systems negative with exception of pertinent positives and negatives noted above.       Objective:     Physical Exam:  General - Well developed, well nourished, in no apparent distress. Pleasant and conversant.   HEENT - Normocephalic, atraumatic. Conjunctiva are clear.   Neck - Supple without masses  Extremities - Peripheral pulses intact. No edema and no rashes.     Neurological examination - Comprehension, attention, memory and reasoning are intact. Language and speech are normal.   On cranial nerve examination, (II, III, IV, VI) pupils are equal, round, and reactive to light. Extraocular motility is normal. (V, VII) Face is symmetric and sensation is intact to light touch. (VIII) Hearing is intact.  Motor examination - There is normal muscle tone and  bulk. Power is full throughout. Loss of sensation on the left side of the face and left upper extremity. Loss of sensation in the bilateral lower extremities cerebellar examination is normal with finger-no-nose testing.  No nystagmus.     Pertinent imaging, studies, and labs:    CT head- no acute findings    CUS- 50-69% stenosis bilaterally       Signed By: SONY Miles - NP     May 9, 2025      I spent 35 minutes today with the patient, which included chart review, obtaining history from the patient/family/other providers, physical exam, documentation, and reviewing pertinent testing.

## 2025-05-09 NOTE — PLAN OF CARE
Problem: Respiratory - Adult  Goal: Achieves optimal ventilation and oxygenation  Outcome: Progressing  Flowsheets (Taken 5/9/2025 0912)  Achieves optimal ventilation and oxygenation:   Assess for changes in respiratory status   Respiratory therapy support as indicated   Assess and instruct to report shortness of breath or any respiratory difficulty   Encourage broncho-pulmonary hygiene including cough, deep breathe, incentive spirometry   Assess for changes in mentation and behavior

## 2025-05-09 NOTE — PLAN OF CARE
Problem: Chronic Conditions and Co-morbidities  Goal: Patient's chronic conditions and co-morbidity symptoms are monitored and maintained or improved  5/8/2025 2153 by Bisi Pitts RN  Outcome: Progressing  Flowsheets (Taken 5/8/2025 2040)  Care Plan - Patient's Chronic Conditions and Co-Morbidity Symptoms are Monitored and Maintained or Improved: Monitor and assess patient's chronic conditions and comorbid symptoms for stability, deterioration, or improvement  5/8/2025 1347 by Violette Pride RN  Outcome: Progressing     Problem: Pain  Goal: Verbalizes/displays adequate comfort level or baseline comfort level  5/8/2025 2153 by Bisi Pitts RN  Outcome: Progressing  5/8/2025 1347 by Violette Pride RN  Outcome: Progressing     Problem: Safety - Adult  Goal: Free from fall injury  5/8/2025 2153 by Bisi Pitts RN  Outcome: Progressing  5/8/2025 1347 by Violette Pride RN  Outcome: Progressing

## 2025-05-09 NOTE — PROGRESS NOTES
GOALS:  LTG: Patient will maintain adequate hydration/nutrition with optimum safety and efficiency of swallowing function with PO intake without overt signs or symptoms of aspiration for the highest appropriate diet level.  STG: PROGRESSING 25  Patient will consume easy to chew textures and thin liquids without overt signs or symptoms of airway compromise.  Patient will perform effortful swallow and small bites and sips to improve swallow safety with minimal cues 90% of the time.  Patient will safely ingest diet trials during therapeutic feedings with SLP without overt signs or symptoms of respiratory compromise in efforts to advance diet.    LTG: Patient will improve neuro-linguistic abilities to increase safety and awareness in functional living environment.   STG: PROGRESSING 25  Patient will recall relevant verbal information with 80% accuracy with minimal cues.  Patient will utilize memory compensatory strategies to improve recall of functional information with 80% accuracy given minimal assistance.    SPEECH LANGUAGE PATHOLOGY: COMBINED Cognitive Communication and Dysphagia   Daily Note #1    Acknowledge Order  I  Therapy Time  I   Charges     I  Rehab Caseload Tracker    NAME: Marc Horton .  : 1955  MRN: [id number]    ADMISSION DATE: 2025  PRIMARY DIAGNOSIS: Acute CVA (cerebrovascular accident) (Former[facility])    ICD-10: Treatment Diagnosis:   R13.12 Dysphagia, Oropharyngeal Phase  R41.841 Cognitive-Communication Deficit    RECOMMENDATIONS   Diet:    Easy to Chew + breads  Thin Liquids    Medication: as tolerated   Compensatory Swallowing Strategies:   Effortful swallow  Small bites/sips   Therapeutic Intervention:   Patient/family education  Dysphagia treatment  Cognitive-linguistic treatment   Patient continues to require skilled intervention:  Yes. Recommend ongoing speech therapy services during this hospitalization.     Anticipated Discharge Needs: Ongoing speech therapy is

## 2025-05-09 NOTE — PROGRESS NOTES
ACUTE PHYSICAL THERAPY GOALS:   (Developed with and agreed upon by patient and/or caregiver.)  (1.) Marc Horton Sr.  will move from supine to sit and sit to supine  with SUPERVISION within 7 treatment day(s).    (2.) Marc Horton Sr. will transfer from bed to chair and chair to bed with SUPERVISION using the least restrictive device within 7 treatment day(s).    (3.) Marc Horton Sr. will ambulate with SUPERVISION for 250 feet with the least restrictive device within 7 treatment day(s).   (4.) Marc Horton Sr. will perform standing static and dynamic balance activities x 10 minutes with SUPERVISION to improve safety within 7 treatment day(s).  (5.) Marc Horton Sr. will ascend and descend 6 stairs using 1 hand rail(s) with SUPERVISION to improve functional mobility and safety within 7 treatment day(s).  (6.) Marc Horton Sr. will perform therapeutic exercises x 10 min for HEP with SUPERVISION to improve strength, endurance, and functional mobility within 7 treatment day(s).     PHYSICAL THERAPY: Daily Note AM   (Link to Caseload Tracking: PT Visit Days : 2  Time In/Out PT Charge Capture  Rehab Caseload Tracker  Orders    Marc Horton Sr. is a 69 y.o. male   PRIMARY DIAGNOSIS: Acute CVA (cerebrovascular accident) (HCC)  Paresthesias [R20.2]  Bilateral leg pain [M79.604, M79.605]  Stroke-like symptoms [R29.90]  Acute CVA (cerebrovascular accident) (HCC) [I63.9]  Chest pain, unspecified type [R07.9]       Inpatient: Payor: [facility] MEDICARE / Plan: [employer] DUAL COMPLETE / Product Type: *No Product type* /     ASSESSMENT:     REHAB RECOMMENDATIONS:   Recommendation to date pending progress:  Setting:  Short-term Rehab    Equipment:    To Be Determined     ASSESSMENT:  Mr. Horton presents supine in bed, agreeable to therapy. Today pt  required Min A with sup to sit secondary to decreased strength, pt then able to scoot to EOB with SBA for safety. Pt stood to RW  Max Total  NT x2 Comments:   Level of Assistance [] [] [] [] [x] [] [] [] [] [] []    Distance 24  feet    DME Rolling Walker    Gait Quality Decreased felicita , Decreased step length, and Trunk flexion    Weightbearing Status      Stairs      I=Independent, Mod I=Modified Independent, S=Supervision, SBA=Standby Assistance, CGA=Contact Guard Assistance,   Min=Minimal Assistance, Mod=Moderate Assistance, Max=Maximal Assistance, Total=Total Assistance, NT=Not Tested    PLAN:   FREQUENCY AND DURATION: 3 times/week for duration of hospital stay or until stated goals are met, whichever comes first.    TREATMENT:   TREATMENT:   Therapeutic Activity (24 Minutes): Therapeutic activity included Supine to Sit, Scooting, Transfer Training, Ambulation on level ground, Sitting balance , and Standing balance to improve functional Activity tolerance, Balance, Mobility, and Strength.    TREATMENT GRID:  N/A    AFTER TREATMENT PRECAUTIONS: Alarm Activated, Bed/Chair Locked, Call light within reach, Chair, and Needs within reach    INTERDISCIPLINARY COLLABORATION:  PT/ PTA    EDUCATION: Education Given To: Patient  Education Provided: Transfer Training;Mobility Training;Fall Prevention Strategies;Equipment  Education Method: Verbal  Barriers to Learning: None  Education Outcome: Continued education needed  Educated patient and/or family/caregiver on the following: Assistive device indications/utilization/safety and Fall prevention strategies    TIME IN/OUT:  Time In: 0832  Time Out: 0856  Minutes: 24    Joseluis Penn PT

## 2025-05-09 NOTE — CARE COORDINATION
Chart reviewed.    Auth started and currently pending.  Pt pending a CTA Head/neck.    Pt will transition to [facility] at discharge on auth received and CTA is done.    Will continue to follow.     Update 1450: auth received and approved form 5/9-5/13.    Update 1550: auth emailed to the facility. Pt will go to room 108 report number [phone]. Pt on will call for tomorrow. Please let Marianela from [facility] know when pt is leaving. Referral sent to the wound care clinic at [facility] per [facility] recommendation. Pt aware that he needs to follow up with them once he leaves rehab as HH may not be an option for him.

## 2025-05-09 NOTE — PROGRESS NOTES
Hospitalist Progress Note   Admit Date:  2025  7:58 PM   Name:  Marc Horton Sr.   Age:  69 y.o.  Sex:  male  :  1955   MRN:  [id number]   Room:  [facility]/    Presenting/Chief Complaint: Chest Pain and Leg Pain     Reason(s) for Admission: Paresthesias [R20.2]  Bilateral leg pain [M79.604, M79.605]  Stroke-like symptoms [R29.90]  Acute CVA (cerebrovascular accident) (HCC) [I63.9]  Chest pain, unspecified type [R07.9]     Hospital Course:   Marc Horton Sr. is a 69 y.o. male with medical history of paroxysmal atrial fibrillation not on OAC, CAD, DM2, hypertension, STEF, neuropathy, HLD COPD, HFpE who presented with c/o dizziness and left sided weakness / numbness.  Patient also c/o worsening b/l LE wounds; he was not compliant with outpatient f/u care at wound center.  He did admit that the symptoms of weakness / dizziness waxed / waned over a course of 2 weeks.    In ER, VS T98.1, /91, HR 80, RR 18, 97% RA.  Labwork remarkable for creatinine 1.46, .  CTH negative for acute abnl.  CTA chest / abd / pelvis without evidence of PE / aortic dissection, no acute process in chest / abd / pelvis, no high grade stenosis / thrombosis found.  Hospitalist consulted for inpatient admission.    Subjective & 24hr Events:   \"I still get dizzy some but my left leg doesn't feel as weak.\"  Pleasant 69y.o. WM sitting up in recliner    Admits to intermittent dizziness and persistent LLE weakness.  Denies HA, visual deficits, diplopia, dyspnea, CP, N/V, abd pains, dysuria.     Assessment & Plan:     Principal Problem:    Acute CVA (cerebrovascular accident) (HCC)  - patient intolerant to MRI brain attempt  - d/w Dr. Sheldon et al today, given carotid doppler revealing 50-69% stenosis b/l, would need CTA head and neck  - if CTA head and neck reveals significant blockage, will need vascular surgery consultation  - appreciate PT/OT eval, STR on discharge     Active Problems:    SATINDER  - resolved  -

## 2025-05-10 VITALS
HEART RATE: 71 BPM | WEIGHT: 255.4 LBS | SYSTOLIC BLOOD PRESSURE: 144 MMHG | RESPIRATION RATE: 15 BRPM | OXYGEN SATURATION: 92 % | TEMPERATURE: 98.4 F | HEIGHT: 72 IN | DIASTOLIC BLOOD PRESSURE: 77 MMHG | BODY MASS INDEX: 34.59 KG/M2

## 2025-05-10 LAB
ANION GAP SERPL CALC-SCNC: 11 MMOL/L (ref 7–16)
BUN SERPL-MCNC: 21 MG/DL (ref 8–23)
CALCIUM SERPL-MCNC: 9 MG/DL (ref 8.8–10.2)
CHLORIDE SERPL-SCNC: 97 MMOL/L (ref 98–107)
CO2 SERPL-SCNC: 25 MMOL/L (ref 20–29)
CREAT SERPL-MCNC: 1.05 MG/DL (ref 0.8–1.3)
ERYTHROCYTE [DISTWIDTH] IN BLOOD BY AUTOMATED COUNT: 17.3 % (ref 11.9–14.6)
GLUCOSE BLD STRIP.AUTO-MCNC: 226 MG/DL (ref 65–100)
GLUCOSE SERPL-MCNC: 242 MG/DL (ref 70–99)
HCT VFR BLD AUTO: 38.2 % (ref 41.1–50.3)
HGB BLD-MCNC: 12.8 G/DL (ref 13.6–17.2)
MAGNESIUM SERPL-MCNC: 2.3 MG/DL (ref 1.8–2.4)
MCH RBC QN AUTO: 27.9 PG (ref 26.1–32.9)
MCHC RBC AUTO-ENTMCNC: 33.5 G/DL (ref 31.4–35)
MCV RBC AUTO: 83.4 FL (ref 82–102)
NRBC # BLD: 0 K/UL (ref 0–0.2)
PLATELET # BLD AUTO: 169 K/UL (ref 150–450)
PMV BLD AUTO: 10.2 FL (ref 9.4–12.3)
POTASSIUM SERPL-SCNC: 4.3 MMOL/L (ref 3.5–5.1)
RBC # BLD AUTO: 4.58 M/UL (ref 4.23–5.6)
SERVICE CMNT-IMP: ABNORMAL
SODIUM SERPL-SCNC: 134 MMOL/L (ref 136–145)
WBC # BLD AUTO: 13.4 K/UL (ref 4.3–11.1)

## 2025-05-10 PROCEDURE — 6360000002 HC RX W HCPCS: Performed by: INTERNAL MEDICINE

## 2025-05-10 PROCEDURE — 94760 N-INVAS EAR/PLS OXIMETRY 1: CPT

## 2025-05-10 PROCEDURE — 6370000000 HC RX 637 (ALT 250 FOR IP): Performed by: PHYSICIAN ASSISTANT

## 2025-05-10 PROCEDURE — 80048 BASIC METABOLIC PNL TOTAL CA: CPT

## 2025-05-10 PROCEDURE — 6370000000 HC RX 637 (ALT 250 FOR IP): Performed by: INTERNAL MEDICINE

## 2025-05-10 PROCEDURE — 85027 COMPLETE CBC AUTOMATED: CPT

## 2025-05-10 PROCEDURE — 82962 GLUCOSE BLOOD TEST: CPT

## 2025-05-10 PROCEDURE — 94640 AIRWAY INHALATION TREATMENT: CPT

## 2025-05-10 PROCEDURE — 36415 COLL VENOUS BLD VENIPUNCTURE: CPT

## 2025-05-10 PROCEDURE — 2500000003 HC RX 250 WO HCPCS: Performed by: INTERNAL MEDICINE

## 2025-05-10 PROCEDURE — 83735 ASSAY OF MAGNESIUM: CPT

## 2025-05-10 RX ORDER — CLOPIDOGREL BISULFATE 75 MG/1
75 TABLET ORAL DAILY
Qty: 20 TABLET | Refills: 0 | Status: SHIPPED | OUTPATIENT
Start: 2025-05-11

## 2025-05-10 RX ORDER — ASPIRIN 81 MG/1
81 TABLET ORAL DAILY
Qty: 30 TABLET | Refills: 3 | Status: SHIPPED | OUTPATIENT
Start: 2025-05-11

## 2025-05-10 RX ORDER — ATORVASTATIN CALCIUM 80 MG/1
80 TABLET, FILM COATED ORAL NIGHTLY
Qty: 30 TABLET | Refills: 0 | Status: SHIPPED | OUTPATIENT
Start: 2025-05-10

## 2025-05-10 RX ADMIN — ASPIRIN 81 MG: 81 TABLET ORAL at 07:35

## 2025-05-10 RX ADMIN — WATER 1000 MG: 1 INJECTION INTRAMUSCULAR; INTRAVENOUS; SUBCUTANEOUS at 04:07

## 2025-05-10 RX ADMIN — ARFORMOTEROL TARTRATE 15 MCG: 15 SOLUTION RESPIRATORY (INHALATION) at 07:55

## 2025-05-10 RX ADMIN — SPIRONOLACTONE 25 MG: 25 TABLET ORAL at 07:35

## 2025-05-10 RX ADMIN — INSULIN LISPRO 5 UNITS: 100 INJECTION, SOLUTION INTRAVENOUS; SUBCUTANEOUS at 07:40

## 2025-05-10 RX ADMIN — FERROUS SULFATE TAB 325 MG (65 MG ELEMENTAL FE) 325 MG: 325 (65 FE) TAB at 07:35

## 2025-05-10 RX ADMIN — HYDROMORPHONE HYDROCHLORIDE 1 MG: 1 INJECTION, SOLUTION INTRAMUSCULAR; INTRAVENOUS; SUBCUTANEOUS at 03:17

## 2025-05-10 RX ADMIN — IPRATROPIUM BROMIDE 0.5 MG: 0.5 SOLUTION RESPIRATORY (INHALATION) at 07:55

## 2025-05-10 RX ADMIN — PANTOPRAZOLE SODIUM 40 MG: 40 TABLET, DELAYED RELEASE ORAL at 04:07

## 2025-05-10 RX ADMIN — AMIODARONE HYDROCHLORIDE 200 MG: 200 TABLET ORAL at 07:35

## 2025-05-10 RX ADMIN — ENOXAPARIN SODIUM 30 MG: 100 INJECTION SUBCUTANEOUS at 07:40

## 2025-05-10 RX ADMIN — CLOPIDOGREL BISULFATE 75 MG: 75 TABLET, FILM COATED ORAL at 07:35

## 2025-05-10 RX ADMIN — SODIUM CHLORIDE, PRESERVATIVE FREE 10 ML: 5 INJECTION INTRAVENOUS at 08:00

## 2025-05-10 RX ADMIN — BUDESONIDE 250 MCG: 0.25 SUSPENSION RESPIRATORY (INHALATION) at 07:55

## 2025-05-10 RX ADMIN — METOPROLOL TARTRATE 50 MG: 25 TABLET, FILM COATED ORAL at 07:35

## 2025-05-10 RX ADMIN — BUMETANIDE 2 MG: 1 TABLET ORAL at 07:59

## 2025-05-10 RX ADMIN — GABAPENTIN 200 MG: 100 CAPSULE ORAL at 07:35

## 2025-05-10 RX ADMIN — HYDROMORPHONE HYDROCHLORIDE 1 MG: 1 INJECTION, SOLUTION INTRAMUSCULAR; INTRAVENOUS; SUBCUTANEOUS at 07:29

## 2025-05-10 ASSESSMENT — PAIN SCALES - GENERAL
PAINLEVEL_OUTOF10: 9
PAINLEVEL_OUTOF10: 5
PAINLEVEL_OUTOF10: 4
PAINLEVEL_OUTOF10: 7

## 2025-05-10 ASSESSMENT — PAIN DESCRIPTION - LOCATION
LOCATION: LEG;ARM
LOCATION: HIP;LEG
LOCATION: HEAD;HIP;LEG

## 2025-05-10 ASSESSMENT — PAIN DESCRIPTION - DESCRIPTORS
DESCRIPTORS: ACHING
DESCRIPTORS: ACHING;CRAMPING;DISCOMFORT

## 2025-05-10 ASSESSMENT — PAIN DESCRIPTION - FREQUENCY: FREQUENCY: CONTINUOUS

## 2025-05-10 ASSESSMENT — PAIN DESCRIPTION - ORIENTATION
ORIENTATION: LEFT
ORIENTATION: LEFT;RIGHT

## 2025-05-10 NOTE — PLAN OF CARE
Problem: Chronic Conditions and Co-morbidities  Goal: Patient's chronic conditions and co-morbidity symptoms are monitored and maintained or improved  5/10/2025 0940 by Juan Antonio Ruiz RN  Outcome: Progressing  5/9/2025 2253 by Yoselin Campos RN  Outcome: Progressing     Problem: Pain  Goal: Verbalizes/displays adequate comfort level or baseline comfort level  5/10/2025 0940 by Juan Antonio Ruiz RN  Outcome: Progressing  5/9/2025 2253 by Yoselin Campos RN  Outcome: Progressing     Problem: Safety - Adult  Goal: Free from fall injury  5/10/2025 0940 by Juan Antonio Ruiz RN  Outcome: Progressing  5/9/2025 2253 by Yoselin Campos RN  Outcome: Progressing     Problem: Respiratory - Adult  Goal: Achieves optimal ventilation and oxygenation  Recent Flowsheet Documentation  Taken 5/10/2025 0800 by Juan Antonio Ruiz RN  Achieves optimal ventilation and oxygenation: Assess for changes in respiratory status  5/10/2025 0757 by Dilshad Moore RCP  Flowsheets (Taken 5/10/2025 0757)  Achieves optimal ventilation and oxygenation:   Assess for changes in respiratory status   Position to facilitate oxygenation and minimize respiratory effort   Respiratory therapy support as indicated   Assess for changes in mentation and behavior   Oxygen supplementation based on oxygen saturation or arterial blood gases   Encourage broncho-pulmonary hygiene including cough, deep breathe, incentive spirometry   Assess and instruct to report shortness of breath or any respiratory difficulty  5/9/2025 2253 by Yoselin Campos RN  Outcome: Progressing

## 2025-05-10 NOTE — PROGRESS NOTES
No further recommendations from neurology.  The patient should continue on dual antiplatelet therapy for 21 days as well as Lipitor 80 mg daily.  I recommend yearly carotid duplex ultrasound.

## 2025-05-10 NOTE — DISCHARGE SUMMARY
Hospitalist Discharge Summary   Admit Date:  2025  7:58 PM   DC Note date: 5/10/2025  Name:  Marc Horton Sr.   Age:  69 y.o.  Sex:  male  :  1955   MRN:  [id number]   Room:  [facility]  PCP:  Meliza Martino PA-C    Presenting Complaint: Chest Pain and Leg Pain     Initial Admission Diagnosis: Paresthesias [R20.2]  Bilateral leg pain [M79.604, M79.605]  Stroke-like symptoms [R29.90]  Acute CVA (cerebrovascular accident) (HCC) [I63.9]  Chest pain, unspecified type [R07.9]     Problem List for this Hospitalization (present on admission):    Principal Problem:    Acute CVA (cerebrovascular accident) (HCC)  Active Problems:    Type 2 diabetes mellitus (HCC)    HTN (hypertension)    Homelessness  Resolved Problems:    * No resolved hospital problems. *      Hospital Course:  Marc Horton Sr. is a 69 y.o. male with medical history of paroxysmal atrial fibrillation not on OAC, CAD, DM2, hypertension, STEF, neuropathy, HLD COPD, HFpE who presented with c/o dizziness and left sided weakness / numbness.  Patient also c/o worsening b/l LE wounds; he was not compliant with outpatient f/u care at wound center.  He did admit that the symptoms of weakness / dizziness waxed / waned over a course of 2 weeks.     In ER, VS T98.1, /91, HR 80, RR 18, 97% RA.  Labwork remarkable for creatinine 1.46, .  CTH negative for acute abnl.  CTA chest / abd / pelvis without evidence of PE / aortic dissection, no acute process in chest / abd / pelvis, no high grade stenosis / thrombosis found.  Hospitalist consulted for inpatient admission.    Patient intolerant to MRI brain attempt; d/w Dr. Sheldon and given carotid doppler revealing 50-69% stenosis b/l, recommended CTA head and neck.  2025 CTA head and neck revealed left narrowing 50% and 35% blockage right carotid; per neurology DAPT x 21 days with continued statin tx then mono antiplatelet tx with ASA thereafter.  Patient also seen by wound team for  novel coronavirus associated with COVID-19.  A negative result does not rule out COVID-19.     This test has been authorized by the FDA under an Emergency Use Authorization (EUA) for use by authorized laboratories.      Fact sheet for Healthcare Providers: https://www.fda.gov/media/412893/download  Fact sheet for Patients: https://www.fda.gov/media/434708/download     Methodology: Isothermal Nucleic Acid Amplification                 All Labs from Last 24 Hrs:  Recent Results (from the past 24 hours)   POCT Glucose    Collection Time: 05/09/25 11:28 AM   Result Value Ref Range    POC Glucose 188 (H) 65 - 100 mg/dL    Performed by: AguiIris ExperienceemyReteannyPCT    POCT Glucose    Collection Time: 05/09/25  5:00 PM   Result Value Ref Range    POC Glucose 199 (H) 65 - 100 mg/dL    Performed by: AgIPLockseJeannyPCT    POCT Glucose    Collection Time: 05/09/25  8:52 PM   Result Value Ref Range    POC Glucose 249 (H) 65 - 100 mg/dL    Performed by: Geri    CBC    Collection Time: 05/10/25  5:39 AM   Result Value Ref Range    WBC 13.4 (H) 4.3 - 11.1 K/uL    RBC 4.58 4.23 - 5.6 M/uL    Hemoglobin 12.8 (L) 13.6 - 17.2 g/dL    Hematocrit 38.2 (L) 41.1 - 50.3 %    MCV 83.4 82 - 102 FL    MCH 27.9 26.1 - 32.9 PG    MCHC 33.5 31.4 - 35.0 g/dL    RDW 17.3 (H) 11.9 - 14.6 %    Platelets 169 150 - 450 K/uL    MPV 10.2 9.4 - 12.3 FL    nRBC 0.00 0.0 - 0.2 K/uL   Basic Metabolic Panel    Collection Time: 05/10/25  5:39 AM   Result Value Ref Range    Sodium 134 (L) 136 - 145 mmol/L    Potassium 4.3 3.5 - 5.1 mmol/L    Chloride 97 (L) 98 - 107 mmol/L    CO2 25 20 - 29 mmol/L    Anion Gap 11 7 - 16 mmol/L    Glucose 242 (H) 70 - 99 mg/dL    BUN 21 8 - 23 MG/DL    Creatinine 1.05 0.80 - 1.30 MG/DL    Est, Glom Filt Rate 76 >60 ml/min/1.73m2    Calcium 9.0 8.8 - 10.2 MG/DL   Magnesium    Collection Time: 05/10/25  5:39 AM   Result Value Ref Range    Magnesium 2.3 1.8 - 2.4 mg/dL   POCT Glucose    Collection Time: 05/10/25  7:16 AM   Result

## 2025-05-10 NOTE — PLAN OF CARE
Problem: Respiratory - Adult  Goal: Achieves optimal ventilation and oxygenation  5/10/2025 0757 by Dilshad Moore RCP  Flowsheets (Taken 5/10/2025 0757)  Achieves optimal ventilation and oxygenation:   Assess for changes in respiratory status   Position to facilitate oxygenation and minimize respiratory effort   Respiratory therapy support as indicated   Assess for changes in mentation and behavior   Oxygen supplementation based on oxygen saturation or arterial blood gases   Encourage broncho-pulmonary hygiene including cough, deep breathe, incentive spirometry   Assess and instruct to report shortness of breath or any respiratory difficulty

## 2025-05-10 NOTE — PLAN OF CARE
Problem: Chronic Conditions and Co-morbidities  Goal: Patient's chronic conditions and co-morbidity symptoms are monitored and maintained or improved  5/9/2025 2253 by Yoselin Campos RN  Outcome: Progressing  5/9/2025 1414 by Violette Pride RN  Outcome: Progressing     Problem: Pain  Goal: Verbalizes/displays adequate comfort level or baseline comfort level  5/9/2025 2253 by Yoselin Campos RN  Outcome: Progressing  5/9/2025 1414 by Violette Pride RN  Outcome: Progressing     Problem: Safety - Adult  Goal: Free from fall injury  5/9/2025 2253 by Yoselin Campos RN  Outcome: Progressing  5/9/2025 1414 by Violetet rPide RN  Outcome: Progressing     Problem: Respiratory - Adult  Goal: Achieves optimal ventilation and oxygenation  5/9/2025 2253 by Yoselin Campos RN  Outcome: Progressing  5/9/2025 1414 by Violette Pride RN  Outcome: Progressing  5/9/2025 0912 by Alma Mirza RCP  Outcome: Progressing  Flowsheets (Taken 5/9/2025 0912)  Achieves optimal ventilation and oxygenation:   Assess for changes in respiratory status   Respiratory therapy support as indicated   Assess and instruct to report shortness of breath or any respiratory difficulty   Encourage broncho-pulmonary hygiene including cough, deep breathe, incentive spirometry   Assess for changes in mentation and behavior

## 2025-05-15 ENCOUNTER — RESULTS FOLLOW-UP (OUTPATIENT)
Dept: FAMILY MEDICINE CLINIC | Facility: CLINIC | Age: 70
End: 2025-05-15

## 2025-05-19 ENCOUNTER — HOSPITAL ENCOUNTER (OUTPATIENT)
Dept: WOUND CARE | Age: 70
Discharge: HOME | End: 2025-05-19
Payer: MEDICARE

## 2025-05-19 VITALS
BODY MASS INDEX: 33.59 KG/M2 | SYSTOLIC BLOOD PRESSURE: 137 MMHG | WEIGHT: 248 LBS | DIASTOLIC BLOOD PRESSURE: 62 MMHG | HEIGHT: 72 IN | HEART RATE: 58 BPM

## 2025-05-19 DIAGNOSIS — I87.311 CHRONIC VENOUS HYPERTENSION (IDIOPATHIC) WITH ULCER OF RIGHT LOWER EXTREMITY (HCC): Primary | ICD-10-CM

## 2025-05-19 DIAGNOSIS — L97.912 NON-PRESSURE CHRONIC ULCER OF RIGHT LOWER LEG WITH FAT LAYER EXPOSED (HCC): ICD-10-CM

## 2025-05-19 DIAGNOSIS — R60.0 LOWER EXTREMITY EDEMA: ICD-10-CM

## 2025-05-19 DIAGNOSIS — L97.919 CHRONIC VENOUS HYPERTENSION (IDIOPATHIC) WITH ULCER OF RIGHT LOWER EXTREMITY (HCC): Primary | ICD-10-CM

## 2025-05-19 PROBLEM — L97.929 ULCERS OF BOTH LOWER LEGS (HCC): Chronic | Status: RESOLVED | Noted: 2022-12-30 | Resolved: 2025-05-19

## 2025-05-19 PROBLEM — F31.9 BIPOLAR DISORDER (HCC): Chronic | Status: RESOLVED | Noted: 2017-09-21 | Resolved: 2025-05-19

## 2025-05-19 PROCEDURE — 99213 OFFICE O/P EST LOW 20 MIN: CPT

## 2025-05-19 PROCEDURE — 29581 APPL MULTLAYER CMPRN SYS LEG: CPT

## 2025-05-19 RX ORDER — GENTAMICIN SULFATE 1 MG/G
OINTMENT TOPICAL PRN
OUTPATIENT
Start: 2025-05-19

## 2025-05-19 RX ORDER — LIDOCAINE 50 MG/G
OINTMENT TOPICAL PRN
OUTPATIENT
Start: 2025-05-19

## 2025-05-19 RX ORDER — MUPIROCIN 20 MG/G
OINTMENT TOPICAL PRN
OUTPATIENT
Start: 2025-05-19

## 2025-05-19 RX ORDER — BETAMETHASONE DIPROPIONATE 0.5 MG/G
CREAM TOPICAL PRN
OUTPATIENT
Start: 2025-05-19

## 2025-05-19 RX ORDER — SILVER SULFADIAZINE 10 MG/G
CREAM TOPICAL PRN
OUTPATIENT
Start: 2025-05-19

## 2025-05-19 RX ORDER — LIDOCAINE 40 MG/G
CREAM TOPICAL PRN
OUTPATIENT
Start: 2025-05-19

## 2025-05-19 RX ORDER — TRIAMCINOLONE ACETONIDE 1 MG/G
OINTMENT TOPICAL PRN
OUTPATIENT
Start: 2025-05-19

## 2025-05-19 RX ORDER — NEOMYCIN/BACITRACIN/POLYMYXINB 3.5-400-5K
OINTMENT (GRAM) TOPICAL PRN
OUTPATIENT
Start: 2025-05-19

## 2025-05-19 RX ORDER — SODIUM CHLOR/HYPOCHLOROUS ACID 0.033 %
SOLUTION, IRRIGATION IRRIGATION PRN
OUTPATIENT
Start: 2025-05-19

## 2025-05-19 RX ORDER — LIDOCAINE HYDROCHLORIDE 20 MG/ML
JELLY TOPICAL PRN
OUTPATIENT
Start: 2025-05-19

## 2025-05-19 RX ORDER — GINSENG 100 MG
CAPSULE ORAL PRN
OUTPATIENT
Start: 2025-05-19

## 2025-05-19 RX ORDER — CLOBETASOL PROPIONATE 0.5 MG/G
OINTMENT TOPICAL PRN
OUTPATIENT
Start: 2025-05-19

## 2025-05-19 RX ORDER — LIDOCAINE HYDROCHLORIDE 40 MG/ML
SOLUTION TOPICAL PRN
OUTPATIENT
Start: 2025-05-19

## 2025-05-19 RX ORDER — BACITRACIN ZINC AND POLYMYXIN B SULFATE 500; 1000 [USP'U]/G; [USP'U]/G
OINTMENT TOPICAL PRN
OUTPATIENT
Start: 2025-05-19

## 2025-05-19 ASSESSMENT — ENCOUNTER SYMPTOMS
VOMITING: 0
NAUSEA: 0

## 2025-05-19 NOTE — WOUND CARE
Unna Boot Application   Below Knee    NAME:  Marc Horton .  YOB: 1955  MEDICAL RECORD NUMBER:  [id number]  DATE:  5/19/2025    Unna boot: Applied moisturizing agent to dry skin as needed.   Appied primary and secondary dressing as ordered.  Applied Unna roll from toes to knee overlapping each time.   Applied ace wrap or coban from toes to below the knee.   Secured with tape and/or metal clips covered with tape.   Instructed patient/caregiver to keep dressing dry and intact. DO NOT REMOVE DRESSING.   Instructed pt/family/caregiver to report excessive draining, loose bandage, wet dressing, severe pain or tingling in toes.  Applied Unna Boot dressing below the knee to right lower leg.    Unna Boot(s) were applied per  Guidelines.     Electronically signed by Dara Tidwell RN on 5/19/2025 at 9:22 AM

## 2025-05-19 NOTE — ASSESSMENT & PLAN NOTE
Assessment  History of CVI with wounds in the past; has had Velcro compression garments in the past but is unsure where they are now  Recently hospitalized with stroke, now at [facility]  Had wounds to bilateral lower extremities; only wound that remains is small wound to right medial lower leg  No evidence of acute infection  Plan  Will apply Unna boot right lower extremity; patient provided with Velcro compression garment for left lower extremity  Patient encouraged to elevate leg at/above level of the heart when possible   RTC in 2 weeks

## 2025-05-19 NOTE — FLOWSHEET NOTE
05/19/25 0840   Right Leg Edema Point of Measurement   Leg circumference 41 cm   Ankle circumference 23 cm   Foot circumference 28 cm   Compression Therapy 2 layer compression wrap   Left Leg Edema Point of Measurement   Leg circumference 39 cm   Ankle circumference 23 cm   Foot circumference 25 cm   Compression Therapy 2 layer compression wrap   RLE Neurovascular Assessment   RLE Sensation  Numbness   R Pedal Pulse +2   LLE Neurovascular Assessment   LLE Sensation  Numbness   L Pedal Pulse +2   Wound 05/08/25 Leg Right;Lower;Distal;Medial   Date First Assessed/Time First Assessed: 05/08/25 1511   Present on Original Admission: Yes  Primary Wound Type: Vascular Ulcer  Secondary Wound Type - Vascular Ulcer: Venous  Location: Leg  Wound Location Orientation: Right;Lower;Distal;Medial   Wound Image    Wound Etiology Venous   Dressing Status Clean;Dry;Intact   Wound Cleansed Soap and water   Dressing/Treatment Xeroform   Wound Length (cm) 0.1 cm   Wound Width (cm) 0.1 cm   Wound Depth (cm) 0.1 cm   Wound Surface Area (cm^2) 0.01 cm^2   Change in Wound Size % (l*w) 99.86   Wound Volume (cm^3) 0.001 cm^3   Wound Healing % 100   Wound Assessment Pink/red   Drainage Amount Scant (moist but unmeasurable)   Drainage Description Serosanguinous   Odor None   Mariana-wound Assessment Dry/flaky   Wound Thickness Description not for Pressure Injury Full thickness   Wound 05/08/25 Leg Left;Lower;Medial;Distal   Date First Assessed/Time First Assessed: 05/08/25 1512   Present on Original Admission: Yes  Primary Wound Type: Vascular Ulcer  Secondary Wound Type - Vascular Ulcer: Venous  Location: Leg  Wound Location Orientation: Left;Lower;Medial;Distal   Wound Image    Wound Etiology Venous   Dressing Status Clean;Dry;Intact   Wound Cleansed Soap and water   Dressing/Treatment Gauze dressing/dressing sponge   Wound Length (cm) 0 cm   Wound Width (cm) 0 cm   Wound Depth (cm) 0 cm   Wound Surface Area (cm^2) 0 cm^2   Change in Wound

## 2025-05-19 NOTE — PROGRESS NOTES
Chronic Pain Consult Note      Plan:    A comprehensive pain management plan may consist of the following: Testing, Therapy, Medications, Interventions, Consults, and Follow up.    Type II Diabetic peripheral neuropathy   Stop Gabapentin (discussed how to do this).   Initiate pregabalin 75 mg 3 times per day with warning for dizziness.  I did tell him that this dose is adjustable.  Post Stroke Pain   Initiate nortriptyline 10 mg 1-2 p.o. nightly.  This may help with headaches as well.  Lumbar stenosis with neurogenic claudication  Plan for lumbar CT Myelogram.  Patient has a noted allergy to iodine but has had CT scans with and without contrast without issue.    General Recommendations: The pain condition that the patient suffers from is best treated with a multidisciplinary approach that involves an increase in physical activity to prevent de-conditioning and worsening of the pain cycle, as well as psychological counseling (formal and/or informal) to address the co morbid psychological effects of pain. Treatment will often involve judicious use of pain medications and interventional procedures to decrease the pain, allowing the patient to participate in the physical activity that will ultimately produce long-lasting pain reductions. The goal of the multidisciplinary approach is to return the patient to a higher level of overall function and to restore their ability to perform activities of daily living.    Narcan nasal spray was prescribed on:    No orders of the defined types were placed in this encounter.    Requested Prescriptions      No prescriptions requested or ordered in this encounter          Referring Provider: Meliza Martino PA-C  Assessment:     Chief Complaint: New Patient (Pain in legs, hands, and back)      Marc Horton  is a 69 y.o. male being [facility] for the following diagnoses:    Diagnosis:  1. Diabetic peripheral neuropathy (HCC)

## 2025-05-19 NOTE — WOUND CARE
Discharge Instructions for  [facility]  [address]  [address]  [place] [address]  Phone [phone]   Fax [phone]      NAME:  Marc Horton Sr.  YOB: 1955  MEDICAL RECORD NUMBER:  [id number]  DATE:  5/19/2025    Return Appointment:   2 weeks with Sintia Underwood NP      Instructions: RLE:  Cleanse with soap and water in shower 2 times per week  Unna boot with wound and lower extremity assessment.  If there is any problem with the dressing (too tight, slides down,, etc.)  Patient to return to clinic to have re-wrapped by clinician.  Change 2 times per week    Pt to get in shower for each dressing change to cleanse legs well  LLE wounds are healed, applied velcro compression wrap today, patient to have assistance daily with application    Do not cut compression wraps off. If wraps become too loose or slide down, call wound center to make an appointment for dressing change. If wraps become too tight, try elevating your legs to assist fluid drainage.  Elevate legs when sitting.  Avoid prolonged standing or sitting with legs in dependent position.  Be cautious of increased salt intake as this promotes fluid retention and swelling.  Take diuretic (fluid pill) as instructed by your doctor.  Increase protein intake to promote wound healing. Deandre and Ensure are examples of protein supplements.  Wound healing is greatly slowed when glucose levels are greater than 200. Monitor glucose levels to ensure tight glucose control.  Wear velco compression garments every day.         Should you experience increased redness, swelling, pain, foul odor, size of wound(s), or have a temperature over 101 degrees please contact the Wound Healing Center at [phone] or if after hours contact your primary care physician or go to the hospital emergency department.    PLEASE NOTE: IF YOU ARE UNABLE TO OBTAIN WOUND SUPPLIES, CONTINUE TO USE THE SUPPLIES YOU HAVE AVAILABLE UNTIL YOU

## 2025-05-19 NOTE — PROGRESS NOTES
ask questions. They agree to treatment plan. Pertinent decisions include: N/A .   The patient's diagnosis and/or treatment is significantly limited by the following social drivers of health: financial limitations or loss of income and less than ideal living conditions.    Discussed with patient/caregiver factors that negatively affect wound healing: swelling and infection. Discussed with patient/caregiver ways to modify these factors: compression and keeping wound clean and covered. Discussed the benefit of serial debridements. Discussed signs of infection (warmth, redness, swelling, pain, fever >100.5, body aches, chills, loss of appetite, etc.), how and when to contact clinic, when to seek treatment in the ED. Questions asked and answered. Written instructions provided to patient/caregiver. Patient/caregiver voiced understanding of the importance of adhering to instructions for wound healing/best outcome.     Wound 11/25/24 Leg Left;Lower venous ulcers redness and rash (Active)   Number of days: 174       Wound 11/25/24 Leg Right;Lower venoud ulclers (Active)   Number of days: 174       Wound 05/08/25 Leg Right;Lower;Distal;Medial (Active)   Wound Image   05/19/25 0840   Wound Etiology Venous 05/19/25 0840   Dressing Status Clean;Dry;Intact 05/19/25 0840   Wound Cleansed Soap and water 05/19/25 0840   Dressing/Treatment Xeroform 05/19/25 0840   Wound Length (cm) 0.1 cm 05/19/25 0840   Wound Width (cm) 0.1 cm 05/19/25 0840   Wound Depth (cm) 0.1 cm 05/19/25 0840   Wound Surface Area (cm^2) 0.01 cm^2 05/19/25 0840   Change in Wound Size % (l*w) 99.86 05/19/25 0840   Wound Volume (cm^3) 0.001 cm^3 05/19/25 0840   Wound Healing % 100 05/19/25 0840   Wound Assessment Pink/red 05/19/25 0840   Drainage Amount Scant (moist but unmeasurable) 05/19/25 0840   Drainage Description Serosanguinous 05/19/25 0840   Odor None 05/19/25 0840   Mariana-wound Assessment Dry/flaky 05/19/25 0840   Margins Flat/open edges 05/08/25 1500

## 2025-05-20 ENCOUNTER — CARE COORDINATION (OUTPATIENT)
Dept: CARE COORDINATION | Facility: CLINIC | Age: 70
End: 2025-05-20

## 2025-05-23 ENCOUNTER — OFFICE VISIT (OUTPATIENT)
Age: 70
End: 2025-05-23
Payer: MEDICARE

## 2025-05-23 VITALS
SYSTOLIC BLOOD PRESSURE: 118 MMHG | WEIGHT: 252 LBS | BODY MASS INDEX: 34.13 KG/M2 | DIASTOLIC BLOOD PRESSURE: 70 MMHG | HEART RATE: 61 BPM | HEIGHT: 72 IN

## 2025-05-23 DIAGNOSIS — Z95.1 HX OF CABG: ICD-10-CM

## 2025-05-23 DIAGNOSIS — I48.0 PAROXYSMAL ATRIAL FIBRILLATION (HCC): ICD-10-CM

## 2025-05-23 DIAGNOSIS — Z95.2 H/O AORTIC VALVE REPLACEMENT: ICD-10-CM

## 2025-05-23 DIAGNOSIS — I50.32 CHRONIC HEART FAILURE WITH PRESERVED EJECTION FRACTION (HCC): Primary | ICD-10-CM

## 2025-05-23 PROCEDURE — 3078F DIAST BP <80 MM HG: CPT | Performed by: INTERNAL MEDICINE

## 2025-05-23 PROCEDURE — 3017F COLORECTAL CA SCREEN DOC REV: CPT | Performed by: INTERNAL MEDICINE

## 2025-05-23 PROCEDURE — 1123F ACP DISCUSS/DSCN MKR DOCD: CPT | Performed by: INTERNAL MEDICINE

## 2025-05-23 PROCEDURE — 1111F DSCHRG MED/CURRENT MED MERGE: CPT | Performed by: INTERNAL MEDICINE

## 2025-05-23 PROCEDURE — 99214 OFFICE O/P EST MOD 30 MIN: CPT | Performed by: INTERNAL MEDICINE

## 2025-05-23 PROCEDURE — 4004F PT TOBACCO SCREEN RCVD TLK: CPT | Performed by: INTERNAL MEDICINE

## 2025-05-23 PROCEDURE — 1126F AMNT PAIN NOTED NONE PRSNT: CPT | Performed by: INTERNAL MEDICINE

## 2025-05-23 PROCEDURE — G8417 CALC BMI ABV UP PARAM F/U: HCPCS | Performed by: INTERNAL MEDICINE

## 2025-05-23 PROCEDURE — 3074F SYST BP LT 130 MM HG: CPT | Performed by: INTERNAL MEDICINE

## 2025-05-23 PROCEDURE — G8428 CUR MEDS NOT DOCUMENT: HCPCS | Performed by: INTERNAL MEDICINE

## 2025-05-27 ENCOUNTER — CARE COORDINATION (OUTPATIENT)
Dept: CARE COORDINATION | Facility: CLINIC | Age: 70
End: 2025-05-27

## 2025-05-27 NOTE — CARE COORDINATION
mgmt 6/4, is agreeable to PCP f/u after seeing pain mgt 6/6. ACM will schedule and notify pt w/ appt.        Assessments Completed:   General Assessment    Do you have any symptoms that are causing concern?: No          Medications Reviewed:   Completed during a previous call     Advance Care Planning:   Not reviewed during this call     Care Planning:   Education Documentation  No documentation found.  Education Comments  No comments found.     ,    Goals Addressed                   This Visit's Progress     Conditions and Symptoms   On track     I will schedule office visits, as directed by my provider.  I will keep my appointment or reschedule if I have to cancel.  I will notify my provider of any barriers to my plan of care.  I will notify my provider of any symptoms that indicate a worsening of my condition.    Barriers: none  Plan for overcoming my barriers: N/A  Confidence: 8/10  Anticipated Goal Completion Date: 4/29/24                 PCP/Specialist follow up:   Future Appointments         Provider Specialty Dept Phone    6/4/2025 9:30 AM Sintia Underwood, APRN - NP; Conchita Lozano RN Wound Ostomy [phone]    6/6/2025 9:00 AM (Arrive by 8:45 AM) Tara Potter PA Pain Management [phone]    11/4/2025 11:30 AM Jevon Encarnacion III, MD Cardiology [phone]            Follow Up:   Plan for next ACM outreach in approximately 1 week to complete:  - follow up appointment with wound clinic .   Patient  is agreeable to this plan.

## 2025-05-28 ENCOUNTER — CARE COORDINATION (OUTPATIENT)
Dept: CARE COORDINATION | Facility: CLINIC | Age: 70
End: 2025-05-28

## 2025-05-28 ENCOUNTER — TELEPHONE (OUTPATIENT)
Dept: FAMILY MEDICINE CLINIC | Facility: CLINIC | Age: 70
End: 2025-05-28

## 2025-05-28 NOTE — TELEPHONE ENCOUNTER
Called pt x1 regarding appt request. Unable to LVM and reach pt at this time. Sending a link via [employer] for pt to call back to schedule appt

## 2025-05-28 NOTE — TELEPHONE ENCOUNTER
----- Message from BRAYDEN ELENA MA sent at 5/28/2025  9:45 AM EDT -----  Regarding: FW: [facility] Appointment Request  Can you please call this pt. To schedule his follow up. Thank you!  ----- Message -----  From: Abran Weiss Janeen  Sent: 5/28/2025   9:31 AM EDT  To: [facility] Clinical Staff  Subject: [facility] Appointment Request                          [facility] Appointment Request    Patient needs appointment for [facility] Appointment Type: Hospital Follow Up.    Patient Requested Dates(s):within 2 weeks  Patient Requested Time:Afternoon or first available  Provider Name:Meliza Martino PA-C    Reason for Appointment Request: Other Patient need's a hospital follow up appointment   --------------------------------------------------------------------------------------------------------------------------    Relationship to Patient: Melissa Beasley     Call Back Information: Do not leave any message, patient will call back for answer  Preferred Call Back Number: Phone [phone]

## 2025-05-28 NOTE — CARE COORDINATION
ACM attempted to schedule hospital f/u appt for pt w/ PCP, no answer after several attempts by ACM and rep.  Rep stated he would request PCP to call pt for appt. Will follow for outcome.

## 2025-06-04 ENCOUNTER — HOSPITAL ENCOUNTER (OUTPATIENT)
Dept: WOUND CARE | Age: 70
Discharge: HOME | End: 2025-06-04
Payer: MEDICARE

## 2025-06-04 VITALS
DIASTOLIC BLOOD PRESSURE: 74 MMHG | HEART RATE: 57 BPM | TEMPERATURE: 97.9 F | RESPIRATION RATE: 18 BRPM | SYSTOLIC BLOOD PRESSURE: 142 MMHG

## 2025-06-04 DIAGNOSIS — R60.0 LOWER EXTREMITY EDEMA: Chronic | ICD-10-CM

## 2025-06-04 DIAGNOSIS — L97.912 NON-PRESSURE CHRONIC ULCER OF RIGHT LOWER LEG WITH FAT LAYER EXPOSED (HCC): Chronic | ICD-10-CM

## 2025-06-04 DIAGNOSIS — I87.311 CHRONIC VENOUS HYPERTENSION (IDIOPATHIC) WITH ULCER OF RIGHT LOWER EXTREMITY (HCC): Primary | Chronic | ICD-10-CM

## 2025-06-04 DIAGNOSIS — L97.919 CHRONIC VENOUS HYPERTENSION (IDIOPATHIC) WITH ULCER OF RIGHT LOWER EXTREMITY (HCC): Primary | Chronic | ICD-10-CM

## 2025-06-04 DIAGNOSIS — I89.0 LYMPHEDEMA: Chronic | ICD-10-CM

## 2025-06-04 PROCEDURE — 99213 OFFICE O/P EST LOW 20 MIN: CPT

## 2025-06-04 RX ORDER — TRIAMCINOLONE ACETONIDE 1 MG/G
OINTMENT TOPICAL PRN
Status: CANCELLED | OUTPATIENT
Start: 2025-06-04

## 2025-06-04 RX ORDER — LIDOCAINE 50 MG/G
OINTMENT TOPICAL PRN
Status: CANCELLED | OUTPATIENT
Start: 2025-06-04

## 2025-06-04 RX ORDER — MUPIROCIN 20 MG/G
OINTMENT TOPICAL PRN
Status: CANCELLED | OUTPATIENT
Start: 2025-06-04

## 2025-06-04 RX ORDER — LIDOCAINE 40 MG/G
CREAM TOPICAL PRN
Status: CANCELLED | OUTPATIENT
Start: 2025-06-04

## 2025-06-04 RX ORDER — BACITRACIN ZINC AND POLYMYXIN B SULFATE 500; 1000 [USP'U]/G; [USP'U]/G
OINTMENT TOPICAL PRN
Status: CANCELLED | OUTPATIENT
Start: 2025-06-04

## 2025-06-04 RX ORDER — SILVER SULFADIAZINE 10 MG/G
CREAM TOPICAL PRN
Status: CANCELLED | OUTPATIENT
Start: 2025-06-04

## 2025-06-04 RX ORDER — LIDOCAINE HYDROCHLORIDE 40 MG/ML
SOLUTION TOPICAL PRN
Status: CANCELLED | OUTPATIENT
Start: 2025-06-04

## 2025-06-04 RX ORDER — SODIUM CHLOR/HYPOCHLOROUS ACID 0.033 %
SOLUTION, IRRIGATION IRRIGATION PRN
Status: CANCELLED | OUTPATIENT
Start: 2025-06-04

## 2025-06-04 RX ORDER — CLOBETASOL PROPIONATE 0.5 MG/G
OINTMENT TOPICAL PRN
Status: CANCELLED | OUTPATIENT
Start: 2025-06-04

## 2025-06-04 RX ORDER — GENTAMICIN SULFATE 1 MG/G
OINTMENT TOPICAL PRN
Status: CANCELLED | OUTPATIENT
Start: 2025-06-04

## 2025-06-04 RX ORDER — BETAMETHASONE DIPROPIONATE 0.5 MG/G
CREAM TOPICAL PRN
Status: CANCELLED | OUTPATIENT
Start: 2025-06-04

## 2025-06-04 RX ORDER — LIDOCAINE HYDROCHLORIDE 20 MG/ML
JELLY TOPICAL PRN
Status: CANCELLED | OUTPATIENT
Start: 2025-06-04

## 2025-06-04 RX ORDER — NEOMYCIN/BACITRACIN/POLYMYXINB 3.5-400-5K
OINTMENT (GRAM) TOPICAL PRN
Status: CANCELLED | OUTPATIENT
Start: 2025-06-04

## 2025-06-04 RX ORDER — GINSENG 100 MG
CAPSULE ORAL PRN
Status: CANCELLED | OUTPATIENT
Start: 2025-06-04

## 2025-06-04 ASSESSMENT — ENCOUNTER SYMPTOMS
NAUSEA: 0
GASTROINTESTINAL NEGATIVE: 1
NAUSEA: 0
SHORTNESS OF BREATH: 1
VOMITING: 0
COUGH: 0
HEMATOCHEZIA: 0
VOMITING: 0
ABDOMINAL PAIN: 0

## 2025-06-04 ASSESSMENT — PAIN SCALES - GENERAL: PAINLEVEL_OUTOF10: 7

## 2025-06-04 NOTE — WOUND CARE
Discharge Instructions for  [facility]  [address] [place] [address]  [address] [place] [id number]  Phone [phone]   Fax [phone]      NAME:  Marc Horton Sr.  YOB: 1955  MEDICAL RECORD NUMBER:  [id number]  DATE:  6/4/2025    Return Appointment:   Discharge with Sintia Underwood NP      Instructions: RLE:  Wounds have healed.  Velcro wrap ordered for right leg.  Moisturize legs daily.  Wear compression on both legs daily.  Start wearing velcro wrap on both legs once it arrives.    Pt to get in shower for each dressing change to cleanse legs well  LLE wounds are healed, applied velcro compression wrap today, patient to have assistance daily with application    Should you experience increased redness, swelling, pain, foul odor, size of wound(s), or have a temperature over 101 degrees please contact the Wound Healing Center at [phone] or if after hours contact your primary care physician or go to the hospital emergency department.    Electronically signed Elijah Hay RN on 6/4/2025 at 10:00 AM

## 2025-06-04 NOTE — DISCHARGE INSTRUCTIONS
Instructions: RLE:  Wounds have healed.  Velcro wrap ordered for right leg.  Moisturize legs daily.  Wear compression on both legs daily.  Start wearing velcro wrap on both legs once it arrives.     Pt to get in shower for each dressing change to cleanse legs well  LLE wounds are healed, applied velcro compression wrap today, patient to have assistance daily with application

## 2025-06-04 NOTE — PROGRESS NOTES
[facility]  Medical Staff Progress Note     Marc Horton .  MEDICAL RECORD NUMBER: [id number]  AGE: 69 y.o.     GENDER: male    : 1955  EPISODE DATE: 2025    Assessment and Plan:     Problem List Items Addressed This Visit          Circulatory    Chronic venous hypertension (idiopathic) with ulcer of right lower extremity (HCC) - Primary (Chronic)       Other    Lower extremity edema (Chronic)    * (Principal) Lymphedema (Chronic)    Assessment  All wounds are healed  Patient has woody edema, hyperpigmentation, fibrotic skin changes and + Stemmer's sign bilaterally  Plan  Lymphedema  Will order Velcro compression garment for RLE; he has one for LLE  CVI discharge education -  Moisturize daily with emollient of choice (Vaseline, Aquaphor, etc.)  Patient encouraged to elevate leg at/above level of the heart when possible   Recommend daily use of: Velcro compression garments; replace every 6 months to 1 year  Will need compression for life  Discharge from clinic - RTC PRN open wound           Non-pressure chronic ulcer of right lower leg with fat layer exposed (HCC)     Medical decision making:  Assessment required other independent historian(s): N/A  Comorbid conditions affecting wound healing as noted in PMH and PSH reviewed for this visit  Review of medical records and external note from other providers reviewed for this visit  Pertinent diagnostics were reviewed for this visit; discussed management or test interpretation with other qualified health care professional  Prescription drug management: N/A    Risk of complications and/or mortality of treatment plan:  The patient has a low risk of morbidity and mortality from additional diagnostic testing or treatment. This is due to conditions that affect wound healing as well as patient and procedure risk factors. Patient/caregiver educated and given the opportunity to ask questions. They agree to

## 2025-06-04 NOTE — FLOWSHEET NOTE
06/04/25 0935   Right Leg Edema Point of Measurement   Heel to calf 40   Leg circumference 40 cm   Ankle circumference 25 cm   Foot circumference 27 cm   Compression Therapy Tubular elastic support bandage   Left Leg Edema Point of Measurement   Leg circumference 39 cm   Ankle circumference 26 cm   Foot circumference 28 cm   Compression Therapy Velcro compression wrap   Wound 05/08/25 Leg Left;Lower;Medial;Distal   Date First Assessed/Time First Assessed: 05/08/25 1512   Present on Original Admission: Yes  Primary Wound Type: Vascular Ulcer  Secondary Wound Type - Vascular Ulcer: Venous  Location: Leg  Wound Location Orientation: Left;Lower;Medial;Distal   Wound Image    Wound Etiology Venous   Dressing Status Other (Comment)  (jailene)   Wound Cleansed Soap and water   Dressing/Treatment Other (comment)  (vaseline)   Wound Length (cm) 0 cm   Wound Width (cm) 0 cm   Wound Depth (cm) 0 cm   Wound Surface Area (cm^2) 0 cm^2   Change in Wound Size % (l*w) 100   Wound Volume (cm^3) 0 cm^3   Wound Healing % 100   Wound Assessment Epithelialization   Drainage Amount None (dry)   Odor None   Mariana-wound Assessment Edematous   Margins Attached edges   Wound Thickness Description not for Pressure Injury Full thickness   Wound 05/08/25 Leg Right;Lower;Distal;Medial   Date First Assessed/Time First Assessed: 05/08/25 1511   Present on Original Admission: Yes  Primary Wound Type: Vascular Ulcer  Secondary Wound Type - Vascular Ulcer: Venous  Location: Leg  Wound Location Orientation: Right;Lower;Distal;Medial   Wound Image    Wound Etiology Venous   Dressing Status Clean;Dry;Intact   Wound Cleansed Soap and water   Dressing/Treatment Other (comment)  (vaseline)   Wound Length (cm) 0 cm   Wound Width (cm) 0 cm   Wound Depth (cm) 0 cm   Wound Surface Area (cm^2) 0 cm^2   Change in Wound Size % (l*w) 100   Wound Volume (cm^3) 0 cm^3   Wound Healing % 100   Wound Assessment Epithelialization   Drainage Amount None (dry)   Odor None

## 2025-06-04 NOTE — ASSESSMENT & PLAN NOTE
Assessment  All wounds are healed  Patient has woody edema, hyperpigmentation, fibrotic skin changes and + Stemmer's sign bilaterally  Plan  Lymphedema  Will order Velcro compression garment for RLE; he has one for LLE  [facility] discharge education -  Moisturize daily with emollient of choice (Vaseline, Aquaphor, etc.)  Patient encouraged to elevate leg at/above level of the heart when possible   Recommend daily use of: Velcro compression garments; replace every 6 months to 1 year  Will need compression for life  Discharge from clinic - RTC PRN open wound

## 2025-06-05 ENCOUNTER — CARE COORDINATION (OUTPATIENT)
Dept: CARE COORDINATION | Facility: CLINIC | Age: 70
End: 2025-06-05

## 2025-06-05 NOTE — PROGRESS NOTES
Mr. Horton is a new patient referred to us by his primary care provider Meliza Martino.  He is a type II diabetic with venous stasis affecting his lower extremities.  His last A1c April 2025 was 7.0.  He has housing insecurity.  Jesusita did increase his gabapentin to 600 mg 3 times per day which she has tolerated.  He has been noncompliant with outpatient wound care and it resulted in a return to the hospital with his stay from May 7 to May 10.  He did follow-up with wound care and saw them Wednesday at which point his wounds had healed.

## 2025-06-05 NOTE — CARE COORDINATION
Ambulatory Care Coordination Note     2025 2:34 PM     Patient Current Location:  Home: [address] [place] [id number]     ACM contacted the patient by telephone. Verified name and  with patient as identifiers.         ACM: Gale Rivera RN     Challenges to be reviewed by the provider   Additional needs identified to be addressed with provider No                  Method of communication with provider: none.    Utilization: Patient has not had any utilization since our last call.     Care Summary Note:   Discussed wound care visit, instructions for prevention of cellulitis. Pt verbalized understanding. Discussed HF educ, when to call provider, confirmed upcoming appt w/ PCP and pain mgmt tomorrow. Will follow for outcome.       Offered patient enrollment in the Remote Patient Monitoring (RPM) program for in-home monitoring: Patient is not eligible for RPM program because: homeless .     Assessments Completed:   Congestive Heart Failure Assessment    Are you currently restricting fluids?: No Restriction  Do you understand a low sodium diet?: Yes  Do you understand how to read food labels?: Yes  How many restaurant meals do you eat per week?: 1-2  Do you salt your food before tasting it?: No         Symptoms:  CHF associated leg swelling: Pos      Symptom course: stable      ,   General Assessment    Do you have any symptoms that are causing concern?: No          Medications Reviewed:   Completed during a previous call     Advance Care Planning:   Not reviewed during this call     Care Planning:   Education Documentation  heart failure self-management, taught by Gale Rivera, RN at 2025  2:34 PM.  Learner: Patient  Readiness: Acceptance  Method: Explanation  Response: Verbalizes Understanding    Diuretics, taught by Gale Rivera RN at 2025  2:34 PM.  Learner: Patient  Readiness: Acceptance  Method: Explanation  Response: Verbalizes Understanding    WHEN TO CALL THE DOCTOR CHF, taught by

## 2025-06-06 ENCOUNTER — TELEPHONE (OUTPATIENT)
Dept: FAMILY MEDICINE CLINIC | Facility: CLINIC | Age: 70
End: 2025-06-06

## 2025-06-06 ENCOUNTER — OFFICE VISIT (OUTPATIENT)
Dept: FAMILY MEDICINE CLINIC | Facility: CLINIC | Age: 70
End: 2025-06-06

## 2025-06-06 ENCOUNTER — OFFICE VISIT (OUTPATIENT)
Age: 70
End: 2025-06-06
Payer: MEDICARE

## 2025-06-06 VITALS
HEART RATE: 70 BPM | SYSTOLIC BLOOD PRESSURE: 140 MMHG | BODY MASS INDEX: 34.31 KG/M2 | WEIGHT: 253 LBS | DIASTOLIC BLOOD PRESSURE: 72 MMHG | OXYGEN SATURATION: 97 % | TEMPERATURE: 98.3 F

## 2025-06-06 DIAGNOSIS — E78.5 DYSLIPIDEMIA: ICD-10-CM

## 2025-06-06 DIAGNOSIS — M48.062 SPINAL STENOSIS OF LUMBAR REGION WITH NEUROGENIC CLAUDICATION: ICD-10-CM

## 2025-06-06 DIAGNOSIS — I10 PRIMARY HYPERTENSION: ICD-10-CM

## 2025-06-06 DIAGNOSIS — Z86.73 HISTORY OF STROKE: ICD-10-CM

## 2025-06-06 DIAGNOSIS — E11.42 DIABETIC PERIPHERAL NEUROPATHY (HCC): ICD-10-CM

## 2025-06-06 DIAGNOSIS — M79.2 NEUROGENIC PAIN DUE TO CENTRAL NERVOUS SYSTEM ABNORMALITY FOLLOWING STROKE: ICD-10-CM

## 2025-06-06 DIAGNOSIS — I10 ESSENTIAL HYPERTENSION: ICD-10-CM

## 2025-06-06 DIAGNOSIS — R29.898 LEFT LEG WEAKNESS: ICD-10-CM

## 2025-06-06 DIAGNOSIS — E11.59 TYPE 2 DIABETES MELLITUS WITH OTHER CIRCULATORY COMPLICATION, WITHOUT LONG-TERM CURRENT USE OF INSULIN (HCC): Primary | ICD-10-CM

## 2025-06-06 DIAGNOSIS — I69.354 HEMIPLEGIA OF LEFT NONDOMINANT SIDE AS LATE EFFECT OF CEREBRAL INFARCTION, UNSPECIFIED HEMIPLEGIA TYPE (HCC): ICD-10-CM

## 2025-06-06 DIAGNOSIS — E11.42 DIABETIC PERIPHERAL NEUROPATHY (HCC): Primary | ICD-10-CM

## 2025-06-06 DIAGNOSIS — I50.32 CHRONIC HEART FAILURE WITH PRESERVED EJECTION FRACTION (HCC): ICD-10-CM

## 2025-06-06 DIAGNOSIS — I69.398 NEUROGENIC PAIN DUE TO CENTRAL NERVOUS SYSTEM ABNORMALITY FOLLOWING STROKE: ICD-10-CM

## 2025-06-06 LAB
ALBUMIN SERPL-MCNC: 4.1 G/DL (ref 3.2–4.6)
ALBUMIN/GLOB SERPL: 1.2 (ref 1–1.9)
ALP SERPL-CCNC: 47 U/L (ref 40–129)
ALT SERPL-CCNC: 21 U/L (ref 8–55)
ANION GAP SERPL CALC-SCNC: 11 MMOL/L (ref 7–16)
AST SERPL-CCNC: 24 U/L (ref 15–37)
BILIRUB SERPL-MCNC: 0.6 MG/DL (ref 0–1.2)
BILIRUBIN, URINE, POC: ABNORMAL
BLOOD URINE, POC: NEGATIVE
BUN SERPL-MCNC: 27 MG/DL (ref 8–23)
CALCIUM SERPL-MCNC: 9.7 MG/DL (ref 8.8–10.2)
CHLORIDE SERPL-SCNC: 104 MMOL/L (ref 98–107)
CO2 SERPL-SCNC: 27 MMOL/L (ref 20–29)
CREAT SERPL-MCNC: 1.25 MG/DL (ref 0.8–1.3)
CREAT UR-MCNC: 123 MG/DL (ref 39–259)
GLOBULIN SER CALC-MCNC: 3.3 G/DL (ref 2.3–3.5)
GLUCOSE SERPL-MCNC: 152 MG/DL (ref 70–99)
GLUCOSE URINE, POC: ABNORMAL
KETONES, URINE, POC: ABNORMAL
LEUKOCYTE ESTERASE, URINE, POC: ABNORMAL
MICROALBUMIN UR-MCNC: <1.2 MG/DL (ref 0–20)
MICROALBUMIN/CREAT UR-RTO: NORMAL MG/G (ref 0–30)
NITRITE, URINE, POC: NEGATIVE
PH, URINE, POC: 7 (ref 4.6–8)
POTASSIUM SERPL-SCNC: 4.5 MMOL/L (ref 3.5–5.1)
PROT SERPL-MCNC: 7.4 G/DL (ref 6.3–8.2)
PROTEIN,URINE, POC: ABNORMAL
SODIUM SERPL-SCNC: 142 MMOL/L (ref 136–145)
SPECIFIC GRAVITY, URINE, POC: 1.01 (ref 1–1.03)
URINALYSIS CLARITY, POC: CLEAR
URINALYSIS COLOR, POC: YELLOW
UROBILINOGEN, POC: NORMAL

## 2025-06-06 PROCEDURE — G8417 CALC BMI ABV UP PARAM F/U: HCPCS | Performed by: PHYSICIAN ASSISTANT

## 2025-06-06 PROCEDURE — 99204 OFFICE O/P NEW MOD 45 MIN: CPT | Performed by: PHYSICIAN ASSISTANT

## 2025-06-06 PROCEDURE — 1123F ACP DISCUSS/DSCN MKR DOCD: CPT | Performed by: PHYSICIAN ASSISTANT

## 2025-06-06 PROCEDURE — 4004F PT TOBACCO SCREEN RCVD TLK: CPT | Performed by: PHYSICIAN ASSISTANT

## 2025-06-06 PROCEDURE — 1111F DSCHRG MED/CURRENT MED MERGE: CPT | Performed by: PHYSICIAN ASSISTANT

## 2025-06-06 PROCEDURE — 3017F COLORECTAL CA SCREEN DOC REV: CPT | Performed by: PHYSICIAN ASSISTANT

## 2025-06-06 PROCEDURE — 2022F DILAT RTA XM EVC RTNOPTHY: CPT | Performed by: PHYSICIAN ASSISTANT

## 2025-06-06 PROCEDURE — 3051F HG A1C>EQUAL 7.0%<8.0%: CPT | Performed by: PHYSICIAN ASSISTANT

## 2025-06-06 PROCEDURE — G8427 DOCREV CUR MEDS BY ELIG CLIN: HCPCS | Performed by: PHYSICIAN ASSISTANT

## 2025-06-06 PROCEDURE — 1159F MED LIST DOCD IN RCRD: CPT | Performed by: PHYSICIAN ASSISTANT

## 2025-06-06 RX ORDER — PANTOPRAZOLE SODIUM 40 MG/1
40 TABLET, DELAYED RELEASE ORAL
Qty: 180 TABLET | Refills: 1 | Status: SHIPPED | OUTPATIENT
Start: 2025-06-06

## 2025-06-06 RX ORDER — PREGABALIN 75 MG/1
75 CAPSULE ORAL 3 TIMES DAILY
Qty: 90 CAPSULE | Refills: 2 | Status: SHIPPED | OUTPATIENT
Start: 2025-06-06 | End: 2025-09-04

## 2025-06-06 RX ORDER — PITAVASTATIN CALCIUM 2.09 MG/1
2 TABLET, FILM COATED ORAL NIGHTLY
Qty: 90 TABLET | Refills: 1 | Status: SHIPPED | OUTPATIENT
Start: 2025-06-06

## 2025-06-06 RX ORDER — SPIRONOLACTONE 25 MG/1
25 TABLET ORAL DAILY
Qty: 90 TABLET | Refills: 1 | Status: SHIPPED | OUTPATIENT
Start: 2025-06-06

## 2025-06-06 RX ORDER — NORTRIPTYLINE HYDROCHLORIDE 10 MG/1
10-20 CAPSULE ORAL NIGHTLY
Qty: 60 CAPSULE | Refills: 1 | Status: SHIPPED | OUTPATIENT
Start: 2025-06-06 | End: 2025-06-06

## 2025-06-06 RX ORDER — METOPROLOL TARTRATE 50 MG
50 TABLET ORAL 2 TIMES DAILY
Qty: 180 TABLET | Refills: 1 | Status: SHIPPED | OUTPATIENT
Start: 2025-06-06

## 2025-06-06 RX ORDER — EZETIMIBE 10 MG/1
10 TABLET ORAL NIGHTLY
Qty: 90 TABLET | Refills: 1 | Status: SHIPPED | OUTPATIENT
Start: 2025-06-06

## 2025-06-06 RX ORDER — BUMETANIDE 2 MG/1
2 TABLET ORAL DAILY
Qty: 90 TABLET | Refills: 2 | Status: SHIPPED | OUTPATIENT
Start: 2025-06-06

## 2025-06-06 ASSESSMENT — ENCOUNTER SYMPTOMS
ALLERGIC/IMMUNOLOGIC NEGATIVE: 1
EYES NEGATIVE: 1
ABDOMINAL PAIN: 0
SHORTNESS OF BREATH: 0

## 2025-06-06 NOTE — TELEPHONE ENCOUNTER
Ab Haider--spoke with pt on phone regarding UA--showed mod leuk's and some protein.    Pt denies any sx's at this time, but will monitor.  I advised that he call office for any sx's and that he would need to give another UA for cx and would need to start abx.  Pt verbalized understanding.

## 2025-06-06 NOTE — PROGRESS NOTES
Physical Exam    Physical Exam  Vitals and nursing note reviewed.   Constitutional:       Appearance: Normal appearance.   Eyes:      Conjunctiva/sclera: Conjunctivae normal.   Cardiovascular:      Rate and Rhythm: Normal rate and regular rhythm.   Pulmonary:      Effort: Pulmonary effort is normal.      Breath sounds: Normal breath sounds.   Musculoskeletal:      Cervical back: Neck supple.   Neurological:      Mental Status: He is alert.   Psychiatric:         Mood and Affect: Mood normal.          I have reviewed the patient's past medical history, social history and family history and vitals.  We have discussed treatment plan and follow up and given patient instructions.  Patient's questions are answered and we will follow up as indicated.  Return in about 2 months (around 8/20/2025).     Meliza Martino PA-C  8:35 AM  6/12/2025

## 2025-06-10 ENCOUNTER — CARE COORDINATION (OUTPATIENT)
Dept: CARE COORDINATION | Facility: CLINIC | Age: 70
End: 2025-06-10

## 2025-06-10 NOTE — CARE COORDINATION
6/10/2025 10:02 AM.  Learner: Patient  Readiness: Acceptance  Method: Explanation  Response: Needs Reinforcement    Educate infection prevention measures, taught by Gale Rivera, RN at 6/10/2025 10:02 AM.  Learner: Patient  Readiness: Acceptance  Method: Explanation  Response: Needs Reinforcement    Education Comments  No comments found.     ,    Goals Addressed                   This Visit's Progress     Conditions and Symptoms   On track     I will schedule office visits, as directed by my provider.  I will keep my appointment or reschedule if I have to cancel.  I will notify my provider of any barriers to my plan of care.  I will notify my provider of any symptoms that indicate a worsening of my condition.    Barriers: none  Plan for overcoming my barriers: N/A  Confidence: 8/10  Anticipated Goal Completion Date: 4/29/24                 PCP/Specialist follow up:   Future Appointments         Provider Specialty Dept Phone    6/25/2025 8:00 AM (Arrive by 7:15 AM) [facility] PA RESOURCE 2; [facility] UNIT 2 Radiology [phone]    6/25/2025 9:00 AM [facility] UNIT 2[facility] CT2 [facility] [address] Radiology [phone]    7/8/2025 3:15 PM (Arrive by 3:00 PM) Tara Potter PA Pain Management [phone]    8/6/2025 9:40 AM Meliza Martino PA-C Family Medicine [phone]    11/4/2025 11:30 AM Jevon Encarnacion III, MD Cardiology [phone]            Follow Up:   Plan for next ACM outreach in approximately 1 week to complete:  - goal progression  - education   - drainage from wound .   Patient  is agreeable to this plan.

## 2025-06-12 ASSESSMENT — ENCOUNTER SYMPTOMS
BLOOD IN STOOL: 0
SHORTNESS OF BREATH: 0
COUGH: 0

## 2025-06-17 ENCOUNTER — TELEPHONE (OUTPATIENT)
Dept: FAMILY MEDICINE CLINIC | Facility: CLINIC | Age: 70
End: 2025-06-17

## 2025-06-17 ENCOUNTER — CARE COORDINATION (OUTPATIENT)
Dept: CARE COORDINATION | Facility: CLINIC | Age: 70
End: 2025-06-17

## 2025-06-17 NOTE — CARE COORDINATION
Ambulatory Care Coordination Note     2025 12:51PM    Patient Current Location:  Home: [address] [id number]     ACM contacted the patient by telephone. Verified name and  with patient as identifiers.         ACM: Gale Rivera RN     Challenges to be reviewed by the provider   Additional needs identified to be addressed with provider   Increased swelling with 2 blisters draining clear fluid               Method of communication with provider: staff message.    Utilization: Patient has not had any utilization since our last call.     Care Summary Note:     Pt reports now has 2 blisters that are draining clear fluid, he is wrapping w/ bandage. Pt declines scheduling with wound care at  this time stating he will \"give it a few more days\". ACM stressed importance of prevention of infection, as last wk. Pt declines wearing stockings due to pain. He confirms taking fluid pill as prescribed.     Pt remains homeless, living out of car. He has several applications for apartments but no calls returned, declines homeless shelter.    PCP notified of above.    Assessments Completed:   Congestive Heart Failure Assessment    Are you currently restricting fluids?: No Restriction  Do you understand a low sodium diet?: Yes  Do you understand how to read food labels?: Yes  How many restaurant meals do you eat per week?: 1-2  Do you salt your food before tasting it?: No         Symptoms:           ,   General Assessment              Medications Reviewed:   Completed during a previous call     Advance Care Planning:   Not reviewed during this call     Care Planning:   Education Documentation  Educate signs and symptoms of infection, taught by Gale Rivera RN at 2025  1:06 PM.  Learner: Patient  Readiness: Acceptance  Method: Explanation  Response: Needs Reinforcement    Educate infection prevention measures, taught by Gale Rivera RN at 2025  1:06 PM.  Learner: Patient  Readiness:  Placed progress summary and [facility] plan of care in bin for review and signature

## 2025-06-17 NOTE — TELEPHONE ENCOUNTER
Please call and advise to take a bumex in the morning and in afternoon for 3 days and then make an appointment for us to recheck kidneys and evaluate if any infection present or other causes for the swelling

## 2025-06-17 NOTE — TELEPHONE ENCOUNTER
----- Message from SEJAL MANCILLA RN sent at 6/17/2025  1:08 PM EDT -----  Regarding: swelling  Hi Meliza,    Mr. Horton confirms taking spironolactone as prescribed, but has noted increased swelling and is not wearing the compression stockings because he says they are painful. He has formed 2 open blisters draining clear fluid and declines calling wound care because he wants to give it a couple of days.  He only had one blister last wk and I tried to schedule wound clinic for him then.    He continues living out of his car, and I don't see this changing for him quickly, unfortunately.     Thank you,     Gale \"Marbella\" SEJAL Rivera, BSN, Ambulatory Care Manager  c: [phone]

## 2025-06-18 ENCOUNTER — TELEPHONE (OUTPATIENT)
Dept: INTERVENTIONAL RADIOLOGY/VASCULAR | Age: 70
End: 2025-06-18

## 2025-06-24 ENCOUNTER — TELEPHONE (OUTPATIENT)
Dept: FAMILY MEDICINE CLINIC | Facility: CLINIC | Age: 70
End: 2025-06-24

## 2025-06-24 ENCOUNTER — CARE COORDINATION (OUTPATIENT)
Dept: CARE COORDINATION | Facility: CLINIC | Age: 70
End: 2025-06-24

## 2025-06-24 NOTE — TELEPHONE ENCOUNTER
Pt called asking if provider sent In antibiotic for swelling legs and sores      Pt doesn't know the name antibiotic         Please advise...

## 2025-06-24 NOTE — CARE COORDINATION
Ambulatory Care Coordination Note     2025 9:48 AM     Patient Current Location:  Home: [address] [place] [id number]     ACM contacted the patient by telephone. Verified name and  with patient as identifiers.         ACM: Gale Rivera RN     Challenges to be reviewed by the provider   Additional needs identified to be addressed with provider No                  Method of communication with provider: none.    Utilization: Patient has not had any utilization since our last call.     Care Summary Note:     Pt reports multiple blisters now from edema. Pt called PCP this am to request antibiotic, is using antibiotic ointment and wrapping w/ gauze, using compression stockings. SOB at baseline. Pt to see PCP tomorrow.     Pt remains homeless, discussed avoiding heat and staying hydrated.   Assessments Completed:   General Assessment              Medications Reviewed:   Completed during a previous call     Advance Care Planning:   Not reviewed during this call     Care Planning:   Education Documentation  No documentation found.  Education Comments  No comments found.     ,    Goals Addressed    None          PCP/Specialist follow up:   Future Appointments         Provider Specialty Dept Phone    2025 11:00 AM Meliza Martino PA-C Family Medicine [phone]    2025 3:15 PM (Arrive by 3:00 PM) Tara Potter PA Pain Management [phone]    2025 9:40 AM Meliza Martino PA-C Family Medicine [phone]    2025 11:30 AM Jevon Encarnacion III, MD Cardiology [phone]            Follow Up:   No further Ambulatory Care Management follow-up scheduled at this time.  Patient  has Ambulatory Care Manager's contact information for any further questions, concerns or needs.

## 2025-06-25 ENCOUNTER — OFFICE VISIT (OUTPATIENT)
Dept: FAMILY MEDICINE CLINIC | Facility: CLINIC | Age: 70
End: 2025-06-25

## 2025-06-25 VITALS
BODY MASS INDEX: 35.03 KG/M2 | HEIGHT: 72 IN | SYSTOLIC BLOOD PRESSURE: 130 MMHG | DIASTOLIC BLOOD PRESSURE: 64 MMHG | HEART RATE: 58 BPM | WEIGHT: 258.6 LBS | OXYGEN SATURATION: 97 % | TEMPERATURE: 98 F

## 2025-06-25 DIAGNOSIS — I48.0 PAROXYSMAL ATRIAL FIBRILLATION (HCC): ICD-10-CM

## 2025-06-25 DIAGNOSIS — Z95.1 HX OF CABG: ICD-10-CM

## 2025-06-25 DIAGNOSIS — L03.90 CHRONIC CELLULITIS: ICD-10-CM

## 2025-06-25 DIAGNOSIS — I11.0 HYPERTENSIVE HEART DISEASE WITH CONGESTIVE HEART FAILURE, UNSPECIFIED HEART FAILURE TYPE (HCC): Chronic | ICD-10-CM

## 2025-06-25 DIAGNOSIS — D50.9 IRON DEFICIENCY ANEMIA, UNSPECIFIED IRON DEFICIENCY ANEMIA TYPE: ICD-10-CM

## 2025-06-25 DIAGNOSIS — E11.65 UNCONTROLLED TYPE 2 DIABETES MELLITUS WITH HYPERGLYCEMIA (HCC): ICD-10-CM

## 2025-06-25 DIAGNOSIS — G31.84 MILD COGNITIVE IMPAIRMENT: ICD-10-CM

## 2025-06-25 DIAGNOSIS — I87.8 VENOUS STASIS: ICD-10-CM

## 2025-06-25 DIAGNOSIS — L97.912 NON-PRESSURE CHRONIC ULCER OF RIGHT LOWER LEG WITH FAT LAYER EXPOSED (HCC): ICD-10-CM

## 2025-06-25 DIAGNOSIS — I69.398 NEUROGENIC PAIN DUE TO CENTRAL NERVOUS SYSTEM ABNORMALITY FOLLOWING STROKE: ICD-10-CM

## 2025-06-25 DIAGNOSIS — M47.816 SPONDYLOSIS OF LUMBAR REGION WITHOUT MYELOPATHY OR RADICULOPATHY: ICD-10-CM

## 2025-06-25 DIAGNOSIS — Z95.2 S/P AVR (AORTIC VALVE REPLACEMENT): ICD-10-CM

## 2025-06-25 DIAGNOSIS — R29.898 WEAKNESS OF LEFT ARM: ICD-10-CM

## 2025-06-25 DIAGNOSIS — Z95.1 STATUS POST CORONARY ARTERY BYPASS GRAFT: ICD-10-CM

## 2025-06-25 DIAGNOSIS — E11.42 DIABETIC PERIPHERAL NEUROPATHY (HCC): ICD-10-CM

## 2025-06-25 DIAGNOSIS — E78.5 DYSLIPIDEMIA: Chronic | ICD-10-CM

## 2025-06-25 DIAGNOSIS — M79.2 NEUROGENIC PAIN DUE TO CENTRAL NERVOUS SYSTEM ABNORMALITY FOLLOWING STROKE: ICD-10-CM

## 2025-06-25 DIAGNOSIS — Z95.2 H/O AORTIC VALVE REPLACEMENT: ICD-10-CM

## 2025-06-25 DIAGNOSIS — F41.9 ANXIETY: Chronic | ICD-10-CM

## 2025-06-25 DIAGNOSIS — I10 PRIMARY HYPERTENSION: Chronic | ICD-10-CM

## 2025-06-25 DIAGNOSIS — F51.01 PRIMARY INSOMNIA: ICD-10-CM

## 2025-06-25 DIAGNOSIS — I69.354 HEMIPLEGIA OF LEFT NONDOMINANT SIDE AS LATE EFFECT OF CEREBRAL INFARCTION, UNSPECIFIED HEMIPLEGIA TYPE (HCC): Primary | ICD-10-CM

## 2025-06-25 DIAGNOSIS — I73.9 PERIPHERAL VASCULAR DISEASE: ICD-10-CM

## 2025-06-25 DIAGNOSIS — Z59.00 HOMELESSNESS: ICD-10-CM

## 2025-06-25 DIAGNOSIS — S90.552A SUPERFICIAL FOREIGN BODY OF LEFT ANKLE WITHOUT MAJOR OPEN WOUND AND WITHOUT INFECTION, INITIAL ENCOUNTER: ICD-10-CM

## 2025-06-25 DIAGNOSIS — E66.01 MORBID (SEVERE) OBESITY DUE TO EXCESS CALORIES (HCC): ICD-10-CM

## 2025-06-25 DIAGNOSIS — G62.9 PERIPHERAL POLYNEUROPATHY: ICD-10-CM

## 2025-06-25 DIAGNOSIS — Z86.73 HISTORY OF STROKE: ICD-10-CM

## 2025-06-25 LAB — GLUCOSE, POC: 157 MG/DL

## 2025-06-25 RX ORDER — CEPHALEXIN 500 MG/1
500 CAPSULE ORAL 2 TIMES DAILY
Qty: 14 CAPSULE | Refills: 0 | Status: SHIPPED | OUTPATIENT
Start: 2025-06-25 | End: 2025-07-02

## 2025-06-25 SDOH — ECONOMIC STABILITY - HOUSING INSECURITY: HOMELESSNESS UNSPECIFIED: Z59.00

## 2025-06-25 ASSESSMENT — ENCOUNTER SYMPTOMS
BLOOD IN STOOL: 0
SHORTNESS OF BREATH: 0
COUGH: 0

## 2025-06-25 ASSESSMENT — PATIENT HEALTH QUESTIONNAIRE - PHQ9
SUM OF ALL RESPONSES TO PHQ QUESTIONS 1-9: 0
2. FEELING DOWN, DEPRESSED OR HOPELESS: NOT AT ALL
SUM OF ALL RESPONSES TO PHQ QUESTIONS 1-9: 0
1. LITTLE INTEREST OR PLEASURE IN DOING THINGS: NOT AT ALL
SUM OF ALL RESPONSES TO PHQ QUESTIONS 1-9: 0
SUM OF ALL RESPONSES TO PHQ QUESTIONS 1-9: 0

## 2025-06-25 NOTE — PROGRESS NOTES
[facility]  [address]   [place] [address]  Phone: [phone]  Fax [phone]  Provider : Meliza Martino PA-C      Patient: Marc Horton  YOB: 1955  Patient Age 69 y.o.  Patient sex: male  Medical Record:  [id number]  Visit Date:6/25/2025   Author:  Meliza Martino PA-C    Family Practice Clinic Note     Chief complaint  Marc Horton  is a 69 y.o. male who was seen on 6/25/2025 for the following reasons:    Chief Complaint   Patient presents with    Follow-up    Wound Check     R leg w more sores    Generalized Body Aches       Current Medical problems addressed    Marc is a 69-year-old male who presents today for follow-up he has an ongoing history of hypertension, PAF, peripheral vascular disease, CVA history, peripheral artery disease These, ulcers of his right lower extremity with chronic venous hypertension, congestive heart failure, COPD, history of bilateral pulmonary infiltrates, GERD, history of gastric ulcers, history of duodenal no ulcers, history of adenomatous polyps of the colon, uncontrolled type 2 diabetes, peripheral neuropathy, left leg weakness status post CVA, spondylosis of the lumbar region, chronic cellulitis of the lower extremities, he is status post coronary artery bypass status post PTCA and status post aortic valve replacement, insomnia, mild cognitive impairment.    He notes two sores on outer right lower leg and smaller one on inner right lower leg  and one over the medial ankle on left leg     ASSESSMENT AND PLAN    ICD-10-CM    1. Hemiplegia of left nondominant side as late effect of cerebral infarction, unspecified hemiplegia type ([facility])  I69.354       2. Spondylosis of lumbar region without myelopathy or radiculopathy  M47.816       3. Chronic cellulitis  L03.90       4. S/P AVR (aortic valve replacement)  Z95.2       5. Primary insomnia  F51.01       6. MRI contraindicated due to metal implant  Z53.09       7. Mild cognitive impairment

## 2025-06-30 ENCOUNTER — RESULTS FOLLOW-UP (OUTPATIENT)
Dept: FAMILY MEDICINE CLINIC | Facility: CLINIC | Age: 70
End: 2025-06-30

## 2025-07-07 NOTE — PROGRESS NOTES
Mr. Horton is a first time follow-up for new patient visit.  We ordered a CT myelogram but he was unable to hold his blood thinner and so myelogram was not completed.  CT was also not completed.  We did initiate him on pregabalin 75 mg 3 times per day for painful diabetic neuropathy and initiated nortriptyline 10 mg 1-2 at bedtime for post stroke-related pain.

## 2025-07-08 ENCOUNTER — OFFICE VISIT (OUTPATIENT)
Age: 70
End: 2025-07-08
Payer: MEDICARE

## 2025-07-08 DIAGNOSIS — M48.062 SPINAL STENOSIS OF LUMBAR REGION WITH NEUROGENIC CLAUDICATION: Primary | ICD-10-CM

## 2025-07-08 DIAGNOSIS — Z51.81 ENCOUNTER FOR THERAPEUTIC DRUG MONITORING: ICD-10-CM

## 2025-07-08 DIAGNOSIS — M79.2 NEUROGENIC PAIN DUE TO CENTRAL NERVOUS SYSTEM ABNORMALITY FOLLOWING STROKE: ICD-10-CM

## 2025-07-08 DIAGNOSIS — I69.398 NEUROGENIC PAIN DUE TO CENTRAL NERVOUS SYSTEM ABNORMALITY FOLLOWING STROKE: ICD-10-CM

## 2025-07-08 DIAGNOSIS — R29.898 LEFT LEG WEAKNESS: ICD-10-CM

## 2025-07-08 PROCEDURE — G8428 CUR MEDS NOT DOCUMENT: HCPCS | Performed by: PHYSICIAN ASSISTANT

## 2025-07-08 PROCEDURE — 99214 OFFICE O/P EST MOD 30 MIN: CPT | Performed by: PHYSICIAN ASSISTANT

## 2025-07-08 PROCEDURE — 1123F ACP DISCUSS/DSCN MKR DOCD: CPT | Performed by: PHYSICIAN ASSISTANT

## 2025-07-08 PROCEDURE — 4004F PT TOBACCO SCREEN RCVD TLK: CPT | Performed by: PHYSICIAN ASSISTANT

## 2025-07-08 PROCEDURE — G8417 CALC BMI ABV UP PARAM F/U: HCPCS | Performed by: PHYSICIAN ASSISTANT

## 2025-07-08 PROCEDURE — 3017F COLORECTAL CA SCREEN DOC REV: CPT | Performed by: PHYSICIAN ASSISTANT

## 2025-07-08 RX ORDER — NORTRIPTYLINE HYDROCHLORIDE 10 MG/1
10-20 CAPSULE ORAL NIGHTLY
Qty: 60 CAPSULE | Refills: 1 | Status: SHIPPED | OUTPATIENT
Start: 2025-07-08 | End: 2025-09-06

## 2025-07-08 RX ORDER — PREGABALIN 100 MG/1
100 CAPSULE ORAL 3 TIMES DAILY
Qty: 90 CAPSULE | Refills: 1 | Status: SHIPPED | OUTPATIENT
Start: 2025-07-08 | End: 2025-08-07

## 2025-07-08 ASSESSMENT — ENCOUNTER SYMPTOMS
ALLERGIC/IMMUNOLOGIC NEGATIVE: 1
SHORTNESS OF BREATH: 0
ABDOMINAL PAIN: 0
EYES NEGATIVE: 1

## 2025-07-08 NOTE — PROGRESS NOTES
Chronic Pain Consult Note      Plan:    A comprehensive pain management plan may consist of the following: Testing, Therapy, Medications, Interventions, Consults, and Follow up.    Type II Diabetic peripheral neuropathy   Stop Gabapentin (discussed how to do this).   Adjust pregabalin 200 mg 3 times daily.  Post Stroke Pain   Initiate nortriptyline 10 mg 1-2 p.o. nightly.  This may help with headaches as well.  This was discontinued by his primary care provider right after I saw him for his initial visit and so he never started it  Lumbar stenosis with neurogenic claudication  Unable to have myelogram.   Order lumbar CT scan  Obtain urine drug screen 7/8/2025 in anticipation of possibly providing something for pain given his numerous medical comorbidities and possibility that he will not be able to hold blood thinners.    General Recommendations: The pain condition that the patient suffers from is best treated with a multidisciplinary approach that involves an increase in physical activity to prevent de-conditioning and worsening of the pain cycle, as well as psychological counseling (formal and/or informal) to address the co morbid psychological effects of pain. Treatment will often involve judicious use of pain medications and interventional procedures to decrease the pain, allowing the patient to participate in the physical activity that will ultimately produce long-lasting pain reductions. The goal of the multidisciplinary approach is to return the patient to a higher level of overall function and to restore their ability to perform activities of daily living.    Narcan nasal spray was prescribed on:    Orders Placed This Encounter   Procedures    CT LUMBAR SPINE WO CONTRAST     Standing Status:   Future     Expected Date:   7/8/2025     Expiration Date:   7/8/2026    Compliance Drug Analysis, Urine     Standing Status:   Future     Number of Occurrences:   1     Expected Date:   7/8/2025     Expiration Date:

## 2025-07-09 ENCOUNTER — OFFICE VISIT (OUTPATIENT)
Dept: FAMILY MEDICINE CLINIC | Facility: CLINIC | Age: 70
End: 2025-07-09

## 2025-07-09 VITALS
SYSTOLIC BLOOD PRESSURE: 156 MMHG | BODY MASS INDEX: 36.03 KG/M2 | HEIGHT: 72 IN | WEIGHT: 266 LBS | TEMPERATURE: 98.1 F | DIASTOLIC BLOOD PRESSURE: 66 MMHG | OXYGEN SATURATION: 97 %

## 2025-07-09 DIAGNOSIS — L98.491 SKIN ULCER, LIMITED TO BREAKDOWN OF SKIN (HCC): Primary | ICD-10-CM

## 2025-07-09 DIAGNOSIS — I50.32 CHRONIC HEART FAILURE WITH PRESERVED EJECTION FRACTION (HCC): ICD-10-CM

## 2025-07-09 RX ORDER — SPIRONOLACTONE 50 MG/1
50 TABLET, FILM COATED ORAL DAILY
Qty: 90 TABLET | Refills: 1 | Status: SHIPPED | OUTPATIENT
Start: 2025-07-09

## 2025-07-09 NOTE — PROGRESS NOTES
[facility]  [address] [place]   [place] [address]  Phone: [phone]  Fax [phone]  Provider : Meliza Martino PA-C      Patient: Marc Horton  YOB: 1955  Patient Age 69 y.o.  Patient sex: male  Medical Record:  [id number]  Visit Date:7/9/2025   Author:  Meliza Martino PA-C    Family Practice Clinic Note     Chief complaint  Marc Horotn  is a 69 y.o. male who was seen on 7/9/2025 for the following reasons:    Chief Complaint   Patient presents with    Follow-up     Wound check---new wound R leg  x 2 days       Current Medical problems addressed  Patient notes swelling is worse the last few days and gained some weight and has an ulcer that has opened up on the right lower leg.     ASSESSMENT AND PLAN    ICD-10-CM    1. Chronic heart failure with preserved ejection fraction (HCC)  I50.32 spironolactone (ALDACTONE) 50 MG tablet         Marc was seen today for follow-up.    Diagnoses and all orders for this visit:    Chronic heart failure with preserved ejection fraction (HCC)  -     spironolactone (ALDACTONE) 50 MG tablet; Take 1 tablet by mouth daily        Follow-up and Dispositions    Return in about 10 days (around 7/19/2025).         Plan includes the following:  Recheck in 7-10 days  Increased fluid meds and follow up for recheck in 10 days   No follow-up provider specified.  No orders of the defined types were placed in this encounter.      Past Medical History:  Allergies:  Allergies   Allergen Reactions    Atorvastatin     Other Shortness Of Breath     Oyster    Rosuvastatin Shortness Of Breath    Oyster Extract Other (See Comments) and Nausea And Vomiting     N & V, diarrhea, abdominal cramping      Iodine      Pt had contrast 11/24/24 with no reaction, also has had several CT with contrast in the past with no reaction KK       Current Medications:   Medications marked \"taking\" at this time:  Current Outpatient Medications   Medication Sig Dispense Refill       CC: Chevy Andrade is a 33 year old  male presents for evaluation of:    Problem List Items Addressed This Visit          Musculoskeletal and Injuries    Ulcer of right heel and midfoot with fat layer exposed (CMD) - Primary    Acquired equinus deformity of both feet         HPI:   SUBJECTIVE:    Chief Complaint   Patient presents with   • Office Visit   • Follow-up     Wound plantar aspect right heel extending through the muscular layer down to the fascial layer   Previous surgical intervention with incision and drainage of the right foot and application of wound vacuum  Lymphedema bilateral lower extremity  Gastrosoleus equinus  Difficulty ambulating:   Patient is unable to wear appropriate shoe gear     Nature/Type/location:   Previous seen 11/21/2023: Given instructions for offloading shoe for the right lower extremity  Multiple doses of antibiotics performed with recent utilization of Keflex 500 mg  We also performed sterile debridement at that time and I advised the patient on the need for reduction pressure to the affected site to allow for healing of the plantar aspect of the right heel  ++ More recently patient relates that the developed some cellulitis on Saturday about 10 days ago.  They wanted going to walk-in care and were given medication at that time  Since that they have seen improvement though there was a period of chills and systemic effect but that seems to have improved     Pain scale:   Severe neuropathic changes lower extremity: Negligible pain  Due to neuropathy patient does not have significant or severe pain at this time  Duration/Onset/Course:   Ongoing wound for many weeks     Aggravating factors:   Aggravated by pressure and weightbearing  And is unable to utilize shoe gear and for this reason continues to have increased pressure to the plantar aspect of the heel  Treatments:   Previous surgical intervention with myself  Multiple in office debridements and treatments with nurse  practitioner     Gait/shoe Wear/Orthotics/Assistive devices:     Unable to wear proper shoe gear for the bilateral lower extremity  We have previously modified a postop shoe in order to offload the area.  ++ Patient has not been utilizing shoe gear for ambulation.  I stressed to the patient that this may lead to long-term issues with breakdown of the skin we will need to do something to keep pressure off of it clean the possibility of using a knee scooter.     Pertinent past medical/surgical Hx:   Surgery as noted above       Primary care physician:  Pcp, Verify       Past Medical History:   Diagnosis Date   • Anemia    • Essential (primary) hypertension    • Morbid obesity (CMD)    • Open wound     right foot     ALLERGIES:  No Known Allergies   Current Outpatient Medications   Medication Sig Dispense Refill   • cefUROXime (CEFTIN) 500 MG tablet Take 500 mg by mouth in the morning and 500 mg in the evening. For 10 days     • traMADol (Ultram) 50 MG tablet Take 1 tablet by mouth every 8 hours as needed for Pain. 30 tablet 0     No current facility-administered medications for this visit.     Patient Active Problem List   Diagnosis   • Ulcer of right heel and midfoot with fat layer exposed (CMD)   • Lymphedema of both lower extremities   • Acquired equinus deformity of both feet      Social History     Tobacco Use   Smoking Status Never   • Passive exposure: Never   Smokeless Tobacco Never      Past Surgical History:   Procedure Laterality Date   • Debridement Right     foot   • Foot/toes surgery proc unlisted  09/07/2023    fasciectomy, plantar aspect of right heel   • Removal adenoids,primary,<11 y/o       Family History   Problem Relation Age of Onset   • Cancer, Colon Mother    • Cancer, Breast Mother    • Cancer Father         bone   • Cancer, Breast Sister    • Diabetes Brother    • Hyperlipidemia Brother      Social History     Socioeconomic History   • Marital status: Single     Spouse name: Not on file   •  Number of children: Not on file   • Years of education: Not on file   • Highest education level: Not on file   Occupational History   • Not on file   Tobacco Use   • Smoking status: Never     Passive exposure: Never   • Smokeless tobacco: Never   Vaping Use   • Vaping Use: never used   Substance and Sexual Activity   • Alcohol use: Not Currently   • Drug use: Never   • Sexual activity: Never   Other Topics Concern   • Not on file   Social History Narrative   • Not on file     Social Determinants of Health     Financial Resource Strain: Not on file   Food Insecurity: Not on file   Transportation Needs: Not on file   Physical Activity: Not on file   Stress: Not on file   Social Connections: Not on file   Interpersonal Safety: Not on file       Review of Systems   Constitutional:  Positive for activity change and appetite change.   HENT: Negative.     Eyes: Negative.    Respiratory: Negative.     Cardiovascular:  Positive for leg swelling.   Gastrointestinal: Negative.    Endocrine: Positive for cold intolerance and heat intolerance.   Musculoskeletal:  Positive for arthralgias, gait problem, joint swelling and myalgias.   Skin:  Positive for color change and wound.   Neurological:  Positive for numbness.   Hematological: Negative.    Psychiatric/Behavioral: Negative.     All other systems reviewed and are negative.      PHYSICAL EXAM:  There were no vitals taken for this visit.    Physical Exam  Nursing note reviewed.   Constitutional:       Appearance: Normal appearance.   HENT:      Head: Normocephalic and atraumatic.      Nose: Nose normal.      Mouth/Throat:      Mouth: Mucous membranes are dry.      Pharynx: Oropharynx is clear.   Eyes:      Extraocular Movements: Extraocular movements intact.      Pupils: Pupils are equal, round, and reactive to light.   Cardiovascular:      Pulses:           Dorsalis pedis pulses are 3+ on the right side and 3+ on the left side.        Posterior tibial pulses are 3+ on the  right side and 3+ on the left side.   Pulmonary:      Effort: Pulmonary effort is normal.      Breath sounds: Normal breath sounds.   Musculoskeletal:         General: Swelling, tenderness, deformity and signs of injury present.      Cervical back: Normal range of motion.      Right lower leg: Edema present.      Left lower leg: Edema present.      Right foot: Decreased range of motion. Deformity and prominent metatarsal heads present.      Left foot: Decreased range of motion. Deformity and prominent metatarsal heads present.        Feet:    Feet:      Right foot:      Protective Sensation: 10 sites tested.  8 sites sensed.      Skin integrity: Ulcer, skin breakdown, erythema and callus present.      Toenail Condition: Right toenails are abnormally thick.      Left foot:      Protective Sensation: 10 sites tested.  8 sites sensed.      Skin integrity: Callus present.      Toenail Condition: Left toenails are abnormally thick.      Comments: Wound location: Plantar aspect right heel  Type: Ulcer  Measurements: 3.5 x 3.0  x 0.5 cm  Depth: extends through the muscular and fascial layer down to the fascial layer  Wound Bed prior to debridement: Fibrogranular  Wound bed post debridement: Granular  Undermining/Tunneling: None    ++ Severe lymphedema of the bilateral lower extremity  Affecting the bilateral lower extremity with +3 nonpitting edema noted to the bilateral lower extremity.    Lymphadenopathy:      Lower Body: Right inguinal adenopathy present. Left inguinal adenopathy present.   Skin:     General: Skin is warm and dry.      Capillary Refill: Capillary refill takes 2 to 3 seconds.      Findings: Bruising, erythema and lesion present.   Neurological:      General: No focal deficit present.      Mental Status: He is alert and oriented to person, place, and time.   Psychiatric:         Mood and Affect: Mood normal.         Behavior: Behavior normal.         Thought Content: Thought content normal.          Judgment: Judgment normal.     Right Ankle Exam   Swelling: severe    Range of Motion   Dorsiflexion:  10 abnormal   Plantar flexion:  abnormal   Eversion:  abnormal   Inversion:  abnormal     Muscle Strength   Dorsiflexion:  3/5  Plantar flexion:  4/5  Anterior tibial:  4/5  Posterior tibial:  4/5  Gastrocsoleus:  4/5  Peroneal muscle:  4/5    Tests   Anterior drawer: negative  Varus tilt: negative    Other   Erythema: present  Sensation: normal  Pulse: present     Comments:  Severe lymphedema of the bilateral lower extremity      Left Ankle Exam   Swelling: severe    Range of Motion   Dorsiflexion:  10 abnormal   Plantar flexion:  abnormal   Eversion:  abnormal   Inversion:  abnormal     Muscle Strength   Dorsiflexion:  3/5   Plantar flexion:  4/5   Anterior tibial:  4/5   Posterior tibial:  4/5  Gastrocsoleus:  4/5  Peroneal muscle:  4/5    Tests   Anterior drawer: negative  Varus tilt: negative    Other   Sensation: normal  Pulse: present    Comments:  Severe lymphedema of the bilateral lower extremity        IMAGING/pictures: I independently reviewed the images:     Narrative & Impression  EXAM: XR FOOT STANDING 3 OR MORE VIEWS RIGHT     DATE OF EXAM: 12/19/2023     COMPARISON: 8/4/2023     PROVIDED CLINICAL HISTORY: Right foot pain     Historical information of Current Exam: No injury.  No surgery.  Not  diabetic.     Symptoms: Hx 2 ulcers plantar surface lat Rt heel, most recent one for  about 1 month.  Pain in same location as ulcers.     Technical limitations of the examination: large body habitus     Number of images: 3 images     FINDINGS: 3 standing views of the right foot are submitted on a total of 4  images. There is no fracture or dislocation. No bone lesion or erosion.     Positive evidence of ulceration of the plantar aspect of the heel best noted on the lateral view.    Plantar posterior calcaneal spurs are present. Mild spurring of the first  tarsal metatarsal joint dorsally.     Soft tissue  irregularity of the plantar aspect of the heel consistent with  ulcer. No soft tissue gas or radiopaque foreign body.  ++ No gross signs of osteomyelitis     IMPRESSION:     1. Chronic bony findings as discussed above. No acute bony abnormality.  2. Plantar heel soft tissue ulceration. No soft tissue gas or radiopaque  foreign body. No radiographic evidence of osteomyelitis.    Dr. Juarez Siddiqui D.P.M.    IR PICC LINE INSERTION AGE 3 OR OLDER  Narrative: PROCEDURE: ULTRASOUND AND FLUOROSCOPIC GUIDED PICC PLACEMENT.    CLINICAL INDICATION: 33-year-old male, abscess. Patient in need of  long-term IV antibiotics. Patient presents for PICC line insertion.    PHYSICIAN:   Dr. Chester Barrera     CONSENT: Obtained    SEDATION: Local anesthesia    FLUOROSCOPY TIME (if supplied): ( ) 0.4 minutes.  Catheter: 5 Welsh dual-lumen power injectable PICC line placed via the  right cephalic vein.    Sterile Barrier Technique: The following sterile barrier precautions were  used during the procedure for the insertion of the catheter:  hand hygiene;  use of 2% chlorhexidine aseptic; use of a cap, mask, sterile gown, sterile  gloves, sterile full body drape, and a large sterile sheet.    ___________________________________  TECHNIQUE:    The patient was placed on the angiography table in supine position. The  right upper extremity was prepped and draped in normal sterile fashion.  Permanent grayscale and color Doppler images of the right upper extremity  showed a patent right cephalic vein. The overlying skin was prepped and  draped in normal sterile fashion. 1% lidocaine was administered for local  anesthetic. A small dermatotomy was made.    Under ultrasound guidance, micropuncture needle was advanced into the right  cephalic vein. Permanent ultrasound image was stored. A microguidewire was  inserted through the needle and advanced centrally under fluoroscopy.  Needle was exchanged for a peel-away sheath.    A 55 cm dual-lumen power  injectable PICC line was then advanced over the  wire and through the peel-away sheath under fluoroscopy. Tip was positioned  at the superior caval atrial junction. Wire was removed. Peel-away sheath  was removed.    Both catheter ports flushed and aspirated easily. The catheter was secured  to the skin. Sterile dressings were applied.    The patient tolerated the procedure well with no immediate complications.  The patient left the radiology department in stable condition.  Impression: 1. Technically successful ultrasound and fluoroscopic guided placement of a  right cephalic vein 55 cm dual-lumen power injectable PICC line. New  catheter ready for use.    Electronically Signed by: LASHAUN GARCIA DO   Signed on: 9/26/2023 3:52 PM   Workstation ID: [id number]         LABS/CHART REVIEW:  Lab Results   Component Value Date    SODIUM 138 09/22/2023    BUN 16 09/22/2023    CREATININE 0.84 09/22/2023    GFRESTIMATE >90 09/22/2023    CALCIUM 8.5 09/22/2023    BILIRUBIN 0.8 09/22/2023    AST 19 09/22/2023    GPT 20 09/22/2023    ALKPT 74 09/22/2023    ALBUMIN 3.0 (L) 09/22/2023    TOTPROTEIN 8.0 09/22/2023    GLOB 5.0 (H) 09/22/2023    AGR 0.6 (L) 09/22/2023      Lab Results   Component Value Date    WBC 10.5 09/22/2023    RBC 5.09 09/22/2023    HGB 12.0 (L) 09/22/2023    HCT 40.5 09/22/2023    MCV 79.6 09/22/2023    MCH 23.6 (L) 09/22/2023    MCHC 29.6 (L) 09/22/2023    RDWCV 17.8 (H) 09/22/2023    RDWSD 50.7 (H) 09/22/2023     09/22/2023    NRBCRE 0 09/22/2023    SEG 74 09/22/2023    TLYMPH 18 09/22/2023    PMON 5 09/22/2023    PEOS 2 09/22/2023    PBASO 0 09/22/2023    IGRE 1 09/22/2023    ANEUT 7.8 (H) 09/22/2023    ALYMS 1.9 09/22/2023    SUZETTE 0.5 09/22/2023    AEOS 0.2 09/22/2023    ABASO 0.0 09/22/2023    IGAB 0.1 09/22/2023     No results found for: \"MICALBUR\", \"CREATURRND\", \"MICALBCRTRT\"  No results found for: \"ELIASYMPHONY\", \"ELIADNADOUBL\"    Lab Results   Component Value Date    HGB 12.0 (L) 09/22/2023     HCT 40.5 09/22/2023    WBC 10.5 09/22/2023     09/22/2023      No results found for: \"PREALBUMIN\"  No components found for: \"A1C\", \"LDL\"       ASSESSMENT     Wound plantar aspect right heel extending through the muscular layer down to the fascial layer   Previous surgical intervention with incision and drainage of the right foot and application of wound vacuum  Lymphedema bilateral lower extremity  Gastrosoleus equinus  Difficulty ambulating:   Patient is unable to wear appropriate shoe gear      PLAN     Discussed findings, pathology and necessary testing. Developed a comprehensive wound management plan:    Problem List Items Addressed This Visit          Musculoskeletal and Injuries    Ulcer of right heel and midfoot with fat layer exposed (CMD) - Primary    Acquired equinus deformity of both feet       Examined patient discussed treatment options including conservative versus surgical treatment  Office consent the risks, benefits and possible complications of procedure were discussed.  Patient relates that, due to the failure of conservative means, patient desires to proceed with procedure.  The possible complications of the office procedure were discussed and include but are not limited to possible infection, residual pain or numbness or bleeding, recurrence of the condition, failure of implant, need for further treatment, complications of anesthesia, possibility of complications of tourniquet/digital tourniquet usage, possible wound dehiscence, residual swelling.  The possible benefits were all discussed as well, though no guarantees are given.  Advised the patient injections may initially hurt more prior to resolution of pain.    Greater complexity: Due to the need to discuss conservative, injection, possible surgical intervention. Significantly greater amount of time and medical decision-making was required for this patient.  Due to the high level of medical complexity and decision making required  for this case including discussions of conservative versus injection versus therapeutic versus surgical intervention options.  This patient's overall multiple comorbidities and medical issues required a significantly higher amount of time and discussion.    The risks, benefits and possible complications of procedure were discussed. Patient relates that, due to the failure of conservative means, patient desires to proceed with procedure. The possible complications of the office procedure were discussed and include but are not limited to possible infection, residual pain or numbness or bleeding, recurrence of the condition, failure of implant, need for further treatment, complications of anesthesia, possibility of complications of tourniquet/digital tourniquet usage, possible wound dehiscence, residual swelling. The possible benefits were all discussed as well, though no guarantees are given. Advised the patient injections may initially hurt more prior to resolution of pain.    Greater than the ordinary amount of time was spent on the patient's overall case; as well as with the patient, performing a history and physical: Examining patient, reviewing their chart/labs/imaging studies, and discussing the risks, benefits and possible outcomes of surgery/procedure/treatment options, a total of 30 minutes was spent on the patient's case with greater than 50% of the time in face-to-face contact with the patient.      Discussions of conditions, pathology, co-morbidities:    We had an in-depth and thorough discussion of patient's diagnoses and underlying comorbidities as well as the multiple issues that they have been facing.  All their questions and concerns were answered in total into the patient's satisfaction regarding the following issues:    Serious wound: Pathophysiology  ++ I discussed with the patient at length the severity of the wound and the overall condition that they are suffering from at this time.    --Any  wound or persistent cellulitis can lead to vasculopathic changes, breakdown in circulation, dermatological issues, possible osteomyelitis or the onset of sepsis.    --Many comorbidities and other factors can contribute to this condition and/or worsen.  These include but are not limited to diabetes, peripheral neuropathy, peripheral vascular disease, penetrating wound, iatrogenic changes.  --The presence of cellulitis/abscess or wounds do sometimes require drastic intervention in the form of surgical incision and drainage, partial amputation or bone resection, the need for direct admission to the hospital and placement on IV antibiotics and possibility of needing advanced imaging.    --Obtaining wound cultures for aerobic and anaerobic analysis as well as Gram stain may be required as well.  ++These wounds can at times even lead to the loss of limb and life.  They are a major concern and should not be taken lightly.  Treatments:  --I discussed wound care with the patient, and recommended that they follow the wound care instructions given which include keeping the wound covered with a clean, sterile dressing at all time and to avoid direct immersion into water.  Any signs of increased redness, warmth or drainage or pain are to be reported immediately or have the patient go to the ER or WIC.  ++ Treatments include but are not limited to debridement of the wound, incision and drainage, IV antibiotics, oral antibiotics, topical antibiotics, topical wound dressings and changes.    --This was done to reduce infection and aid in the healing process.  ++ Offloading and reducing pressure to the site is also a key component of aiding in healing.  These include devices such as postop shoe, cam boot, casting or total contact cast application.     Discussion diabetes:  --We did discuss neuropathy and how it can affect the upper and lower extremity as well as cause long-term issues with regard to sensation disturbance.  We also  discussed anatomical and physiological characteristics of this disease.  We also discussed possible modes of damage and healing potential for the upper and lower extremity motor and sensory neurons.  We also discussed triggering factors also how can be exacerbated by his sugars being elevated for his diabetes. This is another reason the patient should do their absolute best to keep his sugars under control.    --Discussed with patient diabetic medications including risks, benefits and possible complications of the usage of said anti-neuropathic medication. The possible risks, benefits and complications as well as side effects of these medications which can be to cause drowsiness, lethargy or altered mood was discussed in detail.  We also discussed with the patient as well as proper usage of these medications and the need for proper care in utilization and or phasing off of these medications.     ++DMT2 with peripheral neuropathy without insulin use, DMT2 with peripheral arterial disease without insulin use   Diabetic Foot Grade: 2; 12X greater risk of diabetic foot ulcer   Comprehensive diabetic foot exam, including inspection, sensory exam and pulse examination as documented above. Foot ulcers and amputation, which are consequences of diabetic peripheral neuropathy and/or peripheral arterial disease, are common and represent major causes of morbidity and mortality. Early recognition via ongoing preventative foot care and lifelong surveillance can delay or prevent adverse outcomes.     Independently reviewed labwork for the purpose of DM foot risk stratification and provided diabetic foot education.  We discussed A1C < 7%, LDL < 100mg/dl, no smoking and 30 minutes of walking/leg exercises daily to reduce the risk of diabetes related lower extremity amputation.     Discussion neuropathy:  --We did discuss neuropathy and how it can affect the upper and lower extremity as well as cause long-term issues with regard to  sensation disturbance.  We also discussed anatomical and physiological characteristics of this disease.  We also discussed possible modes of damage and healing potential for the upper and lower extremity motor and sensory neurons.  We also discussed triggering factors also how can be exacerbated by his sugars being elevated for his diabetes. This is another reason the patient should do their absolute best to keep his sugars under control.    --Discussed with patient diabetic medications including risks, benefits and possible complications of the usage of said anti-neuropathic medication. The possible risks, benefits and complications as well as side effects of these medications which can be to cause drowsiness, lethargy or altered mood was discussed in detail.  We also discussed with the patient as well as proper usage of these medications and the need for proper care in utilization and or phasing off of these medications.     Discussion lymphedema:  Discussed and gave a description of the underlying components of this disease.  This includes accumulation of excessive lymphatic fluid as well as swelling of the subcutaneous tissue due to obstruction, destruction, hypoplasia of the lymph vessels.  This can also result sometimes from infections or obliteration of the lymphatic tissue by iatrogenic means.  Symptoms:   -Advised patient that a lot of times will be accompanied by nonpitting edema which is a sign of lymphatic obstruction, onset is usually explosive with chills high fever, and red hot swelling of the legs.  Lymphatic streaks may be seen in the skin as well as swelling into the lymph nodes extending all the way up to the groin which can become enlarged and tender.  Treatments:   -The main treatment of swelling is usually treated with elevation and compression usually with specialized wraps or stockings   -occasionally with diuretics which can help to reduce fluid retention within the tissues.  -A variation of  this can also include lymphadenitis/lymphangitis: These are usually caused by bacterial infection.  This can require antibiotic use and sometimes even surgery.     Discussion circulation and peripheral vascular disease:  We previously discussed circulation issues the patient is facing including inflammation swelling of the lower extremity as well as a diminishment in circulation to the feet and ankles bilaterally.  There is been an ongoing issue patient is been facing.  Discussed with the patient that his discomfort is likely related to circulatory changes to his bilateral lower extremities. Discussed the causes, implications, and treatment options regarding circulatory changes. We discussed several treatment modalities and it is my hope that his current Platel regimen along with Nitro-bid, which we will start at this time, will improve circulation to the lower extremities.     Peripheral arterial disease:  Discussed many concerns as well as conservative versus surgical treatment options: Possible need for vascular consult with bilateral MAGNOLIA d/t no waveform and doppler pulse noted in clinic   Discussed risk reduction and patient education regarding DM PAD:  -  Patient is  taking a lipid lowering medication, which reduces risk of new onset claudication by 40%.  -  Total cholesterol < 200; HDL > 40; LDL < 100, at target  -  Antiplatelet therapy: ASA  -  Encouraged 150 min/week of walking or other physical activity and exercise, per the November 2016 ADA position statement.              Treatments:    Physical therapy:  Stretches: Home exercise program/activity:  We discussed various types of stretches will be appropriate for the patient's underlying condition:   Advised patient to continue with home exercise program range of motion immobilization on a daily basis  ++ Stressed again to the patient that the less pressure of the patella he will do better as this will aid in the healing process.  Patient was also offered  the possibility of physical therapy.  We may start off initially by utilizing a home exercise program.  But if patient is unable to perform the exercise program confidently we may consider use of physical therapy as an option.  Discussed at length with patient the risks benefits possible complications of stretching and/or conservative mobilization of the lower extremity with dynamic and isometric stretches of different types and variations.    We discussed utilization of a home exercise program/HEP: Patient was given multiple stretches to utilize his home exercise program.   advised the patient that diligent performance of the stretches on a daily basis would be required in order to aid in the healing process.  This is regardless of the fact that we would perform any type of other treatment such as surgery and or injection.       Shoe gear/orthotics/bracing/assistive devices:    Discussed shoe gear and the need for patient to utilize appropriate shoe gear to offload the area.   I continue to recommend the patient look into alternative shoe gear in order to reduce pressure to the area.  He needs to avoid walking barefoot or putting pressure to the heel as this can contribute to worsening of the wound at that site.    I advised the patient that the orthotics may not be covered by insurance and that they should verify with their insurance carrier what the policy is for obtaining orthotics.  While  will be able to run their insurance to see if it is a covered benefit this is not a guarantee of payment and final responsibility will most likely rest with the patient.    Hopefully this will help to reduce some of the pressure and irritation patient is getting along the metatarsal heads by floating this area and reducing pressure to the site.  When appropriate dispensed coupon prescription with instructions for use of the new balance shoe gear as well as dispensed a coupon for patient to utilize in order to obtain a  proper supportive device.    Medications:  Discussion medication usage:  --Discussed with patient possible treatments with medications including risks, benefits and possible complications of the usage of said medications (i.e. neuropathic medication, pain medication, antibiotic usage).   --Discussed the use of medications and various forms including oral versus topical versus injection treatment modalities.    Placed emphasis on discussion of oral medications including possible side effects: The possibility of it causing drowsiness, lethargy and other side effects was also discussed with the patient as well as proper usage and the need to call with any questions, concerns or problems if they should arise.    Patient may utilize over-the-counter Tylenol or extra strength Tylenol as well as ibuprofen when appropriate for pain control.    The following medication: Patient to continue use of the Dakin's cleansing solution for the wound site was prescribed.  This medication is being utilized to help reduce or treat one or more the patient's underlying comorbidities.  I discussed with the patient at length the risks, benefits, possible complications of utilization of the this medication. Failure for the patient to obtain the following medication should not utilize it as prescribed may result in long-term issues, including pain, poor/delayed healing, difficulty functioning in their job, regression or worsening symptoms over time .  It may also result in worsening of the underlying condition for which the patient is being treated. Failure of the patient's insurance company to allow the patient to utilize this medication for ANY reason, made detrimentally affect their ability to return to full functional capacity may affect the patient adversely.  Delayed/Denial by their insurance company to fill the medication for any reason, including but not limited to, the need for prior authorization may likewise lead to detrimental  effects and inhibit patient's ability to return to full functional capacity.    Imaging: X-rays/MRI/CT scan/bone scan/ultrasound/advanced imaging:  Discussion of imaging: Which may include MRI, CT scan, bone scan, ultrasound, most commonly x-rays: Reviewed previous imaging and advised patient we will obtain x-rays in the near future.    When applicable I independently reviewed and evaluated patient's images with the patient present discussing the multiple views and what they entail.  We reviewed the images in detail:  We reviewed the anatomy as well as the pathology that was demonstrated in the images.  I went over the patient's underlying condition and demonstrated these on the images.    --We reviewed possible treatment options as well as possible surgical intervention pertinent to this particular condition.  --All the patient's questions and concerns guarding imaging were addressed at this time.  -- The possible need for further imaging was also discussed including more advanced imaging such as MRIs, CT scans,, ultrasound, and bone scans.    Labs:  Discussed patient's labs as noted above and discussed the possibility of requiring further labs in the future: We reviewed different comorbidities that can affect the labs:  Reviewed labs as noted above    Injections:  Obtaining consent for injection procedure when appropriate:  Deferred injection at this time    Procedures/treatments:  Obtained consent for in office procedure when appropriate:  Ulcer debridement: Plantar aspect right heel  Using sterile technique, sharp excisional debridement of the wound was performed with a sterile 15 blade through muscular and fascial layer using both sharp excisional debridement with a sterile 15 blade and blunt dissection of the tissue.    -- Resection and removal of devitalized, hypertrophic and/or necrotic tissue was performed at the site.    --Good granular tissue was noted in the bottommost layer, and the wound site was  cleansed thereafter.    --Positive sanguinous drainage was noted after debridement of the site.  I continue to strongly recommend that the patient offload the area is much as possible in order to reduce pressure to the wound site.  Patient also continue with a Betadine and dry dressing as well as an ABD pad Curlex and Ace wrap at this time.  He is to continue cleansing it when he gets home and to utilize the Dakin's solution as previously instructed    Follow-up:  Patient will follow-up in 4 and 8 weeks for reevaluation.        Juarez Siddiqui DPM    Note to patient:    The 21st Century Cures Act makes medical notes like these available to patients in the interest of transparency.  However, be advised this is a medical document.  It is intended as peer to peer communication.  It is written in medical language and may contain abbreviations or verbiage that are unfamiliar.  It may appear blunt or direct.  Medical documents are intended to carry relevant information, facts as evident, and the clinical opinion of the practioner.     :  Use of voice recognize  devices may lead to transcription error due to failure of the computer to recognize proper terminology utilized by the provider.  These may inadvertently become a part of the medical record.  These do not indicate a error in evaluation or assessment or plan but merely representing transcription error.

## 2025-07-10 LAB — DRUGS UR: NORMAL

## 2025-07-15 ENCOUNTER — HOSPITAL ENCOUNTER (OUTPATIENT)
Dept: CT IMAGING | Age: 70
Discharge: HOME | End: 2025-07-17
Payer: MEDICARE

## 2025-07-15 DIAGNOSIS — M48.062 SPINAL STENOSIS OF LUMBAR REGION WITH NEUROGENIC CLAUDICATION: ICD-10-CM

## 2025-07-15 PROCEDURE — 72131 CT LUMBAR SPINE W/O DYE: CPT

## 2025-07-21 ENCOUNTER — OFFICE VISIT (OUTPATIENT)
Dept: FAMILY MEDICINE CLINIC | Facility: CLINIC | Age: 70
End: 2025-07-21
Payer: MEDICARE

## 2025-07-21 VITALS
BODY MASS INDEX: 36.21 KG/M2 | HEART RATE: 68 BPM | TEMPERATURE: 98 F | OXYGEN SATURATION: 95 % | WEIGHT: 267 LBS | DIASTOLIC BLOOD PRESSURE: 64 MMHG | SYSTOLIC BLOOD PRESSURE: 148 MMHG

## 2025-07-21 DIAGNOSIS — L98.491 SKIN ULCER, LIMITED TO BREAKDOWN OF SKIN (HCC): Primary | ICD-10-CM

## 2025-07-21 DIAGNOSIS — G62.9 PERIPHERAL POLYNEUROPATHY: ICD-10-CM

## 2025-07-21 DIAGNOSIS — E11.65 UNCONTROLLED TYPE 2 DIABETES MELLITUS WITH HYPERGLYCEMIA (HCC): ICD-10-CM

## 2025-07-21 DIAGNOSIS — L03.90 CHRONIC CELLULITIS: ICD-10-CM

## 2025-07-21 DIAGNOSIS — Z59.00 HOMELESSNESS: ICD-10-CM

## 2025-07-21 DIAGNOSIS — Z86.73 HISTORY OF STROKE: ICD-10-CM

## 2025-07-21 DIAGNOSIS — J02.9 SORE THROAT: ICD-10-CM

## 2025-07-21 DIAGNOSIS — I69.354 HEMIPLEGIA OF LEFT NONDOMINANT SIDE AS LATE EFFECT OF CEREBRAL INFARCTION, UNSPECIFIED HEMIPLEGIA TYPE (HCC): ICD-10-CM

## 2025-07-21 PROCEDURE — G8417 CALC BMI ABV UP PARAM F/U: HCPCS | Performed by: PHYSICIAN ASSISTANT

## 2025-07-21 PROCEDURE — 4004F PT TOBACCO SCREEN RCVD TLK: CPT | Performed by: PHYSICIAN ASSISTANT

## 2025-07-21 PROCEDURE — 1159F MED LIST DOCD IN RCRD: CPT | Performed by: PHYSICIAN ASSISTANT

## 2025-07-21 PROCEDURE — 3017F COLORECTAL CA SCREEN DOC REV: CPT | Performed by: PHYSICIAN ASSISTANT

## 2025-07-21 PROCEDURE — 99214 OFFICE O/P EST MOD 30 MIN: CPT | Performed by: PHYSICIAN ASSISTANT

## 2025-07-21 PROCEDURE — 2022F DILAT RTA XM EVC RTNOPTHY: CPT | Performed by: PHYSICIAN ASSISTANT

## 2025-07-21 PROCEDURE — 3078F DIAST BP <80 MM HG: CPT | Performed by: PHYSICIAN ASSISTANT

## 2025-07-21 PROCEDURE — 1123F ACP DISCUSS/DSCN MKR DOCD: CPT | Performed by: PHYSICIAN ASSISTANT

## 2025-07-21 PROCEDURE — 3077F SYST BP >= 140 MM HG: CPT | Performed by: PHYSICIAN ASSISTANT

## 2025-07-21 PROCEDURE — 1160F RVW MEDS BY RX/DR IN RCRD: CPT | Performed by: PHYSICIAN ASSISTANT

## 2025-07-21 PROCEDURE — 3051F HG A1C>EQUAL 7.0%<8.0%: CPT | Performed by: PHYSICIAN ASSISTANT

## 2025-07-21 PROCEDURE — G8427 DOCREV CUR MEDS BY ELIG CLIN: HCPCS | Performed by: PHYSICIAN ASSISTANT

## 2025-07-21 SDOH — ECONOMIC STABILITY - HOUSING INSECURITY: HOMELESSNESS UNSPECIFIED: Z59.00

## 2025-07-21 ASSESSMENT — PATIENT HEALTH QUESTIONNAIRE - PHQ9
2. FEELING DOWN, DEPRESSED OR HOPELESS: NOT AT ALL
SUM OF ALL RESPONSES TO PHQ QUESTIONS 1-9: 0
SUM OF ALL RESPONSES TO PHQ QUESTIONS 1-9: 0
1. LITTLE INTEREST OR PLEASURE IN DOING THINGS: NOT AT ALL
SUM OF ALL RESPONSES TO PHQ QUESTIONS 1-9: 0
SUM OF ALL RESPONSES TO PHQ QUESTIONS 1-9: 0

## 2025-07-21 ASSESSMENT — ENCOUNTER SYMPTOMS
SHORTNESS OF BREATH: 0
COUGH: 1
BLOOD IN STOOL: 0
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
## 2025-07-23 PROBLEM — I63.9 CVA (CEREBRAL VASCULAR ACCIDENT) (HCC): Status: RESOLVED | Noted: 2023-10-13 | Resolved: 2025-07-23

## 2025-07-23 PROBLEM — I61.9 CVA (CEREBROVASCULAR ACCIDENT DUE TO INTRACEREBRAL HEMORRHAGE) (HCC): Status: RESOLVED | Noted: 2024-01-10 | Resolved: 2025-07-23

## 2025-08-20 ENCOUNTER — OFFICE VISIT (OUTPATIENT)
Age: 70
End: 2025-08-20
Payer: MEDICARE

## 2025-08-20 DIAGNOSIS — R29.898 LEFT LEG WEAKNESS: ICD-10-CM

## 2025-08-20 DIAGNOSIS — M79.2 NEUROGENIC PAIN DUE TO CENTRAL NERVOUS SYSTEM ABNORMALITY FOLLOWING STROKE: ICD-10-CM

## 2025-08-20 DIAGNOSIS — M48.062 SPINAL STENOSIS OF LUMBAR REGION WITH NEUROGENIC CLAUDICATION: Primary | ICD-10-CM

## 2025-08-20 DIAGNOSIS — M54.59 LUMBAR FACET JOINT PAIN: ICD-10-CM

## 2025-08-20 DIAGNOSIS — I69.398 NEUROGENIC PAIN DUE TO CENTRAL NERVOUS SYSTEM ABNORMALITY FOLLOWING STROKE: ICD-10-CM

## 2025-08-20 PROCEDURE — G8417 CALC BMI ABV UP PARAM F/U: HCPCS | Performed by: PHYSICIAN ASSISTANT

## 2025-08-20 PROCEDURE — G8427 DOCREV CUR MEDS BY ELIG CLIN: HCPCS | Performed by: PHYSICIAN ASSISTANT

## 2025-08-20 PROCEDURE — 1159F MED LIST DOCD IN RCRD: CPT | Performed by: PHYSICIAN ASSISTANT

## 2025-08-20 PROCEDURE — 4004F PT TOBACCO SCREEN RCVD TLK: CPT | Performed by: PHYSICIAN ASSISTANT

## 2025-08-20 PROCEDURE — 1123F ACP DISCUSS/DSCN MKR DOCD: CPT | Performed by: PHYSICIAN ASSISTANT

## 2025-08-20 PROCEDURE — 99214 OFFICE O/P EST MOD 30 MIN: CPT | Performed by: PHYSICIAN ASSISTANT

## 2025-08-20 PROCEDURE — 3017F COLORECTAL CA SCREEN DOC REV: CPT | Performed by: PHYSICIAN ASSISTANT

## 2025-08-20 RX ORDER — NORTRIPTYLINE HYDROCHLORIDE 25 MG/1
25 CAPSULE ORAL NIGHTLY
Qty: 30 CAPSULE | Refills: 5 | Status: SHIPPED | OUTPATIENT
Start: 2025-08-20 | End: 2026-02-16

## 2025-08-20 RX ORDER — PREGABALIN 100 MG/1
100 CAPSULE ORAL 3 TIMES DAILY
Qty: 90 CAPSULE | Refills: 2 | Status: SHIPPED | OUTPATIENT
Start: 2025-08-20 | End: 2025-11-18

## 2025-08-20 RX ORDER — TRAMADOL HYDROCHLORIDE 50 MG/1
50 TABLET ORAL EVERY 8 HOURS PRN
Qty: 90 TABLET | Refills: 1 | Status: SHIPPED | OUTPATIENT
Start: 2025-08-20 | End: 2025-10-19

## 2025-08-20 ASSESSMENT — ENCOUNTER SYMPTOMS
ALLERGIC/IMMUNOLOGIC NEGATIVE: 1
SHORTNESS OF BREATH: 0
EYES NEGATIVE: 1
ABDOMINAL PAIN: 0

## 2025-09-04 ENCOUNTER — OFFICE VISIT (OUTPATIENT)
Age: 70
End: 2025-09-04

## 2025-09-04 DIAGNOSIS — M47.817 LUMBOSACRAL SPONDYLOSIS WITHOUT MYELOPATHY: ICD-10-CM

## 2025-09-04 DIAGNOSIS — M54.59 LUMBAR FACET JOINT PAIN: ICD-10-CM

## 2025-09-04 DIAGNOSIS — M48.062 SPINAL STENOSIS OF LUMBAR REGION WITH NEUROGENIC CLAUDICATION: ICD-10-CM

## 2025-09-04 DIAGNOSIS — M54.16 LUMBAR RADICULOPATHY: Primary | ICD-10-CM

## (undated) DEVICE — Z DISCONTINUED CANNULA NSL ORAL AD FOR CAPNOFLEX CO2 O2 AIRLFE

## (undated) DEVICE — CATHETER DIAG 6FR L100CM LUMN ID0.056IN JR4 CRV 0 SIDE H

## (undated) DEVICE — CLIP LIG SM TI 20 BLU HNDL FOR OPN AND ENDOSCP SGL APPL

## (undated) DEVICE — GAUZE,SPONGE,4"X4",12PLY,WOVEN,NS,LF: Brand: MEDLINE

## (undated) DEVICE — GUIDEWIRE VASC L260CM DIA0.035IN RAD 3MM J TIP L7CM PTFE

## (undated) DEVICE — COR-KNOT® QUICK LOAD® SINGLES: Brand: COR-KNOT® QUICK LOAD®

## (undated) DEVICE — PERCUTANEOUS INSERTION KIT-ARTERIAL: Brand: PERCUTANEOUS INSERTION KIT-ARTERIAL

## (undated) DEVICE — RADIFOCUS OPTITORQUE ANGIOGRAPHIC CATHETER: Brand: OPTITORQUE

## (undated) DEVICE — DRAIN SURG SGL COLL PT TB FOR ATS BG OASIS

## (undated) DEVICE — GUIDE SURG SELECTION FOR GILLINOV COSGROVE LAA EXCLUSION SYS

## (undated) DEVICE — SINGLE PORT MANIFOLD: Brand: NEPTUNE 2

## (undated) DEVICE — CATHETER COR DIAG PIGTAILS PIG 145 CRV 5FR 110CM 6 SIDE H

## (undated) DEVICE — COR-KNOT MINI® COMBO KITBASE PACKAGE TYPE - KITEACH STERILE PACKAGE KIT CONTAINS (2) SINGLE PATIENT USE COR-KNOT MINI® DEVICES AND (12) COR-KNOT® QUICK LOADS®.: Brand: COR-KNOT MINI®

## (undated) DEVICE — CABG DR DENNIS: Brand: MEDLINE INDUSTRIES, INC.

## (undated) DEVICE — CATHETER DIAG 6FR L100CM LUMN ID0.056IN JL4 CRV 0 SIDE H

## (undated) DEVICE — CANISTER, RIGID, 3000CC: Brand: MEDLINE INDUSTRIES, INC.

## (undated) DEVICE — FLOTRAC SENSOR WITH VAMP SYSTEM 60" / 152CM: Brand: FLOTRAC, VAMP

## (undated) DEVICE — GAUZE,SPONGE,4"X4",16PLY,STRL,LF,10/TRAY: Brand: MEDLINE

## (undated) DEVICE — BAND COMPR L24CM REG CLR PLAS HEMSTAT EXT HK AND LOOP RETEN

## (undated) DEVICE — COR-KNOT® QUICK LOAD® 6-POUCH: Brand: COR-KNOT® QUICK LOAD®

## (undated) DEVICE — KENDALL RADIOLUCENT FOAM MONITORING ELECTRODE RECTANGULAR SHAPE: Brand: KENDALL

## (undated) DEVICE — SOLUTION IRRIG 3000ML 0.9% SOD CHL USP UROMATIC PLAS CONT

## (undated) DEVICE — PERFUSION PACK XCOATING 76320] TERUMO CARDIOVASCULAR]

## (undated) DEVICE — CATHETER THOR 32FR L23IN PVC 6 EYELET STR ATRAUM

## (undated) DEVICE — CATHETERIZATION KIT 9 FRX115 CM CV DL ARROWG+ARD +

## (undated) DEVICE — BLOCK BITE AD 60FR W/ VELC STRP ADDRESSES MOST PT AND

## (undated) DEVICE — SUTURE VCRL 3-0 L36IN ABSRB UD CT L40MM 1/2 CIR TAPERPOINT J956H

## (undated) DEVICE — SUTURE VCRL SZ 3-0 L27IN ABSRB UD L24MM PS-1 3/8 CIR PRIM J936H

## (undated) DEVICE — DEVICE COMPR LNG 27 CM VASC BND

## (undated) DEVICE — GLIDESHEATH SLENDER STAINLESS STEEL KIT: Brand: GLIDESHEATH SLENDER

## (undated) DEVICE — ENDOSCOPIC KIT 1.1+ OP4 CA DE 2 GWN AAMI LEVEL 3

## (undated) DEVICE — CATHETER GUID 6FR L100CM COR PERIPH BLU NYL COAT PTFE LNR 67027800]

## (undated) DEVICE — ADHESIVE SKIN CLSR 0.7ML TOP DERMBND ADV

## (undated) DEVICE — TTL1LYR 16FR10ML 100%SIL TMPST TR: Brand: MEDLINE

## (undated) DEVICE — CATHETER THOR 32FR L23IN PVC 5 EYELET STR ATRAUM

## (undated) DEVICE — PRESSURE MONITORING SET: Brand: TRUWAVE, VAMP PLUS

## (undated) DEVICE — CATHETER DIAG 6FR L110CM PIGTAILS CRV STYL PIG145 DXTERITY

## (undated) DEVICE — SOLUTION IV 1000ML LAC RINGERS PH 6.5 INJ USP VIAFLX PLAS

## (undated) DEVICE — MOUTHPIECE ENDOSCP L CTRL OPN AND SIDE PORTS DISP

## (undated) DEVICE — CONNECTOR TBNG OD5-7MM O2 END DISP

## (undated) DEVICE — SOLUTION IRRIG 1000ML 0.9% SOD CHL USP POUR PLAS BTL

## (undated) DEVICE — GUIDEWIRE 035IN 210CM PTFE COAT FIX COR J TIP 15MM FIRM BODY

## (undated) DEVICE — AUTOTRANSFUSION KIT 120 MH FAST STRT AT1 FOR CATS +

## (undated) DEVICE — AIRLIFE™ OXYGEN TUBING 7 FEET (2.1 M) CRUSH RESISTANT OXYGEN TUBING, VINYL TIPPED: Brand: AIRLIFE™

## (undated) DEVICE — CLAMP ABLAT JAW L53MM L CRV 2 ELECTRD BPLR

## (undated) DEVICE — MPS® DELIVERY SET WITH 6 FT. DELIVERY TUBING: Brand: MPS